# Patient Record
Sex: FEMALE | Race: BLACK OR AFRICAN AMERICAN | Employment: OTHER | URBAN - METROPOLITAN AREA
[De-identification: names, ages, dates, MRNs, and addresses within clinical notes are randomized per-mention and may not be internally consistent; named-entity substitution may affect disease eponyms.]

---

## 2018-08-06 ENCOUNTER — HOSPITAL ENCOUNTER (INPATIENT)
Age: 63
LOS: 7 days | Discharge: NURSING, REHAB OR LONG-TERM CARE | DRG: 673 | End: 2018-08-14
Attending: EMERGENCY MEDICINE | Admitting: FAMILY MEDICINE
Payer: MEDICARE

## 2018-08-06 DIAGNOSIS — N18.6 END STAGE RENAL DISEASE (HCC): ICD-10-CM

## 2018-08-06 DIAGNOSIS — N18.9 ACUTE ON CHRONIC RENAL INSUFFICIENCY: Primary | ICD-10-CM

## 2018-08-06 DIAGNOSIS — N28.9 ACUTE ON CHRONIC RENAL INSUFFICIENCY: Primary | ICD-10-CM

## 2018-08-06 DIAGNOSIS — T82.898A ARTERIOVENOUS FISTULA OCCLUSION (HCC): ICD-10-CM

## 2018-08-06 LAB
ANION GAP SERPL CALC-SCNC: 8 MMOL/L (ref 3–18)
BUN SERPL-MCNC: 68 MG/DL (ref 7–18)
BUN/CREAT SERPL: 11 (ref 12–20)
CALCIUM SERPL-MCNC: 8.5 MG/DL (ref 8.5–10.1)
CHLORIDE SERPL-SCNC: 118 MMOL/L (ref 100–108)
CO2 SERPL-SCNC: 25 MMOL/L (ref 21–32)
CREAT SERPL-MCNC: 6.12 MG/DL (ref 0.6–1.3)
GLUCOSE SERPL-MCNC: 140 MG/DL (ref 74–99)
POTASSIUM SERPL-SCNC: 3.8 MMOL/L (ref 3.5–5.5)
SODIUM SERPL-SCNC: 151 MMOL/L (ref 136–145)

## 2018-08-06 PROCEDURE — 99285 EMERGENCY DEPT VISIT HI MDM: CPT

## 2018-08-06 PROCEDURE — 85025 COMPLETE CBC W/AUTO DIFF WBC: CPT | Performed by: EMERGENCY MEDICINE

## 2018-08-06 PROCEDURE — 80048 BASIC METABOLIC PNL TOTAL CA: CPT | Performed by: EMERGENCY MEDICINE

## 2018-08-06 PROCEDURE — 81003 URINALYSIS AUTO W/O SCOPE: CPT | Performed by: EMERGENCY MEDICINE

## 2018-08-06 PROCEDURE — 93005 ELECTROCARDIOGRAM TRACING: CPT

## 2018-08-06 PROCEDURE — 81001 URINALYSIS AUTO W/SCOPE: CPT | Performed by: EMERGENCY MEDICINE

## 2018-08-06 NOTE — Clinical Note
TRANSFER - IN REPORT:  
 
Verbal report received from: [employer]. Report consisted of patient's Situation, Background, Assessment and  
Recommendations(SBAR). Opportunity for questions and clarification was provided. Assessment completed upon patient's arrival to unit and care assumed.

## 2018-08-06 NOTE — Clinical Note
Consent: signed. Proceeding with Tunnelled catheter. The right internal jugular vein accessed. A Dialysis (permanent) catheter will be used. Line secured using suture, tegaderm and Biopatch.

## 2018-08-06 NOTE — Clinical Note
TRANSFER - OUT REPORT:  
 
Verbal report given to: JEAN-CLAUDE rn. Report consisted of patient's Situation, Background, Assessment and  
Recommendations(SBAR). Opportunity for questions and clarification was provided.

## 2018-08-06 NOTE — Clinical Note
Right internal jugular vein. accessed successfully using ultrasound guidance.  Number of attempts =  1.

## 2018-08-06 NOTE — Clinical Note
Access obtained. Central venous catheter verified. Line secured using suture and other (document in comment page). Procedure was tolerated well.

## 2018-08-07 ENCOUNTER — APPOINTMENT (OUTPATIENT)
Dept: ULTRASOUND IMAGING | Age: 63
DRG: 673 | End: 2018-08-07
Attending: FAMILY MEDICINE
Payer: MEDICARE

## 2018-08-07 PROBLEM — I10 HYPERTENSION: Status: ACTIVE | Noted: 2018-08-07

## 2018-08-07 PROBLEM — N18.9 ACUTE ON CHRONIC RENAL INSUFFICIENCY: Status: ACTIVE | Noted: 2018-08-07

## 2018-08-07 PROBLEM — N28.9 ACUTE ON CHRONIC RENAL INSUFFICIENCY: Status: ACTIVE | Noted: 2018-08-07

## 2018-08-07 LAB
ALBUMIN SERPL-MCNC: 1.9 G/DL (ref 3.4–5)
ALBUMIN/GLOB SERPL: 0.5 {RATIO} (ref 0.8–1.7)
ALP SERPL-CCNC: 117 U/L (ref 45–117)
ALT SERPL-CCNC: 30 U/L (ref 13–56)
ANION GAP SERPL CALC-SCNC: 10 MMOL/L (ref 3–18)
APPEARANCE UR: CLEAR
APPEARANCE UR: CLEAR
AST SERPL-CCNC: 45 U/L (ref 15–37)
ATRIAL RATE: 78 BPM
BACTERIA URNS QL MICRO: ABNORMAL /HPF
BASOPHILS # BLD: 0 K/UL (ref 0–0.1)
BASOPHILS NFR BLD: 0 % (ref 0–2)
BILIRUB SERPL-MCNC: 0.2 MG/DL (ref 0.2–1)
BILIRUB UR QL: NEGATIVE
BILIRUB UR QL: NEGATIVE
BUN SERPL-MCNC: 60 MG/DL (ref 7–18)
BUN/CREAT SERPL: 10 (ref 12–20)
CALCIUM SERPL-MCNC: 8.2 MG/DL (ref 8.5–10.1)
CALCULATED P AXIS, ECG09: 59 DEGREES
CALCULATED R AXIS, ECG10: -28 DEGREES
CALCULATED T AXIS, ECG11: 35 DEGREES
CHLORIDE SERPL-SCNC: 117 MMOL/L (ref 100–108)
CO2 SERPL-SCNC: 17 MMOL/L (ref 21–32)
COLOR UR: YELLOW
COLOR UR: YELLOW
CREAT SERPL-MCNC: 5.85 MG/DL (ref 0.6–1.3)
CREAT UR-MCNC: 41.2 MG/DL (ref 30–125)
DIAGNOSIS, 93000: NORMAL
DIFFERENTIAL METHOD BLD: ABNORMAL
EOSINOPHIL # BLD: 0.2 K/UL (ref 0–0.4)
EOSINOPHIL NFR BLD: 2 % (ref 0–5)
EPITH CASTS URNS QL MICRO: ABNORMAL /LPF (ref 0–5)
EPITH CASTS URNS QL MICRO: NORMAL /LPF (ref 0–5)
ERYTHROCYTE [DISTWIDTH] IN BLOOD BY AUTOMATED COUNT: 18 % (ref 11.6–14.5)
GLOBULIN SER CALC-MCNC: 4.2 G/DL (ref 2–4)
GLUCOSE BLD STRIP.AUTO-MCNC: 145 MG/DL (ref 70–110)
GLUCOSE BLD STRIP.AUTO-MCNC: 188 MG/DL (ref 70–110)
GLUCOSE BLD STRIP.AUTO-MCNC: 201 MG/DL (ref 70–110)
GLUCOSE BLD STRIP.AUTO-MCNC: 74 MG/DL (ref 70–110)
GLUCOSE SERPL-MCNC: 155 MG/DL (ref 74–99)
GLUCOSE UR STRIP.AUTO-MCNC: 100 MG/DL
GLUCOSE UR STRIP.AUTO-MCNC: 250 MG/DL
HCT VFR BLD AUTO: 36.9 % (ref 35–45)
HGB BLD-MCNC: 12.3 G/DL (ref 12–16)
HGB UR QL STRIP: ABNORMAL
HGB UR QL STRIP: ABNORMAL
KETONES UR QL STRIP.AUTO: NEGATIVE MG/DL
KETONES UR QL STRIP.AUTO: NEGATIVE MG/DL
LEUKOCYTE ESTERASE UR QL STRIP.AUTO: NEGATIVE
LEUKOCYTE ESTERASE UR QL STRIP.AUTO: NEGATIVE
LYMPHOCYTES # BLD: 1.5 K/UL (ref 0.9–3.6)
LYMPHOCYTES NFR BLD: 14 % (ref 21–52)
MCH RBC QN AUTO: 27.6 PG (ref 24–34)
MCHC RBC AUTO-ENTMCNC: 33.3 G/DL (ref 31–37)
MCV RBC AUTO: 82.9 FL (ref 74–97)
MONOCYTES # BLD: 0.8 K/UL (ref 0.05–1.2)
MONOCYTES NFR BLD: 8 % (ref 3–10)
MUCOUS THREADS URNS QL MICRO: ABNORMAL /LPF
NEUTS SEG # BLD: 8 K/UL (ref 1.8–8)
NEUTS SEG NFR BLD: 76 % (ref 40–73)
NITRITE UR QL STRIP.AUTO: NEGATIVE
NITRITE UR QL STRIP.AUTO: NEGATIVE
P-R INTERVAL, ECG05: 124 MS
PH UR STRIP: 6.5 [PH] (ref 5–8)
PH UR STRIP: 6.5 [PH] (ref 5–8)
PLATELET # BLD AUTO: 286 K/UL (ref 135–420)
PLATELET COMMENTS,PCOM: ABNORMAL
PMV BLD AUTO: 12.1 FL (ref 9.2–11.8)
POTASSIUM SERPL-SCNC: 4.1 MMOL/L (ref 3.5–5.5)
PROT SERPL-MCNC: 6.1 G/DL (ref 6.4–8.2)
PROT UR STRIP-MCNC: >1000 MG/DL
PROT UR STRIP-MCNC: >1000 MG/DL
PROT UR-MCNC: 436 MG/DL
PROT/CREAT UR-RTO: 10.6
Q-T INTERVAL, ECG07: 352 MS
QRS DURATION, ECG06: 80 MS
QTC CALCULATION (BEZET), ECG08: 401 MS
RBC # BLD AUTO: 4.45 M/UL (ref 4.2–5.3)
RBC #/AREA URNS HPF: ABNORMAL /HPF (ref 0–5)
RBC #/AREA URNS HPF: NORMAL /HPF (ref 0–5)
RBC MORPH BLD: ABNORMAL
SODIUM SERPL-SCNC: 144 MMOL/L (ref 136–145)
SODIUM UR-SCNC: 40 MMOL/L (ref 20–110)
SP GR UR REFRACTOMETRY: 1.01 (ref 1–1.03)
SP GR UR REFRACTOMETRY: 1.01 (ref 1–1.03)
UROBILINOGEN UR QL STRIP.AUTO: 0.2 EU/DL (ref 0.2–1)
UROBILINOGEN UR QL STRIP.AUTO: 0.2 EU/DL (ref 0.2–1)
VENTRICULAR RATE, ECG03: 78 BPM
WBC # BLD AUTO: 10.5 K/UL (ref 4.6–13.2)
WBC URNS QL MICRO: ABNORMAL /HPF (ref 0–4)
WBC URNS QL MICRO: NORMAL /HPF (ref 0–4)

## 2018-08-07 PROCEDURE — 74011636637 HC RX REV CODE- 636/637: Performed by: FAMILY MEDICINE

## 2018-08-07 PROCEDURE — 74011250637 HC RX REV CODE- 250/637: Performed by: FAMILY MEDICINE

## 2018-08-07 PROCEDURE — 80053 COMPREHEN METABOLIC PANEL: CPT | Performed by: INTERNAL MEDICINE

## 2018-08-07 PROCEDURE — 65270000029 HC RM PRIVATE

## 2018-08-07 PROCEDURE — 84300 ASSAY OF URINE SODIUM: CPT | Performed by: INTERNAL MEDICINE

## 2018-08-07 PROCEDURE — 84156 ASSAY OF PROTEIN URINE: CPT | Performed by: INTERNAL MEDICINE

## 2018-08-07 PROCEDURE — 74011250636 HC RX REV CODE- 250/636: Performed by: INTERNAL MEDICINE

## 2018-08-07 PROCEDURE — 82962 GLUCOSE BLOOD TEST: CPT

## 2018-08-07 PROCEDURE — 81001 URINALYSIS AUTO W/SCOPE: CPT | Performed by: INTERNAL MEDICINE

## 2018-08-07 PROCEDURE — 36415 COLL VENOUS BLD VENIPUNCTURE: CPT | Performed by: INTERNAL MEDICINE

## 2018-08-07 PROCEDURE — 76770 US EXAM ABDO BACK WALL COMP: CPT

## 2018-08-07 RX ORDER — DILTIAZEM HYDROCHLORIDE 240 MG/1
240 CAPSULE, COATED, EXTENDED RELEASE ORAL DAILY
Status: DISCONTINUED | OUTPATIENT
Start: 2018-08-07 | End: 2018-08-14 | Stop reason: HOSPADM

## 2018-08-07 RX ORDER — HYDRALAZINE HYDROCHLORIDE 25 MG/1
25 TABLET, FILM COATED ORAL 3 TIMES DAILY
Status: DISCONTINUED | OUTPATIENT
Start: 2018-08-07 | End: 2018-08-10

## 2018-08-07 RX ORDER — SIMVASTATIN 20 MG/1
20 TABLET, FILM COATED ORAL
Status: DISCONTINUED | OUTPATIENT
Start: 2018-08-07 | End: 2018-08-14 | Stop reason: HOSPADM

## 2018-08-07 RX ORDER — PARICALCITOL 1 UG/1
1 CAPSULE, LIQUID FILLED ORAL EVERY OTHER DAY
Status: DISCONTINUED | OUTPATIENT
Start: 2018-08-07 | End: 2018-08-08

## 2018-08-07 RX ORDER — ACETAMINOPHEN 325 MG/1
325 TABLET ORAL
Status: DISCONTINUED | OUTPATIENT
Start: 2018-08-07 | End: 2018-08-14 | Stop reason: HOSPADM

## 2018-08-07 RX ORDER — INSULIN LISPRO 100 [IU]/ML
INJECTION, SOLUTION INTRAVENOUS; SUBCUTANEOUS
Status: DISCONTINUED | OUTPATIENT
Start: 2018-08-07 | End: 2018-08-14 | Stop reason: HOSPADM

## 2018-08-07 RX ORDER — DEXTROSE 50 % IN WATER (D50W) INTRAVENOUS SYRINGE
25-50 AS NEEDED
Status: DISCONTINUED | OUTPATIENT
Start: 2018-08-07 | End: 2018-08-14 | Stop reason: HOSPADM

## 2018-08-07 RX ORDER — SODIUM CHLORIDE 9 MG/ML
75 INJECTION, SOLUTION INTRAVENOUS CONTINUOUS
Status: DISCONTINUED | OUTPATIENT
Start: 2018-08-07 | End: 2018-08-10

## 2018-08-07 RX ORDER — MAGNESIUM SULFATE 100 %
16 CRYSTALS MISCELLANEOUS AS NEEDED
Status: DISCONTINUED | OUTPATIENT
Start: 2018-08-07 | End: 2018-08-14 | Stop reason: HOSPADM

## 2018-08-07 RX ORDER — CLONIDINE HYDROCHLORIDE 0.1 MG/1
0.1 TABLET ORAL 2 TIMES DAILY
Status: DISCONTINUED | OUTPATIENT
Start: 2018-08-07 | End: 2018-08-14 | Stop reason: HOSPADM

## 2018-08-07 RX ADMIN — HYDRALAZINE HYDROCHLORIDE 25 MG: 25 TABLET, FILM COATED ORAL at 10:48

## 2018-08-07 RX ADMIN — INSULIN LISPRO 4 UNITS: 100 INJECTION, SOLUTION INTRAVENOUS; SUBCUTANEOUS at 14:03

## 2018-08-07 RX ADMIN — SODIUM CHLORIDE 75 ML/HR: 900 INJECTION, SOLUTION INTRAVENOUS at 12:36

## 2018-08-07 RX ADMIN — INSULIN LISPRO 2 UNITS: 100 INJECTION, SOLUTION INTRAVENOUS; SUBCUTANEOUS at 17:45

## 2018-08-07 RX ADMIN — HYDRALAZINE HYDROCHLORIDE 25 MG: 25 TABLET, FILM COATED ORAL at 17:18

## 2018-08-07 RX ADMIN — PARICALCITOL 1 MCG: 1 CAPSULE, LIQUID FILLED ORAL at 11:00

## 2018-08-07 RX ADMIN — DILTIAZEM HYDROCHLORIDE 240 MG: 240 CAPSULE, COATED, EXTENDED RELEASE ORAL at 10:48

## 2018-08-07 RX ADMIN — CLONIDINE HYDROCHLORIDE 0.1 MG: 0.1 TABLET ORAL at 10:48

## 2018-08-07 RX ADMIN — HYDRALAZINE HYDROCHLORIDE 25 MG: 25 TABLET, FILM COATED ORAL at 23:15

## 2018-08-07 RX ADMIN — SIMVASTATIN 20 MG: 20 TABLET, FILM COATED ORAL at 23:15

## 2018-08-07 RX ADMIN — CLONIDINE HYDROCHLORIDE 0.1 MG: 0.1 TABLET ORAL at 17:18

## 2018-08-07 NOTE — ED PROVIDER NOTES
[address] [id number] [facility]      11:03 PM    Date: 8/6/2018  Patient Name: Mo Sanchez    History of Presenting Illness     Chief Complaint   Patient presents with    Abnormal Lab Results       History Provided By: Patient and Caregiver    Chief Complaint: Abnormal Lab Results  Duration: 3 Hours  Timing:  Acute  Location: N/A  Quality: N/A  Severity: N/A  Modifying Factors: Nothing makes it better or worse  Associated Symptoms: denies any other associated signs or symptoms    70 y.o. female with noted past medical history of asthma, diabetes, HTN, and chronic kidney disease who presents to the emergency department from here caregiver that said that Dr. Sukumar Holly sent her over to the ER because she has some renal insufficiency and for further evaluation. As the patient is without physical symptoms or complaints of pain, there is no severity of pain, quality of pain, duration, modifying factors, or associated signs and symptoms regarding the pt's presenting complaint. No other concerns or symptoms at this time. No other complaints. Nursing notes regarding the HPI and triage nursing notes were reviewed. Prior medical records were reviewed.      Current Facility-Administered Medications   Medication Dose Route Frequency Provider Last Rate Last Dose    losartan (COZAAR) tablet 50 mg  50 mg Oral DAILY Juan Dickson MD   50 mg at 08/08/18 1411    epoetin raphael (EPOGEN;PROCRIT) injection 5,200 Units  5,200 Units IntraVENous DIALYSIS TUE, THU & SAT Juan Dickson MD        doxercalciferol (HECTOROL) 4 mcg/2 mL injection 1 mcg  1 mcg IntraVENous Q TU, TH & SAT Juan Dickson MD        insulin glargine (LANTUS) injection 10 Units  10 Units SubCUTAneous QHS Mandy Cao MD   10 Units at 08/08/18 2220    acetaminophen (TYLENOL) tablet 325 mg  325 mg Oral Q4H PRN Mandy Cao MD        dilTIAZem CD (CARDIZEM CD) capsule 240 mg  240 mg Oral DAILY Mandy Cao MD   240 mg at 08/08/18 0800    cloNIDine HCl (CATAPRES) tablet 0.1 mg  0.1 mg Oral BID Shauna Taylor MD   0.1 mg at 08/08/18 1817    hydrALAZINE (APRESOLINE) tablet 25 mg  25 mg Oral TID Shauna Taylor MD   25 mg at 08/08/18 2220    simvastatin (ZOCOR) tablet 20 mg  20 mg Oral QHS Shauna Taylor MD   20 mg at 08/08/18 2222    insulin lispro (HUMALOG) injection   SubCUTAneous AC&HS Shauna Taylor MD   4 Units at 08/08/18 2221    glucose chewable tablet 16 g  16 g Oral PRN Shauna Taylor MD        glucagon [facility]) injection 1 mg  1 mg IntraMUSCular PRN Shauna Taylor MD        dextrose (D50W) injection syrg 12.5-25 g  25-50 mL IntraVENous PRN Shauna Taylor MD   25 g at 08/09/18 0608    0.9% sodium chloride infusion  75 mL/hr IntraVENous CONTINUOUS Reyes Kelley MD 75 mL/hr at 08/09/18 0212 75 mL/hr at 08/09/18 0212       Past History     Past Medical History:  Past Medical History:   Diagnosis Date    Asthma     Bipolar affective disorder ([facility] [address]     Brain condition     Cataract     Chronic kidney disease     Diabetes ([facility] [address]     Hyperlipidemia     Hypertension     Paralytic strabismus, unspecified     Psychiatric disorder        Past Surgical History:  No past surgical history on file. Family History:  No family history on file. Social History:  Social History   Substance Use Topics    Smoking status: Never Smoker    Smokeless tobacco: Never Used    Alcohol use No       Allergies: Allergies   Allergen Reactions    Amoxicillin Unknown (comments)    Cleocin [Clindamycin Hcl] Unknown (comments)    Codeine Unknown (comments)    Lorcet 10/650 [Hydrocodone-Acetaminophen] Unknown (comments)    Penicillin G Benzathine Unknown (comments)    Shellfish Derived Unknown (comments)       Patient's primary care provider (as noted in EPIC):  Reanna Olson MD    Review of Systems   Constitutional: Negative for diaphoresis. HENT: Negative for congestion. Eyes: Negative for discharge.    Respiratory: Negative for stridor. Cardiovascular: Negative for palpitations. Gastrointestinal: Negative for diarrhea. Genitourinary: Negative for flank pain. Musculoskeletal: Negative for back pain. Neurological: Negative for weakness. Psychiatric/Behavioral: Negative for hallucinations. All other systems reviewed and are negative. Visit Vitals    /86 (BP 1 Location: Left arm, BP Patient Position: At rest)    Pulse 75    Temp 97.5 °F (36.4 °C)    Resp 20    Ht 5' 3\" (1.6 m)    Wt 65.7 kg (144 lb 13.5 oz)    SpO2 98%    Breastfeeding No    BMI 25.66 kg/m2       PHYSICAL EXAM:    CONSTITUTIONAL: Alert, in no apparent distress; well-developed; well-nourished. HEAD:  Normocephalic, atraumatic. No Battles sign. No Raccoons eyes. EYES:  EOM's intact. Normal conjunctiva. Anicteric sclera. ENTM: Nose: no rhinorrhea; Oropharynx:  mucous membranes moist  Neck:  No JVD. No cervical vertebral bony point tenderness or step-off. RESP: Chest clear, equal breath sounds. CARDIOVASCULAR:  Regular rate and rhythm. No murmurs, rubs, or gallops. GI: Normal bowel sounds, abdomen soft and non-tender. No masses or organomegaly. : No costo-vertebral angle tenderness. BACK:  No TLS vertebral bony point tenderness or step-off. UPPER EXT:  Normal inspection  LOWER EXT: No edema, no calf tenderness. Distal pulses intact. NEURO: Grossly normal motor and sensation. SKIN: No rashes; Normal for age and stage. PSYCH:  Alert and oriented, normal affect.     Diagnostic Study Results     Abnormal lab results from this emergency department encounter:  Labs Reviewed   CBC WITH AUTOMATED DIFF - Abnormal; Notable for the following:        Result Value    RDW 18.0 (*)     MPV 12.1 (*)     NEUTROPHILS 76 (*)     LYMPHOCYTES 14 (*)     All other components within normal limits   METABOLIC PANEL, BASIC - Abnormal; Notable for the following:     Sodium 151 (*)     Chloride 118 (*)     Glucose 140 (*)     BUN 68 (*) Creatinine 6.12 (*)     BUN/Creatinine ratio 11 (*)     GFR est AA 8 (*)     GFR est non-AA 7 (*)     All other components within normal limits   URINALYSIS W/ RFLX MICROSCOPIC - Abnormal; Notable for the following:     Protein >1000 (*)     Glucose 250 (*)     Blood TRACE (*)     All other components within normal limits   METABOLIC PANEL, COMPREHENSIVE - Abnormal; Notable for the following:     Chloride 117 (*)     CO2 17 (*)     Glucose 155 (*)     BUN 60 (*)     Creatinine 5.85 (*)     BUN/Creatinine ratio 10 (*)     GFR est AA 9 (*)     GFR est non-AA 7 (*)     Calcium 8.2 (*)     AST (SGOT) 45 (*)     Protein, total 6.1 (*)     Albumin 1.9 (*)     Globulin 4.2 (*)     A-G Ratio 0.5 (*)     All other components within normal limits   URINALYSIS W/ RFLX MICROSCOPIC - Abnormal; Notable for the following:     Protein >1000 (*)     Glucose 100 (*)     Blood TRACE (*)     All other components within normal limits   PROTEIN/CREATININE RATIO, URINE - Abnormal; Notable for the following:     Protein, urine random 436 (*)     All other components within normal limits   URINE MICROSCOPIC ONLY - Abnormal; Notable for the following:     Bacteria FEW (*)     Mucus 1+ (*)     All other components within normal limits   CBC WITH AUTOMATED DIFF - Abnormal; Notable for the following:     RBC 3.89 (*)     HGB 10.5 (*)     HCT 32.9 (*)     RDW 17.7 (*)     MPV 11.9 (*)     NEUTROPHILS 75 (*)     LYMPHOCYTES 18 (*)     All other components within normal limits   METABOLIC PANEL, BASIC - Abnormal; Notable for the following:     Sodium 150 (*)     Chloride 117 (*)     Glucose 108 (*)     BUN 58 (*)     Creatinine 5.60 (*)     BUN/Creatinine ratio 10 (*)     GFR est AA 9 (*)     GFR est non-AA 8 (*)     Calcium 8.1 (*)     All other components within normal limits   PTH INTACT - Abnormal; Notable for the following:     Calcium 7.7 (*)     PTH, Intact 585.1 (*)     All other components within normal limits   CBC WITH AUTOMATED DIFF - Abnormal; Notable for the following:     RBC 4.12 (*)     HGB 11.3 (*)     HCT 34.4 (*)     RDW 17.3 (*)     All other components within normal limits   METABOLIC PANEL, BASIC - Abnormal; Notable for the following:     Sodium 150 (*)     Potassium 3.3 (*)     Chloride 120 (*)     Glucose 49 (*)     BUN 49 (*)     Creatinine 5.25 (*)     BUN/Creatinine ratio 9 (*)     GFR est AA 10 (*)     GFR est non-AA 8 (*)     Calcium 8.2 (*)     All other components within normal limits   GLUCOSE, POC - Abnormal; Notable for the following:     Glucose (POC) 145 (*)     All other components within normal limits   GLUCOSE, POC - Abnormal; Notable for the following:     Glucose (POC) 201 (*)     All other components within normal limits   GLUCOSE, POC - Abnormal; Notable for the following:     Glucose (POC) 188 (*)     All other components within normal limits   GLUCOSE, POC - Abnormal; Notable for the following:     Glucose (POC) 173 (*)     All other components within normal limits   GLUCOSE, POC - Abnormal; Notable for the following:     Glucose (POC) 234 (*)     All other components within normal limits   GLUCOSE, POC - Abnormal; Notable for the following:     Glucose (POC) 141 (*)     All other components within normal limits   GLUCOSE, POC - Abnormal; Notable for the following:     Glucose (POC) 236 (*)     All other components within normal limits   GLUCOSE, POC - Abnormal; Notable for the following:     Glucose (POC) 50 (*)     All other components within normal limits   GLUCOSE, POC - Abnormal; Notable for the following:     Glucose (POC) 191 (*)     All other components within normal limits   URINE MICROSCOPIC ONLY   SODIUM, UR, RANDOM   MAGNESIUM   PHOSPHORUS   PHOSPHORUS   CBC WITH AUTOMATED DIFF   PHOSPHORUS   HEP B SURFACE AB   HEP B SURFACE AG   GLUCOSE, POC   GLUCOSE, POC       Lab values for this patient within approximately the last 12 hours:  Recent Results (from the past 12 hour(s))   GLUCOSE, POC    Collection Time: 08/08/18  9:58 PM   Result Value Ref Range    Glucose (POC) 236 (H) 70 - 110 mg/dL   CBC WITH AUTOMATED DIFF    Collection Time: 08/09/18  2:42 AM   Result Value Ref Range    WBC 7.1 4.6 - 13.2 K/uL    RBC 4.12 (L) 4.20 - 5.30 M/uL    HGB 11.3 (L) 12.0 - 16.0 g/dL    HCT 34.4 (L) 35.0 - 45.0 %    MCV 83.5 74.0 - 97.0 FL    MCH 27.4 24.0 - 34.0 PG    MCHC 32.8 31.0 - 37.0 g/dL    RDW 17.3 (H) 11.6 - 14.5 %    PLATELET 442 234 - 973 K/uL    MPV 10.9 9.2 - 11.8 FL    NEUTROPHILS 66 40 - 73 %    LYMPHOCYTES 23 21 - 52 %    MONOCYTES 8 3 - 10 %    EOSINOPHILS 3 0 - 5 %    BASOPHILS 0 0 - 2 %    ABS. NEUTROPHILS 4.7 1.8 - 8.0 K/UL    ABS. LYMPHOCYTES 1.7 0.9 - 3.6 K/UL    ABS. MONOCYTES 0.6 0.05 - 1.2 K/UL    ABS. EOSINOPHILS 0.2 0.0 - 0.4 K/UL    ABS. BASOPHILS 0.0 0.0 - 0.1 K/UL    DF AUTOMATED     METABOLIC PANEL, BASIC    Collection Time: 08/09/18  2:42 AM   Result Value Ref Range    Sodium 150 (H) 136 - 145 mmol/L    Potassium 3.3 (L) 3.5 - 5.5 mmol/L    Chloride 120 (H) 100 - 108 mmol/L    CO2 21 21 - 32 mmol/L    Anion gap 9 3.0 - 18 mmol/L    Glucose 49 (LL) 74 - 99 mg/dL    BUN 49 (H) 7.0 - 18 MG/DL    Creatinine 5.25 (H) 0.6 - 1.3 MG/DL    BUN/Creatinine ratio 9 (L) 12 - 20      GFR est AA 10 (L) >60 ml/min/1.73m2    GFR est non-AA 8 (L) >60 ml/min/1.73m2    Calcium 8.2 (L) 8.5 - 10.1 MG/DL   PHOSPHORUS    Collection Time: 08/09/18  2:42 AM   Result Value Ref Range    Phosphorus 4.7 2.5 - 4.9 MG/DL   GLUCOSE, POC    Collection Time: 08/09/18  3:28 AM   Result Value Ref Range    Glucose (POC) 50 (LL) 70 - 110 mg/dL   GLUCOSE, POC    Collection Time: 08/09/18  3:55 AM   Result Value Ref Range    Glucose (POC) 191 (H) 70 - 110 mg/dL       Radiologist and cardiologist interpretations if available at time of this note:  No results found.     Medication(s) ordered for patient during this emergency visit encounter:  Medications   acetaminophen (TYLENOL) tablet 325 mg (not administered)   dilTIAZem CD (CARDIZEM CD) capsule 240 mg (240 mg Oral Given 8/8/18 0800)   cloNIDine HCl (CATAPRES) tablet 0.1 mg (0.1 mg Oral Given 8/8/18 1817)   hydrALAZINE (APRESOLINE) tablet 25 mg (25 mg Oral Given 8/8/18 2220)   simvastatin (ZOCOR) tablet 20 mg (20 mg Oral Given 8/8/18 2222)   insulin lispro (HUMALOG) injection (4 Units SubCUTAneous Given 8/8/18 2221)   glucose chewable tablet 16 g (not administered)   glucagon (GLUCAGEN) injection 1 mg (not administered)   dextrose (D50W) injection syrg 12.5-25 g (25 g IntraVENous Given 8/9/18 0332)   0.9% sodium chloride infusion (75 mL/hr IntraVENous New Bag 8/9/18 0212)   losartan (COZAAR) tablet 50 mg (50 mg Oral Given 8/8/18 1411)   epoetin raphael (EPOGEN;PROCRIT) injection 5,200 Units (not administered)   doxercalciferol (HECTOROL) 4 mcg/2 mL injection 1 mcg (not administered)   insulin glargine (LANTUS) injection 10 Units (10 Units SubCUTAneous Given 8/8/18 2220)       Medical Decision Making     I am the first provider for this patient. I reviewed the vital signs, available nursing notes, past medical history, past surgical history, family history and social history. Vital Signs:  Reviewed the patient's vital signs. ED COURSE:      Admit to Patient's Physician or Admitting Physician covering for Patient's Physician    The patient was presented to the accepting physician, Dr. Rima Townsend, who is covering for the patient's primary care physician service or is the patient's primary care physician, Reanna Olson MD.    As the emergency physician, I wrote courtesy admit orders for the accepting physician. The courtesy orders included explicit instructions for the floor nursing staff to call the admitting attending physician upon patient arrival on the floor.        IMPRESSION AND MEDICAL DECISION MAKING:  Based upon the patients presentation with noted HPI and PE, along with the work up done in the emergency department, I believe that the patient has a well adult exam, with no signficant emergent medical issues. DIAGNOSIS:  1. Acute on chronic renal insufficiency. Coding Diagnoses     Clinical Impression:   1. Acute on chronic renal insufficiency    2. Arteriovenous fistula occlusion (HCC)    3. End stage renal disease ([facility] [address]        Disposition     Disposition:  Admit. ROLANDO Ortiz Board Certified Emergency Physician    Provider Attestation:  If a scribe was utilized in generation of this patient record, I personally performed the services described in the documentation, reviewed the documentation, as recorded by the scribe in my presence, and it accurately records the patient's history of presenting illness, review of systems, patient physical examination, and procedures performed by me as the attending physician. ROLANDO Ortiz Board Certified Emergency Physician  8/6/2018.  11:03 PM    Scribe Attestation     Hadley Saravanan acting as a scribe for and in the presence of Gerber Prince MD      August 06, 2018 at 11:11 PM       Provider Attestation:      I personally performed the services described in the documentation, reviewed the documentation, as recorded by the scribe in my presence, and it accurately and completely records my words and actions.  August 06, 2018 at 11:11 PM - Gerber Prince MD

## 2018-08-07 NOTE — CONSULTS
[address] [facility]    Stu Moss  MR#: [id number]  : 1955  ACCOUNT #: [de-identified]   DATE OF SERVICE: 2018    Consult was requested by Dr. Camron Snowden. REASON FOR CONSULTATION:  Worsening renal function. HISTORY OF PRESENT ILLNESS:  This is a 49-year-old [facility] American female, a nursing home resident who is known to me for at least 15 years or more for her renal issue. She has a history of stage IV chronic renal failure from underlying type 2 diabetes mellitus and hypertension. Usually she sees me every 2-3 months. Last time she was seen by me on . During that time, her serum creatinine was 3.37 but she also fluctuates, sometimes it goes more than 4 as reflected in 2018. Her creatinine was 4.14. In fact, her recent lab was faxed to my office yesterday when I was notified that her creatinine has increased up to  6. For that reason, I advised the nursing home to send the patient to the emergency room for further evaluation and the repeat lab also showed the same kind of abnormal chemistry with a serum creatinine of 6.12. In hospital the last time when she was here in 2013, her creatinine was 2.73, but definitely she has advanced stage of stage IV chronic kidney disease and her creatinine was 2.9 in , but has increased progressively to more than 4 most of the time. When you have seen the patient and this morning, she was in bed at her baseline mental status. Denied any complaint, any chest pain, any shortness of breath. She said that she was vomiting for the last few days, but her appetite is good. She does not have any urinary complaints. No fall and no change of medications. It is worth to mention that the patient also has a significant proteinuria in the nephrotic range from diabetes mellitus.   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 SOCIAL HISTORY:  Nursing home resident but also she has support from her family    MEDICATIONS:  The list of medications before she was admitted were as follows:  Clonidine 0.1 mg 2 times daily, Cardizem 240 mg daily, hydralazine 25 mg 3 times daily, Humalog insulin, Zemplar, Zocor. PHYSICAL EXAMINATION:  VITAL SIGNS:  Temperature 98.6, heart rate 89, blood pressure 182/85, 172/38. Weight is 64.4 kilograms. HEENT:  Oral mucosa moist.    NECK:  Her jugular venous pressure nondistended. LUNGS:  Good air entry in both sides. HEART:  S1, S2, without any gallop or murmur. ABDOMEN:  Soft. No palpable mass. EXTREMITIES:  Ankles negative for edema. LABORATORY DATA:  Relevant labs that is being done are as follows:  WBC of 10.5 with hemoglobin of 12.3, hematocrit of 36.9, platelet of 004. Chemistry:  Sodium 151, potassium 3.8, chloride 118, CO2 of 25, glucose of 140, BUN of 68, creatinine of 6.12, calcium of 8.5. EGFR is only 8 mL per minute. Urinalysis on admission, specific gravity of 1.012, pH of 6.5, protein more than 1 gram, glucose 250, blood trace, ketones negative, leukocyte esterase negative, WBC 0-1, RBC 0-2. CT of the head without contrast shows no acute abnormality that was in 01/2013. EKG was done, shows normal sinus rhythm, normal EKG. ASSESSMENT:  1. Worsening renal failure. 2.  Hyponatremia. 3.  History of chronic kidney disease, advanced stage IV. 4.  Hypertension. 5.  Type 2 diabetes mellitus. 6.  Secondary hyperparathyroidism.     PLAN:  Patient's ACE inhibitor captopril will be discontinued given the worsening renal function though she has diabetic nephropathy we will hydrate with fluid to see if her renal functions improve or not.  If the renal function does not improve, then possibly we need to start on dialysis on this patient which I discussed with the patient this morning. Further recommendations will be given based on the hospital course.       MD STACIE Jaffe / TRIXIE  D: 08/07/2018 12:24     T: 08/07/2018 14:23  JOB #: [id number]

## 2018-08-07 NOTE — PROGRESS NOTES
POTENTIAL DRUG  DRUG INTERACTION  AND                  RISK FOR MYOPATHY, INCLUDING RHABDOMYOLYSIS     This patient had been prescribed Simvastatin (Zocor) concomitantly with one or more of the following medications that increases plasma levels of Zocor:     o Calcium Channel Blocker (Amlodipine, Verapamil, or Diltiazem)    o Amiodarone       Coadministration may result in elevated plasma levels of simvastatin, increasing the risk of toxicity; therefore, close monitoring for signs or symptoms of myopathy or rhabdomyolysis is warranted.  Considerations for avoiding this DDI may include:    o Converting the patient from Zocor to an equivalent dose of another statin that does not have this DDI (Crestor, Pravachol, or Lipitor). Avoid the use of Lipitor with Verapamil, Diltiazem, or Amiodarone. (See below for Statin Dose Equivalent chart)      o Reducing the dose of Zocor so that it does not exceed 10 mg/day if coadministration of Zocor is necessary with patients taking Verapamil or Diltiazem     Since this is a rapidly occurring interaction prescribers are to use their clinical judgement in deciding whether to continue Zocor with Amlodipine, Verapamil, Diltiazem, or Amiodarone in patients who have tolerated their concomitant use.   Please monitor your patient as clinically appropriate.                           STATIN DOSE EQUIVALENT CHART        Statin Daily Adult Dose Providing Similar Average LDL-lowering (%)   Simvastatin   (Zocor)      5mg  (26%)    5-10mg  (26-30%)     20mg  (38%)    40mg  (29-41%)     80mg  (36-47%)       Pravastatin  (Pravachol)     10mg  (22%)     20mg  (32%)     40-80mg  (34-37%)     80mg  (37%)     -----       Atovastatin*  (Lipitor)     ----     ----     10mg  (35-39%)     20mg  (43%)     40mg  (50%)       Rosuvastatin  (Crestor)     -----     -----     -----     5mg  (45%)     10-20mg  (46-55%)       *Avoid the use of Lipitor with Verapamil, Diltiazem, or Amiodarone      References:  1) ZocorÒ [package insert]. [address]; October 2011  2) FDA Drug Safety Communication:  New restrictions, contraindications, and dose limitations for Zocor (Simvastatin) to reduce the risk of muscle injury. December 15, 2011. US Food and Drug Administration website:  [employer]. Accessed Jan 31, 2012. 3) Clinically significant statin drug interactions. Pharmacist's Letter/Prescriber's Letter [id number].

## 2018-08-07 NOTE — PROGRESS NOTES
IDR/SLIDR Summary          Patient: Yamileth Loza MRN: [id number]    Age: 70 y.o. YOB: 1955 Room/Bed: [facility]   Admit Diagnosis: Acute on chronic renal insufficiency  Principal Diagnosis: <principal problem not specified>   Goals: SNF  Readmission: NO  Quality Measure: Not applicable  VTE Prophylaxis: Mechanical  Influenza Vaccine screening completed? YES  Pneumococcal Vaccine screening completed? YES  Mobility needs: Yes   Nutrition plan:Yes  Consults: P. T and O.T. Financial concerns:No  Escalated to CM? YES  RRAT Score: 3   [facility]   Testing due for pt today?  YES  LOS: 0 days Expected length of stay 3 days  Discharge plan: SNF   PCP: Reanna Olson MD  Transportation needs: Yes    Days before discharge:two or more days before discharge   Discharge disposition: SNF    Signed:     Lula Valles  8/7/2018  1:32 PM

## 2018-08-07 NOTE — PROGRESS NOTES
Consult dictated, discussed with her, she is following with me for at least 15 years or more . I am the one who send the patient to ER & also requested ER physician to notify me when she comes to ER.

## 2018-08-07 NOTE — PROGRESS NOTES
Problem: Falls - Risk of  Goal: *Absence of Falls  Document Oscar Fall Risk and appropriate interventions in the flowsheet. Outcome: Progressing Towards Goal  Fall Risk Interventions:  Mobility Interventions: Bed/chair exit alarm, Patient to call before getting OOB    Mentation Interventions: Bed/chair exit alarm, Door open when patient unattended    Medication Interventions: Bed/chair exit alarm, Patient to call before getting OOB, Teach patient to arise slowly    Elimination Interventions: Bed/chair exit alarm, Call light in reach             Problem: Pressure Injury - Risk of  Goal: *Prevention of pressure injury  Document Ab Scale and appropriate interventions in the flowsheet.    Outcome: Progressing Towards Goal  Pressure Injury Interventions:  Sensory Interventions: Avoid rigorous massage over bony prominences, Keep linens dry and wrinkle-free         Activity Interventions: Increase time out of bed    Mobility Interventions: Float heels    Nutrition Interventions: Document food/fluid/supplement intake, Offer support with meals,snacks and hydration

## 2018-08-07 NOTE — PROGRESS NOTES
Bedside shift change report given to Neville Henderson RN (oncoming nurse) by Mahin Mortensen (offgoing nurse). Report included the following information SBAR.

## 2018-08-07 NOTE — PROGRESS NOTES
Simvastatin 20mg was therapeutically interchanged for lovastatin 40mg per the P&T Committee approved Therapeutic Interchanges Policy.     Curt Sweeney [facility], Pharmacist  8/7/2018 10:18 AM

## 2018-08-07 NOTE — H&P
History and Physical    Patient: Uli Telles MRN: [id number]  SSN: [id number]    YOB: 1955  Age: 70 y.o. Sex: female      Subjective: Uli Telles is a 70 y.o. female who was sent to the ER from the nursing home due to increased creatinine with evidence acute on chronic renal failure and lethargy. Creatine worse than previously. Patient is poor historian. Will admit for acute on chronic renal failure. Past Medical History:   Diagnosis Date    Asthma     Bipolar affective disorder ([facility] [address]     Brain condition     Cataract     Chronic kidney disease     Diabetes ([facility] [address]     Hyperlipidemia     Hypertension     Paralytic strabismus, unspecified     Psychiatric disorder      No past surgical history on file. No family history on file. Social History   Substance Use Topics    Smoking status: Never Smoker    Smokeless tobacco: Never Used    Alcohol use No      Prior to Admission medications    Medication Sig Start Date End Date Taking? Authorizing Provider   aspirin 81 mg tablet Take 81 mg by mouth daily. Elina Light MD   captopril (CAPOTEN) 50 mg tablet Take 50 mg by mouth three (3) times daily. Elina Light MD   diltiazem CD (CARDIZEM CD) 240 mg ER capsule Take 240 mg by mouth daily. Elina Light MD   cloNIDine (CATAPRES) 0.1 mg tablet Take 0.1 mg by mouth two (2) times a day. Elina Light MD   furosemide (LASIX) 80 mg tablet Take 80 mg by mouth daily. Elina Light MD   hydrALAZINE (APRESOLINE) 25 mg tablet Take 25 mg by mouth three (3) times daily. Elina Light MD   lovastatin (MEVACOR) 40 mg tablet Take 40 mg by mouth nightly. Elina Light MD   magnesium hydroxide ([employer] MILK OF MAGNESIA) 400 mg/5 mL suspension Take 30 mL by mouth daily as needed. Elina Light MD   insulin aspart (NOVOLOG) 100 unit/mL injection 12 Units by SubCUTAneous route. Elina Light MD   acetaminophen (TYLENOL) 325 mg tablet Take 325 mg by mouth as needed.       Elina Other, MD   paricalcitol (ZEMPLAR) 1 mcg capsule Take 1 mcg by mouth every other day. Phys Other, MD   potassium chloride SR (KLOR-CON 10) 10 mEq tablet Take 2 Tabs by mouth daily. 1/2/13   Narciso Bamberger, NP        Allergies   Allergen Reactions    Amoxicillin Unknown (comments)    Cleocin [Clindamycin Hcl] Unknown (comments)    Codeine Unknown (comments)    Lorcet 10/650 [Hydrocodone-Acetaminophen] Unknown (comments)    Penicillin G Benzathine Unknown (comments)    Shellfish Derived Unknown (comments)       Review of Systems:  Review of systems not obtained due to patient factors. Objective:     Vitals:    08/07/18 0354 08/07/18 0523 08/07/18 0802 08/07/18 1317   BP: (!) 183/91 184/81 182/85 (!) 182/99   Pulse: 82 76 89 98   Resp: 18 18 18   Temp: 98.3 °F (36.8 °C)  98.6 °F (37 °C) 98.1 °F (36.7 °C)   SpO2: 99%   93%   Weight:       Height:            Physical Exam:  GENERAL: alert, fatigued, no distress, slowed mentation, appears older than stated age  THROAT & NECK: normal and no erythema or exudates noted. LUNG: clear to auscultation bilaterally  HEART: regular rate and rhythm  ABDOMEN: soft, non-tender. Bowel sounds normal. No masses,  no organomegaly  EXTREMITIES:  extremities normal, atraumatic, no cyanosis or edema, no edema    Assessment:     Hospital Problems  Date Reviewed: 8/7/2018          Codes Class Noted POA    Acute on chronic renal insufficiency ICD-10-CM: N28.9, N18.9  ICD-9-CM: 593.9, 585.9  8/7/2018 Unknown        Hypertension ICD-10-CM: I10  ICD-9-CM: 401.9  8/7/2018 Unknown              Plan:     [facility]  Nephrology consult. Gentle hydration stop ace and lasix renal ultrasound.     Signed By: Bobby Posada MD     August 7, 2018

## 2018-08-07 NOTE — CONSULTS
Consult Note  Consult requested by:dr Princess York is a 70 y.o. female [address] who is being seen on consult for renal failure  Chief Complaint   Patient presents with    Abnormal Lab Results     Admission diagnosis: <principal problem not specified>     HPI: 70 y o  [place] female admitted with worsening renal failure. pt has hx of htn,dm,crf stage 4,no old records in [facility]. pt is very poor historian. apparently was taking captopril and lasix at home  Past Medical History:   Diagnosis Date    Asthma     [facility] ([facility] [address]     Brain condition     Cataract     Chronic kidney disease     Diabetes ([facility] [address]     Hyperlipidemia     Hypertension     Paralytic strabismus, unspecified     Psychiatric disorder       No past surgical history on file. Social History     Social History    Marital status:      Spouse name: N/A    Number of children: N/A    Years of education: N/A     Occupational History    Not on file. Social History Main Topics    Smoking status: Never Smoker    Smokeless tobacco: Never Used    Alcohol use No    Drug use: No    Sexual activity: No     Other Topics Concern    Not on file     Social History Narrative    No narrative on file       No family history on file. Allergies   Allergen Reactions    Amoxicillin Unknown (comments)    Cleocin [Clindamycin Hcl] Unknown (comments)    Codeine Unknown (comments)    Lorcet 10/650 [Hydrocodone-Acetaminophen] Unknown (comments)    Penicillin G Benzathine Unknown (comments)    Shellfish Derived Unknown (comments)        Home Medications:     Prior to Admission Medications   Prescriptions Last Dose Informant Patient Reported? Taking?   acetaminophen (TYLENOL) 325 mg tablet   Yes No   Sig: Take 325 mg by mouth as needed. aspirin 81 mg tablet   Yes No   Sig: Take 81 mg by mouth daily.      captopril (CAPOTEN) 50 mg tablet   Yes No   Sig: Take 50 mg by mouth three (3) times daily. cloNIDine (CATAPRES) 0.1 mg tablet   Yes No   Sig: Take 0.1 mg by mouth two (2) times a day. diltiazem CD (CARDIZEM CD) 240 mg ER capsule   Yes No   Sig: Take 240 mg by mouth daily. furosemide (LASIX) 80 mg tablet   Yes No   Sig: Take 80 mg by mouth daily. hydrALAZINE (APRESOLINE) 25 mg tablet   Yes No   Sig: Take 25 mg by mouth three (3) times daily. insulin aspart (NOVOLOG) 100 unit/mL injection   Yes No   Si Units by SubCUTAneous route.     lovastatin (MEVACOR) 40 mg tablet   Yes No   Sig: Take 40 mg by mouth nightly.     magnesium hydroxide ([employer] MILK OF MAGNESIA) 400 mg/5 mL suspension   Yes No   Sig: Take 30 mL by mouth daily as needed. paricalcitol (ZEMPLAR) 1 mcg capsule   Yes No   Sig: Take 1 mcg by mouth every other day. potassium chloride SR (KLOR-CON 10) 10 mEq tablet   No No   Sig: Take 2 Tabs by mouth daily. Facility-Administered Medications: None       Current Facility-Administered Medications   Medication Dose Route Frequency    acetaminophen (TYLENOL) tablet 325 mg  325 mg Oral Q4H PRN    dilTIAZem CD (CARDIZEM CD) capsule 240 mg  240 mg Oral DAILY    cloNIDine HCl (CATAPRES) tablet 0.1 mg  0.1 mg Oral BID    hydrALAZINE (APRESOLINE) tablet 25 mg  25 mg Oral TID    simvastatin (ZOCOR) tablet 20 mg  20 mg Oral QHS    paricalcitol (ZEMPLAR) capsule 1 mcg  1 mcg Oral EVERY OTHER DAY    insulin lispro (HUMALOG) injection   SubCUTAneous AC&HS    glucose chewable tablet 16 g  16 g Oral PRN    glucagon (GLUCAGEN) injection 1 mg  1 mg IntraMUSCular PRN    dextrose (D50W) injection syrg 12.5-25 g  25-50 mL IntraVENous PRN       Review of Systems:   Review of systems not obtained due to patient factors.   Data Review:    Labs: Results:       Chemistry Recent Labs      18   2135   GLU  140*   NA  151*   K  3.8   CL  118*   CO2  25   BUN  68*   CREA  6.12*   CA  8.5   AGAP  8   BUCR  11*      PTH  Lab Results   Component Value Date/Time Calcium 8.5 08/06/2018 09:35 PM      CBC w/Diff Recent Labs      08/06/18   2135   WBC  10.5   RBC  4.45   HGB  12.3   HCT  36.9   PLT  286   GRANS  76*   LYMPH  14*   EOS  2      Coagulation No results for input(s): PTP, INR, APTT in the last 72 hours. No lab exists for component: INREXT    Iron/Ferritin No results for input(s): IRON in the last 72 hours. No lab exists for component: TIBCCALC   BNP No results for input(s): BNPP in the last 72 hours. Cardiac Enzymes No results for input(s): CPK, CKND1, SONG in the last 72 hours. No lab exists for component: CKRMB, TROIP   Liver Enzymes No results for input(s): TP, ALB, TBIL, AP, SGOT, GPT in the last 72 hours. No lab exists for component: DBIL   Thyroid Studies No results found for: T4, T3U, TSH, TSHEXT     Urinalysis Lab Results   Component Value Date/Time    Color YELLOW 08/06/2018 10:45 PM    Appearance CLEAR 08/06/2018 10:45 PM    Specific gravity 1.012 08/06/2018 10:45 PM    pH (UA) 6.5 08/06/2018 10:45 PM    Protein >1000 (A) 08/06/2018 10:45 PM    Glucose 250 (A) 08/06/2018 10:45 PM    Ketone NEGATIVE  08/06/2018 10:45 PM    Bilirubin NEGATIVE  08/06/2018 10:45 PM    Urobilinogen 0.2 08/06/2018 10:45 PM    Nitrites NEGATIVE  08/06/2018 10:45 PM    Leukocyte Esterase NEGATIVE  08/06/2018 10:45 PM    Epithelial cells 0 to 1 08/06/2018 10:45 PM    WBC 0 to 1 08/06/2018 10:45 PM    RBC 0 to 2 08/06/2018 10:45 PM         IMAGES:   XR Results (maximum last 3): No results found for this or any previous visit. CT Results (maximum last 3): Results from Hospital Encounter encounter on 01/02/13   CT HEAD WO CONT   Narrative CT head without IV contrast    Cortical sulci, ventricles and and brain parenchyma are within normal limits  for age. No intracranial hemorrhage, mass lesions or cortical infarct involving  a major vessel. No midline shift or extra-axial collection. No skull fracture.   Visualized paranasal sinuses and mastoid air cells are clear.         Impression Impression: No acute abnormalities by CT.        MRI Results (maximum last 3): No results found for this or any previous visit. Nuclear Medicine Results (maximum last 3): No results found for this or any previous visit. US Results (maximum last 3): No results found for this or any previous visit. DEXA Results (maximum last 3): No results found for this or any previous visit. [facility] Results (maximum last 3): Results from [place] encounter on 10/05/11   [facility] MAMMOGRAM SCREENING DIGITAL BILAT   Narrative Ordering MD: Canelo Maldonado MD  Signed By: Margarette Cruz. JARRETT HOLLIS  ** FINAL **  ---------------------------------------------------------------------  Procedure Date:  10/05/2011   Accession Number:  [id number]          Order No:   [id number]        Procedure:   [facility] - MAMMO DIGITAL SCREENING       CPT Code:        Admit Diag:   SCREEN MAMMOGRAPHY NEC             Reason:   SCREENING  INTERPRETATION:  STUDY:  Bilateral digital screening mammogram    INDICATION:  Screening. COMPARISON:  2010; 2009; 2007    FINDINGS: Bilateral digital screening mammography was performed, and   is interpreted in conjunction with a computer assisted detection   (CAD) system. The breast parenchymal pattern is scattered   fibroglandular densities. No dominant masses, areas of   architectural distortion or suspicious microcalcifications are   identified . IMPRESSION:   No mammographic evidence of malignancy. Routine follow-up advised. The patient will be notified of these results . BI-RADS Assessment Category 1: Negative. NOTE:  10-15 percent of breast cancers may not be identified by   mammography. Dense breast parenchyma can obscure an underlying   neoplasm, and some types of breast cancers are not well shown on a   mammogram.  A negative mammogram report should not delay further   evaluation if clinically suspicious findings are present.       Results from [facility] Encounter encounter on 09/02/10   [facility] MAMMOGRAM [place]   Narrative Ordering MD: Melba Barros MD  Interpreted By: Natalie Olmstead MD  ** FINAL **  ---------------------------------------------------------------------  Procedure Date:  09/02/2010   Accession Number:  [id number]          Order No:   [id number]        Procedure:   [facility] - MAMMO DIGITAL SCREENING       CPT Code:        Admit Diag:   SCREEN MAMMOGRAPHY NEC             Reason:   ROUTINE  INTERPRETATION:  Bilateral Screening Mammogram Digital with CAD  CPT CODE: , 05157  HISTORY: Asymptomatic. COMPARISON: Mammogram  2009, 2008, 2007, 2006  TECHNIQUE: Digital CC and MLO views of both breasts with computer   assisted detection, iCAD Second Look 7. 2- H  FINDINGS:  There are scattered fibroglandular densities   (approximately 25-50% glandular). No  focal mass is seen. There is   no  distortion . There are no suspicious  calcifications. IMPRESSION:  No demonstration of malignancy. BI-RADS 2 -Benign finding  NOTE:  10-15 percent of breast cancers may not be identified by   mammography. Dense breast parenchyma can obscure an underlying   neoplasm, and some types of breast cancers are not well shown on a   mammogram.  A negative mammogram report should not delay further   evaluation if clinically suspicious findings are present. IR Results (maximum last 3): No results found for this or any previous visit. VAS/US Results (maximum last 3): No results found for this or any previous visit. PET Results (maximum last 3): No results found for this or any previous visit. No results found for this or any previous visit. @LASTPROCAMB([id number])    CULTURE:   )No results for input(s): SDES, CULT in the last 72 hours. No results for input(s): CULT in the last 72 hours.     Physical Assessment:     Visit Vitals    /85 (BP 1 Location: Left arm, BP Patient Position: Head of bed elevated (Comment degrees))    Pulse 89  Temp 98.6 °F (37 °C)    Resp 18    Ht 5' 3\" (1.6 m)    Wt 64.4 kg (142 lb)    SpO2 99%    Breastfeeding No    BMI 25.15 kg/m2     Last 3 Recorded Weights in this Encounter    08/07/18 0335   Weight: 64.4 kg (142 lb)       Intake/Output Summary (Last 24 hours) at 08/07/18 1134  Last data filed at 08/07/18 0655   Gross per 24 hour   Intake              120 ml   Output              550 ml   Net             -430 ml       Physial Exam:  General appearance: alert, cooperative, no distress, appears stated age  Skin: normal coloration and turgor, no rashes, no suspicious skin lesions noted. HEENT: Head; normocephalic, atraumatic. VERITO. ENT- ENT exam normal, no neck nodes or sinus tenderness. Lungs: clear to auscultation bilaterally  Heart: regular rate and rhythm, S1, S2 normal, no murmur, click, rub or gallop  Abdomen: soft, non-tender. Bowel sounds normal. No masses,  no organomegaly  Extremities: extremities normal, atraumatic, no cyanosis or edema    PLAN / RECOMMENDATION:    Acute/crf stage 4.no old records available. I will review office records  if pt was seen in our office.suggest stopping acei and lasix. order urine lyte ,urine for protein/creatinine and renal ultrasound. will need dialysis if no improvement  htn ,adjust meds to control  Hypernatremia,probably intravascular depletion,stop lasix  Proteinuria,check spot urine for protein/creatinine. further workup depends on amount of proteinuria  Sec hyperparathyroidism,check phos,pth     Thank you for the consultation to participate in patient's care. I have personally discussed my plan with the referring physician.      Lilly Miller MD  August 7, 2018

## 2018-08-07 NOTE — ED TRIAGE NOTES
Patient sent due to abnormal labs.  [facility] states Doctor wants patient to be evaluated because of her increase creatine

## 2018-08-07 NOTE — PROGRESS NOTES
conducted an initial consultation and Spiritual Assessment for Mo Sanchez, who is a 70 y.o.,female. Patients Primary Language is: [place]. According to the patients EMR Hoahaoism Affiliation is: Yazdanism.     The reason the Patient came to the hospital is:   Patient Active Problem List    Diagnosis Date Noted    Acute on chronic renal insufficiency 08/07/2018    Hypertension 08/07/2018        The  provided the following Interventions:  Initiated a relationship of care and support. Explored issues of iveth, belief, spirituality and Evangelical/ritual needs while hospitalized. Listened empathically. Provided chaplaincy education. Provided information about Spiritual Care Services. Offered prayer and assurance of continued prayers on patient's behalf. Chart reviewed. The following outcomes where achieved:  Patient shared limited information about both their medical narrative and spiritual journey/beliefs.  confirmed Patient's Hoahaoism Affiliation. Patient processed feeling about current hospitalization. Patient expressed gratitude for 's visit. Assessment:  Patient does not have any Evangelical/cultural needs that will affect patients preferences in health care. There are no spiritual or Evangelical issues which require intervention at this time. Plan:  Chaplains will continue to follow and will provide pastoral care on an as needed/requested basis.  recommends bedside caregivers page  on duty if patient shows signs of acute spiritual or emotional distress.     Ashley Thurston, [facility]  Spiritual Care  [phone]

## 2018-08-07 NOTE — ED NOTES
Patient is awake and alert in no acute distress at this time Dr Naima Hendrix called and made aware of the need for admission orders.

## 2018-08-07 NOTE — ROUTINE PROCESS
TRANSFER - IN REPORT:    Verbal report received from [facility] RN(name) on Terrance Willams  being received from ED(unit) for routine progression of care      Report consisted of patients Situation, Background, Assessment and   Recommendations(SBAR). Information from the following report(s) SBAR, Kardex, Intake/Output, Accordion and Recent Results was reviewed with the receiving nurse. Opportunity for questions and clarification was provided. Assessment completed upon patients arrival to unit and care assumed.

## 2018-08-08 PROBLEM — E11.22 TYPE 2 DM WITH HYPERTENSION AND ESRD ON DIALYSIS (HCC): Status: ACTIVE | Noted: 2018-08-08

## 2018-08-08 PROBLEM — N18.6 ESRD (END STAGE RENAL DISEASE) (HCC): Status: ACTIVE | Noted: 2018-08-08

## 2018-08-08 PROBLEM — N25.81 SECONDARY HYPERPARATHYROIDISM OF RENAL ORIGIN (HCC): Status: ACTIVE | Noted: 2018-08-08

## 2018-08-08 PROBLEM — I12.0 TYPE 2 DM WITH HYPERTENSION AND ESRD ON DIALYSIS (HCC): Status: ACTIVE | Noted: 2018-08-08

## 2018-08-08 PROBLEM — Z99.2 TYPE 2 DM WITH HYPERTENSION AND ESRD ON DIALYSIS (HCC): Status: ACTIVE | Noted: 2018-08-08

## 2018-08-08 PROBLEM — I69.319 CVA, OLD, COGNITIVE DEFICITS: Status: ACTIVE | Noted: 2018-08-08

## 2018-08-08 PROBLEM — N18.6 TYPE 2 DM WITH HYPERTENSION AND ESRD ON DIALYSIS (HCC): Status: ACTIVE | Noted: 2018-08-08

## 2018-08-08 LAB
ANION GAP SERPL CALC-SCNC: 11 MMOL/L (ref 3–18)
BASOPHILS # BLD: 0 K/UL (ref 0–0.1)
BASOPHILS NFR BLD: 0 % (ref 0–2)
BUN SERPL-MCNC: 58 MG/DL (ref 7–18)
BUN/CREAT SERPL: 10 (ref 12–20)
CALCIUM SERPL-MCNC: 7.7 MG/DL (ref 8.5–10.1)
CALCIUM SERPL-MCNC: 8.1 MG/DL (ref 8.5–10.1)
CHLORIDE SERPL-SCNC: 117 MMOL/L (ref 100–108)
CO2 SERPL-SCNC: 22 MMOL/L (ref 21–32)
CREAT SERPL-MCNC: 5.6 MG/DL (ref 0.6–1.3)
DIFFERENTIAL METHOD BLD: ABNORMAL
EOSINOPHIL # BLD: 0.2 K/UL (ref 0–0.4)
EOSINOPHIL NFR BLD: 2 % (ref 0–5)
ERYTHROCYTE [DISTWIDTH] IN BLOOD BY AUTOMATED COUNT: 17.7 % (ref 11.6–14.5)
GLUCOSE BLD STRIP.AUTO-MCNC: 141 MG/DL (ref 70–110)
GLUCOSE BLD STRIP.AUTO-MCNC: 173 MG/DL (ref 70–110)
GLUCOSE BLD STRIP.AUTO-MCNC: 234 MG/DL (ref 70–110)
GLUCOSE BLD STRIP.AUTO-MCNC: 236 MG/DL (ref 70–110)
GLUCOSE BLD STRIP.AUTO-MCNC: 94 MG/DL (ref 70–110)
GLUCOSE SERPL-MCNC: 108 MG/DL (ref 74–99)
HCT VFR BLD AUTO: 32.9 % (ref 35–45)
HGB BLD-MCNC: 10.5 G/DL (ref 12–16)
LYMPHOCYTES # BLD: 1.4 K/UL (ref 0.9–3.6)
LYMPHOCYTES NFR BLD: 18 % (ref 21–52)
MAGNESIUM SERPL-MCNC: 2 MG/DL (ref 1.6–2.6)
MCH RBC QN AUTO: 27 PG (ref 24–34)
MCHC RBC AUTO-ENTMCNC: 31.9 G/DL (ref 31–37)
MCV RBC AUTO: 84.6 FL (ref 74–97)
MONOCYTES # BLD: 0.4 K/UL (ref 0.05–1.2)
MONOCYTES NFR BLD: 5 % (ref 3–10)
NEUTS SEG # BLD: 5.6 K/UL (ref 1.8–8)
NEUTS SEG NFR BLD: 75 % (ref 40–73)
PHOSPHATE SERPL-MCNC: 4.8 MG/DL (ref 2.5–4.9)
PLATELET # BLD AUTO: 221 K/UL (ref 135–420)
PMV BLD AUTO: 11.9 FL (ref 9.2–11.8)
POTASSIUM SERPL-SCNC: 3.6 MMOL/L (ref 3.5–5.5)
PTH-INTACT SERPL-MCNC: 585.1 PG/ML (ref 18.4–88)
RBC # BLD AUTO: 3.89 M/UL (ref 4.2–5.3)
SODIUM SERPL-SCNC: 150 MMOL/L (ref 136–145)
WBC # BLD AUTO: 7.5 K/UL (ref 4.6–13.2)

## 2018-08-08 PROCEDURE — 74011250636 HC RX REV CODE- 250/636: Performed by: INTERNAL MEDICINE

## 2018-08-08 PROCEDURE — 36415 COLL VENOUS BLD VENIPUNCTURE: CPT | Performed by: INTERNAL MEDICINE

## 2018-08-08 PROCEDURE — 82962 GLUCOSE BLOOD TEST: CPT

## 2018-08-08 PROCEDURE — 83735 ASSAY OF MAGNESIUM: CPT | Performed by: INTERNAL MEDICINE

## 2018-08-08 PROCEDURE — 83970 ASSAY OF PARATHORMONE: CPT | Performed by: INTERNAL MEDICINE

## 2018-08-08 PROCEDURE — 80048 BASIC METABOLIC PNL TOTAL CA: CPT | Performed by: INTERNAL MEDICINE

## 2018-08-08 PROCEDURE — 65270000029 HC RM PRIVATE

## 2018-08-08 PROCEDURE — 74011250637 HC RX REV CODE- 250/637: Performed by: FAMILY MEDICINE

## 2018-08-08 PROCEDURE — 84100 ASSAY OF PHOSPHORUS: CPT | Performed by: INTERNAL MEDICINE

## 2018-08-08 PROCEDURE — 85025 COMPLETE CBC W/AUTO DIFF WBC: CPT | Performed by: INTERNAL MEDICINE

## 2018-08-08 PROCEDURE — 74011636637 HC RX REV CODE- 636/637: Performed by: FAMILY MEDICINE

## 2018-08-08 PROCEDURE — 74011250637 HC RX REV CODE- 250/637: Performed by: INTERNAL MEDICINE

## 2018-08-08 RX ORDER — INSULIN GLARGINE 100 [IU]/ML
10 INJECTION, SOLUTION SUBCUTANEOUS
Status: DISCONTINUED | OUTPATIENT
Start: 2018-08-08 | End: 2018-08-14 | Stop reason: HOSPADM

## 2018-08-08 RX ORDER — LOSARTAN POTASSIUM 50 MG/1
50 TABLET ORAL DAILY
Status: DISCONTINUED | OUTPATIENT
Start: 2018-08-08 | End: 2018-08-10

## 2018-08-08 RX ORDER — DOXERCALCIFEROL 4 UG/2ML
1 INJECTION INTRAVENOUS
Status: DISCONTINUED | OUTPATIENT
Start: 2018-08-09 | End: 2018-08-11

## 2018-08-08 RX ADMIN — CLONIDINE HYDROCHLORIDE 0.1 MG: 0.1 TABLET ORAL at 18:17

## 2018-08-08 RX ADMIN — INSULIN LISPRO 2 UNITS: 100 INJECTION, SOLUTION INTRAVENOUS; SUBCUTANEOUS at 12:25

## 2018-08-08 RX ADMIN — INSULIN GLARGINE 10 UNITS: 100 INJECTION, SOLUTION SUBCUTANEOUS at 22:20

## 2018-08-08 RX ADMIN — DILTIAZEM HYDROCHLORIDE 240 MG: 240 CAPSULE, COATED, EXTENDED RELEASE ORAL at 08:00

## 2018-08-08 RX ADMIN — HYDRALAZINE HYDROCHLORIDE 25 MG: 25 TABLET, FILM COATED ORAL at 22:20

## 2018-08-08 RX ADMIN — INSULIN LISPRO 4 UNITS: 100 INJECTION, SOLUTION INTRAVENOUS; SUBCUTANEOUS at 22:21

## 2018-08-08 RX ADMIN — HYDRALAZINE HYDROCHLORIDE 25 MG: 25 TABLET, FILM COATED ORAL at 08:00

## 2018-08-08 RX ADMIN — LOSARTAN POTASSIUM 50 MG: 50 TABLET ORAL at 14:11

## 2018-08-08 RX ADMIN — HYDRALAZINE HYDROCHLORIDE 25 MG: 25 TABLET, FILM COATED ORAL at 16:43

## 2018-08-08 RX ADMIN — CLONIDINE HYDROCHLORIDE 0.1 MG: 0.1 TABLET ORAL at 08:00

## 2018-08-08 RX ADMIN — SIMVASTATIN 20 MG: 20 TABLET, FILM COATED ORAL at 22:22

## 2018-08-08 RX ADMIN — SODIUM CHLORIDE 75 ML/HR: 900 INJECTION, SOLUTION INTRAVENOUS at 12:19

## 2018-08-08 NOTE — PROGRESS NOTES
Progress Note    Patient: Thony Ram MRN: [id number]  SSN: [id number]    YOB: 1955  Age: 70 y.o. Sex: female      Admit Date: 8/6/2018    LOS: 1 day     Subjective:     Patient admitted for acute on chronic renal failure stage 4. Initially lethargic but improved. Objective:     Vitals:    08/08/18 0000 08/08/18 0436 08/08/18 0800 08/08/18 1313   BP: 150/84 (!) 188/97 (!) 198/92 (!) 182/94   Pulse: 76 86 91 80   Resp: 18 18 18 18   Temp: 97.1 °F (36.2 °C) 98.5 °F (36.9 °C) 97.7 °F (36.5 °C) 98.7 °F (37.1 °C)   SpO2: 98% 97% 100% 99%   Weight:       Height:            Intake and Output:  Current Shift:    Last three shifts: 08/06 1901 - 08/08 0700  In: 700 [P.O.:700]  Out: 1900 [Urine:1900]    Physical Exam:   GENERAL: alert, cooperative, no distress, appears older than stated age  LUNG: clear to auscultation bilaterally  HEART: regular rate and rhythm  ABDOMEN: soft, non-tender. Bowel sounds normal. No masses,  no organomegaly  EXTREMITIES:   edema 1+    Lab/Data Review: All lab results for the last 24 hours reviewed. 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     Signed By: Bobby Posada MD     August 8, 2018

## 2018-08-08 NOTE — ROUTINE PROCESS
Bedside and Verbal shift change report given to Fouzia Esposito (oncoming nurse) by Oziel Terrazas RN   (offgoing nurse). Report included the following information SBAR, Kardex, Intake/Output and MAR.

## 2018-08-08 NOTE — PROGRESS NOTES
Care Management Interventions  PCP Verified by CM: Yes  Current Support Network: [address]     Reason for Admission:   [place] on chronic renal insufficiency                  RRAT Score:     18             Do you (patient/family) have any concerns for transition/discharge? no                Plan for utilizing home health:         Likelihood of readmission? Yellow/moderate            Transition of Care Plan:    Pt states she is from [address] [place] [address].   Called Gisella of [facility] to verify if pt is long term or short term at [facility] and CM left a message for Gisella to call back

## 2018-08-08 NOTE — PROGRESS NOTES
Progress Note    Aide Faustin  70 y.o. 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 A comprehensive review of systems was negative except for that written in the History of Present Illness.     Objective:     Visit Vitals    BP (!) 198/92 (BP 1 Location: Left arm)    Pulse 91    Temp 97.7 °F (36.5 °C)    Resp 18    Ht 5' 3\" (1.6 m)    Wt 64.4 kg (142 lb)    SpO2 100%    Breastfeeding No    BMI 25.15 kg/m2         Intake/Output Summary (Last 24 hours) at 08/08/18 1226  Last data filed at 08/08/18 0600   Gross per 24 hour   Intake              580 ml   Output             1350 ml   Net             -770 ml       Current Facility-Administered Medications   Medication Dose Route Frequency Provider Last Rate Last Dose    acetaminophen (TYLENOL) tablet 325 mg  325 mg Oral Q4H PRN Bre Bear MD        dilTIAZem CD (CARDIZEM CD) capsule 240 mg  240 mg Oral DAILY Bre Bear MD   240 mg at 08/08/18 0800    cloNIDine HCl (CATAPRES) tablet 0.1 mg  0.1 mg Oral BID Bre Bear MD   0.1 mg at 08/08/18 0800    hydrALAZINE (APRESOLINE) tablet 25 mg  25 mg Oral TID Bre Bear MD   25 mg at 08/08/18 0800    simvastatin (ZOCOR) tablet 20 mg  20 mg Oral QHS Bre Bear MD   20 mg at 08/07/18 2315    paricalcitol (ZEMPLAR) capsule 1 mcg  1 mcg Oral EVERY OTHER DAY Bre Bear MD   1 mcg at 08/07/18 1100    insulin lispro (HUMALOG) injection   SubCUTAneous AC&HS Shruthi Melchor Faby Delacruz MD   2 Units at 08/08/18 1225    glucose chewable tablet 16 g  16 g Oral PRN Antelmo Lanier MD        glucagon [facility]") injection 1 mg  1 mg IntraMUSCular PRN Antelmo Lanier MD        dextrose (D50W) injection syrg 12.5-25 g  25-50 mL IntraVENous PRN Antelmo Lanier MD        0.9% sodium chloride infusion  75 mL/hr IntraVENous CONTINUOUS Allen Hdez MD 75 mL/hr at 08/08/18 1219 75 mL/hr at 08/08/18 1219        Physical Exam:     Physical Exam:   General:  Alert, cooperative, no distress, appears stated age. Neck: Supple, symmetrical, trachea midline, no adenopathy, thyroid: no enlargement/tenderness/nodules, no carotid bruit and no JVD. Lungs:   Clear to auscultation bilaterally. Heart:  Regular rate and rhythm, S1, S2 normal, no murmur, click, rub or gallop. Abdomen:   Soft, non-tender. Bowel sounds normal. No masses,  No organomegaly. Extremities: Extremities normal, atraumatic, no cyanosis or edema.    Skin: Skin color, texture, turgor normal. No rashes or lesions         Data Review:    CBC w/Diff    Recent Labs      08/08/18   0145  08/06/18   2135   WBC  7.5  10.5   RBC  3.89*  4.45   HGB  10.5*  12.3   HCT  32.9*  36.9   MCV  84.6  82.9   MCH  27.0  27.6   MCHC  31.9  33.3   RDW  17.7*  18.0*    Recent Labs      08/08/18   0145  08/06/18   2135   MONOS  5  8   EOS  2  2   BASOS  0  0   RDW  17.7*  18.0*        Comprehensive Metabolic Profile    Recent Labs      08/08/18   0145  08/07/18   1523  08/06/18   2135   NA  150*  144  151*   K  3.6  4.1  3.8   CL  117*  117*  118*   CO2  22  17*  25   BUN  58*  60*  68*   CREA  5.60*  5.85*  6.12*    Recent Labs      08/08/18   0145  08/07/18   1523  08/06/18   2135   CA  7.7*  8.1*  8.2*  8.5   PHOS  4.8   --    --    ALB   --   1.9*   --    TP   --   6.1*   --    SGOT   --   45*   --    TBILI   --   0.2   --                 }        Impression:       Active Hospital Problems    Diagnosis Date Noted    ESRD (end stage renal disease) ([facility] 08/08/2018    Secondary hyperparathyroidism of renal origin ([facility] 08/08/2018    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 Plan:     She has reached ESRD, has very PTH also, BP not controlled. She needs dialysis for long   Term. . Explained to her  & she agreed with the plan, most of the time he talks appropriately . Asked vascular surgery to arrange [facility],will start dialysis once  she has catheter. aadd ARB  To control BP. Will arrange OP dialysis with  [facility] [place] [facility]. . .        Darryl Kimble MD

## 2018-08-09 LAB
ANION GAP SERPL CALC-SCNC: 9 MMOL/L (ref 3–18)
BASOPHILS # BLD: 0 K/UL (ref 0–0.1)
BASOPHILS NFR BLD: 0 % (ref 0–2)
BUN SERPL-MCNC: 49 MG/DL (ref 7–18)
BUN/CREAT SERPL: 9 (ref 12–20)
CALCIUM SERPL-MCNC: 8.2 MG/DL (ref 8.5–10.1)
CHLORIDE SERPL-SCNC: 120 MMOL/L (ref 100–108)
CO2 SERPL-SCNC: 21 MMOL/L (ref 21–32)
CREAT SERPL-MCNC: 5.25 MG/DL (ref 0.6–1.3)
DIFFERENTIAL METHOD BLD: ABNORMAL
EOSINOPHIL # BLD: 0.2 K/UL (ref 0–0.4)
EOSINOPHIL NFR BLD: 3 % (ref 0–5)
ERYTHROCYTE [DISTWIDTH] IN BLOOD BY AUTOMATED COUNT: 17.3 % (ref 11.6–14.5)
GLUCOSE BLD STRIP.AUTO-MCNC: 105 MG/DL (ref 70–110)
GLUCOSE BLD STRIP.AUTO-MCNC: 140 MG/DL (ref 70–110)
GLUCOSE BLD STRIP.AUTO-MCNC: 150 MG/DL (ref 70–110)
GLUCOSE BLD STRIP.AUTO-MCNC: 191 MG/DL (ref 70–110)
GLUCOSE BLD STRIP.AUTO-MCNC: 244 MG/DL (ref 70–110)
GLUCOSE BLD STRIP.AUTO-MCNC: 50 MG/DL (ref 70–110)
GLUCOSE SERPL-MCNC: 49 MG/DL (ref 74–99)
HBV SURFACE AB SER QL IA: NEGATIVE
HBV SURFACE AB SERPL IA-ACNC: 6.5 MIU/ML
HBV SURFACE AG SER QL: 0.17 INDEX
HBV SURFACE AG SER QL: NEGATIVE
HCT VFR BLD AUTO: 34.4 % (ref 35–45)
HEP BS AB COMMENT,HBSAC: ABNORMAL
HGB BLD-MCNC: 11.3 G/DL (ref 12–16)
LYMPHOCYTES # BLD: 1.7 K/UL (ref 0.9–3.6)
LYMPHOCYTES NFR BLD: 23 % (ref 21–52)
MCH RBC QN AUTO: 27.4 PG (ref 24–34)
MCHC RBC AUTO-ENTMCNC: 32.8 G/DL (ref 31–37)
MCV RBC AUTO: 83.5 FL (ref 74–97)
MONOCYTES # BLD: 0.6 K/UL (ref 0.05–1.2)
MONOCYTES NFR BLD: 8 % (ref 3–10)
NEUTS SEG # BLD: 4.7 K/UL (ref 1.8–8)
NEUTS SEG NFR BLD: 66 % (ref 40–73)
PHOSPHATE SERPL-MCNC: 4.7 MG/DL (ref 2.5–4.9)
PLATELET # BLD AUTO: 263 K/UL (ref 135–420)
PMV BLD AUTO: 10.9 FL (ref 9.2–11.8)
POTASSIUM SERPL-SCNC: 3.3 MMOL/L (ref 3.5–5.5)
RBC # BLD AUTO: 4.12 M/UL (ref 4.2–5.3)
SODIUM SERPL-SCNC: 150 MMOL/L (ref 136–145)
WBC # BLD AUTO: 7.1 K/UL (ref 4.6–13.2)

## 2018-08-09 PROCEDURE — 80048 BASIC METABOLIC PNL TOTAL CA: CPT | Performed by: INTERNAL MEDICINE

## 2018-08-09 PROCEDURE — 74011000250 HC RX REV CODE- 250: Performed by: FAMILY MEDICINE

## 2018-08-09 PROCEDURE — 85025 COMPLETE CBC W/AUTO DIFF WBC: CPT | Performed by: INTERNAL MEDICINE

## 2018-08-09 PROCEDURE — 77030018719 HC DRSG PTCH ANTIMIC J&J -A: Performed by: SURGERY

## 2018-08-09 PROCEDURE — C1750 CATH, HEMODIALYSIS,LONG-TERM: HCPCS | Performed by: SURGERY

## 2018-08-09 PROCEDURE — 77030013744: Performed by: SURGERY

## 2018-08-09 PROCEDURE — 36558 INSERT TUNNELED CV CATH: CPT | Performed by: SURGERY

## 2018-08-09 PROCEDURE — 87340 HEPATITIS B SURFACE AG IA: CPT | Performed by: INTERNAL MEDICINE

## 2018-08-09 PROCEDURE — 84100 ASSAY OF PHOSPHORUS: CPT | Performed by: INTERNAL MEDICINE

## 2018-08-09 PROCEDURE — 0JH63XZ INSERTION OF TUNNELED VASCULAR ACCESS DEVICE INTO CHEST SUBCUTANEOUS TISSUE AND FASCIA, PERCUTANEOUS APPROACH: ICD-10-PCS | Performed by: SURGERY

## 2018-08-09 PROCEDURE — 74011250637 HC RX REV CODE- 250/637: Performed by: FAMILY MEDICINE

## 2018-08-09 PROCEDURE — 77030002933 HC SUT MCRYL J&J -A: Performed by: SURGERY

## 2018-08-09 PROCEDURE — 74011250636 HC RX REV CODE- 250/636

## 2018-08-09 PROCEDURE — 74011250636 HC RX REV CODE- 250/636: Performed by: INTERNAL MEDICINE

## 2018-08-09 PROCEDURE — 36415 COLL VENOUS BLD VENIPUNCTURE: CPT | Performed by: INTERNAL MEDICINE

## 2018-08-09 PROCEDURE — 99152 MOD SED SAME PHYS/QHP 5/>YRS: CPT | Performed by: SURGERY

## 2018-08-09 PROCEDURE — 76937 US GUIDE VASCULAR ACCESS: CPT | Performed by: SURGERY

## 2018-08-09 PROCEDURE — 74011250636 HC RX REV CODE- 250/636: Performed by: SURGERY

## 2018-08-09 PROCEDURE — 82962 GLUCOSE BLOOD TEST: CPT

## 2018-08-09 PROCEDURE — 02HV33Z INSERTION OF INFUSION DEVICE INTO SUPERIOR VENA CAVA, PERCUTANEOUS APPROACH: ICD-10-PCS | Performed by: SURGERY

## 2018-08-09 PROCEDURE — 90935 HEMODIALYSIS ONE EVALUATION: CPT

## 2018-08-09 PROCEDURE — 77030002986 HC SUT PROL J&J -A: Performed by: SURGERY

## 2018-08-09 PROCEDURE — 65270000029 HC RM PRIVATE

## 2018-08-09 PROCEDURE — 74011250637 HC RX REV CODE- 250/637: Performed by: INTERNAL MEDICINE

## 2018-08-09 PROCEDURE — 77030010507 HC ADH SKN DERMBND J&J -B: Performed by: SURGERY

## 2018-08-09 PROCEDURE — 86706 HEP B SURFACE ANTIBODY: CPT | Performed by: INTERNAL MEDICINE

## 2018-08-09 PROCEDURE — 74011636637 HC RX REV CODE- 636/637: Performed by: FAMILY MEDICINE

## 2018-08-09 RX ORDER — MIDAZOLAM HYDROCHLORIDE 1 MG/ML
INJECTION, SOLUTION INTRAMUSCULAR; INTRAVENOUS AS NEEDED
Status: DISCONTINUED | OUTPATIENT
Start: 2018-08-09 | End: 2018-08-09 | Stop reason: HOSPADM

## 2018-08-09 RX ORDER — LIDOCAINE HYDROCHLORIDE 10 MG/ML
INJECTION, SOLUTION EPIDURAL; INFILTRATION; INTRACAUDAL; PERINEURAL AS NEEDED
Status: DISCONTINUED | OUTPATIENT
Start: 2018-08-09 | End: 2018-08-09 | Stop reason: HOSPADM

## 2018-08-09 RX ORDER — FENTANYL CITRATE 50 UG/ML
INJECTION, SOLUTION INTRAMUSCULAR; INTRAVENOUS AS NEEDED
Status: DISCONTINUED | OUTPATIENT
Start: 2018-08-09 | End: 2018-08-09 | Stop reason: HOSPADM

## 2018-08-09 RX ADMIN — INSULIN GLARGINE 10 UNITS: 100 INJECTION, SOLUTION SUBCUTANEOUS at 21:48

## 2018-08-09 RX ADMIN — HYDRALAZINE HYDROCHLORIDE 25 MG: 25 TABLET, FILM COATED ORAL at 21:48

## 2018-08-09 RX ADMIN — INSULIN LISPRO 4 UNITS: 100 INJECTION, SOLUTION INTRAVENOUS; SUBCUTANEOUS at 21:49

## 2018-08-09 RX ADMIN — CLONIDINE HYDROCHLORIDE 0.1 MG: 0.1 TABLET ORAL at 16:50

## 2018-08-09 RX ADMIN — DEXTROSE MONOHYDRATE 25 G: 25 INJECTION, SOLUTION INTRAVENOUS at 03:32

## 2018-08-09 RX ADMIN — LOSARTAN POTASSIUM 50 MG: 50 TABLET ORAL at 16:50

## 2018-08-09 RX ADMIN — CLONIDINE HYDROCHLORIDE 0.1 MG: 0.1 TABLET ORAL at 10:32

## 2018-08-09 RX ADMIN — DILTIAZEM HYDROCHLORIDE 240 MG: 240 CAPSULE, COATED, EXTENDED RELEASE ORAL at 16:49

## 2018-08-09 RX ADMIN — SODIUM CHLORIDE 75 ML/HR: 900 INJECTION, SOLUTION INTRAVENOUS at 02:12

## 2018-08-09 RX ADMIN — HYDRALAZINE HYDROCHLORIDE 25 MG: 25 TABLET, FILM COATED ORAL at 16:49

## 2018-08-09 RX ADMIN — SIMVASTATIN 20 MG: 20 TABLET, FILM COATED ORAL at 21:47

## 2018-08-09 NOTE — PROGRESS NOTES
Called [address] and spoke with [address] who confirmed pt is a long term resident of [facility]

## 2018-08-09 NOTE — ROUTINE PROCESS
Pt currently down in dialysis. Perm cath placed this morning; pt tolerated well. Pt denied any complaint of pain or discomfort.

## 2018-08-09 NOTE — PROGRESS NOTES
Problem: Falls - Risk of  Goal: *Absence of Falls  Document Oscar Fall Risk and appropriate interventions in the flowsheet.    Outcome: Progressing Towards Goal  Fall Risk Interventions:  Mobility Interventions: Patient to call before getting OOB    Mentation Interventions: Adequate sleep, hydration, pain control, Door open when patient unattended, More frequent rounding, Increase mobility, Room close to nurse's station, Update white board    Medication Interventions: Evaluate medications/consider consulting pharmacy, Patient to call before getting OOB, Teach patient to arise slowly    Elimination Interventions: Call light in reach, Patient to call for help with toileting needs    History of Falls Interventions: Door open when patient unattended, Room close to nurse's station, Evaluate medications/consider consulting pharmacy

## 2018-08-09 NOTE — PROGRESS NOTES
Bedside and Verbal shift change report given to Theresa De Anda RN (oncoming nurse) by Salina Diaz RN   (offgoing nurse).  Report included the following information SBAR, Kardex, Intake/Output and MAR.

## 2018-08-09 NOTE — INTERVAL H&P NOTE
H&P Update: Caroline Mason was seen and examined. History and physical has been reviewed. The patient has been examined.  There have been no significant clinical changes since the completion of the originally dated History and Physical.    Signed By: Keaton Bearden MD     August 9, 2018 09:40 AM

## 2018-08-09 NOTE — ROUTINE PROCESS
TRANSFER - OUT REPORT:    Verbal report given to Edmond Carrera RN(name) on Sharla Ventura  being transferred to [facility](unit) for routine progression of care       Report consisted of patients Situation, Background, Assessment and   Recommendations(SBAR). Information from the following report(s) SBAR was reviewed with the receiving nurse. Lines:   Peripheral IV 08/08/18 Right Hand (Active)   Site Assessment Clean, dry, & intact 8/9/2018  8:26 AM   Phlebitis Assessment 0 8/9/2018  8:26 AM   Infiltration Assessment 0 8/9/2018  8:26 AM   Dressing Status Clean, dry, & intact 8/9/2018  8:26 AM   Dressing Type Transparent 8/9/2018  8:26 AM   Hub Color/Line Status Infusing 8/9/2018  8:26 AM        Opportunity for questions and clarification was provided. Patient transported with:   Registered Nurse: DREW Ballard

## 2018-08-09 NOTE — CDMP QUERY
Please clarify if this patient is being treated/managed for:    => Metabolic encephalopathy  => Other Explanation of clinical findings  => Unable to Determine (no explanation of clinical findings)    The medical record reflects the following:  Risk:  -- admitted with hypernatremia, ARMANDO on CKD, now in ESRD    Clinical Indicators:  -- nursing assessments have documented pt with confusion, disorientation    -- attending has documented pt with lethargy    Treatment:   -- being treated for ARMANDO, ESRD, hypernatremia  -- lasix was discontinued  -- HD cath inserted    Please clarify and document your clinical opinion in the progress notes and discharge summary including the definitive and/or presumptive diagnosis, (suspected or probable), related to the above clinical findings. Please include clinical findings supporting your diagnosis. If you DECLINE this query or would like to communicate with [facility], please utilize the \"[employer] message box\" at the TOP of the Progress Note on the right.       Thank you,  Timo Ware Replaced by [facility], [address] [place] [address]

## 2018-08-09 NOTE — PROGRESS NOTES
Progress Note    Patient: James Pop MRN: [id number]  SSN: [id number]    YOB: 1955  Age: 70 y.o. Sex: female      Admit Date: 8/6/2018    LOS: 2 days     Subjective:     Patient admitted due to worsening renal failure with hypertension. Patient had dialysis catheter placed today. Objective:     Vitals:    08/09/18 1011 08/09/18 1025 08/09/18 1043 08/09/18 1139   BP: (!) 214/107 (!) 217/105 (!) 217/112 (!) 188/111   Pulse: 92 84 90 92   Resp: 12 10 12 16   Temp:    98.4 °F (36.9 °C)   SpO2: 98% 98% 99% 97%   Weight:       Height:            Intake and Output:  Current Shift: 08/09 0701 - 08/09 1900  In: 240 [P.O.:240]  Out: 1025 [Urine:1025]  Last three shifts: 08/07 1901 - 08/09 0700  In: 150 [P.O.:150]  Out: 2350 [Urine:2350]    Physical Exam:   GENERAL: alert, cooperative, no distress, slowed mentation, appears stated age, appears older than stated age  LUNG: clear to auscultation bilaterally  HEART: regular rate and rhythm    Lab/Data Review: All lab results for the last 24 hours reviewed. 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     Signed By: Crystal Longoria MD     August 9, 2018

## 2018-08-09 NOTE — ROUTINE PROCESS
Pt up to bsc to void but was also incontinent of large amount of urine as well. Currently pt is down in procedure room getting a perm cath inserted for upcoming dialysis. No concerns noted at this time.

## 2018-08-09 NOTE — CONSULTS
Surgery Consult      Patient: Yamileth Loza MRN: [id number]  CSN: [id number]      YOB: 1955    Age: 70 y.o. Sex: female      DOA: 8/6/2018       HPI:     Yamileth Loza is a 70 y.o. female who presents with CKD stage IV now in need of dialysis catheter access. No previous dialysis. She is right handed. No previous intravascular devices. No arm claudication or rest pain. Past Medical History:   Diagnosis Date    Asthma     Bipolar affective disorder ([facility]     Brain condition     Cataract     Chronic kidney disease     Diabetes ([facility]     Hyperlipidemia     Hypertension     Paralytic strabismus, unspecified     Psychiatric disorder        No past surgical history on file. No family history on file. Social History     Social History    Marital status:      Spouse name: N/A    Number of children: N/A    Years of education: N/A     Social History Main Topics    Smoking status: Never Smoker    Smokeless tobacco: Never Used    Alcohol use No    Drug use: No    Sexual activity: No     Other Topics Concern    Not on file     Social History Narrative    No narrative on file       Prior to Admission medications    Medication Sig Start Date End Date Taking? Authorizing Provider   aspirin 81 mg tablet Take 81 mg by mouth daily. Elina Light MD   captopril (CAPOTEN) 50 mg tablet Take 50 mg by mouth three (3) times daily. Elina Light MD   diltiazem CD (CARDIZEM CD) 240 mg ER capsule Take 240 mg by mouth daily. Elina Light MD   cloNIDine (CATAPRES) 0.1 mg tablet Take 0.1 mg by mouth two (2) times a day. Elina Light MD   furosemide (LASIX) 80 mg tablet Take 80 mg by mouth daily. Elina Light MD   hydrALAZINE (APRESOLINE) 25 mg tablet Take 25 mg by mouth three (3) times daily. Elina Light MD   lovastatin (MEVACOR) 40 mg tablet Take 40 mg by mouth nightly.       Elina Light MD   magnesium hydroxide ([employer] MILK OF MAGNESIA) 400 mg/5 mL suspension Take 30 mL by mouth daily as needed. Elina Light MD   insulin aspart (NOVOLOG) 100 unit/mL injection 12 Units by SubCUTAneous route. Elina Light MD   acetaminophen (TYLENOL) 325 mg tablet Take 325 mg by mouth as needed. Elina Light MD   paricalcitol (ZEMPLAR) 1 mcg capsule Take 1 mcg by mouth every other day. Elina Light MD   potassium chloride SR (KLOR-CON 10) 10 mEq tablet Take 2 Tabs by mouth daily. 1/2/13   Karime Patino NP       Allergies   Allergen Reactions    Amoxicillin Unknown (comments)    Cleocin [Clindamycin Hcl] Unknown (comments)    Codeine Unknown (comments)    Lorcet 10/650 [Hydrocodone-Acetaminophen] Unknown (comments)    Penicillin G Benzathine Unknown (comments)    Shellfish Derived Unknown (comments)       Physical Exam:      Visit Vitals    /86 (BP 1 Location: Left arm, BP Patient Position: At rest)    Pulse 75    Temp 97.5 °F (36.4 °C)    Resp 20    Ht 5' 3\" (1.6 m)    Wt 144 lb 13.5 oz (65.7 kg)    SpO2 98%    Breastfeeding No    BMI 25.66 kg/m2       GENERAL: alert, cooperative, no distress, appears stated age, THROAT & NECK: normal and no erythema or exudates noted. , LUNG: clear to auscultation bilaterally, HEART: regular rate and rhythm, S1, S2 normal, no murmur, click, rub or gallop, ABDOMEN: soft, non-tender. Bowel sounds normal. No masses,  no organomegaly, EXTREMITIES:  extremities normal, atraumatic, no cyanosis or edema, NEUROLOGIC: AOx3. Cranial nerves 3-12 and sensation grossly intact. ROS:  Constitutional: negative  Eyes: negative  Ears, nose, mouth, throat, and face: negative  Respiratory: negative  Cardiovascular: negative  Gastrointestinal: negative  Genitourinary:negative  Hematologic/lymphatic: negative  Musculoskeletal:negative  Neurological: negative  Behavioral/Psych: negative  Endocrine: negative  Allergic/Immunologic: negative  Unless otherwise mentioned in the HPI.     Data Review:    CBC:   Lab Results   Component Value Date/Time WBC 7.1 08/09/2018 02:42 AM    RBC 4.12 (L) 08/09/2018 02:42 AM    HGB 11.3 (L) 08/09/2018 02:42 AM    HCT 34.4 (L) 08/09/2018 02:42 AM    PLATELET 974 30/33/2982 02:42 AM      BMP:   Lab Results   Component Value Date/Time    Glucose 49 (LL) 08/09/2018 02:42 AM    Sodium 150 (H) 08/09/2018 02:42 AM    Potassium 3.3 (L) 08/09/2018 02:42 AM    Chloride 120 (H) 08/09/2018 02:42 AM    CO2 21 08/09/2018 02:42 AM    BUN 49 (H) 08/09/2018 02:42 AM    Creatinine 5.25 (H) 08/09/2018 02:42 AM    Calcium 8.2 (L) 08/09/2018 02:42 AM     Coagulation: No results found for: PTP, INR, APTT      Assessment/Plan     69 y/o female with acute on chronic renal failure now ESRD. --Will place permcath  --Will require outpatient work up for fistula. --Please call with any further questions or concerns.     Active Problems:    Acute on chronic renal insufficiency (8/7/2018)      Hypertension (8/7/2018)      ESRD (end stage renal disease) ([facility] (8/8/2018)      Secondary hyperparathyroidism of renal origin ([facility] (8/8/2018)      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) (8/8/2018)      CVA, old, cognitive deficits (8/8/2018)        Tobin Farmer MD  August 9, 2018

## 2018-08-09 NOTE — PROGRESS NOTES
Critical Result Notification    Received and verbally repeated the following test results Glucose, 49 from [place] (NAME OF TECHNICIAN) on 8/9/2018 (DATE), at 0330 (TIME).      Additional comments: Pt treated per protocol, glucose rebounded to [address]

## 2018-08-09 NOTE — DIALYSIS
ACUTE HEMODIALYSIS FLOW SHEET    HEMODIALYSIS ORDERS: Physician: vickey     Dialyzer: revaclear        Duration: 2 hr  BFR: 300   DFR: 600   Dialysate:  Temp 36 K+   3    Ca+  3 Na 138 Bicarb 35   Weight:  na kg    Bed Scale []     Unable to Obtain [x]      Dry weight/UF Goal: 1000 Access cvc  Needle Gauge ***    Heparin*** []  Bolus      Units    [] Hourly       Units    [x]***None      Catheter locking solution ***   Pre BP:   195/11    Pulse:     97     Temperature:   98.5  Respirations: 18  Tx: NS   ***    ml/Bolus  Other        [x] N/A   Labs: Pre        Post:        [x] N/A   Additional Orders(medications, blood products, hypotension management):   ***    [x] N/A     [x] ***[facility] Consent Verified     CATHETER ACCESS:*** []N/A   [x]Right   []Left   []IJ     []Fem   [] First use X-ray verified     [x]Tunnel                [] Non Tunneled   [x]No S/S infection  []Redness  []Drainage []Cultured []Swelling []Pain   [x]Medical Aseptic Prep Utilized   []Dressing Changed  [] Biopatch  Date: ***      []Clotted   []Patent   Flows: [x]Good  []Poor  []Reversed   If access problem,  notified: []Yes    [x]N/A  Date:           GRAFT/FISTULA ACCESS: *** [x]N/A     []Right     []Left     []UE     []LE   []AVG   []AVF        []Buttonhole    []Medical Aseptic Prep Utilized   []No S/S infection  []Redness  []Drainage []Cultured []Swelling []Pain    Bruit:   [] Strong    [] Weak       Thrill :   [] Strong    [] Weak       Needle Gauge: ***   Length: ***   If access problem,  notified: []Yes     []N/A  Date:        Please describe access if present and not used:***       GENERAL ASSESSMENT: ***   LUNGS:  Rate 18 SaO2%   ***     [] N/A    [x] Clear  [] Coarse  [] Crackles  [] Wheezing        [] Diminished     Location : []RLL   []LLL    []RUL  []STEPHY   Cough: []Productive  []Dry  [x]N/A   Respirations:  [x]Easy  []Labored   Therapy:  [x]RA  []NC *** l/min    Mask: []NRB []Venti       O2%                  []Ventilator []Intubated  [] Trach  [] BiPaP   CARDIAC: [x]Regular      [] Irregular   [] Pericardial Rub  [] JVD        [x]  Monitored  [] Bedside  [] Remotely monitored [] N/A  Rhythm: ***   EDEMA: [] None  [x]Generalized  [] Pitting [] 1    [] 2    [] 3    [] 4                 [] Facial  [] Pedal  []  UE  [] LE   SKIN:   [x] Warm  [] Hot     [] Cold   [] Dry     [] Pale   [] Diaphoretic                  [] Flushed  [] Jaundiced  [] Cyanotic  [] Rash  [] Weeping   LOC:    [x] Alert      [x]Oriented:    [x] Person     [x] Place  []Time               [] Confused  [] Lethargic  [] Medicated  [] Non-responsive     GI / ABDOMEN:***  [] Flat    [] Distended    [] Soft    [] Firm   []  Obese                             [] Diarrhea  [] Bowel Sounds  [] Nausea  [] Vomiting       / URINE ASSESSMENT:***[] Voiding   [] Oliguria  [] Anuria   []  Khan     [] Incontinent    []  Incontinent Brief      []  Bathroom Privileges     PAIN:*** [] 0 []1  []2   []3   []4   []5   []6   []7   []8   []9   []10            Scale 0-10  Action/Follow Up: ***   MOBILITY: *** [] Amb    [] Amb/Assist    [] Bed    [] Wheelchair  [] Stretcher      All Vitals and Treatment Details on Attached [address]: [facility]          Room # ***     [] 1st Time Acute  [] Stat  [] Routine  [] Urgent     [] Acute Room  []  Bedside  [] ICU/CCU  [] ER   Isolation Precautions:  [] Dialysis   [] Airborne   [] Contact    [] Reverse   Special Considerations:         [] Blood Consent Verified []N/A     ALLERGIES: ***  [] NKA          Code Status:***  [] Full Code  [] DNR  [] Other           HBsAg ONLY: Date Drawn ***         []Negative []Positive []Unknown   HBsAb: Date ***    [] Susceptible   [] [id number] []Not Drawn  [] Drawn     Current Labs:    Date of Labs: ***          Today []        Cut and paste current labs here.     ***                                                                                                                              DIET:***  [] Renal [] Other     [] NPO     []  Diabetic      PRIMARY NURSE REPORT: First initial/Last name/Title      Pre Dialysis: ***    Time: ***      EDUCATION: ***   [] Patient [] Other         Knowledge Basis: []None []Minimal [] Substantial   Barriers to learning *** []N/A   [] Access Care     [] S&S of infection     [] Fluid Management     []K+     []Procedural    []Albumin     [] Medications     [] Tx Options     [] Transplant     [] Diet     [] Other   Teaching Tools:  [] Explain  [] Demo  [] Handouts [] Video  Patient response: ***  [] Verbalized understanding  [] Teach back  [] Return demonstration [] Requires follow up   Inappropriate due to            []*** Time Out/Safety Check       RO/HEMODIALYSIS MACHINE SAFETY CHECKS  Before each treatment:     Machine Number:                   [facility]                                  [] Unit Machine # *** with centralized RO                                  [] Portable Machine #1/RO serial # [id number]                                  [] Portable Machine #2/RO serial # [id number]                                  [] Portable Machine #3/RO serial # [id number]                                                                                                       [facility]                                  [] Portable Machine #11/RO serial # [id number]                                   [] Portable Machine #12/RO serial # [id number]                                  [] Portable Machine #13/RO serial #  [id number]      Alarm Test:  Pass time ***         Other:         []*** RO/Machine Log Complete      Temp    ***            []***Extracorporeal Circuit Tested for integrity   Dialysate: pH  *** Conductivity: Meter   ***     HD Machine   ***                  TCD: ***  Dialyzer Lot # ***            Blood Tubing Lot # ***          Saline Lot #  ***     CHLORINE TESTING-Before each treatment and every 4 hours    Total Chlorine:*** [] less than 0.1 ppm  Time: *** 4 Hr/2nd Check Time: ***   (if greater than 0.1 ppm from Primary then every 30 minutes from Secondary)     TREATMENT INITIATION  with Dialysis Precautions:***   [] All Connections Secured                 [] Saline Line Double Clamped   [] Venous Parameters Set                  [] Arterial Parameters Set    [] Prime Given  ***                              []Air Foam Detector Engaged      Treatment Initiation Note: ***     Medication Dose Volume Route Initials Dialyzer Cleared:*** [] Good [] Fair  [] Poor    Blood processed:  *** L  UF Removed  *** Ml    Post Wt: ***    kg  POst BP:   ***       Pulse: ***      Respirations: ***  Temperature: ***                                   Post Tx Vascular Access: AVF/AVG: Bleeding stopped Art *** min. Ray. *** Min   N/A***                                   Catheter: Locking solution: Heparin 1:1000 Art.  ***  Ray. ***  N/A***                                 Post Assessment:                                    Skin:***  [] Warm  [] Dry [] Diaphoretic    [] Flushed  [] Pale [] Cyanotic   [facility] Signatures Title Initials  Time Lungs:*** [] Clear    [] Course  [] Crackles  [] Wheezing [] Diminished       Cardiac:*** [] Regular   [] Irregular   [] Monitor  [] N/A  Rhythm:           Edema:***  [] None    [] General     [] Facial   [] Pedal    [] UE    [] LE       Pain:*** []0  []1  []2   []3  []4   []5   []6   []7   []8   []9   []10         Post Treatment Note: ***     POST TREATMENT PRIMARY NURSE HANDOFF REPORT:     First initial/Last name/Title         Post Dialysis: *** Time:  ***     Abbreviations: AVG-arterial venous graft, AVF-arterial venous fistula, IJ-Internal Jugular, Subcl-Subclavian, Fem-Femoral, Tx-treatment, AP/HR-apical heart rate, DFR-dialysate flow rate, BFR-blood flow rate, AP-arterial pressure, -venous pressure, UF-ultrafiltrate, TMP-transmembrane pressure, Ray-Venous, Art-Arterial, RO-Reverse Osmosis

## 2018-08-09 NOTE — PROGRESS NOTES
RENAL PROGRESS NOTE: Pt seen on dialysis. Follow up of ESRD            Subjective: Feels good, knows that she is on dialysis    Patient is on Dialysis. Objective:    Patient Vitals for the past 12 hrs:   Temp Pulse Resp BP SpO2   08/09/18 1139 98.4 °F (36.9 °C) 92 16 (!) 188/111 97 %   08/09/18 1043 - 90 12 (!) 217/112 99 %   08/09/18 1025 - 84 10 (!) 217/105 98 %   08/09/18 1011 - 92 12 (!) 214/107 98 %   08/09/18 0704 97.5 °F (36.4 °C) 75 20 185/86 98 %   08/09/18 0438 - 66 - 176/87 -        Intake/Output Summary (Last 24 hours) at 08/09/18 1427  Last data filed at 08/09/18 1303   Gross per 24 hour   Intake              390 ml   Output             2425 ml   Net            -2035 ml       Physical Assessment:     General Appearance: NAD  Lung: clear to auscultation  Heart: regular rate and rhythm and no murmurs, clicks or gallops  Lower Extremities: trace edema   Access: (R) TDC,q 300    Labs    CBC w/Diff    Recent Labs      08/09/18 0242 08/08/18 0145 08/06/18   2135   WBC  7.1  7.5  10.5   RBC  4.12*  3.89*  4.45   HGB  11.3*  10.5*  12.3   HCT  34.4*  32.9*  36.9   MCV  83.5  84.6  82.9   MCH  27.4  27.0  27.6   MCHC  32.8  31.9  33.3   RDW  17.3*  17.7*  18.0*    Recent Labs      08/09/18 0242 08/08/18 0145 08/06/18   2135   MONOS  8  5  8   EOS  3  2  2   BASOS  0  0  0   RDW  17.3*  17.7*  18.0*        Comprehensive Metabolic Profile    Recent Labs      08/09/18 0242 08/08/18 0145 08/07/18   1523   NA  150*  150*  144   K  3.3*  3.6  4.1   CL  120*  117*  117*   CO2  21  22  17*   BUN  49*  58*  60*   CREA  5.25*  5.60*  5.85*    Recent Labs      08/09/18   0242  08/08/18   0145  08/07/18   1523   CA  8.2*  7.7*  8.1*  8.2*   PHOS  4.7  4.8   --    ALB   --    --   1.9*   TP   --    --   6.1*   SGOT   --    --   45*   TBILI   --    --   0.2          Basic Metabolic Profile       results  reviwed. MEDS:Reviwed.   Current Facility-Administered Medications   Medication Dose Route Frequency Provider Last Rate Last Dose    losartan (COZAAR) tablet 50 mg  50 mg Oral DAILY Ac Singh MD   Stopped at 08/09/18 0900    epoetin raphael (EPOGEN;PROCRIT) injection 5,200 Units  5,200 Units IntraVENous DIALYSIS TUE, THU & SAT Ac Singh MD        doxercalciferol (HECTOROL) 4 mcg/2 mL injection 1 mcg  1 mcg IntraVENous Q TU, TH & SAT Ac Singh MD        insulin glargine (LANTUS) injection 10 Units  10 Units SubCUTAneous QHS Saint Dory, MD   10 Units at 08/08/18 2220    acetaminophen (TYLENOL) tablet 325 mg  325 mg Oral Q4H PRN Saint Dory, MD        dilTIAZem CD (CARDIZEM CD) capsule 240 mg  240 mg Oral DAILY Saint Dory, MD   Stopped at 08/09/18 0900    cloNIDine HCl (CATAPRES) tablet 0.1 mg  0.1 mg Oral BID Saint Dory, MD   0.1 mg at 08/09/18 1032    hydrALAZINE (APRESOLINE) tablet 25 mg  25 mg Oral TID Saint Dory, MD   Stopped at 08/09/18 0900    simvastatin (ZOCOR) tablet 20 mg  20 mg Oral QHS Saint Dory, MD   20 mg at 08/08/18 2222    insulin lispro (HUMALOG) injection   SubCUTAneous AC&HS Saint Dory, MD   Stopped at 08/09/18 0730    glucose chewable tablet 16 g  16 g Oral PRN Saint Dory, MD        glucagon (GLUCAGEN) injection 1 mg  1 mg IntraMUSCular PRN Saint Dory, MD        dextrose (D50W) injection syrg 12.5-25 g  25-50 mL IntraVENous PRN Saint Dory, MD   25 g at 08/09/18 0332    0.9% sodium chloride infusion  75 mL/hr IntraVENous CONTINUOUS Ac Singh MD 75 mL/hr at 08/09/18 0212 75 mL/hr at 08/09/18 0212       Impression:    ESRD: Tolerating HD well. , 1st treatment, on 3 K, 3  Ca bath, will dialyze for 2 hours today,then3 hours tomorrow. Anemia of CKD: on  Epogen. Hyperparathyroidism Yes    Plan  Dialysis for volume and solute management  Epogen  5500units. Hectorol:1 microgram.  UF 1 kg, dialyze again her on tomorrow. Will take her to [address] [employer] [facility].           Ac Singh MD

## 2018-08-10 LAB
ANION GAP SERPL CALC-SCNC: 8 MMOL/L (ref 3–18)
BASOPHILS # BLD: 0 K/UL (ref 0–0.1)
BASOPHILS NFR BLD: 0 % (ref 0–2)
BUN SERPL-MCNC: 35 MG/DL (ref 7–18)
BUN/CREAT SERPL: 8 (ref 12–20)
CALCIUM SERPL-MCNC: 8.7 MG/DL (ref 8.5–10.1)
CHLORIDE SERPL-SCNC: 114 MMOL/L (ref 100–108)
CO2 SERPL-SCNC: 26 MMOL/L (ref 21–32)
CREAT SERPL-MCNC: 4.33 MG/DL (ref 0.6–1.3)
DIFFERENTIAL METHOD BLD: ABNORMAL
EOSINOPHIL # BLD: 0.2 K/UL (ref 0–0.4)
EOSINOPHIL NFR BLD: 2 % (ref 0–5)
ERYTHROCYTE [DISTWIDTH] IN BLOOD BY AUTOMATED COUNT: 17.2 % (ref 11.6–14.5)
GLUCOSE BLD STRIP.AUTO-MCNC: 119 MG/DL (ref 70–110)
GLUCOSE BLD STRIP.AUTO-MCNC: 173 MG/DL (ref 70–110)
GLUCOSE BLD STRIP.AUTO-MCNC: 248 MG/DL (ref 70–110)
GLUCOSE BLD STRIP.AUTO-MCNC: 343 MG/DL (ref 70–110)
GLUCOSE BLD STRIP.AUTO-MCNC: 61 MG/DL (ref 70–110)
GLUCOSE SERPL-MCNC: 53 MG/DL (ref 74–99)
HCT VFR BLD AUTO: 34.8 % (ref 35–45)
HGB BLD-MCNC: 11.3 G/DL (ref 12–16)
LYMPHOCYTES # BLD: 1.1 K/UL (ref 0.9–3.6)
LYMPHOCYTES NFR BLD: 15 % (ref 21–52)
MCH RBC QN AUTO: 27.2 PG (ref 24–34)
MCHC RBC AUTO-ENTMCNC: 32.5 G/DL (ref 31–37)
MCV RBC AUTO: 83.7 FL (ref 74–97)
MONOCYTES # BLD: 0.7 K/UL (ref 0.05–1.2)
MONOCYTES NFR BLD: 9 % (ref 3–10)
NEUTS SEG # BLD: 5.3 K/UL (ref 1.8–8)
NEUTS SEG NFR BLD: 74 % (ref 40–73)
PHOSPHATE SERPL-MCNC: 3.8 MG/DL (ref 2.5–4.9)
PLATELET # BLD AUTO: 252 K/UL (ref 135–420)
PMV BLD AUTO: 11.6 FL (ref 9.2–11.8)
POTASSIUM SERPL-SCNC: 3 MMOL/L (ref 3.5–5.5)
RBC # BLD AUTO: 4.16 M/UL (ref 4.2–5.3)
SODIUM SERPL-SCNC: 148 MMOL/L (ref 136–145)
WBC # BLD AUTO: 7.2 K/UL (ref 4.6–13.2)

## 2018-08-10 PROCEDURE — 74011636637 HC RX REV CODE- 636/637: Performed by: FAMILY MEDICINE

## 2018-08-10 PROCEDURE — 90935 HEMODIALYSIS ONE EVALUATION: CPT

## 2018-08-10 PROCEDURE — 74011250637 HC RX REV CODE- 250/637: Performed by: FAMILY MEDICINE

## 2018-08-10 PROCEDURE — 65270000029 HC RM PRIVATE

## 2018-08-10 PROCEDURE — 80048 BASIC METABOLIC PNL TOTAL CA: CPT | Performed by: INTERNAL MEDICINE

## 2018-08-10 PROCEDURE — 5A1D70Z PERFORMANCE OF URINARY FILTRATION, INTERMITTENT, LESS THAN 6 HOURS PER DAY: ICD-10-PCS | Performed by: INTERNAL MEDICINE

## 2018-08-10 PROCEDURE — 84100 ASSAY OF PHOSPHORUS: CPT | Performed by: INTERNAL MEDICINE

## 2018-08-10 PROCEDURE — 36415 COLL VENOUS BLD VENIPUNCTURE: CPT | Performed by: INTERNAL MEDICINE

## 2018-08-10 PROCEDURE — 85025 COMPLETE CBC W/AUTO DIFF WBC: CPT | Performed by: INTERNAL MEDICINE

## 2018-08-10 PROCEDURE — 74011000250 HC RX REV CODE- 250: Performed by: FAMILY MEDICINE

## 2018-08-10 PROCEDURE — 74011250636 HC RX REV CODE- 250/636: Performed by: INTERNAL MEDICINE

## 2018-08-10 PROCEDURE — 82962 GLUCOSE BLOOD TEST: CPT

## 2018-08-10 PROCEDURE — 74011250637 HC RX REV CODE- 250/637: Performed by: INTERNAL MEDICINE

## 2018-08-10 RX ORDER — HYDRALAZINE HYDROCHLORIDE 50 MG/1
50 TABLET, FILM COATED ORAL 3 TIMES DAILY
Status: DISCONTINUED | OUTPATIENT
Start: 2018-08-10 | End: 2018-08-14 | Stop reason: HOSPADM

## 2018-08-10 RX ORDER — LOSARTAN POTASSIUM 50 MG/1
100 TABLET ORAL DAILY
Status: DISCONTINUED | OUTPATIENT
Start: 2018-08-11 | End: 2018-08-14 | Stop reason: HOSPADM

## 2018-08-10 RX ORDER — POTASSIUM CHLORIDE 1.5 G/1.77G
20 POWDER, FOR SOLUTION ORAL 2 TIMES DAILY WITH MEALS
Status: DISCONTINUED | OUTPATIENT
Start: 2018-08-10 | End: 2018-08-14 | Stop reason: HOSPADM

## 2018-08-10 RX ADMIN — INSULIN GLARGINE 10 UNITS: 100 INJECTION, SOLUTION SUBCUTANEOUS at 22:00

## 2018-08-10 RX ADMIN — LOSARTAN POTASSIUM 50 MG: 50 TABLET ORAL at 09:39

## 2018-08-10 RX ADMIN — DEXTROSE MONOHYDRATE 12.5 G: 25 INJECTION, SOLUTION INTRAVENOUS at 03:51

## 2018-08-10 RX ADMIN — SIMVASTATIN 20 MG: 20 TABLET, FILM COATED ORAL at 22:00

## 2018-08-10 RX ADMIN — DILTIAZEM HYDROCHLORIDE 240 MG: 240 CAPSULE, COATED, EXTENDED RELEASE ORAL at 09:39

## 2018-08-10 RX ADMIN — INSULIN LISPRO 8 UNITS: 100 INJECTION, SOLUTION INTRAVENOUS; SUBCUTANEOUS at 17:12

## 2018-08-10 RX ADMIN — POTASSIUM CHLORIDE 20 MEQ: 1.5 POWDER, FOR SOLUTION ORAL at 17:12

## 2018-08-10 RX ADMIN — INSULIN LISPRO 4 UNITS: 100 INJECTION, SOLUTION INTRAVENOUS; SUBCUTANEOUS at 22:00

## 2018-08-10 RX ADMIN — CLONIDINE HYDROCHLORIDE 0.1 MG: 0.1 TABLET ORAL at 12:04

## 2018-08-10 RX ADMIN — HYDRALAZINE HYDROCHLORIDE 50 MG: 50 TABLET ORAL at 17:12

## 2018-08-10 RX ADMIN — SODIUM CHLORIDE 75 ML/HR: 900 INJECTION, SOLUTION INTRAVENOUS at 07:08

## 2018-08-10 RX ADMIN — CLONIDINE HYDROCHLORIDE 0.1 MG: 0.1 TABLET ORAL at 17:12

## 2018-08-10 RX ADMIN — HYDRALAZINE HYDROCHLORIDE 50 MG: 50 TABLET ORAL at 22:00

## 2018-08-10 NOTE — PROGRESS NOTES
Problem: Falls - Risk of  Goal: *Absence of Falls  Document Oscar Fall Risk and appropriate interventions in the flowsheet.    Outcome: Progressing Towards Goal  Fall Risk Interventions:  Mobility Interventions: Patient to call before getting OOB    Mentation Interventions: Adequate sleep, hydration, pain control    Medication Interventions: Evaluate medications/consider consulting pharmacy, Patient to call before getting OOB, Teach patient to arise slowly    Elimination Interventions: Call light in reach, Patient to call for help with toileting needs    History of Falls Interventions: Door open when patient unattended

## 2018-08-10 NOTE — PROGRESS NOTES
RENAL PROGRESS NOTE: Pt seen on dialysis. Follow up of ESRD     Present on Admission:   ESRD (end stage renal disease) ([facility] [address]         Subjective: Feels good, no SOB. ,BP is quite high,received Clonidine. Patient is on Dialysis.      Objective:    Patient Vitals for the past 12 hrs:   Temp Pulse Resp BP SpO2   08/10/18 1230 - (!) 101 - (!) 196/119 -   08/10/18 1200 - (!) 103 - (!) 219/105 -   08/10/18 1130 - (!) 105 - (!) 218/105 -   08/10/18 1100 - 98 - (!) 186/109 -   08/10/18 1030 - 93 - (!) 219/103 -   08/10/18 1020 - 96 - (!) 204/101 -   08/10/18 1015 99.7 °F (37.6 °C) 100 16 (!) 208/97 -   08/10/18 0708 97.5 °F (36.4 °C) 77 20 (!) 192/91 100 %   08/10/18 0447 97 °F (36.1 °C) 78 18 135/90 98 %        Intake/Output Summary (Last 24 hours) at 08/10/18 1246  Last data filed at 08/10/18 0920   Gross per 24 hour   Intake              880 ml   Output              875 ml   Net                5 ml       Physical Assessment:     General Appearance: NAD  Lung: clear to auscultation  Heart: regular rate and rhythm and no murmurs, clicks or gallops  Lower Extremities: no edema   Access: (R)  IJ, qb 400    Labs    CBC w/Diff    Recent Labs      08/10/18   0238  08/09/18   0242  08/08/18   0145   WBC  7.2  7.1  7.5   RBC  4.16*  4.12*  3.89*   HGB  11.3*  11.3*  10.5*   HCT  34.8*  34.4*  32.9*   MCV  83.7  83.5  84.6   MCH  27.2  27.4  27.0   MCHC  32.5  32.8  31.9   RDW  17.2*  17.3*  17.7*    Recent Labs      08/10/18   0238  08/09/18   0242  08/08/18   0145   MONOS  9  8  5   EOS  2  3  2   BASOS  0  0  0   RDW  17.2*  17.3*  17.7*        Comprehensive Metabolic Profile    Recent Labs      08/10/18   0238  08/09/18   0242  08/08/18   0145   NA  148*  150*  150*   K  3.0*  3.3*  3.6   CL  114*  120*  117*   CO2  26  21  22   BUN  35*  49*  58*   CREA  4.33*  5.25*  5.60*    Recent Labs      08/10/18   0238  08/09/18   0242  08/08/18   0145  08/07/18   1523   CA  8.7  8.2*  7.7*  8.1*  8.2*   PHOS  3.8  4.7  4.8   -- ALB   --    --    --   1.9*   TP   --    --    --   6.1*   SGOT   --    --    --   45*   TBILI   --    --    --   0.2          Basic Metabolic Profile       results  reviwed. MEDS:Reviwed. Current Facility-Administered Medications   Medication Dose Route Frequency Provider Last Rate Last Dose    [START ON 8/11/2018] losartan (COZAAR) tablet 100 mg  100 mg Oral DAILY Tess De Los Santos MD        hydrALAZINE (APRESOLINE) tablet 50 mg  50 mg Oral TID Tess De Los Santos MD        epoetin raphael (EPOGEN;PROCRIT) injection 5,200 Units  5,200 Units IntraVENous DIALYSIS TUE, THU & SAT Tess De Los Santos MD        doxercalciferol (HECTOROL) 4 mcg/2 mL injection 1 mcg  1 mcg IntraVENous Q TU, TH & SAT Tess De Los Santos MD        insulin glargine (LANTUS) injection 10 Units  10 Units SubCUTAneous QHS Osman Lock MD   10 Units at 08/09/18 2148    acetaminophen (TYLENOL) tablet 325 mg  325 mg Oral Q4H PRN Osman Lock MD        dilTIAZem CD (CARDIZEM CD) capsule 240 mg  240 mg Oral DAILY Osman Lock MD   240 mg at 08/10/18 2703    cloNIDine HCl (CATAPRES) tablet 0.1 mg  0.1 mg Oral BID Osman Lock MD   0.1 mg at 08/10/18 1204    simvastatin (ZOCOR) tablet 20 mg  20 mg Oral QHS Osman Lock MD   20 mg at 08/09/18 2147    insulin lispro (HUMALOG) injection   SubCUTAneous AC&HS Osman Lock MD   Stopped at 08/10/18 0730    glucose chewable tablet 16 g  16 g Oral PRN Osman Lock MD        glucagon [facility]) injection 1 mg  1 mg IntraMUSCular JULEE Lock MD        dextrose (D50W) injection syrg 12.5-25 g  25-50 mL IntraVENous PRPHILLY Lock MD   12.5 g at 08/10/18 0351       Impression:    ESRD: Tolerating HD well. On 3 K 3 Ca bath. Anemia of CKD: on  Epogen. Hyperparathyroidism Yes  Uncontrolled  Hypertension. Plan  Dialysis for volume and solute management  Epogen  : no need   Hectorol 1 micro Gram.  Adjust BP medicine.   Dialysis  Tomorrow & then q Tues/thursday/Saturday.   Await for OP dialysis unit        Arron Lopes MD

## 2018-08-10 NOTE — OP NOTES
[facility] REPORT    Ashley Rosales  MR#: [id number]  : 1955  ACCOUNT #: [de-identified]   DATE OF SERVICE: 2018    SURGEON:  Charlie Martel MD    PREOPERATIVE DIAGNOSIS:  End-stage renal disease in need of dialysis access. POSTOPERATIVE DIAGNOSIS:  End-stage renal disease in need of dialysis access. PROCEDURES PERFORMED:  1. Ultrasound-guided access of right internal jugular vein. 2.  Placement of tunneled dialysis catheter, 19 cm Palindrome through a right IJ approach. 3.  Moderate conscious sedation of 12 minutes. CULTURES:  None. SPECIMENS REMOVED:  None. DRAINS:  None. ESTIMATED BLOOD LOSS:  Less than 50 mL. ANESTHESIA:  Moderate conscious sedation of 12 minutes. This was provided by an independent provider, given the medications per my discretion, monitoring of vital signs throughout the case. COMPLICATIONS:  None. ASSISTANTS:  None. IMPLANTS:  As above. INDICATIONS FOR THE PROCEDURE:  The patient is a 26-year-old female with end-stage renal disease in need of dialysis access. The patient was given risks and benefits of the procedure including but not limited to bleeding, infection, damage to tissue structures, MI, stroke, and death as well as DVT, PE and loss of access. The patient was understanding of all the risks and underwent the procedure. PROCEDURE:  The patient was correctly identified in the precath area, taken to the cath lab in stable condition. The patient had a preincision timeout prior to incision. The patient was prepped and draped in normal sterile fashion following [facility] compliant guidelines for aseptic technique. The patient then had an ultrasound that was placed on the right IJ. A picture was taken and copied to the patient's chart. We numbed her up appropriately using 1% lidocaine, took a single wall entry needle and gained access into the internal jugular vein.   Once the needle tip was identified within the vein and there was positive blood return, a wire was then placed. A small skin incision was created using an 11 blade. We then were able to dilate the tract x 2 and a peel-away sheath was then placed. We then were able to numb up an area that was 2 fingerbreadths below the clavicle, numb up our tract, make a small skin incision on the chest and then tunnel our 19 cm Palindrome catheter. The dilator and wire were removed. The catheter was placed down the peel-away sheath. The peel-away sheath was then removed. The tip of the catheter was at the sinoatrial junction. There was good coverage of the catheter without any kinking. No evidence of pneumothorax was identified and the catheter was good for use. We then were able to easily flush and aspirate both ports and 1.6 mL of hot heparin were placed in each port, capped and wrapped with a 4 x 4 and tape. We then secured the catheter with 2-0 Prolene sutures at both butterfly holes and the catheter insertion site with Biopatch and Tegaderm placed. 4-0 Monocryl subcuticular stitch was used to close the IJ incision with Dermabond as a dressing. The patient tolerated the procedure well without any issues.       MD PATRICIA Gonsalez / [place]  D: 08/09/2018 14:22     T: 08/09/2018 22:03  JOB #: [id number]

## 2018-08-10 NOTE — DIALYSIS
ACUTE HEMODIALYSIS FLOW SHEET    HEMODIALYSIS ORDERS: Physician: Tony Armstrong      Dialyzer: revaclear         Duration: 3 hr  BFR: 300   DFR: 600   Dialysate:  Temp *37 K+   3    Ca+  3 Na 138 Bicarb 35   Weight:  65.7 kg    Bed Scale []     Unable to Obtain []      Dry weight/UF Goal: 1000 Access CVL  Needle Gauge     Heparin []  Bolus      Units    [] Hourly       Units    [x]None     Catheter locking solution heparin   Pre BP:   208/97    Pulse:     100     Temperature:   99.7  Respirations: 16  Tx: NS       ml/Bolus  Other        [x] N/A   Labs: Pre        Post:        [x] N/A   Additional Orders(medications, blood products, hypotension management):       [x] N/A     [x] Time Out/Safety Check  [x] [facility] Consent Verified     CATHETER ACCESS: []N/A   [x]Right   []Left   []IJ     []Fem   [] First use X-ray verified     [x]Tunnel                [] Non Tunneled   [x]No S/S infection  []Redness  []Drainage []Cultured []Swelling []Pain   [x]Medical Aseptic Prep Utilized   []Dressing Changed  [] Biopatch  Date:       []Clotted   [x]Patent   Flows: [x]Good  []Poor  []Reversed   If access problem,  notified: []Yes    []N/A  Date:           GRAFT/FISTULA ACCESS:  [x]N/A     []Right     []Left     []UE     []LE   []AVG   []AVF        []Buttonhole    []Medical Aseptic Prep Utilized   []No S/S infection  []Redness  []Drainage []Cultured []Swelling []Pain    Bruit:   [] Strong    [] Weak       Thrill :   [] Strong    [] Weak       Needle Gauge:    Length:     If access problem,  notified: []Yes     []N/A  Date:        Please describe access if present and not used:       GENERAL ASSESSMENT:    LUNGS:  Rate  SaO2%        [x] N/A    [x] Clear  [] Coarse  [] Crackles  [] Wheezing        [] Diminished     Location : []RLL   []LLL    []RUL  []STEPHY   Cough: []Productive  []Dry  [x]N/A   Respirations:  [x]Easy  []Labored   Therapy:  [x]RA  []NC  l/min    Mask: []NRB []Venti       O2%                  []Ventilator []Intubated  [] Trach  [] BiPaP   CARDIAC: [x]Regular      [] Irregular   [] Pericardial Rub  [] JVD        []  Monitored  [] Bedside  [] Remotely monitored [] N/A  Rhythm:    EDEMA: [] None  [x]Generalized  [] Pitting [] 1    [] 2    [] 3    [] 4                 [] Facial  [] Pedal  []  UE  [] LE   SKIN:   [x] Warm  [] Hot     [] Cold   [x] Dry     [] Pale   [] Diaphoretic                  [] Flushed  [] Jaundiced  [] Cyanotic  [] Rash  [] Weeping   LOC:    [x] Alert      [x]Oriented:    [x] Person     [x] Place  [x]Time               [] Confused  [] Lethargic  [] Medicated  [] Non-responsive     GI / ABDOMEN:  [] Flat    [] Distended    [x] Soft    [] Firm   []  Obese                             [] Diarrhea  [x] Bowel Sounds  [] Nausea  [] Vomiting       / URINE ASSESSMENT:[] Voiding   [x] Oliguria  [] Anuria   []  Khan     [] Incontinent    []  Incontinent Brief      []  Bathroom Privileges     PAIN: [x] 0 []1  []2   []3   []4   []5   []6   []7   []8   []9   []10            Scale 0-10  Action/Follow Up:    MOBILITY:  [] Amb    [] Amb/Assist    [x] Bed    [] Wheelchair  [] Stretcher      All Vitals and Treatment Details on Attached  [address]: [facility]          Room # 474     [] 1st Time Acute  [] Stat  [x] Routine  [] Urgent     [x] Acute Room  []  Bedside  [] ICU/CCU  [] ER   Isolation Precautions:  [x] Dialysis   [] Airborne   [] Contact    [] Reverse   Special Considerations:         [] Blood Consent Verified [x]N/A     ALLERGIES:    View Drug-Allergy Interactions  Show: []Deleted []       Reaction Severity Reaction Type Noted Valid Until Updated            Allergies   Amoxicillin Unknown (comments)   2013  Past Updates. .. Cleocin [Clindamycin Hcl] Unknown (comments)   2013  Past Updates. .. Codeine Unknown (comments)   2013  Past Updates. .. Lorcet 10/650 [Hydrocodone-acetaminophen] Unknown (comments)   2013  Past Updates. ..    Penicillin G Benzathine Unknown (comments) 1/2/2013  Past Updates. .. Shellfish Derived Unknown (comments)   1/2/2013  Past Updates. .. Maxime as Reviewed          [] NKA          Code Status:  [x] Full Code  [] DNR  [] Other           HBsAg ONLY: Date Drawn 08/09/18         [x]Negative []Positive []Unknown   HBsAb: Date 08/09/18    [x] Susceptible   [] [id number] []Not Drawn  [] Drawn     Current Labs:    Date of Labs: Today [x]        Cut and paste current labs here.                                                                                                                                   DIET:  [x] Renal    [] Other     [] NPO     []  Diabetic      PRIMARY NURSE REPORT: First initial/Last name/Title      Pre Dialysis: Monica Waters RN    Time: 0930      EDUCATION:    [x] Patient [] Other         Knowledge Basis: []None [x]Minimal [] Substantial   Barriers to learning  [x]N/A   [] Access Care     [] S&S of infection     [] Fluid Management     []K+     [x]Procedural    []Albumin     [] Medications     [] Tx Options     [] Transplant     [] Diet     [] Other   Teaching Tools:  [x] Explain  [] Demo  [] Handouts [] Video  Patient response:   [x] Verbalized understanding  [] Teach back  [] Return demonstration [] Requires follow up   Inappropriate due to            [address] Before each treatment:     Machine Number:                   [facility]                                  [x] Unit Machine # 9 with centralized RO                                  [] Portable Machine #1/RO serial # [id number]                                  [] Portable Machine #2/RO serial # [id number]                                  [] Portable Machine #3/RO serial # [id number]                                                                                                       [facility]                                  [] Portable Machine #11/RO serial # [id number]                                   [] Portable Machine #12/RO serial # [id number]                                  [] Portable Machine #13/RO serial #  [id number]      Alarm Test:  Pass time 76651         Other:         [x] RO/Machine Log Complete      Temp    37            [x]Extracorporeal Circuit Tested for integrity   Dialysate: pH  7.4 Conductivity: Meter   14     HD Machine   14                  TCD: 14  Dialyzer Lot # [id number]        Blood Tubing Lot # [id number]     Saline Lot #       CHLORINE TESTING-Before each treatment and every 4 hours    Total Chlorine: [x] less than 0.1 ppm  Time: 1000 4 Hr/2nd Check Time:    (if greater than 0.1 ppm from Primary then every 30 minutes from Secondary)     TREATMENT INITIATION  with Dialysis Precautions:   [x] All Connections Secured                 [x] Saline Line Double Clamped   [x] Venous Parameters Set                  [x] Arterial Parameters Set    [x] Prime Given  250 ml               [x]Air Foam Detector Engaged      Treatment Initiation Note: pt arrived in stable condition via bed CVL accessed and treatment initiated without difficulty. Dr Saurabh Bernard at bedside     Medication Dose Volume Route Initials Dialyzer Cleared: [] Good [x] Fair  [] Poor    Blood processed:  44.6 L  UF Removed  1000 Ml    Post Wt: 64.7    kg  POst BP:   189/128       Pulse: 100      Respirations: 16  Temperature: 98.8                                   Post Tx Vascular Access: AVF/AVG: Bleeding stopped Art  min. Ray. Min   N/A                                   Catheter: Locking solution: Heparin 1:1000 Art. 1.6  Ray.  1.6                                 Post Assessment:                                    Skin:  [x] Warm  [x] Dry [] Diaphoretic    [] Flushed  [] Pale [] Cyanotic   [facility] Signatures Title Initials  Time Lungs: [x] Clear    [] Course  [] Crackles  [] Wheezing [] Diminished   Timo Suarez RN    Cardiac: [x] Regular   [] Irregular   [] Monitor  [] N/A  Rhythm:           Edema:  [] None    [x] General     [] Facial   [] Pedal    [] UE    [] LE Pain: [x]0  []1  []2   []3  []4   []5   []6   []7   []8   []9   []10         Post Treatment Note: HD well tolerated. 1L UF removed. No acute distress noted during or post HD treatment.      POST TREATMENT PRIMARY NURSE HANDOFF REPORT:     First initial/Last name/Title         Post Dialysis: JOSE Lam RN Time:  1400     Abbreviations: AVG-arterial venous graft, AVF-arterial venous fistula, IJ-Internal Jugular, Subcl-Subclavian, Fem-Femoral, Tx-treatment, AP/HR-apical heart rate, DFR-dialysate flow rate, BFR-blood flow rate, AP-arterial pressure, -venous pressure, UF-ultrafiltrate, TMP-transmembrane pressure, Ray-Venous, Art-Arterial, RO-Reverse Osmosis

## 2018-08-10 NOTE — PROGRESS NOTES
Progress Note    Patient: Sharla Ventura MRN: [id number]  SSN: [id number]    YOB: 1955  Age: 70 y.o. Sex: female      Admit Date: 8/6/2018    LOS: 3 days     Subjective:     Patient with hypertension admitted for acute on chronic renal failure. No encephalopathy noted during hospitalization. Patient has tolerated dialysis well so far. Objective:     Vitals:    08/10/18 1230 08/10/18 1321 08/10/18 1334 08/10/18 1402   BP: (!) 196/119 (!) 200/99 (!) 189/128 (!) 187/107   Pulse: (!) 101 (!) 103 100 (!) 105   Resp:   16 18   Temp:   98.8 °F (37.1 °C) 99.4 °F (37.4 °C)   TempSrc:   Oral    SpO2:    98%   Weight:       Height:            Intake and Output:  Current Shift: 08/10 0701 - 08/10 1900  In: 340 [P.O.:340]  Out: 1325 [Urine:325]  Last three shifts: 08/08 1901 - 08/10 0700  In: 690 [P.O.:690]  Out: 2175 [Urine:2175]    Physical Exam:   GENERAL: alert, cooperative, no distress, appears older than stated age  LUNG: clear to auscultation bilaterally  HEART: regular rate and rhythm    Lab/Data Review: All lab results for the last 24 hours reviewed. Creatinine 4.33 potassium 3.0  Glucose 119    Assessment:     Active Problems:    Acute on chronic renal insufficiency (8/7/2018)      Hypertension (8/7/2018)      ESRD (end stage renal disease) ([facility] [address] (8/8/2018)      Secondary hyperparathyroidism of renal origin ([facility] [address] (8/8/2018)      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 [facility]) (8/8/2018)      CVA, old, cognitive deficits (8/8/2018)        Plan:     Potassium supplement. Probable transfer back to SNF after dialysis tomorrow.     Signed By: Saint Dory, MD     August 10, 2018

## 2018-08-10 NOTE — PROGRESS NOTES
BS 53 was called as critical. Retook blood sugar 61, D50 12.5 mg iv was given, pt was alert, no acute distress noted, blood sugar is now 173, will continue to monitor, call bell in reach.

## 2018-08-11 LAB
ANION GAP SERPL CALC-SCNC: 7 MMOL/L (ref 3–18)
BASOPHILS # BLD: 0 K/UL (ref 0–0.1)
BASOPHILS NFR BLD: 0 % (ref 0–2)
BUN SERPL-MCNC: 30 MG/DL (ref 7–18)
BUN/CREAT SERPL: 7 (ref 12–20)
CALCIUM SERPL-MCNC: 8.5 MG/DL (ref 8.5–10.1)
CHLORIDE SERPL-SCNC: 105 MMOL/L (ref 100–108)
CO2 SERPL-SCNC: 27 MMOL/L (ref 21–32)
CREAT SERPL-MCNC: 4.3 MG/DL (ref 0.6–1.3)
DIFFERENTIAL METHOD BLD: ABNORMAL
EOSINOPHIL # BLD: 0.2 K/UL (ref 0–0.4)
EOSINOPHIL NFR BLD: 2 % (ref 0–5)
ERYTHROCYTE [DISTWIDTH] IN BLOOD BY AUTOMATED COUNT: 16.8 % (ref 11.6–14.5)
GLUCOSE BLD STRIP.AUTO-MCNC: 155 MG/DL (ref 70–110)
GLUCOSE BLD STRIP.AUTO-MCNC: 172 MG/DL (ref 70–110)
GLUCOSE BLD STRIP.AUTO-MCNC: 175 MG/DL (ref 70–110)
GLUCOSE SERPL-MCNC: 120 MG/DL (ref 74–99)
HCT VFR BLD AUTO: 34.6 % (ref 35–45)
HGB BLD-MCNC: 11.2 G/DL (ref 12–16)
LYMPHOCYTES # BLD: 1.5 K/UL (ref 0.9–3.6)
LYMPHOCYTES NFR BLD: 20 % (ref 21–52)
MCH RBC QN AUTO: 26.8 PG (ref 24–34)
MCHC RBC AUTO-ENTMCNC: 32.4 G/DL (ref 31–37)
MCV RBC AUTO: 82.8 FL (ref 74–97)
MONOCYTES # BLD: 0.8 K/UL (ref 0.05–1.2)
MONOCYTES NFR BLD: 10 % (ref 3–10)
NEUTS SEG # BLD: 5 K/UL (ref 1.8–8)
NEUTS SEG NFR BLD: 68 % (ref 40–73)
PHOSPHATE SERPL-MCNC: 3.7 MG/DL (ref 2.5–4.9)
PLATELET # BLD AUTO: 198 K/UL (ref 135–420)
PLATELET COMMENTS,PCOM: ABNORMAL
POTASSIUM SERPL-SCNC: 3.4 MMOL/L (ref 3.5–5.5)
RBC # BLD AUTO: 4.18 M/UL (ref 4.2–5.3)
RBC MORPH BLD: ABNORMAL
SODIUM SERPL-SCNC: 139 MMOL/L (ref 136–145)
WBC # BLD AUTO: 7.5 K/UL (ref 4.6–13.2)

## 2018-08-11 PROCEDURE — 74011250637 HC RX REV CODE- 250/637: Performed by: FAMILY MEDICINE

## 2018-08-11 PROCEDURE — 85025 COMPLETE CBC W/AUTO DIFF WBC: CPT | Performed by: INTERNAL MEDICINE

## 2018-08-11 PROCEDURE — 65270000029 HC RM PRIVATE

## 2018-08-11 PROCEDURE — 80048 BASIC METABOLIC PNL TOTAL CA: CPT | Performed by: INTERNAL MEDICINE

## 2018-08-11 PROCEDURE — 86706 HEP B SURFACE ANTIBODY: CPT | Performed by: INTERNAL MEDICINE

## 2018-08-11 PROCEDURE — 74011250636 HC RX REV CODE- 250/636: Performed by: INTERNAL MEDICINE

## 2018-08-11 PROCEDURE — 90935 HEMODIALYSIS ONE EVALUATION: CPT

## 2018-08-11 PROCEDURE — 86705 HEP B CORE ANTIBODY IGM: CPT | Performed by: INTERNAL MEDICINE

## 2018-08-11 PROCEDURE — 74011250637 HC RX REV CODE- 250/637: Performed by: INTERNAL MEDICINE

## 2018-08-11 PROCEDURE — 84100 ASSAY OF PHOSPHORUS: CPT | Performed by: INTERNAL MEDICINE

## 2018-08-11 PROCEDURE — 82962 GLUCOSE BLOOD TEST: CPT

## 2018-08-11 PROCEDURE — 36415 COLL VENOUS BLD VENIPUNCTURE: CPT | Performed by: INTERNAL MEDICINE

## 2018-08-11 PROCEDURE — 74011636637 HC RX REV CODE- 636/637: Performed by: FAMILY MEDICINE

## 2018-08-11 RX ORDER — DOXERCALCIFEROL 4 UG/2ML
1 INJECTION INTRAVENOUS
Status: DISCONTINUED | OUTPATIENT
Start: 2018-08-11 | End: 2018-08-14 | Stop reason: HOSPADM

## 2018-08-11 RX ADMIN — HYDRALAZINE HYDROCHLORIDE 50 MG: 50 TABLET ORAL at 16:42

## 2018-08-11 RX ADMIN — LOSARTAN POTASSIUM 100 MG: 50 TABLET ORAL at 13:23

## 2018-08-11 RX ADMIN — DILTIAZEM HYDROCHLORIDE 240 MG: 240 CAPSULE, COATED, EXTENDED RELEASE ORAL at 13:23

## 2018-08-11 RX ADMIN — HYDRALAZINE HYDROCHLORIDE 50 MG: 50 TABLET ORAL at 22:00

## 2018-08-11 RX ADMIN — INSULIN LISPRO 2 UNITS: 100 INJECTION, SOLUTION INTRAVENOUS; SUBCUTANEOUS at 22:00

## 2018-08-11 RX ADMIN — INSULIN GLARGINE 10 UNITS: 100 INJECTION, SOLUTION SUBCUTANEOUS at 22:00

## 2018-08-11 RX ADMIN — DOXERCALCIFEROL 1 MCG: 4 INJECTION, SOLUTION INTRAVENOUS at 12:03

## 2018-08-11 RX ADMIN — CLONIDINE HYDROCHLORIDE 0.1 MG: 0.1 TABLET ORAL at 17:57

## 2018-08-11 RX ADMIN — INSULIN LISPRO 2 UNITS: 100 INJECTION, SOLUTION INTRAVENOUS; SUBCUTANEOUS at 16:42

## 2018-08-11 RX ADMIN — POTASSIUM CHLORIDE 20 MEQ: 1.5 POWDER, FOR SOLUTION ORAL at 16:42

## 2018-08-11 RX ADMIN — SIMVASTATIN 20 MG: 20 TABLET, FILM COATED ORAL at 22:00

## 2018-08-11 NOTE — PROGRESS NOTES
Dr. Christopher Reyes aware of elevated HR and BP. Both are normal for patient. Pt on PO meds to treat both.  Will continue to monitor

## 2018-08-11 NOTE — PROGRESS NOTES
Bedside report given to [facility], RN. Pt still with elevated HR, and BP.  Will continue to monitor

## 2018-08-11 NOTE — PROGRESS NOTES
Progress Note    Patient: Sue Mullins MRN: [id number]  SSN: [id number]    YOB: 1955  Age: 70 y.o. Sex: female      Admit Date: 8/6/2018    LOS: 4 days     Subjective:     Patient admitted with acute on chronic renal failure. Tolerating dialysis well. Objective:     Vitals:    08/11/18 1000 08/11/18 1030 08/11/18 1100 08/11/18 1130   BP: (!) 195/105 (!) 182/107 (!) 191/101 (!) 170/113   Pulse: 91 90 93 (!) 107   Resp: 16 16 16 16   Temp:       TempSrc:       SpO2:       Weight:       Height:            Intake and Output:  Current Shift:    Last three shifts: 08/09 1901 - 08/11 0700  In: 1000 [P.O.:1000]  Out: 1925 [Urine:925]    Physical Exam:   GENERAL: alert, cooperative, no distress, appears older than stated age  LUNG: clear to auscultation bilaterally  HEART: regular rate and rhythm    Lab/Data Review: All lab results for the last 24 hours reviewed. 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     Signed By: Marc Goff MD     August 11, 2018

## 2018-08-11 NOTE — DIALYSIS
ACUTE HEMODIALYSIS FLOW SHEET    HEMODIALYSIS ORDERS: Physician: Ness Carrerak: kavon         Duration: 3 hr  BFR: 300   DFR: 600   Dialysate:  Temp 36.0 K+   3    Ca+  3 Na 140 Bicarb 35   Weight:  63.8 kg    Bed Scale [x]     Unable to Obtain []      Dry weight/UF Goal: 1000 Access RIJ  Needle Gauge     Heparin []  Bolus      Units    [] Hourly       Units    [x]None     Catheter locking solution heparin   Pre BP:   150/95    Pulse:     97     Temperature: 98.3 Respirations:16Tx: NS    ml/Bolus  Other        [x] N/A   Labs: Pre        Post:        [x] N/A   Additional Orders(medications, blood products, hypotension management):       [] N/A hectorchelle epogen held     [x]Time Out/Safety Check  [x] [facility] Consent Verified     CATHETER ACCESS:[]N/A   [x]Right   []Left   [x]IJ     []Fem   [] First use X-ray verified     [x]Tunnel                [] Non Tunneled   [x]No S/S infection  []Redness  []Drainage []Cultured []Swelling []Pain   [x]Medical Aseptic Prep Utilized   [x]Dressing Changed  [x] Biopatch  Date:8/11/18     []Clotted   []Patent   Flows: [x]Good  []Poor  []Reversed   If access problem,  notified: []Yes    [x]N/A  Date:           GRAFT/FISTULA ACCESS:  [x]N/A     []Right     []Left     []UE     []LE   []AVG   []AVF        []Buttonhole    []Medical Aseptic Prep Utilized   []No S/S infection  []Redness  []Drainage []Cultured []Swelling []Pain    Bruit:   [] Strong    [] Weak       Thrill :   [] Strong    [] Weak       Needle Gauge:   Length:     If access problem,  notified: []Yes     []N/A  Date:        Please describe access if present and not used:       GENERAL ASSESSMENT:    LUNGS:  Rate 16 SaO2%      [] N/A    [x] Clear  [] Coarse  [] Crackles  [] Wheezing        [] Diminished     Location : []RLL   []LLL    [x]RUL  [x]STEPHY   Cough: []Productive  []Dry  []N/A   Respirations:  []Easy  []Labored   Therapy:  [x]RA  []NC  l/min    Mask: []NRB []Venti       O2% []Ventilator  []Intubated  [] Trach  [] BiPaP   CARDIAC: []Regular      [x] Irregular   [] Pericardial Rub  [] JVD        []  Monitored  [] Bedside  [] Remotely monitored [] N/A  Rhythm:    EDEMA: [x] None  []Generalized  [] Pitting [] 1    [] 2    [] 3    [] 4                 [] Facial  [] Pedal  []  UE  [] LE   SKIN:   [x] Warm  [] Hot     [] Cold   [x] Dry     [] Pale   [] Diaphoretic                  [] Flushed  [] Jaundiced  [] Cyanotic  [] Rash  [] Weeping   LOC:    [x] Alert      [x]Oriented:    [x] Person     [x] Place  [x]Time               [] Confused  [] Lethargic  [] Medicated  [] Non-responsive     GI / ABDOMEN:[] Flat    [] Distended    [x] Soft    [] Firm   []  Obese                             [] Diarrhea  [x] Bowel Sounds  [] Nausea  [] Vomiting       / URINE ASSESSMENT:[x] Voiding   [] Oliguria  [] Anuria   []  Khan     [x] Incontinent    []  Incontinent Brief      []  Bathroom Privileges     PAIN: [x] 0 []1  []2   []3   []4   []5   []6   []7   []8   []9   []10            Scale 0-10  Action/Follow Up:    MOBILITY:  [] Amb    [] Amb/Assist    [] Bed    [] Wheelchair  [] Stretcher      All Vitals and Treatment Details on Attached [address]: [address]     [] 1st Time Acute  [] Stat  [x] Routine  [] Urgent     [x] Acute Room  []  Bedside  [] ICU/CCU  [] ER   Isolation Precautions:  [x] Dialysis   [] Airborne   [] Contact    [] Reverse   Special Considerations:         [] Blood Consent Verified []N/A     ALLERGIES:  [] NKA    Amoxicillin, Cleocin [Clindamycin Hcl], Codeine, Lorcet 10/650 [Hydrocodone-acetaminophen], Penicillin G Benzathine, Shellfish Derived      Code Status: [x] Full Code  [] DNR  [] Other           HBsAg ONLY: Date Drawn   8/9/18   [x]Negative []Positive []Unknown   HBsAb: Date 8/9/18    [x] Susceptible   [] [id number] []Not Drawn  [] Drawn     Current Labs:    Date of Labs:          Today [x]     Results for Saniya Cohen (MRN [id number]) as of 8/11/2018 12:48   Ref. Range 8/11/2018 03:29 8/11/2018 07:47   GLUCOSE,FAST - POC Latest Ref Range: 70 - 110 mg/dL  155 (H)   WBC Latest Ref Range: 4.6 - 13.2 K/uL 7.5    RBC Latest Ref Range: 4.20 - 5.30 M/uL 4.18 (L)    HGB Latest Ref Range: 12.0 - 16.0 g/dL 11.2 (L)    HCT Latest Ref Range: 35.0 - 45.0 % 34.6 (L)    MCV Latest Ref Range: 74.0 - 97.0 FL 82.8    MCH Latest Ref Range: 24.0 - 34.0 PG 26.8    MCHC Latest Ref Range: 31.0 - 37.0 g/dL 32.4    RDW Latest Ref Range: 11.6 - 14.5 % 16.8 (H)    PLATELET Latest Ref Range: 135 - 420 K/uL 198    NEUTROPHILS Latest Ref Range: 40 - 73 % 68    LYMPHOCYTES Latest Ref Range: 21 - 52 % 20 (L)    MONOCYTES Latest Ref Range: 3 - 10 % 10    EOSINOPHILS Latest Ref Range: 0 - 5 % 2    BASOPHILS Latest Ref Range: 0 - 2 % 0    DF Latest Units:   AUTOMATED    ABS. NEUTROPHILS Latest Ref Range: 1.8 - 8.0 K/UL 5.0    ABS. LYMPHOCYTES Latest Ref Range: 0.9 - 3.6 K/UL 1.5    ABS. MONOCYTES Latest Ref Range: 0.05 - 1.2 K/UL 0.8    ABS. EOSINOPHILS Latest Ref Range: 0.0 - 0.4 K/UL 0.2    ABS. BASOPHILS Latest Ref Range: 0.0 - 0.1 K/UL 0.0    RBC COMMENTS Latest Units:   ANISOCYTOSIS. ..     PLATELET COMMENTS Latest Units:   ADEQUATE PLATELETS    Sodium Latest Ref Range: 136 - 145 mmol/L 139    Potassium Latest Ref Range: 3.5 - 5.5 mmol/L 3.4 (L)    Chloride Latest Ref Range: 100 - 108 mmol/L 105    CO2 Latest Ref Range: 21 - 32 mmol/L 27    Anion gap Latest Ref Range: 3.0 - 18 mmol/L 7    Glucose Latest Ref Range: 74 - 99 mg/dL 120 (H)    BUN Latest Ref Range: 7.0 - 18 MG/DL 30 (H)    Creatinine Latest Ref Range: 0.6 - 1.3 MG/DL 4.30 (H)    BUN/Creatinine ratio Latest Ref Range: 12 - 20   7 (L)    Calcium Latest Ref Range: 8.5 - 10.1 MG/DL 8.5    Phosphorus Latest Ref Range: 2.5 - 4.9 MG/DL 3.7    GFR est non-AA Latest Ref Range: >60 ml/min/1.73m2 10 (L)    GFR est AA Latest Ref Range: >60 ml/min/1.73m2 13 (L)    HEPATITIS B CORE AB, IGM Unknown Rpt DIET: [x] Renal    [] Other     [] NPO     []  Diabetic      PRIMARY NURSE REPORT: First initial/Last name/Title      Pre Dialysis: JOSE Lam RN    Time: 9923     EDUCATION:    [x] Patient [] Other         Knowledge Basis: []None [x]Minimal [] Substantial   Barriers to learning N/A   [x] Access Care     [] S&S of infection     [] Fluid Management     []K+     []Procedural    []Albumin     [] Medications     [] Tx Options     [] Transplant     [] Diet     [] Other   Teaching Tools:  [x] Explain  [] Demo  [] Handouts [] Video  Patient response:   [x] Verbalized understanding  [] Teach back  [] Return demonstration [] Requires follow up   Inappropriate due to            [address] Before each treatment:     Machine Number:                   [address] [facility]                                   [x] Unit Machine # 9 with centralized RO                                  [] Portable Machine #1/RO serial # [id number]                                  [] Portable Machine #2/RO serial # [id number]                                  [] Portable Machine #3/RO serial # [id number]                                                                                                       [facility]                                  [] Portable Machine #11/RO serial # [id number]                                   [] Portable Machine #12/RO serial # [id number]                                  [] Portable Machine #13/RO serial #  [id number]      Alarm Test:  Pass time 0900       Other:         [x] RO/Machine Log Complete      Temp    36.0          [x]Extracorporeal Circuit Tested for integrity   Dialysate: pH  7.4 Conductivity: Meter   14.0    HD Machine   13.8                TCD: 13.8  Dialyzer Lot # [id number]          Blood Tubing Lot # [id number]         Saline Lot #  [id number]     CHLORINE TESTING-Before each treatment and every 4 hours    Total Chlorine: [x] less than 0.1 ppm  Time: 0900 Hr/2nd Check Time:   (if greater than 0.1 ppm from Primary then every 30 minutes from Secondary)     TREATMENT INITIATION  with Dialysis Precautions:   [x] All Connections Secured                 [x] Saline Line Double Clamped   [x] Venous Parameters Set                  [x] Arterial Parameters Set    [x] Prime Given 200 ml               [x]Air Foam Detector Engaged      Treatment Initiation Note: received pt in bed. Alert and awake. R IJ catheter accessed, arterial and venous port aspirates and flushes well. Hemodialysis now started. Will continue to monitor pt and vital signs. Medication Dose Volume Route Initials Dialyzer Cleared: [x] Good [] Fair  [] Poor    Blood processed:42.2 L  UF Removed 971 Ml    Post Wt: 62.2   kg  POst BP:  198/106       Pulse:109    Respirations: 16 Temperature: 98.1      hectorol        1mcg     ml   iv      rl      Post Tx Vascular Access: AVF/AVG: Bleeding stopped Art  min. Ray. Min   N/A*                                 Catheter: Locking solution: Heparin 1:1000 Art. 1.6  Ray. 1.6  N/A                                 Post Assessment:                                    Skin:  [x] Warm  [] Dry [] Diaphoretic    [] Flushed  [] Pale [] Cyanotic   [facility] Signatures Title Initials  Time Lungs: [x] Clear    [] Course  [] Crackles  [] Wheezing [] Diminished   Margot Longshore  RN RL 7300 Cardiac: [x] Regular   [] Irregular   [] Monitor  [] N/A  Rhythm:           Edema:  [x] None    [] General     [] Facial   [] Pedal    [] UE    [] LE       Pain: [x]0  []1  []2   []3  []4   []5   []6   []7   []8   []9   []10         Post Treatment Note: hemodialysis completed 30 mins early due to high ap.971 cc removed. Dr Harman Romeo aware. R IJ catheter flushed with normal saline and heparin. Pt bp high, floor nurse aware. Pt stable. Pt will return to room.      POST TREATMENT PRIMARY NURSE HANDOFF REPORT:     First initial/Last name/Title Post Dialysis: JOSE Lam RNTime:  5102     Abbreviations: AVG-arterial venous graft, AVF-arterial venous fistula, IJ-Internal Jugular, Subcl-Subclavian, Fem-Femoral, Tx-treatment, AP/HR-apical heart rate, DFR-dialysate flow rate, BFR-blood flow rate, AP-arterial pressure, -venous pressure, UF-ultrafiltrate, TMP-transmembrane pressure, Ray-Venous, Art-Arterial, RO-Reverse Osmosis

## 2018-08-11 NOTE — PROGRESS NOTES
RENAL PROGRESS NOTE: Pt seen on dialysis. Follow up of ESRD     Present on Admission:   ESRD (end stage renal disease) ([facility]         Subjective: Feels good, no SOB,     Patient is on Dialysis. Objective:    Patient Vitals for the past 12 hrs:   Temp Pulse Resp BP SpO2   08/11/18 1100 - 93 16 (!) 191/101 -   08/11/18 1030 - 90 16 (!) 182/107 -   08/11/18 1000 - 91 16 (!) 195/105 -   08/11/18 0950 98.3 °F (36.8 °C) 97 16 (!) 184/97 -   08/11/18 0807 - - - (!) 163/91 -   08/11/18 0750 98.1 °F (36.7 °C) 88 16 180/73 100 %   08/11/18 0410 98.4 °F (36.9 °C) 74 18 124/71 97 %   08/11/18 0000 96.8 °F (36 °C) 68 18 124/68 100 %        Intake/Output Summary (Last 24 hours) at 08/11/18 1122  Last data filed at 08/10/18 1753   Gross per 24 hour   Intake              480 ml   Output             1300 ml   Net             -820 ml       Physical Assessment:     General Appearance: NAD  Lung: clear to auscultation  Heart: regular rate and rhythm and no murmurs, clicks or gallops  Lower Extremities: no edema   Access: TDC on right  IJ . Labs    CBC w/Diff    Recent Labs      08/11/18   0329  08/10/18   0238  08/09/18   0242   WBC  7.5  7.2  7.1   RBC  4.18*  4.16*  4.12*   HGB  11.2*  11.3*  11.3*   HCT  34.6*  34.8*  34.4*   MCV  82.8  83.7  83.5   MCH  26.8  27.2  27.4   MCHC  32.4  32.5  32.8   RDW  16.8*  17.2*  17.3*    Recent Labs      08/11/18   0329  08/10/18   0238  08/09/18   0242   MONOS  10  9  8   EOS  2  2  3   BASOS  0  0  0   RDW  16.8*  17.2*  17.3*        Comprehensive Metabolic Profile    Recent Labs      08/11/18   0329  08/10/18   0238  08/09/18   0242   NA  139  148*  150*   K  3.4*  3.0*  3.3*   CL  105  114*  120*   CO2  27  26  21   BUN  30*  35*  49*   CREA  4.30*  4.33*  5.25*    Recent Labs      08/11/18   0329  08/10/18   0238  08/09/18   0242   CA  8.5  8.7  8.2*   PHOS  3.7  3.8  4.7          Basic Metabolic Profile       results  reviwed. MEDS:Reviwed.   Current Facility-Administered Medications   Medication Dose Route Frequency Provider Last Rate Last Dose    doxercalciferol (HECTOROL) 4 mcg/2 mL injection 1 mcg  1 mcg IntraVENous DIALYSIS ALEX MONIQUE & ANTONIO Shen Sa, MD        losartan (COZAAR) tablet 100 mg  100 mg Oral DAILY Ac Singh MD        hydrALAZINE (APRESOLINE) tablet 50 mg  50 mg Oral TID Ac Singh MD   50 mg at 08/10/18 2200    potassium chloride (KLOR-CON) packet 20 mEq  20 mEq Oral BID WITH MEALS Saint Dory, MD   20 mEq at 08/10/18 1712    epoetin raphael (EPOGEN;PROCRIT) injection 5,200 Units  5,200 Units IntraVENous DIALYSIS ALEX MONIQUE & ANTONIO Shen Sa, MD        insulin glargine (LANTUS) injection 10 Units  10 Units SubCUTAneous QHS Saint Dory, MD   10 Units at 08/10/18 2200    acetaminophen (TYLENOL) tablet 325 mg  325 mg Oral Q4H PRN Saint Dory, MD        dilTIAZem CD (CARDIZEM CD) capsule 240 mg  240 mg Oral DAILY Saint Dory, MD   240 mg at 08/10/18 4710    cloNIDine HCl (CATAPRES) tablet 0.1 mg  0.1 mg Oral BID Saint Dory, MD   0.1 mg at 08/10/18 1712    simvastatin (ZOCOR) tablet 20 mg  20 mg Oral QHS Saint Dory, MD   20 mg at 08/10/18 2200    insulin lispro (HUMALOG) injection   SubCUTAneous AC&HS Saint Dory, MD   4 Units at 08/10/18 2200    glucose chewable tablet 16 g  16 g Oral PRN Saint Dory, MD        glucagon [facility]) injection 1 mg  1 mg IntraMUSCular PRN Saint Dory, MD        dextrose (D50W) injection syrg 12.5-25 g  25-50 mL IntraVENous PRN Saint Dory, MD   12.5 g at 08/10/18 0351       Impression:    ESRD: Tolerating HD well. Anemia of CKD: on  Epogen. Hyperparathyroidism Yes  Hypertension. Plan  Dialysis for volume and solute management  Epogen  5200 units. Hectorol: 1 microgram.  Continue Current BP meds,now she will be on Q Tues/Thursday/saturday. Can be transferred back to her Nursing home once her OP dialysis unit is finalized.  Discussed  With Dr. Nikky Mora Saundra.         Jaja Rosales MD

## 2018-08-11 NOTE — PROGRESS NOTES
Bedside verbal report given to Ethan De Dios RN (oncoming nurse) by Nitin Magana RN (offgoing nurse). Report includes SBAR, Kardex, MAR, and I/O.

## 2018-08-12 LAB
GLUCOSE BLD STRIP.AUTO-MCNC: 108 MG/DL (ref 70–110)
GLUCOSE BLD STRIP.AUTO-MCNC: 115 MG/DL (ref 70–110)
GLUCOSE BLD STRIP.AUTO-MCNC: 213 MG/DL (ref 70–110)
GLUCOSE BLD STRIP.AUTO-MCNC: 279 MG/DL (ref 70–110)

## 2018-08-12 PROCEDURE — 74011636637 HC RX REV CODE- 636/637: Performed by: FAMILY MEDICINE

## 2018-08-12 PROCEDURE — 82962 GLUCOSE BLOOD TEST: CPT

## 2018-08-12 PROCEDURE — 65270000029 HC RM PRIVATE

## 2018-08-12 PROCEDURE — 74011250637 HC RX REV CODE- 250/637: Performed by: INTERNAL MEDICINE

## 2018-08-12 PROCEDURE — 74011250637 HC RX REV CODE- 250/637: Performed by: FAMILY MEDICINE

## 2018-08-12 RX ADMIN — INSULIN LISPRO 4 UNITS: 100 INJECTION, SOLUTION INTRAVENOUS; SUBCUTANEOUS at 12:41

## 2018-08-12 RX ADMIN — CLONIDINE HYDROCHLORIDE 0.1 MG: 0.1 TABLET ORAL at 18:11

## 2018-08-12 RX ADMIN — SIMVASTATIN 20 MG: 20 TABLET, FILM COATED ORAL at 22:24

## 2018-08-12 RX ADMIN — CLONIDINE HYDROCHLORIDE 0.1 MG: 0.1 TABLET ORAL at 08:47

## 2018-08-12 RX ADMIN — INSULIN LISPRO 6 UNITS: 100 INJECTION, SOLUTION INTRAVENOUS; SUBCUTANEOUS at 22:26

## 2018-08-12 RX ADMIN — LOSARTAN POTASSIUM 100 MG: 50 TABLET ORAL at 08:47

## 2018-08-12 RX ADMIN — HYDRALAZINE HYDROCHLORIDE 50 MG: 50 TABLET ORAL at 16:29

## 2018-08-12 RX ADMIN — HYDRALAZINE HYDROCHLORIDE 50 MG: 50 TABLET ORAL at 22:24

## 2018-08-12 RX ADMIN — INSULIN GLARGINE 10 UNITS: 100 INJECTION, SOLUTION SUBCUTANEOUS at 22:25

## 2018-08-12 RX ADMIN — HYDRALAZINE HYDROCHLORIDE 50 MG: 50 TABLET ORAL at 08:47

## 2018-08-12 RX ADMIN — POTASSIUM CHLORIDE 20 MEQ: 1.5 POWDER, FOR SOLUTION ORAL at 16:29

## 2018-08-12 RX ADMIN — POTASSIUM CHLORIDE 20 MEQ: 1.5 POWDER, FOR SOLUTION ORAL at 08:47

## 2018-08-12 RX ADMIN — DILTIAZEM HYDROCHLORIDE 240 MG: 240 CAPSULE, COATED, EXTENDED RELEASE ORAL at 08:47

## 2018-08-12 NOTE — PROGRESS NOTES
Received in bed. A/OX1 name. Disoriented to time, place, situation. Side rails X3. Call bell phone within reach. Denies pain. Appears to be in no resp. distress.

## 2018-08-12 NOTE — PROGRESS NOTES
Progress Note    Vanessa Tomlinson  70 y.o. 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     Objective:     Visit Vitals    /89 (BP 1 Location: Right arm, BP Patient Position: At rest)    Pulse 87    Temp 98.8 °F (37.1 °C)    Resp 18    Ht 5' 3\" (1.6 m)    Wt 63 kg (138 lb 14.2 oz)    SpO2 97%    Breastfeeding No    BMI 24.6 kg/m2         Intake/Output Summary (Last 24 hours) at 08/12/18 1238  Last data filed at 08/12/18 0852   Gross per 24 hour   Intake              720 ml   Output                0 ml   Net              720 ml       Current Facility-Administered Medications   Medication Dose Route Frequency Provider Last Rate Last Dose    doxercalciferol (HECTOROL) 4 mcg/2 mL injection 1 mcg  1 mcg IntraVENous DIALYSIS TUE, THDESTINEE & SAT Barbra Williamson MD   1 mcg at 08/11/18 1203    losartan (COZAAR) tablet 100 mg  100 mg Oral DAILY Barbra Williasmon MD   100 mg at 08/12/18 0847    hydrALAZINE (APRESOLINE) tablet 50 mg  50 mg Oral TID Barbra Williamson MD   50 mg at 08/12/18 0847    potassium chloride (KLOR-CON) packet 20 mEq  20 mEq Oral BID WITH MEALS Shayan Davidson MD   20 mEq at 08/12/18 0847    epoetin raphael (EPOGEN;PROCRIT) injection 5,200 Units  5,200 Units IntraVENous DIALYSIS Daquan MONIQUE MD   Stopped at 08/11/18 0900    insulin glargine (LANTUS) injection 10 Units  10 Units SubCUTAneous QHS Shayan Davidson MD   10 Units at 08/11/18 4760    acetaminophen (TYLENOL) tablet 325 mg  325 mg Oral Q4H PRN Valentina Valentine MD        dilTIAZem CD (CARDIZEM CD) capsule 240 mg  240 mg Oral DAILY Valentina Valentine MD   240 mg at 08/12/18 0847    cloNIDine HCl (CATAPRES) tablet 0.1 mg  0.1 mg Oral BID Valentina Valentine MD   0.1 mg at 08/12/18 0847    simvastatin (ZOCOR) tablet 20 mg  20 mg Oral QHS Valentina Valentine MD   20 mg at 08/11/18 2200    insulin lispro (HUMALOG) injection   SubCUTAneous AC&HS Valentina Valentine MD   Stopped at 08/12/18 0730    glucose chewable tablet 16 g  16 g Oral PRN Valentina Valentine MD        glucagon [facility]) injection 1 mg  1 mg IntraMUSCular PRN Valentina Valentine MD        dextrose (D50W) injection syrg 12.5-25 g  25-50 mL IntraVENous PRN Valentina Valentine MD   12.5 g at 08/10/18 0351        Physical Exam:     Physical Exam:   General:  Alert, cooperative, no distress, appears stated age. Neck: Supple, symmetrical, trachea midline, no adenopathy, thyroid: no enlargement/tenderness/nodules, no carotid bruit and no JVD. Lungs:   Clear to auscultation bilaterally. Heart:  Regular rate and rhythm, S1, S2 normal, no murmur, click, rub or gallop. Abdomen:   Soft, non-tender. Bowel sounds normal. No masses,  No organomegaly. Extremities: Extremities normal, atraumatic, no cyanosis or edema, HD catheter is well dressed.    Skin: Skin color, texture, turgor normal. No rashes or lesions         Data Review:    CBC w/Diff    Recent Labs      08/11/18   0329  08/10/18   0238   WBC  7.5  7.2   RBC  4.18*  4.16*   HGB  11.2*  11.3*   HCT  34.6*  34.8*   MCV  82.8  83.7   MCH  26.8  27.2   MCHC  32.4  32.5   RDW  16.8*  17.2*    Recent Labs      08/11/18   0329  08/10/18   0238   MONOS  10  9   EOS  2  2   BASOS  0  0   RDW  16.8*  17.2*        Comprehensive Metabolic Profile    Recent Labs      08/11/18   0329  08/10/18   0238   NA  139  148*   K  3.4*  3.0*   CL  105  114*   CO2  27  26   BUN  30*  35*   CREA  4.30*  4.33* Recent Labs      08/11/18   0329  08/10/18   0238   CA  8.5  8.7   PHOS  3.7  3.8        Hepatitis B profile results not done properly as orderd                Impression:       Active Hospital Problems    Diagnosis Date Noted    ESRD (end stage renal disease) ([facility] 08/08/2018    Secondary hyperparathyroidism of renal origin ([facility] 08/08/2018    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       Yoan Pak MD

## 2018-08-12 NOTE — PROGRESS NOTES
Problem: Pressure Injury - Risk of  Goal: *Prevention of pressure injury  Document Ab Scale and appropriate interventions in the flowsheet.    Outcome: Progressing Towards Goal  Pressure Injury Interventions:  Sensory Interventions: Assess changes in LOC    Moisture Interventions: Absorbent underpads    Activity Interventions: Pressure redistribution bed/mattress(bed type)    Mobility Interventions: HOB 30 degrees or less    Nutrition Interventions: Document food/fluid/supplement intake    Friction and Shear Interventions: HOB 30 degrees or less

## 2018-08-12 NOTE — PROGRESS NOTES
Progress Note    Patient: Lana Haines MRN: [id number]  SSN: [id number]    YOB: 1955  Age: 70 y.o. Sex: female      Admit Date: 8/6/2018    LOS: 5 days     Subjective:     Patient admitted with acute on chronic renal failure. Patient now undergoing dialysis. Back at baseline mentally and no distress. Objective:     Vitals:    08/11/18 2106 08/12/18 0000 08/12/18 0400 08/12/18 0805   BP: 114/65 111/70 127/69 160/83   Pulse: 94 82 73 93   Resp: 18 16 16 20   Temp: 98.7 °F (37.1 °C) 98.3 °F (36.8 °C) 97.7 °F (36.5 °C) 98 °F (36.7 °C)   TempSrc:       SpO2: 94% 97% 99% 99%   Weight:       Height:            Intake and Output:  Current Shift: 08/12 0701 - 08/12 1900  In: 720 [P.O.:720]  Out: -   Last three shifts: 08/10 1901 - 08/12 0700  In: -   Out: 971     Physical Exam:   GENERAL: alert, cooperative, no distress, appears older than stated age  LUNG: clear to auscultation bilaterally  HEART: regular rate and rhythm, S1, S2 normal, no murmur, click, rub or gallop    Lab/Data Review: All lab results for the last 24 hours reviewed. 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     Signed By: Antelmo Lanier MD     August 12, 2018

## 2018-08-12 NOTE — PROGRESS NOTES
Problem: Falls - Risk of  Goal: *Absence of Falls  Document Oscar Fall Risk and appropriate interventions in the flowsheet.    Outcome: Progressing Towards Goal  Fall Risk Interventions:  Mobility Interventions: Bed/chair exit alarm    Mentation Interventions: Door open when patient unattended    Medication Interventions: Patient to call before getting OOB    Elimination Interventions: Patient to call for help with toileting needs    History of Falls Interventions: Door open when patient unattended

## 2018-08-13 ENCOUNTER — APPOINTMENT (OUTPATIENT)
Dept: GENERAL RADIOLOGY | Age: 63
DRG: 673 | End: 2018-08-13
Attending: INTERNAL MEDICINE
Payer: MEDICARE

## 2018-08-13 LAB
ERYTHROCYTE [DISTWIDTH] IN BLOOD BY AUTOMATED COUNT: 16.4 % (ref 11.6–14.5)
GLUCOSE BLD STRIP.AUTO-MCNC: 116 MG/DL (ref 70–110)
GLUCOSE BLD STRIP.AUTO-MCNC: 272 MG/DL (ref 70–110)
GLUCOSE BLD STRIP.AUTO-MCNC: 276 MG/DL (ref 70–110)
HBV CORE IGM SER QL: NEGATIVE
HBV SURFACE AB SER QL IA: NEGATIVE
HBV SURFACE AB SER QL IA: NEGATIVE
HBV SURFACE AB SERPL IA-ACNC: <3.1 MIU/ML
HBV SURFACE AB SERPL IA-ACNC: <3.1 MIU/ML
HCT VFR BLD AUTO: 33.5 % (ref 35–45)
HEP BS AB COMMENT,HBSAC: ABNORMAL
HEP BS AB COMMENT,HBSAC: ABNORMAL
HGB BLD-MCNC: 10.9 G/DL (ref 12–16)
MCH RBC QN AUTO: 27 PG (ref 24–34)
MCHC RBC AUTO-ENTMCNC: 32.5 G/DL (ref 31–37)
MCV RBC AUTO: 82.9 FL (ref 74–97)
PLATELET # BLD AUTO: 179 K/UL (ref 135–420)
RBC # BLD AUTO: 4.04 M/UL (ref 4.2–5.3)
WBC # BLD AUTO: 8.1 K/UL (ref 4.6–13.2)

## 2018-08-13 PROCEDURE — 36415 COLL VENOUS BLD VENIPUNCTURE: CPT | Performed by: INTERNAL MEDICINE

## 2018-08-13 PROCEDURE — 74011250637 HC RX REV CODE- 250/637: Performed by: INTERNAL MEDICINE

## 2018-08-13 PROCEDURE — 86706 HEP B SURFACE ANTIBODY: CPT | Performed by: INTERNAL MEDICINE

## 2018-08-13 PROCEDURE — 82962 GLUCOSE BLOOD TEST: CPT

## 2018-08-13 PROCEDURE — 74011250637 HC RX REV CODE- 250/637: Performed by: FAMILY MEDICINE

## 2018-08-13 PROCEDURE — 97161 PT EVAL LOW COMPLEX 20 MIN: CPT

## 2018-08-13 PROCEDURE — 71046 X-RAY EXAM CHEST 2 VIEWS: CPT

## 2018-08-13 PROCEDURE — 74011636637 HC RX REV CODE- 636/637: Performed by: FAMILY MEDICINE

## 2018-08-13 PROCEDURE — 85027 COMPLETE CBC AUTOMATED: CPT | Performed by: INTERNAL MEDICINE

## 2018-08-13 PROCEDURE — 90935 HEMODIALYSIS ONE EVALUATION: CPT

## 2018-08-13 PROCEDURE — 86704 HEP B CORE ANTIBODY TOTAL: CPT | Performed by: INTERNAL MEDICINE

## 2018-08-13 PROCEDURE — 65270000029 HC RM PRIVATE

## 2018-08-13 RX ADMIN — LOSARTAN POTASSIUM 100 MG: 50 TABLET ORAL at 12:07

## 2018-08-13 RX ADMIN — HYDRALAZINE HYDROCHLORIDE 50 MG: 50 TABLET ORAL at 16:54

## 2018-08-13 RX ADMIN — SIMVASTATIN 20 MG: 20 TABLET, FILM COATED ORAL at 21:27

## 2018-08-13 RX ADMIN — POTASSIUM CHLORIDE 20 MEQ: 1.5 POWDER, FOR SOLUTION ORAL at 12:07

## 2018-08-13 RX ADMIN — INSULIN GLARGINE 10 UNITS: 100 INJECTION, SOLUTION SUBCUTANEOUS at 21:27

## 2018-08-13 RX ADMIN — HYDRALAZINE HYDROCHLORIDE 50 MG: 50 TABLET ORAL at 12:07

## 2018-08-13 RX ADMIN — HYDRALAZINE HYDROCHLORIDE 50 MG: 50 TABLET ORAL at 21:27

## 2018-08-13 RX ADMIN — INSULIN LISPRO 6 UNITS: 100 INJECTION, SOLUTION INTRAVENOUS; SUBCUTANEOUS at 12:15

## 2018-08-13 RX ADMIN — POTASSIUM CHLORIDE 20 MEQ: 1.5 POWDER, FOR SOLUTION ORAL at 16:54

## 2018-08-13 RX ADMIN — CLONIDINE HYDROCHLORIDE 0.1 MG: 0.1 TABLET ORAL at 12:07

## 2018-08-13 RX ADMIN — INSULIN LISPRO 6 UNITS: 100 INJECTION, SOLUTION INTRAVENOUS; SUBCUTANEOUS at 21:27

## 2018-08-13 RX ADMIN — DILTIAZEM HYDROCHLORIDE 240 MG: 240 CAPSULE, COATED, EXTENDED RELEASE ORAL at 12:07

## 2018-08-13 RX ADMIN — CLONIDINE HYDROCHLORIDE 0.1 MG: 0.1 TABLET ORAL at 18:14

## 2018-08-13 NOTE — PROGRESS NOTES
Dr. Mamta Mccarty called and states he wants pt admitted to [facility],   [facility]. Called Yaya Martinez of [facility] Admissions and she will email forms that needs to be filled out and all the requirements. 1250:  Faxed form and required documents to Yaya Martinez. 1415:  Cathy Thomas and left a message. 1557:  Cathy Thomas and she states she will call me back. 1600:  Met with pt's sister Candice Space [phone] ([phone]), [phone]([phone]  Who confirmed pt is going back to [facility].

## 2018-08-13 NOTE — PROGRESS NOTES
ACUTE HEMODIALYSIS FLOW SHEET    HEMODIALYSIS ORDERS: Physician: Leny Napoles     Dialyzer: Mildred        Duration: 3 hr  BFR: 300   DFR: 600   Dialysate:  Temp 37 K+   3    Ca+  3 Na 138 Bicarb 35   Weight:  68 kg    Bed Scale [x]     Unable to Obtain []      Dry weight/UF Goal: 1000 Access CVC  Needle Gauge     Heparin []  Bolus      Units    [] Hourly       Units    [x]None      Catheter locking solution Heparin 1:1000   Pre BP:   156/58    Pulse:     88     Temperature:   98.4  Respirations: 18  Tx: NS       ml/Bolus  Other        [] N/A   Labs: Pre        Post:        [x] N/A   Additional Orders(medications, blood products, hypotension management):      [x] N/A     [x][facility] Consent Verified     CATHETER ACCESS: []N/A   [x]Right   []Left   []IJ     []Fem   [] First use X-ray verified     [x]Tunnel                [] Non Tunneled   [x]No S/S infection  []Redness  []Drainage []Cultured []Swelling []Pain   [x]Medical Aseptic Prep Utilized   [x]Dressing Changed  [x] Biopatch  Date: 8/13/18     []Clotted   [x]Patent   Flows: [x]Good  []Poor  []Reversed   If access problem,  notified: []Yes    [x]N/A  Date:           GRAFT/FISTULA ACCESS:  [x]N/A     []Right     []Left     []UE     []LE   []AVG   []AVF        []Buttonhole    []Medical Aseptic Prep Utilized   []No S/S infection  []Redness  []Drainage []Cultured []Swelling []Pain    Bruit:   [] Strong    [] Weak       Thrill :   [] Strong    [] Weak       Needle Gauge:    Length:     If access problem,  notified: []Yes     [x]N/A  Date:        Please describe access if present and not used:       GENERAL ASSESSMENT:    LUNGS:  Rate 18 SaO2%        [] N/A    [x] Clear  [] Coarse  [] Crackles  [] Wheezing        [] Diminished     Location : []RLL   []LLL    []RUL  []STEPHY   Cough: []Productive  []Dry  [x]N/A   Respirations:  [x]Easy  []Labored   Therapy:  [x]RA  []NC  l/min    Mask: []NRB []Venti       O2%                  []Ventilator  []Intubated  [] Trach  [] BiPaP   CARDIAC: [x]Regular      [] Irregular   [] Pericardial Rub  [] JVD        []  Monitored  [] Bedside  [] Remotely monitored [] N/A  Rhythm:    EDEMA: [] None  [x]Generalized  [] Pitting [x] 1    [] 2    [] 3    [] 4                 [] Facial  [] Pedal  []  UE  [] LE   SKIN:   [x] Warm  [] Hot     [] Cold   [x] Dry     [] Pale   [] Diaphoretic                  [] Flushed  [] Jaundiced  [] Cyanotic  [] Rash  [] Weeping   LOC:    [x] Alert      [x]Oriented:    [x] Person     [] Place  []Time               [] Confused  [] Lethargic  [] Medicated  [] Non-responsive     GI / ABDOMEN:  [] Flat    [] Distended    [x] Soft    [] Firm   []  Obese                             [] Diarrhea  [x] Bowel Sounds  [] Nausea  [] Vomiting       / URINE ASSESSMENT:[] Voiding   [x] Oliguria  [] Anuria   []  Khan     [] Incontinent    []  Incontinent Brief      []  Bathroom Privileges     PAIN: [x] 0 []1  []2   []3   []4   []5   []6   []7   []8   []9   []10            Scale 0-10  Action/Follow Up:    MOBILITY:  [] Amb    [] Amb/Assist    [x] Bed    [] Wheelchair  [] Stretcher      All Vitals and Treatment Details on Attached [address]: [facility]          Room # 474     [] 1st Time Acute  [] Stat  [x] Routine  [] Urgent     [x] Acute Room  []  Bedside  [] ICU/CCU  [] ER   Isolation Precautions:  [x] Dialysis   [] Airborne   [] Contact    [] Reverse   Special Considerations:         [] Blood Consent Verified [x]N/A     ALLERGIES:  [] NKA   Amoxicillin, Cleocin, Codeine, Lorcet, Pen G, Shellfish derived       Code Status:  [x] Full Code  [] DNR  [] Other           HBsAg ONLY: Date Drawn 8/9/18        [x]Negative []Positive []Unknown   HBsAb: Date 8/9/18    [x] Susceptible   [] [id number] []Not Drawn  [] Drawn     Current Labs:    Date of Labs: Today [x]       Results for Ivanna Washington (MRN [id number]) as of 8/13/2018 12:04   Ref.  Range 8/13/2018 04:34   WBC Latest Ref Range: 4.6 - 13.2 K/uL 8.1   RBC Latest Ref Range: 4.20 - 5.30 M/uL 4.04 (L)   HGB Latest Ref Range: 12.0 - 16.0 g/dL 10.9 (L)   HCT Latest Ref Range: 35.0 - 45.0 % 33.5 (L)   MCV Latest Ref Range: 74.0 - 97.0 FL 82.9   MCH Latest Ref Range: 24.0 - 34.0 PG 27.0   MCHC Latest Ref Range: 31.0 - 37.0 g/dL 32.5   RDW Latest Ref Range: 11.6 - 14.5 % 16.4 (H)   PLATELET Latest Ref Range: 135 - 420 K/uL 179   Hepatitis B surface Ab Latest Ref Range: >10.0 mIU/mL <3.10 (L)   Hep B surface Ab Interp. Latest Ref Range: POS   NEGATIVE (A)   Hep B surface Ab comment Latest Units:   Samples with a  v... HEPATITIS B CORE ABS, IGG/IGM Unknown Rpt     Results for Ricardo Durham (MRN [id number]) as of 8/13/2018 12:04   Ref. Range 8/11/2018 03:29   Sodium Latest Ref Range: 136 - 145 mmol/L 139   Potassium Latest Ref Range: 3.5 - 5.5 mmol/L 3.4 (L)   Chloride Latest Ref Range: 100 - 108 mmol/L 105   CO2 Latest Ref Range: 21 - 32 mmol/L 27   Anion gap Latest Ref Range: 3.0 - 18 mmol/L 7   Glucose Latest Ref Range: 74 - 99 mg/dL 120 (H)   BUN Latest Ref Range: 7.0 - 18 MG/DL 30 (H)   Creatinine Latest Ref Range: 0.6 - 1.3 MG/DL 4.30 (H)   BUN/Creatinine ratio Latest Ref Range: 12 - 20   7 (L)   Calcium Latest Ref Range: 8.5 - 10.1 MG/DL 8.5   Phosphorus Latest Ref Range: 2.5 - 4.9 MG/DL 3.7   GFR est non-AA Latest Ref Range: >60 ml/min/1.73m2 10 (L)   GFR est AA Latest Ref Range: >60 ml/min/1.73m2 13 (L)                                                                                                                                 DIET:  [x] Renal    [] Other     [] NPO     []  Diabetic      PRIMARY NURSE REPORT: First initial/Last name/Title      Pre Dialysis:   RAYO Sierra   Time: 0708      EDUCATION:    [x] Patient [] Other         Knowledge Basis: []None [x]Minimal [] Substantial   Barriers to learning COGNITION []N/A   [x] Access Care     [x] S&S of infection     [x] Fluid Management     []K+     [x]Procedural    []Albumin     [] Medications     [] Tx Options     [] Transplant [] Diet     [] Other   Teaching Tools:  [x] Explain  [] Demo  [] Handouts [] Video  Patient response:   [] Verbalized understanding  [] Teach back  [] Return demonstration   [x] Requires follow up   Inappropriate due to            [x] Time Out/Safety Check       [address] [facility] Before each treatment:     Machine Number:                   [facility]                                  [x] Unit Machine # 5 with centralized RO                                  [] Portable Machine #1/RO serial # [id number]                                  [] Portable Machine #2/RO serial # [id number]                                  [] Portable Machine #3/RO serial # [id number]                                                                                                       [facility]                                  [] Portable Machine #11/RO serial # [id number]                                   [] Portable Machine #12/RO serial # [id number]                                  [] Portable Machine #13/RO serial #  [id number]      Alarm Test:  Pass time 1259        Other:         [x]RO/Machine Log Complete      Temp  36.7          [x]Extracorporeal Circuit Tested for integrity   Dialysate: pH 7.4 Conductivity: Meter   14.0     HD Machine   13.9                  TCD: 13.8  Dialyzer Lot # [id number]            Blood Tubing Lot # [id number]          Saline Lot #  [id number]     CHLORINE TESTING-Before each treatment and every 4 hours    Total Chlorine: [x] less than 0.1 ppm  Time: 0738 4 Hr/2nd Check Time:    (if greater than 0.1 ppm from Primary then every 30 minutes from Secondary)     TREATMENT INITIATION  with Dialysis Precautions:   [x] All Connections Secured                 [x] Saline Line Double Clamped   [x] Venous Parameters Set                  [x] Arterial Parameters Set    [x] Prime Given  250                             [x]Air Foam Detector Engaged      Treatment Initiation Note: Pt arrived to RDU in no acute distress and stable to begin tx. HD tx initiated using right chest CVC. Both lumens patent, easily aspirates and returns blood. Medication Dose Volume Route Initials Dialyzer Cleared: [x] Good [] Fair  [] Poor    Blood processed: 50.1 L  UF Removed  1000 Ml    Post Wt:     kg  POst BP:   191/96       Pulse: 114      Respirations: 18  Temperature: 98.3      No meds given                            Post Tx Vascular Access: AVF/AVG: Bleeding stopped Art  min. Ray. Min   N/A x                                   Catheter: Locking solution: Heparin 1:1000 Art. 1.6  Ray. 1.6  N/A                                 Post Assessment:                                    Skin: [x] Warm  [x] Dry [] Diaphoretic    [] Flushed  [] Pale [] Cyanotic   [facility] Signatures Title Initials  Time Lungs: [x] Clear    [] Course  [] Crackles  [] Wheezing [] Diminished   Marisol Chen RN [facility] [address] Cardiac: [x] Regular   [] Irregular   [] Monitor  [] N/A  Rhythm:           Edema:  [x] None    [] General     [] Facial   [] Pedal    [] UE    [] LE       Pain: [x]0  []1  []2   []3  []4   []5   []6   []7   []8   []9   []10         Post Treatment Note: Pt tolerated HD tx well. Successfully able to remove 1L of fluid as tolerated. Post-dialysis catheter care performed. Dressing and biopatch changed. Pt stable to return to room.      POST TREATMENT PRIMARY NURSE HANDOFF REPORT:     First initial/Last name/Title         Post Dialysis: Benito Rehman RN Time:  0     Abbreviations: AVG-arterial venous graft, AVF-arterial venous fistula, IJ-Internal Jugular, Subcl-Subclavian, Fem-Femoral, Tx-treatment, AP/HR-apical heart rate, DFR-dialysate flow rate, BFR-blood flow rate, AP-arterial pressure, -venous pressure, UF-ultrafiltrate, TMP-transmembrane pressure, Ray-Venous, Art-Arterial, RO-Reverse Osmosis

## 2018-08-13 NOTE — INTERDISCIPLINARY ROUNDS
IDR/SLIDR Summary          Patient: Sharita Henry MRN: [id number]    Age: 70 y.o. 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 YES  Pneumococcal Vaccine screening completed? YES  Mobility needs: Yes   Nutrition plan:No  Consults:Case Management    Financial concerns:No  Escalated to CM? NO  RRAT Score: 35   Interventions:  Testing due for pt today?  NO  LOS: 6 days Expected length of stay 7 days  Discharge plan: SNF   PCP: Paige Munoz MD  Transportation needs: No    Days before discharge:two or more days before discharge   Discharge disposition: SNF    Signed:     Renetta Casiano RN  8/13/2018  9:01 AM

## 2018-08-13 NOTE — PROGRESS NOTES
Progress Note    Florencia Padron  70 y.o. 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 . No Chest x-ray done in last 30 days. All Hepatitis result      A comprehensive review of systems was negative except for that written in the History of Present Illness.     Objective:     Visit Vitals    BP (!) 182/101 (BP 1 Location: Left arm, BP Patient Position: At rest)    Pulse (!) 123    Temp 98.5 °F (36.9 °C)    Resp 20    Ht 5' 3\" (1.6 m)    Wt 68 kg (150 lb)    SpO2 97%    Breastfeeding No    BMI 26.57 kg/m2         Intake/Output Summary (Last 24 hours) at 08/13/18 1355  Last data filed at 08/13/18 1243   Gross per 24 hour   Intake              360 ml   Output             1100 ml   Net             -740 ml       Current Facility-Administered Medications   Medication Dose Route Frequency Provider Last Rate Last Dose    doxercalciferol (HECTOROL) 4 mcg/2 mL injection 1 mcg  1 mcg IntraVENous DIALYSIS ALEX MONIQUE & ANTONIO Avina MD   1 mcg at 08/11/18 1203    losartan (COZAAR) tablet 100 mg  100 mg Oral DAILY Annmarie Avina MD   100 mg at 08/13/18 1207    hydrALAZINE (APRESOLINE) tablet 50 mg  50 mg Oral TID Annmarie Avina MD   50 mg at 08/13/18 1207    potassium chloride (KLOR-CON) packet 20 mEq  20 mEq Oral BID WITH MEALS Arben Sifuentes MD   20 mEq at 08/13/18 1207    epoetin raphael (EPOGEN;PROCRIT) injection 5,200 Units  5,200 Units IntraVENous DIALYSIS ALEX MONIQUE & ANTONIO Avina MD   Stopped at 08/11/18 0900    insulin glargine (LANTUS) injection 10 Units 10 Units SubCUTAneous QHS Amaya Pires MD   10 Units at 08/12/18 2225    acetaminophen (TYLENOL) tablet 325 mg  325 mg Oral Q4H PRN Amaya Pires MD        dilTIAZem CD (CARDIZEM CD) capsule 240 mg  240 mg Oral DAILY Amaya Pires MD   240 mg at 08/13/18 1207    cloNIDine HCl (CATAPRES) tablet 0.1 mg  0.1 mg Oral BID Amaya Pires MD   0.1 mg at 08/13/18 1207    simvastatin (ZOCOR) tablet 20 mg  20 mg Oral QHS Amaya Pires MD   20 mg at 08/12/18 2224    insulin lispro (HUMALOG) injection   SubCUTAneous AC&HS Amaya Pires MD   6 Units at 08/13/18 1215    glucose chewable tablet 16 g  16 g Oral PRN Amaya Pires MD        glucagon [facility]) injection 1 mg  1 mg IntraMUSCular PRN Amaya Pires MD        dextrose (D50W) injection syrg 12.5-25 g  25-50 mL IntraVENous PRN Amaya Pires MD   12.5 g at 08/10/18 0351        Physical Exam:     Physical Exam:   General:  Alert, cooperative, no distress, appears stated age. Eyes:  Conjunctivae/corneas clear. PERRL, EOMs intact. Mouth/Throat: Lips, mucosa, and tongue normal. Teeth and gums normal.   Neck: Supple, symmetrical, trachea midline, no adenopathy, thyroid: no enlargement/tenderness/nodules, no carotid bruit and no JVD. Lungs:   Clear to auscultation bilaterally. Heart:  Regular rate and rhythm, S1, S2 normal, no murmur, click, rub or gallop. Abdomen:   Soft, non-tender. Bowel sounds normal. No masses,  No organomegaly. Extremities: Extremities normal, atraumatic, no cyanosis or edema, HD catheter is well dressed.          Data Review:    CBC w/Diff    Recent Labs      08/13/18   0434  08/11/18   0329   WBC  8.1  7.5   RBC  4.04*  4.18*   HGB  10.9*  11.2*   HCT  33.5*  34.6*   MCV  82.9  82.8   MCH  27.0  26.8   MCHC  32.5  32.4   RDW  16.4*  16.8*    Recent Labs      08/13/18   0434  08/11/18   0329   MONOS   --   10   EOS   --   2   BASOS   --   0   RDW  16.4*  16.8*        Comprehensive Metabolic Profile Recent Labs      08/11/18   0329   NA  139   K  3.4*   CL  105   CO2  27   BUN  30*   CREA  4.30*    Recent Labs      08/11/18   0329   CA  8.5   PHOS  3.7                        Impression:       Active Hospital Problems    Diagnosis Date Noted    ESRD (end stage renal disease) ([facility] 08/08/2018    Secondary hyperparathyroidism of renal origin ([facility] 08/08/2018    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, Wed & Friday.       Tess De Los Santos MD

## 2018-08-13 NOTE — PROGRESS NOTES
NUTRITION    Nutrition Screen      RECOMMENDATIONS / PLAN:     - Add Nepro once daily  - Continue RD inpatient monitoring and evaluation. NUTRITION INTERVENTIONS & DIAGNOSIS:     [x] Meals/snacks: modify composition  [x] Medical food supplement therapy: initiate    Nutrition Diagnosis:  Increased nutrient needs (protein, energy) related to increased expenditure as evidenced by pt on HD 3 times weekly    ASSESSMENT:     Pt unavailable at time of visit. Has good/excellent po intake most meals per chart. Good appetite. On HD.      Average po intake adequate to meet patients estimated nutritional needs:   [] Yes     [x] No   [] Unable to determine at this time    Diet: DIET DIABETIC CONSISTENT CARB Regular      Food Allergies: shellfish  Current Appetite:   [x] Good     [] Fair     [] Poor     [] Other:  Appetite/meal intake prior to admission:   [] Good     [] Fair     [] Poor     [x] Other: unknown   Feeding Limitations:  [] Swallowing difficulty    [] Chewing difficulty    [] Other:  Current Meal Intake: Patient Vitals for the past 100 hrs:   % Diet Eaten   08/12/18 1712 25 %   08/12/18 1238 100 %   08/12/18 0847 75 %   08/10/18 1753 75 %   08/10/18 0920 95 %   08/09/18 1753 75 %   08/09/18 1303 90 %   08/09/18 0842 0 %       BM: 8/10  Skin Integrity: no pressure injury or wound noted  Edema:   [x] No     [] Yes   Pertinent Medications: Reviewed: hectorol, lantus, SSI, KCl    Recent Labs      08/11/18   0329   NA  139   K  3.4*   CL  105   CO2  27   GLU  120*   BUN  30*   CREA  4.30*   CA  8.5   PHOS  3.7       Intake/Output Summary (Last 24 hours) at 08/13/18 1208  Last data filed at 08/13/18 1047   Gross per 24 hour   Intake              600 ml   Output             1000 ml   Net             -400 ml       Anthropometrics:  Ht Readings from Last 1 Encounters:   08/07/18 5' 3\" (1.6 m)     Last 3 Recorded Weights in this Encounter    08/09/18 0003 08/11/18 0628 08/12/18 1445   Weight: 65.7 kg (144 lb 13.5 oz) 63 kg (138 lb 14.2 oz) 68 kg (150 lb)     Body mass index is 26.57 kg/(m^2). 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]  End stage renal disease (HCC) [N18.6]  Pertinent PMHx:  CKD, DM, HLD, HTN, psychiatric disorder    Education Needs:        [x] None identified  [] Identified - Not appropriate at this time  []  Identified and addressed - refer to education log  Learning Limitations:   [x] None identified  [] Identified    Cultural, Methodist & ethnic food preferences:  [x] None identified    [] Identified and addressed     ESTIMATED NUTRITION NEEDS:     Calories: 6105-5320 kcal (30-35 kcal/kg) based on  [x] Actual BW: 68 kg     [] IBW   Protein:  gm (1.2-1.5 gm/kg) based on  [x] Actual BW      [] IBW   Fluid: 1 mL/kcal     MONITORING & EVALUATION:     Nutrition Goal(s):   1. Po intake of meals will meet >75% of patient estimated nutritional needs within the next 7 days.   Outcome:  [] Met/Ongoing    []  Not Met    [x] New/Initial Goal     Monitoring:   [x] Food and beverage intake   [x] Diet order   [x] Nutrition-focused physical findings   [x] Treatment/therapy   [] Weight   [] Enteral nutrition intake        Previous Recommendations (for follow-up assessments only):     []   Implemented       []   Not Implemented (RD to address)      [] No Longer Appropriate     [] No Recommendation Made     Discharge Planning:  Renal, diabetic diet   [x] Participated in care planning, discharge planning, & interdisciplinary rounds as appropriate      Bola Fernandes, [address] [address] [facility] [address]   Pager: [phone]

## 2018-08-13 NOTE — PROGRESS NOTES
Problem: Falls - Risk of  Goal: *Absence of Falls  Document Oscar Fall Risk and appropriate interventions in the flowsheet. Outcome: Progressing Towards Goal  Fall Risk Interventions:  Mobility Interventions: Bed/chair exit alarm    Mentation Interventions: Door open when patient unattended    Medication Interventions: Patient to call before getting OOB    Elimination Interventions: Patient to call for help with toileting needs    History of Falls Interventions: Door open when patient unattended        Problem: Pressure Injury - Risk of  Goal: *Prevention of pressure injury  Document Ab Scale and appropriate interventions in the flowsheet.    Outcome: Progressing Towards Goal  Pressure Injury Interventions:  Sensory Interventions: Assess changes in LOC    Moisture Interventions: Absorbent underpads    Activity Interventions: Pressure redistribution bed/mattress(bed type)    Mobility Interventions: HOB 30 degrees or less    Nutrition Interventions: Document food/fluid/supplement intake    Friction and Shear Interventions: HOB 30 degrees or less

## 2018-08-13 NOTE — PROGRESS NOTES
Progress Note    Maryse Núñez  70 y.o. 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 All Hepatitis  B results are finally back, no  Chest X-ray done, Dany a Central off ice still have not received her medical records. A comprehensive review of systems was negative except for that written in the History of Present Illness.     Objective:     Visit Vitals    BP (!) 182/101 (BP 1 Location: Left arm, BP Patient Position: At rest)    Pulse (!) 123    Temp 98.5 °F (36.9 °C)    Resp 20    Ht 5' 3\" (1.6 m)    Wt 68 kg (150 lb)    SpO2 97%    Breastfeeding No    BMI 26.57 kg/m2         Intake/Output Summary (Last 24 hours) at 08/13/18 1404  Last data filed at 08/13/18 1243   Gross per 24 hour   Intake              360 ml   Output             1100 ml   Net             -740 ml       Current Facility-Administered Medications   Medication Dose Route Frequency Provider Last Rate Last Dose    doxercalciferol (HECTOROL) 4 mcg/2 mL injection 1 mcg  1 mcg IntraVENous DIALYSIS ALEX MONIQUE & SAT Adelia Matias MD   1 mcg at 08/11/18 1203    losartan (COZAAR) tablet 100 mg  100 mg Oral DAILY Adelia Matias MD   100 mg at 08/13/18 1207    hydrALAZINE (APRESOLINE) tablet 50 mg  50 mg Oral TID Adelia Matias MD   50 mg at 08/13/18 1207    potassium chloride (KLOR-CON) packet 20 mEq  20 mEq Oral BID WITH MEALS Valentina Valentine MD   20 mEq at 08/13/18 1207    epoetin raphael (EPOGEN;PROCRIT) injection 5,200 Units  5,200 Units IntraVENous DIALYSIS Corinne MONIQUE MD Stopped at 08/11/18 0900    insulin glargine (LANTUS) injection 10 Units  10 Units SubCUTAneous QHS Ashley Bettencourt MD   10 Units at 08/12/18 2225    acetaminophen (TYLENOL) tablet 325 mg  325 mg Oral Q4H PRN Ashley Bettencourt MD        dilTIAZem CD (CARDIZEM CD) capsule 240 mg  240 mg Oral DAILY Ashley Bettencourt MD   240 mg at 08/13/18 1207    cloNIDine HCl (CATAPRES) tablet 0.1 mg  0.1 mg Oral BID Ashley Bettencourt MD   0.1 mg at 08/13/18 1207    simvastatin (ZOCOR) tablet 20 mg  20 mg Oral QHS Ashley Bettencourt MD   20 mg at 08/12/18 2224    insulin lispro (HUMALOG) injection   SubCUTAneous AC&HS Ashley Bettencourt MD   6 Units at 08/13/18 1215    glucose chewable tablet 16 g  16 g Oral PRN Ashley Bettencourt MD        glucagon [facility]) injection 1 mg  1 mg IntraMUSCular PRN Ashley Bettencourt MD        dextrose (D50W) injection syrg 12.5-25 g  25-50 mL IntraVENous PRN Ashley Bettencourt MD   12.5 g at 08/10/18 0351        Physical Exam:     Physical Exam:   General:  Alert, cooperative, no distress, appears stated age. Neck: Supple, symmetrical, trachea midline, no adenopathy, thyroid: no enlargement/tenderness/nodules, no carotid bruit and no JVD. Lungs:   Clear to auscultation bilaterally. Heart:  Regular rate and rhythm, S1, S2 normal, no murmur, click, rub or gallop. Abdomen:   Soft, non-tender. Bowel sounds normal. No masses,  No organomegaly. Extremities: Extremities normal, atraumatic, no cyanosis or edema,HD catheter is well dressed.          Data Review:    CBC w/Diff    Recent Labs      08/13/18 0434  08/11/18   0329   WBC  8.1  7.5   RBC  4.04*  4.18*   HGB  10.9*  11.2*   HCT  33.5*  34.6*   MCV  82.9  82.8   MCH  27.0  26.8   MCHC  32.5  32.4   RDW  16.4*  16.8*    Recent Labs      08/13/18   0434  08/11/18   0329   MONOS   --   10   EOS   --   2   BASOS   --   0   RDW  16.4*  16.8*        Comprehensive Metabolic Profile    Recent Labs      08/11/18   0329   NA  139   K  3.4* CL  105   CO2  27   BUN  30*   CREA  4.30*    Recent Labs      08/11/18   0329   CA  8.5   PHOS  3.7          FINDINGS:  Right jugular dialysis catheter terminates at the atriocaval junction. Mild  enlargement of the cardiac silhouette. . Pulmonary vasculature is within normal  limits. No pneumothorax or pleural effusions. No air space opacity. No acute  osseous abnormality.     IMPRESSION  Impression:     Right jugular dialysis catheter. Mild enlargement of the cardiac silhouette. No  acute pulmonary process.                     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 Probably she can be transferred back to [address] [place] [address].       Dawna Pacheco MD

## 2018-08-13 NOTE — ROUTINE PROCESS
Bedside and Verbal shift change report given to Ashley Miguel LPN (oncoming nurse) by J Carlos Lee RN (offgoing nurse). Report included the following information SBAR, Kardex, MAR and Recent Results.     SITUATION:    Code Status: Full Code   Reason for Admission: Arteriovenous fistula occlusion ([facility] [T82.898A]   End stage renal disease ([facility] [N18.6]    [facility] day: 6   Problem List:       Hospital Problems  Date Reviewed: 8/8/2018          Codes Class Noted POA    ESRD (end stage renal disease) ([facility] ICD-10-CM: N18.6  ICD-9-CM: 585.6  8/8/2018 Yes        Secondary hyperparathyroidism of renal origin [facility]) ICD-10-CM: N25.81  ICD-9-CM: 588.81  8/8/2018 Unknown        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 [facility] ICD-10-CM: E11.22, I12.0, Z99.2, N18.6  ICD-9-CM: 250.40, 403.91, V45.11, 585.6  8/8/2018 Unknown        CVA, old, cognitive deficits ICD-10-CM: I69.319  ICD-9-CM: 438.0  8/8/2018 Unknown        Acute on chronic renal insufficiency ICD-10-CM: N28.9, N18.9  ICD-9-CM: 593.9, 585.9  8/7/2018 Unknown        Hypertension ICD-10-CM: I10  ICD-9-CM: 401.9  8/7/2018 Unknown              BACKGROUND:    Past Medical History:   Past Medical History:   Diagnosis Date    Asthma     Bipolar affective disorder ([facility]     Brain condition     Cataract     Chronic kidney disease     Diabetes ([facility]     Hyperlipidemia     Hypertension     Paralytic strabismus, unspecified     Psychiatric disorder          Patient taking anticoagulants no     ASSESSMENT:    Changes in Assessment Throughout Shift: none     Patient has Central Line: yes Reasons if yes: dialysis   Patient has Khan Cath: no Reasons if yes: n/a      Last Vitals:     Vitals:    08/12/18 1510 08/12/18 2005 08/13/18 0000 08/13/18 0416   BP: 135/80 130/73 116/66 147/79   Pulse: 83 83 85 73   Resp: 18 18 18 18   Temp: 97.8 °F (36.6 °C) 99 °F (37.2 °C) 98.2 °F (36.8 °C) 97.6 °F (36.4 °C)   TempSrc:       SpO2: 99% 98% 96% 96%   Weight: Height:            IV and DRAINS (will only show if present)   [REMOVED] Peripheral IV 08/06/18 Left Antecubital-Site Assessment: Clean, dry, & intact  [REMOVED] Peripheral IV 08/08/18 Right Hand-Site Assessment: Clean, dry, & intact  Peripheral IV 08/10/18 Left Hand-Site Assessment: Clean, dry, & intact     WOUND (if present)   Wound Type:  none   Dressing present Dressing Present : No   Wound Concerns/Notes:  none     PAIN    Pain Assessment    Pain Intensity 1: 0 (08/12/18 1926)              Patient Stated Pain Goal: 0  o Interventions for Pain:  None given  o Intervention effective: no c/o pain  o Time of last intervention: n/a   o Reassessment Completed: yes      Last 3 Weights:  Last 3 Recorded Weights in this Encounter    08/09/18 0003 08/11/18 0628 08/12/18 1445   Weight: 65.7 kg (144 lb 13.5 oz) 63 kg (138 lb 14.2 oz) 68 kg (150 lb)     Weight change:      INTAKE/OUPUT    Current Shift:      Last three shifts: 08/11 1901 - 08/13 0700  In: 1320 [P.O.:1320]  Out: -      LAB RESULTS     Recent Labs      08/13/18   0434  08/11/18   0329   WBC  8.1  7.5   HGB  10.9*  11.2*   HCT  33.5*  34.6*   PLT  179  198        Recent Labs      08/11/18   0329   NA  139   K  3.4*   GLU  120*   BUN  30*   CREA  4.30*   CA  8.5       RECOMMENDATIONS AND DISCHARGE PLANNING     1. Pending tests/procedures/ Plan of Care or Other Needs: dialysis today     2. Discharge plan for patient and Needs/Barriers: SNF    3. Estimated Discharge Date: 8/14/18 Posted on Whiteboard in [facility] Room: yes      4. The patient's care plan was reviewed with the oncoming nurse. \"HEALS\" SAFETY CHECK      Fall Risk    Total Score: 4    Safety Measures: Safety Measures: Bed/Chair alarm on, Bed/Chair-Wheels locked, Bed in low position, Call light within reach    A safety check occurred in the patient's room between off going nurse and oncoming nurse listed above.     The safety check included the below items  Area Items   H  High Alert Medications - Verify all high alert medication drips (heparin, PCA, etc.)   E  Equipment - Suction is set up for ALL patients (with genesis)  - Red plugs utilized for all equipment (IV pumps, etc.)  - WOWs wiped down at end of shift.  - Room stocked with oxygen, suction, and other unit-specific supplies   A  Alarms - Bed alarm is set for fall risk patients  - Ensure chair alarm is in place and activated if patient is up in a chair   L  Lines - Check IV for any infiltration  - Khan bag is empty if patient has a Khan   - Tubing and IV bags are labeled   S  Safety   - Room is clean, patient is clean, and equipment is clean. - Hallways are clear from equipment besides carts. - Fall bracelet on for fall risk patients  - Ensure room is clear and free of clutter  - Suction is set up for ALL patients (with genesis)  - Hallways are clear from equipment besides carts.    - Isolation precautions followed, supplies available outside room, sign posted     Shoshana Woods RN

## 2018-08-13 NOTE — PROGRESS NOTES
Progress Note    Patient: Todd Hinkle MRN: [id number]  CSN: [id number]    YOB: 1955  Age: 70 y.o. Sex: female    DOA: 8/6/2018 LOS:  LOS: 6 days                    Subjective:   Pt was seen this morning during dialysis , pt tolerating dialysis no chest pain no SOB  Pt started dialysis this admission waiting to set up dialysis as outpatinet discussed with Dr Gagan Otero and felicity betancur . Paper work submitted waiting for approval. Hepatitis panel ordered by nephrology    Renal ultrasound   Bilateral atrophic echogenic kidneys suggestive of chronic renal disease. No  hydronephrosis.     2 cm cyst upper pole right kidney.     Limited exam due to body habitus. Chief Complaint:   Chief Complaint   Patient presents with    Abnormal Lab Results       Review of systems  General: No fevers or chills. Cardiovascular: No chest pain or pressure. No palpitations. Pulmonary: No shortness of breath, cough or wheeze. Dialysis catheter Rt chest  Gastrointestinal: No abdominal pain, nausea, vomiting or diarrhea. Genitourinary: No urinary frequency, urgency, hesitancy or dysuria. Musculoskeletal: No joint or muscle pain, no back pain, no recent trauma. Neurologic: No headache,  H/O CVA cognitive impairment    Objective:     Physical Exam:  Visit Vitals    BP (!) 179/105    Pulse (!) 111    Temp 97.6 °F (36.4 °C)    Resp 18    Ht 5' 3\" (1.6 m)    Wt 68 kg (150 lb)    SpO2 96%    Breastfeeding No    BMI 26.57 kg/m2        General:         Alert, cooperative, no acute distress    HEENT: NC, Atraumatic. PERRLA, anicteric sclerae. Lungs: CTA Bilaterally. No Wheezing/Rhonchi/Rales. Heart:  Regular  rhythm,  No murmur, No Rubs, No Gallops  Abdomen: Soft, Non distended, Non tender.  +Bowel sounds, no HSM  Extremities: No lower limb edema  Psych:    Not anxious or agitated. Neurologic:  CN 2-12 grossly intact, Alert and confused .   No acute neurological                                 Deficits,     Intake and Output:  Current Shift:     Last three shifts:  08/11 1901 - 08/13 0700  In: 1320 [P.O.:1320]  Out: -     Labs: Results:       Chemistry Recent Labs      08/11/18   0329   GLU  120*   NA  139   K  3.4*   CL  105   CO2  27   BUN  30*   CREA  4.30*   CA  8.5   AGAP  7   BUCR  7*      CBC w/Diff Recent Labs      08/13/18   0434  08/11/18   0329   WBC  8.1  7.5   RBC  4.04*  4.18*   HGB  10.9*  11.2*   HCT  33.5*  34.6*   PLT  179  198   GRANS   --   68   LYMPH   --   20*   EOS   --   2      Cardiac Enzymes No results for input(s): CPK, CKND1, SONG in the last 72 hours. No lab exists for component: CKRMB, TROIP   Coagulation No results for input(s): PTP, INR, APTT in the last 72 hours. No lab exists for component: INREXT    Lipid Panel No results found for: CHOL, CHOLPOCT, CHOLX, CHLST, CHOLV, 020222, HDL, LDL, LDLC, DLDLP, 258683, VLDLC, VLDL, TGLX, TRIGL, TRIGP, TGLPOCT, CHHD, CHHDX   BNP No results for input(s): BNPP in the last 72 hours. Liver Enzymes No results for input(s): TP, ALB, TBIL, AP, SGOT, GPT in the last 72 hours.     No lab exists for component: DBIL   Thyroid Studies No results found for: T4, T3U, TSH, TSHEXT       Procedures/imaging: see electronic medical records for all procedures/Xrays and details which were not copied into this note but were reviewed prior to creation of Plan    Medications:   Current Facility-Administered Medications   Medication Dose Route Frequency    doxercalciferol (HECTOROL) 4 mcg/2 mL injection 1 mcg  1 mcg IntraVENous DIALYSIS TUE, THU & SAT    losartan (COZAAR) tablet 100 mg  100 mg Oral DAILY    hydrALAZINE (APRESOLINE) tablet 50 mg  50 mg Oral TID    potassium chloride (KLOR-CON) packet 20 mEq  20 mEq Oral BID WITH MEALS    epoetin raphael (EPOGEN;PROCRIT) injection 5,200 Units  5,200 Units IntraVENous DIALYSIS TUE, THU & SAT    insulin glargine (LANTUS) injection 10 Units  10 Units SubCUTAneous QHS    acetaminophen (TYLENOL) tablet 325 mg  325 mg Oral Q4H PRN    dilTIAZem CD (CARDIZEM CD) capsule 240 mg  240 mg Oral DAILY    cloNIDine HCl (CATAPRES) tablet 0.1 mg  0.1 mg Oral BID    simvastatin (ZOCOR) tablet 20 mg  20 mg Oral QHS    insulin lispro (HUMALOG) injection   SubCUTAneous AC&HS    glucose chewable tablet 16 g  16 g Oral PRN    glucagon (GLUCAGEN) injection 1 mg  1 mg IntraMUSCular PRN    dextrose (D50W) injection syrg 12.5-25 g  25-50 mL IntraVENous PRN       Assessment/Plan     Active Problems:    Acute on chronic renal insufficiency (8/7/2018)      Hypertension (8/7/2018)      ESRD (end stage renal disease) ([facility] (8/8/2018)      Secondary hyperparathyroidism of renal origin ([facility] (8/8/2018)      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) (8/8/2018)      CVA, old, cognitive deficits (8/8/2018)    plan    Acute on top chronic renal failure  F/u hepatitis B panel    waiting for Set up dialysis  As outpatinet  Discussed with nephrology and case manger    Hypertension  continue clonidine , hydralazine cozaar and Cardizem   continue to monitor Bp  Might increase hydralazine      Diabetes Mellitus  Check HG A1c  Lantus 10 unites sq qhs  Humalog sliding scale      Hyperlipdiemia  zocor 20 mg qhs  F/u liver function    Hypokalemia  contineu oral potassium supplement  F/u electrolyte cbc, cmp, mag    Pt and ot evaluation and treatment  Return to [facility] once dialysis approved    Susy Prader, MD  8/13/2018 10:27 AM

## 2018-08-13 NOTE — PROGRESS NOTES
Problem: Mobility Impaired (Adult and Pediatric)  Goal: *Acute Goals and Plan of Care (Insert Text)  Physical Therapy Goals  Initiated 8/13/2018 and to be accomplished within 7 day(s)  1. Patient will move from supine to sit and sit to supine  and scoot up and down in bed with modified independence. 2.  Patient will transfer from bed to chair and chair to bed with supervision/set-up using the least restrictive device. 3.  Patient will perform sit to stand with supervision/set-up. 4.  Patient will ambulate with supervision/set-up for >150 feet with the least restrictive device. Outcome: Progressing Towards Goal  physical Therapy EVALUATION    Patient: Arelis Mendoza (35 y.o. female)  Date: 8/13/2018  Primary Diagnosis: Arteriovenous fistula occlusion (HCC) [T82.898A]  End stage renal disease ([facility] [N18.6]  Procedure(s) (LRB):   in-pt 474A, Insertion Tunneled Central Venous Catheter (N/A)  Ultrasound Guided Vascular Access (N/A) 4 Days Post-Op   Precautions: Fall    OBJECTIVE/ASSESSMENT :  Based on the objective data described below, the patient presents with impaired functional mobility including bed mobility, transfers, ambulation, and general activity tolerance following admission for ESRD. Patient A&O to self only, however able to follow commands, demonstrates mild impulsivity. Patient presented today sidelying in bed, alert and agreeable to PT evaluation. Patient found to have had BM in the bed, was assisted with pericare and provided with clean gown prior to functional mobility training. Patient transferred to sitting EOB with SBA, stood with CGA without assistive device. Patient ambulated 10 ft with PT HHA, then was provided with RW. Patient ambulated [de-identified] ft with Carlos for RW management and environmental awareness as patient bumped into walls/furniture on both left and right sides.  At conclusion of session, patient left resting comfortably in bed with bed alarm in place, call bell in reach, needs met, and nurse notified. Education: bed mobility, transfers, ambulation, assistive device management, safety awareness, environmental awareness -- patient verbalized/demonstrated understanding, requires reinforcement    Patient will benefit from skilled intervention to address the above impairments. Patients rehabilitation potential is considered to be Fair  Factors which may influence rehabilitation potential include:   []         None noted  []         Mental ability/status  []         Medical condition  []         Home/family situation and support systems  []         Safety awareness  []         Pain tolerance/management  []         Other:      PLAN :  Recommendations and Planned Interventions:  [x]           Bed Mobility Training             [x]    Neuromuscular Re-Education  [x]           Transfer Training                   []    Orthotic/Prosthetic Training  [x]           Gait Training                          []    Modalities  [x]           Therapeutic Exercises          []    Edema Management/Control  [x]           Therapeutic Activities            [x]    Patient and Family Training/Education  []           Other (comment):    Frequency/Duration: Patient will be followed by physical therapy 1-2 times per day, 4-7 days per week to address goals. Discharge Recommendations: Elvis Mahajan  Further Equipment Recommendations for Discharge: rolling walker     SUBJECTIVE:   Patient stated What day is today? Wednesday?  Patient asked question 5-6 times, reminded every time that it was in fact Monday.     OBJECTIVE DATA SUMMARY:     Past Medical History:   Diagnosis Date    Asthma     [facility] ([facility]     Brain condition     Cataract     Chronic kidney disease     Diabetes ([facility]     Hyperlipidemia     Hypertension     Paralytic strabismus, unspecified     Psychiatric disorder      Past Surgical History:   Procedure Laterality Date    WA INSJ TUNNELED CVC W/O SUBQ PORT/ AGE 3 YR/> N/A 8/9/2018     in-pt 474A, Insertion Tunneled Central Venous Catheter performed by Daija Mcintyre MD at [facility]     Barriers to Learning/Limitations: yes;  cognitive and altered mental status (i.e.Sedation, Confusion)  Compensate with: Visual Cues, Verbal Cues and Tactile Cues  Prior Level of Function/Home Situation: Patient is long term resident of [address] [place], [place] [address] [place]. She reports being independent with ambulation PTA. Home Situation  Home Environment: [address] [place] [address] Name: [address] [place] [address],[address]  # Steps to Enter: 0  One/Two Story Residence: One story  Living Alone: No  Support Systems: Skilled nursing facility  Patient Expects to be Discharged to[de-identified] Skilled nursing facility  Current DME Used/Available at Home: None  Critical Behavior:  Neurologic State: Alert;Confused  Psychosocial  Patient Behaviors: Calm; Cooperative;Confused  Needs Expressed: Educational  Purposeful Interaction: Yes  Pt Identified Daily Priority: Clinical issues (comment)  Car[facility] Process: Teaching/learning; Attend basic human needs  Caring Interventions: Reassure  Reassure: Caring rounds  Strength:    Strength: Generally decreased, functional (BLE)  Tone & Sensation:   Tone: Normal (BLE)  Sensation: Intact (BLE)   Range Of Motion:  AROM: Within functional limits (BLE)  Functional Mobility:  Bed Mobility:  Supine to Sit: Stand-by assistance  Sit to Supine: Stand-by assistance  Transfers:  Sit to Stand: Contact guard assistance  Stand to Sit: Contact guard assistance  Balance:   Sitting: Intact  Standing: Impaired; With support  Standing - Static: Good  Standing - Dynamic : Fair  Ambulation/Gait Training:  Distance (ft): 80 Feet (ft)  Assistive Device: Walker, rolling  Ambulation - Level of Assistance: Minimal assistance  Base of Support: Center of gravity altered  Speed/Susan: Pace decreased (<100 feet/min)  Pain:  Pre session: 0/10  Post session: 0/10  Activity Tolerance:   Fair/good  Please refer to the flowsheet for vital signs taken during this treatment. After treatment:   [] Patient left in no apparent distress sitting up in chair  [] Patient left sitting on EOB  [x] Patient left in no apparent distress in bed  [] Patient declined to be OOB at this time due to  [x] Call bell left within reach  [x] Nursing notified(Aimee)  [] Caregiver present  [x] Bed alarm activated  [] SCDs in place    COMMUNICATION/EDUCATION:   [x]         Fall prevention education was provided and the patient/caregiver indicated understanding. [x]         Patient/family have participated as able in goal setting and plan of care. [x]         Patient/family agree to work toward stated goals and plan of care. []         Patient understands intent and goals of therapy, but is neutral about his/her participation. []         Patient is unable to participate in goal setting and plan of care. Thank you for this referral.  Bijankei Megha   Time Calculation: 16 mins      Mobility  Current  CJ= 20-39%   Goal  CI= 1-19%. The severity rating is based on the Level of Assistance required for Functional Mobility and ADLs.     Eval Complexity: History: MEDIUM  Complexity : 1-2 comorbidities / personal factors will impact the outcome/ POC Exam:MEDIUM Complexity : 3 Standardized tests and measures addressing body structure, function, activity limitation and / or participation in recreation  Presentation: MEDIUM Complexity : Evolving with changing characteristics  Clinical Decision Making:Medium Complexity   Overall Complexity:MEDIUM

## 2018-08-14 VITALS
HEART RATE: 94 BPM | BODY MASS INDEX: 25.76 KG/M2 | OXYGEN SATURATION: 96 % | TEMPERATURE: 98.6 F | DIASTOLIC BLOOD PRESSURE: 73 MMHG | SYSTOLIC BLOOD PRESSURE: 116 MMHG | RESPIRATION RATE: 20 BRPM | WEIGHT: 145.4 LBS | HEIGHT: 63 IN

## 2018-08-14 LAB
ALBUMIN SERPL-MCNC: 1.9 G/DL (ref 3.4–5)
ALBUMIN/GLOB SERPL: 0.5 {RATIO} (ref 0.8–1.7)
ALP SERPL-CCNC: 115 U/L (ref 45–117)
ALT SERPL-CCNC: 24 U/L (ref 13–56)
ANION GAP SERPL CALC-SCNC: 10 MMOL/L (ref 3–18)
AST SERPL-CCNC: 28 U/L (ref 15–37)
BASOPHILS # BLD: 0 K/UL (ref 0–0.1)
BASOPHILS NFR BLD: 0 % (ref 0–2)
BILIRUB SERPL-MCNC: 0.1 MG/DL (ref 0.2–1)
BUN SERPL-MCNC: 24 MG/DL (ref 7–18)
BUN/CREAT SERPL: 6 (ref 12–20)
CALCIUM SERPL-MCNC: 8.3 MG/DL (ref 8.5–10.1)
CHLORIDE SERPL-SCNC: 103 MMOL/L (ref 100–108)
CO2 SERPL-SCNC: 25 MMOL/L (ref 21–32)
CREAT SERPL-MCNC: 4.36 MG/DL (ref 0.6–1.3)
DIFFERENTIAL METHOD BLD: ABNORMAL
EOSINOPHIL # BLD: 0.1 K/UL (ref 0–0.4)
EOSINOPHIL NFR BLD: 1 % (ref 0–5)
ERYTHROCYTE [DISTWIDTH] IN BLOOD BY AUTOMATED COUNT: 16.2 % (ref 11.6–14.5)
EST. AVERAGE GLUCOSE BLD GHB EST-MCNC: 177 MG/DL
GLOBULIN SER CALC-MCNC: 4 G/DL (ref 2–4)
GLUCOSE BLD STRIP.AUTO-MCNC: 118 MG/DL (ref 70–110)
GLUCOSE BLD STRIP.AUTO-MCNC: 151 MG/DL (ref 70–110)
GLUCOSE BLD STRIP.AUTO-MCNC: 51 MG/DL (ref 70–110)
GLUCOSE BLD STRIP.AUTO-MCNC: 86 MG/DL (ref 70–110)
GLUCOSE SERPL-MCNC: 51 MG/DL (ref 74–99)
HBA1C MFR BLD: 7.8 % (ref 4.2–5.6)
HBV CORE AB SERPL QL IA: NEGATIVE
HBV CORE IGM SERPL QL IA: NEGATIVE
HCT VFR BLD AUTO: 34.8 % (ref 35–45)
HGB BLD-MCNC: 11.6 G/DL (ref 12–16)
LYMPHOCYTES # BLD: 1.3 K/UL (ref 0.9–3.6)
LYMPHOCYTES NFR BLD: 16 % (ref 21–52)
MAGNESIUM SERPL-MCNC: 2 MG/DL (ref 1.6–2.6)
MCH RBC QN AUTO: 27 PG (ref 24–34)
MCHC RBC AUTO-ENTMCNC: 33.3 G/DL (ref 31–37)
MCV RBC AUTO: 81.1 FL (ref 74–97)
MONOCYTES # BLD: 1.1 K/UL (ref 0.05–1.2)
MONOCYTES NFR BLD: 13 % (ref 3–10)
NEUTS SEG # BLD: 5.9 K/UL (ref 1.8–8)
NEUTS SEG NFR BLD: 70 % (ref 40–73)
PLATELET # BLD AUTO: 190 K/UL (ref 135–420)
PLATELET COMMENTS,PCOM: ABNORMAL
POTASSIUM SERPL-SCNC: 3.4 MMOL/L (ref 3.5–5.5)
PROT SERPL-MCNC: 5.9 G/DL (ref 6.4–8.2)
RBC # BLD AUTO: 4.29 M/UL (ref 4.2–5.3)
RBC MORPH BLD: ABNORMAL
SODIUM SERPL-SCNC: 138 MMOL/L (ref 136–145)
WBC # BLD AUTO: 8.4 K/UL (ref 4.6–13.2)

## 2018-08-14 PROCEDURE — 36415 COLL VENOUS BLD VENIPUNCTURE: CPT | Performed by: FAMILY MEDICINE

## 2018-08-14 PROCEDURE — 85025 COMPLETE CBC W/AUTO DIFF WBC: CPT | Performed by: FAMILY MEDICINE

## 2018-08-14 PROCEDURE — 82962 GLUCOSE BLOOD TEST: CPT

## 2018-08-14 PROCEDURE — 83036 HEMOGLOBIN GLYCOSYLATED A1C: CPT | Performed by: FAMILY MEDICINE

## 2018-08-14 PROCEDURE — 74011250637 HC RX REV CODE- 250/637: Performed by: INTERNAL MEDICINE

## 2018-08-14 PROCEDURE — 83735 ASSAY OF MAGNESIUM: CPT | Performed by: FAMILY MEDICINE

## 2018-08-14 PROCEDURE — 74011250637 HC RX REV CODE- 250/637: Performed by: FAMILY MEDICINE

## 2018-08-14 PROCEDURE — 97166 OT EVAL MOD COMPLEX 45 MIN: CPT

## 2018-08-14 PROCEDURE — 97535 SELF CARE MNGMENT TRAINING: CPT

## 2018-08-14 PROCEDURE — 74011250636 HC RX REV CODE- 250/636: Performed by: FAMILY MEDICINE

## 2018-08-14 PROCEDURE — 90744 HEPB VACC 3 DOSE PED/ADOL IM: CPT | Performed by: FAMILY MEDICINE

## 2018-08-14 PROCEDURE — 80053 COMPREHEN METABOLIC PANEL: CPT | Performed by: FAMILY MEDICINE

## 2018-08-14 PROCEDURE — 74011636637 HC RX REV CODE- 636/637: Performed by: FAMILY MEDICINE

## 2018-08-14 RX ORDER — POTASSIUM CHLORIDE 1.5 G/1.77G
20 POWDER, FOR SOLUTION ORAL 2 TIMES DAILY WITH MEALS
Qty: 30 PACKET | Refills: 0 | Status: ON HOLD
Start: 2018-08-14 | End: 2019-04-19

## 2018-08-14 RX ORDER — DOXERCALCIFEROL 4 UG/2ML
1 INJECTION INTRAVENOUS
Qty: 1 ML | Refills: 0 | Status: ON HOLD
Start: 2018-08-14 | End: 2019-04-19

## 2018-08-14 RX ORDER — HYDRALAZINE HYDROCHLORIDE 50 MG/1
50 TABLET, FILM COATED ORAL 3 TIMES DAILY
Qty: 90 TAB | Refills: 0 | Status: SHIPPED
Start: 2018-08-14 | End: 2020-09-25

## 2018-08-14 RX ORDER — INSULIN LISPRO 100 [IU]/ML
INJECTION, SOLUTION INTRAVENOUS; SUBCUTANEOUS
Qty: 1 VIAL | Refills: 0 | Status: SHIPPED
Start: 2018-08-14

## 2018-08-14 RX ORDER — INSULIN GLARGINE 100 [IU]/ML
10 INJECTION, SOLUTION SUBCUTANEOUS
Qty: 1 VIAL | Refills: 0 | Status: ON HOLD
Start: 2018-08-14 | End: 2019-04-19

## 2018-08-14 RX ORDER — SIMVASTATIN 20 MG/1
20 TABLET, FILM COATED ORAL
Qty: 30 TAB | Refills: 0 | Status: SHIPPED
Start: 2018-08-14 | End: 2020-09-25

## 2018-08-14 RX ORDER — LOSARTAN POTASSIUM 100 MG/1
100 TABLET ORAL DAILY
Qty: 30 TAB | Refills: 0 | Status: SHIPPED
Start: 2018-08-15 | End: 2020-09-25

## 2018-08-14 RX ADMIN — HEPATITIS B VACCINE (RECOMBINANT) 10 MCG: 10 INJECTION, SUSPENSION INTRAMUSCULAR at 15:14

## 2018-08-14 RX ADMIN — HYDRALAZINE HYDROCHLORIDE 50 MG: 50 TABLET ORAL at 15:13

## 2018-08-14 RX ADMIN — LOSARTAN POTASSIUM 100 MG: 50 TABLET ORAL at 08:13

## 2018-08-14 RX ADMIN — HYDRALAZINE HYDROCHLORIDE 50 MG: 50 TABLET ORAL at 08:13

## 2018-08-14 RX ADMIN — CLONIDINE HYDROCHLORIDE 0.1 MG: 0.1 TABLET ORAL at 08:12

## 2018-08-14 RX ADMIN — DILTIAZEM HYDROCHLORIDE 240 MG: 240 CAPSULE, COATED, EXTENDED RELEASE ORAL at 08:13

## 2018-08-14 RX ADMIN — INSULIN LISPRO 2 UNITS: 100 INJECTION, SOLUTION INTRAVENOUS; SUBCUTANEOUS at 13:00

## 2018-08-14 RX ADMIN — POTASSIUM CHLORIDE 20 MEQ: 1.5 POWDER, FOR SOLUTION ORAL at 08:12

## 2018-08-14 RX ADMIN — ACETAMINOPHEN 325 MG: 325 TABLET ORAL at 08:13

## 2018-08-14 NOTE — PROGRESS NOTES
Stu Clifton from [place] [facility] called and states pt's admission to [facility] has been set up for MWF at 1:45pm.  First day pt has to be there at 1:30pm.  Dr. Oanh Guzman aware. 0:  Called Dr. Daria Escobar office and spoke with josef. 1210: Informed pt's sister dialysis days and time and transportation. 1520:  Transport set for 1545 to [facility]. Nursing, pt's family, pt, and [facility] informed.

## 2018-08-14 NOTE — DISCHARGE SUMMARY
Discharge Summary        Patient ID:        Al Ivey        [id number]        50 y.o.        1955        Admission Date: 8/6/2018      Discharge date and time: 8/14/2018        Inpatient care:   Problem that led to hospitalization: Active Problems:    Acute on chronic renal insufficiency (8/7/2018)      Hypertension (8/7/2018)      ESRD (end stage renal disease) ([facility] (8/8/2018)      Secondary hyperparathyroidism of renal origin ([facility] (8/8/2018)      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 [facility]) (8/8/2018)      CVA, old, cognitive deficits (8/8/2018)                  Consults:Nephrology and Vascular Surgery              Final diagnoses (primary and secondary): <principal problem not specified>                                            Active Problems:    Acute on chronic renal insufficiency (8/7/2018)      Hypertension (8/7/2018)      ESRD (end stage renal disease) ([facility] (8/8/2018)      Secondary hyperparathyroidism of renal origin ([facility] (8/8/2018)      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 [facility]) (8/8/2018)      CVA, old, cognitive deficits (8/8/2018)            Brief hospital course  Pt started dialysis this admission waiting to set up dialysis as outpatinet discussed with Dr Napoleno De La Cruz and case manger . Paper work submitted waiting for approval. Hepatitis panel ordered by nephrology     Renal ultrasound   Bilateral atrophic echogenic kidneys suggestive of chronic renal disease. No hydronephrosis.     2 cm cyst upper pole right kidney.    Limited exam due to body habitus.     Acute on top chronic renal failure  Hep B antibody neg, non-immune, Hep B vaccine given prior to DC, follow up PCM for booster  Patient arranged to have dialysis at [address] unit [facility] schedule  Follow up with nephrology, Dr. Napoleon De La Cruz     Hypertension  continue clonidine , hydralazine, cozaar and Cardizem   continue to monitor Bp      Diabetes Mellitus  Lantus 10 unites sq qhs  Humalog sliding scale        Hyperlipdiemia  zocor 20 mg qhs     Hypokalemia  contineu oral potassium supplement  F/u electrolyte cbc, cmp, mag     Pt and ot evaluation and treatment  Return to [facility]                 Discharge Exam:   Visit Vitals    /73 (BP 1 Location: Left arm, BP Patient Position: Sitting)    Pulse 94    Temp 98.6 °F (37 °C)    Resp 20    Ht 5' 3\" (1.6 m)    Wt 66 kg (145 lb 6.4 oz)    SpO2 96%    Breastfeeding No    BMI 25.76 kg/m2     General:  Alert, cooperative, no distress, appears stated age. Head:  Normocephalic, without obvious abnormality, atraumatic. Eyes:  Conjunctivae/corneas clear. Throat: Lips, mucosa, and tongue normal.    Neck: Supple, symmetrical, trachea midline, no adenopathy   Back:   No CVA tenderness. Lungs:   Clear to auscultation bilaterally. Chest wall:  Right chest HD Cath in place without erythema or induration. Heart:  Regular rate and rhythm, S1, S2 normal, no murmur, click, rub or gallop. Abdomen:   Soft, non-tender. Bowel sounds normal. No masses,  No organomegaly. Extremities: Extremities  atraumatic, no cyanosis or edema. Pulses: 2+ and symmetric all extremities. Skin: Skin color, texture, turgor normal. No rashes or lesions. Neurologic: CNII-XII intact. Normal strength, sensation and reflexes throughout. Postdischarge care/patient self-management          Medications at discharge  including reasons for change and indications for new ones:   Current Discharge Medication List      START taking these medications    Details   simvastatin (ZOCOR) 20 mg tablet Take 1 Tab by mouth nightly. Qty: 30 Tab, Refills: 0      potassium chloride (KLOR-CON) 20 mEq packet Take 1 Packet by mouth two (2) times daily (with meals). Qty: 30 Packet, Refills: 0      doxercalciferol (HECTOROL) 4 mcg/2 mL injection 0.5 mL by IntraVENous route Every Tuesday, Thursday and Saturday.   Qty: 1 mL, Refills: 0      epoetin raphael (EPOGEN;PROCRIT) 3,000 unit/mL injection 1.73 mL by SubCUTAneous route Every Tuesday, Thursday and Saturday. Indications: ANEMIA DUE TO RENAL FAILURE, Renal Dialysis  Qty: 1 Vial, Refills: 0      insulin glargine (LANTUS) 100 unit/mL injection 10 Units by SubCUTAneous route nightly. Qty: 1 Vial, Refills: 0      insulin lispro (HUMALOG) 100 unit/mL injection INITIATE INSULIN CORRECTIVE PROTOCOL: Normal Insulin Sensitivity   For Blood Sugar (mg/dL) of:     Less than 150 =   0 units           150 -199 =   2 units  200 -249 =   4 units  250 -299 =   6 units  300 -349 =   8 units  350 and above = 10 units and Call Physician  If 2 glucose readings are above  Qty: 1 Vial, Refills: 0      losartan (COZAAR) 100 mg tablet Take 1 Tab by mouth daily. Qty: 30 Tab, Refills: 0         CONTINUE these medications which have CHANGED    Details   hydrALAZINE (APRESOLINE) 50 mg tablet Take 1 Tab by mouth three (3) times daily. Qty: 90 Tab, Refills: 0         CONTINUE these medications which have NOT CHANGED    Details   aspirin 81 mg tablet Take 81 mg by mouth daily. diltiazem CD (CARDIZEM CD) 240 mg ER capsule Take 240 mg by mouth daily. cloNIDine (CATAPRES) 0.1 mg tablet Take 0.1 mg by mouth two (2) times a day. magnesium hydroxide ([place] MILK OF MAGNESIA) 400 mg/5 mL suspension Take 30 mL by mouth daily as needed. acetaminophen (TYLENOL) 325 mg tablet Take 325 mg by mouth as needed.            STOP taking these medications       captopril (CAPOTEN) 50 mg tablet Comments:   Reason for Stopping:         furosemide (LASIX) 80 mg tablet Comments:   Reason for Stopping:         lovastatin (MEVACOR) 40 mg tablet Comments:   Reason for Stopping:         insulin aspart (NOVOLOG) 100 unit/mL injection Comments:   Reason for Stopping:         paricalcitol (ZEMPLAR) 1 mcg capsule Comments:   Reason for Stopping:         potassium chloride SR (KLOR-CON 10) 10 mEq tablet Comments:   Reason for Stopping:                   Pending laboratory work and tests: None         Condition at discharge and functional status: improved and stable        Diet at discharge Cardiac Diet, Diabetic Diet and Renal Diet        Activities at discharge: PT/OT Eval and treat        Discharge destination: SNF      Advanced care plan    Full Code    Contact information/plan for follow up care     Follow-up with Dr. Kira Mcguire at [facility] within 48 hours.   Follow up with Vascular surgery, Dr. Izabela Johnson in 1 week for HD access eval  Follow up with Nephrology, Dr. Silvano Hernandez, in 1-2 weeks   Follow-up tests/labs:  CBC, CMP, Mg within 3 days of arriving at [facility]

## 2018-08-14 NOTE — PROGRESS NOTES
Progress Note    Aide Faustin  70 y.o. Admit Date: 8/6/2018  Active Problems:    Acute on chronic renal insufficiency (8/7/2018) POA: Unknown      Hypertension (8/7/2018) POA: Unknown      ESRD (end stage renal disease) ([facility] (8/8/2018) POA: Yes      Secondary hyperparathyroidism of renal origin ([facility] (8/8/2018) POA: Unknown      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 ([facility] (8/8/2018) POA: Unknown      CVA, old, cognitive deficits (8/8/2018) POA: Unknown            Subjective:     Patient feels good, no SOB. A comprehensive review of systems was negative except for that written in the History of Present Illness.     Objective:     Visit Vitals    /73 (BP 1 Location: Left arm, BP Patient Position: Sitting)    Pulse 94    Temp 98.6 °F (37 °C)    Resp 20    Ht 5' 3\" (1.6 m)    Wt 66 kg (145 lb 6.4 oz)    SpO2 96%    Breastfeeding No    BMI 25.76 kg/m2         Intake/Output Summary (Last 24 hours) at 08/14/18 1156  Last data filed at 08/14/18 0924   Gross per 24 hour   Intake              880 ml   Output              400 ml   Net              480 ml       Current Facility-Administered Medications   Medication Dose Route Frequency Provider Last Rate Last Dose    doxercalciferol (HECTOROL) 4 mcg/2 mL injection 1 mcg  1 mcg IntraVENous DIALYSIS ALEX MONIQUE & ANTONIO Jalloh MD   1 mcg at 08/11/18 1203    losartan (COZAAR) tablet 100 mg  100 mg Oral DAILY Mali Jalloh MD   100 mg at 08/14/18 0813    hydrALAZINE (APRESOLINE) tablet 50 mg  50 mg Oral TID Mali Jalloh MD   50 mg at 08/14/18 0813    potassium chloride (KLOR-CON) packet 20 mEq  20 mEq Oral BID WITH MEALS Bre Bear MD   20 mEq at 08/14/18 0058    epoetin raphael (EPOGEN;PROCRIT) injection 5,200 Units  5,200 Units IntraVENous DIALYSIS ALEX MONIQUE & ANTONIO Jalloh MD   Stopped at 08/11/18 0900    insulin glargine (LANTUS) injection 10 Units  10 Units SubCUTAneous QHS Bre Bear MD   10 Units at 08/13/18 6085    acetaminophen (TYLENOL) tablet 325 mg  325 mg Oral Q4H PRN Antelmo Lanier MD   325 mg at 08/14/18 0813    dilTIAZem CD (CARDIZEM CD) capsule 240 mg  240 mg Oral DAILY Antelmo Lanier MD   240 mg at 08/14/18 0813    cloNIDine HCl (CATAPRES) tablet 0.1 mg  0.1 mg Oral BID Antelmo Lanier MD   0.1 mg at 08/14/18 6916    simvastatin (ZOCOR) tablet 20 mg  20 mg Oral QHS Antelmo Lanier MD   20 mg at 08/13/18 2127    insulin lispro (HUMALOG) injection   SubCUTAneous AC&HS Antelmo Lanier MD   Stopped at 08/14/18 0730    glucose chewable tablet 16 g  16 g Oral PRN Antelmo Lanier MD        glucagon [facility]) injection 1 mg  1 mg IntraMUSCular PRN Antelmo Lanier MD        dextrose (D50W) injection syrg 12.5-25 g  25-50 mL IntraVENous PRN Antelmo Lanier MD   12.5 g at 08/10/18 0351        Physical Exam:     Physical Exam:   General:  Alert, cooperative, no distress, appears stated age. Lungs:   Clear to auscultation bilaterally. Heart:  Regular rate and rhythm, S1, S2 normal, no murmur, click, rub or gallop. Abdomen:   Soft, non-tender. Bowel sounds normal. No masses,  No organomegaly.    Extremities: Extremities normal, atraumatic, no cyanosis or edema,has TDC   Skin: Skin color, texture, turgor normal. No rashes or lesions         Data Review:    CBC w/Diff    Recent Labs      08/14/18   0234  08/13/18   0434   WBC  8.4  8.1   RBC  4.29  4.04*   HGB  11.6*  10.9*   HCT  34.8*  33.5*   MCV  81.1  82.9   MCH  27.0  27.0   MCHC  33.3  32.5   RDW  16.2*  16.4*    Recent Labs      08/14/18   0234   MONOS  13*   EOS  1   BASOS  0   RDW  16.2*        Comprehensive Metabolic Profile    Recent Labs      08/14/18   0234   NA  138   K  3.4*   CL  103   CO2  25   BUN  24*   CREA  4.36*    Recent Labs      08/14/18   0234   CA  8.3*   ALB  1.9*   TP  5.9*   SGOT  28   TBILI  0.1*                        Impression:       Active Hospital Problems    Diagnosis Date Noted    ESRD (end stage renal disease) ([facility] 08/08/2018    Secondary hyperparathyroidism of renal origin ([facility] 08/08/2018    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 [facility]) 08/08/2018    CVA, old, cognitive deficits 08/08/2018    Acute on chronic renal insufficiency 08/07/2018    Hypertension 08/07/2018            Plan:     [id number] Yudelka Peralta  to transfer back to Nursing home today,will start OP dialysis q Mon, Wed & Friday at [facility] unit  From tomorrow.       Mali Jallho MD

## 2018-08-14 NOTE — PROGRESS NOTES
1530 Patient discharged to [facility]. IV removed. Hepatitis B Vaccine administered. Instructions provided to the facility.

## 2018-08-14 NOTE — PROGRESS NOTES
Assumed care; Pt resting in chair; no distress noted; Pt denies pain; bed in low position; Side rails up x 3; Call light and personal belonging within reach. Will continue to monitor. 2000: Returned to bed; Bed alarm on; Side rails up x 3; Call light and personal belonging within reach. 0327: Critical glucose 51  0333: POC 50  0334: Repeat test POC 51. Pt awake, asymptomatic. Hypoglycemic protocol initiated. 0354[de-identified] .  Will continue to monitor

## 2018-08-14 NOTE — PROGRESS NOTES
Problem: Self Care Deficits Care Plan (Adult)  Goal: *Acute Goals and Plan of Care (Insert Text)  Occupational Therapy Goals  Initiated 8/14/2018 within 7 day(s). 1.  Patient will perform upper body dressing with modified independence   2. Patient will perform lower body dressing with supervision/set-up. 4.  Patient will perform toilet transfers with supervision/set-up. 5.  Patient will perform all aspects of toileting with supervision/set-up. 6.  Patient will participate in upper extremity therapeutic exercise/activities with supervision/set-up for 8 minutes. 7.  Patient will perform grooming in stance at sink with SUP and G balance. Occupational Therapy EVALUATION  Patient: Sharla Ventura (46 y.o. 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 Pt demonstrates decreased L shld strength and decreased balance while performing self care. Pt is impulsive and requires v/c for safety during self care (i.e. Sitting when donning underwear). Patient will benefit from skilled intervention to address the above impairments. Pt performed toileting CGA and required assist to doff/mookie underwear. Pt performed oral care in stance with and demonstrated need for setup due to decreased visual perception.  Patients rehabilitation potential is considered to be Mikhail Jenniffer may influence rehabilitation potential include:   []             None noted  []             Mental ability/status  []             Medical condition  []             Home/family situation and support systems  []             Safety awareness  [] Pain tolerance/management  []             Other:      PLAN :  Recommendations and Planned Interventions:  []               Self Care Training                  []        Therapeutic Activities  []               Functional Mobility Training    []        Cognitive Retraining  []               Therapeutic Exercises           []        Endurance Activities  []               Balance Training                   []        Neuromuscular Re-Education  []               Visual/Perceptual Training     []   Home Safety Training  []               Patient Education                 []        Family Training/Education  []               Other (comment):    Frequency/Duration: Patient will be followed by occupational therapy 1-2 times per day/4-7 days per week to address goals. Discharge Recommendations: Elvis Mahajan  Further Equipment Recommendations for Discharge: N/A     Barriers to Learning/Limitations: yes;  cognitive and sensory deficits-vision/hearing/speech  Compensate with: visual, verbal, tactile, kinesthetic cues/model     PATIENT COMPLEXITY      Eval Complexity: History: MEDIUM Complexity : Expanded review of history including physical, cognitive and psychosocial  history ; Examination: MEDIUM Complexity : 3-5 performance deficits relating to physical, cognitive , or psychosocial skils that result in activity limitations and / or participation restrictions; Decision Making:MEDIUM Complexity : Patient may present with comorbidities that affect occupational performnce. Miniml to moderate modification of tasks or assistance (eg, physical or verbal ) with assesment(s) is necessary to enable patient to complete evaluation  Assessment: moderate Complexity     G-CODES:     Self Care  Current  CJ= 20-39%   Goal  CI= 1-19%. The severity rating is based on the Level of Assistance required for Functional Mobility and ADLs. SUBJECTIVE:   Patient stated I dress myself.     OBJECTIVE DATA SUMMARY:     Past Medical History:   Diagnosis Date    Asthma     Bipolar affective disorder [facility]     Brain condition     Cataract     Chronic kidney disease     Diabetes [facility]     Hyperlipidemia     Hypertension     Paralytic strabismus, unspecified     Psychiatric disorder      Past Surgical History:   Procedure Laterality Date    TN INSJ TUNNELED CVC W/O SUBQ PORT/ AGE 3 YR/> N/A 8/9/2018     in-pt 474A, Insertion Tunneled Central Venous Catheter performed by Bhavik Carrasquillo MD at [facility] CATH LAB     Prior Level of Function/Home Situation: MI dressing, toileting, MI cane ambulation, assist bathing  Home Situation  Home Environment: [address] [place] [address] Name: [address] [place] [address],[address]  # Steps to Enter: 0  One/Two Story Residence: One story  Living Alone: No  Support Systems: Skilled nursing facility  Patient Expects to be Discharged to[de-identified] Skilled nursing facility  Current DME Used/Available at Home: skip Ocasio, Hospital bed  [x]  Right hand dominant   []  Left hand dominant  Cognitive/Behavioral Status:  Neurologic State: Alert  Orientation Level: Disoriented to person  Cognition: Decreased attention/concentration; Impaired decision making; Impulsive  Safety/Judgement: Fall prevention;Decreased awareness of environment    Skin:  Intact BUEs    Edema: none noted BUEs    Vision/Perceptual:   Impaired L and R visual field perception     Balance:  Sitting: Intact  Standing: With support; Impaired  Strength:  Strength: Generally decreased, functional (LUE shld 3+/5)   Tone & Sensation:  Tone: Normal (BUEs)  Sensation: Intact (BUEs)   Range of Motion:  AROM: Within functional limits (BUEs)   Functional Mobility and Transfers for ADLs:  Transfers: Toilet Transfer : Contact guard assistance    ADL Assessment:  Feeding: Setup  Oral Facial Hygiene/Grooming: Setup  Bathing: Moderate assistance  Upper Body Dressing: Setup  Lower Body Dressing:  Moderate assistance  Toileting: Minimum assistance   ADL Intervention: Cognitive Retraining  Safety/Judgement: Fall prevention;Decreased awareness of environment      Pain:  Pt reports 0/10 pain or discomfort prior to treatment.    Pt reports 0/10 pain or discomfort post treatment. Activity Tolerance:   Good    Please refer to the flowsheet for vital signs taken during this treatment. After treatment:   [x] Patient left in no apparent distress sitting up in chair  [] Patient left in no apparent distress in bed  [x] Call bell left within reach  [] Nursing notified  [] Caregiver present  [] Bed alarm activated    COMMUNICATION/EDUCATION:   [x] Home safety education was provided and the patient/caregiver indicated understanding. [x] Patient/family have participated as able in goal setting and plan of care. [x] Patient/family agree to work toward stated goals and plan of care. [] Patient understands intent and goals of therapy, but is neutral about his/her participation. [] Patient is unable to participate in goal setting and plan of care.     Thank you for this referral.  Parrish David OT  Time Calculation: 26 mins

## 2018-08-14 NOTE — PROGRESS NOTES
Problem: Falls - Risk of  Goal: *Absence of Falls  Document Oscar Fall Risk and appropriate interventions in the flowsheet. Outcome: Progressing Towards Goal  Fall Risk Interventions:  Mobility Interventions: Patient to call before getting OOB, Bed/chair exit alarm    Mentation Interventions: Bed/chair exit alarm, Room close to nurse's station, Toileting rounds, More frequent rounding    Medication Interventions: Teach patient to arise slowly, Patient to call before getting OOB, Bed/chair exit alarm    Elimination Interventions: Patient to call for help with toileting needs, Toileting schedule/hourly rounds    History of Falls Interventions: Room close to nurse's station, Door open when patient unattended        Problem: Pressure Injury - Risk of  Goal: *Prevention of pressure injury  Document Ab Scale and appropriate interventions in the flowsheet.    Outcome: Progressing Towards Goal  Pressure Injury Interventions:  Sensory Interventions: Assess changes in LOC, Keep linens dry and wrinkle-free    Moisture Interventions: Offer toileting Q_hr, Check for incontinence Q2 hours and as needed    Activity Interventions: Increase time out of bed, PT/OT evaluation    Mobility Interventions: Pressure redistribution bed/mattress (bed type)    Nutrition Interventions: Document food/fluid/supplement intake    Friction and Shear Interventions: Foam dressings/transparent film/skin sealants

## 2018-08-14 NOTE — PROGRESS NOTES
CMS was asked to arrange round trip transportation to [facility] (  [facility] [address]) from [facility] ([address] [place] [address]). CMS spoke with \"[employer]\" ([phone]) representative Sharla Manning) and scheduled the following days MWF at [phone] (8/15/18- #[id number], 8/17/18- #21580, 8/20/18- #[id number]). CMS also spoke with [facility] representative to inform them of round trip info and updates.

## 2018-08-14 NOTE — DIABETES MGMT
Glycemic Control Plan of Care    T2DM with pending result of A1c ordered on 8/14/2018. Patient is from [facility]. POC BG range on 8/13/2018: 116-276 mg/dL. POC BG report on 8/14/2018 at time of review: 51, 118, 86 mg/dL. Patient reported today that she felt shaky and sweaty very early in the morning and she was given orange juice to drink and then she felt better after that. Did not see specific nursing documentation about hypoglycemia treatment. Noted plan for return back to [facility]. Recommendation(s):  1.) Discontinue basal lantus insulin at HS.  2.) Continue on correctional lispro insulin. Assessment:  Patient is 70year old with past medical history including type 2 diabetes mellitus, hypertension, hyperparathyroidism,  Old CVA, cataract, and bipolar disorder - was admitted from [facility] home facility on 8/06/2018 with report of lethargy. Noted:  ESRD on hemodialysis. T2DM with pending result of A1c ordered on 8/14/2018. Most recent blood glucose values:    Results for Tamar Mendoza (MRN [id number]) as of 8/14/2018 11:29   Ref. Range 8/13/2018 12:10 8/13/2018 15:59 8/13/2018 21:21   GLUCOSE,FAST - POC Latest Ref Range: 70 - 110 mg/dL 272 (H) 116 (H) 276 (H)     Results for Tamar Mendoza (MRN [id number]) as of 8/14/2018 11:29   Ref. Range 8/14/2018 03:34 8/14/2018 03:54 8/14/2018 07:32   GLUCOSE,FAST - POC Latest Ref Range: 70 - 110 mg/dL 51 (LL) 118 (H) 86     Current A1C: No report at time of review. Pending A1c result ordered on 8/14/2018. Current hospital diabetes medications:  Basal lantus insulin 10 units daily at bedtime. Correctional lispro insulin ACHS. Normal sensitivity dose.     Total daily dose insulin requirement previous day: 8/13/2018  Lantus: 10 units  Lispro: 12 units  TDD: 22 units of insulin    Home diabetes medications: Patient came from [address] facility:  [facility] insulin.     Diet: Diabetic consistent carb regular; nutr suppl: nepro with lunch    Goals:  Blood glucose will be within target range of  mg/dL by 8/17/2018    Education:  ___  Refer to Diabetes Education Record             _X__  Education not indicated at this time    Jayna Lowry RN [facility]  Pager: [phone]

## 2018-08-15 RX ORDER — SODIUM CHLORIDE 0.9 % (FLUSH) 0.9 %
5-10 SYRINGE (ML) INJECTION EVERY 8 HOURS
Status: CANCELLED | OUTPATIENT
Start: 2018-08-15

## 2018-08-15 RX ORDER — SODIUM CHLORIDE 0.9 % (FLUSH) 0.9 %
5-10 SYRINGE (ML) INJECTION AS NEEDED
Status: CANCELLED | OUTPATIENT
Start: 2018-08-15

## 2018-08-16 ENCOUNTER — HOSPITAL ENCOUNTER (OUTPATIENT)
Age: 63
Setting detail: OUTPATIENT SURGERY
Discharge: HOME | End: 2018-08-16
Attending: SURGERY | Admitting: SURGERY
Payer: MEDICARE

## 2018-08-16 VITALS
HEART RATE: 108 BPM | DIASTOLIC BLOOD PRESSURE: 76 MMHG | OXYGEN SATURATION: 99 % | SYSTOLIC BLOOD PRESSURE: 180 MMHG | RESPIRATION RATE: 16 BRPM

## 2018-08-16 DIAGNOSIS — N18.6 END STAGE RENAL DISEASE (HCC): ICD-10-CM

## 2018-08-16 DIAGNOSIS — T82.898A ARTERIOVENOUS FISTULA OCCLUSION (HCC): ICD-10-CM

## 2018-08-16 LAB
BUN BLD-MCNC: 51 MG/DL (ref 7–18)
CA-I BLD-MCNC: 1.24 MMOL/L (ref 1.12–1.32)
CHLORIDE BLD-SCNC: 106 MMOL/L (ref 100–108)
CREAT UR-MCNC: 6.6 MG/DL (ref 0.6–1.3)
GLUCOSE BLD STRIP.AUTO-MCNC: 155 MG/DL (ref 74–106)
HCT VFR BLD CALC: 36 % (ref 36–49)
HGB BLD-MCNC: 12.2 G/DL (ref 12–16)
POTASSIUM BLD-SCNC: 5.4 MMOL/L (ref 3.5–5.5)
SODIUM BLD-SCNC: 139 MMOL/L (ref 136–145)

## 2018-08-16 PROCEDURE — 77030018719 HC DRSG PTCH ANTIMIC J&J -A: Performed by: SURGERY

## 2018-08-16 PROCEDURE — 74011250636 HC RX REV CODE- 250/636

## 2018-08-16 PROCEDURE — C1769 GUIDE WIRE: HCPCS | Performed by: SURGERY

## 2018-08-16 PROCEDURE — 36558 INSERT TUNNELED CV CATH: CPT | Performed by: SURGERY

## 2018-08-16 PROCEDURE — C1750 CATH, HEMODIALYSIS,LONG-TERM: HCPCS | Performed by: SURGERY

## 2018-08-16 PROCEDURE — 80047 BASIC METABLC PNL IONIZED CA: CPT

## 2018-08-16 PROCEDURE — 74011250636 HC RX REV CODE- 250/636: Performed by: SURGERY

## 2018-08-16 RX ORDER — HEPARIN SODIUM 5000 [USP'U]/ML
INJECTION, SOLUTION INTRAVENOUS; SUBCUTANEOUS AS NEEDED
Status: DISCONTINUED | OUTPATIENT
Start: 2018-08-16 | End: 2018-08-16 | Stop reason: HOSPADM

## 2018-08-16 RX ORDER — ACETAMINOPHEN 325 MG/1
650 TABLET ORAL
Status: DISCONTINUED | OUTPATIENT
Start: 2018-08-16 | End: 2018-08-16 | Stop reason: HOSPADM

## 2018-08-16 RX ORDER — FENTANYL CITRATE 50 UG/ML
INJECTION, SOLUTION INTRAMUSCULAR; INTRAVENOUS
Status: DISCONTINUED
Start: 2018-08-16 | End: 2018-08-16 | Stop reason: HOSPADM

## 2018-08-16 RX ORDER — HEPARIN SODIUM 5000 [USP'U]/ML
INJECTION, SOLUTION INTRAVENOUS; SUBCUTANEOUS
Status: DISCONTINUED
Start: 2018-08-16 | End: 2018-08-16 | Stop reason: HOSPADM

## 2018-08-16 RX ORDER — MIDAZOLAM HYDROCHLORIDE 1 MG/ML
INJECTION, SOLUTION INTRAMUSCULAR; INTRAVENOUS
Status: DISCONTINUED
Start: 2018-08-16 | End: 2018-08-16 | Stop reason: HOSPADM

## 2018-08-16 RX ORDER — DIPHENHYDRAMINE HYDROCHLORIDE 50 MG/ML
12.5 INJECTION, SOLUTION INTRAMUSCULAR; INTRAVENOUS
Status: DISCONTINUED | OUTPATIENT
Start: 2018-08-16 | End: 2018-08-16 | Stop reason: HOSPADM

## 2018-08-16 RX ORDER — MORPHINE SULFATE 10 MG/ML
1 INJECTION, SOLUTION INTRAMUSCULAR; INTRAVENOUS
Status: DISCONTINUED | OUTPATIENT
Start: 2018-08-16 | End: 2018-08-16 | Stop reason: HOSPADM

## 2018-08-16 RX ORDER — OXYCODONE AND ACETAMINOPHEN 5; 325 MG/1; MG/1
1 TABLET ORAL
Status: DISCONTINUED | OUTPATIENT
Start: 2018-08-16 | End: 2018-08-16 | Stop reason: HOSPADM

## 2018-08-16 RX ORDER — ONDANSETRON 2 MG/ML
4 INJECTION INTRAMUSCULAR; INTRAVENOUS
Status: DISCONTINUED | OUTPATIENT
Start: 2018-08-16 | End: 2018-08-16 | Stop reason: HOSPADM

## 2018-08-16 RX ORDER — LIDOCAINE HYDROCHLORIDE 10 MG/ML
INJECTION, SOLUTION EPIDURAL; INFILTRATION; INTRACAUDAL; PERINEURAL
Status: DISCONTINUED
Start: 2018-08-16 | End: 2018-08-16 | Stop reason: HOSPADM

## 2018-08-16 RX ORDER — FENTANYL CITRATE 50 UG/ML
INJECTION, SOLUTION INTRAMUSCULAR; INTRAVENOUS AS NEEDED
Status: DISCONTINUED | OUTPATIENT
Start: 2018-08-16 | End: 2018-08-16 | Stop reason: HOSPADM

## 2018-08-16 RX ORDER — MIDAZOLAM HYDROCHLORIDE 1 MG/ML
INJECTION, SOLUTION INTRAMUSCULAR; INTRAVENOUS AS NEEDED
Status: DISCONTINUED | OUTPATIENT
Start: 2018-08-16 | End: 2018-08-16 | Stop reason: HOSPADM

## 2018-08-16 RX ORDER — LIDOCAINE HYDROCHLORIDE 10 MG/ML
INJECTION, SOLUTION EPIDURAL; INFILTRATION; INTRACAUDAL; PERINEURAL AS NEEDED
Status: DISCONTINUED | OUTPATIENT
Start: 2018-08-16 | End: 2018-08-16 | Stop reason: HOSPADM

## 2018-08-16 NOTE — Clinical Note
Sheath #1: Dressed using transparent dressing. Site: clean, dry, & intact, no bleeding and no hematoma.

## 2018-08-16 NOTE — Clinical Note
TRANSFER - IN REPORT:  
 
Verbal report received from: Juan Ramon Ramirez RN. Report consisted of patient's Situation, Background, Assessment and  
Recommendations(SBAR). Opportunity for questions and clarification was provided. Assessment completed upon patient's arrival to unit and care assumed. Patient transported with a Cardiac Cath Tech / Patient Care Tech.

## 2018-08-16 NOTE — PROGRESS NOTES
I have reviewed discharge instructions with the patient and pts sisiter,   The patient and guardian verbalized understanding. pt taken out by sister in wheelchair awaiting for medical transport to arrive to take pt back to [facility] .

## 2018-08-16 NOTE — Clinical Note
TRANSFER - OUT REPORT:  
 
Verbal report given to: rekha baez RN. Report consisted of patient's Situation, Background, Assessment and  
Recommendations(SBAR). Opportunity for questions and clarification was provided. Patient transported with a Cardiac Cath Tech / Patient Care Tech. Patient transported to: [address].

## 2018-08-16 NOTE — H&P
Surgery History and Physical    Subjective: Todd Hinkle is a 70 y.o. female who presents with poorly working dialysis catheter with ESRD. 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 ([facility] 08/08/2018    CVA, old, cognitive deficits 08/08/2018    Acute on chronic renal insufficiency 08/07/2018    Hypertension 08/07/2018     Past Medical History:   Diagnosis Date    Asthma     Bipolar affective disorder ([facility]     Brain condition     Cataract     Chronic kidney disease     Diabetes ([facility]     Hyperlipidemia     Hypertension     Paralytic strabismus, unspecified     Psychiatric disorder       Past Surgical History:   Procedure Laterality Date    DC INSJ TUNNELED CVC W/O SUBQ PORT/ AGE 3 YR/> N/A 8/9/2018     in-pt 474A, Insertion Tunneled Central Venous Catheter performed by Osman Alexis MD at [facility] CATH LAB      Social History   Substance Use Topics    Smoking status: Never Smoker    Smokeless tobacco: Never Used    Alcohol use No      No family history on file. Prior to Admission medications    Medication Sig Start Date End Date Taking? Authorizing Provider   hydrALAZINE (APRESOLINE) 50 mg tablet Take 1 Tab by mouth three (3) times daily. 8/14/18   Cat Kirk MD   simvastatin (ZOCOR) 20 mg tablet Take 1 Tab by mouth nightly. 8/14/18   Cat Kirk MD   potassium chloride (KLOR-CON) 20 mEq packet Take 1 Packet by mouth two (2) times daily (with meals). 8/14/18   Cat Kirk MD   doxercalciferol (HECTOROL) 4 mcg/2 mL injection 0.5 mL by IntraVENous route Every Tuesday, Thursday and Saturday. 8/14/18   Cat Kirk MD   epoetin raphael (EPOGEN;PROCRIT) 3,000 unit/mL injection 1.73 mL by SubCUTAneous route Every Tuesday, Thursday and Saturday.  Indications: ANEMIA DUE TO RENAL FAILURE, Renal Dialysis 8/14/18   Doug Aponte MD   insulin glargine (LANTUS) 100 unit/mL injection 10 Units by SubCUTAneous route nightly. 8/14/18   Angle Kirk MD   insulin lispro (HUMALOG) 100 unit/mL injection INITIATE INSULIN CORRECTIVE PROTOCOL: Normal Insulin Sensitivity   For Blood Sugar (mg/dL) of:     Less than 150 =   0 units           150 -199 =   2 units  200 -249 =   4 units  250 -299 =   6 units  300 -349 =   8 units  350 and above = 10 units and Call Physician  If 2 glucose readings are above 8/14/18   Doug Aponte MD   losartan (COZAAR) 100 mg tablet Take 1 Tab by mouth daily. 8/15/18   Angle Kirk MD   aspirin 81 mg tablet Take 81 mg by mouth daily. Elina Light MD   diltiazem CD (CARDIZEM CD) 240 mg ER capsule Take 240 mg by mouth daily. Elina Light MD   cloNIDine (CATAPRES) 0.1 mg tablet Take 0.1 mg by mouth two (2) times a day. Elina Light MD   magnesium hydroxide ([place] MILK OF MAGNESIA) 400 mg/5 mL suspension Take 30 mL by mouth daily as needed. Elina Light MD   acetaminophen (TYLENOL) 325 mg tablet Take 325 mg by mouth as needed. Elina Light MD     Allergies   Allergen Reactions    Amoxicillin Unknown (comments)    Cleocin [Clindamycin Hcl] Unknown (comments)    Codeine Unknown (comments)    Lorcet 10/650 [Hydrocodone-Acetaminophen] Unknown (comments)    Penicillin G Benzathine Unknown (comments)    Shellfish Derived Unknown (comments)         ROS:  Pertinent items are noted in HPI. Unless otherwise mentioned in the HPI. Objective:     No data found. No data recorded. Physical Exam:  GENERAL: alert, cooperative, no distress, appears stated age, THROAT & NECK: normal and no erythema or exudates noted. , LUNG: clear to auscultation bilaterally, HEART: regular rate and rhythm, S1, S2 normal, no murmur, click, rub or gallop, ABDOMEN: soft, non-tender.  Bowel sounds normal. No masses,  no organomegaly    Labs: No results found for this or any previous visit (from the past 24 hour(s)). Data Review:    CBC:   Lab Results   Component Value Date/Time    WBC 8.4 08/14/2018 02:34 AM    RBC 4.29 08/14/2018 02:34 AM    HGB 11.6 (L) 08/14/2018 02:34 AM    HCT 34.8 (L) 08/14/2018 02:34 AM    PLATELET 573 19/14/2245 02:34 AM      BMP:   Lab Results   Component Value Date/Time    Glucose 51 (LL) 08/14/2018 02:34 AM    Sodium 138 08/14/2018 02:34 AM    Potassium 3.4 (L) 08/14/2018 02:34 AM    Chloride 103 08/14/2018 02:34 AM    CO2 25 08/14/2018 02:34 AM    BUN 24 (H) 08/14/2018 02:34 AM    Creatinine 4.36 (H) 08/14/2018 02:34 AM    Calcium 8.3 (L) 08/14/2018 02:34 AM     Coagulation: No results found for: PTP, INR, APTT    Assessment:     Active Problems:    * No active hospital problems. *      Plan:     permcath exchange.     Signed By: Nano Baez MD     August 16, 2018

## 2018-08-16 NOTE — IP AVS SNAPSHOT
[facility] 
 
 
 [address] Patient: Matthew Prieto MRN: [id number] About your hospitalization You were admitted on:  August 16, 2018 You last received care in the:  [facility] CATH LAB You were discharged on:  August 16, 2018 Why you were hospitalized Your primary diagnosis was:  Not on File Follow-up Information Follow up With Details Comments Contact Info Evaristo Romo MD   [address] [facility] [place] [address] 
[phone] Sree Camara MD Follow up in 5 day(s) follow up [address] Christophe JOSE 
[facility]  [facility] 
[phone] Your Scheduled Appointments Thursday August 16, 2018 FLUORO GUIDED VASCULAR ACCESS DEVICE PLACEMENT with Sree Camara MD  
[facility] CARD[facility]AC [facility]ATH [facility] [address] [place] [address]) [address], [address], [address] Tuesday August 28, 2018  2:45 PM EDT PROCEDURE with [facility] 1 [facility] ([address] [place] [address]) [address] [facility] [address] [facility]  
[phone] Thursday September 13, 2018 10:00 AM EDT HOSPITAL DISCHARGE with [facility] ([address] [place] [address]) [address] [facility] [address] 200 [facility]  
[phone] Discharge Orders None A check raz indicates which time of day the medication should be taken. My Medications CONTINUE taking these medications Instructions Each Dose to Equal  
 Morning Noon Evening Bedtime  
 aspirin 81 mg tablet Your last dose was: Your next dose is: Take 81 mg by mouth daily. 81 mg CARDIZEM  mg ER capsule Generic drug:  dilTIAZem CD Your last dose was: Your next dose is: Take 240 mg by mouth daily. 240 mg  
    
   
   
   
  
 CATAPRES 0.1 mg tablet Generic drug:  cloNIDine HCl Your last dose was: Your next dose is: Take 0.1 mg by mouth two (2) times a day. 0.1 mg  
    
   
   
   
  
 doxercalciferol 4 mcg/2 mL injection Commonly known as:  Lo Parshall Your last dose was: Your next dose is: 0.5 mL by IntraVENous route Every Tuesday, Thursday and Saturday. 1 mcg  
    
   
   
   
  
 epoetin raphael 3,000 unit/mL injection Commonly known as:  EPOGEN;PROCRIT Your last dose was: Your next dose is:    
   
   
 1.73 mL by SubCUTAneous route Every Tuesday, Thursday and Saturday. Indications: ANEMIA DUE TO RENAL FAILURE, Renal Dialysis 5200 Units  
    
   
   
   
  
 hydrALAZINE 50 mg tablet Commonly known as:  APRESOLINE Your last dose was: Your next dose is: Take 1 Tab by mouth three (3) times daily. 50 mg  
    
   
   
   
  
 insulin glargine 100 unit/mL injection Commonly known as:  LANTUS Your last dose was: Your next dose is:    
   
   
 10 Units by SubCUTAneous route nightly. 10 Units  
    
   
   
   
  
 insulin lispro 100 unit/mL injection Commonly known as:  HUMALOG Your last dose was: Your next dose is: INITIATE INSULIN CORRECTIVE PROTOCOL: Normal Insulin Sensitivity  For Blood Sugar (mg/dL) of:    Less than 150 =   0 units          150 -199 =   2 units 200 -249 =   4 units 250 -299 =   6 units 300 -349 =   8 units 350 and above = 10 units and Call Physician If 2 glucose readings are above  
     
   
   
   
  
 losartan 100 mg tablet Commonly known as:  COZAAR Your last dose was: Your next dose is: Take 1 Tab by mouth daily. 100 mg [place] MILK OF MAGNESIA 400 mg/5 mL suspension Generic drug:  magnesium hydroxide Your last dose was: Your next dose is: Take 30 mL by mouth daily as needed. 30 mL  
    
   
   
   
  
 potassium chloride 20 mEq packet Commonly known as:  KLOR-CON Your last dose was: Your next dose is: Take 1 Packet by mouth two (2) times daily (with meals). 20 mEq  
    
   
   
   
  
 simvastatin 20 mg tablet Commonly known as:  ZOCOR Your last dose was: Your next dose is: Take 1 Tab by mouth nightly. 20 mg  
    
   
   
   
  
 TYLENOL 325 mg tablet Generic drug:  acetaminophen Your last dose was: Your next dose is: Take 325 mg by mouth as needed. 325 mg Discharge Instructions [address] Tunneled or Non Tunneled Catheter Discharge Instructions General Information: A catheter, either tunneled (permanent) or non-tunneled (temporary) catheter was inserted into your neck today for the purpose of Cancer treatment (apheresis) or dialysis. Your catheter will be used for the length of your apheresis, or until you get a fistula placed in surgery for dialysis. After this time, your catheter may be removed. You may return to our department for the catheter removal.  A non-tunneled catheter will exit at the neck. There will be three ports, two of which will be used for dialysis or apheresis. The one smaller port in the middle can be used like a regular IV. It ideally is used only for about two weeks. A tunneled catheter will exit lower down on the chest wall, and can be used for a longer period of time. There is no IV port on these. The tunneled catheter is used only for dialysis.   In case of emergencies only can drugs be given through these catheters and only with written permission from your doctor. Home Care Instructions: You can resume your regular diet. Do not drink alcohol, drive, or make any important legal decisions in the next 24 hours. Watch the site carefully for signs of infection, like fever, drainage, redness, and/or swelling. Your catheter should be \"packed\" with heparin after each use or at least once a week if it is not being used. This \"packing\" should only be done by nurses experienced with caring for this type of catheter. You may shower in 24 hours, but you need to cover the catheter with plastic wrap and tape to keep it dry while you are showering. Keep the site clean, dry, and protected. Do not immerse yourself in water like in the case of tub baths or swimming as long as you have the catheter. Call If: 
 You should call your Physician and/or the Radiology Nurse if you have any signs of infection, shortness of breath, or if the dressing should come off or become moist.  You will be instructed on where to go for a new dressing. You should not have to change the dressings yourself, as that will be done by the nurses who access the catheter. Follow-Up Instructions:  Please see your ordering doctor as he/she has requested. DISCHARGE SUMMARY from Nurse PATIENT INSTRUCTIONS: 
 
After general anesthesia or intravenous sedation, for 24 hours or while taking prescription Narcotics: · Limit your activities · Do not drive and operate hazardous machinery · Do not make important personal or business decisions · Do  not drink alcoholic beverages · If you have not urinated within 8 hours after discharge, please contact your surgeon on call. Report the following to your surgeon: 
· Excessive pain, swelling, redness or odor of or around the surgical area · Temperature over 100.5 · Nausea and vomiting lasting longer than 4 hours or if unable to take medications · Any signs of decreased circulation or nerve impairment to extremity: change in color, persistent  numbness, tingling, coldness or increase pain · Any questions What to do at Home: *  Please give a list of your current medications to your Primary Care Provider. *  Please update this list whenever your medications are discontinued, doses are 
    changed, or new medications (including over-the-counter products) are added. *  Please carry medication information at all times in case of emergency situations. These are general instructions for a healthy lifestyle: No smoking/ No tobacco products/ Avoid exposure to second hand smoke Surgeon General's Warning:  Quitting smoking now greatly reduces serious risk to your health. Obesity, smoking, and sedentary lifestyle greatly increases your risk for illness A healthy diet, regular physical exercise & weight monitoring are important for maintaining a healthy lifestyle You may be retaining fluid if you have a history of heart failure or if you experience any of the following symptoms:  Weight gain of 3 pounds or more overnight or 5 pounds in a week, increased swelling in our hands or feet or shortness of breath while lying flat in bed. Please call your doctor as soon as you notice any of these symptoms; do not wait until your next office visit. Recognize signs and symptoms of STROKE: 
 
F-face looks uneven A-arms unable to move or move unevenly S-speech slurred or non-existent T-time-call 911 as soon as signs and symptoms begin-DO NOT go Back to bed or wait to see if you get better-TIME IS BRAIN. Warning Signs of HEART ATTACK Call 911 if you have these symptoms: 
? Chest discomfort. Most heart attacks involve discomfort in the center of the chest that lasts more than a few minutes, or that goes away and comes back. It can feel like uncomfortable pressure, squeezing, fullness, or pain. ? Discomfort in other areas of the upper body. Symptoms can include pain or discomfort in one or both arms, the back, neck, jaw, or stomach. ? Shortness of breath with or without chest discomfort. ? Other signs may include breaking out in a cold sweat, nausea, or lightheadedness. Don't wait more than five minutes to call [address]! Fast action can save your life. Calling 911 is almost always the fastest way to get lifesaving treatment. Emergency Medical Services staff can begin treatment when they arrive  up to an hour sooner than if someone gets to the hospital by car. The discharge information has been reviewed with the patient and caregiver. The patient and caregiver verbalized understanding. Discharge medications reviewed with the patient and caregiver and appropriate educational materials and side effects teaching were provided. ___________________________________________________________________________________________________________________________________ To Reach Us:  
 
Patient Signature: 
Date: 8/16/2018 Discharging Nurse: Alberta Espinal RN Introducing [facility]! Nicolasa Mahajan introduces [employer] patient portal. Now you can access parts of your medical record, email your doctor's office, and request medication refills online. 1. In your internet browser, go to [email or web address] 2. Click on the First Time User? Click Here link in the Sign In box. You will see the New Member Sign Up page. 3. Enter your [employer] Access Code exactly as it appears below. You will not need to use this code after youve completed the sign-up process. If you do not sign up before the expiration date, you must request a new code. · [employer] Access Code: [facility] Expires: 11/4/2018  9:54 PM 
 
4. Enter the last four digits of your Social Security Number (xxxx) and Date of Birth (mm/dd/yyyy) as indicated and click Submit.  You will be taken to the next sign-up page. 5. Create a [employer] ID. This will be your [employer] login ID and cannot be changed, so think of one that is secure and easy to remember. 6. Create a [employer] password. You can change your password at any time. 7. Enter your Password Reset Question and Answer. This can be used at a later time if you forget your password. 8. Enter your e-mail address. You will receive e-mail notification when new information is available in [address] 9. Click Sign Up. You can now view and download portions of your medical record. 10. Click the Download Summary menu link to download a portable copy of your medical information. If you have questions, please visit the Frequently Asked Questions section of the [employer] website. Remember, [employer] is NOT to be used for urgent needs. For medical emergencies, dial 911. Now available from your iPhone and Android! Introducing Lucius Trent As a [facility] patient, I wanted to make you aware of our electronic visit tool called Lucius Trent. [facility] 24/7 allows you to connect within minutes with a medical provider 24 hours a day, seven days a week via a mobile device or tablet or logging into a secure website from your computer. You can access Lucius Trent from anywhere in the [place]. A virtual visit might be right for you when you have a simple condition and feel like you just dont want to get out of bed, or cant get away from work for an appointment, when your regular [facility] provider is not available (evenings, weekends or holidays), or when youre out of town and need minor care. Electronic visits cost only $49 and if the [facility] 24/7 provider determines a prescription is needed to treat your condition, one can be electronically transmitted to a nearby pharmacy*. Please take a moment to enroll today if you have not already done so.   The enrollment process is free and takes just a few minutes. To enroll, please download the [address] 24/7 lisa to your tablet or phone, or visit [email or web address] to enroll on your computer. And, as an [address] [place] [address] patient with a [employer] account, the results of your visits will be scanned into your electronic medical record and your primary care provider will be able to view the scanned results. We urge you to continue to see your regular [facility] provider for your ongoing medical care. And while your primary care provider may not be the one available when you seek a [employer] virtual visit, the peace of mind you get from getting a real diagnosis real time can be priceless. For more information on [employer], view our Frequently Asked Questions (FAQs) at [email or web address]. Sincerely, 
 
He Ferris MD 
Chief Medical Officer Chris Zaria Watson *:  certain medications cannot be prescribed via [employer] Providers Seen During Your Hospitalization Provider Specialty Primary office phone Divya Coughlin MD Vascular Surgery [phone] Your Primary Care Physician (PCP) Primary Care Physician Office Phone Office Fax Margarita Reasons [phone] You are allergic to the following Allergen Reactions Amoxicillin Unknown (comments) Cleocin (Clindamycin Hcl) Unknown (comments) Codeine Unknown (comments) Lorcet 10/650 (Hydrocodone-Acetaminophen) Unknown (comments) Penicillin G Benzathine Unknown (comments) Shellfish Derived Unknown (comments) Recent Documentation Smoking Status Never Smoker Emergency Contacts Name Discharge Info Relation Home Work Mobile [employer] Moo[employer] CAREGIVER [3] Other Relative [6] [phone] Patient Belongings The following personal items are in your possession at time of discharge: 
                             
 
  
  
 Please provide this summary of care documentation to your next provider. Signatures-by signing, you are acknowledging that this After Visit Summary has been reviewed with you and you have received a copy. Patient Signature:  ____________________________________________________________ Date:  ____________________________________________________________  
  
[facility] Provider Signature:  ____________________________________________________________ Date:  ____________________________________________________________

## 2018-08-16 NOTE — Clinical Note
Right neck prepped with ChloraPrep and draped. Wet prep solution applied at: 1316. Wet prep solution dried at: 1319. Wet prep elapsed drying time: 3 mins.

## 2018-08-16 NOTE — DISCHARGE INSTRUCTIONS
[address] Tunneled or Non Tunneled Catheter Discharge Instructions    General Information:   A catheter, either tunneled (permanent) or non-tunneled (temporary) catheter was inserted into your neck today for the purpose of Cancer treatment (apheresis) or dialysis. Your catheter will be used for the length of your apheresis, or until you get a fistula placed in surgery for dialysis. After this time, your catheter may be removed. You may return to our department for the catheter removal.  A non-tunneled catheter will exit at the neck. There will be three ports, two of which will be used for dialysis or apheresis. The one smaller port in the middle can be used like a regular IV. It ideally is used only for about two weeks. A tunneled catheter will exit lower down on the chest wall, and can be used for a longer period of time. There is no IV port on these. The tunneled catheter is used only for dialysis. In case of emergencies only can drugs be given through these catheters and only with written permission from your doctor. Home Care Instructions: You can resume your regular diet. Do not drink alcohol, drive, or make any important legal decisions in the next 24 hours. Watch the site carefully for signs of infection, like fever, drainage, redness, and/or swelling. Your catheter should be \"packed\" with heparin after each use or at least once a week if it is not being used. This \"packing\" should only be done by nurses experienced with caring for this type of catheter. You may shower in 24 hours, but you need to cover the catheter with plastic wrap and tape to keep it dry while you are showering. Keep the site clean, dry, and protected. Do not immerse yourself in water like in the case of tub baths or swimming as long as you have the catheter.      Call If:   You should call your Physician and/or the Radiology Nurse if you have any signs of infection, shortness of breath, or if the dressing should come off or become moist.  You will be instructed on where to go for a new dressing. You should not have to change the dressings yourself, as that will be done by the nurses who access the catheter. Follow-Up Instructions:  Please see your ordering doctor as he/she has requested. DISCHARGE SUMMARY from Nurse    PATIENT INSTRUCTIONS:    After general anesthesia or intravenous sedation, for 24 hours or while taking prescription Narcotics:  · Limit your activities  · Do not drive and operate hazardous machinery  · Do not make important personal or business decisions  · Do  not drink alcoholic beverages  · If you have not urinated within 8 hours after discharge, please contact your surgeon on call. Report the following to your surgeon:  · Excessive pain, swelling, redness or odor of or around the surgical area  · Temperature over 100.5  · Nausea and vomiting lasting longer than 4 hours or if unable to take medications  · Any signs of decreased circulation or nerve impairment to extremity: change in color, persistent  numbness, tingling, coldness or increase pain  · Any questions    What to do at Home:    *  Please give a list of your current medications to your Primary Care Provider. *  Please update this list whenever your medications are discontinued, doses are      changed, or new medications (including over-the-counter products) are added. *  Please carry medication information at all times in case of emergency situations. These are general instructions for a healthy lifestyle:    No smoking/ No tobacco products/ Avoid exposure to second hand smoke  Surgeon General's Warning:  Quitting smoking now greatly reduces serious risk to your health.     Obesity, smoking, and sedentary lifestyle greatly increases your risk for illness    A healthy diet, regular physical exercise & weight monitoring are important for maintaining a healthy lifestyle    You may be retaining fluid if you have a history of heart failure or if you experience any of the following symptoms:  Weight gain of 3 pounds or more overnight or 5 pounds in a week, increased swelling in our hands or feet or shortness of breath while lying flat in bed. Please call your doctor as soon as you notice any of these symptoms; do not wait until your next office visit. Recognize signs and symptoms of STROKE:    F-face looks uneven    A-arms unable to move or move unevenly    S-speech slurred or non-existent    T-time-call 911 as soon as signs and symptoms begin-DO NOT go       Back to bed or wait to see if you get better-TIME IS BRAIN. Warning Signs of HEART ATTACK     Call 911 if you have these symptoms:   Chest discomfort. Most heart attacks involve discomfort in the center of the chest that lasts more than a few minutes, or that goes away and comes back. It can feel like uncomfortable pressure, squeezing, fullness, or pain.  Discomfort in other areas of the upper body. Symptoms can include pain or discomfort in one or both arms, the back, neck, jaw, or stomach.  Shortness of breath with or without chest discomfort.  Other signs may include breaking out in a cold sweat, nausea, or lightheadedness. Don't wait more than five minutes to call 911 - MINUTES MATTER! Fast action can save your life. Calling 911 is almost always the fastest way to get lifesaving treatment. Emergency Medical Services staff can begin treatment when they arrive -- up to an hour sooner than if someone gets to the hospital by car. The discharge information has been reviewed with the patient and caregiver. The patient and caregiver verbalized understanding. Discharge medications reviewed with the patient and caregiver and appropriate educational materials and side effects teaching were provided.   ___________________________________________________________________________________________________________________________________    To Reach Us: Patient Signature:  Date: 8/16/2018  Discharging Nurse: Eros Childress RN

## 2018-08-17 NOTE — OP NOTES
[facility] REPORT    Tomas Saeed  MR#: [id number]  : 1955  ACCOUNT #: [de-identified]   DATE OF SERVICE: 2018    PREOPERATIVE DIAGNOSIS:  End-stage renal disease with poorly working catheter. POSTOPERATIVE DIAGNOSIS:  End-stage renal disease with poorly working catheter. PROCEDURE PERFORMED:  Permanent dialysis catheter exchange over wire, 19 cm Palindrome catheter through right IJ approach. SURGEON:  Partha Dunne MD     CULTURES:  None. SPECIMENS REMOVED:  None. DRAINS:  None. ESTIMATED BLOOD LOSS:  Less than 50 mL. ANESTHESIA:  Moderate conscious sedation 5 minutes along with local anesthetic. COMPLICATIONS:  None. ASSISTANTS:  None. IMPLANTS:  As above. INDICATION FOR THE PROCEDURE:  The patient is a  12-year-old female with a right IJ permanent tunneled dialysis catheter poorly working and need of new one. The patient was given risks and benefits of the procedure including but not limited to bleeding, infection, damage to adjacent structures, MI, stroke, and death as well as loss of access and need for further surgery. Patient was understanding of all the risks and underwent the procedure. PROCEDURE:  The patient was correctly identified in the precath area and taken to the cath lab in stable condition. The patient had a preincision timeout per anesthesia. The patient was prepped and draped in normal sterile fashion according to CDC guidelines for aseptic technique. We then were able to numb her up appropriately using 1% lidocaine from the catheter insertion and all the way to the neck. We then took an Amplatz Super Stiff wire down the previous port of the previous catheter, removed it out over a wire, placed a new 19 cm Palindrome catheter in. We then secured the new catheter with a 2-0 Prolene suture. I put the butterfly holes and the catheter insertion site. Biopatch and Tegaderm were placed.   Both ports were easily flushed and aspirated. A 1.6 mm of hot heparin were placed in each pleural cavity and wrapped with a 4 x 4. X-ray was taken. The tip of the catheter was at the sinoatrial junction. There was good curvature of the catheter without any kinking and no pneumothorax. The patient tolerated the procedure well without any issues.       MD PATRICIA Woods / AMY  D: 08/16/2018 14:32     T: 08/16/2018 21:05  JOB #: [id number]

## 2018-08-27 ENCOUNTER — HOSPITAL ENCOUNTER (INPATIENT)
Age: 63
LOS: 4 days | Discharge: NURSING, REHAB OR LONG-TERM CARE | DRG: 100 | End: 2018-08-31
Attending: EMERGENCY MEDICINE | Admitting: FAMILY MEDICINE
Payer: MEDICARE

## 2018-08-27 ENCOUNTER — APPOINTMENT (OUTPATIENT)
Dept: CT IMAGING | Age: 63
DRG: 100 | End: 2018-08-27
Attending: EMERGENCY MEDICINE
Payer: MEDICARE

## 2018-08-27 ENCOUNTER — APPOINTMENT (OUTPATIENT)
Dept: GENERAL RADIOLOGY | Age: 63
DRG: 100 | End: 2018-08-27
Attending: EMERGENCY MEDICINE
Payer: MEDICARE

## 2018-08-27 DIAGNOSIS — D72.825 BANDEMIA: ICD-10-CM

## 2018-08-27 DIAGNOSIS — N18.6 ESRD ON DIALYSIS (HCC): ICD-10-CM

## 2018-08-27 DIAGNOSIS — R65.20 SEVERE SEPSIS (HCC): ICD-10-CM

## 2018-08-27 DIAGNOSIS — R56.9 NEW ONSET SEIZURE (HCC): Primary | ICD-10-CM

## 2018-08-27 DIAGNOSIS — Z99.2 ESRD ON DIALYSIS (HCC): ICD-10-CM

## 2018-08-27 DIAGNOSIS — A41.9 SEVERE SEPSIS (HCC): ICD-10-CM

## 2018-08-27 LAB
ALBUMIN SERPL-MCNC: 2.3 G/DL (ref 3.4–5)
ALBUMIN/GLOB SERPL: 0.6 {RATIO} (ref 0.8–1.7)
ALP SERPL-CCNC: 120 U/L (ref 45–117)
ALT SERPL-CCNC: 17 U/L (ref 13–56)
ANION GAP SERPL CALC-SCNC: 10 MMOL/L (ref 3–18)
APPEARANCE FLD: CLEAR
AST SERPL-CCNC: 18 U/L (ref 15–37)
BASOPHILS # BLD: 0 K/UL (ref 0–0.06)
BASOPHILS NFR BLD: 0 % (ref 0–3)
BILIRUB SERPL-MCNC: 0.3 MG/DL (ref 0.2–1)
BUN SERPL-MCNC: 18 MG/DL (ref 7–18)
BUN/CREAT SERPL: 4 (ref 12–20)
CA-I SERPL-SCNC: 1.08 MMOL/L (ref 1.12–1.32)
CALCIUM SERPL-MCNC: 8 MG/DL (ref 8.5–10.1)
CHLORIDE SERPL-SCNC: 105 MMOL/L (ref 100–108)
CO2 SERPL-SCNC: 24 MMOL/L (ref 21–32)
COLOR FLD: COLORLESS
CREAT SERPL-MCNC: 4.27 MG/DL (ref 0.6–1.3)
DIFFERENTIAL METHOD BLD: ABNORMAL
EOSINOPHIL # BLD: 0 K/UL (ref 0–0.4)
EOSINOPHIL NFR BLD: 0 % (ref 0–5)
ERYTHROCYTE [DISTWIDTH] IN BLOOD BY AUTOMATED COUNT: 15.7 % (ref 11.6–14.5)
GLOBULIN SER CALC-MCNC: 4 G/DL (ref 2–4)
GLUCOSE BLD STRIP.AUTO-MCNC: 210 MG/DL (ref 70–110)
GLUCOSE CSF-MCNC: 113 MG/DL (ref 40–70)
GLUCOSE SERPL-MCNC: 240 MG/DL (ref 74–99)
HCT VFR BLD AUTO: 36 % (ref 35–45)
HGB BLD-MCNC: 11.5 G/DL (ref 12–16)
LACTATE BLD-SCNC: 0.9 MMOL/L (ref 0.4–2)
LACTATE BLD-SCNC: 2.6 MMOL/L (ref 0.4–2)
LYMPHOCYTES # BLD: 1.6 K/UL (ref 0.8–3.5)
LYMPHOCYTES NFR BLD: 6 % (ref 20–51)
MAGNESIUM SERPL-MCNC: 1.8 MG/DL (ref 1.6–2.6)
MCH RBC QN AUTO: 27.5 PG (ref 24–34)
MCHC RBC AUTO-ENTMCNC: 31.9 G/DL (ref 31–37)
MCV RBC AUTO: 86.1 FL (ref 74–97)
METAMYELOCYTES NFR BLD MANUAL: 1 %
MONOCYTES # BLD: 0.5 K/UL (ref 0–1)
MONOCYTES NFR BLD: 2 % (ref 2–9)
NEUTS BAND NFR BLD MANUAL: 11 % (ref 0–5)
NEUTS SEG # BLD: 23.8 K/UL (ref 1.8–8)
NEUTS SEG NFR BLD: 80 % (ref 42–75)
NUC CELL # FLD: 0 /CU MM
PHOSPHATE SERPL-MCNC: 3.2 MG/DL (ref 2.5–4.9)
PLATELET # BLD AUTO: 199 K/UL (ref 135–420)
PLATELET COMMENTS,PCOM: ABNORMAL
PMV BLD AUTO: 11.1 FL (ref 9.2–11.8)
POTASSIUM SERPL-SCNC: 3.9 MMOL/L (ref 3.5–5.5)
PROT CSF-MCNC: 31 MG/DL (ref 15–45)
PROT SERPL-MCNC: 6.3 G/DL (ref 6.4–8.2)
RBC # BLD AUTO: 4.18 M/UL (ref 4.2–5.3)
RBC # FLD: 3 /CU MM
RBC MORPH BLD: ABNORMAL
RBC MORPH BLD: ABNORMAL
SODIUM SERPL-SCNC: 139 MMOL/L (ref 136–145)
SPECIMEN SOURCE FLD: ABNORMAL
TUBE # CSF: 3
TUBE # CSF: 3
WBC # BLD AUTO: 26.2 K/UL (ref 4.6–13.2)

## 2018-08-27 PROCEDURE — 80053 COMPREHEN METABOLIC PANEL: CPT | Performed by: EMERGENCY MEDICINE

## 2018-08-27 PROCEDURE — 96375 TX/PRO/DX INJ NEW DRUG ADDON: CPT

## 2018-08-27 PROCEDURE — 83735 ASSAY OF MAGNESIUM: CPT | Performed by: EMERGENCY MEDICINE

## 2018-08-27 PROCEDURE — 85025 COMPLETE CBC W/AUTO DIFF WBC: CPT | Performed by: EMERGENCY MEDICINE

## 2018-08-27 PROCEDURE — 96368 THER/DIAG CONCURRENT INF: CPT

## 2018-08-27 PROCEDURE — 82330 ASSAY OF CALCIUM: CPT | Performed by: EMERGENCY MEDICINE

## 2018-08-27 PROCEDURE — 009U3ZX DRAINAGE OF SPINAL CANAL, PERCUTANEOUS APPROACH, DIAGNOSTIC: ICD-10-PCS | Performed by: EMERGENCY MEDICINE

## 2018-08-27 PROCEDURE — 84100 ASSAY OF PHOSPHORUS: CPT | Performed by: EMERGENCY MEDICINE

## 2018-08-27 PROCEDURE — 74011250637 HC RX REV CODE- 250/637: Performed by: FAMILY MEDICINE

## 2018-08-27 PROCEDURE — 96361 HYDRATE IV INFUSION ADD-ON: CPT

## 2018-08-27 PROCEDURE — 87070 CULTURE OTHR SPECIMN AEROBIC: CPT

## 2018-08-27 PROCEDURE — 65660000000 HC RM CCU STEPDOWN

## 2018-08-27 PROCEDURE — 89051 BODY FLUID CELL COUNT: CPT

## 2018-08-27 PROCEDURE — 87252 VIRUS INOCULATION TISSUE: CPT

## 2018-08-27 PROCEDURE — 84157 ASSAY OF PROTEIN OTHER: CPT

## 2018-08-27 PROCEDURE — 99284 EMERGENCY DEPT VISIT MOD MDM: CPT

## 2018-08-27 PROCEDURE — 87040 BLOOD CULTURE FOR BACTERIA: CPT | Performed by: EMERGENCY MEDICINE

## 2018-08-27 PROCEDURE — 70450 CT HEAD/BRAIN W/O DYE: CPT

## 2018-08-27 PROCEDURE — 74011250636 HC RX REV CODE- 250/636: Performed by: EMERGENCY MEDICINE

## 2018-08-27 PROCEDURE — 83605 ASSAY OF LACTIC ACID: CPT

## 2018-08-27 PROCEDURE — 82962 GLUCOSE BLOOD TEST: CPT

## 2018-08-27 PROCEDURE — 71045 X-RAY EXAM CHEST 1 VIEW: CPT

## 2018-08-27 PROCEDURE — 74011250636 HC RX REV CODE- 250/636: Performed by: STUDENT IN AN ORGANIZED HEALTH CARE EDUCATION/TRAINING PROGRAM

## 2018-08-27 PROCEDURE — 82945 GLUCOSE OTHER FLUID: CPT

## 2018-08-27 PROCEDURE — 74011000258 HC RX REV CODE- 258: Performed by: EMERGENCY MEDICINE

## 2018-08-27 RX ORDER — LIDOCAINE HYDROCHLORIDE 10 MG/ML
10 INJECTION, SOLUTION EPIDURAL; INFILTRATION; INTRACAUDAL; PERINEURAL ONCE
Status: COMPLETED | OUTPATIENT
Start: 2018-08-27 | End: 2018-08-27

## 2018-08-27 RX ORDER — DOXERCALCIFEROL 4 UG/2ML
1 INJECTION INTRAVENOUS
Status: DISCONTINUED | OUTPATIENT
Start: 2018-08-28 | End: 2018-08-28

## 2018-08-27 RX ORDER — DEXTROSE 50 % IN WATER (D50W) INTRAVENOUS SYRINGE
25-50 AS NEEDED
Status: DISCONTINUED | OUTPATIENT
Start: 2018-08-27 | End: 2018-08-31 | Stop reason: HOSPADM

## 2018-08-27 RX ORDER — DILTIAZEM HYDROCHLORIDE 240 MG/1
240 CAPSULE, COATED, EXTENDED RELEASE ORAL DAILY
Status: DISCONTINUED | OUTPATIENT
Start: 2018-08-28 | End: 2018-08-31 | Stop reason: HOSPADM

## 2018-08-27 RX ORDER — HYDRALAZINE HYDROCHLORIDE 50 MG/1
50 TABLET, FILM COATED ORAL EVERY 8 HOURS
Status: DISCONTINUED | OUTPATIENT
Start: 2018-08-27 | End: 2018-08-31 | Stop reason: HOSPADM

## 2018-08-27 RX ORDER — CLONIDINE HYDROCHLORIDE 0.1 MG/1
0.1 TABLET ORAL 2 TIMES DAILY
Status: DISCONTINUED | OUTPATIENT
Start: 2018-08-27 | End: 2018-08-27

## 2018-08-27 RX ORDER — LOSARTAN POTASSIUM 50 MG/1
100 TABLET ORAL DAILY
Status: DISCONTINUED | OUTPATIENT
Start: 2018-08-28 | End: 2018-08-31 | Stop reason: HOSPADM

## 2018-08-27 RX ORDER — SIMVASTATIN 20 MG/1
20 TABLET, FILM COATED ORAL
Status: DISCONTINUED | OUTPATIENT
Start: 2018-08-27 | End: 2018-08-31 | Stop reason: HOSPADM

## 2018-08-27 RX ORDER — MAGNESIUM SULFATE 100 %
16 CRYSTALS MISCELLANEOUS AS NEEDED
Status: DISCONTINUED | OUTPATIENT
Start: 2018-08-27 | End: 2018-08-31 | Stop reason: HOSPADM

## 2018-08-27 RX ORDER — LEVETIRACETAM 5 MG/ML
500 INJECTION INTRAVASCULAR EVERY 12 HOURS
Status: DISCONTINUED | OUTPATIENT
Start: 2018-08-27 | End: 2018-08-28

## 2018-08-27 RX ORDER — CLONIDINE HYDROCHLORIDE 0.1 MG/1
0.1 TABLET ORAL EVERY 12 HOURS
Status: DISCONTINUED | OUTPATIENT
Start: 2018-08-27 | End: 2018-08-31 | Stop reason: HOSPADM

## 2018-08-27 RX ORDER — INSULIN LISPRO 100 [IU]/ML
INJECTION, SOLUTION INTRAVENOUS; SUBCUTANEOUS
Status: DISCONTINUED | OUTPATIENT
Start: 2018-08-27 | End: 2018-08-31 | Stop reason: HOSPADM

## 2018-08-27 RX ORDER — HYDRALAZINE HYDROCHLORIDE 50 MG/1
50 TABLET, FILM COATED ORAL 3 TIMES DAILY
Status: DISCONTINUED | OUTPATIENT
Start: 2018-08-27 | End: 2018-08-27

## 2018-08-27 RX ORDER — ACETAMINOPHEN 325 MG/1
325 TABLET ORAL
Status: DISCONTINUED | OUTPATIENT
Start: 2018-08-27 | End: 2018-08-31 | Stop reason: HOSPADM

## 2018-08-27 RX ADMIN — SIMVASTATIN 20 MG: 20 TABLET, FILM COATED ORAL at 21:27

## 2018-08-27 RX ADMIN — CLONIDINE HYDROCHLORIDE 0.1 MG: 0.1 TABLET ORAL at 21:27

## 2018-08-27 RX ADMIN — VANCOMYCIN HYDROCHLORIDE 1750 MG: 10 INJECTION, POWDER, LYOPHILIZED, FOR SOLUTION INTRAVENOUS at 18:57

## 2018-08-27 RX ADMIN — SODIUM CHLORIDE 2 G: 900 INJECTION INTRAVENOUS at 18:34

## 2018-08-27 RX ADMIN — LIDOCAINE HYDROCHLORIDE 10 ML: 10 INJECTION, SOLUTION EPIDURAL; INFILTRATION; INTRACAUDAL; PERINEURAL at 20:06

## 2018-08-27 RX ADMIN — LEVETIRACETAM 500 MG: 5 INJECTION INTRAVENOUS at 18:34

## 2018-08-27 RX ADMIN — HYDRALAZINE HYDROCHLORIDE 50 MG: 50 TABLET, FILM COATED ORAL at 21:27

## 2018-08-27 NOTE — H&P
History and Physical    Patient: Man Cortes MRN: [id number]  SSN: [id number]    YOB: 1955  Age: 70 y.o. Sex: female      Subjective: Man Cortes is a 70 y.o. female who had witnessed seizures at dialysis today. Patient denies fever but is poor historian. Patient sent to ER, No further seizures but elevated WBC. Admit for possible infection, new seizures and concern for meningitis. Past Medical History:   Diagnosis Date    Asthma     Bipolar affective disorder ([facility] [address]     Brain condition     Cataract     Chronic kidney disease     Diabetes ([facility] [address]     Hyperlipidemia     Hypertension     Paralytic strabismus, unspecified     Psychiatric disorder      Past Surgical History:   Procedure Laterality Date    MA INSJ TUNNELED CVC W/O SUBQ PORT/ AGE 3 YR/> N/A 8/9/2018     in-pt [address]A, Insertion Tunneled Central Venous Catheter performed by Lencho Haney MD at [facility]      History reviewed. No pertinent family history. Social History   Substance Use Topics    Smoking status: Never Smoker    Smokeless tobacco: Never Used    Alcohol use No      Prior to Admission medications    Medication Sig Start Date End Date Taking? Authorizing Provider   hydrALAZINE (APRESOLINE) 50 mg tablet Take 1 Tab by mouth three (3) times daily. 8/14/18   Amna iKrk MD   simvastatin (ZOCOR) 20 mg tablet Take 1 Tab by mouth nightly. 8/14/18   Amna Kirk MD   potassium chloride (KLOR-CON) 20 mEq packet Take 1 Packet by mouth two (2) times daily (with meals). 8/14/18   Amna Kirk MD   doxercalciferol (HECTOROL) 4 mcg/2 mL injection 0.5 mL by IntraVENous route Every Tuesday, Thursday and Saturday. 8/14/18   Amna Kirk MD   epoetin raphael (EPOGEN;PROCRIT) 3,000 unit/mL injection 1.73 mL by SubCUTAneous route Every Tuesday, Thursday and Saturday.  Indications: ANEMIA DUE TO RENAL FAILURE, Renal Dialysis 8/14/18   Jody Shetty MD insulin glargine (LANTUS) 100 unit/mL injection 10 Units by SubCUTAneous route nightly. 8/14/18   Anaid Kirk MD   insulin lispro (HUMALOG) 100 unit/mL injection INITIATE INSULIN CORRECTIVE PROTOCOL: Normal Insulin Sensitivity   For Blood Sugar (mg/dL) of:     Less than 150 =   0 units           150 -199 =   2 units  200 -249 =   4 units  250 -299 =   6 units  300 -349 =   8 units  350 and above = 10 units and Call Physician  If 2 glucose readings are above 8/14/18   Iva Vivar MD   losartan (COZAAR) 100 mg tablet Take 1 Tab by mouth daily. 8/15/18   Anaid Kirk MD   aspirin 81 mg tablet Take 81 mg by mouth daily. Elina Light MD   diltiazem CD (CARDIZEM CD) 240 mg ER capsule Take 240 mg by mouth daily. Elina Light MD   cloNIDine (CATAPRES) 0.1 mg tablet Take 0.1 mg by mouth two (2) times a day. Elina Light MD   magnesium hydroxide ([place] MILK OF MAGNESIA) 400 mg/5 mL suspension Take 30 mL by mouth daily as needed. Elina Light MD   acetaminophen (TYLENOL) 325 mg tablet Take 325 mg by mouth as needed. Elina Light MD        Allergies   Allergen Reactions    Amoxicillin Unknown (comments)    Cleocin [Clindamycin Hcl] Unknown (comments)    Codeine Unknown (comments)    Lorcet 10/650 [Hydrocodone-Acetaminophen] Unknown (comments)    Penicillin G Benzathine Unknown (comments)    Shellfish Derived Unknown (comments)       Review of Systems:  Review of systems not obtained due to patient factors. Objective:     Vitals:    08/27/18 1708 08/27/18 1709 08/27/18 1715 08/27/18 1716   BP: 169/63  157/62    Pulse:  95 88    Resp:  22 18    Temp:       SpO2:  97% 99%    Weight:    69.6 kg (153 lb 6.4 oz)        Physical Exam:  GENERAL: alert, cooperative, slowed mentation, appears older than stated age  THROAT & NECK: normal, no erythema or exudates noted.   and neck supple and symmetrical.  no nucchal rigidity  LUNG: clear to auscultation bilaterally  HEART: regular rate and rhythm, S1, S2 normal, no murmur, click, rub or gallop  ABDOMEN: soft, non-tender. Bowel sounds normal. No masses,  no organomegaly  EXTREMITIES:  extremities normal, atraumatic, no cyanosis or edema    Assessment:     Hospital Problems  Date Reviewed: 8/27/2018          Codes Class Noted POA    [place] ICD-10-CM: X36.089  ICD-9-CM: 288.66  8/27/2018 Unknown        [facility] ICD-10-CM: R56.9  ICD-9-CM: 780.39  8/27/2018 Unknown        ESRD (end stage renal disease) ([facility] ICD-10-CM: N18.6  ICD-9-CM: 585.6  8/8/2018 Unknown              Plan:     Admit LP  IV antibiotics alessandro.     Signed By: Crystal Longoria MD     August 27, 2018

## 2018-08-27 NOTE — ED PROVIDER NOTES
EMERGENCY DEPARTMENT HISTORY AND PHYSICAL EXAM    4:02 PM      Date: 8/27/2018  Patient Name: Estephanie Peña    History of Presenting Illness     Chief Complaint   Patient presents with    Seizure         History Provided By: EMS    Chief Complaint: Seizure  Duration:  PTA, minutes  Timing: Acute  Location: Generalized  Quality: NA  Severity: Moderate  Modifying Factors: No modifying or aggravating factors were reported. Associated Symptoms: No associated Sx reported      Additional History (Context): 4:03 PM Estephanie Peña is a 70 y.o. female with h/o asthma, diabetes, CKD, HTN, psychiatric disorder, and hyperlipidemia who presents to ED complaining of acute moderate generalized seizures onset PTA. No modifying or aggravating factors were reported. Per EMS, Pt was 2 hours into her 3.5 dialysis appointment when she had 4 seizures, the last of which was tonic clonic. Takes no seizure medicines. Does not know who her dialysis physician is. Denies any further complaints or symptoms at the moment.        PCP: Bobbi Brito MD        Current Facility-Administered Medications   Medication Dose Route Frequency Provider Last Rate Last Dose    [START ON 8/28/2018] aztreonam (AZACTAM) 500 mg in 0.9% sodium chloride 100 mL IVPB  500 mg IntraVENous Kirby Woodruff MD        VANCOMYCIN INFORMATION NOTE   Other CONTINUOUS Genny Rodriguez MD   Stopped at 08/27/18 1810    levETIRAcetam (KEPPRA) 500 mg in saline (iso-osm) 100 ml IVPB  500 mg IntraVENous Q12H Genny Rodriguez MD   Stopped at 08/27/18 1849    acetaminophen (TYLENOL) tablet 325 mg  325 mg Oral Q4H PRN MD Mendoza Luong ON 8/28/2018] dilTIAZem CD (CARDIZEM CD) capsule 240 mg  240 mg Oral DAILY Bobbi Brito MD        [START ON 8/28/2018] doxercalciferol (HECTOROL) 4 mcg/2 mL injection 1 mcg  1 mcg IntraVENous DIALYSIS ALEX MONIQUE & SAT Bobbi Brito MD        [START ON 8/28/2018] epoetin raphael (EPOGEN;PROCRIT) injection 5,200 Units  5,200 Units SubCUTAneous DIALYSIS TUE, THU & SAT Bre Bear MD        [START ON 8/28/2018] losartan (COZAAR) tablet 100 mg  100 mg Oral DAILY Bre Bear MD        simvastatin (ZOCOR) tablet 20 mg  20 mg Oral QHS Bre Bear MD        insulin lispro (HUMALOG) injection   SubCUTAneous AC&HS Bre Bear MD        glucose chewable tablet 16 g  16 g Oral PRN Bre Bear MD        glucagon [facility]) injection 1 mg  1 mg IntraMUSCular PRN Bre Bear MD        dextrose (D50W) injection syrg 12.5-25 g  25-50 mL IntraVENous PRN Bre Bear MD        cloNIDine HCl (CATAPRES) tablet 0.1 mg  0.1 mg Oral Q12H Bre Bear MD        hydrALAZINE (APRESOLINE) tablet 50 mg  50 mg Oral Q8H Bre Bear MD           Past History     Past Medical History:  Past Medical History:   Diagnosis Date    Asthma     [facility]     Brain condition     Cataract     Chronic kidney disease     Diabetes ([facility]     Hyperlipidemia     Hypertension     Paralytic strabismus, unspecified     Psychiatric disorder        Past Surgical History:  Past Surgical History:   Procedure Laterality Date    PA INSJ TUNNELED CVC W/O SUBQ PORT/ AGE 3 YR/> N/A 8/9/2018     in-pt 474A, Insertion Tunneled Central Venous Catheter performed by Rob Arshad MD at [address] [place] [address]       Family History:  History reviewed. No pertinent family history. Social History:  Social History   Substance Use Topics    Smoking status: Never Smoker    Smokeless tobacco: Never Used    Alcohol use No       Allergies:   Allergies   Allergen Reactions    Amoxicillin Unknown (comments)    Cleocin [Clindamycin Hcl] Unknown (comments)    Codeine Unknown (comments)    Lorcet 10/650 [Hydrocodone-Acetaminophen] Unknown (comments)    Penicillin G Benzathine Unknown (comments)    Shellfish Derived Unknown (comments)         Review of Systems       Review of Systems   Unable to perform ROS: Acuity of condition         Physical Exam     Visit Vitals    /70    Pulse 97    Temp 98.5 °F (36.9 °C)    Resp 22    Wt 69.6 kg (153 lb 6.4 oz)    SpO2 99%    BMI 27.17 kg/m2         Physical Exam   Constitutional: She appears well-developed and well-nourished. No distress. HENT:   Head: Normocephalic and atraumatic. Right Ear: External ear normal.   Left Ear: External ear normal.   Nose: Nose normal.   Mouth/Throat: Oropharynx is clear and moist.   Eyes: Conjunctivae are normal. Pupils are equal, round, and reactive to light. No scleral icterus. Chronic strabismus noted    Neck: Normal range of motion. Neck supple. No JVD present. No tracheal deviation present. No thyromegaly present. Cardiovascular: Normal rate, regular rhythm, normal heart sounds and intact distal pulses. Exam reveals no gallop and no friction rub. No murmur heard. Pulmonary/Chest: Effort normal and breath sounds normal. She exhibits no tenderness. HD cath in R chest wall    Abdominal: Soft. Bowel sounds are normal. She exhibits no distension. There is no tenderness. There is no rebound and no guarding. Musculoskeletal: She exhibits no edema or tenderness. No edema    Lymphadenopathy:     She has no cervical adenopathy. Neurological: She is alert. No cranial nerve deficit. Coordination normal.   Follows commands, moving all 4 extremities, gait not observed   Skin: Skin is warm and dry. Psychiatric:   Lives in SNF, no family currently at the bedside    Nursing note and vitals reviewed.         Diagnostic Study Results     Labs -  Recent Results (from the past 12 hour(s))   CBC WITH AUTOMATED DIFF    Collection Time: 08/27/18  4:28 PM   Result Value Ref Range    WBC 26.2 (H) 4.6 - 13.2 K/uL    RBC 4.18 (L) 4.20 - 5.30 M/uL    HGB 11.5 (L) 12.0 - 16.0 g/dL    HCT 36.0 35.0 - 45.0 %    MCV 86.1 74.0 - 97.0 FL    MCH 27.5 24.0 - 34.0 PG    MCHC 31.9 31.0 - 37.0 g/dL    RDW 15.7 (H) 11.6 - 14.5 % PLATELET 172 034 - 817 K/uL    MPV 11.1 9.2 - 11.8 FL    NEUTROPHILS 80 (H) 42 - 75 %    BAND NEUTROPHILS 11 (H) 0 - 5 %    LYMPHOCYTES 6 (L) 20 - 51 %    MONOCYTES 2 2 - 9 %    EOSINOPHILS 0 0 - 5 %    BASOPHILS 0 0 - 3 %    METAMYELOCYTES 1 (H) 0 %    ABS. NEUTROPHILS 23.8 (H) 1.8 - 8.0 K/UL    ABS. LYMPHOCYTES 1.6 0.8 - 3.5 K/UL    ABS. MONOCYTES 0.5 0 - 1.0 K/UL    ABS. EOSINOPHILS 0.0 0.0 - 0.4 K/UL    ABS. BASOPHILS 0.0 0.0 - 0.06 K/UL    DF MANUAL      PLATELET COMMENTS ADEQUATE PLATELETS      RBC COMMENTS ANISOCYTOSIS  1+        RBC COMMENTS HYPOCHROMIA  1+       METABOLIC PANEL, COMPREHENSIVE    Collection Time: 08/27/18  4:28 PM   Result Value Ref Range    Sodium 139 136 - 145 mmol/L    Potassium 3.9 3.5 - 5.5 mmol/L    Chloride 105 100 - 108 mmol/L    CO2 24 21 - 32 mmol/L    Anion gap 10 3.0 - 18 mmol/L    Glucose 240 (H) 74 - 99 mg/dL    BUN 18 7.0 - 18 MG/DL    Creatinine 4.27 (H) 0.6 - 1.3 MG/DL    BUN/Creatinine ratio 4 (L) 12 - 20      GFR est AA 13 (L) >60 ml/min/1.73m2    GFR est non-AA 10 (L) >60 ml/min/1.73m2    Calcium 8.0 (L) 8.5 - 10.1 MG/DL    Bilirubin, total 0.3 0.2 - 1.0 MG/DL    ALT (SGPT) 17 13 - 56 U/L    AST (SGOT) 18 15 - 37 U/L    Alk.  phosphatase 120 (H) 45 - 117 U/L    Protein, total 6.3 (L) 6.4 - 8.2 g/dL    Albumin 2.3 (L) 3.4 - 5.0 g/dL    Globulin 4.0 2.0 - 4.0 g/dL    A-G Ratio 0.6 (L) 0.8 - 1.7     MAGNESIUM    Collection Time: 08/27/18  4:28 PM   Result Value Ref Range    Magnesium 1.8 1.6 - 2.6 mg/dL   PHOSPHORUS    Collection Time: 08/27/18  4:28 PM   Result Value Ref Range    Phosphorus 3.2 2.5 - 4.9 MG/DL   POC LACTIC ACID    Collection Time: 08/27/18  5:45 PM   Result Value Ref Range    Lactic Acid (POC) 2.6 (HH) 0.4 - 2.0 mmol/L   CALCIUM, IONIZED    Collection Time: 08/27/18  7:56 PM   Result Value Ref Range    Ionized Calcium 1.08 (L) 1.12 - 1.32 MMOL/L       Radiologic Studies -   CT HEAD WO CONT   Final Result      XR CHEST SNGL V   Final Result      Xr Chest Sngl V    Result Date: 8/27/2018  IMPRESSION: Gastric distention. Stable cardiomegaly. No pneumonia or CHF. No acute pulmonary disease. Ct Head Wo Cont    Result Date: 8/27/2018  IMPRESSION: Out of proportion enlargement of the lateral ventricles this may represent normal pressure hydrocephalus. No acute hemorrhage or midline shift. No evidence for a fracture in the calvarium. .          Medical Decision Making   I am the first provider for this patient. I reviewed the vital signs, available nursing notes, past medical history, past surgical history, family history and social history. Vital Signs-Reviewed the patient's vital signs. Pulse Oximetry Analysis -  Patient is 99% O2 on RA, indicating adequate oxygenation. Cardiac Monitor:  Rate: 88 bpm    Records Reviewed: Nursing Notes (Time of Review: 4:02 PM)    ED Course: Progress Notes, Reevaluation, and Consults:  6:39 PM Consult: I discussed care with Dr. Jocelyn Javier, nephrology. It was a standard discussion including patient history, chief complaint, available diagnostic results, and predicted treatment course. Called and knows patient well. Pt had 5 witnessed seizures in dialysis. He saw her and she's back to baseline. Wants pt started on broad-spectrum Abx for white count. Would like pt admitted to PCP service and wants a neuro consult. Wants 500 mg of Keppra Bid.     6:40 PM Consult: I discussed care with Dr. Dhaval Perez, neuro. It was a standard discussion including patient history, chief complaint, available diagnostic results, and predicted treatment course. Agrees with Keppra and would like us to do a LP given pt's white count. 6:46 PM Consult: I discussed care with Dr. Aidan Elizondo, internist.  It was a standard discussion including patient history, chief complaint, available diagnostic results, and predicted treatment course. Dr Lois Eller is at bedside and agrees to admit pt for telemetry.     Provider Notes (Medical Decision Making): Pt is a 70yo female with a hx of ESRD on HD, HTN, CVA, behavioral disease, DM presents after having what appeared to be a seizure per the reports from HD after 2.5 hrs of the run. Pt in the ED is alert and follows commands. Suspect seizure vs syncope from volume shifts. Will follow CT head, CXR, trend her labs including K then reevaluate. Pt has HD cath and white count so will start empiric abx and then reevaluate. Malcom Cook DO 5:12 PM    Lumbar Puncture  Date/Time: 8/27/2018 8:58 PM  Performed by: Luis Armando Meyer  Authorized by: Adore Velarde     Consent:     Consent obtained:  Written    Consent given by:  Patient and guardian    Risks discussed:  Bleeding, infection, pain, repeat procedure, nerve damage and headache    Alternatives discussed:  No treatment  Pre-procedure details:     Procedure purpose:  Diagnostic    Preparation: Patient was prepped and draped in usual sterile fashion    Anesthesia (see MAR for exact dosages): Anesthesia method:  Local infiltration    Local anesthetic:  Lidocaine 1% w/o epi  Procedure details:     Lumbar space:  L3-L4 interspace    Patient position:  Sitting    Needle gauge:  20    Needle type:  Spinal needle - Quincke tip    Needle length (in):  3.5    Ultrasound guidance: no      Number of attempts:  2    Fluid appearance:  Clear    Tubes of fluid:  4    Total volume (ml):  10  Post-procedure:     Puncture site:  Adhesive bandage applied and direct pressure applied    Patient tolerance of procedure: Tolerated well, no immediate complications          Critical Care Time:  The services I provided to this patient were to treat and/or prevent clinically significant deterioration that could result in the failure of one or more body systems and/or organ systems due to sepsis.     Services included the following:  -reviewing nursing notes and old charts  -vital sign assessments  -direct patient care  -medication orders and management  -interpreting and reviewing diagnostic studies/labs  -re-evaluations  -documentation time    Aggregate critical care time was 60 minutes, which includes only time during which I was engaged in work directly related to the patient's care as described above, whether I was at bedside or elsewhere in the Emergency Department. It did not include time spent performing other reported procedures or the services of residents, students, nurses, or advance practice providers. Gabo Antonjose miguel   6:48 PM      Decision to Admit pt was made by Dr. Bolivar Rodrigues at 6:46 PM    Diagnosis     Clinical Impression:   1. New onset seizure ([facility]    2. Bandemia    3. Severe sepsis ([facility] [facility]    4. ESRD on dialysis [facility])        Disposition: Admit    Follow-up Information     None           Current Discharge Medication List      CONTINUE these medications which have NOT CHANGED    Details   hydrALAZINE (APRESOLINE) 50 mg tablet Take 1 Tab by mouth three (3) times daily. Qty: 90 Tab, Refills: 0      simvastatin (ZOCOR) 20 mg tablet Take 1 Tab by mouth nightly. Qty: 30 Tab, Refills: 0      potassium chloride (KLOR-CON) 20 mEq packet Take 1 Packet by mouth two (2) times daily (with meals). Qty: 30 Packet, Refills: 0      doxercalciferol (HECTOROL) 4 mcg/2 mL injection 0.5 mL by IntraVENous route Every Tuesday, Thursday and Saturday. Qty: 1 mL, Refills: 0      epoetin raphael (EPOGEN;PROCRIT) 3,000 unit/mL injection 1.73 mL by SubCUTAneous route Every Tuesday, Thursday and Saturday. Indications: ANEMIA DUE TO RENAL FAILURE, Renal Dialysis  Qty: 1 Vial, Refills: 0      insulin glargine (LANTUS) 100 unit/mL injection 10 Units by SubCUTAneous route nightly.   Qty: 1 Vial, Refills: 0      insulin lispro (HUMALOG) 100 unit/mL injection INITIATE INSULIN CORRECTIVE PROTOCOL: Normal Insulin Sensitivity   For Blood Sugar (mg/dL) of:     Less than 150 =   0 units           150 -199 =   2 units  200 -249 =   4 units  250 -299 =   6 units  300 -349 =   8 units  350 and above = 10 units and Call Physician  If 2 glucose readings are above  Qty: 1 Vial, Refills: 0      losartan (COZAAR) 100 mg tablet Take 1 Tab by mouth daily. Qty: 30 Tab, Refills: 0      aspirin 81 mg tablet Take 81 mg by mouth daily. diltiazem CD (CARDIZEM CD) 240 mg ER capsule Take 240 mg by mouth daily. cloNIDine (CATAPRES) 0.1 mg tablet Take 0.1 mg by mouth two (2) times a day. magnesium hydroxide ([place] MILK OF MAGNESIA) 400 mg/5 mL suspension Take 30 mL by mouth daily as needed. acetaminophen (TYLENOL) 325 mg tablet Take 325 mg by mouth as needed. _______________________________    Attestations:  Scribe Attestation     Aide Hicks acting as a scribe for and in the presence of Zulieka Sanabria MD      August 27, 2018 at 4:02 PM       Provider Attestation:      I personally performed the services described in the documentation, reviewed the documentation, as recorded by the scribe in my presence, and it accurately and completely records my words and actions.  August 27, 2018 at 4:02 PM - Zuleika Sanabria MD    _______________________________

## 2018-08-27 NOTE — ED TRIAGE NOTES
Pt was at dialysis center receiving dialysis (2/3.5 hours tx) had 4 witnessed seizures; last seizure lasted about 8 mins; ; VSS.

## 2018-08-28 LAB
BASOPHILS # BLD: 0 K/UL (ref 0–0.1)
BASOPHILS NFR BLD: 0 % (ref 0–2)
DIFFERENTIAL METHOD BLD: ABNORMAL
EOSINOPHIL # BLD: 0.1 K/UL (ref 0–0.4)
EOSINOPHIL NFR BLD: 0 % (ref 0–5)
ERYTHROCYTE [DISTWIDTH] IN BLOOD BY AUTOMATED COUNT: 15.8 % (ref 11.6–14.5)
GLUCOSE BLD STRIP.AUTO-MCNC: 119 MG/DL (ref 70–110)
GLUCOSE BLD STRIP.AUTO-MCNC: 213 MG/DL (ref 70–110)
GLUCOSE BLD STRIP.AUTO-MCNC: 86 MG/DL (ref 70–110)
HCT VFR BLD AUTO: 30.6 % (ref 35–45)
HGB BLD-MCNC: 9.7 G/DL (ref 12–16)
LYMPHOCYTES # BLD: 1.2 K/UL (ref 0.9–3.6)
LYMPHOCYTES NFR BLD: 8 % (ref 21–52)
MCH RBC QN AUTO: 27.2 PG (ref 24–34)
MCHC RBC AUTO-ENTMCNC: 31.7 G/DL (ref 31–37)
MCV RBC AUTO: 85.7 FL (ref 74–97)
MONOCYTES # BLD: 0.9 K/UL (ref 0.05–1.2)
MONOCYTES NFR BLD: 6 % (ref 3–10)
NEUTS SEG # BLD: 13 K/UL (ref 1.8–8)
NEUTS SEG NFR BLD: 86 % (ref 40–73)
PLATELET # BLD AUTO: 170 K/UL (ref 135–420)
PMV BLD AUTO: 11.3 FL (ref 9.2–11.8)
RBC # BLD AUTO: 3.57 M/UL (ref 4.2–5.3)
VANCOMYCIN SERPL-MCNC: 26.1 UG/ML (ref 5–40)
WBC # BLD AUTO: 15.2 K/UL (ref 4.6–13.2)

## 2018-08-28 PROCEDURE — 65660000000 HC RM CCU STEPDOWN

## 2018-08-28 PROCEDURE — 80202 ASSAY OF VANCOMYCIN: CPT | Performed by: FAMILY MEDICINE

## 2018-08-28 PROCEDURE — 74011000258 HC RX REV CODE- 258: Performed by: EMERGENCY MEDICINE

## 2018-08-28 PROCEDURE — 74011250637 HC RX REV CODE- 250/637: Performed by: PSYCHIATRY & NEUROLOGY

## 2018-08-28 PROCEDURE — 95816 EEG AWAKE AND DROWSY: CPT | Performed by: FAMILY MEDICINE

## 2018-08-28 PROCEDURE — 74011250637 HC RX REV CODE- 250/637: Performed by: FAMILY MEDICINE

## 2018-08-28 PROCEDURE — 74011250636 HC RX REV CODE- 250/636: Performed by: EMERGENCY MEDICINE

## 2018-08-28 PROCEDURE — 36415 COLL VENOUS BLD VENIPUNCTURE: CPT | Performed by: FAMILY MEDICINE

## 2018-08-28 PROCEDURE — 82962 GLUCOSE BLOOD TEST: CPT

## 2018-08-28 PROCEDURE — 85025 COMPLETE CBC W/AUTO DIFF WBC: CPT | Performed by: FAMILY MEDICINE

## 2018-08-28 PROCEDURE — 74011636637 HC RX REV CODE- 636/637: Performed by: FAMILY MEDICINE

## 2018-08-28 RX ORDER — LEVETIRACETAM 500 MG/1
500 TABLET ORAL 2 TIMES DAILY
Status: DISCONTINUED | OUTPATIENT
Start: 2018-08-28 | End: 2018-08-31 | Stop reason: HOSPADM

## 2018-08-28 RX ORDER — LEVETIRACETAM 250 MG/1
250 TABLET ORAL
Status: DISCONTINUED | OUTPATIENT
Start: 2018-08-29 | End: 2018-08-31 | Stop reason: HOSPADM

## 2018-08-28 RX ORDER — DOXERCALCIFEROL 4 UG/2ML
0.5 INJECTION INTRAVENOUS
Status: DISCONTINUED | OUTPATIENT
Start: 2018-08-29 | End: 2018-08-29

## 2018-08-28 RX ADMIN — INSULIN LISPRO 4 UNITS: 100 INJECTION, SOLUTION INTRAVENOUS; SUBCUTANEOUS at 17:42

## 2018-08-28 RX ADMIN — AZTREONAM 500 MG: 1 INJECTION, POWDER, LYOPHILIZED, FOR SOLUTION INTRAMUSCULAR; INTRAVENOUS at 17:36

## 2018-08-28 RX ADMIN — DILTIAZEM HYDROCHLORIDE 240 MG: 240 CAPSULE, COATED, EXTENDED RELEASE ORAL at 09:00

## 2018-08-28 RX ADMIN — LOSARTAN POTASSIUM 100 MG: 50 TABLET ORAL at 09:00

## 2018-08-28 RX ADMIN — CLONIDINE HYDROCHLORIDE 0.1 MG: 0.1 TABLET ORAL at 22:26

## 2018-08-28 RX ADMIN — LEVETIRACETAM 500 MG: 5 INJECTION INTRAVENOUS at 06:28

## 2018-08-28 RX ADMIN — AZTREONAM 500 MG: 1 INJECTION, POWDER, LYOPHILIZED, FOR SOLUTION INTRAMUSCULAR; INTRAVENOUS at 02:58

## 2018-08-28 RX ADMIN — HYDRALAZINE HYDROCHLORIDE 50 MG: 50 TABLET, FILM COATED ORAL at 17:36

## 2018-08-28 RX ADMIN — HYDRALAZINE HYDROCHLORIDE 50 MG: 50 TABLET, FILM COATED ORAL at 06:28

## 2018-08-28 RX ADMIN — AZTREONAM 500 MG: 1 INJECTION, POWDER, LYOPHILIZED, FOR SOLUTION INTRAMUSCULAR; INTRAVENOUS at 09:07

## 2018-08-28 RX ADMIN — HYDRALAZINE HYDROCHLORIDE 50 MG: 50 TABLET, FILM COATED ORAL at 22:27

## 2018-08-28 RX ADMIN — LEVETIRACETAM 500 MG: 500 TABLET ORAL at 17:36

## 2018-08-28 RX ADMIN — SIMVASTATIN 20 MG: 20 TABLET, FILM COATED ORAL at 22:27

## 2018-08-28 NOTE — PROGRESS NOTES
conducted an initial consultation and Spiritual Assessment for James Pop, who is a 70 y.o.,female. Patients Primary Language is: [place]. According to the patients EMR Pentecostal Affiliation is: Hinduism.  
 
The reason the Patient came to the hospital is:  
Patient Active Problem List  
 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 
Initiated a relationship of care and support. Explored issues of iveth, belief, spirituality and Jainism/ritual needs while hospitalized. Listened empathically. Provided chaplaincy education. Provided information about Spiritual Care Services. Offered prayer and assurance of continued prayers on patient's behalf. Chart reviewed. The following outcomes where achieved: 
Patient shared limited information about both their medical narrative and spiritual journey/beliefs.  confirmed Patient's Pentecostal Affiliation. Patient processed feeling about current hospitalization. Patient expressed gratitude for 's visit. Assessment: 
Patient does not have any Jainism/cultural needs that will affect patients preferences in health care. There are no spiritual or Jainism issues which require intervention at this time. Plan: 
Chaplains will continue to follow and will provide pastoral care on an as needed/requested basis.  recommends bedside caregivers page  on duty if patient shows signs of acute spiritual or emotional distress. Chaplain Isaac Heath Spiritual Care  
[phone]

## 2018-08-28 NOTE — PROGRESS NOTES
Progress Note Patient: Matthew Prieto MRN: [id number]  SSN: [id number] YOB: 1955  Age: 70 y.o. Sex: female Admit Date: 8/27/2018 LOS: 1 day Subjective:  
 
Patient with ESRD admitted s/p new onset seizure noted during dialysis. No further seizures and no fever. Patient has no complaints and is baseline alert. Objective:  
 
Vitals:  
 08/28/18 0044 08/28/18 0417 08/28/18 0858 08/28/18 1200 BP: 106/57 119/72 125/65 122/69 Pulse: 68 76 85 90 Resp: 19 18 18 17 Temp: 98.2 °F (36.8 °C) 98.3 °F (36.8 °C) 98.4 °F (36.9 °C) 98 °F (36.7 °C) SpO2: 100% 100% 96% 99% Weight:      
  
 
Intake and Output: 
Current Shift: 08/28 0701 - 08/28 1900 In: 100 [I.V.:100] Out: - Last three shifts:   
 
Physical Exam:  
GENERAL: alert, cooperative, no distress, appears stated age LUNG: clear to auscultation bilaterally HEART: regular rate and rhythm, S1, S2 normal, no murmur, click, rub or gallop Lab/Data Review: All lab results for the last 24 hours reviewed. Blood and CSF cultures negative so far Assessment:  
 
Active Problems: 
  ESRD (end stage renal disease) ([facility] (8/8/2018) Bandemia (8/27/2018) New onset seizure ([address] (8/27/2018) Plan:  
 
Dialysis tomorrow. Continue present antibiotics  EEG. Signed By: Evaristo Romo MD   
 August 28, 2018

## 2018-08-28 NOTE — PROGRESS NOTES
NUTRITION Nursing Referral: [facility]  
  
RECOMMENDATIONS / PLAN:  
 
- Add supplement: Nepro BID 
- Continue RD inpatient monitoring and evaluation. NUTRITION INTERVENTIONS & DIAGNOSIS:  
 
[x] Meals/snacks: modify composition 
[x] Medical food supplement therapy:  initiate Nutrition Diagnosis:   Increased nutrient needs (protein, energy) related to increased expenditure as evidenced by pt on HD 3 times weekly. Unintended weight loss related to inadequate oral intake as evidenced by wt loss of 7 lb PTA ASSESSMENT:  
 
Pt asleep/lethargic at time of visit. Reported unplanned wt loss PTA per nursing screen. Has hx of ESRD on HD Average po intake adequate to meet patients estimated nutritional needs:   [] Yes     [] No   [x] Unable to determine at this time Diet: DIET RENAL Regular Food Allergies:  None known Current Appetite:   [] Good     [] Fair     [] Poor     [x] Other: unknown Appetite/meal intake prior to admission:   [] Good     [] Fair     [] Poor     [x] Other: unknown Feeding Limitations:  [] Swallowing difficulty    [] Chewing difficulty    [] Other: 
Current Meal Intake: No data found. BM:  PTA Skin Integrity:  No pressure injury or wound noted Edema:   [x] No     [] Yes Pertinent Medications: Reviewed: SSI, hectorol (ordered) Recent Labs  
   08/27/18 
 1628 NA  139  
K  3.9 CL  105 CO2  24 GLU  240* BUN  18 CREA  4.27* CA  8.0*  
MG  1.8 PHOS  3.2 ALB  2.3*  
SGOT  18 ALT  17 Intake/Output Summary (Last 24 hours) at 08/28/18 1736 Last data filed at 08/28/18 7082 Gross per 24 hour Intake              100 ml Output                0 ml Net              100 ml Anthropometrics: 
Ht Readings from Last 1 Encounters:  
08/07/18 5' 3\" (1.6 m) Last 3 Recorded Weights in this Encounter 08/27/18 1716 08/27/18 2120 Weight: 69.6 kg (153 lb 6.4 oz) 62.9 kg (138 lb 10.7 oz) Body mass index is 24.56 kg/(m^2). Weight History:  Pt reported unplanned wt loss PTA per nursing screen. Noted wt loss of 7 lb (4.8%) x 2 week PTA per chart hx 
 
Weight Metrics 8/27/2018 8/13/2018 1/2/2013 Weight 138 lb 10.7 oz 145 lb 6.4 oz 146 lb BMI 24.56 kg/m2 25.76 kg/m2 26.7 kg/m2 Admitting Diagnosis: New onset seizure ([facility] Bandemia ESRD (end stage renal disease) ([facility] [address] [facility] Pertinent PMHx:  CKD, DM, HLD, HTN Education Needs:        [x] None identified  [] Identified - Not appropriate at this time  []  Identified and addressed - refer to education log Learning Limitations:   [x] None identified  [] Identified Cultural, Shinto & ethnic food preferences:  [x] None identified    [] Identified and addressed ESTIMATED NUTRITION NEEDS:  
 
Calories: 5252-9696 kcal (30-35 kcal/kg) based on  [x] Actual BW: 63 kg     [] IBW Protein: 76-95 gm (1.2-1.5 gm/kg) based on  [x] Actual BW      [] IBW Fluid: 1 mL/kcal 
  
MONITORING & EVALUATION:  
 
Nutrition Goal(s): 1. Po intake of meals will meet >75% of patient estimated nutritional needs within the next 7 days. Outcome:  [] Met/Ongoing    []  Not Met    [x] New/Initial Goal  
 
Monitoring:   [x] Food and beverage intake   [x] Diet order   [x] Nutrition-focused physical findings   [x] Treatment/therapy   [] Weight   [] Enteral nutrition intake Previous Recommendations (for follow-up assessments only):     []   Implemented       []   Not Implemented (RD to address)      [] No Longer Appropriate     [] No Recommendation Made Discharge Planning:  Renal, diabetic diet [x] Participated in care planning, discharge planning, & interdisciplinary rounds as appropriate Elizabeth Danielson RD Pager: [phone]

## 2018-08-28 NOTE — PROGRESS NOTES
Problem: Falls - Risk of 
Goal: *Absence of Falls Document Jose Manuel Linares Fall Risk and appropriate interventions in the flowsheet. Outcome: Progressing Towards Goal 
Fall Risk Interventions: 
Mobility Interventions: Assess mobility with egress test, Bed/chair exit alarm, Communicate number of staff needed for ambulation/transfer, OT consult for ADLs, Patient to call before getting OOB, PT Consult for mobility concerns, PT Consult for assist device competence, Strengthening exercises (ROM-active/passive) Mentation Interventions: Adequate sleep, hydration, pain control, Bed/chair exit alarm, Door open when patient unattended, Familiar objects from home, More frequent rounding, Increase mobility, Reorient patient, Room close to nurse's station Medication Interventions: Assess postural VS orthostatic hypotension, Bed/chair exit alarm, Evaluate medications/consider consulting pharmacy, Patient to call before getting OOB, Teach patient to arise slowly Elimination Interventions: Bed/chair exit alarm, Call light in reach, Patient to call for help with toileting needs History of Falls Interventions: Bed/chair exit alarm, Consult care management for discharge planning, Door open when patient unattended, Investigate reason for fall, Room close to nurse's station Comments: Non skid footwear intact, call for assistance

## 2018-08-28 NOTE — CONSULTS
[address] [facility]    Jyotsna Amos  MR#: [id number]  : 1955  ACCOUNT #: [de-identified]   DATE OF SERVICE: 2018    REQUESTED BY:  Consult requested by emergency room physician . REASON FOR CONSULTATION:  New onset seizure in a dialysis patient. HISTORY OF PRESENT ILLNESS:  This is a 68-year-old -American male known to me for her medical and renal problem, was sent to the emergency room directly from the dialysis unit as this patient had 2-3 episodes of seizures. The last one was quite tonic-clonic and patient was unresponsive at that time, but finally she wake up and was talking with the nurse and back to her baseline, but given the recurrent seizure in the dialysis unit, she was transferred to the emergency room. Subsequently, she remained at her baseline again and being admitted for further evaluation. After discussion with ER physician, I advised them to start him on Keppra as to admit him to the primary care physician's service and as this patient has a high white cell count make sure that she does not have any source of infection. Subsequently, neuro was consulted and they also concurred with the plan for the [address] and recommended to get a lumbar puncture, which was done in the emergency room. CT showed some questionable normal pressure hydrocephalus. This patient has a long-term history of hypertension, type 2 diabetes mellitus, hyperlipidemia, old stroke and underlying psychiatric problem. Recently, she has started dialysis based on her chronic kidney failure and . She usually comes to dialysis on a regular basis, but off and on had mild episode of vomiting and also some catheter issue. Already she changed 1 catheter and having high white cell count also. When I saw the patient again today she feels good back to her baseline. No fever, no chills, no seizure. She claims that she knew that she was having seizure.   Denies any headache or any other complaint. PAST MEDICAL HISTORY:  As I mentioned. Has history of hypertension, type 2 diabetes mellitus, hyperlipidemia, ESRD, bipolar disorder, asthma, psychiatric disorder unspecified. PAST SURGICAL HISTORY:  Tunneled dialysis catheter and was planning to have AV fistula as an outpatient. FAMILY HISTORY:  Is not clear. SOCIAL HISTORY:  Nursing home resident, nonsmoker, nondrinker, no drug abuse problem. ALLERGIES:  SHE IS ALLERGIC TO AMOXICILLIN, CLINDAMYCIN, LOTREL, PENICILLIN, SHELLFISH , nature of  ALLERGY IS NOT CLEAR. REVIEW OF SYSTEMS:  GENERAL:  This is a  woman, appears her stated age without any acute distress. No more evidence of any seizure. Denies any headache, any chills, any weakness. CARDIOVASCULAR SYSTEM:  No palpitation. No chest pain. RESPIRATORY:  No wheezing. No shortness of breath. GASTROINTESTINAL:  No nausea, no vomiting anymore, but had vomiting at the time of admission as per the patient. GENITOURINARY: No urgency, no hesitancy, no hematuria, no flank pain. LIST OF MEDICATIONS:  Tylenol, aspirin, clonidine, Cardizem, Hectorol, Epogen, hydralazine, Lantus insulin, Humalog insulin, losartan, potassium chloride, and Zocor. PHYSICAL EXAMINATION:  GENERAL:  At this time, patient is quite alert, awake and oriented, not in acute distress, can recognize me, but cannot tell my name. VITAL SIGNS:  Temperature 98.4, heart rate 85, blood pressure 135/65, mean arterial pressures of 85, respiration of 18, oxygenation % on room air. Body weight is 62.9 kilograms. HEENT:  Oral mucosa moist.  NECK:  Jugular venous pressure not distended. LUNGS:  Good air entry to both sides. HEART:  S1, S2, without any gallop or murmur. ABDOMEN:  Soft. No palpable mass. EXTREMITIES:  Ankle negative edema. She has a right IJ tunneled dialysis catheter, no drainage. LABORATORY DATA:  There has been none so far.   As follows:  WBC of 26.2 on admission, hemoglobin of 11.5, hematocrit of 36.0, platelets of 601,747. Today, WBC of 15.2 with a hemoglobin of 9.7, hematocrit 30.6, platelets 344,854 with a neutrophil of 86%. Chemistry today, sodium 139, potassium 3.9, chloride 104, CO2 24, glucose of 240, urea nitrogen of 18, creatinine of 4.27, calcium of 8.0, ionized calcium of 1.08, total protein of 6.6, albumin of 2.3, phosphorus of 3.2, magnesium 1.8. Last hemoglobin A1c was 7.8. Average glucose is 177. CT of the head without contrast was done, shows out of proportion enlargement of the lateral ventricles. This may represent normal pressure hydrocephalus. No acute hemorrhage or midline shift. No evidence of any fracture of the calvarium. Chest x-ray was done on the day of admission. Gastric distention is stable. No hepatomegaly. No pneumothorax or heart failure. No acute pulmonary disease. IMPRESSION:  1. End stage renal disease. 2.  New onset seizure. 3.  Hypertension. 4.  Type 2 diabetes mellitus. 5.  Underlying psychiatric disorder with a bipolar problem. PLAN:  Dialyzed  today, but no immediate need for any dialysis. Neuro recommended to continue the Keppra, that will be continued. I would like to supplement  250 mg of Keppra after each dialysis treatment if is not being ordered. Also, we will maintain her on Epogen and Hectorol for her anemia and secondary hyperparathyroidism. We will follow the culture data. White cell count is high. Source is not clear. So far, the cultures are negative and we have to continue to use the antibiotic empirically. While she is here, she will be maintained on her dialysis schedule every Monday, Wednesday and Friday and we will give Epogen and Hectorol based on her lab results. Further recommendations will be given based on the hospital course.       Skyler Acosta MD       [facility] / Ruben Abbott  D: 08/28/2018 11:38     T: 08/28/2018 12:51  JOB #: [id number]

## 2018-08-28 NOTE — PROGRESS NOTES
Bedside shift change report given to Colette RN (oncoming nurse) by Velva Bloch RN (offgoing nurse). Report included the following information SBAR, Intake/Output, Recent Results and Cardiac Rhythm a fib. Bed alarm on and call bell within reach.

## 2018-08-28 NOTE — PROGRESS NOTES
Problem: Falls - Risk of  Goal: *Absence of Falls  Document Oscar Fall Risk and appropriate interventions in the flowsheet. Outcome: Progressing Towards Goal  Fall Risk Interventions:       Mentation Interventions: Bed/chair exit alarm, Room close to nurse's station, Reorient patient    Medication Interventions: Teach patient to arise slowly         History of Falls Interventions: Bed/chair exit alarm, Room close to nurse's station   Call bell within reach, pt told to call before getting out of bed        Problem: Falls - Risk of  Goal: *Absence of Falls  Document Oscar Fall Risk and appropriate interventions in the flowsheet. Fall Risk Interventions:       Mentation Interventions: Bed/chair exit alarm, Room close to nurse's station, Reorient patient    Medication Interventions: Teach patient to arise slowly         History of Falls Interventions: Bed/chair exit alarm, Room close to nurse's station        Problem: Diabetes Self-Management  Goal: *Monitoring blood glucose, interpreting and using results  Identify recommended blood glucose targets  and personal targets. Outcome: Progressing Towards Goal  Educated patient on importance of keeping blood sugar at a certain range,   Notified patient of sliding scale we will be using while inpatient.

## 2018-08-28 NOTE — PROGRESS NOTES
Patient in room 417. Admission assessment and required documentation completed. Patient is intermittently confused, very repetitive.   2 IVs both patent, pt oriented to room and call bell usage. Telemetry box on. Will continue to monitor.

## 2018-08-28 NOTE — CONSULTS
HPI: 78-year-old female admitted with a chief complaint of new onset seizure. Yesterday while receiving hemodialysis for end-stage renal disease she reportedly had 2-3 episodes of seizures. These were witnessed. They were described as tonic-clonic with unresponsiveness. She reportedly was able to wake up and was talking with the nurse after these episodes. The duration is not specifically documented. There is no mentions of oral trauma or urinary incontinence. There are no reports of past history of seizures. She was started on Keppra. She had a white blood cell count of 24,000. Because of the very elevated white blood cell count I was consulted and they recommended a lumbar puncture, the results are below and did not reveal evidence of meningoencephalitis process. She is back to her baseline. Her sister is in the room with her, she mentions how she lives in [facility], which is a nearby [facility] and that she has been forgetful and repetitive for some time now. Social History     Social History    Marital status:      Spouse name: N/A    Number of children: N/A    Years of education: N/A     Occupational History    Not on file. Social History Main Topics    Smoking status: Never Smoker    Smokeless tobacco: Never Used    Alcohol use No    Drug use: No    Sexual activity: No     Other Topics Concern    Not on file     Social History Narrative       History reviewed. No pertinent family history.     Current Facility-Administered Medications   Medication Dose Route Frequency Provider Last Rate Last Dose    [START ON 8/29/2018] epoetin raphael (EPOGEN;PROCRIT) 5,000 Units  5,000 Units IntraVENous DIALYSIS MON, WED & Jose R Robert MD        [START ON 8/29/2018] doxercalciferol (HECTOROL) 4 mcg/2 mL injection 0.5 mcg  0.5 mcg IntraVENous Q MON, WED & Jose R Robert MD        aztreonam (AZACTAM) 500 mg in 0.9% sodium chloride 100 mL IVPB  500 mg IntraVENous Q8H Jeffrey MENA MD [place] 200 mL/hr at 08/28/18 0907 500 mg at 08/28/18 1186    VANCOMYCIN INFORMATION NOTE   Other CONTINUOUS Otis Cline MD   Stopped at 08/27/18 1810    levETIRAcetam (KEPPRA) 500 mg in saline (iso-osm) 100 ml IVPB  500 mg IntraVENous Q12H Otis Cline MD 0 mL/hr at 08/27/18 1849 500 mg at 08/28/18 1566    acetaminophen (TYLENOL) tablet 325 mg  325 mg Oral Q4H PRN Valentina Valentine MD        dilTIAZem CD (CARDIZEM CD) capsule 240 mg  240 mg Oral DAILY Valentina Valentine MD   240 mg at 08/28/18 0900    losartan (COZAAR) tablet 100 mg  100 mg Oral DAILY Valentina Valentine MD   100 mg at 08/28/18 0900    simvastatin (ZOCOR) tablet 20 mg  20 mg Oral QHS Valentina Valentine MD   20 mg at 08/27/18 2127    insulin lispro (HUMALOG) injection   SubCUTAneous AC&HS Valentina Valentine MD   Stopped at 08/27/18 2200    glucose chewable tablet 16 g  16 g Oral PRN Valentina Valentine MD        glucagon [facility]) injection 1 mg  1 mg IntraMUSCular PRN Valentina Valentine MD        dextrose (D50W) injection syrg 12.5-25 g  25-50 mL IntraVENous PRN Valentina Valentine MD        cloNIDine HCl (CATAPRES) tablet 0.1 mg  0.1 mg Oral Q12H Valentina Valentine MD   Stopped at 08/28/18 0900    hydrALAZINE (APRESOLINE) tablet 50 mg  50 mg Oral Q8H Valentina Valentine MD   50 mg at 08/28/18 1521       Past Medical History:   Diagnosis Date    Asthma     Bipolar [facility] disorder [facility]     Brain condition     Cataract     Chronic kidney disease     Diabetes ([facility]     Hyperlipidemia     Hypertension     Paralytic strabismus, unspecified     Psychiatric disorder        Past Surgical History:   Procedure Laterality Date    CO INSJ TUNNELED CVC W/O SUBQ PORT/ AGE 5 YR/> N/A 8/9/2018     in-pt 474A, Insertion Tunneled Central Venous Catheter performed by Daija Mcintyre MD at [facility]       Allergies   Allergen Reactions    Amoxicillin Unknown (comments)    Cleocin [Clindamycin Hcl] Unknown (comments)    Codeine Unknown (comments)    Lorcet 10/650 [Hydrocodone-Acetaminophen] Unknown (comments)    Penicillin G Benzathine Unknown (comments)    Shellfish Derived Unknown (comments)       Patient Active Problem List   Diagnosis Code    Acute on chronic renal insufficiency N28.9, N18.9    Hypertension I10    ESRD (end stage renal disease) ([facility] N18.6    Secondary hyperparathyroidism of renal origin ([facility] N25.81    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 ([facility] E11.22, I12.0, Z99.2, N18.6    CVA, old, cognitive deficits I69.319    Bandemia D72.825    New onset seizure [facility] R56.9         Review of Systems:   Constitutional: no fever or chills  Skin denies rash or itching  HEENT:  Denies tinnitus, hearing loss, or visual changes  Respiratory: denies shortness of breath  Cardiovascular: denies chest pain, dyspnea on exertion  Gastrointestinal: does not report nausea or vomiting  Genitourinary: does not report dysuria or incontinence  Musculoskeletal: does not report joint pain or swelling  Endocrine: denies weight change  Hematology: denies easy bruising or bleeding   Neurological: as above in HPI      PHYSICAL EXAMINATION:      VITAL SIGNS:    Visit Vitals    /69 (BP 1 Location: Right arm, BP Patient Position: At rest)    Pulse 90    Temp 98 °F (36.7 °C)    Resp 17    Wt 62.9 kg (138 lb 10.7 oz)    SpO2 99%    BMI 24.56 kg/m2       GENERAL: Well developed, well nourished, in no apparent distress. HEART: RR, no murmurs heard, no carotid bruits  EXTREMITIES: No clubbing, cyanosis, or edema is identified. Pulses 2+    and symmetrical.  HEAD:   Normocephalic, atraumatic. NEUROLOGIC EXAMINATION    MENTAL STATUS: Awake, alert,. She knows she is at [place], she thinks that she is at [facility], the nursing home where she lives at. She is unable to tell me the day of the week or the date. Her attention is diminished. Her short-term memory is poor.    Fund of knowledge is reduced. Mood and affect are appropriate  CRANIAL NERVES: Visual fields are full to confrontation. Unable to see fundi at bedside, right esotropia is present, pupils are reactive to light and accommodation. Facial sensation is normal  Face is symmetrical.   Hearing is present. SCM/TPZ 5/5  Palate rises symmetrically. Tongue is in the midline. MOTOR:  The patient is 5/5 in all four limbs without any drift. CEREBELLAR: No tremors or dysmetria    SENSORY: Reduced distal pinprick, unable to test Romberg    DTR's: Absent bilateral ankle jerks, no long tract signs     GAIT:   Deferred        I have personally reviewed imaging studies. I have reviewed CTs of the head done back in January 2013 and compared to the one done yesterday. There is a substantial amount of deep cortical atrophy that has not essentially changed from the 2013 to yesterday's study. There is a significant amount of diffuse microangiopathic changes. The ventricles are indeed enlarged. Interestingly despite the CAT scans looking very similar to each other, the radiologist in 2013 calls the ventricles as being normal, but yesterday the radiologist that read it is of the opinion that normal pressure hydrocephalus needs to be considered. Impression: New onset seizure on a patient with probable early likely vascular dementia, with evidence of significant cerebral atrophy and what I think is ex-vacuo ventriculomegaly. Plan: 1Continue levetiracetam 500 mg, changing it to p.o. twice a day with an additional 250 mg to be given after each dialysis session. 2Patient is back to her baseline. She may be discharged on the above dose of levetiracetam when she is considered to be otherwise medically able with a follow-up with me as an outpatient in approximately 1 month. PLEASE NOTE:   Portions of this document may have been produced using voice recognition software. Unrecognized errors in transcription may be present. This note will not be viewable in [address]

## 2018-08-28 NOTE — PROGRESS NOTES
Reason for Admission:   Witnessed Seizures RRAT Score:     34 Resources/supports as identified by patient/family:   Resides at [address] [place] Challenges facing patient (as identified by patient/family and CM): Finances/Medication cost?      No  
           
Transportation? Medical transport will be set up to [facility] when discharged. Then patient and sister report [facility] will take her if she needs outside follow up care. Support system or lack thereof? Family, sister at bedside Living arrangements? Long term care, [address] Self-care/ADLs/Cognition? Requires assistance for cleaning etc, however can ambulate with one assist and feed herself. Current Advanced Directive/Advance Care Plan:  Not on file, Full code Plan for utilizing home health:    No, plan to return to LTC Likelihood of readmission: high Transition of Care Plan:             Plan is to return to [address] [place] [address]. Transportation to be made from Care Management at time of discharge.

## 2018-08-29 LAB
ANION GAP SERPL CALC-SCNC: 9 MMOL/L (ref 3–18)
BASOPHILS # BLD: 0 K/UL (ref 0–0.1)
BASOPHILS NFR BLD: 0 % (ref 0–2)
BUN SERPL-MCNC: 34 MG/DL (ref 7–18)
BUN/CREAT SERPL: 6 (ref 12–20)
CALCIUM SERPL-MCNC: 8.1 MG/DL (ref 8.5–10.1)
CHLORIDE SERPL-SCNC: 114 MMOL/L (ref 100–108)
CO2 SERPL-SCNC: 21 MMOL/L (ref 21–32)
CREAT SERPL-MCNC: 5.36 MG/DL (ref 0.6–1.3)
DIFFERENTIAL METHOD BLD: ABNORMAL
EOSINOPHIL # BLD: 0.2 K/UL (ref 0–0.4)
EOSINOPHIL NFR BLD: 2 % (ref 0–5)
ERYTHROCYTE [DISTWIDTH] IN BLOOD BY AUTOMATED COUNT: 15.9 % (ref 11.6–14.5)
GLUCOSE BLD STRIP.AUTO-MCNC: 121 MG/DL (ref 70–110)
GLUCOSE BLD STRIP.AUTO-MCNC: 185 MG/DL (ref 70–110)
GLUCOSE BLD STRIP.AUTO-MCNC: 223 MG/DL (ref 70–110)
GLUCOSE SERPL-MCNC: 132 MG/DL (ref 74–99)
HCT VFR BLD AUTO: 29.3 % (ref 35–45)
HGB BLD-MCNC: 9.3 G/DL (ref 12–16)
LYMPHOCYTES # BLD: 1.5 K/UL (ref 0.9–3.6)
LYMPHOCYTES NFR BLD: 14 % (ref 21–52)
MCH RBC QN AUTO: 27.2 PG (ref 24–34)
MCHC RBC AUTO-ENTMCNC: 31.7 G/DL (ref 31–37)
MCV RBC AUTO: 85.7 FL (ref 74–97)
MONOCYTES # BLD: 0.6 K/UL (ref 0.05–1.2)
MONOCYTES NFR BLD: 5 % (ref 3–10)
NEUTS SEG # BLD: 8.6 K/UL (ref 1.8–8)
NEUTS SEG NFR BLD: 79 % (ref 40–73)
PHOSPHATE SERPL-MCNC: 3.6 MG/DL (ref 2.5–4.9)
PLATELET # BLD AUTO: 180 K/UL (ref 135–420)
PMV BLD AUTO: 11.6 FL (ref 9.2–11.8)
POTASSIUM SERPL-SCNC: 3.9 MMOL/L (ref 3.5–5.5)
RBC # BLD AUTO: 3.42 M/UL (ref 4.2–5.3)
SODIUM SERPL-SCNC: 144 MMOL/L (ref 136–145)
VANCOMYCIN SERPL-MCNC: 17.4 UG/ML (ref 5–40)
WBC # BLD AUTO: 10.8 K/UL (ref 4.6–13.2)

## 2018-08-29 PROCEDURE — 82962 GLUCOSE BLOOD TEST: CPT

## 2018-08-29 PROCEDURE — 65660000000 HC RM CCU STEPDOWN

## 2018-08-29 PROCEDURE — 74011250636 HC RX REV CODE- 250/636: Performed by: INTERNAL MEDICINE

## 2018-08-29 PROCEDURE — 74011250637 HC RX REV CODE- 250/637: Performed by: FAMILY MEDICINE

## 2018-08-29 PROCEDURE — 74011250637 HC RX REV CODE- 250/637: Performed by: PSYCHIATRY & NEUROLOGY

## 2018-08-29 PROCEDURE — 36415 COLL VENOUS BLD VENIPUNCTURE: CPT | Performed by: FAMILY MEDICINE

## 2018-08-29 PROCEDURE — 5A1D70Z PERFORMANCE OF URINARY FILTRATION, INTERMITTENT, LESS THAN 6 HOURS PER DAY: ICD-10-PCS | Performed by: FAMILY MEDICINE

## 2018-08-29 PROCEDURE — 80048 BASIC METABOLIC PNL TOTAL CA: CPT | Performed by: INTERNAL MEDICINE

## 2018-08-29 PROCEDURE — 90935 HEMODIALYSIS ONE EVALUATION: CPT

## 2018-08-29 PROCEDURE — 74011250636 HC RX REV CODE- 250/636: Performed by: FAMILY MEDICINE

## 2018-08-29 PROCEDURE — 80202 ASSAY OF VANCOMYCIN: CPT | Performed by: FAMILY MEDICINE

## 2018-08-29 PROCEDURE — 85025 COMPLETE CBC W/AUTO DIFF WBC: CPT | Performed by: INTERNAL MEDICINE

## 2018-08-29 PROCEDURE — 84100 ASSAY OF PHOSPHORUS: CPT | Performed by: INTERNAL MEDICINE

## 2018-08-29 PROCEDURE — 74011250636 HC RX REV CODE- 250/636: Performed by: EMERGENCY MEDICINE

## 2018-08-29 PROCEDURE — 74011000258 HC RX REV CODE- 258: Performed by: EMERGENCY MEDICINE

## 2018-08-29 PROCEDURE — 74011636637 HC RX REV CODE- 636/637: Performed by: FAMILY MEDICINE

## 2018-08-29 RX ORDER — DOXERCALCIFEROL 4 UG/2ML
0.5 INJECTION INTRAVENOUS
Status: DISCONTINUED | OUTPATIENT
Start: 2018-08-29 | End: 2018-08-31 | Stop reason: HOSPADM

## 2018-08-29 RX ADMIN — ERYTHROPOIETIN 5000 UNITS: 3000 INJECTION, SOLUTION INTRAVENOUS; SUBCUTANEOUS at 13:30

## 2018-08-29 RX ADMIN — VANCOMYCIN HYDROCHLORIDE 600 MG: 10 INJECTION, POWDER, LYOPHILIZED, FOR SOLUTION INTRAVENOUS at 09:00

## 2018-08-29 RX ADMIN — HYDRALAZINE HYDROCHLORIDE 50 MG: 50 TABLET, FILM COATED ORAL at 15:53

## 2018-08-29 RX ADMIN — CLONIDINE HYDROCHLORIDE 0.1 MG: 0.1 TABLET ORAL at 21:10

## 2018-08-29 RX ADMIN — HYDRALAZINE HYDROCHLORIDE 50 MG: 50 TABLET, FILM COATED ORAL at 21:10

## 2018-08-29 RX ADMIN — LOSARTAN POTASSIUM 100 MG: 50 TABLET ORAL at 15:52

## 2018-08-29 RX ADMIN — HYDRALAZINE HYDROCHLORIDE 50 MG: 50 TABLET, FILM COATED ORAL at 06:11

## 2018-08-29 RX ADMIN — INSULIN LISPRO 2 UNITS: 100 INJECTION, SOLUTION INTRAVENOUS; SUBCUTANEOUS at 17:28

## 2018-08-29 RX ADMIN — INSULIN LISPRO 4 UNITS: 100 INJECTION, SOLUTION INTRAVENOUS; SUBCUTANEOUS at 21:11

## 2018-08-29 RX ADMIN — AZTREONAM 500 MG: 1 INJECTION, POWDER, LYOPHILIZED, FOR SOLUTION INTRAMUSCULAR; INTRAVENOUS at 09:04

## 2018-08-29 RX ADMIN — DILTIAZEM HYDROCHLORIDE 240 MG: 240 CAPSULE, COATED, EXTENDED RELEASE ORAL at 08:27

## 2018-08-29 RX ADMIN — AZTREONAM 500 MG: 1 INJECTION, POWDER, LYOPHILIZED, FOR SOLUTION INTRAMUSCULAR; INTRAVENOUS at 02:51

## 2018-08-29 RX ADMIN — LEVETIRACETAM 500 MG: 500 TABLET ORAL at 17:28

## 2018-08-29 RX ADMIN — SIMVASTATIN 20 MG: 20 TABLET, FILM COATED ORAL at 21:10

## 2018-08-29 RX ADMIN — LEVETIRACETAM 250 MG: 500 TABLET ORAL at 17:31

## 2018-08-29 RX ADMIN — LEVETIRACETAM 500 MG: 500 TABLET ORAL at 08:31

## 2018-08-29 NOTE — PROGRESS NOTES
Progress Note Patient: Uli Telles MRN: [id number]  SSN: [id number] YOB: 1955  Age: 70 y.o. Sex: female Admit Date: 8/27/2018 LOS: 2 days Subjective:  
 
Patient with ESRD admitted for new onset seizure. Patient back at baseline and no fever. Objective:  
 
Vitals:  
 08/29/18 1100 08/29/18 1130 08/29/18 1200 08/29/18 1234 BP: 149/82 137/78 151/79 153/68 Pulse: (!) 109 (!) 106 (!) 104 99 Resp: 16 16 16 18 Temp:      
TempSrc:      
SpO2:      
Weight:      
  
 
Intake and Output: 
Current Shift: 08/29 0701 - 08/29 1900 In: -  
Out: [address] [Urine:200] Last three shifts: 08/27 1901 - 08/29 0700 In: 100 [I.V.:100] Out: - Physical Exam:  
GENERAL: alert, cooperative, slowed mentation, appears older than stated age LUNG: clear to auscultation bilaterally HEART: regular rate and rhythm, S1, S2 normal, no murmur, click, rub or gallop Lab/Data Review: All lab results for the last 24 hours reviewed. Blood and CSF cultures negative  Wbc 10.8 Assessment:  
 
Active Problems: 
  ESRD (end stage renal disease) ([facility] (8/8/2018) Bandemia (8/27/2018) New onset seizure ([facility] (8/27/2018) Plan: EEG   Will stop IV antibiotics   Ready to go back to SNF after EEG if dialysis catheter problem resolved. Signed By: David Canada MD   
 August 29, 2018

## 2018-08-29 NOTE — PROGRESS NOTES
Danis Tempelton (SALAZAR) sent SNF referral to [facility] in [place]. Informed [place] referral was sent.

## 2018-08-29 NOTE — DIALYSIS
ACUTE HEMODIALYSIS FLOW SHEET 
 
HEMODIALYSIS ORDERS: Physician: Joce Sanabria. Dialyzer: revaclear   Duration: 3.5 hr  BFR: 350   DFR: 800 Dialysate:  Temp 37 K+   3    Ca+  2.5 Na 138 Bicarb 35 Weight:  62.8 kg    Patient Chart [x]     Unable to Obtain []   Dry weight/UF Goal: [address] [facility] Heparin []  Bolus      Units    [] Hourly       Units    [x]None Catheter locking solution Heparin 1000units/mL Pre BP:   158/91    Pulse:     98     Temperature:   97.7  Respirations: 18  Tx: NS       ml/Bolus  Other        [x] N/A Labs: Pre        Post:        [x] N/A Additional Orders(medications, blood products, hypotension management):       [x] N/A [x] Casper Consent Verified CATHETER ACCESS: []N/A   [x]Right   []Left   []IJ     []Fem [] First use X-ray verified     [x]Tunnel                [] Non Tunneled [x]No S/S infection  []Redness  []Drainage []Cultured []Swelling []Pain  
[x]Medical Aseptic Prep Utilized   []Dressing Changed  [] Biopatch  Date: n/a []Clotted   []Patent   Flows: []Good  []Poor  [x]Reversed If access problem,  notified: [x]Yes  [facility]. Mago Pour   []N/A  Date:   8/29/2018 GENERAL ASSESSMENT:   
LUNGS:  Rate 18 SaO2%        [x] N/A    [x] Clear  [] Coarse  [] Crackles  [] Wheezing 
      [] Diminished     Location : []RLL   []LLL    []RUL  []STEPHY Cough: []Productive  []Dry  [x]N/A   Respirations:  [x]Easy  []Labored Therapy:  [x]RA  []NC  l/min    Mask: []NRB []Venti       O2% []Ventilator  []Intubated  [] Trach  [] BiPaP CARDIAC: [x]Regular      [] Irregular   [] Pericardial Rub  [] JVD []  Monitored  [] Bedside  [] Remotely monitored [] N/A  Rhythm: n;a  
EDEMA: [] None  [x]Generalized  [] Pitting [] 1    [] 2    [] 3    [] 4 [] Facial  [] Pedal  []  UE  [] LE  
SKIN:   [x] Warm  [] Hot     [] Cold   [x] Dry     [] Pale   [] Diaphoretic [] Flushed  [] Jaundiced  [] Cyanotic  [] Rash  [] Weeping LOC:    [x] Alert      [x]Oriented:    [x] Person     [] Place  []Time 
             [] Confused  [] Lethargic  [] Medicated  [] Non-responsive GI / ABDOMEN:  [] Flat    [] Distended    [x] Soft    [] Firm   []  Obese 
                           [] Diarrhea  [x] Bowel Sounds  [] Nausea  [] Vomiting  / URINE ASSESSMENT:[] Voiding   [x] Oliguria  [] Anuria   []  Khan [] Incontinent    []  Incontinent Brief      []  Bathroom Privileges PAIN: [x] 0 []1  []2   []3   []4   []5   []6   []7   []8   []9   []10 Scale 0-10  Action/Follow Up: N/A MOBILITY:  [] Amb    [] Amb/Assist    [x] Bed    [] Wheelchair  [] Stretcher All Vitals and Treatment Details on Attached Flowsheet Hospital: [facility] Room # 362 [] 1st Time Acute  [] Stat  [x] Routine  [] Urgent [x] Acute Room  []  Bedside  [] ICU/CCU  [] ER Isolation Precautions:  [x] Dialysis   [] Airborne   [] Contact    [] Reverse Special Considerations:         [] Blood Consent Verified [x]N/A ALLERGIES:   [] NKA   Amoxicillin, Cleocin, Codeine, Lorcet, Penicillin G, Shellfish Code Status:  [x] Full Code  [] DNR  [] Other HBsAg ONLY: Date Drawn 8/13/2018         [x]Negative []Positive []Unknown HBsAb: Date 8/13/2018    [x] Susceptible   [] Spacoq82 []Not Drawn  [] Drawn Current Labs:    Date of Labs: Today [x] Results for Ricardo Durham (MRN [id number]) as of 8/29/2018 11:28 Ref. Range 8/29/2018 02:50 8/29/2018 08:24 GLUCOSE,FAST - POC Latest Ref Range: 70 - 110 mg/dL  121 (H) WBC Latest Ref Range: 4.6 - 13.2 K/uL 10.8 RBC Latest Ref Range: 4.20 - 5.30 M/uL 3.42 (L) HGB Latest Ref Range: 12.0 - 16.0 g/dL 9.3 (L) HCT Latest Ref Range: 35.0 - 45.0 % 29.3 (L) MCV Latest Ref Range: 74.0 - 97.0 FL 85.7 MCH Latest Ref Range: 24.0 - 34.0 PG 27.2 MCHC Latest Ref Range: 31.0 - 37.0 g/dL 31.7 RDW Latest Ref Range: 11.6 - 14.5 % 15.9 (H) PLATELET Latest Ref Range: 135 - 420 K/uL 180 MPV Latest Ref Range: 9.2 - 11.8 FL 11.6 NEUTROPHILS Latest Ref Range: 40 - 73 % 79 (H) LYMPHOCYTES Latest Ref Range: 21 - 52 % 14 (L) MONOCYTES Latest Ref Range: 3 - 10 % 5 EOSINOPHILS Latest Ref Range: 0 - 5 % 2   
BASOPHILS Latest Ref Range: 0 - 2 % 0   
DF Latest Units:   AUTOMATED   
ABS. NEUTROPHILS Latest Ref Range: 1.8 - 8.0 K/UL 8.6 (H)   
ABS. LYMPHOCYTES Latest Ref Range: 0.9 - 3.6 K/UL 1.5   
ABS. MONOCYTES Latest Ref Range: 0.05 - 1.2 K/UL 0.6   
ABS. EOSINOPHILS Latest Ref Range: 0.0 - 0.4 K/UL 0.2   
ABS. BASOPHILS Latest Ref Range: 0.0 - 0.1 K/UL 0.0 Sodium Latest Ref Range: 136 - 145 mmol/L 144 Potassium Latest Ref Range: 3.5 - 5.5 mmol/L 3.9 Chloride Latest Ref Range: 100 - 108 mmol/L 114 (H) CO2 Latest Ref Range: 21 - 32 mmol/L 21 Anion gap Latest Ref Range: 3.0 - 18 mmol/L 9 Glucose Latest Ref Range: 74 - 99 mg/dL 132 (H) BUN Latest Ref Range: 7.0 - 18 MG/DL 34 (H) Creatinine Latest Ref Range: 0.6 - 1.3 MG/DL 5.36 (H) BUN/Creatinine ratio Latest Ref Range: 12 - 20   6 (L) Calcium Latest Ref Range: 8.5 - 10.1 MG/DL 8.1 (L) Phosphorus Latest Ref Range: 2.5 - 4.9 MG/DL 3.6 GFR est non-AA Latest Ref Range: >60 ml/min/1.73m2 8 (L) GFR est AA Latest Ref Range: >60 ml/min/1.73m2 10 (L) Vancomycin, random Latest Ref Range: 5.0 - 40.0 UG/ML 17.4 DIET:  [x] Renal    [] Other     [] NPO     []  Diabetic PRIMARY NURSE REPORT: First initial/Last name/Title Pre Dialysis: ANDREW Allan RN    [x][x][x][x][x][x][x][x][x][x][x][x][x][x][x][x][x][x][x][x][x][x][x][x][x][x][x][x][x][x][x][x][x][x][x][x][x][x][x][x][x][x][x][x][x][x][x][x][x][x][x][x][x][x][x][x][x][x][x][x][x][x][x][x][x][x][x][x][x][x][x][x][x][x][x][x][x][x][x][x][x][x][x][x][x][x][x][x][x][x]

## 2018-08-29 NOTE — PROGRESS NOTES
This CM talked with Nursing in regards to patient being able to return to [address] [place] [address] today. Patient is currently in dialysis and is scheduled to get an EEG today, and then may be ready for d/c. Talked with Admission at [address] [place] [address] and they stated that patient could return to the facility today, and requested clinical be sent to them for review. This CM requested that clinical be sent to the facility for review via [address]. Awaiting to hear when patient is ready to have tranportation set up for d/c back to facility. Will continue to follow and assist with d/c planning for return to SNF. SHU Robles, Care Manager.

## 2018-08-29 NOTE — PROGRESS NOTES
Problem: Falls - Risk of 
Goal: *Absence of Falls Document Loulou Snyder Fall Risk and appropriate interventions in the flowsheet. Outcome: Progressing Towards Goal 
Fall Risk Interventions: 
Mobility Interventions: Assess mobility with egress test, Bed/chair exit alarm, OT consult for ADLs, Patient to call before getting OOB, PT Consult for mobility concerns, PT Consult for assist device competence, Strengthening exercises (ROM-active/passive) Mentation Interventions: Adequate sleep, hydration, pain control, Bed/chair exit alarm, Door open when patient unattended, More frequent rounding, Reorient patient, Room close to nurse's station Medication Interventions: Assess postural VS orthostatic hypotension, Bed/chair exit alarm, Evaluate medications/consider consulting pharmacy, Patient to call before getting OOB, Teach patient to arise slowly Elimination Interventions: Bed/chair exit alarm, Call light in reach, Patient to call for help with toileting needs History of Falls Interventions: Bed/chair exit alarm, Consult care management for discharge planning, Door open when patient unattended, Investigate reason for fall, Room close to nurse's station Comments: Non skid footwear intact

## 2018-08-29 NOTE — PROGRESS NOTES
Spoke with patient's sister's regarding patient's \"home medications. \" Says she lives at [facility] and they would have that information. Sabi you faxed a copy of patient's medications over and I will put it in the system.

## 2018-08-29 NOTE — PROGRESS NOTES
RENAL PROGRESS NOTE: Pt seen on dialysis. Follow up of ESRD Subjective: Feels good, no more documented Seizure, no Fever. Patient is on Dialysis. Objective: had to reverse the catheter, still having high -ve   arerial pressure. Patient Vitals for the past 12 hrs: 
 Temp Pulse Resp BP SpO2  
08/29/18 1100 - (!) 109 16 149/82 -  
08/29/18 1030 - (!) 109 18 (!) 141/94 -  
08/29/18 1020 - (!) 110 16 132/82 -  
08/29/18 1000 97.7 °F (36.5 °C) 98 18 (!) 158/91 -  
08/29/18 0827 - 88 - 152/89 -  
08/29/18 0805 98 °F (36.7 °C) 88 17 152/89 97 % 08/29/18 0458 98.4 °F (36.9 °C) 81 18 111/74 97 % 08/29/18 0043 97.2 °F (36.2 °C) 81 18 111/65 97 % Intake/Output Summary (Last 24 hours) at 08/29/18 1116 Last data filed at 08/29/18 6622 Gross per 24 hour Intake                0 ml Output              200 ml Net             -200 ml Physical Assessment:  
 
General Appearance: NAD Lung: clear to auscultation Heart: regular rate and rhythm and no murmurs, clicks or gallops Lower Extremitiesno: edema Access: (R) IJ TDC  seems coming out Labs CBC w/Diff Recent Labs  
   08/29/18 
 0250  08/28/18 
 0142  08/27/18 
 1628 WBC  10.8  15.2*  26.2*  
RBC  3.42*  3.57*  4.18* HGB  9.3*  9.7*  11.5* HCT  29.3*  30.6*  36.0 MCV  85.7  85.7  86.1 MCH  27.2  27.2  27.5 MCHC  31.7  31.7  31.9  
RDW  15.9*  15.8*  15.7* Recent Labs  
   08/29/18 
 0250  08/28/18 
 0142  08/27/18 
 1628 BANDS   --    --   11* MONOS  5  6  2 EOS  2  0  0  
BASOS  0  0  0  
RDW  15.9*  15.8*  15.7* Comprehensive Metabolic Profile Recent Labs  
   08/29/18 
 0250  08/27/18 
 1628 NA  144  139  
K  3.9  3.9 CL  114*  105 CO2  21  24 BUN  34*  18  
CREA  5.36*  4.27* Recent Labs  
   08/29/18 
 0250  08/27/18 
 1628 CA  8.1*  8.0*  
PHOS  3.6  3.2 ALB   --   2.3* TP   --   6.3*  
SGOT   --   18  
TBILI   --   0.3 Basic Metabolic Profile results  reviwed. MEDS:Reviwed. Current Facility-Administered Medications Medication Dose Route Frequency Provider Last Rate Last Dose  doxercalciferol (HECTOROL) 4 mcg/2 mL injection 0.5 mcg  0.5 mcg IntraVENous DIALYSIS MON, WED & Aaronphilip Duran MD      
 epoetin raphael (EPOGEN;PROCRIT) 5,000 Units  5,000 Units IntraVENous DIALYSIS MON, WED & FRI Arnold Cortes MD      
 levETIRAcetam (KEPPRA) tablet 500 mg  500 mg Oral BID Jacqueline Andersen MD   500 mg at 08/29/18 4550  levETIRAcetam (KEPPRA) tablet 250 mg  250 mg Oral Q MON, WED & FRI Jacqueline Andersen MD      
 acetaminophen (TYLENOL) tablet 325 mg  325 mg Oral Q4H PRN Gwen Barahona MD      
 dilTIAZem CD (CARDIZEM CD) capsule 240 mg  240 mg Oral DAILY Gwen Barahona MD   240 mg at 08/29/18 0827  
 losartan (COZAAR) tablet 100 mg  100 mg Oral DAILY Gwen Barahona MD   100 mg at 08/28/18 0900  simvastatin (ZOCOR) tablet 20 mg  20 mg Oral QHS Gwen Barahona MD   20 mg at 08/28/18 2227  
 insulin lispro (HUMALOG) injection   SubCUTAneous AC&HS Gwen Barahona MD   Stopped at 08/28/18 2200  
 glucose chewable tablet 16 g  16 g Oral PRN Gwen Barahona MD      
 glucagon [facility]) injection 1 mg  1 mg IntraMUSCular PRN Gwen Barahona MD      
 dextrose (D50W) injection syrg 12.5-25 g  25-50 mL IntraVENous PRN Gwen Barahona MD      
 cloNIDine HCl (CATAPRES) tablet 0.1 mg  0.1 mg Oral Q12H Gwen Barahona MD   0.1 mg at 08/28/18 2226  hydrALAZINE (APRESOLINE) tablet 50 mg  50 mg Oral Q8H Gwen Barahona MD   50 mg at 08/29/18 2196 Impression: 
 
ESRD: Tolerating HD well.UF 2.5 kg Anemia of CKD: on  Epogen. Hyperparathyroidism Yes Plan Dialysis for volume and solute management, UF 2.5 kg. Epogen  5000 units. Hectorol: 0.5 microgram. 
Discussed with Isatu Feeler, PA  Of Vascular Surgery, she will address patient's HD catheter issue.  
 
 
 
Arnold Cortes MD

## 2018-08-29 NOTE — PROGRESS NOTES
Bedside and Verbal shift change report given to [address] [place] [address] (oncoming nurse) by Saniya Heredia RN 
 (offgoing nurse). Report given with SBAR, Navneet, MAR and Recent Results.

## 2018-08-30 ENCOUNTER — APPOINTMENT (OUTPATIENT)
Dept: INTERVENTIONAL RADIOLOGY/VASCULAR | Age: 63
DRG: 100 | End: 2018-08-30
Attending: SURGERY
Payer: MEDICARE

## 2018-08-30 LAB
GLUCOSE BLD STRIP.AUTO-MCNC: 120 MG/DL (ref 70–110)
GLUCOSE BLD STRIP.AUTO-MCNC: 123 MG/DL (ref 70–110)
GLUCOSE BLD STRIP.AUTO-MCNC: 130 MG/DL (ref 70–110)
GLUCOSE BLD STRIP.AUTO-MCNC: 157 MG/DL (ref 70–110)

## 2018-08-30 PROCEDURE — 05PY33Z REMOVAL OF INFUSION DEVICE FROM UPPER VEIN, PERCUTANEOUS APPROACH: ICD-10-PCS | Performed by: RADIOLOGY

## 2018-08-30 PROCEDURE — 74011250636 HC RX REV CODE- 250/636

## 2018-08-30 PROCEDURE — 74011250637 HC RX REV CODE- 250/637: Performed by: FAMILY MEDICINE

## 2018-08-30 PROCEDURE — 74011000250 HC RX REV CODE- 250: Performed by: RADIOLOGY

## 2018-08-30 PROCEDURE — 74011250636 HC RX REV CODE- 250/636: Performed by: RADIOLOGY

## 2018-08-30 PROCEDURE — 82962 GLUCOSE BLOOD TEST: CPT

## 2018-08-30 PROCEDURE — 05HM33Z INSERTION OF INFUSION DEVICE INTO RIGHT INTERNAL JUGULAR VEIN, PERCUTANEOUS APPROACH: ICD-10-PCS | Performed by: RADIOLOGY

## 2018-08-30 PROCEDURE — 65660000000 HC RM CCU STEPDOWN

## 2018-08-30 PROCEDURE — 76937 US GUIDE VASCULAR ACCESS: CPT

## 2018-08-30 PROCEDURE — 0JH63XZ INSERTION OF TUNNELED VASCULAR ACCESS DEVICE INTO CHEST SUBCUTANEOUS TISSUE AND FASCIA, PERCUTANEOUS APPROACH: ICD-10-PCS | Performed by: RADIOLOGY

## 2018-08-30 PROCEDURE — 74011250637 HC RX REV CODE- 250/637: Performed by: PSYCHIATRY & NEUROLOGY

## 2018-08-30 PROCEDURE — 74011636637 HC RX REV CODE- 636/637: Performed by: FAMILY MEDICINE

## 2018-08-30 RX ORDER — HEPARIN SODIUM (PORCINE) LOCK FLUSH IV SOLN 100 UNIT/ML 100 UNIT/ML
300 SOLUTION INTRAVENOUS AS NEEDED
Status: DISCONTINUED | OUTPATIENT
Start: 2018-08-30 | End: 2018-08-31 | Stop reason: HOSPADM

## 2018-08-30 RX ORDER — SODIUM CHLORIDE 0.9 % (FLUSH) 0.9 %
5-10 SYRINGE (ML) INJECTION EVERY 8 HOURS
Status: DISCONTINUED | OUTPATIENT
Start: 2018-08-30 | End: 2018-08-31 | Stop reason: HOSPADM

## 2018-08-30 RX ORDER — HEPARIN SODIUM 1000 [USP'U]/ML
5000 INJECTION, SOLUTION INTRAVENOUS; SUBCUTANEOUS ONCE
Status: COMPLETED | OUTPATIENT
Start: 2018-08-30 | End: 2018-08-30

## 2018-08-30 RX ORDER — SODIUM CHLORIDE 0.9 % (FLUSH) 0.9 %
5-10 SYRINGE (ML) INJECTION AS NEEDED
Status: DISCONTINUED | OUTPATIENT
Start: 2018-08-30 | End: 2018-08-31 | Stop reason: HOSPADM

## 2018-08-30 RX ORDER — FENTANYL CITRATE 50 UG/ML
50 INJECTION, SOLUTION INTRAMUSCULAR; INTRAVENOUS
Status: DISCONTINUED | OUTPATIENT
Start: 2018-08-30 | End: 2018-08-30

## 2018-08-30 RX ORDER — HEPARIN SODIUM 1000 [USP'U]/ML
INJECTION, SOLUTION INTRAVENOUS; SUBCUTANEOUS
Status: COMPLETED
Start: 2018-08-30 | End: 2018-08-30

## 2018-08-30 RX ORDER — NALOXONE HYDROCHLORIDE 0.4 MG/ML
0.1 INJECTION, SOLUTION INTRAMUSCULAR; INTRAVENOUS; SUBCUTANEOUS
Status: DISCONTINUED | OUTPATIENT
Start: 2018-08-30 | End: 2018-08-31 | Stop reason: HOSPADM

## 2018-08-30 RX ORDER — FLUMAZENIL 0.1 MG/ML
0.2 INJECTION INTRAVENOUS
Status: DISCONTINUED | OUTPATIENT
Start: 2018-08-30 | End: 2018-08-31 | Stop reason: HOSPADM

## 2018-08-30 RX ORDER — MIDAZOLAM HYDROCHLORIDE 1 MG/ML
1 INJECTION, SOLUTION INTRAMUSCULAR; INTRAVENOUS
Status: DISCONTINUED | OUTPATIENT
Start: 2018-08-30 | End: 2018-08-30

## 2018-08-30 RX ADMIN — Medication 10 ML: at 17:00

## 2018-08-30 RX ADMIN — INSULIN LISPRO 2 UNITS: 100 INJECTION, SOLUTION INTRAVENOUS; SUBCUTANEOUS at 21:35

## 2018-08-30 RX ADMIN — LEVETIRACETAM 500 MG: 500 TABLET ORAL at 17:18

## 2018-08-30 RX ADMIN — CLONIDINE HYDROCHLORIDE 0.1 MG: 0.1 TABLET ORAL at 09:44

## 2018-08-30 RX ADMIN — HYDRALAZINE HYDROCHLORIDE 50 MG: 50 TABLET, FILM COATED ORAL at 05:37

## 2018-08-30 RX ADMIN — HEPARIN SODIUM 5000 UNITS: 1000 INJECTION, SOLUTION INTRAVENOUS; SUBCUTANEOUS at 16:14

## 2018-08-30 RX ADMIN — SIMVASTATIN 20 MG: 20 TABLET, FILM COATED ORAL at 21:34

## 2018-08-30 RX ADMIN — LEVETIRACETAM 500 MG: 500 TABLET ORAL at 09:44

## 2018-08-30 RX ADMIN — FENTANYL CITRATE 50 MCG: 50 INJECTION INTRAMUSCULAR; INTRAVENOUS at 16:05

## 2018-08-30 RX ADMIN — DILTIAZEM HYDROCHLORIDE 240 MG: 240 CAPSULE, COATED, EXTENDED RELEASE ORAL at 09:44

## 2018-08-30 RX ADMIN — SODIUM CHLORIDE 1000 MG: 900 INJECTION, SOLUTION INTRAVENOUS at 15:50

## 2018-08-30 RX ADMIN — LIDOCAINE HYDROCHLORIDE 300 MG: 10; .005 INJECTION, SOLUTION EPIDURAL; INFILTRATION; INTRACAUDAL; PERINEURAL at 16:03

## 2018-08-30 RX ADMIN — MIDAZOLAM HYDROCHLORIDE 1 MG: 1 INJECTION, SOLUTION INTRAMUSCULAR; INTRAVENOUS at 16:00

## 2018-08-30 RX ADMIN — Medication 10 ML: at 21:39

## 2018-08-30 RX ADMIN — MIDAZOLAM HYDROCHLORIDE 1 MG: 1 INJECTION, SOLUTION INTRAMUSCULAR; INTRAVENOUS at 16:05

## 2018-08-30 RX ADMIN — HYDRALAZINE HYDROCHLORIDE 50 MG: 50 TABLET, FILM COATED ORAL at 21:34

## 2018-08-30 RX ADMIN — FENTANYL CITRATE 50 MCG: 50 INJECTION INTRAMUSCULAR; INTRAVENOUS at 16:00

## 2018-08-30 RX ADMIN — LOSARTAN POTASSIUM 100 MG: 50 TABLET ORAL at 09:44

## 2018-08-30 RX ADMIN — HYDRALAZINE HYDROCHLORIDE 50 MG: 50 TABLET, FILM COATED ORAL at 13:25

## 2018-08-30 NOTE — PROCEDURES
RADIOLOGY POST PROCEDURE NOTE     August 30, 2018       5:15 PM     Preoperative Diagnosis:   Malfunctioning TDC,    Postoperative Diagnosis:  Same. :  Dr. Ana Wright    Assistant:  None. Type of Anesthesia: 1% plain lidocaine and IV moderate sedation with Versed and Fentanyl. Procedure/Description:      1. Image guided RIJ TDC removal.  2. Image guided RIJ new TDC placement. Findings:   No bleeding. Estimated blood Loss:  Minimal    Specimen Removed:   no    Blood transfusions:  None. Implants: 14.5F double lumen 19 cm Palindrome RIJ TDC. Complications: None    Condition: Stable    Discharge Plan:  continue present therapy     Catheter can be used now.     Alec Duenas MD

## 2018-08-30 NOTE — PROGRESS NOTES
Progress Note Patient: Eduard Elkins MRN: [id number]  SSN: [id number] YOB: 1955  Age: 70 y.o. Sex: female Admit Date: 8/27/2018 LOS: 3 days Subjective:  
 
Patient with ESRD admitted for new onset seizure. No further seizures. Problem with dialysis catheter. Objective:  
 
Vitals:  
 08/30/18 1610 08/30/18 1615 08/30/18 1620 08/30/18 1641 BP: 145/62 128/60 132/62 135/81 Pulse: 86 91 89 92 Resp: 13 12 13 16 Temp:    98.4 °F (36.9 °C) TempSrc:      
SpO2: 100% 100% 99% 98% Weight:      
  
 
Intake and Output: 
Current Shift:   
Last three shifts: 08/28 1901 - 08/30 0700 In: 1240 [P.O.:1240] Out: 0712 [[id number]] Physical Exam:  
GENERAL: alert, cooperative, no distress, slowed mentation, appears older than stated age LUNG: clear to auscultation bilaterally HEART: regular rate and rhythm, S1, S2 normal, no murmur, click, rub or gallop Lab/Data Review: All lab results for the last 24 hours reviewed. All cultures negative EEG pending Assessment:  
 
Active Problems: 
  ESRD (end stage renal disease) ([facility] (8/8/2018) Bandemia (8/27/2018) New onset seizure ([facility] (8/27/2018) Plan:  
 
Transfer back to [facility] after dialysis catheter changed. Signed By: Dianne Diop MD   
 August 30, 2018

## 2018-08-30 NOTE — PROCEDURES
[address] [place] [address] Dr TALA Garcia Search  MR#: [id number]  : 1955  ACCOUNT #: [de-identified]   DATE OF SERVICE: 2018    ELECTROENCEPHALOGRAM NUMBER:  . HISTORY:  A 25-year-old female with a history of new onset seizures. MEDICATIONS:  Include Catapres, diltiazem, Epogen, GlucaGen, hydralazine, Humalog, Keppra, Cozaar, Zocor. ELECTROENCEPHALOGRAM REPORT:  This is a 16-channel routine EEG done using the International 10-20 electrode placement system. The predominant background consists of posterior predominant 8-9 Hz activity which is of low amplitude, somewhat irregular, and symmetric without appreciable reactivity to eye opening. This is intermixed with prominent and frequent beta activity. Hyperventilation was not performed. There were no abnormal photoparoxysmal responses. No epileptiform abnormalities were observed. IMPRESSION:  This is a normal awake EEG. It does show the presence of beta activity, which could be secondary to a medication effect.       MD Shea King  D: 2018 10:58     T: 2018 12:49  JOB #: [id number]

## 2018-08-30 NOTE — PROGRESS NOTES
Problem: Falls - Risk of 
Goal: *Absence of Falls Document Santhosh Car Fall Risk and appropriate interventions in the flowsheet. Outcome: Progressing Towards Goal 
Fall Risk Interventions: 
Mobility Interventions: Assess mobility with egress test, Bed/chair exit alarm Mentation Interventions: Adequate sleep, hydration, pain control Medication Interventions: Bed/chair exit alarm Elimination Interventions: Bed/chair exit alarm History of Falls Interventions: Bed/chair exit alarm Problem: Falls - Risk of 
Goal: *Absence of Falls Document Santhosh Car Fall Risk and appropriate interventions in the flowsheet. Outcome: Progressing Towards Goal 
Fall Risk Interventions: 
Mobility Interventions: Assess mobility with egress test, Bed/chair exit alarm Mentation Interventions: Adequate sleep, hydration, pain control Medication Interventions: Bed/chair exit alarm Elimination Interventions: Bed/chair exit alarm History of Falls Interventions: Bed/chair exit alarm

## 2018-08-30 NOTE — PROGRESS NOTES
CMS went to speak with the pt in regards to the [employer] from [employer] About Your Rights. \"  Pt was able to review, but unable to sign the documents provided. No family were present at bedside. Unsigned, but noted documents can be found in the chart for review; additional copies were left at pt bedside for family review.

## 2018-08-30 NOTE — PROGRESS NOTES
TRANSFER - OUT REPORT: 
 
Verbal report given to RAYO Kenyon (name) on Eduard Elkins  being transferred to (unit) for routine post - op Report consisted of patients Situation, Background, Assessment and  
Recommendations(SBAR). Information from the following report(s) SBAR, Kardex and MAR was reviewed with the receiving nurse. Lines:  
Peripheral IV 08/28/18 Right Wrist (Active) Site Assessment Clean, dry, & intact 8/29/2018  8:31 AM  
Phlebitis Assessment 0 8/29/2018  8:31 AM  
Infiltration Assessment 0 8/29/2018  8:31 AM  
Dressing Status Clean, dry, & intact 8/29/2018  8:31 AM  
Dressing Type Transparent 8/29/2018  8:31 AM  
Hub Color/Line Status Yellow;Capped;Flushed 8/29/2018  8:31 AM  
Action Taken Other (comment) 8/29/2018  8:31 AM  
Alcohol Cap Used Yes 8/29/2018  8:31 AM  
   
Peripheral IV 08/30/18 Left Wrist (Active) Site Assessment Clean, dry, & intact 8/30/2018  3:30 PM  
Phlebitis Assessment 0 8/30/2018  3:30 PM  
Infiltration Assessment 0 8/30/2018  3:30 PM  
Dressing Status Clean, dry, & intact 8/30/2018  3:30 PM  
Dressing Type Transparent;Tape 8/30/2018  3:30 PM  
Hub Color/Line Status Blue;Flushed;Patent;Capped 8/30/2018  3:30 PM  
Action Taken Other (comment) 8/30/2018  3:30 PM  
Alcohol Cap Used Yes 8/30/2018  3:30 PM  
  
 
Opportunity for questions and clarification was provided. Patient transported with: 
 [employer]

## 2018-08-30 NOTE — PROGRESS NOTES
Progress Note Yamileth Loza 70 y.o. Admit Date: 8/27/2018 Active Problems: 
  ESRD (end stage renal disease) ([facility] (8/8/2018) POA: Unknown Bandemia (8/27/2018) POA: Unknown [facility] (8/27/2018) POA: Unknown Subjective:  
 
Patient feels good, old dialysis catheter is anchored with part of it is out , IR will replace the catheter as 2 recent cath done by Vascular surgery did not work  Good. A comprehensive review of systems was negative except for that written in the History of Present Illness. Objective:  
 
Visit Vitals  /84 (BP 1 Location: Left arm, BP Patient Position: At rest)  Pulse 85  Temp 97.3 °F (36.3 °C)  Resp 19  Wt 62.5 kg (137 lb 12.8 oz)  SpO2 98%  BMI 24.41 kg/m2 Intake/Output Summary (Last 24 hours) at 08/30/18 1536 Last data filed at 08/30/18 1319 Gross per 24 hour Intake              840 ml Output             1300 ml Net             -460 ml  
 
 
Current Facility-Administered Medications Medication Dose Route Frequency Provider Last Rate Last Dose  vancomycin (VANCOCIN) 1,000 mg in 0.9% sodium chloride (MBP/ADV) 250 mL adv  1,000 mg IntraVENous ONCE Alec Duenas MD      
 sodium chloride (NS) flush 5-10 mL  5-10 mL IntraVENous Q8H Alec Duenas MD      
 sodium chloride (NS) flush 5-10 mL  5-10 mL IntraVENous PRN Alec Duenas MD      
 flumazenil (ROMAZICON) 0.1 mg/mL injection 0.2 mg  0.2 mg IntraVENous Multiple Alec Duenas MD      
 naloxone (NARCAN) injection 0.1 mg  0.1 mg IntraVENous Multiple Alec Duenas MD      
 midazolam (VERSED) injection 1 mg  1 mg IntraVENous Yue Duenas MD      
 fentaNYL citrate (PF) injection 50 mcg  50 mcg IntraVENous Yue Duenas MD      
 lidocaine-EPINEPHrine (PF) (XYLOCAINE) 1 %-1:200,000 injection 300 mg  30 mL SubCUTAneous Soo Malave MD      
  doxercalciferol (HECTOROL) 4 mcg/2 mL injection 0.5 mcg  0.5 mcg IntraVENous DIALYSIS MON, WED & Carmel Michaud MD      
 epoetin raphael (EPOGEN;PROCRIT) 5,000 Units  5,000 Units IntraVENous DIALYSIS MON, WED & Carmel Michaud MD   5,000 Units at 08/29/18 1330  levETIRAcetam (KEPPRA) tablet 500 mg  500 mg Oral BID Teim Morales MD   500 mg at 08/30/18 5781  levETIRAcetam (KEPPRA) tablet 250 mg  250 mg Oral Q MON, WED & FRI Temi Morales MD   250 mg at 08/29/18 1731  acetaminophen (TYLENOL) tablet 325 mg  325 mg Oral Q4H PRN Ashley Bettencourt MD      
 dilTIAZem CD (CARDIZEM CD) capsule 240 mg  240 mg Oral DAILY Ashley Bettencourt MD   240 mg at 08/30/18 8920  losartan (COZAAR) tablet 100 mg  100 mg Oral DAILY Ashley Bettencourt MD   100 mg at 08/30/18 5169  simvastatin (ZOCOR) tablet 20 mg  20 mg Oral QHS Ashley Bettencourt MD   20 mg at 08/29/18 2110  
 insulin lispro (HUMALOG) injection   SubCUTAneous AC&HS Ashley Bettencourt MD   Stopped at 08/30/18 0730  
 glucose chewable tablet 16 g  16 g Oral PRN Ashley Bettencourt MD      
 glucagon [facility]) injection 1 mg  1 mg IntraMUSCular PRN Ashley Bettencourt MD      
 dextrose (D50W) injection syrg 12.5-25 g  25-50 mL IntraVENous PRN Ashley Bettencourt MD      
 cloNIDine HCl (CATAPRES) tablet 0.1 mg  0.1 mg Oral Q12H Ashley Bettencourt MD   0.1 mg at 08/30/18 2052  hydrALAZINE (APRESOLINE) tablet 50 mg  50 mg Oral Q8H Ashley Bettencourt MD   50 mg at 08/30/18 1325 Physical Exam:  
 
Physical Exam:  
General:  Alert, cooperative, no distress, appears stated age. Neck: Supple, symmetrical, trachea midline, no adenopathy, thyroid: no enlargement/tenderness/nodules, no carotid bruit and no JVD. Lungs:   Clear to auscultation bilaterally. Heart:  Regular rate and rhythm, S1, S2 normal, no murmur, click, rub or gallop. Abdomen:   Soft, non-tender. Bowel sounds normal. No masses,  No organomegaly. Extremities: Extremities normal, atraumatic, no cyanosis or edema, has  Partly out HD catheter. No drainage. Skin: Skin color, texture, turgor normal. No rashes or lesions Data Review: CBC w/Diff Recent Labs  
   08/29/18 0250 08/28/18 0142 08/27/18 
 1628 WBC  10.8  15.2*  26.2*  
RBC  3.42*  3.57*  4.18* HGB  9.3*  9.7*  11.5* HCT  29.3*  30.6*  36.0 MCV  85.7  85.7  86.1 MCH  27.2  27.2  27.5 MCHC  31.7  31.7  31.9  
RDW  15.9*  15.8*  15.7* Recent Labs  
   08/29/18 
 0250 08/28/18 0142 08/27/18 
 1628 BANDS   --    --   11* MONOS  5  6  2 EOS  2  0  0  
BASOS  0  0  0  
RDW  15.9*  15.8*  15.7* Comprehensive Metabolic Profile Recent Labs  
   08/29/18 0250 08/27/18 
 1628 NA  144  139  
K  3.9  3.9 CL  114*  105 CO2  21  24 BUN  34*  18  
CREA  5.36*  4.27* Recent Labs  
   08/29/18 
 0250 08/27/18 
 1628 CA  8.1*  8.0*  
PHOS  3.6  3.2 ALB   --   2.3* TP   --   6.3*  
SGOT   --   18  
TBILI   --   0.3  
  
 
 
 
 
 
} 
 
 
 
Impression: Active Hospital Problems Diagnosis Date Noted  Bandemia 08/27/2018  New onset seizure ([facility] 08/27/2018  ESRD (end stage renal disease) ([facility] 08/08/2018 Plan:  
 
Dialysis tomorrow once has  Anew catheter. Discussd with her sister at the bed side, she will give consent.  
 
 
Flores Mccallum MD

## 2018-08-30 NOTE — PROGRESS NOTES
Bedside and Verbal shift change report given to [employer] (oncoming nurse) by Geoff Sanchez RN 
 (offgoing nurse). Report given with SBAR, Kardex, MAR and Recent Results.

## 2018-08-31 VITALS
BODY MASS INDEX: 24.3 KG/M2 | DIASTOLIC BLOOD PRESSURE: 82 MMHG | OXYGEN SATURATION: 100 % | SYSTOLIC BLOOD PRESSURE: 141 MMHG | RESPIRATION RATE: 18 BRPM | WEIGHT: 137.2 LBS | HEART RATE: 108 BPM | TEMPERATURE: 98.3 F

## 2018-08-31 LAB
ANION GAP SERPL CALC-SCNC: 8 MMOL/L (ref 3–18)
BASOPHILS # BLD: 0 K/UL (ref 0–0.1)
BASOPHILS NFR BLD: 0 % (ref 0–2)
BUN SERPL-MCNC: 26 MG/DL (ref 7–18)
BUN/CREAT SERPL: 5 (ref 12–20)
CALCIUM SERPL-MCNC: 8.7 MG/DL (ref 8.5–10.1)
CHLORIDE SERPL-SCNC: 113 MMOL/L (ref 100–108)
CO2 SERPL-SCNC: 24 MMOL/L (ref 21–32)
CREAT SERPL-MCNC: 5.15 MG/DL (ref 0.6–1.3)
DIFFERENTIAL METHOD BLD: ABNORMAL
EOSINOPHIL # BLD: 0.2 K/UL (ref 0–0.4)
EOSINOPHIL NFR BLD: 3 % (ref 0–5)
ERYTHROCYTE [DISTWIDTH] IN BLOOD BY AUTOMATED COUNT: 16.3 % (ref 11.6–14.5)
GLUCOSE BLD STRIP.AUTO-MCNC: 126 MG/DL (ref 70–110)
GLUCOSE BLD STRIP.AUTO-MCNC: 196 MG/DL (ref 70–110)
GLUCOSE SERPL-MCNC: 135 MG/DL (ref 74–99)
HCT VFR BLD AUTO: 32.9 % (ref 35–45)
HGB BLD-MCNC: 10.4 G/DL (ref 12–16)
LYMPHOCYTES # BLD: 1.1 K/UL (ref 0.9–3.6)
LYMPHOCYTES NFR BLD: 12 % (ref 21–52)
MCH RBC QN AUTO: 27.7 PG (ref 24–34)
MCHC RBC AUTO-ENTMCNC: 31.6 G/DL (ref 31–37)
MCV RBC AUTO: 87.7 FL (ref 74–97)
MONOCYTES # BLD: 0.7 K/UL (ref 0.05–1.2)
MONOCYTES NFR BLD: 7 % (ref 3–10)
NEUTS SEG # BLD: 7.1 K/UL (ref 1.8–8)
NEUTS SEG NFR BLD: 78 % (ref 40–73)
PHOSPHATE SERPL-MCNC: 4.3 MG/DL (ref 2.5–4.9)
PLATELET # BLD AUTO: 186 K/UL (ref 135–420)
PMV BLD AUTO: 11.5 FL (ref 9.2–11.8)
POTASSIUM SERPL-SCNC: 4.2 MMOL/L (ref 3.5–5.5)
RBC # BLD AUTO: 3.75 M/UL (ref 4.2–5.3)
SODIUM SERPL-SCNC: 145 MMOL/L (ref 136–145)
WBC # BLD AUTO: 9.2 K/UL (ref 4.6–13.2)

## 2018-08-31 PROCEDURE — 74011250637 HC RX REV CODE- 250/637: Performed by: FAMILY MEDICINE

## 2018-08-31 PROCEDURE — 74011250637 HC RX REV CODE- 250/637: Performed by: PSYCHIATRY & NEUROLOGY

## 2018-08-31 PROCEDURE — 77030022017 HC DRSG HEMO QCLOT ZMED -A

## 2018-08-31 PROCEDURE — 85025 COMPLETE CBC W/AUTO DIFF WBC: CPT | Performed by: INTERNAL MEDICINE

## 2018-08-31 PROCEDURE — 77030031139 HC SUT VCRL2 J&J -A

## 2018-08-31 PROCEDURE — 82962 GLUCOSE BLOOD TEST: CPT

## 2018-08-31 PROCEDURE — 80048 BASIC METABOLIC PNL TOTAL CA: CPT | Performed by: INTERNAL MEDICINE

## 2018-08-31 PROCEDURE — 90935 HEMODIALYSIS ONE EVALUATION: CPT

## 2018-08-31 PROCEDURE — C1750 CATH, HEMODIALYSIS,LONG-TERM: HCPCS

## 2018-08-31 PROCEDURE — C1769 GUIDE WIRE: HCPCS

## 2018-08-31 PROCEDURE — C1893 INTRO/SHEATH, FIXED,NON-PEEL: HCPCS

## 2018-08-31 PROCEDURE — 74011250636 HC RX REV CODE- 250/636: Performed by: INTERNAL MEDICINE

## 2018-08-31 PROCEDURE — 36415 COLL VENOUS BLD VENIPUNCTURE: CPT | Performed by: INTERNAL MEDICINE

## 2018-08-31 PROCEDURE — 74011636637 HC RX REV CODE- 636/637: Performed by: FAMILY MEDICINE

## 2018-08-31 PROCEDURE — 84100 ASSAY OF PHOSPHORUS: CPT | Performed by: INTERNAL MEDICINE

## 2018-08-31 RX ORDER — LEVETIRACETAM 500 MG/1
500 TABLET ORAL 2 TIMES DAILY
Qty: 30 TAB | Refills: 1 | Status: ON HOLD
Start: 2018-08-31 | End: 2019-04-19

## 2018-08-31 RX ORDER — LEVETIRACETAM 250 MG/1
250 TABLET ORAL
Qty: 30 TAB | Refills: 1 | Status: SHIPPED
Start: 2018-08-31

## 2018-08-31 RX ADMIN — HYDRALAZINE HYDROCHLORIDE 50 MG: 50 TABLET, FILM COATED ORAL at 06:08

## 2018-08-31 RX ADMIN — Medication 10 ML: at 06:10

## 2018-08-31 RX ADMIN — INSULIN LISPRO 2 UNITS: 100 INJECTION, SOLUTION INTRAVENOUS; SUBCUTANEOUS at 13:45

## 2018-08-31 RX ADMIN — ERYTHROPOIETIN 5000 UNITS: 3000 INJECTION, SOLUTION INTRAVENOUS; SUBCUTANEOUS at 12:37

## 2018-08-31 RX ADMIN — CLONIDINE HYDROCHLORIDE 0.1 MG: 0.1 TABLET ORAL at 13:29

## 2018-08-31 RX ADMIN — HYDRALAZINE HYDROCHLORIDE 50 MG: 50 TABLET, FILM COATED ORAL at 13:29

## 2018-08-31 RX ADMIN — DOXERCALCIFEROL 0.5 MCG: 4 INJECTION, SOLUTION INTRAVENOUS at 12:37

## 2018-08-31 RX ADMIN — LEVETIRACETAM 500 MG: 500 TABLET ORAL at 09:13

## 2018-08-31 RX ADMIN — LOSARTAN POTASSIUM 100 MG: 50 TABLET ORAL at 13:46

## 2018-08-31 RX ADMIN — DILTIAZEM HYDROCHLORIDE 240 MG: 240 CAPSULE, COATED, EXTENDED RELEASE ORAL at 13:29

## 2018-08-31 NOTE — ROUTINE PROCESS
Bedside shift change report given to Jyoti Snyder (oncoming nurse) by Guadalupe Hassan (offgoing nurse). Report included the following information SBAR, Intake/Output, MAR, Recent Results and Cardiac Rhythm NSR.

## 2018-08-31 NOTE — DIALYSIS
ACUTE HEMODIALYSIS FLOW SHEET 
 
HEMODIALYSIS ORDERS: Physician: Nico Nayak Dialyzer: revaclear   Duration: 3.25 hr  BFR: 350   DFR: 800 Dialysate:  Temp 37 K+   2    Ca+  2.5 Na 138 Bicarb 35 Weight:  62.2 kg    Patient Chart [x]     Unable to Obtain []   Dry weight/UF Goal: 2000ml  Access RT chest CVL Heparin [x]  Bolus  1[x][x][x][x][x][x][x][x][x][x][x][x][x][x][x][x][x][x][x][x][x][x][x][x][x][x][x][x][x][x][x][x][x][x][x][x][x][x][x][x][x][x][x][x][x][x][x][x][x][x][x][x][x][x][x][x][x][x][x][x][x][x][x][x][x][x][x][x][x][x][x][x][x][x][x][x][x][x][x][x][x][x][x][x][x][x][x][x][x][x][x][x][x][x][x][x][x][x][x][x][x][x][x][x][x][x][x][x][x][x][x][x][x][x][x][x][x][x][x][x][x][x][x][x][x][x][x][x][x][x][x][x][x][x][x][x][x][x][x][x][x][x][x][x][x][x][x][x][x][x][x][x][x][x][x][x][x][x][x][x][x][x][x][x][x][x][x][x][x][x][x][x][x][x][x][x][x][x][x][x][x][x][x][x][x][x][x][x][x][x][x][x][x][x][x][x][x][x][x][x][x][x][x][x][x][x][x][x][x][x][x][x][x][x][x][x][x][x][x][x][x][x][x][x][x][x][x][x][x][x][x][x][x][x][x][x][x][x][x][x][x][x][x][x][x][x][x][x][x][x][x][x][x][x][x][x][x][x][x][x][x][x][x][x][x][x][x][x][x][x][x][x][x][x][x]

## 2018-08-31 NOTE — DISCHARGE SUMMARY
Discharge Summary     Patient: Jose Rapp MRN: [id number]  SSN: [id number]    YOB: 1955  Age: 70 y.o. Sex: female       Admit Date: 8/27/2018    Discharge Date: 8/31/2018      Admission Diagnoses: New onset seizure ([facility]  Bandemia  ESRD (end stage renal disease) ([facility]    Discharge Diagnoses:   Problem List as of 8/31/2018  Date Reviewed: 8/27/2018          Codes Class Noted - Resolved    Bandemia ICD-10-CM: M15.927  ICD-9-CM: 288.66  8/27/2018 - Present        New onset seizure ([facility] ICD-10-CM: R56.9  ICD-9-CM: 780.39  8/27/2018 - Present        ESRD (end stage renal disease) ([facility] ICD-10-CM: N18.6  ICD-9-CM: 585.6  8/8/2018 - Present        Secondary hyperparathyroidism of renal origin [facility]) ICD-10-CM: N25.81  ICD-9-CM: 588.81  8/8/2018 - Present        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 Patient given keppra and no further seizure activity. WBC initially elevated so LP done. Patient did have trouble with the dialysis catheter which has now been changed. Patient will be transferred back to the SNF with addition of keppra.     Consults: Nephrology, Neurology and Vascular Surgery    Significant Diagnostic Studies: microbiology: blood culture: negative and csf culture negative EEG    Disposition: SNF    Discharge Medications:   Current Discharge Medication List      START taking these medications    Details   !! levETIRAcetam (KEPPRA) 250 mg tablet Take 1 Tab by mouth every Monday, Wednesday, Friday. Qty: 30 Tab, Refills: 1      !! levETIRAcetam (KEPPRA) 500 mg tablet Take 1 Tab by mouth two (2) times a day. Qty: 30 Tab, Refills: 1       !! - Potential duplicate medications found. Please discuss with provider. CONTINUE these medications which have NOT CHANGED    Details   hydrALAZINE (APRESOLINE) 50 mg tablet Take 1 Tab by mouth three (3) times daily. Qty: 90 Tab, Refills: 0      simvastatin (ZOCOR) 20 mg tablet Take 1 Tab by mouth nightly. Qty: 30 Tab, Refills: 0      insulin glargine (LANTUS) 100 unit/mL injection 10 Units by SubCUTAneous route nightly. Qty: 1 Vial, Refills: 0      losartan (COZAAR) 100 mg tablet Take 1 Tab by mouth daily. Qty: 30 Tab, Refills: 0      diltiazem CD (CARDIZEM CD) 240 mg ER capsule Take 240 mg by mouth daily. potassium chloride (KLOR-CON) 20 mEq packet Take 1 Packet by mouth two (2) times daily (with meals). Qty: 30 Packet, Refills: 0      doxercalciferol (HECTOROL) 4 mcg/2 mL injection 0.5 mL by IntraVENous route Every Tuesday, Thursday and Saturday. Qty: 1 mL, Refills: 0      epoetin raphael (EPOGEN;PROCRIT) 3,000 unit/mL injection 1.73 mL by SubCUTAneous route Every Tuesday, Thursday and Saturday. Indications: ANEMIA DUE TO RENAL FAILURE, Renal Dialysis  Qty: 1 Vial, Refills: 0      insulin lispro (HUMALOG) 100 unit/mL injection INITIATE INSULIN CORRECTIVE PROTOCOL: Normal Insulin Sensitivity   For Blood Sugar (mg/dL) of:     Less than 150 =   0 units           150 -199 =   2 units  200 -249 =   4 units  250 -299 =   6 units  300 -349 =   8 units  350 and above = 10 units and Call Physician  If 2 glucose readings are above  Qty: 1 Vial, Refills: 0      aspirin 81 mg tablet Take 81 mg by mouth daily. cloNIDine (CATAPRES) 0.1 mg tablet Take 0.1 mg by mouth two (2) times a day. magnesium hydroxide ([place] MILK OF MAGNESIA) 400 mg/5 mL suspension Take 30 mL by mouth daily as needed.         acetaminophen (TYLENOL) 325 mg tablet Take 325 mg by mouth as needed.                Activity: Activity as tolerated  Diet: Renal Diet  Wound Care: None needed    Follow-up Appointments   Procedures    FOLLOW UP VISIT Appointment in: One Month With Neurology Dr. Doc Paul 1 month after discharge     With Neurology Dr. Doc Paul 1 month after discharge     Standing Status:   Standing     Number of Occurrences:   1     Order Specific Question:   Appointment in     Answer:   One Month       Signed By: Rhiannon Ribeiro MD     August 31, 2018

## 2018-08-31 NOTE — PROGRESS NOTES
Per Prakash (SALAZAR) called Medicaid and received trip # [id number] from [place]. Called [facility] to schedule transport, [facility] has already scheduled transport for 1:00pm to [facility]. Informed Prakash of transportation arrangements.

## 2018-08-31 NOTE — PROGRESS NOTES
RENAL PROGRESS NOTE: Pt seen on dialysis. Follow up of ESRD Subjective: Feels good, no new complain, Patient is on Dialysis. Objective:TDCwaschangebreanne IR yesterday Patient Vitals for the past 12 hrs: 
 Temp Pulse Resp BP SpO2  
08/31/18 1100 - (!) 108 - 153/89 -  
08/31/18 1030 - (!) 106 - 159/90 -  
08/31/18 1000 - (!) 104 - 129/80 -  
08/31/18 0937 - (!) 104 - 167/80 -  
08/31/18 0927 98.5 °F (36.9 °C) (!) 106 16 155/78 -  
08/31/18 0759 97.6 °F (36.4 °C) (!) 109 18 180/90 100 % 08/31/18 0308 98.3 °F (36.8 °C) 80 18 (!) 152/95 97 % 08/31/18 0000 98.5 °F (36.9 °C) 81 18 132/71 97 % Intake/Output Summary (Last 24 hours) at 08/31/18 1143 Last data filed at 08/31/18 1485 Gross per 24 hour Intake              950 ml Output              550 ml Net              400 ml Physical Assessment:  
 
General Appearance: NAD Lung: clear to auscultation Heart: regular rate and rhythm and no murmurs, clicks or gallops Lower Extremities:no  edema Access: (R)IJ TDC,qb 350, had to reverse ,does not work  qb 400 Labs CBC w/Diff Recent Labs 08/31/18 
 0320  08/29/18 
 0250 WBC  9.2  10.8 RBC  3.75*  3.42* HGB  10.4*  9.3* HCT  32.9*  29.3* MCV  87.7  85.7 MCH  27.7  27.2 MCHC  31.6  31.7 RDW  16.3*  15.9* Recent Labs 08/31/18 
 0320  08/29/18 
 0250 MONOS  7  5 EOS  3  2  
BASOS  0  0  
RDW  16.3*  15.9* Comprehensive Metabolic Profile Recent Labs 08/31/18 
 0320  08/29/18 
 0250 NA  145  144  
K  4.2  3.9 CL  113*  114* CO2  24  21 BUN  26*  34* CREA  5.15*  5.36* Recent Labs 08/31/18 
 0320  08/29/18 
 0250 CA  8.7  8.1*  
PHOS  4.3  3.6 Basic Metabolic Profile   
 
 results  reviwed. MEDS:Reviwed. Current Facility-Administered Medications Medication Dose Route Frequency Provider Last Rate Last Dose  sodium chloride (NS) flush 5-10 mL  5-10 mL IntraVENous Q8H Bubba MD Raj   10 mL at 08/31/18 0610  
 sodium chloride (NS) flush 5-10 mL  5-10 mL IntraVENous PRN Sarabjit Liu MD      
 flumazenil (ROMAZICON) 0.1 mg/mL injection 0.2 mg  0.2 mg IntraVENous Multiple Sarabjit Liu MD      
 naloxone (NARCAN) injection 0.1 mg  0.1 mg IntraVENous Multiple Sarabjit Liu MD      
 heparin (porcine) 100 unit/mL injection 300 Units  300 Units InterCATHeter PRN Sarabjit Liu MD      
 doxercalciferol (HECTOROL) 4 mcg/2 mL injection 0.5 mcg  0.5 mcg IntraVENous DIALYSIS MON, WED & Kari King MD      
 epoetin raphael (EPOGEN;PROCRIT) 5,000 Units  5,000 Units IntraVENous DIALYSIS MON, WED & Kari King MD   5,000 Units at 08/29/18 1330  levETIRAcetam (KEPPRA) tablet 500 mg  500 mg Oral BID Terri Mathew MD   500 mg at 08/31/18 4582  levETIRAcetam (KEPPRA) tablet 250 mg  250 mg Oral Q MON, WED & FRI Terri Mathew MD   250 mg at 08/29/18 1731  acetaminophen (TYLENOL) tablet 325 mg  325 mg Oral Q4H PRN Margarette Bee MD      
 dilTIAZem CD (CARDIZEM CD) capsule 240 mg  240 mg Oral DAILY Margarette Bee MD   Stopped at 08/31/18 0900  losartan (COZAAR) tablet 100 mg  100 mg Oral DAILY Margarette Bee MD   Stopped at 08/31/18 0900  simvastatin (ZOCOR) tablet 20 mg  20 mg Oral QHS Margarette Bee MD   20 mg at 08/30/18 2134  insulin lispro (HUMALOG) injection   SubCUTAneous AC&HS Margarette Bee MD   Stopped at 08/31/18 0730  
 glucose chewable tablet 16 g  16 g Oral PRN Margarette Bee MD      
 glucagon [facility]) injection 1 mg  1 mg IntraMUSCular PRN Margarette Bee MD      
 dextrose (D50W) injection syrg 12.5-25 g  25-50 mL IntraVENous PRN Margarette Bee MD      
 cloNIDine HCl (CATAPRES) tablet 0.1 mg  0.1 mg Oral Q12H Margarette Bee MD   Stopped at 08/30/18 2100  hydrALAZINE (APRESOLINE) tablet 50 mg  50 mg Oral Q8H Margarette Bee MD   50 mg at 08/31/18 0125 Impression: 
 
ESRD: Tolerating HD well. Anemia of CKD: on  Epogen. Hyperparathyroidism Yes Seizure Plan Dialysis for volume and solute management Epogen  5000 units. Hectorol: 0.5 microgram.. Continue Moo.  
 
 
 
Yoan Pak MD

## 2018-08-31 NOTE — ROUTINE PROCESS
Bedside and Verbal shift change report given to Bruce Hoyt RN (oncoming nurse) by Jeremias Valente RN (offgoing nurse). Report included the following information SBAR, Kardex, MAR and Recent Results. SITUATION:  
? Code Status: Full Code 
? Reason for Admission: New onset seizure ([facility] ? Bandemia ? ESRD (end stage renal disease) ([facility] ? Hospital day: 3 
? Problem List:  
   
Hospital Problems  Date Reviewed: 8/27/2018 Codes Class Noted POA Bandemia ICD-10-CM: C73.606 ICD-9-CM: 288.66  8/27/2018 Unknown New onset seizure ([facility] ICD-10-CM: R56.9 ICD-9-CM: 780.39  8/27/2018 Unknown ESRD (end stage renal disease) ([facility] ICD-10-CM: N18.6 ICD-9-CM: 585.6  8/8/2018 Unknown BACKGROUND:  
 Past Medical History:  
Past Medical History:  
Diagnosis Date  Asthma  Bipolar affective disorder ([facility]  Brain condition  Cataract  Chronic kidney disease  Diabetes ([facility]  Hyperlipidemia  Hypertension  Paralytic strabismus, unspecified  Psychiatric disorder Patient taking anticoagulants: No  
 
ASSESSMENT:  
? Changes in Assessment Throughout Shift: EEG completed. Old malfunctioning tunneled dialysis catheter removed and replaced by new tunneled dialysis catheter without complication by Interventional Radiologist. No seizure activity. ? Patient has Central Line: No     (Right IJ tunneled dialysis catheter) ? Patient has Khan Cath: No  
 
? Last Vitals: 
  
Vitals:  
 08/30/18 1615 08/30/18 1620 08/30/18 1641 08/30/18 2000 BP: 128/60 132/62 135/81 114/53 Pulse: 91 89 92 89 Resp: 12 13 16 18 Temp:   98.4 °F (36.9 °C) 97.8 °F (36.6 °C) TempSrc:      
SpO2: 100% 99% 98% 100% Weight:      
 
 
? IV and DRAINS (will only show if present) [REMOVED] Peripheral IV 08/27/18 Left Antecubital-Site Assessment: Clean, dry, & intact Peripheral IV 08/28/18 Right Wrist-Site Assessment: Clean, dry, & intact Peripheral IV 08/30/18 Left Wrist-Site Assessment: Clean, dry, & intact ? WOUND (if present) Wound Type:  none Dressing present Dressing Present : No 
 Wound Concerns/Notes:  none ? PAIN Pain Assessment Pain Intensity 1: 0 (08/30/18 1641) Patient Stated Pain Goal: 0 
o Interventions for Pain:  none 
o Intervention effective: N/A 
o Time of last intervention: N/A  
o Reassessment Completed: Yes ? Last 3 Weights: 
Last 3 Recorded Weights in this Encounter 08/28/18 2225 08/29/18 0458 08/30/18 0400 Weight: 63.5 kg (140 lb) 62.8 kg (138 lb 8 oz) 62.5 kg (137 lb 12.8 oz) Weight change: -0.998 kg (-2 lb 3.2 oz) ? INTAKE/OUPUT Current Shift: 08/30 1901 - 08/31 0700 In: -  
Out: 200 [Urine:200] Last three shifts: 08/29 0701 - 08/30 1900 In: 0676 [P.O.:1480; I.V.:250] Out: 6791 [WEICE:9897] ? LAB RESULTS Recent Labs  
   08/29/18 
 0250  08/28/18 
 0142 WBC  10.8  15.2* HGB  9.3*  9.7* HCT  29.3*  30.6* PLT  180  170 Recent Labs  
   08/29/18 
 0250 NA  144  
K  3.9 GLU  132* BUN  34* CREA  5.36* CA  8.1*  
 
 
RECOMMENDATIONS AND DISCHARGE PLANNING 1. Pending tests/procedures/ Plan of Care or Other Needs: Dialysis 8-31-18, then discharge back to [address] [place] [address],[address]; Seizure precautions 2. Discharge plan for patient and Needs/Barriers: Discharge back to [address] [place] [address],[address] following completion of dialysis treatment, 8-31-18 3. Estimated Discharge Date: 8-31-18; Posted on Whiteboard in [facility] Room: Yes 4. The patient's care plan was reviewed with the oncoming nurse. \"HEALS\" SAFETY CHECK Fall Risk Total Score: 4 Safety Measures: Safety Measures: Bed/Chair-Wheels locked, Bed in low position, Call light within reach A safety check occurred in the patient's room between off going nurse and oncoming nurse listed above. The safety check included the below items Area Items H 
 High Alert Medications ? Verify all high alert medication drips (heparin, PCA, etc.) E Equipment ? Suction is set up for ALL patients (with genesis) ? Red plugs utilized for all equipment (IV pumps, etc.) ? WOWs wiped down at end of shift. ? Room stocked with oxygen, suction, and other unit-specific supplies A Alarms ? Bed alarm is set for fall risk patients ? Ensure chair alarm is in place and activated if patient is up in a chair L Lines ? Check IV for any infiltration ? Khan bag is empty if patient has a Khan ? Tubing and IV bags are labeled Kelly Po Safety ? Room is clean, patient is clean, and equipment is clean. ? Hallways are clear from equipment besides carts. ? Fall bracelet on for fall risk patients ? Ensure room is clear and free of clutter ? Suction is set up for ALL patients (with genesis) ? Hallways are clear from equipment besides carts. ? Isolation precautions followed, supplies available outside room, sign posted Reanna Carrington RN

## 2018-08-31 NOTE — PROGRESS NOTES
Problem: Falls - Risk of 
Goal: *Absence of Falls Document Sallyann Farrell Fall Risk and appropriate interventions in the flowsheet. Outcome: Progressing Towards Goal 
Fall Risk Interventions: 
Mobility Interventions: Assess mobility with egress test, Bed/chair exit alarm, Patient to call before getting OOB, PT Consult for mobility concerns Mentation Interventions: Adequate sleep, hydration, pain control, Bed/chair exit alarm, Door open when patient unattended, Increase mobility, More frequent rounding, Reorient patient, Room close to nurse's station Medication Interventions: Bed/chair exit alarm, Patient to call before getting OOB, Teach patient to arise slowly Elimination Interventions: Bed/chair exit alarm, Call light in reach, Patient to call for help with toileting needs, Toileting schedule/hourly rounds History of Falls Interventions: Bed/chair exit alarm, Door open when patient unattended, Room close to nurse's station Problem: Falls - Risk of 
Goal: *Absence of Falls Document Sallyann Farrell Fall Risk and appropriate interventions in the flowsheet. Outcome: Progressing Towards Goal 
Fall Risk Interventions: 
Mobility Interventions: Assess mobility with egress test, Bed/chair exit alarm, Patient to call before getting OOB, PT Consult for mobility concerns Mentation Interventions: Adequate sleep, hydration, pain control, Bed/chair exit alarm, Door open when patient unattended, Increase mobility, More frequent rounding, Reorient patient, Room close to nurse's station Medication Interventions: Bed/chair exit alarm, Patient to call before getting OOB, Teach patient to arise slowly Elimination Interventions: Bed/chair exit alarm, Call light in reach, Patient to call for help with toileting needs, Toileting schedule/hourly rounds History of Falls Interventions: Bed/chair exit alarm, Door open when patient unattended, Room close to nurse's station Problem: Pressure Injury - Risk of 
 Goal: *Prevention of pressure injury Document Ab Scale and appropriate interventions in the flowsheet. Outcome: Progressing Towards Goal 
Pressure Injury Interventions: 
Sensory Interventions: Assess changes in LOC, Check visual cues for pain, Pressure redistribution bed/mattress (bed type) Moisture Interventions: Absorbent underpads, Check for incontinence Q2 hours and as needed, Offer toileting Q_hr Activity Interventions: Increase time out of bed, Pressure redistribution bed/mattress(bed type), PT/OT evaluation Mobility Interventions: HOB 30 degrees or less, Pressure redistribution bed/mattress (bed type), PT/OT evaluation Nutrition Interventions: Document food/fluid/supplement intake Friction and Shear Interventions: HOB 30 degrees or less

## 2018-09-01 LAB
BACTERIA SPEC CULT: NORMAL
GRAM STN SPEC: NORMAL
GRAM STN SPEC: NORMAL
SERVICE CMNT-IMP: NORMAL

## 2018-09-02 LAB
BACTERIA SPEC CULT: NORMAL
BACTERIA SPEC CULT: NORMAL
SERVICE CMNT-IMP: NORMAL
SERVICE CMNT-IMP: NORMAL

## 2018-09-05 LAB
SPECIMEN SOURCE: NORMAL
VIRUS SPEC CULT: NORMAL

## 2018-10-04 ENCOUNTER — HOSPITAL ENCOUNTER (OUTPATIENT)
Age: 63
Setting detail: OUTPATIENT SURGERY
Discharge: HOME | End: 2018-10-04
Attending: SURGERY | Admitting: SURGERY
Payer: MEDICARE

## 2018-10-04 VITALS
TEMPERATURE: 95.5 F | OXYGEN SATURATION: 99 % | SYSTOLIC BLOOD PRESSURE: 166 MMHG | DIASTOLIC BLOOD PRESSURE: 77 MMHG | RESPIRATION RATE: 12 BRPM

## 2018-10-04 DIAGNOSIS — T82.898A ARTERIOVENOUS FISTULA OCCLUSION (HCC): ICD-10-CM

## 2018-10-04 DIAGNOSIS — N18.6 END STAGE RENAL DISEASE (HCC): ICD-10-CM

## 2018-10-04 LAB
BUN BLD-MCNC: 37 MG/DL (ref 7–18)
CHLORIDE BLD-SCNC: 118 MMOL/L (ref 100–108)
GLUCOSE BLD STRIP.AUTO-MCNC: 118 MG/DL (ref 74–106)
HCT VFR BLD CALC: 31 % (ref 36–49)
HGB BLD-MCNC: 10.5 G/DL (ref 12–16)
POTASSIUM BLD-SCNC: 4.7 MMOL/L (ref 3.5–5.5)
SODIUM BLD-SCNC: 146 MMOL/L (ref 136–145)

## 2018-10-04 PROCEDURE — 74011000250 HC RX REV CODE- 250: Performed by: SURGERY

## 2018-10-04 PROCEDURE — C1750 CATH, HEMODIALYSIS,LONG-TERM: HCPCS | Performed by: SURGERY

## 2018-10-04 PROCEDURE — 74011250636 HC RX REV CODE- 250/636: Performed by: SURGERY

## 2018-10-04 PROCEDURE — C1769 GUIDE WIRE: HCPCS | Performed by: SURGERY

## 2018-10-04 PROCEDURE — 82435 ASSAY OF BLOOD CHLORIDE: CPT

## 2018-10-04 PROCEDURE — 36581 REPLACE TUNNELED CV CATH: CPT | Performed by: SURGERY

## 2018-10-04 PROCEDURE — 74011250636 HC RX REV CODE- 250/636

## 2018-10-04 DEVICE — PRECISION CHRONIC CATHETER SPORT PACK,SYMMETRICAL TIP, TAL VENATRAC STYLET,14.5 FR/CH (4.8 MM) X 19 CM
Type: IMPLANTABLE DEVICE | Status: FUNCTIONAL
Brand: PALINDROME

## 2018-10-04 RX ORDER — OXYCODONE AND ACETAMINOPHEN 5; 325 MG/1; MG/1
1 TABLET ORAL
Status: DISCONTINUED | OUTPATIENT
Start: 2018-10-04 | End: 2018-10-04 | Stop reason: HOSPADM

## 2018-10-04 RX ORDER — ONDANSETRON 2 MG/ML
4 INJECTION INTRAMUSCULAR; INTRAVENOUS
Status: DISCONTINUED | OUTPATIENT
Start: 2018-10-04 | End: 2018-10-04 | Stop reason: HOSPADM

## 2018-10-04 RX ORDER — ACETAMINOPHEN 325 MG/1
650 TABLET ORAL
Status: DISCONTINUED | OUTPATIENT
Start: 2018-10-04 | End: 2018-10-04 | Stop reason: HOSPADM

## 2018-10-04 RX ORDER — LABETALOL HYDROCHLORIDE 5 MG/ML
INJECTION, SOLUTION INTRAVENOUS AS NEEDED
Status: DISCONTINUED | OUTPATIENT
Start: 2018-10-04 | End: 2018-10-04 | Stop reason: HOSPADM

## 2018-10-04 RX ORDER — MORPHINE SULFATE 10 MG/ML
1 INJECTION, SOLUTION INTRAMUSCULAR; INTRAVENOUS
Status: DISCONTINUED | OUTPATIENT
Start: 2018-10-04 | End: 2018-10-04 | Stop reason: HOSPADM

## 2018-10-04 RX ORDER — DIPHENHYDRAMINE HYDROCHLORIDE 50 MG/ML
12.5 INJECTION, SOLUTION INTRAMUSCULAR; INTRAVENOUS
Status: DISCONTINUED | OUTPATIENT
Start: 2018-10-04 | End: 2018-10-04 | Stop reason: HOSPADM

## 2018-10-04 RX ORDER — HEPARIN SODIUM 5000 [USP'U]/ML
INJECTION, SOLUTION INTRAVENOUS; SUBCUTANEOUS AS NEEDED
Status: DISCONTINUED | OUTPATIENT
Start: 2018-10-04 | End: 2018-10-04 | Stop reason: HOSPADM

## 2018-10-04 RX ORDER — HYDRALAZINE HYDROCHLORIDE 20 MG/ML
INJECTION INTRAMUSCULAR; INTRAVENOUS AS NEEDED
Status: DISCONTINUED | OUTPATIENT
Start: 2018-10-04 | End: 2018-10-04 | Stop reason: HOSPADM

## 2018-10-04 RX ORDER — LIDOCAINE HYDROCHLORIDE 10 MG/ML
INJECTION, SOLUTION EPIDURAL; INFILTRATION; INTRACAUDAL; PERINEURAL AS NEEDED
Status: DISCONTINUED | OUTPATIENT
Start: 2018-10-04 | End: 2018-10-04 | Stop reason: HOSPADM

## 2018-10-04 NOTE — PROGRESS NOTES
10/04/18 1247 Vitals Temp 95.5 °F (35.3 °C) Temp Source Oral  
Heart Rate Source Monitor Heart Rate (Monitor) 82 Resp Rate 13  
O2 Sat (%) 98 % Level of Consciousness Alert  
BP (!) 188/97 MAP (Calculated) 127 BP 1 Location Left arm BP 1 Method Automatic  
BP Patient Position At rest  
Cardiac Rhythm NSR Pt AxO 2, brought to [facility] ambulatory by transportation from a nursing home and family members in the waiting room. pt or family unable to provide information about the pt. Pt placed in bed 7and connected to monitor. Baseline VS taken and PIV started x 1 unable to complete admission database. Pt ready for ordered procedure.

## 2018-10-04 NOTE — IP AVS SNAPSHOT
[facility] 
 
 
 [address] Patient: William Verma MRN: [id number] About your hospitalization You were admitted on:  October 4, 2018 You last received care in the:  [facility] CATH LAB You were discharged on:  October 4, 2018 Why you were hospitalized Your primary diagnosis was:  Not on File Follow-up Information Follow up With Details Comments Contact Info Юлия Bonds MD   [address] [facility] [place] [id number] [phone] Sung Jones MD Follow up in 3 day(s) 3-5 days please call Dr's office for follow up appointment 165 [facility] Shirin Gonzales 
BS VEIN AND VASCULAR  [facility] 
[phone] Your Scheduled Appointments Thursday October 04, 2018 FLUORO GUIDED VASCULAR ACCESS DEVICE PLACEMENT with Sung Jones MD  
[facility] CARDIAC CATH LAB [facility]) [address] [facility], [address] [facility], [address] Discharge Orders None A check raz indicates which time of day the medication should be taken. My Medications CONTINUE taking these medications Instructions Each Dose to Equal  
 Morning Noon Evening Bedtime  
 aspirin 81 mg tablet Your last dose was: Your next dose is: Take 81 mg by mouth daily. 81 mg CARDIZEM  mg ER capsule Generic drug:  dilTIAZem CD Your last dose was: Your next dose is: Take 240 mg by mouth daily. 240 mg  
    
   
   
   
  
 CATAPRES 0.1 mg tablet Generic drug:  cloNIDine HCl Your last dose was: Your next dose is: Take 0.1 mg by mouth two (2) times a day. 0.1 mg  
    
   
   
   
  
 doxercalciferol 4 mcg/2 mL injection Commonly known as:  Eneida Hedger Your last dose was: Your next dose is: 0.5 mL by IntraVENous route Every Tuesday, Thursday and Saturday. 1 mcg  
    
   
   
   
  
 epoetin raphael 3,000 unit/mL injection Commonly known as:  EPOGEN;PROCRIT Your last dose was: Your next dose is:    
   
   
 1.73 mL by SubCUTAneous route Every Tuesday, Thursday and Saturday. Indications: ANEMIA DUE TO RENAL FAILURE, Renal Dialysis 5200 Units  
    
   
   
   
  
 hydrALAZINE 50 mg tablet Commonly known as:  APRESOLINE Your last dose was: Your next dose is: Take 1 Tab by mouth three (3) times daily. 50 mg  
    
   
   
   
  
 insulin glargine 100 unit/mL injection Commonly known as:  LANTUS Your last dose was: Your next dose is:    
   
   
 10 Units by SubCUTAneous route nightly. 10 Units  
    
   
   
   
  
 insulin lispro 100 unit/mL injection Commonly known as:  HUMALOG Your last dose was: Your next dose is: INITIATE INSULIN CORRECTIVE PROTOCOL: Normal Insulin Sensitivity  For Blood Sugar (mg/dL) of:    Less than 150 =   0 units          150 -199 =   2 units 200 -249 =   4 units 250 -299 =   6 units 300 -349 =   8 units 350 and above = 10 units and Call Physician If 2 glucose readings are above * levETIRAcetam 250 mg tablet Commonly known as:  KEPPRA Your last dose was: Your next dose is: Take 1 Tab by mouth every Monday, Wednesday, Friday. 250 mg  
    
   
   
   
  
 * levETIRAcetam 500 mg tablet Commonly known as:  KEPPRA Your last dose was: Your next dose is: Take 1 Tab by mouth two (2) times a day. 500 mg  
    
   
   
   
  
 losartan 100 mg tablet Commonly known as:  COZAAR  
   
 Your last dose was: Your next dose is: Take 1 Tab by mouth daily. 100 mg GREENBERG MILK OF MAGNESIA 400 mg/5 mL suspension Generic drug:  magnesium hydroxide Your last dose was: Your next dose is: Take 30 mL by mouth daily as needed. 30 mL  
    
   
   
   
  
 potassium chloride 20 mEq packet Commonly known as:  KLOR-CON Your last dose was: Your next dose is: Take 1 Packet by mouth two (2) times daily (with meals). 20 mEq  
    
   
   
   
  
 simvastatin 20 mg tablet Commonly known as:  ZOCOR Your last dose was: Your next dose is: Take 1 Tab by mouth nightly. 20 mg  
    
   
   
   
  
 TYLENOL 325 mg tablet Generic drug:  acetaminophen Your last dose was: Your next dose is: Take 325 mg by mouth as needed. 325 mg  
    
   
   
   
  
 * Notice: This list has 2 medication(s) that are the same as other medications prescribed for you. Read the directions carefully, and ask your doctor or other care provider to review them with you. Discharge Instructions Hemodialysis Access: What to Expect at [facility] Hemodialysis is a way to remove wastes from the blood when your kidneys can no longer do the job. It is not a cure, but it can help you live longer and feel better. It is a lifesaving treatment when you have kidney failure. Hemodialysis is often called dialysis. Your doctor created a place (called an access) in your arm for your blood to flow in and out of your body during your dialysis sessions. Your arm will probably be bruised and swollen. It may hurt. The cut (incision) may bleed. The pain and bleeding will get better over several days. You will probably need only over-the-counter pain medicine. You can reduce swelling by propping your arm on 1 or 2 pillows and keeping your elbow straight. You will have stitches. These may dissolve on their own, or your doctor will tell you when to come in to have them removed. You should also be able to return to work in a few days. You may feel some coolness or numbness in your hand. These feelings usually go away in a few weeks. Your doctor may suggest squeezing a soft object. This will strengthen your access and may make hemodialysis faster and easier. You should always be able to feel blood rushing through the fistula or graft. It feels like a slight vibration when you put your fingers on the skin over the fistula or graft. This feeling is called a thrill or pulse. This care sheet gives you a general idea about how long it will take for you to recover. But each person recovers at a different pace. Follow the steps below to get better as quickly as possible. How can you care for yourself at home? Activity 
  · Rest when you feel tired. Getting enough sleep will help you recover. Do not lie on or sleep on the arm with the access.  
  · Avoid activities such as washing windows or gardening that put stress on the arm with the access.  
  · You may use your arm, but do not lift anything that weighs more than about 15 pounds. This may include a child, heavy grocery bags, a heavy briefcase or backpack, cat litter or dog food bags, or a vacuum .  
  · You can shower, but keep the access dry for the first 2 days. Cover the area with a plastic bag to keep it dry.  
  · Do not soak or scrub the incision until it has healed.  
  · Wear an arm guard to protect the area if you play sports or work with your arms.  
  · You may drive when your doctor says it is okay. This is usually in 1 to 2 days.  
  · Most people are able to return to work about 1 or 2 days after surgery. Diet 
  · Follow an eating plan that is good for your kidneys. A registered dietitian can help you make a meal plan that is right for you.  You may need to limit protein, salt, fluids, and certain foods. Medicines 
  · Your doctor will tell you if and when you can restart your medicines. He or she will also give you instructions about taking any new medicines.  
  · If you take blood thinners, such as warfarin (Coumadin), clopidogrel (Plavix), or aspirin, be sure to talk to your doctor. He or she will tell you if and when to start taking those medicines again. Make sure that you understand exactly what your doctor wants you to do.  
  · Take pain medicines exactly as directed. ¨ If the doctor gave you a prescription medicine for pain, take it as prescribed. ¨ If you are not taking a prescription pain medicine, ask your doctor if you can take acetaminophen (Tylenol). Do not take ibuprofen (Advil, Motrin) or naproxen (Aleve), or similar medicines, unless your doctor tells you to. They may make chronic kidney disease worse. ¨ Do not take two or more pain medicines at the same time unless the doctor told you to. Many pain medicines have acetaminophen, which is Tylenol. Too much acetaminophen (Tylenol) can be harmful.  
  · If you think your pain medicine is making you sick to your stomach: 
¨ Take your medicine after meals (unless your doctor has told you not to). ¨ Ask your doctor for a different pain medicine.  
  · If your doctor prescribed antibiotics, take them as directed. Do not stop taking them just because you feel better. You need to take the full course of antibiotics. Incision care 
  · Keep the area dry for 2 days. After 2 days, wash the area with soap and water every day, and always before dialysis.  
  · Do not soak or scrub the incision until it has healed.  
  · If you have a bandage, change it every day or as your doctor recommends. Your doctor will tell you when you can remove it. Exercise 
  · Squeeze a soft ball or other object as your doctor tells you. This will help blood flow through the access and help prevent blood clots.  Elevation 
  · Prop up the sore arm on a pillow anytime you sit or lie down during the next 3 days. Try to keep it above the level of your heart. This will help reduce swelling. Other instructions 
  · Every day, check your access for a pulse or thrill in the fistula or graft area. A thrill is a vibration. To feel a pulse or thrill, place the first two fingers of your hand over the access.  
  · Do not bump your arm.  
  · Do not wear tight clothing, jewelry, or anything else that may squeeze the access.  
  · Use your other arm to have blood drawn or blood pressure taken.  
  · Do not put cream or lotion on or near the access.  
  · Make sure all doctors you deal with know you have a vascular access. Follow-up care is a key part of your treatment and safety. Be sure to make and go to all appointments, and call your doctor if you are having problems. It's also a good idea to know your test results and keep a list of the medicines you take. When should you call for help? Call 911 anytime you think you may need emergency care. For example, call if: 
  · You passed out (lost consciousness).  
  · You have chest pain, are short of breath, or cough up blood.  
 Call your doctor now or seek immediate medical care if: 
  · Your hand or arm is cold or dark-colored.  
  · You have no pulse in your access.  
  · You have nausea or you vomit.  
  · You have pain that does not get better after you take pain medicine.  
  · You have loose stitches, or your incision comes open.  
  · You are bleeding from the incision.  
  · You have signs of infection, such as: 
¨ Increased pain, swelling, warmth, or redness. ¨ Red streaks leading from the area. ¨ Pus draining from the area. ¨ A fever.  
  · You have signs of a blood clot in your leg (called a deep vein thrombosis), such as: 
¨ Pain in your calf, back of the knee, thigh, or groin. ¨ Redness or swelling in your leg.  Watch closely for changes in your health, and be sure to contact your doctor if you have any problems. Where can you learn more? Go to http://izabela-jewell.info/. Enter P616 in the search box to learn more about \"Hemodialysis Access: What to Expect at Home. \" Current as of: March 15, 2018 Content Version: 11.8 © 0505-2866 To8to. Care instructions adapted under license by [employer] (which disclaims liability or warranty for this information). If you have questions about a medical condition or this instruction, always ask your healthcare professional. [place] [address] any warranty or liability for your use of this information. DISCHARGE SUMMARY from Nurse PATIENT INSTRUCTIONS: 
 
After general anesthesia or intravenous sedation, for 24 hours or while taking prescription Narcotics: · Limit your activities · Do not drive and operate hazardous machinery · Do not make important personal or business decisions · Do  not drink alcoholic beverages · If you have not urinated within 8 hours after discharge, please contact your surgeon on call. Report the following to your surgeon: 
· Excessive pain, swelling, redness or odor of or around the surgical area · Temperature over 100.5 · Nausea and vomiting lasting longer than 4 hours or if unable to take medications · Any signs of decreased circulation or nerve impairment to extremity: change in color, persistent  numbness, tingling, coldness or increase pain · Any questions What to do at Home: 
Recommended activity: Activity as tolerated and no driving for today, *  Please give a list of your current medications to your Primary Care Provider. *  Please update this list whenever your medications are discontinued, doses are 
    changed, or new medications (including over-the-counter products) are added.  
 
*  Please carry medication information at all times in case of emergency situations. These are general instructions for a healthy lifestyle: No smoking/ No tobacco products/ Avoid exposure to second hand smoke Surgeon General's Warning:  Quitting smoking now greatly reduces serious risk to your health. Obesity, smoking, and sedentary lifestyle greatly increases your risk for illness A healthy diet, regular physical exercise & weight monitoring are important for maintaining a healthy lifestyle You may be retaining fluid if you have a history of heart failure or if you experience any of the following symptoms:  Weight gain of 3 pounds or more overnight or 5 pounds in a week, increased swelling in our hands or feet or shortness of breath while lying flat in bed. Please call your doctor as soon as you notice any of these symptoms; do not wait until your next office visit. Recognize signs and symptoms of STROKE: 
 
F-face looks uneven A-arms unable to move or move unevenly S-speech slurred or non-existent T-time-call 911 as soon as signs and symptoms begin-DO NOT go Back to bed or wait to see if you get better-TIME IS BRAIN. Warning Signs of HEART ATTACK Call 911 if you have these symptoms: 
? Chest discomfort. Most heart attacks involve discomfort in the center of the chest that lasts more than a few minutes, or that goes away and comes back. It can feel like uncomfortable pressure, squeezing, fullness, or pain. ? Discomfort in other areas of the upper body. Symptoms can include pain or discomfort in one or both arms, the back, neck, jaw, or stomach. ? Shortness of breath with or without chest discomfort. ? Other signs may include breaking out in a cold sweat, nausea, or lightheadedness. Don't wait more than five minutes to call [address]! Fast action can save your life. Calling 911 is almost always the fastest way to get lifesaving treatment.  Emergency Medical Services staff can begin treatment when they arrive  up to an hour sooner than if someone gets to the hospital by car. The discharge information has been reviewed with the patient. The patient verbalized understanding. Discharge medications reviewed with the patient and appropriate educational materials and side effects teaching were provided. ___________________________________________________________________________________________________________________________________ Introducing [facility]! Harrison Buchanan introduces OffersBy.Me patient portal. Now you can access parts of your medical record, email your doctor's office, and request medication refills online. 1. In your internet browser, go to [email or web address] 2. Click on the First Time User? Click Here link in the Sign In box. You will see the New Member Sign Up page. 3. Enter your OffersBy.Me Access Code exactly as it appears below. You will not need to use this code after youve completed the sign-up process. If you do not sign up before the expiration date, you must request a new code. · OffersBy.Me Access Code: [facility] Expires: 11/4/2018  9:54 PM 
 
4. Enter the last four digits of your Social Security Number (xxxx) and Date of Birth (mm/dd/yyyy) as indicated and click Submit. You will be taken to the next sign-up page. 5. Create a MedServet ID. This will be your OffersBy.Me login ID and cannot be changed, so think of one that is secure and easy to remember. 6. Create a MedServet password. You can change your password at any time. 7. Enter your Password Reset Question and Answer. This can be used at a later time if you forget your password. 8. Enter your e-mail address. You will receive e-mail notification when new information is available in [address]. 9. Click Sign Up. You can now view and download portions of your medical record. 10. Click the Download Summary menu link to download a portable copy of your medical information. If you have questions, please visit the Frequently Asked Questions section of the MyChart website. Remember, AppLovint is NOT to be used for urgent needs. For medical emergencies, dial 911. Now available from your iPhone and Android! Introducing Lucius Trent As a [employer] patient, I wanted to make you aware of our electronic visit tool called Lucius Trent. [employer] 24/7 allows you to connect within minutes with a medical provider 24 hours a day, seven days a week via a mobile device or tablet or logging into a secure website from your computer. You can access Lucius Trent from anywhere in the [place]. A virtual visit might be right for you when you have a simple condition and feel like you just dont want to get out of bed, or cant get away from work for an appointment, when your regular [employer] provider is not available (evenings, weekends or holidays), or when youre out of town and need minor care. Electronic visits cost only $49 and if the [employer] 24/7 provider determines a prescription is needed to treat your condition, one can be electronically transmitted to a nearby pharmacy*. Please take a moment to enroll today if you have not already done so. The enrollment process is free and takes just a few minutes. To enroll, please download the [employer] 24/7 lisa to your tablet or phone, or visit [email or web address] to enroll on your computer. And, as an [address] [place] [address] patient with a [employer] account, the results of your visits will be scanned into your electronic medical record and your primary care provider will be able to view the scanned results. We urge you to continue to see your regular [employer] provider for your ongoing medical care.   And while your primary care provider may not be the one available when you seek a Lucius Trent virtual visit, the peace of mind you get from getting a real diagnosis real time can be priceless. For more information on Lucius Trent, view our Frequently Asked Questions (FAQs) at [email or web address]. Sincerely, 
 
Sherron Adam MD 
Chief Medical Officer [address] *:  certain medications cannot be prescribed via Lucius Trent Providers Seen During Your Hospitalization Provider Specialty Primary office phone Flora Marquez MD Vascular Surgery [phone] Your Primary Care Physician (PCP) Primary Care Physician Office Phone Office Fax Dewane Lesch [phone] You are allergic to the following Allergen Reactions Amoxicillin Unknown (comments) Cleocin (Clindamycin Hcl) Unknown (comments) Codeine Unknown (comments) Lorcet 10/650 (Hydrocodone-Acetaminophen) Unknown (comments) Penicillin G Benzathine Unknown (comments) Shellfish Derived Unknown (comments) Recent Documentation Smoking Status Never Smoker Emergency Contacts Name Discharge Info Relation Home Work Mobile [employer] CAREGIVER [3] Other Relative [6] [phone] Flora Ortiz  Other Relative [6] [phone] Patient Belongings The following personal items are in your possession at time of discharge: 
                             
 
  
  
 Please provide this summary of care documentation to your next provider. Signatures-by signing, you are acknowledging that this After Visit Summary has been reviewed with you and you have received a copy. Patient Signature:  ____________________________________________________________ Date:  ____________________________________________________________  
  
[place] Provider Signature:  ____________________________________________________________ Date:  ____________________________________________________________

## 2018-10-04 NOTE — Clinical Note
Consent: signed. Proceeding with Tunnelled catheter. A Dialysis (permanent) catheter will be used. Central venous catheter inserted and verified. Line secured using suture, tegaderm and Biopatch. Procedure was tolerated well.

## 2018-10-04 NOTE — DISCHARGE INSTRUCTIONS
Hemodialysis Access: What to Expect at [address] [facility]  Hemodialysis is a way to remove wastes from the blood when your kidneys can no longer do the job. It is not a cure, but it can help you live longer and feel better. It is a lifesaving treatment when you have kidney failure. Hemodialysis is often called dialysis. Your doctor created a place (called an access) in your arm for your blood to flow in and out of your body during your dialysis sessions. Your arm will probably be bruised and swollen. It may hurt. The cut (incision) may bleed. The pain and bleeding will get better over several days. You will probably need only over-the-counter pain medicine. You can reduce swelling by propping your arm on 1 or 2 pillows and keeping your elbow straight. You will have stitches. These may dissolve on their own, or your doctor will tell you when to come in to have them removed. You should also be able to return to work in a few days. You may feel some coolness or numbness in your hand. These feelings usually go away in a few weeks. Your doctor may suggest squeezing a soft object. This will strengthen your access and may make hemodialysis faster and easier. You should always be able to feel blood rushing through the fistula or graft. It feels like a slight vibration when you put your fingers on the skin over the fistula or graft. This feeling is called a thrill or pulse. This care sheet gives you a general idea about how long it will take for you to recover. But each person recovers at a different pace. Follow the steps below to get better as quickly as possible. How can you care for yourself at home? Activity    · Rest when you feel tired. Getting enough sleep will help you recover.  Do not lie on or sleep on the arm with the access.     · Avoid activities such as washing windows or gardening that put stress on the arm with the access.     · You may use your arm, but do not lift anything that weighs more than about 15 pounds. This may include a child, heavy grocery bags, a heavy briefcase or backpack, cat litter or dog food bags, or a vacuum .     · You can shower, but keep the access dry for the first 2 days. Cover the area with a plastic bag to keep it dry.     · Do not soak or scrub the incision until it has healed.     · Wear an arm guard to protect the area if you play sports or work with your arms.     · You may drive when your doctor says it is okay. This is usually in 1 to 2 days.     · Most people are able to return to work about 1 or 2 days after surgery. Diet    · Follow an eating plan that is good for your kidneys. A registered dietitian can help you make a meal plan that is right for you. You may need to limit protein, salt, fluids, and certain foods. Medicines    · Your doctor will tell you if and when you can restart your medicines. He or she will also give you instructions about taking any new medicines.     · If you take blood thinners, such as warfarin (Coumadin), clopidogrel (Plavix), or aspirin, be sure to talk to your doctor. He or she will tell you if and when to start taking those medicines again. Make sure that you understand exactly what your doctor wants you to do.     · Take pain medicines exactly as directed. ¨ If the doctor gave you a prescription medicine for pain, take it as prescribed. ¨ If you are not taking a prescription pain medicine, ask your doctor if you can take acetaminophen (Tylenol). Do not take ibuprofen (Advil, Motrin) or naproxen (Aleve), or similar medicines, unless your doctor tells you to. They may make chronic kidney disease worse. ¨ Do not take two or more pain medicines at the same time unless the doctor told you to. Many pain medicines have acetaminophen, which is Tylenol.  Too much acetaminophen (Tylenol) can be harmful.     · If you think your pain medicine is making you sick to your stomach:  ¨ Take your medicine after meals (unless your doctor has told you not to). ¨ Ask your doctor for a different pain medicine.     · If your doctor prescribed antibiotics, take them as directed. Do not stop taking them just because you feel better. You need to take the full course of antibiotics. Incision care    · Keep the area dry for 2 days. After 2 days, wash the area with soap and water every day, and always before dialysis.     · Do not soak or scrub the incision until it has healed.     · If you have a bandage, change it every day or as your doctor recommends. Your doctor will tell you when you can remove it. Exercise    · Squeeze a soft ball or other object as your doctor tells you. This will help blood flow through the access and help prevent blood clots.    Elevation    · Prop up the sore arm on a pillow anytime you sit or lie down during the next 3 days. Try to keep it above the level of your heart. This will help reduce swelling. Other instructions    · Every day, check your access for a pulse or thrill in the fistula or graft area. A thrill is a vibration. To feel a pulse or thrill, place the first two fingers of your hand over the access.     · Do not bump your arm.     · Do not wear tight clothing, jewelry, or anything else that may squeeze the access.     · Use your other arm to have blood drawn or blood pressure taken.     · Do not put cream or lotion on or near the access.     · Make sure all doctors you deal with know you have a vascular access. Follow-up care is a key part of your treatment and safety. Be sure to make and go to all appointments, and call your doctor if you are having problems. It's also a good idea to know your test results and keep a list of the medicines you take. When should you call for help? Call 911 anytime you think you may need emergency care.  For example, call if:    · You passed out (lost consciousness).     · You have chest pain, are short of breath, or cough up blood.    Call your doctor now or seek immediate medical care if:    · Your hand or arm is cold or dark-colored.     · You have no pulse in your access.     · You have nausea or you vomit.     · You have pain that does not get better after you take pain medicine.     · You have loose stitches, or your incision comes open.     · You are bleeding from the incision.     · You have signs of infection, such as:  ¨ Increased pain, swelling, warmth, or redness. ¨ Red streaks leading from the area. ¨ Pus draining from the area. ¨ A fever.     · You have signs of a blood clot in your leg (called a deep vein thrombosis), such as:  ¨ Pain in your calf, back of the knee, thigh, or groin. ¨ Redness or swelling in your leg.    Watch closely for changes in your health, and be sure to contact your doctor if you have any problems. Where can you learn more? Go to http://izabela-jewell.info/. Enter P616 in the search box to learn more about \"Hemodialysis Access: What to Expect at Home. \"  Current as of: March 15, 2018  Content Version: 11.8  © 8116-4946 [employer] [place]. Care instructions adapted under license by [employer] (which disclaims liability or warranty for this information). If you have questions about a medical condition or this instruction, always ask your healthcare professional. [address] any warranty or liability for your use of this information. DISCHARGE SUMMARY from Nurse    PATIENT INSTRUCTIONS:    After general anesthesia or intravenous sedation, for 24 hours or while taking prescription Narcotics:  · Limit your activities  · Do not drive and operate hazardous machinery  · Do not make important personal or business decisions  · Do  not drink alcoholic beverages  · If you have not urinated within 8 hours after discharge, please contact your surgeon on call.     Report the following to your surgeon:  · Excessive pain, swelling, redness or odor of or around the surgical area  · Temperature over 100.5  · Nausea and vomiting lasting longer than 4 hours or if unable to take medications  · Any signs of decreased circulation or nerve impairment to extremity: change in color, persistent  numbness, tingling, coldness or increase pain  · Any questions    What to do at Home:  Recommended activity: Activity as tolerated and no driving for today,   *  Please give a list of your current medications to your Primary Care Provider. *  Please update this list whenever your medications are discontinued, doses are      changed, or new medications (including over-the-counter products) are added. *  Please carry medication information at all times in case of emergency situations. These are general instructions for a healthy lifestyle:    No smoking/ No tobacco products/ Avoid exposure to second hand smoke  Surgeon General's Warning:  Quitting smoking now greatly reduces serious risk to your health. Obesity, smoking, and sedentary lifestyle greatly increases your risk for illness    A healthy diet, regular physical exercise & weight monitoring are important for maintaining a healthy lifestyle    You may be retaining fluid if you have a history of heart failure or if you experience any of the following symptoms:  Weight gain of 3 pounds or more overnight or 5 pounds in a week, increased swelling in our hands or feet or shortness of breath while lying flat in bed. Please call your doctor as soon as you notice any of these symptoms; do not wait until your next office visit. Recognize signs and symptoms of STROKE:    F-face looks uneven    A-arms unable to move or move unevenly    S-speech slurred or non-existent    T-time-call 911 as soon as signs and symptoms begin-DO NOT go       Back to bed or wait to see if you get better-TIME IS BRAIN. Warning Signs of HEART ATTACK     Call 911 if you have these symptoms:   Chest discomfort.  Most heart attacks involve discomfort in the center of the chest that lasts more than a few minutes, or that goes away and comes back. It can feel like uncomfortable pressure, squeezing, fullness, or pain.  Discomfort in other areas of the upper body. Symptoms can include pain or discomfort in one or both arms, the back, neck, jaw, or stomach.  Shortness of breath with or without chest discomfort.  Other signs may include breaking out in a cold sweat, nausea, or lightheadedness. Don't wait more than five minutes to call 911 - MINUTES MATTER! Fast action can save your life. Calling 911 is almost always the fastest way to get lifesaving treatment. Emergency Medical Services staff can begin treatment when they arrive -- up to an hour sooner than if someone gets to the hospital by car. The discharge information has been reviewed with the patient. The patient verbalized understanding. Discharge medications reviewed with the patient and appropriate educational materials and side effects teaching were provided.   ___________________________________________________________________________________________________________________________________

## 2018-10-04 NOTE — H&P
Surgery History and Physical 
 
Subjective: Walter Patton is a 70 y.o. female who presents with ESRD and poorly working catheter. Patient Active Problem List  
 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  
Diagnosis Date  Asthma  Bipolar affective disorder ([facility]  Brain condition  Cataract  Chronic kidney disease  Diabetes ([facility]  Hyperlipidemia  Hypertension  Paralytic strabismus, unspecified  Psychiatric disorder Past Surgical History:  
Procedure Laterality Date  [place] INSJ TUNNELED CVC W/O SUBQ PORT/ AGE 3 YR/> N/A 8/9/2018 in-pt 474A, Insertion Tunneled Central Venous Catheter performed by Anny Easley MD at [facility] CATH LAB Social History Substance Use Topics  Smoking status: Never Smoker  Smokeless tobacco: Never Used  Alcohol use No  
  
No family history on file. Prior to Admission medications Medication Sig Start Date End Date Taking? Authorizing Provider  
levETIRAcetam (KEPPRA) 250 mg tablet Take 1 Tab by mouth every Monday, Wednesday, Friday. 8/31/18   Honey Craig MD  
levETIRAcetam (KEPPRA) 500 mg tablet Take 1 Tab by mouth two (2) times a day. 8/31/18   Honey Craig MD  
hydrALAZINE (APRESOLINE) 50 mg tablet Take 1 Tab by mouth three (3) times daily. 8/14/18   Gabrielle Kirk MD  
simvastatin (ZOCOR) 20 mg tablet Take 1 Tab by mouth nightly. 8/14/18   Gabrielle Kirk MD  
potassium chloride (KLOR-CON) 20 mEq packet Take 1 Packet by mouth two (2) times daily (with meals).  8/14/18   Neeru Lipscomb MD  
 doxercalciferol (HECTOROL) 4 mcg/2 mL injection 0.5 mL by IntraVENous route Every Tuesday, Thursday and Saturday. 8/14/18   Damián Kirk MD  
epoetin raphael (EPOGEN;PROCRIT) 3,000 unit/mL injection 1.73 mL by SubCUTAneous route Every Tuesday, Thursday and Saturday. Indications: ANEMIA DUE TO RENAL FAILURE, Renal Dialysis 8/14/18   Romel Monroy MD  
insulin glargine (LANTUS) 100 unit/mL injection 10 Units by SubCUTAneous route nightly. 8/14/18   Tiera Kirk MD  
insulin lispro (HUMALOG) 100 unit/mL injection INITIATE INSULIN CORRECTIVE PROTOCOL: Normal Insulin Sensitivity For Blood Sugar (mg/dL) of:    
Less than 150 =   0 units 150 -199 =   2 units 200 -249 =   4 units 250 -299 =   6 units 300 -349 =   8 units 350 and above = 10 units and Call Physician If 2 glucose readings are above 8/14/18   Romel Monroy MD  
losartan (COZAAR) 100 mg tablet Take 1 Tab by mouth daily. 8/15/18   Damián Kirk MD  
aspirin 81 mg tablet Take 81 mg by mouth daily. Elina Light MD  
diltiazem CD (CARDIZEM CD) 240 mg ER capsule Take 240 mg by mouth daily. Elina Light MD  
cloNIDine (CATAPRES) 0.1 mg tablet Take 0.1 mg by mouth two (2) times a day. Elina Light MD  
magnesium hydroxide (GREENBERG MILK OF MAGNESIA) 400 mg/5 mL suspension Take 30 mL by mouth daily as needed. Elina Light MD  
acetaminophen (TYLENOL) 325 mg tablet Take 325 mg by mouth as needed. Elina Light MD  
 
Allergies Allergen Reactions  Amoxicillin Unknown (comments)  Cleocin [Clindamycin Hcl] Unknown (comments)  Codeine Unknown (comments)  Lorcet 10/650 [Hydrocodone-Acetaminophen] Unknown (comments)  Penicillin G Benzathine Unknown (comments)  Shellfish Derived Unknown (comments) ROS: 
Pertinent items are noted in HPI. Unless otherwise mentioned in the HPI. Objective:  
 
No data found. No data recorded. Physical Exam: GENERAL: alert, cooperative, no distress, appears stated age, THROAT & NECK: normal and no erythema or exudates noted. , LUNG: clear to auscultation bilaterally, HEART: regular rate and rhythm, S1, S2 normal, no murmur, click, rub or gallop, ABDOMEN: soft, non-tender. Bowel sounds normal. No masses,  no organomegaly Labs:  
Recent Results (from the past 24 hour(s)) POC 6 PLUS Collection Time: 10/04/18  1:06 PM  
Result Value Ref Range Sodium,  (H) 136 - 145 MMOL/L Potassium, POC 4.7 3.5 - 5.5 MMOL/L Chloride,  (H) 100 - 108 MMOL/L  
 BUN, POC 37 (H) 7 - 18 MG/DL Glucose,  (H) 74 - 106 MG/DL Hematocrit, POC 31 (L) 36 - 49 % Hemoglobin, POC 10.5 (L) 12 - 16 G/DL Data Review: CBC:  
Lab Results Component Value Date/Time WBC 9.2 08/31/2018 03:20 AM  
 RBC 3.75 (L) 08/31/2018 03:20 AM  
 HGB 10.4 (L) 08/31/2018 03:20 AM  
 HCT 32.9 (L) 08/31/2018 03:20 AM  
 PLATELET 557 52/83/4614 03:20 AM  
  
BMP:  
Lab Results Component Value Date/Time Glucose 135 (H) 08/31/2018 03:20 AM  
 Sodium 145 08/31/2018 03:20 AM  
 Potassium 4.2 08/31/2018 03:20 AM  
 Chloride 113 (H) 08/31/2018 03:20 AM  
 CO2 24 08/31/2018 03:20 AM  
 BUN 26 (H) 08/31/2018 03:20 AM  
 Creatinine 5.15 (H) 08/31/2018 03:20 AM  
 Calcium 8.7 08/31/2018 03:20 AM  
 
Coagulation: No results found for: PTP, INR, APTT Assessment:  
 
Active Problems: * No active hospital problems. * 
 
 
Plan:  
 
permcath exchange. Signed By: Jerome Keys MD   
 October 4, 2018

## 2018-10-04 NOTE — H&P (VIEW-ONLY)
Surgery History and Physical 
 
Subjective: César Nieves is a 70 y.o. female who presents with ESRD and poorly working catheter. Patient Active Problem List  
 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  
Diagnosis Date  Asthma  Bipolar affective disorder ([facility]  Brain condition  Cataract  Chronic kidney disease  Diabetes ([facility]  Hyperlipidemia  Hypertension  Paralytic strabismus, unspecified  Psychiatric disorder Past Surgical History:  
Procedure Laterality Date  [place] INSJ TUNNELED CVC W/O SUBQ PORT/ AGE 3 YR/> N/A 8/9/2018 in-pt [facility]A, Insertion Tunneled Central Venous Catheter performed by Hilda Leary MD at [facility] CATH LAB Social History Substance Use Topics  Smoking status: Never Smoker  Smokeless tobacco: Never Used  Alcohol use No  
  
No family history on file. Prior to Admission medications Medication Sig Start Date End Date Taking? Authorizing Provider  
levETIRAcetam (KEPPRA) 250 mg tablet Take 1 Tab by mouth every Monday, Wednesday, Friday. 8/31/18   Kai Schmid MD  
levETIRAcetam (KEPPRA) 500 mg tablet Take 1 Tab by mouth two (2) times a day. 8/31/18   Kai Schmid MD  
hydrALAZINE (APRESOLINE) 50 mg tablet Take 1 Tab by mouth three (3) times daily. 8/14/18   Damián Kirk MD  
simvastatin (ZOCOR) 20 mg tablet Take 1 Tab by mouth nightly. 8/14/18   Damián Kirk MD  
potassium chloride (KLOR-CON) 20 mEq packet Take 1 Packet by mouth two (2) times daily (with meals).  8/14/18   Romel Monroy MD  
 doxercalciferol (HECTOROL) 4 mcg/2 mL injection 0.5 mL by IntraVENous route Every Tuesday, Thursday and Saturday. 8/14/18   Pat Kirk MD  
epoetin raphael (EPOGEN;PROCRIT) 3,000 unit/mL injection 1.73 mL by SubCUTAneous route Every Tuesday, Thursday and Saturday. Indications: ANEMIA DUE TO RENAL FAILURE, Renal Dialysis 8/14/18   Obdulio Guardado MD  
insulin glargine (LANTUS) 100 unit/mL injection 10 Units by SubCUTAneous route nightly. 8/14/18   Tiera Kirk MD  
insulin lispro (HUMALOG) 100 unit/mL injection INITIATE INSULIN CORRECTIVE PROTOCOL: Normal Insulin Sensitivity For Blood Sugar (mg/dL) of:    
Less than 150 =   0 units 150 -199 =   2 units 200 -249 =   4 units 250 -299 =   6 units 300 -349 =   8 units 350 and above = 10 units and Call Physician If 2 glucose readings are above 8/14/18   Obdulio Guardado MD  
losartan (COZAAR) 100 mg tablet Take 1 Tab by mouth daily. 8/15/18   Pat Kirk MD  
aspirin 81 mg tablet Take 81 mg by mouth daily. Elina Lihgt MD  
diltiazem CD (CARDIZEM CD) 240 mg ER capsule Take 240 mg by mouth daily. Elina Light MD  
cloNIDine (CATAPRES) 0.1 mg tablet Take 0.1 mg by mouth two (2) times a day. Elina Light MD  
magnesium hydroxide (GREENBERG MILK OF MAGNESIA) 400 mg/5 mL suspension Take 30 mL by mouth daily as needed. Elina Light MD  
acetaminophen (TYLENOL) 325 mg tablet Take 325 mg by mouth as needed. Elina Light MD  
 
Allergies Allergen Reactions  Amoxicillin Unknown (comments)  Cleocin [Clindamycin Hcl] Unknown (comments)  Codeine Unknown (comments)  Lorcet 10/650 [Hydrocodone-Acetaminophen] Unknown (comments)  Penicillin G Benzathine Unknown (comments)  Shellfish Derived Unknown (comments) ROS: 
Pertinent items are noted in HPI. Unless otherwise mentioned in the HPI. Objective:  
 
No data found. No data recorded. Physical Exam: GENERAL: alert, cooperative, no distress, appears stated age, THROAT & NECK: normal and no erythema or exudates noted. , LUNG: clear to auscultation bilaterally, HEART: regular rate and rhythm, S1, S2 normal, no murmur, click, rub or gallop, ABDOMEN: soft, non-tender. Bowel sounds normal. No masses,  no organomegaly Labs:  
Recent Results (from the past 24 hour(s)) POC 6 PLUS Collection Time: 10/04/18  1:06 PM  
Result Value Ref Range Sodium,  (H) 136 - 145 MMOL/L Potassium, POC 4.7 3.5 - 5.5 MMOL/L Chloride,  (H) 100 - 108 MMOL/L  
 BUN, POC 37 (H) 7 - 18 MG/DL Glucose,  (H) 74 - 106 MG/DL Hematocrit, POC 31 (L) 36 - 49 % Hemoglobin, POC 10.5 (L) 12 - 16 G/DL Data Review: CBC:  
Lab Results Component Value Date/Time WBC 9.2 08/31/2018 03:20 AM  
 RBC 3.75 (L) 08/31/2018 03:20 AM  
 HGB 10.4 (L) 08/31/2018 03:20 AM  
 HCT 32.9 (L) 08/31/2018 03:20 AM  
 PLATELET 031 43/31/3589 03:20 AM  
  
BMP:  
Lab Results Component Value Date/Time Glucose 135 (H) 08/31/2018 03:20 AM  
 Sodium 145 08/31/2018 03:20 AM  
 Potassium 4.2 08/31/2018 03:20 AM  
 Chloride 113 (H) 08/31/2018 03:20 AM  
 CO2 24 08/31/2018 03:20 AM  
 BUN 26 (H) 08/31/2018 03:20 AM  
 Creatinine 5.15 (H) 08/31/2018 03:20 AM  
 Calcium 8.7 08/31/2018 03:20 AM  
 
Coagulation: No results found for: PTP, INR, APTT Assessment:  
 
Active Problems: * No active hospital problems. * 
 
 
Plan:  
 
permcath exchange. Signed By: Yefri Baker MD   
 October 4, 2018

## 2018-10-04 NOTE — Clinical Note
TRANSFER - OUT REPORT:  
 
Verbal report given to: josh nam rn. Report consisted of patient's Situation, Background, Assessment and  
Recommendations(SBAR). Opportunity for questions and clarification was provided. Patient transported with a Cardiac Cath Tech / Patient Care Tech. Patient transported to: [facility].

## 2018-10-04 NOTE — Clinical Note
TRANSFER - IN REPORT:  
 
Verbal report received from: Endy Fulton RN. Report consisted of patient's Situation, Background, Assessment and  
Recommendations(SBAR). Opportunity for questions and clarification was provided. Assessment completed upon patient's arrival to unit and care assumed. Patient transported with a Cardiac Cath Tech / Patient Care Tech.

## 2018-10-05 NOTE — OP NOTES
[facility] REPORT    Gayla Willoughby  MR#: [id number]  : 1955  ACCOUNT #: [de-identified]   DATE OF SERVICE: 10/04/2018    PREOPERATIVE DIAGNOSIS:  End-stage renal disease in need of dialysis access as current catheter is not working well. POSTOPERATIVE DIAGNOSIS:  End-stage renal disease in need of dialysis access as current catheter is not working well. PROCEDURE PERFORMED:  Permanent dialysis catheter exchange 19 cm Palindrome through a right IJ approach. SURGEON:  Sunita Jack MD    CULTURES:  None. SPECIMENS REMOVED:  None. DRAINS:  None. ESTIMATED BLOOD LOSS:  Less than 50 mL. ANESTHESIA:  Local anesthetic only. ASSISTANTS:  None. COMPLICATIONS:  None. IMPLANTS:  As above. INDICATION FOR THE PROCEDURE:  The patient is a 24-year-old female with end-stage renal disease needing dialysis access as her current catheter was not working very well. The patient was given the risks and benefits of the procedure including but not limited to bleeding, infection, damage to adjacent structures, MI, stroke, and death as well as loss of access. The patient was understanding of all the risks and underwent the procedure. PROCEDURE:  The patient was correctly identified in the precath area and taken to the cath lab in stable condition. The patient had a preincision timeout prior to incision. The patient was prepped and draped in normal sterile fashion according to CDC guidelines aseptic technique. We then were able to take an Amplatz wire down the previous port through the previous catheter. We numbed her up appropriately from the catheter insertion site all the way to the neck. We did perform some blunt dissection removed the cuff. The catheter was then removed in its entirety. We placed a new 19 cm Palindrome catheter with a cuff approximately 1-2 cm within the patient.   We secured the catheter with 2-0 Prolene suture at both butterfly holes and the catheter insertion site. A Biopatch and Tegaderm were placed. Both ports were easily flushed and aspirated. The tip of the catheter was at the sinoatrial junction with good curvature of the catheter without any evidence of pneumothorax. The catheter was good for use. We then were able to cap and wrap the ports and wrap it with a tape. The patient tolerated the procedure well without any new issues.       MD PATRICIA Serra / ANDRES  D: 10/04/2018 15:25     T: 10/04/2018 20:22  JOB #: [id number]

## 2018-10-11 ENCOUNTER — TELEPHONE (OUTPATIENT)
Dept: VASCULAR SURGERY | Age: 63
End: 2018-10-11

## 2018-10-11 NOTE — TELEPHONE ENCOUNTER
Patient's dialysis unit has notified our office that perm catheter is not working properly, activase was used but then could only run for hour and a half and would not work anymore. Notified Dr. Josiah Cooley and has advised for perm catheter to be exchanged. Surgery scheduler will contact patient with date and time.

## 2018-10-12 ENCOUNTER — HOSPITAL ENCOUNTER (OUTPATIENT)
Age: 63
Setting detail: OUTPATIENT SURGERY
Discharge: HOME | End: 2018-10-12
Attending: SURGERY | Admitting: SURGERY
Payer: MEDICARE

## 2018-10-12 VITALS
OXYGEN SATURATION: 100 % | RESPIRATION RATE: 16 BRPM | HEIGHT: 63 IN | SYSTOLIC BLOOD PRESSURE: 211 MMHG | BODY MASS INDEX: 23.92 KG/M2 | WEIGHT: 135 LBS | DIASTOLIC BLOOD PRESSURE: 92 MMHG | HEART RATE: 96 BPM

## 2018-10-12 DIAGNOSIS — T82.898A ARTERIOVENOUS FISTULA OCCLUSION (HCC): ICD-10-CM

## 2018-10-12 LAB
BUN BLD-MCNC: 33 MG/DL (ref 7–18)
CHLORIDE BLD-SCNC: 115 MMOL/L (ref 100–108)
GLUCOSE BLD STRIP.AUTO-MCNC: 141 MG/DL (ref 74–106)
HCT VFR BLD CALC: 31 % (ref 36–49)
HGB BLD-MCNC: 10.5 G/DL (ref 12–16)
POTASSIUM BLD-SCNC: 4.4 MMOL/L (ref 3.5–5.5)
SODIUM BLD-SCNC: 146 MMOL/L (ref 136–145)

## 2018-10-12 PROCEDURE — C1769 GUIDE WIRE: HCPCS | Performed by: SURGERY

## 2018-10-12 PROCEDURE — 74011250636 HC RX REV CODE- 250/636: Performed by: SURGERY

## 2018-10-12 PROCEDURE — 84295 ASSAY OF SERUM SODIUM: CPT

## 2018-10-12 PROCEDURE — 36558 INSERT TUNNELED CV CATH: CPT | Performed by: SURGERY

## 2018-10-12 PROCEDURE — C1750 CATH, HEMODIALYSIS,LONG-TERM: HCPCS | Performed by: SURGERY

## 2018-10-12 PROCEDURE — 77030018719 HC DRSG PTCH ANTIMIC J&J -A: Performed by: SURGERY

## 2018-10-12 DEVICE — PRECISION CHRONIC CATHETER SPORT PACK,SYMMETRICAL TIP, TAL VENATRAC STYLET,14.5 FR/CH (4.8 MM) X 19 CM
Type: IMPLANTABLE DEVICE | Status: FUNCTIONAL
Brand: PALINDROME

## 2018-10-12 RX ORDER — DIPHENHYDRAMINE HYDROCHLORIDE 50 MG/ML
12.5 INJECTION, SOLUTION INTRAMUSCULAR; INTRAVENOUS
Status: DISCONTINUED | OUTPATIENT
Start: 2018-10-12 | End: 2018-10-12 | Stop reason: HOSPADM

## 2018-10-12 RX ORDER — ACETAMINOPHEN 325 MG/1
650 TABLET ORAL
Status: DISCONTINUED | OUTPATIENT
Start: 2018-10-12 | End: 2018-10-12 | Stop reason: HOSPADM

## 2018-10-12 RX ORDER — MORPHINE SULFATE 10 MG/ML
1 INJECTION, SOLUTION INTRAMUSCULAR; INTRAVENOUS
Status: DISCONTINUED | OUTPATIENT
Start: 2018-10-12 | End: 2018-10-12 | Stop reason: HOSPADM

## 2018-10-12 RX ORDER — ONDANSETRON 2 MG/ML
4 INJECTION INTRAMUSCULAR; INTRAVENOUS
Status: DISCONTINUED | OUTPATIENT
Start: 2018-10-12 | End: 2018-10-12 | Stop reason: HOSPADM

## 2018-10-12 RX ORDER — LIDOCAINE HYDROCHLORIDE 10 MG/ML
INJECTION, SOLUTION EPIDURAL; INFILTRATION; INTRACAUDAL; PERINEURAL AS NEEDED
Status: DISCONTINUED | OUTPATIENT
Start: 2018-10-12 | End: 2018-10-12 | Stop reason: HOSPADM

## 2018-10-12 RX ORDER — HEPARIN SODIUM 1000 [USP'U]/ML
INJECTION, SOLUTION INTRAVENOUS; SUBCUTANEOUS AS NEEDED
Status: DISCONTINUED | OUTPATIENT
Start: 2018-10-12 | End: 2018-10-12 | Stop reason: HOSPADM

## 2018-10-12 NOTE — DISCHARGE INSTRUCTIONS
[facility] [address] Tunneled or Non Tunneled Catheter Discharge Instructions    General Information:   A catheter, either tunneled (permanent) or non-tunneled (temporary) catheter was inserted into your neck today for the purpose of Cancer treatment (apheresis) or dialysis. Your catheter will be used for the length of your apheresis, or until you get a fistula placed in surgery for dialysis. After this time, your catheter may be removed. You may return to our department for the catheter removal.  A non-tunneled catheter will exit at the neck. There will be three ports, two of which will be used for dialysis or apheresis. The one smaller port in the middle can be used like a regular IV. It ideally is used only for about two weeks. A tunneled catheter will exit lower down on the chest wall, and can be used for a longer period of time. There is no IV port on these. The tunneled catheter is used only for dialysis. In case of emergencies only can drugs be given through these catheters and only with written permission from your doctor. Home Care Instructions: You can resume your regular diet. Do not drink alcohol, drive, or make any important legal decisions in the next 24 hours. Watch the site carefully for signs of infection, like fever, drainage, redness, and/or swelling. Your catheter should be \"packed\" with heparin after each use or at least once a week if it is not being used. This \"packing\" should only be done by nurses experienced with caring for this type of catheter. You may shower in 24 hours, but you need to cover the catheter with plastic wrap and tape to keep it dry while you are showering. Keep the site clean, dry, and protected. Do not immerse yourself in water like in the case of tub baths or swimming as long as you have the catheter.      Call If:   You should call your Physician and/or the Radiology Nurse if you have any signs of infection, shortness of breath, or if the dressing should come off or become moist.  You will be instructed on where to go for a new dressing. You should not have to change the dressings yourself, as that will be done by the nurses who access the catheter. Follow-Up Instructions:  Please see your ordering doctor as he/she has requested. DISCHARGE SUMMARY from Nurse    PATIENT INSTRUCTIONS:    After general anesthesia or intravenous sedation, for 24 hours or while taking prescription Narcotics:  · Limit your activities  · Do not drive and operate hazardous machinery  · Do not make important personal or business decisions  · Do  not drink alcoholic beverages  · If you have not urinated within 8 hours after discharge, please contact your surgeon on call. Report the following to your surgeon:  · Excessive pain, swelling, redness or odor of or around the surgical area  · Temperature over 100.5  · Nausea and vomiting lasting longer than 4 hours or if unable to take medications  · Any signs of decreased circulation or nerve impairment to extremity: change in color, persistent  numbness, tingling, coldness or increase pain  · Any questions    What to do at Home:  Recommended activity: Activity as tolerated,     *  Please give a list of your current medications to your Primary Care Provider. *  Please update this list whenever your medications are discontinued, doses are      changed, or new medications (including over-the-counter products) are added. *  Please carry medication information at all times in case of emergency situations. These are general instructions for a healthy lifestyle:    No smoking/ No tobacco products/ Avoid exposure to second hand smoke  Surgeon General's Warning:  Quitting smoking now greatly reduces serious risk to your health.     Obesity, smoking, and sedentary lifestyle greatly increases your risk for illness    A healthy diet, regular physical exercise & weight monitoring are important for maintaining a healthy lifestyle    You may be retaining fluid if you have a history of heart failure or if you experience any of the following symptoms:  Weight gain of 3 pounds or more overnight or 5 pounds in a week, increased swelling in our hands or feet or shortness of breath while lying flat in bed. Please call your doctor as soon as you notice any of these symptoms; do not wait until your next office visit. Recognize signs and symptoms of STROKE:    F-face looks uneven    A-arms unable to move or move unevenly    S-speech slurred or non-existent    T-time-call 911 as soon as signs and symptoms begin-DO NOT go       Back to bed or wait to see if you get better-TIME IS BRAIN. Warning Signs of HEART ATTACK     Call 911 if you have these symptoms:   Chest discomfort. Most heart attacks involve discomfort in the center of the chest that lasts more than a few minutes, or that goes away and comes back. It can feel like uncomfortable pressure, squeezing, fullness, or pain.  Discomfort in other areas of the upper body. Symptoms can include pain or discomfort in one or both arms, the back, neck, jaw, or stomach.  Shortness of breath with or without chest discomfort.  Other signs may include breaking out in a cold sweat, nausea, or lightheadedness. Don't wait more than five minutes to call 911 - MINUTES MATTER! Fast action can save your life. Calling 911 is almost always the fastest way to get lifesaving treatment. Emergency Medical Services staff can begin treatment when they arrive -- up to an hour sooner than if someone gets to the hospital by car. The discharge information has been reviewed with the patient and caregiver. The patient and caregiver verbalized understanding.   Discharge medications reviewed with the patient and caregiver and appropriate educational materials and side effects teaching were provided.   ___________________________________________________________________________________________________________________________________    To Reach Us:     Patient Signature:  Date: 10/12/2018  Discharging Nurse: Alberta Torres RN

## 2018-10-12 NOTE — Clinical Note
Access site prepped. Proceeding with Non-Tunnelled catheter. The right internal jugular vein accessed. A Dialysis (temporary) catheter will be used. Line secured using suture, tegaderm and Biopatch.

## 2018-10-12 NOTE — H&P (VIEW-ONLY)
Surgery History and Physical 
 
Subjective: Beau Capone is a 70 y.o. female who presents with ESRD and poorly working catheter. Patient Active Problem List  
 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  
Diagnosis Date  Asthma  Bipolar affective disorder ([facility]  Brain condition  Cataract  Chronic kidney disease  Diabetes ([facility]  Hyperlipidemia  Hypertension  Paralytic strabismus, unspecified  Psychiatric disorder Past Surgical History:  
Procedure Laterality Date  LA INSJ TUNNELED CVC W/O SUBQ PORT/ AGE 3 YR/> N/A 8/9/2018 in-pt 474A, Insertion Tunneled Central Venous Catheter performed by Fawad Mcintyre MD at [facility] CATH LAB Social History Substance Use Topics  Smoking status: Never Smoker  Smokeless tobacco: Never Used  Alcohol use No  
  
No family history on file. Prior to Admission medications Medication Sig Start Date End Date Taking? Authorizing Provider  
levETIRAcetam (KEPPRA) 250 mg tablet Take 1 Tab by mouth every Monday, Wednesday, Friday. 8/31/18   Joshua Ag MD  
levETIRAcetam (KEPPRA) 500 mg tablet Take 1 Tab by mouth two (2) times a day. 8/31/18   Joshua Ag MD  
hydrALAZINE (APRESOLINE) 50 mg tablet Take 1 Tab by mouth three (3) times daily. 8/14/18   Fortino Kirk MD  
simvastatin (ZOCOR) 20 mg tablet Take 1 Tab by mouth nightly. 8/14/18   Fortino Kirk MD  
potassium chloride (KLOR-CON) 20 mEq packet Take 1 Packet by mouth two (2) times daily (with meals).  8/14/18   Jacob Rice MD  
 doxercalciferol (HECTOROL) 4 mcg/2 mL injection 0.5 mL by IntraVENous route Every Tuesday, Thursday and Saturday. 8/14/18   Hayley Kirk MD  
epoetin raphael (EPOGEN;PROCRIT) 3,000 unit/mL injection 1.73 mL by SubCUTAneous route Every Tuesday, Thursday and Saturday. Indications: ANEMIA DUE TO RENAL FAILURE, Renal Dialysis 8/14/18   Jose Cordova MD  
insulin glargine (LANTUS) 100 unit/mL injection 10 Units by SubCUTAneous route nightly. 8/14/18   Tiera Kirk MD  
insulin lispro (HUMALOG) 100 unit/mL injection INITIATE INSULIN CORRECTIVE PROTOCOL: Normal Insulin Sensitivity For Blood Sugar (mg/dL) of:    
Less than 150 =   0 units 150 -199 =   2 units 200 -249 =   4 units 250 -299 =   6 units 300 -349 =   8 units 350 and above = 10 units and Call Physician If 2 glucose readings are above 8/14/18   Jose Cordova MD  
losartan (COZAAR) 100 mg tablet Take 1 Tab by mouth daily. 8/15/18   Hayley Kirk MD  
aspirin 81 mg tablet Take 81 mg by mouth daily. Elina Light MD  
diltiazem CD (CARDIZEM CD) 240 mg ER capsule Take 240 mg by mouth daily. Elina Light MD  
cloNIDine (CATAPRES) 0.1 mg tablet Take 0.1 mg by mouth two (2) times a day. Elina Light MD  
magnesium hydroxide ([place] MILK OF MAGNESIA) 400 mg/5 mL suspension Take 30 mL by mouth daily as needed. Elina Light MD  
acetaminophen (TYLENOL) 325 mg tablet Take 325 mg by mouth as needed. Elina Light MD  
 
Allergies Allergen Reactions  Amoxicillin Unknown (comments)  Cleocin [Clindamycin Hcl] Unknown (comments)  Codeine Unknown (comments)  Lorcet 10/650 [Hydrocodone-Acetaminophen] Unknown (comments)  Penicillin G Benzathine Unknown (comments)  Shellfish Derived Unknown (comments) ROS: 
Pertinent items are noted in HPI. Unless otherwise mentioned in the HPI. Objective:  
 
No data found. No data recorded. Physical Exam: GENERAL: alert, cooperative, no distress, appears stated age, THROAT & NECK: normal and no erythema or exudates noted. , LUNG: clear to auscultation bilaterally, HEART: regular rate and rhythm, S1, S2 normal, no murmur, click, rub or gallop, ABDOMEN: soft, non-tender. Bowel sounds normal. No masses,  no organomegaly Labs:  
Recent Results (from the past 24 hour(s)) POC 6 PLUS Collection Time: 10/04/18  1:06 PM  
Result Value Ref Range Sodium,  (H) 136 - 145 MMOL/L Potassium, POC 4.7 3.5 - 5.5 MMOL/L Chloride,  (H) 100 - 108 MMOL/L  
 BUN, POC 37 (H) 7 - 18 MG/DL Glucose,  (H) 74 - 106 MG/DL Hematocrit, POC 31 (L) 36 - 49 % Hemoglobin, POC 10.5 (L) 12 - 16 G/DL Data Review: CBC:  
Lab Results Component Value Date/Time WBC 9.2 08/31/2018 03:20 AM  
 RBC 3.75 (L) 08/31/2018 03:20 AM  
 HGB 10.4 (L) 08/31/2018 03:20 AM  
 HCT 32.9 (L) 08/31/2018 03:20 AM  
 PLATELET 064 70/41/5493 03:20 AM  
  
BMP:  
Lab Results Component Value Date/Time Glucose 135 (H) 08/31/2018 03:20 AM  
 Sodium 145 08/31/2018 03:20 AM  
 Potassium 4.2 08/31/2018 03:20 AM  
 Chloride 113 (H) 08/31/2018 03:20 AM  
 CO2 24 08/31/2018 03:20 AM  
 BUN 26 (H) 08/31/2018 03:20 AM  
 Creatinine 5.15 (H) 08/31/2018 03:20 AM  
 Calcium 8.7 08/31/2018 03:20 AM  
 
Coagulation: No results found for: PTP, INR, APTT Assessment:  
 
Active Problems: * No active hospital problems. * 
 
 
Plan:  
 
permcath exchange. Signed By: Eduar Fermin MD   
 October 4, 2018

## 2018-10-12 NOTE — Clinical Note
TRANSFER - OUT REPORT:  
 
[facility] Report consisted of patient's Situation, Background, Assessment and  
Recommendations(SBAR). Opportunity for questions and clarification was provided.

## 2018-10-12 NOTE — PROGRESS NOTES
Cath holding summary    Patient escorted to cath holding from waiting area ambulatory, alert and oriented x 4, voicing no complaints. Changed into gown and placed on monitor. NPO since MN. Lab results, med rec and H&P reviewed on chart. PIV x 1 inserted without difficulty. Family to bedside. Patient arrived to cath holding awake and alert, right upper chest site clean dry and intact with no hematoma present, /92 Dr Lakeisha Aquino made aware, patient ok to take medication once returned to [facility], nurse made aware.      1120 patient discharged home no C/O pain

## 2018-10-12 NOTE — OP NOTES
[facility] REPORT    Zoila Meadows  MR#: [id number]  : 1955  ACCOUNT #: [de-identified]   DATE OF SERVICE: 10/12/2018    SURGEON:  Rhoda Andrade MD    PREOPERATIVE DIAGNOSIS:  End-stage renal disease with poorly working dialysis catheter. POSTOPERATIVE DIAGNOSIS:  End-stage renal disease with poorly working dialysis catheter. PROCEDURE PERFORMED:  Permanent dialysis catheter exchange, Palindrome catheter, 19 cm, through a right internal jugular approach over a wire. CULTURES:  None. SPECIMENS REMOVED:  None. DRAINS:  None. ESTIMATED BLOOD LOSS:  Less than 50 mL. ANESTHESIA:  Local anesthetic only. ASSISTANT:  None. COMPLICATIONS:  None. IMPLANTS:  As above. INDICATION FOR THE PROCEDURE:  The patient is a 15-year-old female with end-stage renal disease who needed dialysis access as her current catheter is not working. The patient was given risks and benefits of the procedure including, but not limited to bleeding, infection, damage to adjacent structures, MI, stroke, and death, as well as loss of access and need for further surgery. The patient voiced understanding of all of the risks and underwent the procedure. OPERATIVE FINDINGS:  Appropriately placed previous dialysis catheter and needed a new one. PROCEDURE:  The patient was correctly identified in the precath area and taken to the cath lab in stable condition. The patient had a preincision timeout prior to the incision. The patient was prepped and draped in normal sterile fashion according to CDC guidelines for aseptic technique. We then were able to use 10 mL of 1% lidocaine from the catheter insertion site all the way to the base of the neck. We then were able to place an Amplatz Super Stiff wire down the previous catheter and get this down into the IVC. We removed the previous catheter out over a wire without any major need for any blunt dissection.   We then were able to place a new 19 cm Palindrome catheter and made sure that the cuff was approximately 2 cm within the patient. The tip of the catheter was deep within the sinoatrial junction towards the hepatic portion of the IVC. We then were able to see that there was good curvature of the catheter without any kinking. No pneumothorax was identified. Both ports were easily flushed and aspirated, and 2-0 Prolene suture was used to secure the catheter at both butterfly holes and the catheter insertion site. Biopatch with Tegaderm was placed. The patient tolerated the procedure without any issues.       MD PATRICIA Franco / ANDRES  D: 10/12/2018 11:01     T: 10/12/2018 14:29  JOB #: [id number]

## 2018-10-12 NOTE — Clinical Note
TRANSFER - IN REPORT:  
 
Verbal report received from: Lula CADE. Report consisted of patient's Situation, Background, Assessment and  
Recommendations(SBAR). Opportunity for questions and clarification was provided. Assessment completed upon patient's arrival to unit and care assumed.

## 2018-10-12 NOTE — INTERVAL H&P NOTE
H&P Update: Robert Foy was seen and examined. History and physical has been reviewed. Significant clinical changes have occurred as noted:  Continued not working catheter needs new one.     Signed By: Tamela Aguilar MD     October 12, 2018 9:49 AM

## 2018-10-12 NOTE — IP AVS SNAPSHOT
[address] Patient: Aleks Hobbs MRN: [id number]:[id number] About your hospitalization You were admitted on:  October 12, 2018 You last received care in the:  [facility] CATH LAB You were discharged on:  October 12, 2018 Why you were hospitalized Your primary diagnosis was:  Not on File Follow-up Information Follow up With Details Comments Contact Info Lo Beltran MD   [address] [facility] [place] [address] 
[phone] Sarika Serrano MD Follow up in 5 day(s) follow up [address] [facility] Shirin Gonzales 
[facility]  [facility] 
[phone] Your Scheduled Appointments Tuesday October 16, 2018  3:15 PM EDT HOSPITAL DISCHARGE with [facility] ([facility]) [address] [place] [address] 701 [facility]  
[phone] Discharge Orders None A check raz indicates which time of day the medication should be taken. My Medications CONTINUE taking these medications Instructions Each Dose to Equal  
 Morning Noon Evening Bedtime  
 aspirin 81 mg tablet Your last dose was: Your next dose is: Take 81 mg by mouth daily. 81 mg CARDIZEM  mg ER capsule Generic drug:  dilTIAZem CD Your last dose was: Your next dose is: Take 240 mg by mouth daily. 240 mg  
    
   
   
   
  
 CATAPRES 0.1 mg tablet Generic drug:  cloNIDine HCl Your last dose was: Your next dose is: Take 0.1 mg by mouth two (2) times a day. 0.1 mg  
    
   
   
   
  
 doxercalciferol 4 mcg/2 mL injection Commonly known as:  [place] Slimmer Your last dose was: Your next dose is: 0.5 mL by IntraVENous route Every Tuesday, Thursday and Saturday. 1 mcg  
    
   
   
   
  
 epoetin raphael 3,000 unit/mL injection Commonly known as:  EPOGEN;PROCRIT Your last dose was: Your next dose is:    
   
   
 1.73 mL by SubCUTAneous route Every Tuesday, Thursday and Saturday. Indications: ANEMIA DUE TO RENAL FAILURE, Renal Dialysis 5200 Units  
    
   
   
   
  
 hydrALAZINE 50 mg tablet Commonly known as:  APRESOLINE Your last dose was: Your next dose is: Take 1 Tab by mouth three (3) times daily. 50 mg  
    
   
   
   
  
 insulin glargine 100 unit/mL injection Commonly known as:  LANTUS Your last dose was: Your next dose is:    
   
   
 10 Units by SubCUTAneous route nightly. 10 Units  
    
   
   
   
  
 insulin lispro 100 unit/mL injection Commonly known as:  HUMALOG Your last dose was: Your next dose is: INITIATE INSULIN CORRECTIVE PROTOCOL: Normal Insulin Sensitivity  For Blood Sugar (mg/dL) of:    Less than 150 =   0 units          150 -199 =   2 units 200 -249 =   4 units 250 -299 =   6 units 300 -349 =   8 units 350 and above = 10 units and Call Physician If 2 glucose readings are above * levETIRAcetam 250 mg tablet Commonly known as:  KEPPRA Your last dose was: Your next dose is: Take 1 Tab by mouth every Monday, Wednesday, Friday. 250 mg  
    
   
   
   
  
 * levETIRAcetam 500 mg tablet Commonly known as:  KEPPRA Your last dose was: Your next dose is: Take 1 Tab by mouth two (2) times a day. 500 mg  
    
   
   
   
  
 losartan 100 mg tablet Commonly known as:  COZAAR Your last dose was: Your next dose is: Take 1 Tab by mouth daily. 100 mg [place]EN[place]G MILK OF MAGNESIA 400 mg/5 mL suspension Generic drug:  magnesium hydroxide Your last dose was: Your next dose is: Take 30 mL by mouth daily as needed. 30 mL  
    
   
   
   
  
 potassium chloride 20 mEq packet Commonly known as:  KLOR-CON Your last dose was: Your next dose is: Take 1 Packet by mouth two (2) times daily (with meals). 20 mEq  
    
   
   
   
  
 simvastatin 20 mg tablet Commonly known as:  ZOCOR Your last dose was: Your next dose is: Take 1 Tab by mouth nightly. 20 mg  
    
   
   
   
  
 TYLENOL 325 mg tablet Generic drug:  acetaminophen Your last dose was: Your next dose is: Take 325 mg by mouth as needed. 325 mg  
    
   
   
   
  
 * Notice: This list has 2 medication(s) that are the same as other medications prescribed for you. Read the directions carefully, and ask your doctor or other care provider to review them with you. Discharge Instructions [place] 34 Tunneled or Non Tunneled Catheter Discharge Instructions General Information: A catheter, either tunneled (permanent) or non-tunneled (temporary) catheter was inserted into your neck today for the purpose of Cancer treatment (apheresis) or dialysis. Your catheter will be used for the length of your apheresis, or until you get a fistula placed in surgery for dialysis. After this time, your catheter may be removed. You may return to our department for the catheter removal.  A non-tunneled catheter will exit at the neck. There will be three ports, two of which will be used for dialysis or apheresis. The one smaller port in the middle can be used like a regular IV. It ideally is used only for about two weeks. A tunneled catheter will exit lower down on the chest wall, and can be used for a longer period of time. There is no IV port on these. The tunneled catheter is used only for dialysis.   In case of emergencies only can drugs be given through these catheters and only with written permission from your doctor. Home Care Instructions: You can resume your regular diet. Do not drink alcohol, drive, or make any important legal decisions in the next 24 hours. Watch the site carefully for signs of infection, like fever, drainage, redness, and/or swelling. Your catheter should be \"packed\" with heparin after each use or at least once a week if it is not being used. This \"packing\" should only be done by nurses experienced with caring for this type of catheter. You may shower in 24 hours, but you need to cover the catheter with plastic wrap and tape to keep it dry while you are showering. Keep the site clean, dry, and protected. Do not immerse yourself in water like in the case of tub baths or swimming as long as you have the catheter. Call If: 
 You should call your Physician and/or the Radiology Nurse if you have any signs of infection, shortness of breath, or if the dressing should come off or become moist.  You will be instructed on where to go for a new dressing. You should not have to change the dressings yourself, as that will be done by the nurses who access the catheter. Follow-Up Instructions:  Please see your ordering doctor as he/she has requested. DISCHARGE SUMMARY from Nurse PATIENT INSTRUCTIONS: 
 
 
F-face looks uneven A-arms unable to move or move unevenly S-speech slurred or non-existent T-time-call 911 as soon as signs and symptoms begin-DO NOT go Back to bed or wait to see if you get better-TIME IS BRAIN. Warning Signs of HEART ATTACK Call 911 if you have these symptoms: 
? Chest discomfort.  Most heart attacks involve discomfort in the center of the chest that lasts more than a few minutes, or that goes away and comes back. It can feel like uncomfortable pressure, squeezing, fullness, or pain. ? Discomfort in other areas of the upper body. Symptoms can include pain or discomfort in one or both arms, the back, neck, jaw, or stomach. ? Shortness of breath with or without chest discomfort. ? Other signs may include breaking out in a cold sweat, nausea, or lightheadedness. Don't wait more than five minutes to call [address]! Fast action can save your life. Calling 911 is almost always the fastest way to get lifesaving treatment. Emergency Medical Services staff can begin treatment when they arrive  up to an hour sooner than if someone gets to the hospital by car. The discharge information has been reviewed with the patient and caregiver. The patient and caregiver verbalized understanding. Discharge medications reviewed with the patient and caregiver and appropriate educational materials and side effects teaching were provided. ___________________________________________________________________________________________________________________________________ To Reach Us:  
 
Patient Signature: 
Date: 10/12/2018 Discharging Nurse: Ravindra Shaw RN Introducing [facility]! Sarah Restrepo introduces [employer] patient portal. Now you can access parts of your medical record, email your doctor's office, and request medication refills online. 1. In your internet browser, go to [email or web address] 2. Click on the First Time User? Click Here link in the Sign In box. You will see the New Member Sign Up page. 3. Enter your [employer] Access Code exactly as it appears below. You will not need to use this code after youve completed the sign-up process. If you do not sign up before the expiration date, you must request a new code. · [employer] Access Code: [facility] Expires: 11/4/2018  9:54 PM 
 
4.  Enter the last four digits of your Social Security Number (xxxx) and Date of Birth (mm/dd/yyyy) as indicated and click Submit. You will be taken to the next sign-up page. 5. Create a Sootoo.comt ID. This will be your [employer] login ID and cannot be changed, so think of one that is secure and easy to remember. 6. Create a Sootoo.comt password. You can change your password at any time. 7. Enter your Password Reset Question and Answer. This can be used at a later time if you forget your password. 8. Enter your e-mail address. You will receive e-mail notification when new information is available in [address] 9. Click Sign Up. You can now view and download portions of your medical record. 10. Click the Download Summary menu link to download a portable copy of your medical information. If you have questions, please visit the Frequently Asked Questions section of the Indigo[employer]oom website. Remember, [employer]igo[employer]oom is NOT to be used for urgent needs. For medical emergencies, dial 911. Now available from your iPhone and Android! Introducing Lucius Trent As a [facility] patient, I wanted to make you aware of our electronic visit tool called Lucius Trent. [facility] 24/7 allows you to connect within minutes with a medical provider 24 hours a day, seven days a week via a mobile device or tablet or logging into a secure website from your computer. You can access Lucius Trent from anywhere in the [place]. A virtual visit might be right for you when you have a simple condition and feel like you just dont want to get out of bed, or cant get away from work for an appointment, when your regular [facility] provider is not available (evenings, weekends or holidays), or when youre out of town and need minor care. Electronic visits cost only $49 and if the [facility] 24/7 provider determines a prescription is needed to treat your condition, one can be electronically transmitted to a nearby pharmacy*. Please take a moment to enroll today if you have not already done so. The enrollment process is free and takes just a few minutes. To enroll, please download the [employer] 24/7 lisa to your tablet or phone, or visit [email or web address] to enroll on your computer. And, as an [address] [place] [address] patient with a [employer] account, the results of your visits will be scanned into your electronic medical record and your primary care provider will be able to view the scanned results. We urge you to continue to see your regular [employer] provider for your ongoing medical care. And while your primary care provider may not be the one available when you seek a [employer] virtual visit, the peace of mind you get from getting a real diagnosis real time can be priceless. For more information on [employer], view our Frequently Asked Questions (FAQs) at [email or web address]. Sincerely, 
 
Phil Aragon MD 
Chief Medical Officer [facility] Zaria Watson *:  certain medications cannot be prescribed via [employer] Providers Seen During Your Hospitalization Provider Specialty Primary office phone Basilio Beach MD Vascular Surgery [phone] Your Primary Care Physician (PCP) Primary Care Physician Office Phone Office Fax Tiffani Beach [phone] You are allergic to the following Allergen Reactions Amoxicillin Unknown (comments) Cleocin (Clindamycin Hcl) Unknown (comments) Codeine Unknown (comments) Lorcet 10/650 (Hydrocodone-Acetaminophen) Unknown (comments) Penicillin G Benzathine Unknown (comments) Shellfish Derived Unknown (comments) Recent Documentation Height Weight Breastfeeding? BMI Smoking Status 1.6 m 61.2 kg No 23.91 kg/m2 Never Smoker Emergency Contacts Name Discharge Info Relation Home Work Mobile [address] CAREGIVER [3] Other Relative [6] [phone] Flora Ortiz  Other Relative [6] [phone] Patient Belongings The following personal items are in your possession at time of discharge: 
  Dental Appliances: None  Visual Aid: None      Home Medications: None   Jewelry: None  Clothing: At bedside    Other Valuables: None Please provide this summary of care documentation to your next provider. Signatures-by signing, you are acknowledging that this After Visit Summary has been reviewed with you and you have received a copy. Patient Signature:  ____________________________________________________________ Date:  ____________________________________________________________  
  
Lawerance Pool Provider Signature:  ____________________________________________________________ Date:  ____________________________________________________________

## 2018-10-16 ENCOUNTER — OFFICE VISIT (OUTPATIENT)
Dept: VASCULAR SURGERY | Age: 63
End: 2018-10-16

## 2018-10-16 VITALS
SYSTOLIC BLOOD PRESSURE: 122 MMHG | RESPIRATION RATE: 17 BRPM | WEIGHT: 135 LBS | HEIGHT: 63 IN | HEART RATE: 76 BPM | BODY MASS INDEX: 23.92 KG/M2 | DIASTOLIC BLOOD PRESSURE: 82 MMHG

## 2018-10-16 DIAGNOSIS — N18.6 ESRD (END STAGE RENAL DISEASE) (HCC): Primary | ICD-10-CM

## 2018-10-16 RX ORDER — MIRTAZAPINE 15 MG/1
TABLET, FILM COATED ORAL
COMMUNITY
End: 2020-09-25

## 2018-10-16 NOTE — PROGRESS NOTES
Ms Dorothey Phalen is here for follow up vein mapping  She had to start dialysis urgently in the hospital recently and had catheter placed  She has had catheter replaced several times since then due to dysfunction  She needs permanent access  She had vein mapping and has no suitable veins for fistula  She is right handed so we discussed plans for a left arm AVG  Surgical details, follow up, time line to use, and then timing for catheter removal reviewed  She agrees to proceed  Will schedule her at the next available time

## 2018-10-16 NOTE — PROGRESS NOTES
1. Have you been to an emergency room or urgent care clinic since your last visit? no    Hospitalized since your last visit? If yes, where, when, and reason for visit? no  2. Have you seen or consulted any other health care providers outside of the [facility] since your last visit including any procedures, health maintenance items.  If yes, where, when and reason for visit? no

## 2018-10-22 ENCOUNTER — ANESTHESIA EVENT (OUTPATIENT)
Dept: CARDIOTHORACIC SURGERY | Age: 63
End: 2018-10-22
Payer: MEDICARE

## 2018-10-23 ENCOUNTER — HOSPITAL ENCOUNTER (OUTPATIENT)
Age: 63
Setting detail: OUTPATIENT SURGERY
Discharge: HOME | End: 2018-10-23
Attending: SURGERY | Admitting: SURGERY
Payer: MEDICARE

## 2018-10-23 ENCOUNTER — ANESTHESIA (OUTPATIENT)
Dept: CARDIOTHORACIC SURGERY | Age: 63
End: 2018-10-23
Payer: MEDICARE

## 2018-10-23 VITALS
DIASTOLIC BLOOD PRESSURE: 79 MMHG | HEIGHT: 63 IN | BODY MASS INDEX: 24.63 KG/M2 | RESPIRATION RATE: 13 BRPM | TEMPERATURE: 97.8 F | OXYGEN SATURATION: 100 % | WEIGHT: 139 LBS | SYSTOLIC BLOOD PRESSURE: 164 MMHG | HEART RATE: 103 BPM

## 2018-10-23 DIAGNOSIS — N18.6 ESRD (END STAGE RENAL DISEASE) (HCC): Primary | ICD-10-CM

## 2018-10-23 LAB
BUN BLD-MCNC: 37 MG/DL (ref 7–18)
CHLORIDE BLD-SCNC: 112 MMOL/L (ref 100–108)
ERYTHROCYTE [DISTWIDTH] IN BLOOD BY AUTOMATED COUNT: 16 % (ref 11.6–14.5)
GLUCOSE BLD STRIP.AUTO-MCNC: 121 MG/DL (ref 70–110)
GLUCOSE BLD STRIP.AUTO-MCNC: 129 MG/DL (ref 70–110)
GLUCOSE BLD STRIP.AUTO-MCNC: 63 MG/DL (ref 70–110)
GLUCOSE BLD STRIP.AUTO-MCNC: 91 MG/DL (ref 74–106)
HCT VFR BLD AUTO: 41.1 % (ref 35–45)
HCT VFR BLD CALC: 36 % (ref 36–49)
HGB BLD-MCNC: 12.2 G/DL (ref 12–16)
HGB BLD-MCNC: 13.4 G/DL (ref 12–16)
MCH RBC QN AUTO: 28.9 PG (ref 24–34)
MCHC RBC AUTO-ENTMCNC: 32.6 G/DL (ref 31–37)
MCV RBC AUTO: 88.8 FL (ref 74–97)
PLATELET # BLD AUTO: 298 K/UL (ref 135–420)
PMV BLD AUTO: 13 FL (ref 9.2–11.8)
POTASSIUM BLD-SCNC: 4.8 MMOL/L (ref 3.5–5.5)
RBC # BLD AUTO: 4.63 M/UL (ref 4.2–5.3)
SODIUM BLD-SCNC: 144 MMOL/L (ref 136–145)
WBC # BLD AUTO: 16.1 K/UL (ref 4.6–13.2)

## 2018-10-23 PROCEDURE — C1768 GRAFT, VASCULAR: HCPCS | Performed by: SURGERY

## 2018-10-23 PROCEDURE — 74011250636 HC RX REV CODE- 250/636

## 2018-10-23 PROCEDURE — 77030002996 HC SUT SLK J&J -A: Performed by: SURGERY

## 2018-10-23 PROCEDURE — 74011000258 HC RX REV CODE- 258: Performed by: NURSE ANESTHETIST, CERTIFIED REGISTERED

## 2018-10-23 PROCEDURE — 77030002924 HC SUT GORTX WLGO -B: Performed by: SURGERY

## 2018-10-23 PROCEDURE — 77030039266 HC ADH SKN EXOFIN S2SG -A: Performed by: SURGERY

## 2018-10-23 PROCEDURE — 84295 ASSAY OF SERUM SODIUM: CPT

## 2018-10-23 PROCEDURE — 76210000006 HC OR PH I REC 0.5 TO 1 HR: Performed by: SURGERY

## 2018-10-23 PROCEDURE — 77030031139 HC SUT VCRL2 J&J -A: Performed by: SURGERY

## 2018-10-23 PROCEDURE — C1894 INTRO/SHEATH, NON-LASER: HCPCS | Performed by: SURGERY

## 2018-10-23 PROCEDURE — 77030002986 HC SUT PROL J&J -A: Performed by: SURGERY

## 2018-10-23 PROCEDURE — 82947 ASSAY GLUCOSE BLOOD QUANT: CPT

## 2018-10-23 PROCEDURE — 76210000021 HC REC RM PH II 0.5 TO 1 HR: Performed by: SURGERY

## 2018-10-23 PROCEDURE — 74011250636 HC RX REV CODE- 250/636: Performed by: SURGERY

## 2018-10-23 PROCEDURE — 76060000034 HC ANESTHESIA 1.5 TO 2 HR: Performed by: SURGERY

## 2018-10-23 PROCEDURE — 77030018836 HC SOL IRR NACL ICUM -A: Performed by: SURGERY

## 2018-10-23 PROCEDURE — 82962 GLUCOSE BLOOD TEST: CPT

## 2018-10-23 PROCEDURE — 85027 COMPLETE CBC AUTOMATED: CPT | Performed by: SURGERY

## 2018-10-23 PROCEDURE — 76010000129 HC CV SURG 1.5 TO 2 HR: Performed by: SURGERY

## 2018-10-23 DEVICE — GRAFT VASC 4-7MMX45CM TAPR -- ACUSEAL: Type: IMPLANTABLE DEVICE | Site: ARM | Status: FUNCTIONAL

## 2018-10-23 RX ORDER — PROPOFOL 10 MG/ML
INJECTION, EMULSION INTRAVENOUS
Status: DISCONTINUED | OUTPATIENT
Start: 2018-10-23 | End: 2018-10-23 | Stop reason: HOSPADM

## 2018-10-23 RX ORDER — MAGNESIUM SULFATE 100 %
4 CRYSTALS MISCELLANEOUS AS NEEDED
Status: DISCONTINUED | OUTPATIENT
Start: 2018-10-23 | End: 2018-10-23 | Stop reason: HOSPADM

## 2018-10-23 RX ORDER — ONDANSETRON 2 MG/ML
4 INJECTION INTRAMUSCULAR; INTRAVENOUS ONCE
Status: DISCONTINUED | OUTPATIENT
Start: 2018-10-23 | End: 2018-10-23 | Stop reason: HOSPADM

## 2018-10-23 RX ORDER — SODIUM CHLORIDE 0.9 % (FLUSH) 0.9 %
5-10 SYRINGE (ML) INJECTION EVERY 8 HOURS
Status: DISCONTINUED | OUTPATIENT
Start: 2018-10-23 | End: 2018-10-23 | Stop reason: HOSPADM

## 2018-10-23 RX ORDER — HEPARIN SODIUM 1000 [USP'U]/ML
INJECTION, SOLUTION INTRAVENOUS; SUBCUTANEOUS AS NEEDED
Status: DISCONTINUED | OUTPATIENT
Start: 2018-10-23 | End: 2018-10-23 | Stop reason: HOSPADM

## 2018-10-23 RX ORDER — INSULIN LISPRO 100 [IU]/ML
INJECTION, SOLUTION INTRAVENOUS; SUBCUTANEOUS ONCE
Status: DISCONTINUED | OUTPATIENT
Start: 2018-10-23 | End: 2018-10-23 | Stop reason: HOSPADM

## 2018-10-23 RX ORDER — HEPARIN SODIUM 200 [USP'U]/100ML
INJECTION, SOLUTION INTRAVENOUS
Status: DISCONTINUED
Start: 2018-10-23 | End: 2018-10-23 | Stop reason: HOSPADM

## 2018-10-23 RX ORDER — PHENYLEPHRINE HCL IN 0.9% NACL 1 MG/10 ML
SYRINGE (ML) INTRAVENOUS AS NEEDED
Status: DISCONTINUED | OUTPATIENT
Start: 2018-10-23 | End: 2018-10-23 | Stop reason: HOSPADM

## 2018-10-23 RX ORDER — FENTANYL CITRATE 50 UG/ML
INJECTION, SOLUTION INTRAMUSCULAR; INTRAVENOUS AS NEEDED
Status: DISCONTINUED | OUTPATIENT
Start: 2018-10-23 | End: 2018-10-23 | Stop reason: HOSPADM

## 2018-10-23 RX ORDER — MIDAZOLAM HYDROCHLORIDE 1 MG/ML
INJECTION, SOLUTION INTRAMUSCULAR; INTRAVENOUS AS NEEDED
Status: DISCONTINUED | OUTPATIENT
Start: 2018-10-23 | End: 2018-10-23 | Stop reason: HOSPADM

## 2018-10-23 RX ORDER — FAMOTIDINE 20 MG/1
20 TABLET, FILM COATED ORAL ONCE
Status: DISCONTINUED | OUTPATIENT
Start: 2018-10-23 | End: 2018-10-23 | Stop reason: HOSPADM

## 2018-10-23 RX ORDER — SODIUM CHLORIDE 0.9 % (FLUSH) 0.9 %
5-10 SYRINGE (ML) INJECTION AS NEEDED
Status: DISCONTINUED | OUTPATIENT
Start: 2018-10-23 | End: 2018-10-23 | Stop reason: HOSPADM

## 2018-10-23 RX ORDER — HEPARIN SODIUM 200 [USP'U]/100ML
INJECTION, SOLUTION INTRAVENOUS
Status: COMPLETED | OUTPATIENT
Start: 2018-10-23 | End: 2018-10-23

## 2018-10-23 RX ORDER — OXYCODONE AND ACETAMINOPHEN 5; 325 MG/1; MG/1
1 TABLET ORAL
Qty: 40 TAB | Refills: 0 | Status: ON HOLD | OUTPATIENT
Start: 2018-10-23 | End: 2018-11-20

## 2018-10-23 RX ORDER — LIDOCAINE HYDROCHLORIDE 20 MG/ML
INJECTION, SOLUTION EPIDURAL; INFILTRATION; INTRACAUDAL; PERINEURAL AS NEEDED
Status: DISCONTINUED | OUTPATIENT
Start: 2018-10-23 | End: 2018-10-23 | Stop reason: HOSPADM

## 2018-10-23 RX ORDER — PROPOFOL 10 MG/ML
INJECTION, EMULSION INTRAVENOUS AS NEEDED
Status: DISCONTINUED | OUTPATIENT
Start: 2018-10-23 | End: 2018-10-23 | Stop reason: HOSPADM

## 2018-10-23 RX ORDER — HEPARIN SODIUM 5000 [USP'U]/ML
INJECTION, SOLUTION INTRAVENOUS; SUBCUTANEOUS
Status: DISCONTINUED
Start: 2018-10-23 | End: 2018-10-23 | Stop reason: HOSPADM

## 2018-10-23 RX ORDER — CEFOXITIN 2 G/1
INJECTION, POWDER, FOR SOLUTION INTRAVENOUS AS NEEDED
Status: DISCONTINUED | OUTPATIENT
Start: 2018-10-23 | End: 2018-10-23

## 2018-10-23 RX ORDER — DEXTROSE MONOHYDRATE AND SODIUM CHLORIDE 5; .225 G/100ML; G/100ML
25 INJECTION, SOLUTION INTRAVENOUS CONTINUOUS
Status: DISCONTINUED | OUTPATIENT
Start: 2018-10-23 | End: 2018-10-23 | Stop reason: HOSPADM

## 2018-10-23 RX ORDER — LIDOCAINE HYDROCHLORIDE 10 MG/ML
INJECTION, SOLUTION EPIDURAL; INFILTRATION; INTRACAUDAL; PERINEURAL AS NEEDED
Status: DISCONTINUED | OUTPATIENT
Start: 2018-10-23 | End: 2018-10-23 | Stop reason: HOSPADM

## 2018-10-23 RX ORDER — LIDOCAINE HYDROCHLORIDE 10 MG/ML
0.1 INJECTION, SOLUTION EPIDURAL; INFILTRATION; INTRACAUDAL; PERINEURAL AS NEEDED
Status: DISCONTINUED | OUTPATIENT
Start: 2018-10-23 | End: 2018-10-23 | Stop reason: HOSPADM

## 2018-10-23 RX ORDER — DEXTROSE 50 % IN WATER (D50W) INTRAVENOUS SYRINGE
Status: DISCONTINUED
Start: 2018-10-23 | End: 2018-10-23 | Stop reason: HOSPADM

## 2018-10-23 RX ORDER — CEFAZOLIN SODIUM 1 G/3ML
INJECTION, POWDER, FOR SOLUTION INTRAMUSCULAR; INTRAVENOUS AS NEEDED
Status: DISCONTINUED | OUTPATIENT
Start: 2018-10-23 | End: 2018-10-23 | Stop reason: HOSPADM

## 2018-10-23 RX ORDER — LIDOCAINE HYDROCHLORIDE 10 MG/ML
INJECTION, SOLUTION EPIDURAL; INFILTRATION; INTRACAUDAL; PERINEURAL
Status: DISCONTINUED
Start: 2018-10-23 | End: 2018-10-23 | Stop reason: HOSPADM

## 2018-10-23 RX ORDER — DEXTROSE 50 % IN WATER (D50W) INTRAVENOUS SYRINGE
25-50 AS NEEDED
Status: DISCONTINUED | OUTPATIENT
Start: 2018-10-23 | End: 2018-10-23 | Stop reason: HOSPADM

## 2018-10-23 RX ORDER — FENTANYL CITRATE 50 UG/ML
50 INJECTION, SOLUTION INTRAMUSCULAR; INTRAVENOUS
Status: DISCONTINUED | OUTPATIENT
Start: 2018-10-23 | End: 2018-10-23 | Stop reason: HOSPADM

## 2018-10-23 RX ADMIN — LIDOCAINE HYDROCHLORIDE 100 MG: 20 INJECTION, SOLUTION EPIDURAL; INFILTRATION; INTRACAUDAL; PERINEURAL at 14:02

## 2018-10-23 RX ADMIN — FENTANYL CITRATE 50 MCG: 50 INJECTION, SOLUTION INTRAMUSCULAR; INTRAVENOUS at 14:49

## 2018-10-23 RX ADMIN — DEXTROSE MONOHYDRATE AND SODIUM CHLORIDE: 5; .225 INJECTION, SOLUTION INTRAVENOUS at 13:55

## 2018-10-23 RX ADMIN — FENTANYL CITRATE 50 MCG: 50 INJECTION, SOLUTION INTRAMUSCULAR; INTRAVENOUS at 13:53

## 2018-10-23 RX ADMIN — DEXTROSE MONOHYDRATE AND SODIUM CHLORIDE 25 ML/HR: 5; .225 INJECTION, SOLUTION INTRAVENOUS at 13:31

## 2018-10-23 RX ADMIN — PROPOFOL 30 MG: 10 INJECTION, EMULSION INTRAVENOUS at 14:14

## 2018-10-23 RX ADMIN — Medication 100 MCG: at 14:57

## 2018-10-23 RX ADMIN — PROPOFOL 30 MG: 10 INJECTION, EMULSION INTRAVENOUS at 15:15

## 2018-10-23 RX ADMIN — FENTANYL CITRATE 50 MCG: 50 INJECTION, SOLUTION INTRAMUSCULAR; INTRAVENOUS at 14:09

## 2018-10-23 RX ADMIN — PROPOFOL 50 MCG/KG/MIN: 10 INJECTION, EMULSION INTRAVENOUS at 14:02

## 2018-10-23 RX ADMIN — PROPOFOL 20 MG: 10 INJECTION, EMULSION INTRAVENOUS at 14:23

## 2018-10-23 RX ADMIN — FENTANYL CITRATE 50 MCG: 50 INJECTION, SOLUTION INTRAMUSCULAR; INTRAVENOUS at 14:24

## 2018-10-23 RX ADMIN — CEFAZOLIN SODIUM 2 G: 1 INJECTION, POWDER, FOR SOLUTION INTRAMUSCULAR; INTRAVENOUS at 14:00

## 2018-10-23 RX ADMIN — HEPARIN SODIUM 5000 UNITS: 1000 INJECTION, SOLUTION INTRAVENOUS; SUBCUTANEOUS at 14:40

## 2018-10-23 RX ADMIN — MIDAZOLAM HYDROCHLORIDE 2 MG: 1 INJECTION, SOLUTION INTRAMUSCULAR; INTRAVENOUS at 13:53

## 2018-10-23 RX ADMIN — PROPOFOL 20 MG: 10 INJECTION, EMULSION INTRAVENOUS at 14:13

## 2018-10-23 NOTE — ANESTHESIA POSTPROCEDURE EVALUATION
Procedure(s): LEFT ARM ARTERIO VENOUS GRAFT CREATION. Anesthesia Post Evaluation Multimodal analgesia: multimodal analgesia used between 6 hours prior to anesthesia start to PACU discharge Patient location during evaluation: bedside Patient participation: complete - patient participated Level of consciousness: awake Pain management: adequate Airway patency: patent Anesthetic complications: no 
Cardiovascular status: acceptable Respiratory status: acceptable Hydration status: acceptable Visit Vitals /79 Pulse (!) 103 Temp 36.6 °C (97.8 °F) Resp 13 Ht 5' 3\" (1.6 m) Wt 63 kg (139 lb) SpO2 100% BMI 24.62 kg/m²

## 2018-10-23 NOTE — ROUTINE PROCESS
TRANSFER - IN REPORT: 
 
Verbal report received from Karley High LPN ([facility]) on William Verma  being received from [facility]) for ordered procedure Report consisted of patients Situation, Background, Assessment and  
Recommendations(SBAR). Information from the following report(s) SBAR and Kardex was reviewed with the receiving nurse. Opportunity for questions and clarification was provided. Assessment completed upon patients arrival to unit and care assumed.

## 2018-10-23 NOTE — ANESTHESIA PREPROCEDURE EVALUATION
Anesthetic History No history of anesthetic complications Review of Systems / Medical History Patient summary reviewed and pertinent labs reviewed Pulmonary Asthma Neuro/Psych  
 
seizures CVA 
TIA and psychiatric history Comments: Was in a Coma about 30 years ago for a few months. Has been in a nursing home since then. Blind and bed bound. Cardiovascular Hypertension Exercise tolerance: <4 METS 
  
GI/Hepatic/Renal 
  
 
 
Renal disease: ESRD and dialysis Endo/Other Diabetes Other Findings Physical Exam 
 
Airway Mallampati: III 
TM Distance: 4 - 6 cm Neck ROM: normal range of motion Mouth opening: Normal 
 
 Cardiovascular Regular rate and rhythm,  S1 and S2 normal,  no murmur, click, rub, or gallop Dental 
 
Dentition: Poor dentition and Upper partial plate Pulmonary Breath sounds clear to auscultation Abdominal 
GI exam deferred Other Findings Anesthetic Plan ASA: 4 Anesthesia type: MAC Induction: Intravenous Anesthetic plan and risks discussed with: Patient

## 2018-10-23 NOTE — PERIOP NOTES
DR. Shara Eisenmenger in Pre-op to transfer pt. To CVT-OR. I was unable to transfer PIV from Left arm to Right arm or give ordered po pepcid or repeat redraw of lab(  Reported I stat lab to Dr. Shara Eisenmenger potassium >9, and   called for specimen redraw. Also reported to Dr. Shara Eisenmenger Vancomycin hasn't been started, I was unable to start or send vancomycin with pt.

## 2018-10-23 NOTE — INTERVAL H&P NOTE
H&P Update: Lakesha Guevara was seen and examined. History and physical has been reviewed. Significant clinical changes have occurred as noted:  Catheter working fine per report now ready for AVG placement.  
 
Signed By: Lisa Myles MD   
 October 23, 2018 1:08 PM

## 2018-10-23 NOTE — PERIOP NOTES
Report/discharge instructions given to \"Markos\" at [facility] in [place].

## 2018-10-23 NOTE — DISCHARGE INSTRUCTIONS
Hemodialysis Access: What to Expect at [address]  Hemodialysis is a way to remove wastes from the blood when your kidneys can no longer do the job. It is not a cure, but it can help you live longer and feel better. It is a lifesaving treatment when you have kidney failure. Hemodialysis is often called dialysis. Your doctor created a place (called an access) in your arm for your blood to flow in and out of your body during your dialysis sessions. Your arm will probably be bruised and swollen. It may hurt. The cut (incision) may bleed. The pain and bleeding will get better over several days. You will probably need only over-the-counter pain medicine. You can reduce swelling by propping your arm on 1 or 2 pillows and keeping your elbow straight. You will have stitches. These may dissolve on their own, or your doctor will tell you when to come in to have them removed. You should also be able to return to work in a few days. You may feel some coolness or numbness in your hand. These feelings usually go away in a few weeks. Your doctor may suggest squeezing a soft object. This will strengthen your access and may make hemodialysis faster and easier. You should always be able to feel blood rushing through the fistula or graft. It feels like a slight vibration when you put your fingers on the skin over the fistula or graft. This feeling is called a thrill or pulse. This care sheet gives you a general idea about how long it will take for you to recover. But each person recovers at a different pace. Follow the steps below to get better as quickly as possible. How can you care for yourself at home? Activity    · Rest when you feel tired. Getting enough sleep will help you recover.  Do not lie on or sleep on the arm with the access.     · Avoid activities such as washing windows or gardening that put stress on the arm with the access.     · You may use your arm, but do not lift anything that weighs more than about 15 pounds. This may include a child, heavy grocery bags, a heavy briefcase or backpack, cat litter or dog food bags, or a vacuum .     · You can shower, but keep the access dry for the first 2 days. Cover the area with a plastic bag to keep it dry.     · Do not soak or scrub the incision until it has healed.     · Wear an arm guard to protect the area if you play sports or work with your arms.     · You may drive when your doctor says it is okay. This is usually in 1 to 2 days.     · Most people are able to return to work about 1 or 2 days after surgery. Diet    · Follow an eating plan that is good for your kidneys. A registered dietitian can help you make a meal plan that is right for you. You may need to limit protein, salt, fluids, and certain foods. Medicines    · Your doctor will tell you if and when you can restart your medicines. He or she will also give you instructions about taking any new medicines.     · If you take blood thinners, such as warfarin (Coumadin), clopidogrel (Plavix), or aspirin, be sure to talk to your doctor. He or she will tell you if and when to start taking those medicines again. Make sure that you understand exactly what your doctor wants you to do.     · Take pain medicines exactly as directed. ? If the doctor gave you a prescription medicine for pain, take it as prescribed. ? If you are not taking a prescription pain medicine, ask your doctor if you can take acetaminophen (Tylenol). Do not take ibuprofen (Advil, Motrin) or naproxen (Aleve), or similar medicines, unless your doctor tells you to. They may make chronic kidney disease worse. ? Do not take two or more pain medicines at the same time unless the doctor told you to. Many pain medicines have acetaminophen, which is Tylenol.  Too much acetaminophen (Tylenol) can be harmful.     · If you think your pain medicine is making you sick to your stomach:  ? Take your medicine after meals (unless your doctor has told you not to). ? Ask your doctor for a different pain medicine.     · If your doctor prescribed antibiotics, take them as directed. Do not stop taking them just because you feel better. You need to take the full course of antibiotics. Incision care    · Keep the area dry for 2 days. After 2 days, wash the area with soap and water every day, and always before dialysis.     · Do not soak or scrub the incision until it has healed.     · If you have a bandage, change it every day or as your doctor recommends. Your doctor will tell you when you can remove it. Exercise    · Squeeze a soft ball or other object as your doctor tells you. This will help blood flow through the access and help prevent blood clots.    Elevation    · Prop up the sore arm on a pillow anytime you sit or lie down during the next 3 days. Try to keep it above the level of your heart. This will help reduce swelling. Other instructions    · Every day, check your access for a pulse or thrill in the fistula or graft area. A thrill is a vibration. To feel a pulse or thrill, place the first two fingers of your hand over the access.     · Do not bump your arm.     · Do not wear tight clothing, jewelry, or anything else that may squeeze the access.     · Use your other arm to have blood drawn or blood pressure taken.     · Do not put cream or lotion on or near the access.     · Make sure all doctors you deal with know you have a vascular access. Follow-up care is a key part of your treatment and safety. Be sure to make and go to all appointments, and call your doctor if you are having problems. It's also a good idea to know your test results and keep a list of the medicines you take. When should you call for help? Call 911 anytime you think you may need emergency care.  For example, call if:    · You passed out (lost consciousness).     · You have chest pain, are short of breath, or cough up blood.    Call your doctor now or seek immediate medical care if:    · Your hand or arm is cold or dark-colored.     · You have no pulse in your access.     · You have nausea or you vomit.     · You have pain that does not get better after you take pain medicine.     · You have loose stitches, or your incision comes open.     · You are bleeding from the incision.     · You have signs of infection, such as:  ? Increased pain, swelling, warmth, or redness. ? Red streaks leading from the area. ? Pus draining from the area. ? A fever.     · You have signs of a blood clot in your leg (called a deep vein thrombosis), such as:  ? Pain in your calf, back of the knee, thigh, or groin. ? Redness or swelling in your leg.    Watch closely for changes in your health, and be sure to contact your doctor if you have any problems. Where can you learn more? Go to http://izabela-jewell.info/. Enter P616 in the search box to learn more about \"Hemodialysis Access: What to Expect at Home. \"  Current as of: March 15, 2018  Content Version: 11.8  © 6516-4760 [employer]. Care instructions adapted under license by [employer] (which disclaims liability or warranty for this information). If you have questions about a medical condition or this instruction, always ask your healthcare professional. [place] [address] any warranty or liability for your use of this information. Hemodialysis Access: What to Expect at [address] [place] [address]  Hemodialysis is a way to remove wastes from the blood when your kidneys can no longer do the job. It is not a cure, but it can help you live longer and feel better. It is a lifesaving treatment when you have kidney failure. Hemodialysis is often called dialysis. Your doctor created a place (called an access) in your arm for your blood to flow in and out of your body during your dialysis sessions. Your arm will probably be bruised and swollen. It may hurt. The cut (incision) may bleed.  The pain and bleeding will get better over several days. You will probably need only over-the-counter pain medicine. You can reduce swelling by propping your arm on 1 or 2 pillows and keeping your elbow straight. You will have stitches. These may dissolve on their own, or your doctor will tell you when to come in to have them removed. You should also be able to return to work in a few days. You may feel some coolness or numbness in your hand. These feelings usually go away in a few weeks. Your doctor may suggest squeezing a soft object. This will strengthen your access and may make hemodialysis faster and easier. You should always be able to feel blood rushing through the fistula or graft. It feels like a slight vibration when you put your fingers on the skin over the fistula or graft. This feeling is called a thrill or pulse. This care sheet gives you a general idea about how long it will take for you to recover. But each person recovers at a different pace. Follow the steps below to get better as quickly as possible. How can you care for yourself at home? Activity    · Rest when you feel tired. Getting enough sleep will help you recover. Do not lie on or sleep on the arm with the access.     · Avoid activities such as washing windows or gardening that put stress on the arm with the access.     · You may use your arm, but do not lift anything that weighs more than about 15 pounds. This may include a child, heavy grocery bags, a heavy briefcase or backpack, cat litter or dog food bags, or a vacuum .     · You can shower, but keep the access dry for the first 2 days. Cover the area with a plastic bag to keep it dry.     · Do not soak or scrub the incision until it has healed.     · Wear an arm guard to protect the area if you play sports or work with your arms.     · You may drive when your doctor says it is okay.  This is usually in 1 to 2 days.     · Most people are able to return to work about 1 or 2 days after surgery. Diet    · Follow an eating plan that is good for your kidneys. A registered dietitian can help you make a meal plan that is right for you. You may need to limit protein, salt, fluids, and certain foods. Medicines    · Your doctor will tell you if and when you can restart your medicines. He or she will also give you instructions about taking any new medicines.     · If you take blood thinners, such as warfarin (Coumadin), clopidogrel (Plavix), or aspirin, be sure to talk to your doctor. He or she will tell you if and when to start taking those medicines again. Make sure that you understand exactly what your doctor wants you to do.     · Take pain medicines exactly as directed. ? If the doctor gave you a prescription medicine for pain, take it as prescribed. ? If you are not taking a prescription pain medicine, ask your doctor if you can take acetaminophen (Tylenol). Do not take ibuprofen (Advil, Motrin) or naproxen (Aleve), or similar medicines, unless your doctor tells you to. They may make chronic kidney disease worse. ? Do not take two or more pain medicines at the same time unless the doctor told you to. Many pain medicines have acetaminophen, which is Tylenol. Too much acetaminophen (Tylenol) can be harmful.     · If you think your pain medicine is making you sick to your stomach:  ? Take your medicine after meals (unless your doctor has told you not to). ? Ask your doctor for a different pain medicine.     · If your doctor prescribed antibiotics, take them as directed. Do not stop taking them just because you feel better. You need to take the full course of antibiotics. Incision care    · Keep the area dry for 2 days. After 2 days, wash the area with soap and water every day, and always before dialysis.     · Do not soak or scrub the incision until it has healed.     · If you have a bandage, change it every day or as your doctor recommends. Your doctor will tell you when you can remove it. Exercise    · Squeeze a soft ball or other object as your doctor tells you. This will help blood flow through the access and help prevent blood clots.    Elevation    · Prop up the sore arm on a pillow anytime you sit or lie down during the next 3 days. Try to keep it above the level of your heart. This will help reduce swelling. Other instructions    · Every day, check your access for a pulse or thrill in the fistula or graft area. A thrill is a vibration. To feel a pulse or thrill, place the first two fingers of your hand over the access.     · Do not bump your arm.     · Do not wear tight clothing, jewelry, or anything else that may squeeze the access.     · Use your other arm to have blood drawn or blood pressure taken.     · Do not put cream or lotion on or near the access.     · Make sure all doctors you deal with know you have a vascular access. Follow-up care is a key part of your treatment and safety. Be sure to make and go to all appointments, and call your doctor if you are having problems. It's also a good idea to know your test results and keep a list of the medicines you take. When should you call for help? Call 911 anytime you think you may need emergency care. For example, call if:    · You passed out (lost consciousness).     · You have chest pain, are short of breath, or cough up blood.    Call your doctor now or seek immediate medical care if:    · Your hand or arm is cold or dark-colored.     · You have no pulse in your access.     · You have nausea or you vomit.     · You have pain that does not get better after you take pain medicine.     · You have loose stitches, or your incision comes open.     · You are bleeding from the incision.     · You have signs of infection, such as:  ? Increased pain, swelling, warmth, or redness. ? Red streaks leading from the area. ? Pus draining from the area.   ? A fever.     · You have signs of a blood clot in your leg (called a deep vein thrombosis), such as:  ? Pain in your calf, back of the knee, thigh, or groin. ? Redness or swelling in your leg.    Watch closely for changes in your health, and be sure to contact your doctor if you have any problems. Where can you learn more? Go to http://izabela-jewell.info/. Enter P616 in the search box to learn more about \"Hemodialysis Access: What to Expect at Home. \"  Current as of: March 15, 2018  Content Version: 11.8  © 8050-8917 [employer]. Care instructions adapted under license by [employer] (which disclaims liability or warranty for this information). If you have questions about a medical condition or this instruction, always ask your healthcare professional. [address] any warranty or liability for your use of this information. Hemodialysis Access: What to Expect at [address] [place] [address]  Hemodialysis is a way to remove wastes from the blood when your kidneys can no longer do the job. It is not a cure, but it can help you live longer and feel better. It is a lifesaving treatment when you have kidney failure. Hemodialysis is often called dialysis. Your doctor created a place (called an access) in your arm for your blood to flow in and out of your body during your dialysis sessions. Your arm will probably be bruised and swollen. It may hurt. The cut (incision) may bleed. The pain and bleeding will get better over several days. You will probably need only over-the-counter pain medicine. You can reduce swelling by propping your arm on 1 or 2 pillows and keeping your elbow straight. You will have stitches. These may dissolve on their own, or your doctor will tell you when to come in to have them removed. You should also be able to return to work in a few days. You may feel some coolness or numbness in your hand. These feelings usually go away in a few weeks. Your doctor may suggest squeezing a soft object.  This will strengthen your access and may make hemodialysis faster and easier. You should always be able to feel blood rushing through the fistula or graft. It feels like a slight vibration when you put your fingers on the skin over the fistula or graft. This feeling is called a thrill or pulse. This care sheet gives you a general idea about how long it will take for you to recover. But each person recovers at a different pace. Follow the steps below to get better as quickly as possible. How can you care for yourself at home? Activity    · Rest when you feel tired. Getting enough sleep will help you recover. Do not lie on or sleep on the arm with the access.     · Avoid activities such as washing windows or gardening that put stress on the arm with the access.     · You may use your arm, but do not lift anything that weighs more than about 15 pounds. This may include a child, heavy grocery bags, a heavy briefcase or backpack, cat litter or dog food bags, or a vacuum .     · You can shower, but keep the access dry for the first 2 days. Cover the area with a plastic bag to keep it dry.     · Do not soak or scrub the incision until it has healed.     · Wear an arm guard to protect the area if you play sports or work with your arms.     · You may drive when your doctor says it is okay. This is usually in 1 to 2 days.     · Most people are able to return to work about 1 or 2 days after surgery. Diet    · Follow an eating plan that is good for your kidneys. A registered dietitian can help you make a meal plan that is right for you. You may need to limit protein, salt, fluids, and certain foods. Medicines    · Your doctor will tell you if and when you can restart your medicines. He or she will also give you instructions about taking any new medicines.     · If you take blood thinners, such as warfarin (Coumadin), clopidogrel (Plavix), or aspirin, be sure to talk to your doctor.  He or she will tell you if and when to start taking those medicines again. Make sure that you understand exactly what your doctor wants you to do.     · Take pain medicines exactly as directed. ? If the doctor gave you a prescription medicine for pain, take it as prescribed. ? If you are not taking a prescription pain medicine, ask your doctor if you can take acetaminophen (Tylenol). Do not take ibuprofen (Advil, Motrin) or naproxen (Aleve), or similar medicines, unless your doctor tells you to. They may make chronic kidney disease worse. ? Do not take two or more pain medicines at the same time unless the doctor told you to. Many pain medicines have acetaminophen, which is Tylenol. Too much acetaminophen (Tylenol) can be harmful.     · If you think your pain medicine is making you sick to your stomach:  ? Take your medicine after meals (unless your doctor has told you not to). ? Ask your doctor for a different pain medicine.     · If your doctor prescribed antibiotics, take them as directed. Do not stop taking them just because you feel better. You need to take the full course of antibiotics. Incision care    · Keep the area dry for 2 days. After 2 days, wash the area with soap and water every day, and always before dialysis.     · Do not soak or scrub the incision until it has healed.     · If you have a bandage, change it every day or as your doctor recommends. Your doctor will tell you when you can remove it. Exercise    · Squeeze a soft ball or other object as your doctor tells you. This will help blood flow through the access and help prevent blood clots.    Elevation    · Prop up the sore arm on a pillow anytime you sit or lie down during the next 3 days. Try to keep it above the level of your heart. This will help reduce swelling. Other instructions    · Every day, check your access for a pulse or thrill in the fistula or graft area. A thrill is a vibration.  To feel a pulse or thrill, place the first two fingers of your hand over the access.     · Do not bump your arm.     · Do not wear tight clothing, jewelry, or anything else that may squeeze the access.     · Use your other arm to have blood drawn or blood pressure taken.     · Do not put cream or lotion on or near the access.     · Make sure all doctors you deal with know you have a vascular access. Follow-up care is a key part of your treatment and safety. Be sure to make and go to all appointments, and call your doctor if you are having problems. It's also a good idea to know your test results and keep a list of the medicines you take. When should you call for help? Call 911 anytime you think you may need emergency care. For example, call if:    · You passed out (lost consciousness).     · You have chest pain, are short of breath, or cough up blood.    Call your doctor now or seek immediate medical care if:    · Your hand or arm is cold or dark-colored.     · You have no pulse in your access.     · You have nausea or you vomit.     · You have pain that does not get better after you take pain medicine.     · You have loose stitches, or your incision comes open.     · You are bleeding from the incision.     · You have signs of infection, such as:  ? Increased pain, swelling, warmth, or redness. ? Red streaks leading from the area. ? Pus draining from the area. ? A fever.     · You have signs of a blood clot in your leg (called a deep vein thrombosis), such as:  ? Pain in your calf, back of the knee, thigh, or groin. ? Redness or swelling in your leg.    Watch closely for changes in your health, and be sure to contact your doctor if you have any problems. Where can you learn more? Go to http://izabela-jewell.info/. Enter P616 in the search box to learn more about \"Hemodialysis Access: What to Expect at Home. \"  Current as of: March 15, 2018  Content Version: 11.8  © 7501-2592 [employer].  Care instructions adapted under license by [employer] (which disclaims liability or warranty for this information). If you have questions about a medical condition or this instruction, always ask your healthcare professional. [address] any warranty or liability for your use of this information. Hemodialysis Access: What to Expect at [address] [facility]  Hemodialysis is a way to remove wastes from the blood when your kidneys can no longer do the job. It is not a cure, but it can help you live longer and feel better. It is a lifesaving treatment when you have kidney failure. Hemodialysis is often called dialysis. Your doctor created a place (called an access) in your arm for your blood to flow in and out of your body during your dialysis sessions. Your arm will probably be bruised and swollen. It may hurt. The cut (incision) may bleed. The pain and bleeding will get better over several days. You will probably need only over-the-counter pain medicine. You can reduce swelling by propping your arm on 1 or 2 pillows and keeping your elbow straight. You will have stitches. These may dissolve on their own, or your doctor will tell you when to come in to have them removed. You should also be able to return to work in a few days. You may feel some coolness or numbness in your hand. These feelings usually go away in a few weeks. Your doctor may suggest squeezing a soft object. This will strengthen your access and may make hemodialysis faster and easier. You should always be able to feel blood rushing through the fistula or graft. It feels like a slight vibration when you put your fingers on the skin over the fistula or graft. This feeling is called a thrill or pulse. This care sheet gives you a general idea about how long it will take for you to recover. But each person recovers at a different pace. Follow the steps below to get better as quickly as possible. How can you care for yourself at home? Activity    · Rest when you feel tired.  Getting enough sleep will help you recover. Do not lie on or sleep on the arm with the access.     · Avoid activities such as washing windows or gardening that put stress on the arm with the access.     · You may use your arm, but do not lift anything that weighs more than about 15 pounds. This may include a child, heavy grocery bags, a heavy briefcase or backpack, cat litter or dog food bags, or a vacuum .     · You can shower, but keep the access dry for the first 2 days. Cover the area with a plastic bag to keep it dry.     · Do not soak or scrub the incision until it has healed.     · Wear an arm guard to protect the area if you play sports or work with your arms.     · You may drive when your doctor says it is okay. This is usually in 1 to 2 days.     · Most people are able to return to work about 1 or 2 days after surgery. Diet    · Follow an eating plan that is good for your kidneys. A registered dietitian can help you make a meal plan that is right for you. You may need to limit protein, salt, fluids, and certain foods. Medicines    · Your doctor will tell you if and when you can restart your medicines. He or she will also give you instructions about taking any new medicines.     · If you take blood thinners, such as warfarin (Coumadin), clopidogrel (Plavix), or aspirin, be sure to talk to your doctor. He or she will tell you if and when to start taking those medicines again. Make sure that you understand exactly what your doctor wants you to do.     · Take pain medicines exactly as directed. ? If the doctor gave you a prescription medicine for pain, take it as prescribed. ? If you are not taking a prescription pain medicine, ask your doctor if you can take acetaminophen (Tylenol). Do not take ibuprofen (Advil, Motrin) or naproxen (Aleve), or similar medicines, unless your doctor tells you to. They may make chronic kidney disease worse.   ? Do not take two or more pain medicines at the same time unless the doctor told you to. Many pain medicines have acetaminophen, which is Tylenol. Too much acetaminophen (Tylenol) can be harmful.     · If you think your pain medicine is making you sick to your stomach:  ? Take your medicine after meals (unless your doctor has told you not to). ? Ask your doctor for a different pain medicine.     · If your doctor prescribed antibiotics, take them as directed. Do not stop taking them just because you feel better. You need to take the full course of antibiotics. Incision care    · Keep the area dry for 2 days. After 2 days, wash the area with soap and water every day, and always before dialysis.     · Do not soak or scrub the incision until it has healed.     · If you have a bandage, change it every day or as your doctor recommends. Your doctor will tell you when you can remove it. Exercise    · Squeeze a soft ball or other object as your doctor tells you. This will help blood flow through the access and help prevent blood clots.    Elevation    · Prop up the sore arm on a pillow anytime you sit or lie down during the next 3 days. Try to keep it above the level of your heart. This will help reduce swelling. Other instructions    · Every day, check your access for a pulse or thrill in the fistula or graft area. A thrill is a vibration. To feel a pulse or thrill, place the first two fingers of your hand over the access.     · Do not bump your arm.     · Do not wear tight clothing, jewelry, or anything else that may squeeze the access.     · Use your other arm to have blood drawn or blood pressure taken.     · Do not put cream or lotion on or near the access.     · Make sure all doctors you deal with know you have a vascular access. Follow-up care is a key part of your treatment and safety. Be sure to make and go to all appointments, and call your doctor if you are having problems. It's also a good idea to know your test results and keep a list of the medicines you take.   When should you call for help?  Call 911 anytime you think you may need emergency care. For example, call if:    · You passed out (lost consciousness).     · You have chest pain, are short of breath, or cough up blood.    Call your doctor now or seek immediate medical care if:    · Your hand or arm is cold or dark-colored.     · You have no pulse in your access.     · You have nausea or you vomit.     · You have pain that does not get better after you take pain medicine.     · You have loose stitches, or your incision comes open.     · You are bleeding from the incision.     · You have signs of infection, such as:  ? Increased pain, swelling, warmth, or redness. ? Red streaks leading from the area. ? Pus draining from the area. ? A fever.     · You have signs of a blood clot in your leg (called a deep vein thrombosis), such as:  ? Pain in your calf, back of the knee, thigh, or groin. ? Redness or swelling in your leg.    Watch closely for changes in your health, and be sure to contact your doctor if you have any problems. Where can you learn more? Go to http://izabela-jewell.info/. Enter P616 in the search box to learn more about \"Hemodialysis Access: What to Expect at Home. \"  Current as of: March 15, 2018  Content Version: 11.8  © 3906-1189 [employer]. Care instructions adapted under license by [employer] (which disclaims liability or warranty for this information). If you have questions about a medical condition or this instruction, always ask your healthcare professional. [place] [address] any warranty or liability for your use of this information.     DISCHARGE SUMMARY from Nurse    PATIENT INSTRUCTIONS:    After general anesthesia or intravenous sedation, for 24 hours or while taking prescription Narcotics:  · Limit your activities  · Do not drive and operate hazardous machinery  · Do not make important personal or business decisions  · Do  not drink alcoholic beverages  · If you have not urinated within 8 hours after discharge, please contact your surgeon on call. Report the following to your surgeon:  · Excessive pain, swelling, redness or odor of or around the surgical area  · Temperature over 100.5  · Nausea and vomiting lasting longer than 4 hours or if unable to take medications  · Any signs of decreased circulation or nerve impairment to extremity: change in color, persistent  numbness, tingling, coldness or increase pain  · Any questions    What to do at Home:  Recommended activity: Activity as tolerated. *  Please give a list of your current medications to your Primary Care Provider. *  Please update this list whenever your medications are discontinued, doses are      changed, or new medications (including over-the-counter products) are added. *  Please carry medication information at all times in case of emergency situations. These are general instructions for a healthy lifestyle:    No smoking/ No tobacco products/ Avoid exposure to second hand smoke  Surgeon General's Warning:  Quitting smoking now greatly reduces serious risk to your health. Obesity, smoking, and sedentary lifestyle greatly increases your risk for illness    A healthy diet, regular physical exercise & weight monitoring are important for maintaining a healthy lifestyle    You may be retaining fluid if you have a history of heart failure or if you experience any of the following symptoms:  Weight gain of 3 pounds or more overnight or 5 pounds in a week, increased swelling in our hands or feet or shortness of breath while lying flat in bed. Please call your doctor as soon as you notice any of these symptoms; do not wait until your next office visit.     Recognize signs and symptoms of STROKE:    F-face looks uneven    A-arms unable to move or move unevenly    S-speech slurred or non-existent    T-time-call 911 as soon as signs and symptoms begin-DO NOT go       Back to bed or wait to see if you get better-TIME IS BRAIN. Warning Signs of HEART ATTACK     Call 911 if you have these symptoms:   Chest discomfort. Most heart attacks involve discomfort in the center of the chest that lasts more than a few minutes, or that goes away and comes back. It can feel like uncomfortable pressure, squeezing, fullness, or pain.  Discomfort in other areas of the upper body. Symptoms can include pain or discomfort in one or both arms, the back, neck, jaw, or stomach.  Shortness of breath with or without chest discomfort.  Other signs may include breaking out in a cold sweat, nausea, or lightheadedness. Don't wait more than five minutes to call 911 - MINUTES MATTER! Fast action can save your life. Calling 911 is almost always the fastest way to get lifesaving treatment. Emergency Medical Services staff can begin treatment when they arrive -- up to an hour sooner than if someone gets to the hospital by car. The discharge information has been reviewed with the patient and daughter. The patient and daughter verbalized understanding. Discharge medications reviewed with the patient and daughter and appropriate educational materials and side effects teaching were provided. ___________________________________________________________________________________________________________________________________    Oxycodone/Acetaminophen (By mouth)   Acetaminophen (s-rvlq-c-MIN-oh-fen), Oxycodone Hydrochloride (sq-s-FPD-done acosta-droe-KLOR-barrie)  Treats moderate to moderately severe pain. This medicine is a narcotic pain reliever. Brand Name(s): Endocet, Percocet, Primlev, Xartemis XR   There may be other brand names for this medicine. When This Medicine Should Not Be Used: This medicine is not right for everyone. Do not use it if you had an allergic reaction to acetaminophen or oxycodone, or if you have serious breathing problems or paralytic ileus.   How to Use This Medicine:   Capsule, Liquid, Tablet, Long Acting Tablet  · Your doctor will tell you how much medicine to use. Do not use more than directed. · An overdose can be dangerous. Follow directions carefully so you do not get too much medicine at one time. · Oral liquid: Measure the oral liquid medicine with a marked measuring spoon, oral syringe, or medicine cup. · Swallow the extended-release tablet whole. Do not crush, break, or chew it. Do not lick or wet the tablet before placing it in your mouth. Do not give this medicine through a feeding tube. · This medicine should come with a Medication Guide. Ask your pharmacist for a copy if you do not have one. · Missed dose: If you miss a dose of this medicine, skip the missed dose and go back to your regular dosing schedule. Do not double doses. · Store the medicine in a closed container at room temperature, away from heat, moisture, and direct light. Ask your pharmacist about the best way to dispose of medicine you do not use. Drugs and Foods to Avoid:   Ask your doctor or pharmacist before using any other medicine, including over-the-counter medicines, vitamins, and herbal products. · Do not use Xartemis XR if you are using or have used an MAO inhibitor in the past 14 days. · Some medicines can affect how this medicine works. Tell your doctor if you are using any of the following:   ¨ Carbamazepine, erythromycin, ketoconazole, lamotrigine, mirtazapine, naltrexone, phenytoin, propranolol, rifampin, ritonavir, tramadol, trazodone, or zidovudine  ¨ Birth control pills  ¨ Diuretic (water pill)  ¨ Medicine to treat depression  ¨ Phenothiazine medicine  ¨ Triptan medicine to treat migraine headaches  · Do not drink alcohol while you are using this medicine. Acetaminophen can damage your liver, and alcohol can increase this risk. Do not take acetaminophen without asking your doctor if you have 3 or more drinks of alcohol every day.   · Tell your doctor if you use anything else that makes you sleepy. Some examples are allergy medicine, narcotic pain medicine, and alcohol. Tell your doctor if you are using buprenorphine, butorphanol, nalbuphine, pentazocine, a benzodiazepine, or a muscle relaxer. Warnings While Using This Medicine:   · Tell your doctor if you are pregnant or breastfeeding, or if you have kidney disease, liver disease, heart disease, low blood pressure, breathing problems or lung disease (such as asthma, COPD), thyroid problems, McKinnon disease, pancreas or gallbladder problems, prostate problems, trouble urinating, or a stomach problems, or a history of head injury or brain damage, seizures, or alcohol or drug abuse. Tell your doctor if you are allergic to codeine. · This medicine may cause the following problems:  ¨ High risk of overdose, which can lead to death  ¨ Respiratory depression (serious breathing problem that can be life-threatening)  ¨ Liver problems  ¨ Serious skin reactions  ¨ Serotonin syndrome (when used with certain medicines)  · This medicine may make you dizzy or drowsy. Do not drive or do anything that could be dangerous until you know how this medicine affects you. Sit or lie down if you feel dizzy. Stand up carefully. · This medicine contains acetaminophen. Read the labels of all other medicines you are using to see if they also contain acetaminophen, or ask your doctor or pharmacist. Amelia Loja not use more than 4 grams (4,000 milligrams) total of acetaminophen in one day. · This medicine can be habit-forming. Do not use more than your prescribed dose. Call your doctor if you think your medicine is not working. · Do not stop using this medicine suddenly. Your doctor will need to slowly decrease your dose before you stop it completely. · This medicine could cause infertility. Talk with your doctor before using this medicine if you plan to have children. · This medicine may cause constipation, especially with long-term use.  Ask your doctor if you should use a laxative to prevent and treat constipation. · Keep all medicine out of the reach of children. Never share your medicine with anyone. Possible Side Effects While Using This Medicine:   Call your doctor right away if you notice any of these side effects:  · Allergic reaction: Itching or hives, swelling in your face or hands, swelling or tingling in your mouth or throat, chest tightness, trouble breathing  · Anxiety, restlessness, fast heartbeat, fever, muscle spasms, twitching, diarrhea, seeing or hearing things that are not there  · Blistering, peeling, red skin rash  · Blue lips, fingernails, or skin  · Dark urine or pale stools, loss of appetite, stomach pain, yellow skin or eyes  · Extreme weakness, shallow breathing, uneven heartbeat, seizures, sweating, or cold or clammy skin  · Severe confusion, lightheadedness, dizziness, or fainting  · Severe constipation, nausea, or vomiting  · Trouble breathing or slow breathing  If you notice these less serious side effects, talk with your doctor:   · Headache  · Mild constipation, nausea, or vomiting  · Mild sleepiness or drowsiness  If you notice other side effects that you think are caused by this medicine, tell your doctor. Call your doctor for medical advice about side effects. You may report side effects to FDA at 2-934-FDA-3447  © 2017 [address] [employer] Information is for End User's use only and may not be sold, redistributed or otherwise used for commercial purposes. The above information is an  only. It is not intended as medical advice for individual conditions or treatments. Talk to your doctor, nurse or pharmacist before following any medical regimen to see if it is safe and effective for you.

## 2018-10-23 NOTE — PERIOP NOTES
IV with swelling with attempt to administer Dextrose for blood sugar 63. Pt awake and verbalizes able to drink juice. Pt given 4oz Apple juice mixed with 4oz Cranberry juice.

## 2018-10-23 NOTE — BRIEF OP NOTE
BRIEF OPERATIVE NOTE Date of Procedure: 10/23/2018 Preoperative Diagnosis: ESRD (end stage renal disease) ([facility].) [N18.6] Postoperative Diagnosis: ESRD (end stage renal disease) ([facility] [N18.6] Procedure(s): LEFT ARM ARTERIO VENOUS GRAFT CREATION Surgeon(s) and Role: Amanda Agrawal MD - Primary Surgical Assistant: none Surgical Staff: 
Circ-1: Mick Newman RN Scrub Tech-1: April Pizano Surg Asst-1: Natasha Arroyo Time In Time Out Incision Start [phone] Incision Close Anesthesia: MAC Estimated Blood Loss: 100mL Specimens: * No specimens in log * Findings: none Complications: none Implants:  
Implant Name Type Inv. Item Serial No.  Lot No. LRB No. Used Action GRAFT VASC [id number] Ayde Peek  GRAFT VASC [id number] TAPR -- ACUSEAL [id number]  [employer] N/A Left 1 Implanted

## 2018-10-24 LAB
BUN BLD-MCNC: 66 MG/DL (ref 7–18)
CHLORIDE BLD-SCNC: 112 MMOL/L (ref 100–108)
GLUCOSE BLD STRIP.AUTO-MCNC: 131 MG/DL (ref 74–106)
HCT VFR BLD CALC: 41 % (ref 36–49)
HGB BLD-MCNC: 13.9 G/DL (ref 12–16)
POTASSIUM BLD-SCNC: >9 MMOL/L (ref 3.5–5.5)
SODIUM BLD-SCNC: 136 MMOL/L (ref 136–145)

## 2018-10-24 NOTE — OP NOTES
[facility] REPORT    Mikhail Carlson  MR#: [id number]  : 1955  ACCOUNT #: [de-identified]   DATE OF SERVICE: 10/23/2018    SURGEON:  Jo Ann Banks MD    PREOPERATIVE DIAGNOSIS:  End-stage renal disease, in need of dialysis access. POSTOPERATIVE DIAGNOSIS:  End-stage renal disease, in need of dialysis access. PROCEDURE PERFORMED:  Left arm AV graft, placement of 4 x 7 tapered Acuseal graft. CULTURES:  None. SPECIMENS REMOVED:  None. DRAINS:  None. ESTIMATED BLOOD LOSS:  100 mL. ANESTHESIA:  MAC with local anesthetic. COMPLICATIONS:  None. ASSISTANTS:  None. IMPLANTS:  As above. INDICATIONS FOR THE PROCEDURE:  The patient is a 68-year-old female with end-stage renal disease needing dialysis access. The patient was given risks and benefits of the procedure including but not limited to bleeding, infection, damage to adjacent structures, MI, stroke, and death as well as loss of the upper extremity and need for further surgery. The patient was understanding of all the risks and underwent the procedure. PROCEDURE:  The patient was correctly identified in the preoperative holding area and taken to the operating room in stable condition. The patient had a preincision timeout prior to the incision. The patient was prepped and draped in normal sterile fashion according to the CDC guidelines for aseptic technique. We then were able to feel the arterial limb flow and then the brachial artery just above the antecubital fossa. We numbed this area up and then dissected out the brachial artery, both proximally and distally, and looped with red vessel loops in a Biswas fashion and blue vessel loops in a Biswas fashion for all branches. We then turned our attention to the axilla, made an incision between the bicipital, tricipital groove after numbing it up appropriately and dissected down to the axillary vein.   We did identify the axillary vein deep to the artery and nerve and then we were able to loop this with blue vessel loops in a Biswas fashion both proximally and distally. The patient tolerated this portion of the procedure and was heparinized. We tunneled our 4 x 7 Acuseal graft, creating an arteriotomy using 11 blade and Biswas scissors, and a CV5 suture was used to create an anastomosis. No repair sutures were required at the arterial portion. On the venous side, we created a venotomy using 11 blade, cut our graft to appropriate size, and we created an anastomosis with a CV5 suture. Again, the patient did well, did require 3 repair sutures. We then copiously irrigated the wounds. Surgicel was placed with good hemostasis. We then were able to remove Surgicel and close using 3-0 Vicryl suture in the deep dermal layer,  4-0 Vicryl suture in the subcuticular layer, and Dermabond for dressing. There was a good thrill at the end of the case. Patient tolerated the procedure well without any issues.       MD PATRICIA Singer / MAXI  D: 10/23/2018 15:24     T: 10/24/2018 04:10  JOB #: [id number]

## 2018-10-25 ENCOUNTER — TELEPHONE (OUTPATIENT)
Dept: VASCULAR SURGERY | Age: 63
End: 2018-10-25

## 2018-10-25 NOTE — TELEPHONE ENCOUNTER
Received call from [address] [place] [address] at [facility] and she states that patient's arm is swollen and warm. Advised this is normal since patient just had AVG placed 2 days ago. Advised to have patient elevate arm, use warm compresses and patient would benefit from light compression with ace or tubi , advised if she uses ace wrap needs to be from base of fingers to shoulder consistently. She verbalized understanding and will call back Monday if not better.

## 2018-11-01 ENCOUNTER — OFFICE VISIT (OUTPATIENT)
Dept: VASCULAR SURGERY | Age: 63
End: 2018-11-01

## 2018-11-01 VITALS
SYSTOLIC BLOOD PRESSURE: 110 MMHG | DIASTOLIC BLOOD PRESSURE: 60 MMHG | HEART RATE: 76 BPM | RESPIRATION RATE: 16 BRPM | HEIGHT: 63 IN | BODY MASS INDEX: 24.63 KG/M2 | WEIGHT: 139 LBS

## 2018-11-01 DIAGNOSIS — Z99.2 ESRD (END STAGE RENAL DISEASE) ON DIALYSIS (HCC): Primary | ICD-10-CM

## 2018-11-01 DIAGNOSIS — N18.6 ESRD (END STAGE RENAL DISEASE) ON DIALYSIS (HCC): Primary | ICD-10-CM

## 2018-11-01 NOTE — PROGRESS NOTES
Subjective: Bipin Waller is a 70 y.o. female who presents today for post op visit, status post left arm AVG creation. She has a surgical incision wound which is located on the left upper arm x 2. Current symptoms: she has a fair amount of swelling in left arm  Verbal orders were given to snf for compression but she has none on    Objective:     Visit Vitals  /60 (BP 1 Location: Left arm, BP Patient Position: Sitting)   Pulse 76   Resp 16   Ht 5' 3\" (1.6 m)   Wt 139 lb (63 kg)   BMI 24.62 kg/m²       Wound:   wound margins intact and healing well. No signs of infection. Good bruit of graft to confirm patency  But she does have significant edema through the arm     Assessment:     Wound check/discharge visit. Plan:       ICD-10-CM ICD-9-CM    1. ESRD (end stage renal disease) on dialysis ([facility]) N18.6 585.6     Z99.2 V45.11      No orders of the defined types were placed in this encounter. Needs to do adequate compression and elevation  We placed a tubi  sleeve on today and written instructions for the snf to keep this on, elevate regularly on 2 pillows, and do squeeze ball exercises throughout the day, ball provided  Will follow up in a couple of weeks to re-evaluate before determination for cannulation vs further testing on the arm    Follow-up Disposition:  Return in about 2 weeks (around 11/15/2018). SUSI Marcano    Portions of this note have been entered using voice recognition software.

## 2018-11-01 NOTE — PROGRESS NOTES
1. Have you been to an emergency room or urgent care clinic since your last visit? NO    Hospitalized since your last visit? If yes, where, when, and reason for visit? NO  2. Have you seen or consulted any other health care providers outside of the [facility] since your last visit including any procedures, health maintenance items. If yes, where, when and reason for visit?  NO

## 2018-11-12 ENCOUNTER — HOSPITAL ENCOUNTER (EMERGENCY)
Age: 63
Discharge: HOME | End: 2018-11-12
Attending: EMERGENCY MEDICINE
Payer: MEDICARE

## 2018-11-12 VITALS
DIASTOLIC BLOOD PRESSURE: 81 MMHG | RESPIRATION RATE: 20 BRPM | OXYGEN SATURATION: 96 % | SYSTOLIC BLOOD PRESSURE: 171 MMHG | HEART RATE: 102 BPM

## 2018-11-12 DIAGNOSIS — G89.29 CHRONIC PAIN OF LEFT UPPER EXTREMITY: Primary | ICD-10-CM

## 2018-11-12 DIAGNOSIS — M79.602 CHRONIC PAIN OF LEFT UPPER EXTREMITY: Primary | ICD-10-CM

## 2018-11-12 LAB
ALBUMIN SERPL-MCNC: 2.9 G/DL (ref 3.4–5)
ALBUMIN/GLOB SERPL: 0.7 {RATIO} (ref 0.8–1.7)
ALP SERPL-CCNC: 112 U/L (ref 45–117)
ALT SERPL-CCNC: 27 U/L (ref 13–56)
ANION GAP SERPL CALC-SCNC: 9 MMOL/L (ref 3–18)
AST SERPL-CCNC: 26 U/L (ref 15–37)
BASOPHILS # BLD: 0.1 K/UL (ref 0–0.06)
BASOPHILS NFR BLD: 1 % (ref 0–3)
BILIRUB SERPL-MCNC: 0.2 MG/DL (ref 0.2–1)
BUN SERPL-MCNC: 17 MG/DL (ref 7–18)
BUN/CREAT SERPL: 4 (ref 12–20)
CALCIUM SERPL-MCNC: 8.7 MG/DL (ref 8.5–10.1)
CHLORIDE SERPL-SCNC: 106 MMOL/L (ref 100–108)
CO2 SERPL-SCNC: 25 MMOL/L (ref 21–32)
CREAT SERPL-MCNC: 4.4 MG/DL (ref 0.6–1.3)
DIFFERENTIAL METHOD BLD: ABNORMAL
EOSINOPHIL # BLD: 0.5 K/UL (ref 0–0.4)
EOSINOPHIL NFR BLD: 6 % (ref 0–5)
ERYTHROCYTE [DISTWIDTH] IN BLOOD BY AUTOMATED COUNT: 16.7 % (ref 11.6–14.5)
ETHANOL SERPL-MCNC: <3 MG/DL (ref 0–3)
GLOBULIN SER CALC-MCNC: 4.4 G/DL (ref 2–4)
GLUCOSE SERPL-MCNC: 174 MG/DL (ref 74–99)
HCT VFR BLD AUTO: 35.6 % (ref 35–45)
HGB BLD-MCNC: 11.1 G/DL (ref 12–16)
LYMPHOCYTES # BLD: 0.8 K/UL (ref 0.8–3.5)
LYMPHOCYTES NFR BLD: 9 % (ref 20–51)
MCH RBC QN AUTO: 28.2 PG (ref 24–34)
MCHC RBC AUTO-ENTMCNC: 31.2 G/DL (ref 31–37)
MCV RBC AUTO: 90.4 FL (ref 74–97)
MONOCYTES # BLD: 0.4 K/UL (ref 0–1)
MONOCYTES NFR BLD: 4 % (ref 2–9)
NEUTS SEG # BLD: 7.1 K/UL (ref 1.8–8)
NEUTS SEG NFR BLD: 80 % (ref 42–75)
PLATELET # BLD AUTO: 266 K/UL (ref 135–420)
PLATELET COMMENTS,PCOM: ABNORMAL
PMV BLD AUTO: 11.2 FL (ref 9.2–11.8)
POTASSIUM SERPL-SCNC: 4.5 MMOL/L (ref 3.5–5.5)
PROT SERPL-MCNC: 7.3 G/DL (ref 6.4–8.2)
RBC # BLD AUTO: 3.94 M/UL (ref 4.2–5.3)
RBC MORPH BLD: ABNORMAL
SODIUM SERPL-SCNC: 140 MMOL/L (ref 136–145)
WBC # BLD AUTO: 8.9 K/UL (ref 4.6–13.2)

## 2018-11-12 PROCEDURE — 85025 COMPLETE CBC W/AUTO DIFF WBC: CPT

## 2018-11-12 PROCEDURE — 80053 COMPREHEN METABOLIC PANEL: CPT

## 2018-11-12 PROCEDURE — 99285 EMERGENCY DEPT VISIT HI MDM: CPT

## 2018-11-12 PROCEDURE — 80307 DRUG TEST PRSMV CHEM ANLYZR: CPT

## 2018-11-13 NOTE — ED PROVIDER NOTES
EMERGENCY DEPARTMENT HISTORY AND PHYSICAL EXAM 
 
7:29 PM 
 
 
Date: 11/12/2018 Patient Name: Silvana Garcia History of Presenting Illness Chief Complaint Patient presents with  Arm Pain History Provided By: Patient Chief Complaint: rash Duration:  PTA Timing:  Acute Location: left arm Severity: Mild Modifying Factors: Pt was at dialysis and made a statement around the lines of she wants to hurt herself. The people at dialysis called her  and told her to come here. She initially refused but was threatened to get TDO'd so she. She denies SI while here. Associated Symptoms: Denies SI Additional History (Context): Silvana Garcia is a 70 y.o. female with CKD, Asthma, DM, HTN, HLD who presents with acute mild rash on her left arm that started PTA. Pt was at dialysis and made a statement around the lines of she wants to hurt herself. The people at dialysis called her  and told her to come here. She initially refused but was threatened to get TDO'd so she. She denies SI while here. She does not smoke or drink. PCP: Hilda Caruso MD 
 
Current Outpatient Medications Medication Sig Dispense Refill  oxyCODONE-acetaminophen (PERCOCET) 5-325 mg per tablet Take 1 Tab by mouth every four (4) hours as needed for Pain. Max Daily Amount: 6 Tabs. 40 Tab 0  
 mirtazapine (REMERON) 15 mg tablet Take  by mouth nightly.  levETIRAcetam (KEPPRA) 250 mg tablet Take 1 Tab by mouth every Monday, Wednesday, Friday. 30 Tab 1  
 levETIRAcetam (KEPPRA) 500 mg tablet Take 1 Tab by mouth two (2) times a day. 30 Tab 1  
 hydrALAZINE (APRESOLINE) 50 mg tablet Take 1 Tab by mouth three (3) times daily. 90 Tab 0  
 simvastatin (ZOCOR) 20 mg tablet Take 1 Tab by mouth nightly. 30 Tab 0  
 potassium chloride (KLOR-CON) 20 mEq packet Take 1 Packet by mouth two (2) times daily (with meals).  30 Packet 0  
 doxercalciferol (HECTOROL) 4 mcg/2 mL injection 0.5 mL by IntraVENous route Every Tuesday, Thursday and Saturday. (Patient taking differently: 1 mcg by IntraVENous route every Monday, Wednesday, Friday.) 1 mL 0  
 epoetin raphael (EPOGEN;PROCRIT) 3,000 unit/mL injection 1.73 mL by SubCUTAneous route Every Tuesday, Thursday and Saturday. Indications: ANEMIA DUE TO RENAL FAILURE, Renal Dialysis (Patient taking differently: 3,000 Units by SubCUTAneous route every Monday, Wednesday, Friday.) 1 Vial 0  
 insulin glargine (LANTUS) 100 unit/mL injection 10 Units by SubCUTAneous route nightly. 1 Vial 0  
 insulin lispro (HUMALOG) 100 unit/mL injection INITIATE INSULIN CORRECTIVE PROTOCOL: Normal Insulin Sensitivity For Blood Sugar (mg/dL) of:    
Less than 150 =   0 units 150 -199 =   2 units 200 -249 =   4 units 250 -299 =   6 units 300 -349 =   8 units 350 and above = 10 units and Call Physician If 2 glucose readings are above 1 Vial 0  
 losartan (COZAAR) 100 mg tablet Take 1 Tab by mouth daily. 30 Tab 0  
 aspirin 81 mg tablet Take 81 mg by mouth daily.  diltiazem CD (CARDIZEM CD) 240 mg ER capsule Take 240 mg by mouth daily.  cloNIDine (CATAPRES) 0.1 mg tablet Take 0.1 mg by mouth two (2) times a day.  acetaminophen (TYLENOL) 325 mg tablet Take 325 mg by mouth as needed. Past History Past Medical History: 
Past Medical History:  
Diagnosis Date  Asthma  Bipolar affective disorder ([facility]  Brain condition  Cataract  Chronic kidney disease   
 hemodialysis M-W-F  Diabetes ([facility]  Hyperlipidemia  Hypertension  Paralytic strabismus, unspecified  Psychiatric disorder  Seizures ([facility] 08/27/2018 Past Surgical History: 
History reviewed. No pertinent surgical history. Family History: 
History reviewed. No pertinent family history. Social History: 
Social History Tobacco Use  Smoking status: Never Smoker  Smokeless tobacco: Never Used Substance Use Topics  Alcohol use:  No  
  Drug use: No  
 
 
Allergies: Allergies Allergen Reactions  Amoxicillin Unknown (comments)  Cleocin [Clindamycin Hcl] Unknown (comments)  Codeine Unknown (comments)  Lorcet 10/650 [Hydrocodone-Acetaminophen] Unknown (comments)  Penicillin G Benzathine Unknown (comments)  Shellfish Derived Unknown (comments) Review of Systems Review of Systems Constitutional: Negative for activity change and appetite change. HENT: Negative for congestion. Eyes: Negative for visual disturbance. Respiratory: Negative for cough and shortness of breath. Cardiovascular: Negative for chest pain. Gastrointestinal: Negative for abdominal pain, diarrhea, nausea and vomiting. Genitourinary: Negative for dysuria. Musculoskeletal: Negative for arthralgias and myalgias. Skin: Positive for rash. Neurological: Negative for weakness and numbness. Psychiatric/Behavioral: Negative for suicidal ideas. All other systems reviewed and are negative. Physical Exam  
 
Visit Vitals /67 Pulse (!) 110 Resp 19 SpO2 97% Physical Exam  
Constitutional: She is oriented to person, place, and time. She appears well-developed and well-nourished. HENT:  
Head: Normocephalic and atraumatic. Mouth/Throat: Oropharynx is clear and moist.  
Eyes: Conjunctivae are normal.  
Neck: Normal range of motion. Neck supple. No JVD present. Cardiovascular: Normal rate, regular rhythm, normal heart sounds and intact distal pulses. No murmur heard. Pulmonary/Chest: Effort normal and breath sounds normal.  
Abdominal: Soft. Bowel sounds are normal. She exhibits no distension. There is no tenderness. Musculoskeletal: Normal range of motion. She exhibits no deformity. Left arm edematous, baseline, with compression hose. Lymphadenopathy:  
  She has no cervical adenopathy. Neurological: She is alert and oriented to person, place, and time.  Coordination normal.  
 Skin: Skin is warm and dry. No rash noted. Psychiatric: She has a normal mood and affect. Nursing note and vitals reviewed. Diagnostic Study Results Labs - Recent Results (from the past 12 hour(s)) CBC WITH AUTOMATED DIFF Collection Time: 11/12/18  7:57 PM  
Result Value Ref Range WBC 8.9 4.6 - 13.2 K/uL  
 RBC 3.94 (L) 4.20 - 5.30 M/uL  
 HGB 11.1 (L) 12.0 - 16.0 g/dL HCT 35.6 35.0 - 45.0 % MCV 90.4 74.0 - 97.0 FL  
 MCH 28.2 24.0 - 34.0 PG  
 MCHC 31.2 31.0 - 37.0 g/dL  
 RDW 16.7 (H) 11.6 - 14.5 % PLATELET 618 099 - 102 K/uL MPV 11.2 9.2 - 11.8 FL  
 NEUTROPHILS PENDING % LYMPHOCYTES PENDING % MONOCYTES PENDING % EOSINOPHILS PENDING % BASOPHILS PENDING %  
 ABS. NEUTROPHILS PENDING K/UL  
 ABS. LYMPHOCYTES PENDING K/UL  
 ABS. MONOCYTES PENDING K/UL  
 ABS. EOSINOPHILS PENDING K/UL  
 ABS. BASOPHILS PENDING K/UL  
 DF PENDING   
METABOLIC PANEL, COMPREHENSIVE Collection Time: 11/12/18  7:57 PM  
Result Value Ref Range Sodium 140 136 - 145 mmol/L Potassium 4.5 3.5 - 5.5 mmol/L Chloride 106 100 - 108 mmol/L  
 CO2 25 21 - 32 mmol/L Anion gap 9 3.0 - 18 mmol/L Glucose 174 (H) 74 - 99 mg/dL BUN 17 7.0 - 18 MG/DL Creatinine 4.40 (H) 0.6 - 1.3 MG/DL  
 BUN/Creatinine ratio 4 (L) 12 - 20 GFR est AA 12 (L) >60 ml/min/1.73m2 GFR est non-AA 10 (L) >60 ml/min/1.73m2 Calcium 8.7 8.5 - 10.1 MG/DL Bilirubin, total 0.2 0.2 - 1.0 MG/DL  
 ALT (SGPT) 27 13 - 56 U/L  
 AST (SGOT) 26 15 - 37 U/L Alk. phosphatase 112 45 - 117 U/L Protein, total 7.3 6.4 - 8.2 g/dL Albumin 2.9 (L) 3.4 - 5.0 g/dL Globulin 4.4 (H) 2.0 - 4.0 g/dL A-G Ratio 0.7 (L) 0.8 - 1.7 ETHYL ALCOHOL Collection Time: 11/12/18  7:57 PM  
Result Value Ref Range ALCOHOL(ETHYL),SERUM <3 0 - 3 MG/DL Radiologic Studies - No orders to display CT Results  (Last 48 hours) None CXR Results  (Last 48 hours) None Medical Decision Making I am the first provider for this patient. I reviewed the vital signs, available nursing notes, past medical history, past surgical history, family history and social history. Vital Signs-Reviewed the patient's vital signs. Records Reviewed: Nursing Notes (Time of Review: 7:29 PM) ED Course: Progress Notes, Reevaluation, and Consults: 
 
Provider Notes (Medical Decision Making): Nader Laughlin is a 70 y.o. female with CKD, Asthma, DM, HTN, HLD who presents with acute mild rash on her left arm that started PTA. She denies SI. Appears well on exam, without acute complaint. Differential Diagnosis: sent for evaluation for SI given unknown statement at dialysis center. Patient denies SI, has no acute medical concerns. Noted concern for rash, but no  Rash noted on exam. 
 
Testing:  CBC, CMP, UDS Treatments: pending evaluation by crisis. 10:00 PM 
Seen by crisis, does not feel she is a danger to herself. Patient noted commend made when frustrated about her chronic left arm pain/edema. No acute issue tonight. Cleared for discharge. The patient will be discharged home. Findings were discussed at length and questions were answered. Information on all newly prescribed medications was given. the patient was instructed to follow-up with her PCP, or return to the Emergency Department with any worsened symptoms or concerns. Return precautions were given. Diagnosis Clinical Impression: 1. Chronic pain of left upper extremity Disposition: discharge Follow-up Information Follow up With Specialties Details Why Contact Info Alisa Sanderson MD Family Practice Schedule an appointment as soon as possible for a visit As needed [address] [place] [address] 
[phone] [facility] EMERGENCY DEPT Emergency Medicine  If symptoms worsen [address] [facility] [address] 
[phone] Medication List  
  
 ASK your doctor about these medications   
aspirin 81 mg tablet CARDIZEM  mg ER capsule Generic drug:  dilTIAZem CD 
  
CATAPRES 0.1 mg tablet Generic drug:  cloNIDine HCl 
  
doxercalciferol 4 mcg/2 mL injection Commonly known as:  HECTOROL 
0.5 mL by IntraVENous route Every Tuesday, Thursday and Saturday. epoetin raphael 3,000 unit/mL injection Commonly known as:  EPOGEN;PROCRIT 
1.73 mL by SubCUTAneous route Every Tuesday, Thursday and Saturday. Indications: ANEMIA DUE TO RENAL FAILURE, Renal Dialysis 
  
hydrALAZINE 50 mg tablet Commonly known as:  APRESOLINE Take 1 Tab by mouth three (3) times daily. insulin glargine 100 unit/mL injection Commonly known as:  LANTUS 
10 Units by SubCUTAneous route nightly. insulin lispro 100 unit/mL injection Commonly known as:  HUMALOG INITIATE INSULIN CORRECTIVE PROTOCOL: Normal Insulin Sensitivity For Blood Sugar (mg/dL) of:    
Less than 150 =   0 units 150 -199 =   2 units 200 -249 =   4 units 250 -299 =   6 units 300 -349 =   8 units 350 and above = 10 units and Call Physician If 2 glucose readings are above * levETIRAcetam 250 mg tablet Commonly known as:  KEPPRA Take 1 Tab by mouth every Monday, Wednesday, Friday. * levETIRAcetam 500 mg tablet Commonly known as:  KEPPRA Take 1 Tab by mouth two (2) times a day. losartan 100 mg tablet Commonly known as:  COZAAR Take 1 Tab by mouth daily. oxyCODONE-acetaminophen 5-325 mg per tablet Commonly known as:  PERCOCET Take 1 Tab by mouth every four (4) hours as needed for Pain. Max Daily Amount: 6 Tabs. potassium chloride 20 mEq packet Commonly known as:  KLOR-CON Take 1 Packet by mouth two (2) times daily (with meals). REMERON 15 mg tablet Generic drug:  mirtazapine 
  
simvastatin 20 mg tablet Commonly known as:  ZOCOR Take 1 Tab by mouth nightly. TYLENOL 325 mg tablet Generic drug:  acetaminophen * This list has 2 medication(s) that are the same as other medications prescribed for you. Read the directions carefully, and ask your doctor or other care provider to review them with you.  
  
  
  
 
_______________________________ Attestations: 
Scribe Attestation Macario Jennifer acting as a scribe for and in the presence of Abbey Pryor MD     
November 12, 2018 at 7:35 PM 
    
Provider Attestation:     
I personally performed the services described in the documentation, reviewed the documentation, as recorded by the scribe in my presence, and it accurately and completely records my words and actions. November 12, 2018 at 7:35 PM - Abbey Pryor MD   
_________________________ 
______

## 2018-11-13 NOTE — ED TRIAGE NOTES
Pt arrived via EMS from dialysis, pt lives at [facility], according to dialysis nurse pt stated that she wanted to hurt herself, dialysis nurse told patient she would not send her back to [facility] due to statement, pt refused to be transported to [facility] so dialysis nurse reportedly called  who threatened to [address]asia Hendrix patient if she did not go to [facility] for evaluation, pt tearful but agreed to come to [facility], upon arrival pt denies any SI or HI, states she is having left arm pain, denies any other complaints. Pt has hx of multiple strokes, states she has memory problems and weakness in all extremeties due to strokes. Pt states she does not want to be here

## 2018-11-13 NOTE — ED NOTES
I have reviewed discharge instructions with the patient. The patient verbalized understanding. [facility], report called to [facility]

## 2018-11-13 NOTE — BSMART NOTE
Comprehensive Assessment Form Part 1 Section l - Integrated Summary Summary:  70year old female brought to the ER by EMS from Dialysis after making a statement she was suicidal. 
 
Miller Ask in room 11 @ the request of Dr. Yary Valadez. Patient lying in ER bed. Calm and cooperative. Patient is oriented to person only. Stated she has memory problems, stated she has had two strokes. Patient was reportedly at dialysis and made a statement of being suicidal. Patient recalls she did mention something about killing herself but relates this statement to her right arm hurting. Per Dr. Yary Valadez patient's arm is edematous related to resent fistula issue. Patient current is not stating she is suicidal.  
 
Residence: patient is residing at [facility] in [place]. Section ll - Disposition The Medical Doctor to Psychiatrist conference was not completed. The Medical Doctor is in agreement with Psychiatrist disposition because of (reason) patient is not actively suicidal and does not appear a danger to herself. The plan is discuss with Dr. Yary Valadez plan to discharge back to [facility]. Referred to outpatient per [facility] Myles Holguin RN

## 2018-11-13 NOTE — DISCHARGE INSTRUCTIONS

## 2018-11-15 ENCOUNTER — OFFICE VISIT (OUTPATIENT)
Dept: VASCULAR SURGERY | Age: 63
End: 2018-11-15

## 2018-11-15 VITALS
DIASTOLIC BLOOD PRESSURE: 80 MMHG | BODY MASS INDEX: 24.63 KG/M2 | WEIGHT: 139 LBS | HEIGHT: 63 IN | HEART RATE: 104 BPM | SYSTOLIC BLOOD PRESSURE: 140 MMHG

## 2018-11-15 DIAGNOSIS — N18.6 ESRD (END STAGE RENAL DISEASE) ON DIALYSIS (HCC): Primary | ICD-10-CM

## 2018-11-15 DIAGNOSIS — Z99.2 ESRD (END STAGE RENAL DISEASE) ON DIALYSIS (HCC): Primary | ICD-10-CM

## 2018-11-15 NOTE — PROGRESS NOTES
1. Have you been to an emergency room or urgent care clinic since your last visit? No    Hospitalized since your last visit? If yes, where, when, and reason for visit? NO  2. Have you seen or consulted any other health care providers outside of the [facility] since your last visit including any procedures, health maintenance items. If yes, where, when and reason for visit?  NO

## 2018-11-16 NOTE — PROGRESS NOTES
Ms. June Esquivel is here today for another postop check status post left upper extremity AV graft placement. She had some postop edema which is not unusual, but I wanted her to be able to come back to reevaluate. Her edema appears no better in fact perhaps even worse. This is in spite of efforts of compression which she was even wearing today. The graft is patent with a good bruit, incisions well-healed, but she is obviously very uncomfortable and was in tears. I discussed with Dr. Rosalie Ramires, though we might could look into some central venous stenosis with a shuntogram, I stated that I was worried even if we resolve this her affect just makes me concerned she will not end up allowing any cannulation of this arm just based on the experience that she has had with this so far. Perhaps planning for ligation would help reduce the edema and allow her to be more comfortable. She will just have to use the catheter for now.   That we can reevaluate other options, but the urgency to get her comfortable I think is what will matter at this point for her

## 2018-11-19 ENCOUNTER — ANESTHESIA EVENT (OUTPATIENT)
Dept: CARDIOTHORACIC SURGERY | Age: 63
End: 2018-11-19
Payer: MEDICARE

## 2018-11-20 ENCOUNTER — ANESTHESIA (OUTPATIENT)
Dept: CARDIOTHORACIC SURGERY | Age: 63
End: 2018-11-20
Payer: MEDICARE

## 2018-11-20 ENCOUNTER — HOSPITAL ENCOUNTER (OUTPATIENT)
Age: 63
Setting detail: OUTPATIENT SURGERY
Discharge: HOME | End: 2018-11-20
Attending: SURGERY | Admitting: SURGERY
Payer: MEDICARE

## 2018-11-20 VITALS
TEMPERATURE: 97.1 F | OXYGEN SATURATION: 98 % | HEIGHT: 63 IN | SYSTOLIC BLOOD PRESSURE: 155 MMHG | BODY MASS INDEX: 24.63 KG/M2 | HEART RATE: 90 BPM | RESPIRATION RATE: 14 BRPM | DIASTOLIC BLOOD PRESSURE: 77 MMHG | WEIGHT: 139 LBS

## 2018-11-20 DIAGNOSIS — N18.6 ESRD (END STAGE RENAL DISEASE) (HCC): Primary | ICD-10-CM

## 2018-11-20 LAB
BUN BLD-MCNC: 33 MG/DL (ref 7–18)
CHLORIDE BLD-SCNC: 111 MMOL/L (ref 100–108)
GLUCOSE BLD STRIP.AUTO-MCNC: 113 MG/DL (ref 70–110)
GLUCOSE BLD STRIP.AUTO-MCNC: 132 MG/DL (ref 74–106)
HCT VFR BLD CALC: 38 % (ref 36–49)
HGB BLD-MCNC: 12.9 G/DL (ref 12–16)
POTASSIUM BLD-SCNC: 5 MMOL/L (ref 3.5–5.5)
SODIUM BLD-SCNC: 142 MMOL/L (ref 136–145)

## 2018-11-20 PROCEDURE — 74011000258 HC RX REV CODE- 258: Performed by: NURSE ANESTHETIST, CERTIFIED REGISTERED

## 2018-11-20 PROCEDURE — 74011000258 HC RX REV CODE- 258

## 2018-11-20 PROCEDURE — 74011250636 HC RX REV CODE- 250/636: Performed by: NURSE ANESTHETIST, CERTIFIED REGISTERED

## 2018-11-20 PROCEDURE — 82947 ASSAY GLUCOSE BLOOD QUANT: CPT

## 2018-11-20 PROCEDURE — 77030018836 HC SOL IRR NACL ICUM -A: Performed by: SURGERY

## 2018-11-20 PROCEDURE — 74011250636 HC RX REV CODE- 250/636

## 2018-11-20 PROCEDURE — 77030002996 HC SUT SLK J&J -A: Performed by: SURGERY

## 2018-11-20 PROCEDURE — 76060000032 HC ANESTHESIA 0.5 TO 1 HR: Performed by: SURGERY

## 2018-11-20 PROCEDURE — 74011250636 HC RX REV CODE- 250/636: Performed by: SURGERY

## 2018-11-20 PROCEDURE — 82962 GLUCOSE BLOOD TEST: CPT

## 2018-11-20 PROCEDURE — 77030039266 HC ADH SKN EXOFIN S2SG -A: Performed by: SURGERY

## 2018-11-20 PROCEDURE — C1894 INTRO/SHEATH, NON-LASER: HCPCS | Performed by: SURGERY

## 2018-11-20 PROCEDURE — 76210000021 HC REC RM PH II 0.5 TO 1 HR: Performed by: SURGERY

## 2018-11-20 PROCEDURE — 77030010509 HC AIRWY LMA MSK TELE -A: Performed by: ANESTHESIOLOGY

## 2018-11-20 PROCEDURE — 76210000006 HC OR PH I REC 0.5 TO 1 HR: Performed by: SURGERY

## 2018-11-20 PROCEDURE — 77030031139 HC SUT VCRL2 J&J -A: Performed by: SURGERY

## 2018-11-20 PROCEDURE — 77030002924 HC SUT GORTX WLGO -B: Performed by: SURGERY

## 2018-11-20 PROCEDURE — 77030002986 HC SUT PROL J&J -A: Performed by: SURGERY

## 2018-11-20 PROCEDURE — 76010000112 HC CV SURG 0.5 TO 1 HR: Performed by: SURGERY

## 2018-11-20 PROCEDURE — 74011250637 HC RX REV CODE- 250/637: Performed by: SURGERY

## 2018-11-20 RX ORDER — TRAMADOL HYDROCHLORIDE 50 MG/1
50 TABLET ORAL
Status: DISCONTINUED | OUTPATIENT
Start: 2018-11-20 | End: 2018-11-20 | Stop reason: HOSPADM

## 2018-11-20 RX ORDER — SODIUM CHLORIDE 0.9 % (FLUSH) 0.9 %
5-10 SYRINGE (ML) INJECTION AS NEEDED
Status: DISCONTINUED | OUTPATIENT
Start: 2018-11-20 | End: 2018-11-20 | Stop reason: HOSPADM

## 2018-11-20 RX ORDER — FAMOTIDINE 10 MG/ML
INJECTION INTRAVENOUS
Status: DISCONTINUED
Start: 2018-11-20 | End: 2018-11-20 | Stop reason: HOSPADM

## 2018-11-20 RX ORDER — SODIUM CHLORIDE 0.9 % (FLUSH) 0.9 %
5-10 SYRINGE (ML) INJECTION EVERY 8 HOURS
Status: DISCONTINUED | OUTPATIENT
Start: 2018-11-20 | End: 2018-11-20 | Stop reason: HOSPADM

## 2018-11-20 RX ORDER — DEXTROSE 50 % IN WATER (D50W) INTRAVENOUS SYRINGE
25-50 AS NEEDED
Status: DISCONTINUED | OUTPATIENT
Start: 2018-11-20 | End: 2018-11-20 | Stop reason: HOSPADM

## 2018-11-20 RX ORDER — DEXTROSE MONOHYDRATE AND SODIUM CHLORIDE 5; .225 G/100ML; G/100ML
25 INJECTION, SOLUTION INTRAVENOUS CONTINUOUS
Status: DISCONTINUED | OUTPATIENT
Start: 2018-11-20 | End: 2018-11-20 | Stop reason: HOSPADM

## 2018-11-20 RX ORDER — ONDANSETRON 2 MG/ML
4 INJECTION INTRAMUSCULAR; INTRAVENOUS ONCE
Status: DISCONTINUED | OUTPATIENT
Start: 2018-11-20 | End: 2018-11-20 | Stop reason: HOSPADM

## 2018-11-20 RX ORDER — MAGNESIUM SULFATE 100 %
4 CRYSTALS MISCELLANEOUS AS NEEDED
Status: DISCONTINUED | OUTPATIENT
Start: 2018-11-20 | End: 2018-11-20 | Stop reason: HOSPADM

## 2018-11-20 RX ORDER — PHENYLEPHRINE HCL IN 0.9% NACL 1 MG/10 ML
SYRINGE (ML) INTRAVENOUS AS NEEDED
Status: DISCONTINUED | OUTPATIENT
Start: 2018-11-20 | End: 2018-11-20 | Stop reason: HOSPADM

## 2018-11-20 RX ORDER — HEPARIN SODIUM 200 [USP'U]/100ML
INJECTION, SOLUTION INTRAVENOUS
Status: DISCONTINUED
Start: 2018-11-20 | End: 2018-11-20 | Stop reason: HOSPADM

## 2018-11-20 RX ORDER — LIDOCAINE HYDROCHLORIDE 10 MG/ML
0.1 INJECTION, SOLUTION EPIDURAL; INFILTRATION; INTRACAUDAL; PERINEURAL AS NEEDED
Status: DISCONTINUED | OUTPATIENT
Start: 2018-11-20 | End: 2018-11-20 | Stop reason: HOSPADM

## 2018-11-20 RX ORDER — FENTANYL CITRATE 50 UG/ML
50 INJECTION, SOLUTION INTRAMUSCULAR; INTRAVENOUS AS NEEDED
Status: DISCONTINUED | OUTPATIENT
Start: 2018-11-20 | End: 2018-11-20 | Stop reason: HOSPADM

## 2018-11-20 RX ORDER — LIDOCAINE HYDROCHLORIDE 10 MG/ML
INJECTION, SOLUTION EPIDURAL; INFILTRATION; INTRACAUDAL; PERINEURAL
Status: DISCONTINUED
Start: 2018-11-20 | End: 2018-11-20 | Stop reason: HOSPADM

## 2018-11-20 RX ORDER — FENTANYL CITRATE 50 UG/ML
INJECTION, SOLUTION INTRAMUSCULAR; INTRAVENOUS AS NEEDED
Status: DISCONTINUED | OUTPATIENT
Start: 2018-11-20 | End: 2018-11-20 | Stop reason: HOSPADM

## 2018-11-20 RX ORDER — LIDOCAINE HYDROCHLORIDE 20 MG/ML
INJECTION, SOLUTION EPIDURAL; INFILTRATION; INTRACAUDAL; PERINEURAL AS NEEDED
Status: DISCONTINUED | OUTPATIENT
Start: 2018-11-20 | End: 2018-11-20 | Stop reason: HOSPADM

## 2018-11-20 RX ORDER — INSULIN LISPRO 100 [IU]/ML
INJECTION, SOLUTION INTRAVENOUS; SUBCUTANEOUS ONCE
Status: DISCONTINUED | OUTPATIENT
Start: 2018-11-20 | End: 2018-11-20 | Stop reason: HOSPADM

## 2018-11-20 RX ORDER — VANCOMYCIN/0.9 % SOD CHLORIDE 1 G/100 ML
1000 PLASTIC BAG, INJECTION (ML) INTRAVENOUS ONCE
Status: COMPLETED | OUTPATIENT
Start: 2018-11-20 | End: 2018-11-20

## 2018-11-20 RX ORDER — MIDAZOLAM HYDROCHLORIDE 1 MG/ML
INJECTION, SOLUTION INTRAMUSCULAR; INTRAVENOUS AS NEEDED
Status: DISCONTINUED | OUTPATIENT
Start: 2018-11-20 | End: 2018-11-20 | Stop reason: HOSPADM

## 2018-11-20 RX ORDER — LIDOCAINE HYDROCHLORIDE 10 MG/ML
INJECTION, SOLUTION EPIDURAL; INFILTRATION; INTRACAUDAL; PERINEURAL AS NEEDED
Status: DISCONTINUED | OUTPATIENT
Start: 2018-11-20 | End: 2018-11-20 | Stop reason: HOSPADM

## 2018-11-20 RX ORDER — PROPOFOL 10 MG/ML
INJECTION, EMULSION INTRAVENOUS AS NEEDED
Status: DISCONTINUED | OUTPATIENT
Start: 2018-11-20 | End: 2018-11-20 | Stop reason: HOSPADM

## 2018-11-20 RX ORDER — TRAMADOL HYDROCHLORIDE 50 MG/1
50 TABLET ORAL
Qty: 20 TAB | Refills: 0 | Status: ON HOLD | OUTPATIENT
Start: 2018-11-20 | End: 2020-09-29 | Stop reason: SDUPTHER

## 2018-11-20 RX ORDER — HEPARIN SODIUM 200 [USP'U]/100ML
INJECTION, SOLUTION INTRAVENOUS
Status: COMPLETED | OUTPATIENT
Start: 2018-11-20 | End: 2018-11-20

## 2018-11-20 RX ADMIN — LIDOCAINE HYDROCHLORIDE 40 MG: 20 INJECTION, SOLUTION EPIDURAL; INFILTRATION; INTRACAUDAL; PERINEURAL at 09:02

## 2018-11-20 RX ADMIN — FENTANYL CITRATE 50 MCG: 50 INJECTION, SOLUTION INTRAMUSCULAR; INTRAVENOUS at 09:24

## 2018-11-20 RX ADMIN — FENTANYL CITRATE 50 MCG: 50 INJECTION, SOLUTION INTRAMUSCULAR; INTRAVENOUS at 08:56

## 2018-11-20 RX ADMIN — DEXTROSE MONOHYDRATE AND SODIUM CHLORIDE 25 ML/HR: 5; .225 INJECTION, SOLUTION INTRAVENOUS at 08:41

## 2018-11-20 RX ADMIN — FAMOTIDINE 20 MG: 10 INJECTION, SOLUTION INTRAVENOUS at 08:41

## 2018-11-20 RX ADMIN — Medication 100 MCG: at 09:05

## 2018-11-20 RX ADMIN — VANCOMYCIN HYDROCHLORIDE 1 G: 10 INJECTION, POWDER, LYOPHILIZED, FOR SOLUTION INTRAVENOUS at 08:56

## 2018-11-20 RX ADMIN — TRAMADOL HYDROCHLORIDE 50 MG: 50 TABLET, FILM COATED ORAL at 12:20

## 2018-11-20 RX ADMIN — PROPOFOL 120 MG: 10 INJECTION, EMULSION INTRAVENOUS at 09:02

## 2018-11-20 RX ADMIN — MIDAZOLAM HYDROCHLORIDE 1 MG: 1 INJECTION, SOLUTION INTRAMUSCULAR; INTRAVENOUS at 08:56

## 2018-11-20 RX ADMIN — Medication 100 MCG: at 09:21

## 2018-11-20 RX ADMIN — MIDAZOLAM HYDROCHLORIDE 1 MG: 1 INJECTION, SOLUTION INTRAMUSCULAR; INTRAVENOUS at 08:59

## 2018-11-20 NOTE — PERIOP NOTES
Report given to Damion Clifford at [facility]. No further questions. Written instructions provided on last dose of tramadol.

## 2018-11-20 NOTE — PERIOP NOTES
Patient confirmed by two identifiers with discharge instructions prior too being provided to patient and sister and caregiver.

## 2018-11-20 NOTE — ANESTHESIA POSTPROCEDURE EVALUATION
Procedure(s): LIGATION OF LEFT ARM AV FISTULA. Anesthesia Post Evaluation Multimodal analgesia: multimodal analgesia used between 6 hours prior to anesthesia start to PACU discharge Patient location during evaluation: PACU Patient participation: complete - patient participated Level of consciousness: awake Pain score: 0 Pain management: satisfactory to patient Airway patency: patent Anesthetic complications: no 
Cardiovascular status: acceptable Respiratory status: acceptable Hydration status: acceptable Post anesthesia nausea and vomiting:  none Visit Vitals /77 (BP 1 Location: Right arm, BP Patient Position: At rest) Pulse 90 Temp 36.2 °C (97.1 °F) Resp 14 Ht 5' 3\" (1.6 m) Wt 63 kg (139 lb) SpO2 98% BMI 24.62 kg/m²

## 2018-11-20 NOTE — OP NOTES
[facility] REPORT    Christ Segura  MR#: [id number]  : 1955  ACCOUNT #: [de-identified]   DATE OF SERVICE: 2018    PREOPERATIVE DIAGNOSIS:  End-stage renal disease with a left upper extremity swelling and pain. POSTOPERATIVE DIAGNOSIS:  End-stage renal disease with a left upper extremity swelling and pain. PROCEDURE PERFORMED:  Left arteriovenous graft ligation. SURGEON:  Amari Pagan MD    CULTURES:  None. SPECIMENS REMOVED:  None. DRAINS:  None. ESTIMATED BLOOD LOSS:  Less than 50 mL. ANESTHESIA:  General.    ASSISTANT:  None. COMPLICATIONS:  None. IMPLANTS:  None. INDICATIONS FOR THE PROCEDURE:  The patient is a 58-year-old female with end-stage renal disease with left upper extremity swelling and pain after AV graft placement. Patient was given the risks and benefits of ligation including a loss of access, bleeding, infection, damage to adjacent structures, MI, stroke, and death, as well as need for further surgery. She was offered a shuntogram with attempted balloon angioplasty to salvage the graft, but the patient was very adamant that if we were able to improve things, she would not want any access on that arm secondary to the amount of pain that she is currently in, so decision was made to move forward with ligation. PROCEDURE:  The patient was correctly identified in the preoperative holding area and taken to the operating room in stable condition. The patient had a preincision timeout prior to incision. The patient was prepped and draped in normal sterile fashion according to CDC guidelines for aseptic technique. Patient also had preoperative antibiotics prior to incision. We then were able to numb her up at the arterial incision, dissect down to the graft itself. We looped it twice with 0 silk ties and tied off the graft. We had a successful ligation of the graft at this time.   We then were able to irrigate and gained hemostasis with electric Bovie cautery and closed with a deep dermal layer and staples for skin. At her axilla wound, there was some dehiscence at the kind of more proximal portion of it, with a kind of a complex area, but there was good pink granulation tissue over the area. In order to help with healing, we just went ahead and placed some staples to try and bring the skin together to allow it for healing and we were able to close that wound as well. Small Coverderms were placed at both staple sites. The patient tolerated the procedure well without any issues.       MD PATRICIA Oneal / PAKO  D: 11/20/2018 09:30     T: 11/20/2018 16:00  JOB #: [id number]

## 2018-11-20 NOTE — ANESTHESIA PREPROCEDURE EVALUATION
Anesthetic History No history of anesthetic complications Review of Systems / Medical History Patient summary reviewed and pertinent labs reviewed Pulmonary Within defined limits Asthma Neuro/Psych  
 
seizures CVA 
TIA and psychiatric history Cardiovascular Within defined limits Hypertension Exercise tolerance: <4 METS 
  
GI/Hepatic/Renal 
Within defined limits Renal disease: ESRD Endo/Other Within defined limits Diabetes Other Findings Physical Exam 
 
Airway Mallampati: III 
TM Distance: 4 - 6 cm Neck ROM: normal range of motion Mouth opening: Normal 
 
 Cardiovascular Rhythm: regular Dental 
 
Dentition: Poor dentition Comments: Patient reports upper teeth do not come out but they appear to be implants or dentures Pulmonary Breath sounds clear to auscultation Abdominal 
GI exam deferred Other Findings Anesthetic Plan ASA: 4 Anesthesia type: general 
 
 
 
 
Induction: Intravenous Anesthetic plan and risks discussed with: Patient

## 2018-11-20 NOTE — BRIEF OP NOTE
BRIEF OPERATIVE NOTE Date of Procedure: 11/20/2018 Preoperative Diagnosis: END STAGE RENAL DISEASE ACCESS COMPLICATION Postoperative Diagnosis: END STAGE RENAL DISEASE ACCESS COMPLICATION Procedure(s): LIGATION OF LEFT ARM AV FISTULA Surgeon(s) and Role: Isela Georges MD - Primary Surgical Assistant: none Surgical Staff: 
Circ-1: Kameron Yates RN Scrub Tech-1: Oneal Morataya Surg Asst-1: [facility] Event Time In Time Out Incision Start 1966 Incision Close Anesthesia: General  
Estimated Blood Loss: <50mL Specimens: * No specimens in log * Findings: none Complications: none Implants: * No implants in log *

## 2018-11-20 NOTE — DISCHARGE INSTRUCTIONS
INSTRUCTIONS     ACTIVITY:  Limited activity today. Keep  arm straight and raised above the level of your heart for 24 hours or until the swelling goes away. DIET:  May have your usual food, unless told otherwise by your doctor. PAIN:  Use the prescription for pain medicine your doctor gave you. If you do not have one, usual your normal pain medicine. If this does not control your pain, call your doctor. DO NOT TAKE ASPIRIN OR MEDICINE WITH ASPIRIN IN IT, unless your doctor says you may. DRESSING CARE:   Keep your dressing clean and dry for 24-48 hours. BLEEDING:  If you have any bleeding put pressure over the bleeding area and call your doctor. CARE OF YOUR ACCESS ARM:  You may have some bruising, swelling, and discoloration for several weeks. For the next 24 hours, DO NOT Drive, Drink Alcoholic Beverages, or Make IMPORTANT Decisions. OTHER INSTRUCTIONS: No strenuous activities for 2 weeks. APPOINTMENT/DIALYSIS: Go as scheduled    Constipation: Care Instructions  Your Care Instructions    Constipation means that you have a hard time passing stools (bowel movements). People pass stools from 3 times a day to once every 3 days. What is normal for you may be different. Constipation may occur with pain in the rectum and cramping. The pain may get worse when you try to pass stools. Sometimes there are small amounts of bright red blood on toilet paper or the surface of stools. This is because of enlarged veins near the rectum (hemorrhoids). A few changes in your diet and lifestyle may help you avoid ongoing constipation. Your doctor may also prescribe medicine to help loosen your stool. Some medicines can cause constipation. These include pain medicines and antidepressants. Tell your doctor about all the medicines you take. Your doctor may want to make a medicine change to ease your symptoms. Follow-up care is a key part of your treatment and safety.  Be sure to make and go to all appointments, and call your doctor if you are having problems. It's also a good idea to know your test results and keep a list of the medicines you take. How can you care for yourself at home? · Drink plenty of fluids, enough so that your urine is light yellow or clear like water. If you have kidney, heart, or liver disease and have to limit fluids, talk with your doctor before you increase the amount of fluids you drink. · Include high-fiber foods in your diet each day. These include fruits, vegetables, beans, and whole grains. · Get at least 30 minutes of exercise on most days of the week. Walking is a good choice. You also may want to do other activities, such as running, swimming, cycling, or playing tennis or team sports. · Take a fiber supplement, such as Citrucel or Metamucil, every day. Read and follow all instructions on the label. · Schedule time each day for a bowel movement. A daily routine may help. Take your time having your bowel movement. · Support your feet with a small step stool when you sit on the toilet. This helps flex your hips and places your pelvis in a squatting position. · Your doctor may recommend an over-the-counter laxative to relieve your constipation. Examples are Milk of Magnesia and MiraLax. Read and follow all instructions on the label. Do not use laxatives on a long-term basis. When should you call for help? Call your doctor now or seek immediate medical care if:    · You have new or worse belly pain.     · You have new or worse nausea or vomiting.     · You have blood in your stools.    Watch closely for changes in your health, and be sure to contact your doctor if:    · Your constipation is getting worse.     · You do not get better as expected. Where can you learn more? Go to http://izabela-jewell.info/. Enter 21 [phone] in the search box to learn more about \"Constipation: Care Instructions. \"  Current as of: November 20, 2017  Content Version: 11.8  © 5154-2489 [employer]. Care instructions adapted under license by [employer] (which disclaims liability or warranty for this information). If you have questions about a medical condition or this instruction, always ask your healthcare professional. [address] any warranty or liability for your use of this information. Tramadol (Ultram, Ultram ER, [place], Theratramadol-60) - (By mouth)   Why this medicine is used:   Treats moderate to severe pain. This medicine is a narcotic pain reliever. Contact a nurse or doctor right away if you have:  · Extreme weakness, shallow breathing  · Seizures  · Seeing or hearing things that are not there  · Anxiety, restlessness, muscle spasms, fever, sweating, diarrhea  · Slow or fast heartbeat     Common side effects:  · Nausea, vomiting, constipation  · Headache, drowsiness  · Itching, feeling of warmth, redness of the face, neck, arms, and upper chest  © 2017 [address] [facility] Information is for End User's use only and may not be sold, redistributed or otherwise used for commercial purposes. DISCHARGE SUMMARY from Nurse    PATIENT INSTRUCTIONS:    After general anesthesia or intravenous sedation, for 24 hours or while taking prescription Narcotics:  · Limit your activities  · Do not drive and operate hazardous machinery  · Do not make important personal or business decisions  · Do  not drink alcoholic beverages  · If you have not urinated within 8 hours after discharge, please contact your surgeon on call.     Report the following to your surgeon:  · Excessive pain, swelling, redness or odor of or around the surgical area  · Temperature over 100.5  · Nausea and vomiting lasting longer than 4 hours or if unable to take medications  · Any signs of decreased circulation or nerve impairment to extremity: change in color, persistent  numbness, tingling, coldness or increase pain  · Any questions  *  Please give a list of your current medications to your Primary Care Provider. *  Please update this list whenever your medications are discontinued, doses are      changed, or new medications (including over-the-counter products) are added. *  Please carry medication information at all times in case of emergency situations. These are general instructions for a healthy lifestyle:    No smoking/ No tobacco products/ Avoid exposure to second hand smoke  Surgeon General's Warning:  Quitting smoking now greatly reduces serious risk to your health. Obesity, smoking, and sedentary lifestyle greatly increases your risk for illness    A healthy diet, regular physical exercise & weight monitoring are important for maintaining a healthy lifestyle    You may be retaining fluid if you have a history of heart failure or if you experience any of the following symptoms:  Weight gain of 3 pounds or more overnight or 5 pounds in a week, increased swelling in our hands or feet or shortness of breath while lying flat in bed. Please call your doctor as soon as you notice any of these symptoms; do not wait until your next office visit. Recognize signs and symptoms of STROKE:    F-face looks uneven    A-arms unable to move or move unevenly    S-speech slurred or non-existent    T-time-call 911 as soon as signs and symptoms begin-DO NOT go       Back to bed or wait to see if you get better-TIME IS BRAIN. Warning Signs of HEART ATTACK     Call 911 if you have these symptoms:   Chest discomfort. Most heart attacks involve discomfort in the center of the chest that lasts more than a few minutes, or that goes away and comes back. It can feel like uncomfortable pressure, squeezing, fullness, or pain.  Discomfort in other areas of the upper body. Symptoms can include pain or discomfort in one or both arms, the back, neck, jaw, or stomach.  Shortness of breath with or without chest discomfort.    Other signs may include breaking out in a cold sweat, nausea, or lightheadedness. Don't wait more than five minutes to call 911 - MINUTES MATTER! Fast action can save your life. Calling 911 is almost always the fastest way to get lifesaving treatment. Emergency Medical Services staff can begin treatment when they arrive -- up to an hour sooner than if someone gets to the hospital by car. The discharge information has been reviewed with the patient. The patient verbalized understanding. Discharge medications reviewed with the patient and appropriate educational materials and side effects teaching were provided.   ___________________________________________________________________________________________________________________________________

## 2018-11-20 NOTE — H&P
Surgery History and Physical 
 
Subjective: Noni Roberson is a 70 y.o. female who presents with AVG with arm swelling and pain. She was offered shuntagram to aide with reduction of swelling and pain but wants ligation. She understands that this is a total loss of access. Patient Active Problem List  
 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  
Diagnosis Date  Asthma  Bipolar affective disorder ([facility] [address]  Brain condition  Cataract  Chronic kidney disease   
 hemodialysis M-W-F  Diabetes ([facility] [address]  Hyperlipidemia  Hypertension  Paralytic strabismus, unspecified  Psychiatric disorder  Seizures ([facility] [address] 08/27/2018 History reviewed. No pertinent surgical history. Social History Tobacco Use  Smoking status: Never Smoker  Smokeless tobacco: Never Used Substance Use Topics  Alcohol use: No  
  
History reviewed. No pertinent family history. Prior to Admission medications Medication Sig Start Date End Date Taking? Authorizing Provider  
mirtazapine (REMERON) 15 mg tablet Take  by mouth nightly. Yes Provider, Historical  
levETIRAcetam (KEPPRA) 250 mg tablet Take 1 Tab by mouth every Monday, Wednesday, Friday. 8/31/18  Yes Nolvia Butler MD  
levETIRAcetam (KEPPRA) 500 mg tablet Take 1 Tab by mouth two (2) times a day. 8/31/18  Yes Nolvia Butler MD  
hydrALAZINE (APRESOLINE) 50 mg tablet Take 1 Tab by mouth three (3) times daily. 8/14/18  Yes Tyler Kirk MD  
simvastatin (ZOCOR) 20 mg tablet Take 1 Tab by mouth nightly.  8/14/18  Yes Dianne Urbina MD  
 potassium chloride (KLOR-CON) 20 mEq packet Take 1 Packet by mouth two (2) times daily (with meals). 8/14/18  Yes Fortino Kirk MD  
doxercalciferol (HECTOROL) 4 mcg/2 mL injection 0.5 mL by IntraVENous route Every Tuesday, Thursday and Saturday. Patient taking differently: 1 mcg by IntraVENous route every Monday, Wednesday, Friday. 8/14/18  Yes Fortino Kirk MD  
epoetin raphael (EPOGEN;PROCRIT) 3,000 unit/mL injection 1.73 mL by SubCUTAneous route Every Tuesday, Thursday and Saturday. Indications: ANEMIA DUE TO RENAL FAILURE, Renal Dialysis Patient taking differently: 3,000 Units by SubCUTAneous route every Monday, Wednesday, Friday. 8/14/18  Yes Fortino Kirk MD  
insulin glargine (LANTUS) 100 unit/mL injection 10 Units by SubCUTAneous route nightly. 8/14/18  Yes Fortino Kirk MD  
losartan (COZAAR) 100 mg tablet Take 1 Tab by mouth daily. 8/15/18  Yes Fortino Kirk MD  
aspirin 81 mg tablet Take 81 mg by mouth daily. Yes Kuldeep, MD Elina  
diltiazem CD (CARDIZEM CD) 240 mg ER capsule Take 240 mg by mouth daily. Yes Kuldeep, MD Elina  
cloNIDine (CATAPRES) 0.1 mg tablet Take 0.1 mg by mouth two (2) times a day. Yes Elina Light MD  
acetaminophen (TYLENOL) 325 mg tablet Take 325 mg by mouth as needed. Yes Kuldeep, MD Elina  
insulin lispro (HUMALOG) 100 unit/mL injection INITIATE INSULIN CORRECTIVE PROTOCOL: Normal Insulin Sensitivity For Blood Sugar (mg/dL) of:    
Less than 150 =   0 units 150 -199 =   2 units 200 -249 =   4 units 250 -299 =   6 units 300 -349 =   8 units 350 and above = 10 units and Call Physician If 2 glucose readings are above 8/14/18   Fortino Kirk MD  
 
Allergies Allergen Reactions  Amoxicillin Unknown (comments)  Cleocin [Clindamycin Hcl] Unknown (comments)  Codeine Unknown (comments)  Lorcet 10/650 [Hydrocodone-Acetaminophen] Unknown (comments)  Penicillin G Benzathine Unknown (comments)  Shellfish Derived Unknown (comments) ROS: 
Pertinent items are noted in HPI. Unless otherwise mentioned in the HPI. Objective:  
 
Patient Vitals for the past 8 hrs: 
 Temp 18 0804 97.7 °F (36.5 °C) Temp (24hrs), Av.7 °F (36.5 °C), Min:97.7 °F (36.5 °C), Max:97.7 °F (36.5 °C) Physical Exam: 
GENERAL: alert, cooperative, no distress, appears stated age, THROAT & NECK: normal and no erythema or exudates noted. , LUNG: clear to auscultation bilaterally, HEART: regular rate and rhythm, S1, S2 normal, no murmur, click, rub or gallop, ABDOMEN: soft, non-tender. Bowel sounds normal. No masses,  no organomegaly Labs: No results found for this or any previous visit (from the past 24 hour(s)). Data Review: CBC:  
Lab Results Component Value Date/Time WBC 8.9 2018 07:57 PM  
 RBC 3.94 (L) 2018 07:57 PM  
 HGB 11.1 (L) 2018 07:57 PM  
 HCT 35.6 2018 07:57 PM  
 PLATELET 366  07:57 PM  
  
BMP:  
Lab Results Component Value Date/Time Glucose 174 (H) 2018 07:57 PM  
 Sodium 140 2018 07:57 PM  
 Potassium 4.5 2018 07:57 PM  
 Chloride 106 2018 07:57 PM  
 CO2 25 2018 07:57 PM  
 BUN 17 2018 07:57 PM  
 Creatinine 4.40 (H) 2018 07:57 PM  
 Calcium 8.7 2018 07:57 PM  
 
Coagulation: No results found for: PTP, INR, APTT Assessment:  
 
Active Problems: * No active hospital problems. * 
 
 
Plan:  
 
Ligation of AVG Signed By: Ila Triplett MD   
 2018

## 2018-12-06 ENCOUNTER — OFFICE VISIT (OUTPATIENT)
Dept: VASCULAR SURGERY | Age: 63
End: 2018-12-06

## 2018-12-06 VITALS
RESPIRATION RATE: 14 BRPM | DIASTOLIC BLOOD PRESSURE: 80 MMHG | HEART RATE: 72 BPM | BODY MASS INDEX: 24.63 KG/M2 | SYSTOLIC BLOOD PRESSURE: 144 MMHG | HEIGHT: 63 IN | WEIGHT: 139 LBS

## 2018-12-06 DIAGNOSIS — Z99.2 ESRD (END STAGE RENAL DISEASE) ON DIALYSIS (HCC): Primary | ICD-10-CM

## 2018-12-06 DIAGNOSIS — N18.6 ESRD (END STAGE RENAL DISEASE) ON DIALYSIS (HCC): Primary | ICD-10-CM

## 2018-12-06 NOTE — PROGRESS NOTES
40-year-old female with end-stage renal disease on hemodialysis  She had a left upper extremity AV graft placed in October. Unfortunately she had uncontrollable pain and swelling in the left arm postoperatively  Ultimately the decision was made to ligate her graft and she presents today in follow-up  She continues to complain of pain and swelling in the arm. No report of fever/chills. She is currently dialyzing with a RIJ tunneled dialysis catheter without issue. Incisions of her left arm are clean dry and intact. Staples were removed in the office today  She has no signs of infection or hematoma. I did advise her to continue to elevate her arm and she was given a Tubigrip for gentle compression to help with the swelling. Discussed that we will allow her more healing time and have her follow-up in 2-3 weeks to reassess. Further surgical discussion for new access to be addressed at that time. She will call the office sooner as needed.

## 2018-12-06 NOTE — PROGRESS NOTES
1. Have you been to an emergency room or urgent care clinic since your last visit? No  Hospitalized since your last visit? If yes, where, when, and reason for visit? No  2. Have you seen or consulted any other health care providers outside of the [facility] since your last visit including any procedures, health maintenance items. If yes, where, when and reason for visit?

## 2018-12-19 ENCOUNTER — OFFICE VISIT (OUTPATIENT)
Dept: VASCULAR SURGERY | Age: 63
End: 2018-12-19

## 2018-12-19 VITALS
HEART RATE: 72 BPM | HEIGHT: 63 IN | BODY MASS INDEX: 24.63 KG/M2 | WEIGHT: 139 LBS | RESPIRATION RATE: 16 BRPM | SYSTOLIC BLOOD PRESSURE: 160 MMHG | DIASTOLIC BLOOD PRESSURE: 80 MMHG

## 2018-12-19 DIAGNOSIS — N18.6 ESRD (END STAGE RENAL DISEASE) (HCC): Primary | ICD-10-CM

## 2018-12-19 NOTE — PROGRESS NOTES
1. Have you been to an emergency room or urgent care clinic since your last visit? No  Hospitalized since your last visit? If yes, where, when, and reason for visit? No  2. Have you seen or consulted any other health care providers outside of the [facility] since your last visit including any procedures, health maintenance items. If yes, where, when and reason for visit?

## 2018-12-19 NOTE — PROGRESS NOTES
Robert Foy    Chief Complaint   Patient presents with    End Stage Renal Disease       History and Physical    Robert Foy is a 70 y.o. female with end-stage renal disease in need of dialysis access. Patient has good basilic vein on the right arm that could be useful. Patient unfortunately had AV graft that was placed in the left side that caused him with swelling and pain that required ligation. Overall her left upper extremity seems to be improving. Currently she has no fevers or chills. Past Medical History:   Diagnosis Date    Asthma     [facility] [address]     Brain condition     Cataract     Chronic kidney disease     hemodialysis M-W-F    Diabetes ([facility] [address]     Hyperlipidemia     Hypertension     Paralytic strabismus, unspecified     Psychiatric disorder     Seizures ([facility] [address] 08/27/2018     Past Surgical History:   Procedure Laterality Date    MO INSJ TUNNELED CVC W/O SUBQ PORT/ AGE 3 YR/> N/A 8/9/2018     in-pt 474A, Insertion Tunneled Central Venous Catheter performed by Tamela Aguilar MD at [facility] CATH LAB     Patient Active Problem List   Diagnosis Code    Acute on chronic renal insufficiency N28.9, N18.9    Hypertension I10    ESRD (end stage renal disease) ([facility] [address] N18.6    Secondary hyperparathyroidism of renal origin ([facility] [address] N25.81    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 Max Daily Amount: 200 mg. 20 Tab 0    mirtazapine (REMERON) 15 mg tablet Take  by mouth nightly.  levETIRAcetam (KEPPRA) 250 mg tablet Take 1 Tab by mouth every Monday, Wednesday, Friday. 30 Tab 1    levETIRAcetam (KEPPRA) 500 mg tablet Take 1 Tab by mouth two (2) times a day.  30 Tab 1    hydrALAZINE (APRESOLINE) 50 mg tablet Take 1 Tab by mouth three (3) times daily. 90 Tab 0    simvastatin (ZOCOR) 20 mg tablet Take 1 Tab by mouth nightly. 30 Tab 0    potassium chloride (KLOR-CON) 20 mEq packet Take 1 Packet by mouth two (2) times daily (with meals). 30 Packet 0    doxercalciferol (HECTOROL) 4 mcg/2 mL injection 0.5 mL by IntraVENous route Every Tuesday, Thursday and Saturday. (Patient taking differently: 1 mcg by IntraVENous route every Monday, Wednesday, Friday.) 1 mL 0    epoetin raphael (EPOGEN;PROCRIT) 3,000 unit/mL injection 1.73 mL by SubCUTAneous route Every Tuesday, Thursday and Saturday. Indications: ANEMIA DUE TO RENAL FAILURE, Renal Dialysis (Patient taking differently: 3,000 Units by SubCUTAneous route every Monday, Wednesday, Friday.) 1 Vial 0    insulin glargine (LANTUS) 100 unit/mL injection 10 Units by SubCUTAneous route nightly. 1 Vial 0    insulin lispro (HUMALOG) 100 unit/mL injection INITIATE INSULIN CORRECTIVE PROTOCOL: Normal Insulin Sensitivity   For Blood Sugar (mg/dL) of:     Less than 150 =   0 units           150 -199 =   2 units  200 -249 =   4 units  250 -299 =   6 units  300 -349 =   8 units  350 and above = 10 units and Call Physician  If 2 glucose readings are above 1 Vial 0    losartan (COZAAR) 100 mg tablet Take 1 Tab by mouth daily. 30 Tab 0    aspirin 81 mg tablet Take 81 mg by mouth daily.  diltiazem CD (CARDIZEM CD) 240 mg ER capsule Take 240 mg by mouth daily.  cloNIDine (CATAPRES) 0.1 mg tablet Take 0.1 mg by mouth two (2) times a day.  acetaminophen (TYLENOL) 325 mg tablet Take 325 mg by mouth as needed.          Allergies   Allergen Reactions    Amoxicillin Unknown (comments)    Cleocin [Clindamycin Hcl] Unknown (comments)    Codeine Unknown (comments)    Lorcet 10/650 [Hydrocodone-Acetaminophen] Unknown (comments)    Penicillin G Benzathine Unknown (comments)    Shellfish Derived Unknown (comments)     Social History Socioeconomic History    Marital status: SINGLE     Spouse name: Not on file    Number of children: Not on file    Years of education: Not on file    Highest education level: Not on file   Social Needs    Financial resource strain: Not on file    Food insecurity - worry: Not on file    Food insecurity - inability: Not on file    Transportation needs - medical: Not on file   [employer] needs - non-medical: Not on file   Occupational History    Not on file   Tobacco Use    Smoking status: Never Smoker    Smokeless tobacco: Never Used   Substance and Sexual Activity    Alcohol use: No    Drug use: No    Sexual activity: No   Other Topics Concern    Not on file   Social History Narrative    Not on file      History reviewed. No pertinent family history. Physical Exam:    Visit Vitals  /80 (BP 1 Location: Right arm, BP Patient Position: Sitting)   Pulse 72   Resp 16   Ht 5' 3\" (1.6 m)   Wt 139 lb (63 kg)   BMI 24.62 kg/m²      General: Well-appearing female in no acute distress  HEENT: EOMI no scleral icterus is noted  Pulmonary: No increased work of breathing is noted  Extremities: Warm and perfused she does have 1+ edema of the left upper extremity  Neuro: Cranial nerves II through XII grossly intact    Impression and Plan:  Beau England is a 70 y.o. female with end-stage renal disease in need of dialysis. I had a long talk with her stating that we can perform a basilic vein transposition versus AV graft placement. Patient is absolutely against basilic vein transposition. She does not want a longer incision on her arm. We had a long talk about the benefits of fistula placement versus graft placement but patient really wants AV graft quicker procedure time and less pain. We will move forward with right arm AV graft. We reviewed the plan with the patient and the patient understands. We also gave the patient appropriate instructions on their disease process and when to call back. Greater than 50% of this visit was spent with face to face discussion. Follow-up Disposition: Not on [address], [place]    PLEASE NOTE:  This document has been produced using voice recognition software. Unrecognized errors in transcription may be present.

## 2019-01-07 ENCOUNTER — ANESTHESIA EVENT (OUTPATIENT)
Dept: CARDIOTHORACIC SURGERY | Age: 64
End: 2019-01-07
Payer: MEDICARE

## 2019-01-08 ENCOUNTER — HOSPITAL ENCOUNTER (EMERGENCY)
Age: 64
Discharge: HOME | End: 2019-01-08
Attending: EMERGENCY MEDICINE
Payer: MEDICARE

## 2019-01-08 ENCOUNTER — HOSPITAL ENCOUNTER (OUTPATIENT)
Age: 64
Setting detail: OUTPATIENT SURGERY
Discharge: OTHER INSTITUTION | End: 2019-01-08
Attending: SURGERY | Admitting: SURGERY
Payer: MEDICARE

## 2019-01-08 ENCOUNTER — APPOINTMENT (OUTPATIENT)
Dept: GENERAL RADIOLOGY | Age: 64
End: 2019-01-08
Attending: EMERGENCY MEDICINE
Payer: MEDICARE

## 2019-01-08 ENCOUNTER — ANESTHESIA (OUTPATIENT)
Dept: CARDIOTHORACIC SURGERY | Age: 64
End: 2019-01-08
Payer: MEDICARE

## 2019-01-08 VITALS
HEIGHT: 63 IN | DIASTOLIC BLOOD PRESSURE: 90 MMHG | RESPIRATION RATE: 18 BRPM | BODY MASS INDEX: 22.15 KG/M2 | WEIGHT: 125 LBS | TEMPERATURE: 97.9 F | OXYGEN SATURATION: 98 % | HEART RATE: 80 BPM | SYSTOLIC BLOOD PRESSURE: 190 MMHG

## 2019-01-08 VITALS
SYSTOLIC BLOOD PRESSURE: 172 MMHG | HEART RATE: 70 BPM | RESPIRATION RATE: 20 BRPM | OXYGEN SATURATION: 99 % | TEMPERATURE: 98.3 F | DIASTOLIC BLOOD PRESSURE: 88 MMHG

## 2019-01-08 DIAGNOSIS — R07.9 CHEST PAIN, UNSPECIFIED TYPE: Primary | ICD-10-CM

## 2019-01-08 DIAGNOSIS — N18.6 ESRD (END STAGE RENAL DISEASE) (HCC): ICD-10-CM

## 2019-01-08 LAB
ALBUMIN SERPL-MCNC: 3.1 G/DL (ref 3.4–5)
ALBUMIN/GLOB SERPL: 0.8 {RATIO} (ref 0.8–1.7)
ALP SERPL-CCNC: 146 U/L (ref 45–117)
ALT SERPL-CCNC: 21 U/L (ref 13–56)
ANION GAP SERPL CALC-SCNC: 7 MMOL/L (ref 3–18)
ANION GAP SERPL CALC-SCNC: 8 MMOL/L (ref 3–18)
AST SERPL-CCNC: 15 U/L (ref 15–37)
ATRIAL RATE: 83 BPM
BASOPHILS # BLD: 0 K/UL (ref 0–0.1)
BASOPHILS NFR BLD: 0 % (ref 0–2)
BILIRUB SERPL-MCNC: 0.3 MG/DL (ref 0.2–1)
BUN SERPL-MCNC: 32 MG/DL (ref 7–18)
BUN SERPL-MCNC: 33 MG/DL (ref 7–18)
BUN/CREAT SERPL: 7 (ref 12–20)
BUN/CREAT SERPL: 7 (ref 12–20)
CALCIUM SERPL-MCNC: 8.9 MG/DL (ref 8.5–10.1)
CALCIUM SERPL-MCNC: 9.2 MG/DL (ref 8.5–10.1)
CALCULATED P AXIS, ECG09: 70 DEGREES
CALCULATED R AXIS, ECG10: -51 DEGREES
CALCULATED T AXIS, ECG11: 42 DEGREES
CHLORIDE SERPL-SCNC: 112 MMOL/L (ref 100–108)
CHLORIDE SERPL-SCNC: 113 MMOL/L (ref 100–108)
CK MB CFR SERPL CALC: NORMAL % (ref 0–4)
CK MB SERPL-MCNC: <1 NG/ML (ref 5–25)
CK SERPL-CCNC: 80 U/L (ref 26–192)
CO2 SERPL-SCNC: 25 MMOL/L (ref 21–32)
CO2 SERPL-SCNC: 28 MMOL/L (ref 21–32)
CREAT SERPL-MCNC: 4.91 MG/DL (ref 0.6–1.3)
CREAT SERPL-MCNC: 5.02 MG/DL (ref 0.6–1.3)
DIAGNOSIS, 93000: NORMAL
DIFFERENTIAL METHOD BLD: ABNORMAL
EOSINOPHIL # BLD: 0.3 K/UL (ref 0–0.4)
EOSINOPHIL NFR BLD: 4 % (ref 0–5)
ERYTHROCYTE [DISTWIDTH] IN BLOOD BY AUTOMATED COUNT: 17.2 % (ref 11.6–14.5)
ERYTHROCYTE [DISTWIDTH] IN BLOOD BY AUTOMATED COUNT: 17.2 % (ref 11.6–14.5)
GLOBULIN SER CALC-MCNC: 4.1 G/DL (ref 2–4)
GLUCOSE BLD STRIP.AUTO-MCNC: 144 MG/DL (ref 70–110)
GLUCOSE SERPL-MCNC: 147 MG/DL (ref 74–99)
GLUCOSE SERPL-MCNC: 152 MG/DL (ref 74–99)
HCT VFR BLD AUTO: 37.4 % (ref 35–45)
HCT VFR BLD AUTO: 39.4 % (ref 35–45)
HGB BLD-MCNC: 11.9 G/DL (ref 12–16)
HGB BLD-MCNC: 12.6 G/DL (ref 12–16)
LYMPHOCYTES # BLD: 1.3 K/UL (ref 0.9–3.6)
LYMPHOCYTES NFR BLD: 17 % (ref 21–52)
MCH RBC QN AUTO: 27.6 PG (ref 24–34)
MCH RBC QN AUTO: 27.8 PG (ref 24–34)
MCHC RBC AUTO-ENTMCNC: 31.8 G/DL (ref 31–37)
MCHC RBC AUTO-ENTMCNC: 32 G/DL (ref 31–37)
MCV RBC AUTO: 86.8 FL (ref 74–97)
MCV RBC AUTO: 87 FL (ref 74–97)
MONOCYTES # BLD: 0.5 K/UL (ref 0.05–1.2)
MONOCYTES NFR BLD: 6 % (ref 3–10)
NEUTS SEG # BLD: 5.7 K/UL (ref 1.8–8)
NEUTS SEG NFR BLD: 73 % (ref 40–73)
P-R INTERVAL, ECG05: 146 MS
PLATELET # BLD AUTO: 199 K/UL (ref 135–420)
PLATELET # BLD AUTO: 201 K/UL (ref 135–420)
PMV BLD AUTO: 11.7 FL (ref 9.2–11.8)
POTASSIUM SERPL-SCNC: 4 MMOL/L (ref 3.5–5.5)
POTASSIUM SERPL-SCNC: 4.5 MMOL/L (ref 3.5–5.5)
PROT SERPL-MCNC: 7.2 G/DL (ref 6.4–8.2)
Q-T INTERVAL, ECG07: 388 MS
QRS DURATION, ECG06: 86 MS
QTC CALCULATION (BEZET), ECG08: 455 MS
RBC # BLD AUTO: 4.31 M/UL (ref 4.2–5.3)
RBC # BLD AUTO: 4.53 M/UL (ref 4.2–5.3)
SODIUM SERPL-SCNC: 146 MMOL/L (ref 136–145)
SODIUM SERPL-SCNC: 147 MMOL/L (ref 136–145)
TROPONIN I SERPL-MCNC: <0.02 NG/ML (ref 0–0.04)
TROPONIN I SERPL-MCNC: <0.02 NG/ML (ref 0–0.04)
VENTRICULAR RATE, ECG03: 83 BPM
WBC # BLD AUTO: 7.5 K/UL (ref 4.6–13.2)
WBC # BLD AUTO: 7.8 K/UL (ref 4.6–13.2)

## 2019-01-08 PROCEDURE — 71045 X-RAY EXAM CHEST 1 VIEW: CPT

## 2019-01-08 PROCEDURE — 93005 ELECTROCARDIOGRAM TRACING: CPT

## 2019-01-08 PROCEDURE — 74011250636 HC RX REV CODE- 250/636: Performed by: ANESTHESIOLOGY

## 2019-01-08 PROCEDURE — 85027 COMPLETE CBC AUTOMATED: CPT

## 2019-01-08 PROCEDURE — 82550 ASSAY OF CK (CPK): CPT

## 2019-01-08 PROCEDURE — 74011250637 HC RX REV CODE- 250/637: Performed by: ANESTHESIOLOGY

## 2019-01-08 PROCEDURE — 99283 EMERGENCY DEPT VISIT LOW MDM: CPT

## 2019-01-08 PROCEDURE — 74011250636 HC RX REV CODE- 250/636: Performed by: NURSE ANESTHETIST, CERTIFIED REGISTERED

## 2019-01-08 PROCEDURE — 85025 COMPLETE CBC W/AUTO DIFF WBC: CPT

## 2019-01-08 PROCEDURE — 80053 COMPREHEN METABOLIC PANEL: CPT

## 2019-01-08 PROCEDURE — 96374 THER/PROPH/DIAG INJ IV PUSH: CPT

## 2019-01-08 PROCEDURE — 82962 GLUCOSE BLOOD TEST: CPT

## 2019-01-08 RX ORDER — VANCOMYCIN/0.9 % SOD CHLORIDE 1 G/100 ML
1000 PLASTIC BAG, INJECTION (ML) INTRAVENOUS ONCE
Status: DISCONTINUED | OUTPATIENT
Start: 2019-01-08 | End: 2019-01-08

## 2019-01-08 RX ORDER — FAMOTIDINE 20 MG/1
20 TABLET, FILM COATED ORAL ONCE
Status: DISCONTINUED | OUTPATIENT
Start: 2019-01-08 | End: 2019-01-08 | Stop reason: HOSPADM

## 2019-01-08 RX ORDER — SODIUM CHLORIDE, SODIUM LACTATE, POTASSIUM CHLORIDE, CALCIUM CHLORIDE 600; 310; 30; 20 MG/100ML; MG/100ML; MG/100ML; MG/100ML
75 INJECTION, SOLUTION INTRAVENOUS CONTINUOUS
Status: DISCONTINUED | OUTPATIENT
Start: 2019-01-09 | End: 2019-01-08 | Stop reason: HOSPADM

## 2019-01-08 RX ORDER — VANCOMYCIN/0.9 % SOD CHLORIDE 1 G/100 ML
1000 PLASTIC BAG, INJECTION (ML) INTRAVENOUS ONCE
Status: DISCONTINUED | OUTPATIENT
Start: 2019-01-08 | End: 2019-01-08 | Stop reason: HOSPADM

## 2019-01-08 RX ORDER — GUAIFENESIN 100 MG/5ML
81 LIQUID (ML) ORAL DAILY
Status: DISCONTINUED | OUTPATIENT
Start: 2019-01-09 | End: 2019-01-08 | Stop reason: HOSPADM

## 2019-01-08 RX ORDER — NITROGLYCERIN 400 UG/1
1 SPRAY ORAL
Status: DISCONTINUED | OUTPATIENT
Start: 2019-01-08 | End: 2019-01-08 | Stop reason: HOSPADM

## 2019-01-08 RX ORDER — LABETALOL HCL 20 MG/4 ML
20 SYRINGE (ML) INTRAVENOUS AS NEEDED
Status: DISCONTINUED | OUTPATIENT
Start: 2019-01-08 | End: 2019-01-08 | Stop reason: HOSPADM

## 2019-01-08 RX ORDER — SODIUM CHLORIDE 0.9 % (FLUSH) 0.9 %
5-40 SYRINGE (ML) INJECTION AS NEEDED
Status: DISCONTINUED | OUTPATIENT
Start: 2019-01-08 | End: 2019-01-08 | Stop reason: HOSPADM

## 2019-01-08 RX ORDER — SODIUM CHLORIDE 0.9 % (FLUSH) 0.9 %
5-40 SYRINGE (ML) INJECTION EVERY 8 HOURS
Status: DISCONTINUED | OUTPATIENT
Start: 2019-01-08 | End: 2019-01-08 | Stop reason: HOSPADM

## 2019-01-08 RX ORDER — SODIUM CHLORIDE 9 MG/ML
25 INJECTION, SOLUTION INTRAVENOUS CONTINUOUS
Status: DISCONTINUED | OUTPATIENT
Start: 2019-01-08 | End: 2019-01-08 | Stop reason: HOSPADM

## 2019-01-08 RX ORDER — INSULIN LISPRO 100 [IU]/ML
INJECTION, SOLUTION INTRAVENOUS; SUBCUTANEOUS ONCE
Status: DISCONTINUED | OUTPATIENT
Start: 2019-01-08 | End: 2019-01-08 | Stop reason: HOSPADM

## 2019-01-08 RX ADMIN — SODIUM CHLORIDE 25 ML/HR: 900 INJECTION, SOLUTION INTRAVENOUS at 10:30

## 2019-01-08 RX ADMIN — ASPIRIN 81 MG 81 MG: 81 TABLET ORAL at 11:49

## 2019-01-08 RX ADMIN — NITROGLYCERIN 1 SPRAY: 400 SPRAY, METERED SUBLINGUAL at 11:50

## 2019-01-08 RX ADMIN — LABETALOL HYDROCHLORIDE 20 MG: 5 INJECTION, SOLUTION INTRAVENOUS at 11:50

## 2019-01-08 NOTE — PERIOP NOTES
Spoke with nurse Vista Krabbe to report to Dr. Jayy Tim he need to speak with pt's sister to get consent for surgery

## 2019-01-08 NOTE — ANESTHESIA PREPROCEDURE EVALUATION
Anesthetic History No history of anesthetic complications Review of Systems / Medical History Patient summary reviewed and pertinent labs reviewed Pulmonary Asthma Neuro/Psych  
 
seizures CVA 
TIA and psychiatric history Cardiovascular Hypertension: poorly controlled Past MI and CAD Exercise tolerance: <4 METS 
  
GI/Hepatic/Renal 
  
 
Hepatitis Renal disease: dialysis and ESRD Endo/Other Diabetes: type 2 Other Findings Physical Exam 
 
Airway Mallampati: III 
TM Distance: > 6 cm Neck ROM: normal range of motion Mouth opening: Normal 
 
 Cardiovascular Regular rate and rhythm,  S1 and S2 normal,  no murmur, click, rub, or gallop Dental 
 
Dentition: Poor dentition Comments: Multiple missing, damaged and diseased teeth Pulmonary Decreased breath sounds: bilateral 
 
 
 
 
 Abdominal 
GI exam deferred Other Findings Anesthetic Plan ASA: 4 Anesthesia type: general 
 
 
 
 
Induction: Intravenous Anesthetic plan and risks discussed with: Patient

## 2019-01-08 NOTE — ED TRIAGE NOTES
Pt arrived c/o chest pain, was scheduled for AV fistula placement today when she began to experience pain, was hypertensive in pre op, comes from [facility], CNA sitter at bedside, oriented to self and situation, was given 81mg ASA, 1 nitro and 20mg labetalol by pre op staff, EKG completed in pre op and brought with patient. Dr Luis Alberto Mayo notified of delay en route

## 2019-01-08 NOTE — DISCHARGE INSTRUCTIONS
Patient Education   PLEASE MAKE APPOINTMENT WITH DOCTOR TO DISCUSS PATIENT'S WISH TO STOP DIALYSIS AND AVOID FISTULA OPERATION, POSSIBLY COMFORT CARE. Chest Pain: Care Instructions  Your Care Instructions    There are many things that can cause chest pain. Some are not serious and will get better on their own in a few days. But some kinds of chest pain need more testing and treatment. Your doctor may have recommended a follow-up visit in the next 8 to 12 hours. If you are not getting better, you may need more tests or treatment. Even though your doctor has released you, you still need to watch for any problems. The doctor carefully checked you, but sometimes problems can develop later. If you have new symptoms or if your symptoms do not get better, get medical care right away. If you have worse or different chest pain or pressure that lasts more than 5 minutes or you passed out (lost consciousness), call 911 or seek other emergency help right away. A medical visit is only one step in your treatment. Even if you feel better, you still need to do what your doctor recommends, such as going to all suggested follow-up appointments and taking medicines exactly as directed. This will help you recover and help prevent future problems. How can you care for yourself at home? · Rest until you feel better. · Take your medicine exactly as prescribed. Call your doctor if you think you are having a problem with your medicine. · Do not drive after taking a prescription pain medicine. When should you call for help? Call 911 if:    · You passed out (lost consciousness).     · You have severe difficulty breathing.     · You have symptoms of a heart attack. These may include:  ? Chest pain or pressure, or a strange feeling in your chest.  ? Sweating. ? Shortness of breath. ? Nausea or vomiting.   ? Pain, pressure, or a strange feeling in your back, neck, jaw, or upper belly or in one or both shoulders or arms.  ? Lightheadedness or sudden weakness. ? A fast or irregular heartbeat. After you call 911, the  may tell you to chew 1 adult-strength or 2 to 4 low-dose aspirin. Wait for an ambulance. Do not try to drive yourself.    Call your doctor today if:    · You have any trouble breathing.     · Your chest pain gets worse.     · You are dizzy or lightheaded, or you feel like you may faint.     · You are not getting better as expected.     · You are having new or different chest pain. Where can you learn more? Go to http://izabela-jewell.info/. Enter A120 in the search box to learn more about \"Chest Pain: Care Instructions. \"  Current as of: November 20, 2017  Content Version: 11.8  © 0990-0006 [employer]. Care instructions adapted under license by [employer] (which disclaims liability or warranty for this information). If you have questions about a medical condition or this instruction, always ask your healthcare professional. [address] any warranty or liability for your use of this information.

## 2019-01-08 NOTE — ED PROVIDER NOTES
EMERGENCY DEPARTMENT HISTORY AND PHYSICAL EXAM 
 
12:51 PM 
 
 
Date: 1/8/2019 Patient Name: Micki Crigler History of Presenting Illness Chief Complaint Patient presents with  Chest Pain History Provided By: Patient and patients sister Chief Complaint: Chest Pain Duration:  Prior to ED arrival 
Timing:  acute Location: Mid chest 
Quality: Not reported Severity: Moderate Modifying Factors: No modifying factors Associated Symptoms: Denies any associated sx Additional History (Context): 12:52 PM Micki Crigler is a 70 y.o. female with h/o Asthma, DM, HTN, HLD, and CKD who presents to ED complaining of moderate acute mid chest pain onset prior to ED arrival. Patient reportedly began complaining of chest pain while upstairs preparing for a skin graft to gain dialysis access on the right arm. No other concerns or symptoms at this time. PCP: Nathaniel Florez MD 
 
 
Current Outpatient Medications Medication Sig Dispense Refill  traMADol (ULTRAM) 50 mg tablet Take 1 Tab by mouth every six (6) hours as needed for Pain. Max Daily Amount: 200 mg. 20 Tab 0  
 mirtazapine (REMERON) 15 mg tablet Take  by mouth nightly.  levETIRAcetam (KEPPRA) 250 mg tablet Take 1 Tab by mouth every Monday, Wednesday, Friday. 30 Tab 1  
 levETIRAcetam (KEPPRA) 500 mg tablet Take 1 Tab by mouth two (2) times a day. 30 Tab 1  
 hydrALAZINE (APRESOLINE) 50 mg tablet Take 1 Tab by mouth three (3) times daily. 90 Tab 0  
 simvastatin (ZOCOR) 20 mg tablet Take 1 Tab by mouth nightly. 30 Tab 0  
 potassium chloride (KLOR-CON) 20 mEq packet Take 1 Packet by mouth two (2) times daily (with meals). 30 Packet 0  
 doxercalciferol (HECTOROL) 4 mcg/2 mL injection 0.5 mL by IntraVENous route Every Tuesday, Thursday and Saturday.  (Patient taking differently: 1 mcg by IntraVENous route every Monday, Wednesday, Friday.) 1 mL 0  
 epoetin raphael (EPOGEN;PROCRIT) 3,000 unit/mL injection 1.73 mL by SubCUTAneous route Every Tuesday, Thursday and Saturday. Indications: ANEMIA DUE TO RENAL FAILURE, Renal Dialysis (Patient taking differently: 3,000 Units by SubCUTAneous route every Monday, Wednesday, Friday.) 1 Vial 0  
 insulin glargine (LANTUS) 100 unit/mL injection 10 Units by SubCUTAneous route nightly. 1 Vial 0  
 insulin lispro (HUMALOG) 100 unit/mL injection INITIATE INSULIN CORRECTIVE PROTOCOL: Normal Insulin Sensitivity For Blood Sugar (mg/dL) of:    
Less than 150 =   0 units 150 -199 =   2 units 200 -249 =   4 units 250 -299 =   6 units 300 -349 =   8 units 350 and above = 10 units and Call Physician If 2 glucose readings are above 1 Vial 0  
 losartan (COZAAR) 100 mg tablet Take 1 Tab by mouth daily. 30 Tab 0  
 aspirin 81 mg tablet Take 81 mg by mouth daily.  diltiazem CD (CARDIZEM CD) 240 mg ER capsule Take 240 mg by mouth daily.  cloNIDine (CATAPRES) 0.1 mg tablet Take 0.1 mg by mouth two (2) times a day.  acetaminophen (TYLENOL) 325 mg tablet Take 325 mg by mouth as needed. Past History Past Medical History: 
Past Medical History:  
Diagnosis Date  Asthma  Bipolar affective disorder ([facility] [address]  Brain condition  Cataract  Chronic kidney disease   
 hemodialysis M-W-F  Diabetes ([facility] [address]  Hyperlipidemia  Hypertension  Paralytic strabismus, unspecified  Psychiatric disorder  Seizures ([facility] [address] 08/27/2018 Past Surgical History: 
Past Surgical History:  
Procedure Laterality Date  OR INSJ TUNNELED CVC W/O SUBQ PORT/ AGE 3 YR/> N/A 8/9/2018 in-pt 474A, Insertion Tunneled Central Venous Catheter performed by Jean-Claude Palacios MD at [facility] CATH LAB Family History: 
History reviewed. No pertinent family history. Social History: 
Social History Tobacco Use  Smoking status: Never Smoker  Smokeless tobacco: Never Used Substance Use Topics  Alcohol use: No  
 Drug use:  No  
 
 
 Allergies: Allergies Allergen Reactions  Amoxicillin Unknown (comments)  Cleocin [Clindamycin Hcl] Unknown (comments)  Codeine Unknown (comments)  Lorcet 10/650 [Hydrocodone-Acetaminophen] Unknown (comments)  Penicillin G Benzathine Unknown (comments)  Shellfish Derived Unknown (comments) Review of Systems Review of Systems Constitutional: Negative for diaphoresis and fever. HENT: Negative for congestion and sore throat. Eyes: Negative for pain and itching. Respiratory: Negative for cough and shortness of breath. Cardiovascular: Positive for chest pain. Negative for palpitations. Gastrointestinal: Negative for abdominal pain and diarrhea. Endocrine: Negative for polydipsia and polyuria. Genitourinary: Negative for dysuria and hematuria. Musculoskeletal: Negative for arthralgias and myalgias. Skin: Negative for rash and wound. Neurological: Negative for seizures and syncope. Hematological: Does not bruise/bleed easily. Psychiatric/Behavioral: Negative for agitation and hallucinations. Physical Exam  
 
Patient Vitals for the past 12 hrs: 
 Temp Pulse Resp BP SpO2  
01/08/19 1241 98.3 °F (36.8 °C) 77 18 (!) 186/92 99 % 01/08/19 1207  96 17 (!) 200/113 99 % Physical Exam  
Constitutional: She appears well-developed and well-nourished. Appears to be in poor general health HENT:  
Head: Normocephalic and atraumatic. Eyes: Conjunctivae are normal. No scleral icterus. Neck: Normal range of motion. Neck supple. No JVD present. Cardiovascular: Normal rate, regular rhythm and normal heart sounds. 4 intact extremity pulses Pulmonary/Chest: Effort normal and breath sounds normal.  
Left chest dialysis catheter. Abdominal: Soft. She exhibits no mass. There is no tenderness. Musculoskeletal: Normal range of motion. Lymphadenopathy:  
  She has no cervical adenopathy. Neurological: She is alert. Skin: Skin is warm and dry. Nursing note and vitals reviewed. Diagnostic Study Results Labs - Recent Results (from the past 12 hour(s)) GLUCOSE, POC Collection Time: 01/08/19 10:11 AM  
Result Value Ref Range Glucose (POC) 144 (H) 70 - 110 mg/dL METABOLIC PANEL, BASIC Collection Time: 01/08/19 10:54 AM  
Result Value Ref Range Sodium 147 (H) 136 - 145 mmol/L Potassium 4.5 3.5 - 5.5 mmol/L Chloride 112 (H) 100 - 108 mmol/L  
 CO2 28 21 - 32 mmol/L Anion gap 7 3.0 - 18 mmol/L Glucose 152 (H) 74 - 99 mg/dL BUN 32 (H) 7.0 - 18 MG/DL Creatinine 4.91 (H) 0.6 - 1.3 MG/DL  
 BUN/Creatinine ratio 7 (L) 12 - 20 GFR est AA 11 (L) >60 ml/min/1.73m2 GFR est non-AA 9 (L) >60 ml/min/1.73m2 Calcium 9.2 8.5 - 10.1 MG/DL  
CBC W/O DIFF Collection Time: 01/08/19 10:54 AM  
Result Value Ref Range WBC 7.5 4.6 - 13.2 K/uL  
 RBC 4.53 4.20 - 5.30 M/uL  
 HGB 12.6 12.0 - 16.0 g/dL HCT 39.4 35.0 - 45.0 % MCV 87.0 74.0 - 97.0 FL  
 MCH 27.8 24.0 - 34.0 PG  
 MCHC 32.0 31.0 - 37.0 g/dL  
 RDW 17.2 (H) 11.6 - 14.5 % PLATELET 650 096 - 799 K/uL CARDIAC PANEL,(CK, CKMB & TROPONIN) Collection Time: 01/08/19 10:54 AM  
Result Value Ref Range CK 80 26 - 192 U/L  
 CK - MB <1.0 <3.6 ng/ml CK-MB Index  0.0 - 4.0 % CALCULATION NOT PERFORMED WHEN RESULT IS BELOW LINEAR LIMIT Troponin-I, QT <0.02 0.0 - 0.045 NG/ML  
EKG, 12 LEAD, INITIAL Collection Time: 01/08/19 11:28 AM  
Result Value Ref Range Ventricular Rate 83 BPM  
 Atrial Rate 83 BPM  
 P-R Interval 146 ms  
 QRS Duration 86 ms  
 Q-T Interval 388 ms QTC Calculation (Bezet) 455 ms Calculated P Axis 70 degrees Calculated R Axis -51 degrees Calculated T Axis 42 degrees Diagnosis Normal sinus rhythm Left anterior fascicular block Abnormal ECG When compared with ECG of 06-AUG-2018 23:16, 
Nonspecific T wave abnormality no longer evident in Anterior leads QT has lengthened Confirmed by Ari Mccullough ([id number]) on 1/8/2019 1:07:38 PM 
  
CBC WITH AUTOMATED DIFF Collection Time: 01/08/19  1:55 PM  
Result Value Ref Range WBC 7.8 4.6 - 13.2 K/uL  
 RBC 4.31 4.20 - 5.30 M/uL  
 HGB 11.9 (L) 12.0 - 16.0 g/dL HCT 37.4 35.0 - 45.0 % MCV 86.8 74.0 - 97.0 FL  
 MCH 27.6 24.0 - 34.0 PG  
 MCHC 31.8 31.0 - 37.0 g/dL  
 RDW 17.2 (H) 11.6 - 14.5 % PLATELET 377 718 - 466 K/uL MPV 11.7 9.2 - 11.8 FL  
 NEUTROPHILS 73 40 - 73 % LYMPHOCYTES 17 (L) 21 - 52 % MONOCYTES 6 3 - 10 % EOSINOPHILS 4 0 - 5 % BASOPHILS 0 0 - 2 %  
 ABS. NEUTROPHILS 5.7 1.8 - 8.0 K/UL  
 ABS. LYMPHOCYTES 1.3 0.9 - 3.6 K/UL  
 ABS. MONOCYTES 0.5 0.05 - 1.2 K/UL  
 ABS. EOSINOPHILS 0.3 0.0 - 0.4 K/UL  
 ABS. BASOPHILS 0.0 0.0 - 0.1 K/UL  
 DF AUTOMATED METABOLIC PANEL, COMPREHENSIVE Collection Time: 01/08/19  1:55 PM  
Result Value Ref Range Sodium 146 (H) 136 - 145 mmol/L Potassium 4.0 3.5 - 5.5 mmol/L Chloride 113 (H) 100 - 108 mmol/L  
 CO2 25 21 - 32 mmol/L Anion gap 8 3.0 - 18 mmol/L Glucose 147 (H) 74 - 99 mg/dL BUN 33 (H) 7.0 - 18 MG/DL Creatinine 5.02 (H) 0.6 - 1.3 MG/DL  
 BUN/Creatinine ratio 7 (L) 12 - 20 GFR est AA 10 (L) >60 ml/min/1.73m2 GFR est non-AA 9 (L) >60 ml/min/1.73m2 Calcium 8.9 8.5 - 10.1 MG/DL Bilirubin, total 0.3 0.2 - 1.0 MG/DL  
 ALT (SGPT) 21 13 - 56 U/L  
 AST (SGOT) 15 15 - 37 U/L Alk. phosphatase 146 (H) 45 - 117 U/L Protein, total 7.2 6.4 - 8.2 g/dL Albumin 3.1 (L) 3.4 - 5.0 g/dL Globulin 4.1 (H) 2.0 - 4.0 g/dL A-G Ratio 0.8 0.8 - 1.7    
TROPONIN I Collection Time: 01/08/19  1:55 PM  
Result Value Ref Range Troponin-I, QT <0.02 0.0 - 0.045 NG/ML Radiologic Studies -  
XR CHEST PORT Final Result IMPRESSION: Hypoinflation exaggerating bronchovascular markings. No definite  
confluent consolidation. Xr Chest [facility] Result Date: 1/8/2019 INDICATION:  Chest pain. COMPARISON: 8/27/2018 FINDINGS: A portable AP radiograph of the chest was obtained at 1303 hours. Stable cardiac silhouette. Dialysis catheter tip overlying the cavoatrial junction, stable. Hypoinflation exaggerating bronchovascular markings. No definite confluent consolidation. Osseous structures are stable. IMPRESSION: Hypoinflation exaggerating bronchovascular markings. No definite confluent consolidation. Medications ordered:  
Medications - No data to display Medical Decision Making Initial Medical Decision Making and DDx: 
Will evaluate for ACS. Doubt PE, PNA, PTX. CP has been short lived. ED Course: Progress Notes, Reevaluation, and Consults: 
 5:14 PM Discussed results thus far. If next troponin is negative will discharge. NAD. Discussed patients desire for no further operation to facilitate dialysis. She is interested in stopping dialysis. Recommended discussion with PCP about palliative care. I am the first provider for this patient. I reviewed the vital signs, available nursing notes, past medical history, past surgical history, family history and social history. Vital Signs-Reviewed the patient's vital signs. Pulse Oximetry Analysis - 99% in room air, WNL Cardiac Monitor: 
Rate/Rhythm:  77 bpm 
 
EKG: Interpreted by the EP. Time Interpreted: 11:28 Rate: 83 bpm 
 Rhythm: SR Interpretation: NAP or ischemic changes Records Reviewed: Nursing Notes and Old Medical Records (Time of Review: 12:51 PM) Diagnosis Clinical Impression: 1. Chest pain, unspecified type 2. ESRD (end stage renal disease) ([facility] [address] Disposition: Discharge Follow-up Information Follow up With Specialties Details Why Contact Info Quan Wooten MD Geriatric Medicine   [facility] [address] [facility] [place] [id number] [phone] Medication List  
  
ASK your doctor about these medications aspirin 81 mg tablet CARDIZEM  mg ER capsule Generic drug:  dilTIAZem CD 
  
CATAPRES 0.1 mg tablet Generic drug:  cloNIDine HCl 
  
doxercalciferol 4 mcg/2 mL injection Commonly known as:  HECTOROL 
0.5 mL by IntraVENous route Every Tuesday, Thursday and Saturday. epoetin raphael 3,000 unit/mL injection Commonly known as:  EPOGEN;PROCRIT 
1.73 mL by SubCUTAneous route Every Tuesday, Thursday and Saturday. Indications: ANEMIA DUE TO RENAL FAILURE, Renal Dialysis 
  
hydrALAZINE 50 mg tablet Commonly known as:  APRESOLINE Take 1 Tab by mouth three (3) times daily. insulin glargine 100 unit/mL injection Commonly known as:  LANTUS 
10 Units by SubCUTAneous route nightly. insulin lispro 100 unit/mL injection Commonly known as:  HUMALOG INITIATE INSULIN CORRECTIVE PROTOCOL: Normal Insulin Sensitivity For Blood Sugar (mg/dL) of:    
Less than 150 =   0 units 150 -199 =   2 units 200 -249 =   4 units 250 -299 =   6 units 300 -349 =   8 units 350 and above = 10 units and Call Physician If 2 glucose readings are above * levETIRAcetam 250 mg tablet Commonly known as:  KEPPRA Take 1 Tab by mouth every Monday, Wednesday, Friday. * levETIRAcetam 500 mg tablet Commonly known as:  KEPPRA Take 1 Tab by mouth two (2) times a day. losartan 100 mg tablet Commonly known as:  COZAAR Take 1 Tab by mouth daily. potassium chloride 20 mEq Commonly known as:  KLOR-CON Take 1 Packet by mouth two (2) times daily (with meals). REMERON 15 mg tablet Generic drug:  mirtazapine 
  
simvastatin 20 mg tablet Commonly known as:  ZOCOR Take 1 Tab by mouth nightly. traMADol 50 mg tablet Commonly known as:  ULTRAM 
Take 1 Tab by mouth every six (6) hours as needed for Pain. Max Daily Amount: 200 mg. 
  
TYLENOL 325 mg tablet Generic drug:  acetaminophen  * This list has 2 medication(s) that are the same as other medications prescribed for you. Read the directions carefully, and ask your doctor or other care provider to review them with you.  
  
  
  
 
_______________________________ Attestations: 
Scribe Attestation Mikala Heart acting as a scribe for and in the presence of Mickie Seals MD     
January 08, 2019 at 5:35 PM 
    
Provider Attestation:     
I personally performed the services described in the documentation, reviewed the documentation, as recorded by the scribe in my presence, and it accurately and completely records my words and actions. January 08, 2019 at 5:35 PM - Mickie Seals MD   
_______________________________

## 2019-01-08 NOTE — PERIOP NOTES
1125 patient c/o chest pain, placed pt. On cardiac monitor, Dr. Ap Malave at bedside talking with patient. Patient stated she has had chest pain before and never was told about order 12lead EKG. 81mg aspirin. 20mg labetalol. And 0.4mg nitroglycerine sublingualspray. Code green called patient talking and oriented to self, patient transported to ER,

## 2019-01-14 ENCOUNTER — DOCUMENTATION ONLY (OUTPATIENT)
Dept: VASCULAR SURGERY | Age: 64
End: 2019-01-14

## 2019-01-14 NOTE — PROGRESS NOTES
1/14/19 I spoke to patients sister Araseli Lees and she states that Ms Elizabeth Dyer does not want to have the surgery for a dialysis graft or any other surgery. She states that Dr Georgia Smith told her she had about a year to live and this is her choice.

## 2019-01-25 ENCOUNTER — HOSPITAL ENCOUNTER (EMERGENCY)
Age: 64
Discharge: NURSING, REHAB OR LONG-TERM CARE | End: 2019-01-25
Attending: EMERGENCY MEDICINE | Admitting: EMERGENCY MEDICINE
Payer: MEDICARE

## 2019-01-25 VITALS
BODY MASS INDEX: 24.45 KG/M2 | HEART RATE: 105 BPM | TEMPERATURE: 98.4 F | SYSTOLIC BLOOD PRESSURE: 169 MMHG | WEIGHT: 138 LBS | DIASTOLIC BLOOD PRESSURE: 90 MMHG | RESPIRATION RATE: 16 BRPM | OXYGEN SATURATION: 99 %

## 2019-01-25 DIAGNOSIS — N18.6 ESRD (END STAGE RENAL DISEASE) ON DIALYSIS (HCC): Primary | ICD-10-CM

## 2019-01-25 DIAGNOSIS — I10 POORLY-CONTROLLED HYPERTENSION: ICD-10-CM

## 2019-01-25 DIAGNOSIS — Z99.2 ESRD (END STAGE RENAL DISEASE) ON DIALYSIS (HCC): Primary | ICD-10-CM

## 2019-01-25 LAB
ALBUMIN SERPL-MCNC: 3.4 G/DL (ref 3.4–5)
ALBUMIN/GLOB SERPL: 1 {RATIO} (ref 0.8–1.7)
ALP SERPL-CCNC: 142 U/L (ref 45–117)
ALT SERPL-CCNC: 20 U/L (ref 13–56)
ANION GAP SERPL CALC-SCNC: 8 MMOL/L (ref 3–18)
AST SERPL-CCNC: 18 U/L (ref 15–37)
BASOPHILS # BLD: 0 K/UL (ref 0–0.1)
BASOPHILS NFR BLD: 0 % (ref 0–2)
BILIRUB SERPL-MCNC: 0.3 MG/DL (ref 0.2–1)
BUN SERPL-MCNC: 19 MG/DL (ref 7–18)
BUN/CREAT SERPL: 6 (ref 12–20)
CALCIUM SERPL-MCNC: 8.4 MG/DL (ref 8.5–10.1)
CHLORIDE SERPL-SCNC: 106 MMOL/L (ref 100–108)
CK MB CFR SERPL CALC: NORMAL % (ref 0–4)
CK MB SERPL-MCNC: <1 NG/ML (ref 5–25)
CK SERPL-CCNC: 71 U/L (ref 26–192)
CO2 SERPL-SCNC: 30 MMOL/L (ref 21–32)
CREAT SERPL-MCNC: 3.05 MG/DL (ref 0.6–1.3)
DIFFERENTIAL METHOD BLD: ABNORMAL
EOSINOPHIL # BLD: 0.2 K/UL (ref 0–0.4)
EOSINOPHIL NFR BLD: 3 % (ref 0–5)
ERYTHROCYTE [DISTWIDTH] IN BLOOD BY AUTOMATED COUNT: 17.7 % (ref 11.6–14.5)
GLOBULIN SER CALC-MCNC: 3.4 G/DL (ref 2–4)
GLUCOSE SERPL-MCNC: 189 MG/DL (ref 74–99)
HCT VFR BLD AUTO: 36.7 % (ref 35–45)
HGB BLD-MCNC: 11.7 G/DL (ref 12–16)
LYMPHOCYTES # BLD: 1.4 K/UL (ref 0.9–3.6)
LYMPHOCYTES NFR BLD: 21 % (ref 21–52)
MCH RBC QN AUTO: 28.1 PG (ref 24–34)
MCHC RBC AUTO-ENTMCNC: 31.9 G/DL (ref 31–37)
MCV RBC AUTO: 88 FL (ref 74–97)
MONOCYTES # BLD: 0.5 K/UL (ref 0.05–1.2)
MONOCYTES NFR BLD: 7 % (ref 3–10)
NEUTS SEG # BLD: 4.6 K/UL (ref 1.8–8)
NEUTS SEG NFR BLD: 69 % (ref 40–73)
PLATELET # BLD AUTO: 206 K/UL (ref 135–420)
PLATELET COMMENTS,PCOM: ABNORMAL
POTASSIUM SERPL-SCNC: 3.3 MMOL/L (ref 3.5–5.5)
PROT SERPL-MCNC: 6.8 G/DL (ref 6.4–8.2)
RBC # BLD AUTO: 4.17 M/UL (ref 4.2–5.3)
RBC MORPH BLD: ABNORMAL
SODIUM SERPL-SCNC: 144 MMOL/L (ref 136–145)
TROPONIN I SERPL-MCNC: <0.02 NG/ML (ref 0–0.04)
WBC # BLD AUTO: 6.7 K/UL (ref 4.6–13.2)

## 2019-01-25 PROCEDURE — 99285 EMERGENCY DEPT VISIT HI MDM: CPT

## 2019-01-25 PROCEDURE — 93005 ELECTROCARDIOGRAM TRACING: CPT

## 2019-01-25 PROCEDURE — 96374 THER/PROPH/DIAG INJ IV PUSH: CPT

## 2019-01-25 PROCEDURE — 85025 COMPLETE CBC W/AUTO DIFF WBC: CPT

## 2019-01-25 PROCEDURE — 74011250636 HC RX REV CODE- 250/636: Performed by: EMERGENCY MEDICINE

## 2019-01-25 PROCEDURE — 82550 ASSAY OF CK (CPK): CPT

## 2019-01-25 PROCEDURE — 80053 COMPREHEN METABOLIC PANEL: CPT

## 2019-01-25 RX ORDER — HYDRALAZINE HYDROCHLORIDE 20 MG/ML
20 INJECTION INTRAMUSCULAR; INTRAVENOUS ONCE
Status: COMPLETED | OUTPATIENT
Start: 2019-01-25 | End: 2019-01-25

## 2019-01-25 RX ADMIN — HYDRALAZINE HYDROCHLORIDE 20 MG: 20 INJECTION INTRAMUSCULAR; INTRAVENOUS at 19:05

## 2019-01-25 NOTE — ED TRIAGE NOTES
Pt arrived via EMS from dialysis center. Pt received 100% of dialysis treatment and had a BP of 200/120, per EMS. Dialysis catheter on left chest wall.

## 2019-01-25 NOTE — ED PROVIDER NOTES
EMERGENCY DEPARTMENT HISTORY AND PHYSICAL EXAM 
 
6:38 PM 
 
 
Date: 1/25/2019 Patient Name: Kulwinder Vieyra History of Presenting Illness Chief Complaint Patient presents with  Hypertension History Provided By: Patient Chief Complaint: Hypertension Duration:  Today Timing:  Acute Location: Cardio Quality: N/A Severity: Severe Modifying Factors: none reported Associated Symptoms: denies any other associated signs or symptoms 6:40 PM Kulwinder Vieyra is a 70 y.o. female with h/o HTN and seizures who presents to ED complaining of hypertension onset today. Patient arrived via EMS from dialysis center after having a blood pressure of 200/120. Denies any further complaints or symptoms at the moment. PCP: Hoa Cannon MD 
 
 
 
Current Outpatient Medications Medication Sig Dispense Refill  traMADol (ULTRAM) 50 mg tablet Take 1 Tab by mouth every six (6) hours as needed for Pain. Max Daily Amount: 200 mg. 20 Tab 0  
 mirtazapine (REMERON) 15 mg tablet Take  by mouth nightly.  levETIRAcetam (KEPPRA) 250 mg tablet Take 1 Tab by mouth every Monday, Wednesday, Friday. 30 Tab 1  
 levETIRAcetam (KEPPRA) 500 mg tablet Take 1 Tab by mouth two (2) times a day. 30 Tab 1  
 hydrALAZINE (APRESOLINE) 50 mg tablet Take 1 Tab by mouth three (3) times daily. 90 Tab 0  
 simvastatin (ZOCOR) 20 mg tablet Take 1 Tab by mouth nightly. 30 Tab 0  
 potassium chloride (KLOR-CON) 20 mEq packet Take 1 Packet by mouth two (2) times daily (with meals). 30 Packet 0  
 doxercalciferol (HECTOROL) 4 mcg/2 mL injection 0.5 mL by IntraVENous route Every Tuesday, Thursday and Saturday. (Patient taking differently: 1 mcg by IntraVENous route every Monday, Wednesday, Friday.) 1 mL 0  
 epoetin raphael (EPOGEN;PROCRIT) 3,000 unit/mL injection 1.73 mL by SubCUTAneous route Every Tuesday, Thursday and Saturday.  Indications: ANEMIA DUE TO RENAL FAILURE, Renal Dialysis (Patient taking differently: 3,000 Units by SubCUTAneous route every Monday, Wednesday, Friday.) 1 Vial 0  
 insulin glargine (LANTUS) 100 unit/mL injection 10 Units by SubCUTAneous route nightly. 1 Vial 0  
 insulin lispro (HUMALOG) 100 unit/mL injection INITIATE INSULIN CORRECTIVE PROTOCOL: Normal Insulin Sensitivity For Blood Sugar (mg/dL) of:    
Less than 150 =   0 units 150 -199 =   2 units 200 -249 =   4 units 250 -299 =   6 units 300 -349 =   8 units 350 and above = 10 units and Call Physician If 2 glucose readings are above 1 Vial 0  
 losartan (COZAAR) 100 mg tablet Take 1 Tab by mouth daily. 30 Tab 0  
 aspirin 81 mg tablet Take 81 mg by mouth daily.  diltiazem CD (CARDIZEM CD) 240 mg ER capsule Take 240 mg by mouth daily.  cloNIDine (CATAPRES) 0.1 mg tablet Take 0.1 mg by mouth two (2) times a day.  acetaminophen (TYLENOL) 325 mg tablet Take 325 mg by mouth as needed. Past History Past Medical History: 
Past Medical History:  
Diagnosis Date  Asthma  Bipolar affective disorder ([facility] [address]  Brain condition  Cataract  Chronic kidney disease   
 hemodialysis M-W-F  Diabetes ([facility] [address]  Hyperlipidemia  Hypertension  Paralytic strabismus, unspecified  Psychiatric disorder  Seizures ([facility] [address] 08/27/2018 Past Surgical History: 
Past Surgical History:  
Procedure Laterality Date  ID INSJ TUNNELED CVC W/O SUBQ PORT/ AGE 3 YR/> N/A 8/9/2018 in-pt 474A, Insertion Tunneled Central Venous Catheter performed by Demario Alexis MD at [facility] CATH LAB Family History: No family history on file. Social History: 
Social History Tobacco Use  Smoking status: Never Smoker  Smokeless tobacco: Never Used Substance Use Topics  Alcohol use: No  
 Drug use: No  
 
 
Allergies: Allergies Allergen Reactions  Amoxicillin Unknown (comments)  Cleocin [Clindamycin Hcl] Unknown (comments)  Codeine Unknown (comments)  Lorcet 10/650 [Hydrocodone-Acetaminophen] Unknown (comments)  Penicillin G Benzathine Unknown (comments)  Shellfish Derived Unknown (comments) Review of Systems Review of Systems Constitutional: Negative for chills and fatigue. Hypertension HENT: Negative for rhinorrhea and sore throat. Respiratory: Negative for cough and shortness of breath. Cardiovascular: Negative for chest pain and palpitations. Gastrointestinal: Negative for abdominal pain, diarrhea, nausea and vomiting. Genitourinary: Negative for difficulty urinating and dysuria. Musculoskeletal: Negative for back pain. Neurological: Negative for syncope and light-headedness. All other systems reviewed and are negative. Physical Exam  
 
Visit Vitals BP (!) 165/97 Pulse 94 Temp 98.4 °F (36.9 °C) Resp 22 SpO2 100% Physical Exam  
Constitutional: She is oriented to person, place, and time. She appears well-developed and well-nourished. No distress. HENT:  
Head: Normocephalic and atraumatic. Eyes: Pupils are equal, round, and reactive to light. Neck: Normal range of motion. Neck supple. Cardiovascular: Normal rate, regular rhythm and normal heart sounds. Pulmonary/Chest: Effort normal and breath sounds normal. She has no wheezes. She has no rales. Abdominal: Soft. Bowel sounds are normal. She exhibits no distension. There is no tenderness. Musculoskeletal: Normal range of motion. Lymphadenopathy:  
  She has no cervical adenopathy. Neurological: She is alert and oriented to person, place, and time. Skin: Skin is warm and dry. She is not diaphoretic. Nursing note and vitals reviewed. Diagnostic Study Results Labs - Recent Results (from the past 12 hour(s)) CBC WITH AUTOMATED DIFF Collection Time: 01/25/19  6:25 PM  
Result Value Ref Range WBC 6.7 4.6 - 13.2 K/uL  
 RBC 4.17 (L) 4.20 - 5.30 M/uL  
 HGB 11.7 (L) 12.0 - 16.0 g/dL HCT 36.7 35.0 - 45.0 % MCV 88.0 74.0 - 97.0 FL  
 MCH 28.1 24.0 - 34.0 PG  
 MCHC 31.9 31.0 - 37.0 g/dL  
 RDW 17.7 (H) 11.6 - 14.5 % PLATELET 681 083 - 637 K/uL NEUTROPHILS PENDING % LYMPHOCYTES PENDING % MONOCYTES PENDING % EOSINOPHILS PENDING % BASOPHILS PENDING %  
 ABS. NEUTROPHILS PENDING K/UL  
 ABS. LYMPHOCYTES PENDING K/UL  
 ABS. MONOCYTES PENDING K/UL  
 ABS. EOSINOPHILS PENDING K/UL  
 ABS. BASOPHILS PENDING K/UL  
 DF PENDING   
METABOLIC PANEL, COMPREHENSIVE Collection Time: 01/25/19  6:25 PM  
Result Value Ref Range Sodium 144 136 - 145 mmol/L Potassium 3.3 (L) 3.5 - 5.5 mmol/L Chloride 106 100 - 108 mmol/L  
 CO2 30 21 - 32 mmol/L Anion gap 8 3.0 - 18 mmol/L Glucose 189 (H) 74 - 99 mg/dL BUN 19 (H) 7.0 - 18 MG/DL Creatinine 3.05 (H) 0.6 - 1.3 MG/DL  
 BUN/Creatinine ratio 6 (L) 12 - 20 GFR est AA 19 (L) >60 ml/min/1.73m2 GFR est non-AA 15 (L) >60 ml/min/1.73m2 Calcium 8.4 (L) 8.5 - 10.1 MG/DL Bilirubin, total 0.3 0.2 - 1.0 MG/DL  
 ALT (SGPT) 20 13 - 56 U/L  
 AST (SGOT) 18 15 - 37 U/L Alk. phosphatase 142 (H) 45 - 117 U/L Protein, total 6.8 6.4 - 8.2 g/dL Albumin 3.4 3.4 - 5.0 g/dL Globulin 3.4 2.0 - 4.0 g/dL A-G Ratio 1.0 0.8 - 1.7 CARDIAC PANEL,(CK, CKMB & TROPONIN) Collection Time: 01/25/19  6:25 PM  
Result Value Ref Range CK 71 26 - 192 U/L  
 CK - MB <1.0 <3.6 ng/ml CK-MB Index  0.0 - 4.0 % CALCULATION NOT PERFORMED WHEN RESULT IS BELOW LINEAR LIMIT Troponin-I, QT <0.02 0.0 - 0.045 NG/ML Radiologic Studies - No orders to display Medical Decision Making It should be noted that Johan Price MD will be the provider of record for this patient. I reviewed the vital signs, available nursing notes, past medical history, past surgical history, family history and social history. Vital Signs-Reviewed the patient's vital signs. Pulse Oximetry Analysis -  100% on room air, normal  
 
EKG: 
NSR, no acute ST/T changes Records Reviewed: Nursing Notes and Old Medical Records (Time of Review: 6:38 PM) ED Course: Progress Notes, Reevaluation, and Consults: 
 
Provider Notes (Medical Decision Making): Asymptomatic HTN, reassuring labs, no indication for acute intervention. Diagnosis Clinical Impression: 1. ESRD (end stage renal disease) on dialysis ([address] 2. Poorly-controlled hypertension Disposition: discharge Follow-up Information Follow up With Specialties Details Why Contact Info Carolyn Guzman MD Geriatric Medicine In 3 days  [address] [facility] Internal Medicine PD [phone] [facility] EMERGENCY DEPT Emergency Medicine  As needed, If symptoms worsen [phone] 
[phone] Medication List  
  
ASK your doctor about these medications   
aspirin 81 mg tablet CARDIZEM  mg ER capsule Generic drug:  dilTIAZem CD 
  
CATAPRES 0.1 mg tablet Generic drug:  cloNIDine HCl 
  
doxercalciferol 4 mcg/2 mL injection Commonly known as:  HECTOROL 
0.5 mL by IntraVENous route Every Tuesday, Thursday and Saturday. epoetin raphael 3,000 unit/mL injection Commonly known as:  EPOGEN;PROCRIT 
1.73 mL by SubCUTAneous route Every Tuesday, Thursday and Saturday. Indications: ANEMIA DUE TO RENAL FAILURE, Renal Dialysis 
  
hydrALAZINE 50 mg tablet Commonly known as:  APRESOLINE Take 1 Tab by mouth three (3) times daily. insulin glargine 100 unit/mL injection Commonly known as:  LANTUS 
10 Units by SubCUTAneous route nightly. insulin lispro 100 unit/mL injection Commonly known as:  HUMALOG INITIATE INSULIN CORRECTIVE PROTOCOL: Normal Insulin Sensitivity For Blood Sugar (mg/dL) of:    
Less than 150 =   0 units 150 -199 =   2 units 200 -249 =   4 units 250 -299 =   6 units 300 -349 =   8 units 350 and above = 10 units and Call Physician If 2 glucose readings are above * levETIRAcetam 250 mg tablet Commonly known as:  KEPPRA Take 1 Tab by mouth every Monday, Wednesday, Friday. * levETIRAcetam 500 mg tablet Commonly known as:  KEPPRA Take 1 Tab by mouth two (2) times a day. losartan 100 mg tablet Commonly known as:  COZAAR Take 1 Tab by mouth daily. potassium chloride 20 mEq Pack Commonly known as:  KLOR-CON Take 1 Packet by mouth two (2) times daily (with meals). REMERON 15 mg tablet Generic drug:  mirtazapine 
  
simvastatin 20 mg tablet Commonly known as:  ZOCOR Take 1 Tab by mouth nightly. traMADol 50 mg tablet Commonly known as:  ULTRAM 
Take 1 Tab by mouth every six (6) hours as needed for Pain. Max Daily Amount: 200 mg. 
  
TYLENOL 325 mg tablet Generic drug:  acetaminophen * This list has 2 medication(s) that are the same as other medications prescribed for you. Read the directions carefully, and ask your doctor or other care provider to review them with you.  
  
  
  
 
_______________________________ Scribe Attestation Jackson Villafana acting as a scribe for and in the presence of Silas Godfrey MD     
January 25, 2019 at 6:38 PM 
    
Provider Attestation:     
I personally performed the services described in the documentation, reviewed the documentation, as recorded by the scribe in my presence, and it accurately and completely records my words and actions. January 25, 2019 at 6:38 PM - Silas Godfrey MD   
 
 
_______________________________

## 2019-01-26 LAB
ATRIAL RATE: 92 BPM
CALCULATED P AXIS, ECG09: 61 DEGREES
CALCULATED R AXIS, ECG10: -38 DEGREES
CALCULATED T AXIS, ECG11: 28 DEGREES
DIAGNOSIS, 93000: NORMAL
P-R INTERVAL, ECG05: 148 MS
Q-T INTERVAL, ECG07: 382 MS
QRS DURATION, ECG06: 84 MS
QTC CALCULATION (BEZET), ECG08: 472 MS
VENTRICULAR RATE, ECG03: 92 BPM

## 2019-01-26 NOTE — DISCHARGE INSTRUCTIONS
Patient Education        Learning About High Blood Pressure  What is high blood pressure? Blood pressure is a measure of how hard the blood pushes against the walls of your arteries. It's normal for blood pressure to go up and down throughout the day, but if it stays up, you have high blood pressure. Another name for high blood pressure is hypertension. Two numbers tell you your blood pressure. The first number is the systolic pressure. It shows how hard the blood pushes when your heart is pumping. The second number is the diastolic pressure. It shows how hard the blood pushes between heartbeats, when your heart is relaxed and filling with blood. Your doctor will give you a goal for your blood pressure. Your goal will be based on your health and your age. High blood pressure (hypertension) means that the top number stays high, or the bottom number stays high, or both. High blood pressure increases the risk of stroke, heart attack, and other problems. You and your doctor will talk about your risks of these problems based on your blood pressure. What happens when you have high blood pressure? · Blood flows through your arteries with too much force. Over time, this damages the walls of your arteries. But you can't feel it. High blood pressure usually doesn't cause symptoms. · Fat and calcium start to build up in your arteries. This buildup is called plaque. Plaque makes your arteries narrower and stiffer. Blood can't flow through them as easily. · This lack of good blood flow starts to damage some of the organs in your body. This can lead to problems such as coronary artery disease and heart attack, heart failure, stroke, kidney failure, and eye damage. How can you prevent high blood pressure? · Stay at a healthy weight. · Try to limit how much sodium you eat to less than 2,300 milligrams (mg) a day. If you limit your sodium to 1,500 mg a day, you can lower your blood pressure even more.   ? Buy foods that are labeled \"unsalted,\" \"sodium-free,\" or \"low-sodium. \" Foods labeled \"reduced-sodium\" and \"light sodium\" may still have too much sodium. ? Flavor your food with garlic, lemon juice, onion, vinegar, herbs, and spices instead of salt. Do not use soy sauce, steak sauce, onion salt, garlic salt, mustard, or ketchup on your food. ? Use less salt (or none) when recipes call for it. You can often use half the salt a recipe calls for without losing flavor. · Be physically active. Get at least 30 minutes of exercise on most days of the week. Walking is a good choice. You also may want to do other activities, such as running, swimming, cycling, or playing tennis or team sports. · Limit alcohol to 2 drinks a day for men and 1 drink a day for women. · Eat plenty of fruits, vegetables, and low-fat dairy products. Eat less saturated and total fats. How is high blood pressure treated? · Your doctor will suggest making lifestyle changes. For example, your doctor may ask you to eat healthy foods, quit smoking, lose extra weight, and be more active. · If lifestyle changes don't help enough, your doctor may recommend that you take medicine. · When blood pressure is very high, medicines are needed to lower it. Follow-up care is a key part of your treatment and safety. Be sure to make and go to all appointments, and call your doctor if you are having problems. It's also a good idea to know your test results and keep a list of the medicines you take. Where can you learn more? Go to http://izabela-jewell.info/. Enter P501 in the search box to learn more about \"Learning About High Blood Pressure. \"  Current as of: July 22, 2018  Content Version: 11.9  © 9872-5303 [employer]. Care instructions adapted under license by [employer] (which disclaims liability or warranty for this information).  If you have questions about a medical condition or this instruction, always ask your healthcare professional. [address] any warranty or liability for your use of this information.

## 2019-04-18 ENCOUNTER — DOCUMENTATION ONLY (OUTPATIENT)
Dept: VASCULAR SURGERY | Age: 64
End: 2019-04-18

## 2019-04-18 NOTE — PROGRESS NOTES
Received communication from dialysis that pt is in need of cath exchange , gave info to surgical scheduler and patient will need dc appt to discuss permanent access , and if patient declines needs documentation as to why cath only.

## 2019-04-19 ENCOUNTER — HOSPITAL ENCOUNTER (OUTPATIENT)
Age: 64
Setting detail: OUTPATIENT SURGERY
Discharge: HOME | End: 2019-04-19
Attending: SURGERY | Admitting: SURGERY
Payer: MEDICARE

## 2019-04-19 VITALS
DIASTOLIC BLOOD PRESSURE: 96 MMHG | SYSTOLIC BLOOD PRESSURE: 202 MMHG | HEART RATE: 84 BPM | OXYGEN SATURATION: 99 % | BODY MASS INDEX: 24.13 KG/M2 | WEIGHT: 136.2 LBS | RESPIRATION RATE: 11 BRPM | HEIGHT: 63 IN

## 2019-04-19 DIAGNOSIS — T82.898A ARTERIOVENOUS FISTULA OCCLUSION (HCC): ICD-10-CM

## 2019-04-19 DIAGNOSIS — N18.6 END STAGE RENAL DISEASE (HCC): ICD-10-CM

## 2019-04-19 LAB
BUN BLD-MCNC: 58 MG/DL (ref 7–18)
CHLORIDE BLD-SCNC: 108 MMOL/L (ref 100–108)
GLUCOSE BLD STRIP.AUTO-MCNC: 234 MG/DL (ref 70–110)
GLUCOSE BLD STRIP.AUTO-MCNC: 238 MG/DL (ref 74–106)
HCT VFR BLD CALC: 31 % (ref 36–49)
HGB BLD-MCNC: 10.5 G/DL (ref 12–16)
POTASSIUM BLD-SCNC: 4.9 MMOL/L (ref 3.5–5.5)
SODIUM BLD-SCNC: 144 MMOL/L (ref 136–145)

## 2019-04-19 PROCEDURE — C1750 CATH, HEMODIALYSIS,LONG-TERM: HCPCS | Performed by: SURGERY

## 2019-04-19 PROCEDURE — 36558 INSERT TUNNELED CV CATH: CPT | Performed by: SURGERY

## 2019-04-19 PROCEDURE — 74011250636 HC RX REV CODE- 250/636

## 2019-04-19 PROCEDURE — 74011250636 HC RX REV CODE- 250/636: Performed by: SURGERY

## 2019-04-19 PROCEDURE — 74011000250 HC RX REV CODE- 250: Performed by: SURGERY

## 2019-04-19 PROCEDURE — 99152 MOD SED SAME PHYS/QHP 5/>YRS: CPT | Performed by: SURGERY

## 2019-04-19 PROCEDURE — 82947 ASSAY GLUCOSE BLOOD QUANT: CPT

## 2019-04-19 PROCEDURE — 82962 GLUCOSE BLOOD TEST: CPT

## 2019-04-19 PROCEDURE — C1769 GUIDE WIRE: HCPCS | Performed by: SURGERY

## 2019-04-19 DEVICE — PRECISION CHRONIC CATHETER SPORT PACK,SYMMETRICAL TIP, TAL VENATRAC STYLET,14.5 FR/CH (4.8 MM) X 19 CM
Type: IMPLANTABLE DEVICE | Status: FUNCTIONAL
Brand: PALINDROME

## 2019-04-19 RX ORDER — HEPARIN SODIUM 5000 [USP'U]/ML
INJECTION, SOLUTION INTRAVENOUS; SUBCUTANEOUS AS NEEDED
Status: DISCONTINUED | OUTPATIENT
Start: 2019-04-19 | End: 2019-04-19 | Stop reason: HOSPADM

## 2019-04-19 RX ORDER — METOPROLOL TARTRATE 5 MG/5ML
INJECTION INTRAVENOUS AS NEEDED
Status: DISCONTINUED | OUTPATIENT
Start: 2019-04-19 | End: 2019-04-19 | Stop reason: HOSPADM

## 2019-04-19 RX ORDER — FENTANYL CITRATE 50 UG/ML
INJECTION, SOLUTION INTRAMUSCULAR; INTRAVENOUS AS NEEDED
Status: DISCONTINUED | OUTPATIENT
Start: 2019-04-19 | End: 2019-04-19 | Stop reason: HOSPADM

## 2019-04-19 RX ORDER — MIDAZOLAM HYDROCHLORIDE 1 MG/ML
INJECTION, SOLUTION INTRAMUSCULAR; INTRAVENOUS AS NEEDED
Status: DISCONTINUED | OUTPATIENT
Start: 2019-04-19 | End: 2019-04-19 | Stop reason: HOSPADM

## 2019-04-19 RX ORDER — SEVELAMER CARBONATE 800 MG/1
800 TABLET, FILM COATED ORAL 3 TIMES DAILY
COMMUNITY
End: 2021-11-10

## 2019-04-19 RX ORDER — LIDOCAINE HYDROCHLORIDE 10 MG/ML
INJECTION, SOLUTION EPIDURAL; INFILTRATION; INTRACAUDAL; PERINEURAL AS NEEDED
Status: DISCONTINUED | OUTPATIENT
Start: 2019-04-19 | End: 2019-04-19 | Stop reason: HOSPADM

## 2019-04-19 NOTE — DISCHARGE INSTRUCTIONS
DISCHARGE SUMMARY from Nurse    PATIENT INSTRUCTIONS:    After general anesthesia or intravenous sedation, for 24 hours or while taking prescription Narcotics:  · Limit your activities  · Do not drive and operate hazardous machinery  · Do not make important personal or business decisions  · Do  not drink alcoholic beverages  · If you have not urinated within 8 hours after discharge, please contact your surgeon on call. Report the following to your surgeon:  · Excessive pain, swelling, redness or odor of or around the surgical area  · Temperature over 100.5  · Nausea and vomiting lasting longer than 4 hours or if unable to take medications  · Any signs of decreased circulation or nerve impairment to extremity: change in color, persistent  numbness, tingling, coldness or increase pain  · Any questions    What to do at Home:  Recommended activity: Activity as tolerated and no driving for today,     If you experience any of the following symptoms fever greater than 100.5, bleeding, swelling, numbness or tingling down your extremities, redness, puss from site, please follow up with emergency services. *  Please give a list of your current medications to your Primary Care Provider. *  Please update this list whenever your medications are discontinued, doses are      changed, or new medications (including over-the-counter products) are added. *  Please carry medication information at all times in case of emergency situations. These are general instructions for a healthy lifestyle:    No smoking/ No tobacco products/ Avoid exposure to second hand smoke  Surgeon General's Warning:  Quitting smoking now greatly reduces serious risk to your health.     Obesity, smoking, and sedentary lifestyle greatly increases your risk for illness    A healthy diet, regular physical exercise & weight monitoring are important for maintaining a healthy lifestyle    You may be retaining fluid if you have a history of heart failure or if you experience any of the following symptoms:  Weight gain of 3 pounds or more overnight or 5 pounds in a week, increased swelling in our hands or feet or shortness of breath while lying flat in bed. Please call your doctor as soon as you notice any of these symptoms; do not wait until your next office visit. Recognize signs and symptoms of STROKE:    F-face looks uneven    A-arms unable to move or move unevenly    S-speech slurred or non-existent    T-time-call 911 as soon as signs and symptoms begin-DO NOT go       Back to bed or wait to see if you get better-TIME IS BRAIN. Warning Signs of HEART ATTACK     Call 911 if you have these symptoms:   Chest discomfort. Most heart attacks involve discomfort in the center of the chest that lasts more than a few minutes, or that goes away and comes back. It can feel like uncomfortable pressure, squeezing, fullness, or pain.  Discomfort in other areas of the upper body. Symptoms can include pain or discomfort in one or both arms, the back, neck, jaw, or stomach.  Shortness of breath with or without chest discomfort.  Other signs may include breaking out in a cold sweat, nausea, or lightheadedness. Don't wait more than five minutes to call 911 - MINUTES MATTER! Fast action can save your life. Calling 911 is almost always the fastest way to get lifesaving treatment. Emergency Medical Services staff can begin treatment when they arrive -- up to an hour sooner than if someone gets to the hospital by car. The discharge information has been reviewed with the patient. The patient verbalized understanding. Discharge medications reviewed with the patient and appropriate educational materials and side effects teaching were provided.   ___________________________________________________________________________________________________________________________________      Patient Education        Catheter for Hemodialysis: Care Instructions  Your Care Instructions    To start hemodialysis (also called dialysis) right away, your doctor will insert a soft plastic tube into a vein. This tube will carry your blood to the dialysis machine. The tube is called a central venous catheter, or CV line. It will be your vascular access until your permanent access is ready to use. If you have a kidney injury that can be healed, you may need dialysis only for a short time. But some people will need to have long-term dialysis. This includes people with chronic kidney disease. If you need long-term dialysis, it can take weeks or months for a permanent vascular access to be ready to use. You can use the catheter until a permanent access site is ready. The catheter site will be in a large vein, usually in your chest or neck. It may also be in your groin. A few stitches will hold the catheter in place. By learning how to care for your access, you will help avoid problems and get the best results from your dialysis treatments. Follow-up care is a key part of your treatment and safety. Be sure to make and go to all appointments, and call your doctor if you are having problems. It's also a good idea to know your test results and keep a list of the medicines you take. How can you care for yourself at home? · Make sure the catheter is secured to your body and not pulling. · Avoid clothes that rub or pull on your catheter. · Never use scissors or other sharp objects around your catheter. · Don't bend or crimp your catheter. · Ask your doctor or dialysis nurse when you can take a bath or shower. You may be able to do these things after the site heals or if you cover the exit site with a waterproof bandage. · You can stay active while the catheter is in. But talk to your doctor about the kind of activities you want to do. · Review emergency instructions with your dialysis team so you know what to do if your catheter comes out. · Keep your bandage and exit site dry and clean. Change a dirty or bloody bandage. Check it every day for signs of infection. · Always wash your hands before you touch your catheter. · Keep the end of the catheter covered when it's not in use. · Wear a mask during your dialysis treatments. It can keep you from breathing germs onto your catheter. When should you call for help? Call 911 anytime you think you may need emergency care. For example, call if:    · The catheter comes out of your body.    Call your doctor now or seek immediate medical care if:    · You have signs of infection, such as:  ? Increased pain, swelling, warmth, or redness around the area. ? Red streaks leading from the area. ? Pus draining from the area. ? A fever.     · You have liquid leaking from around the catheter.     · There are cracks or leaks in the tube.     · You have pain or swelling in your neck or arm.     · The line becomes clogged.    Watch closely for changes in your health, and be sure to contact your doctor if you have any problems. Where can you learn more? Go to http://izabela-jewell.info/. Enter C210 in the search box to learn more about \"Catheter for Hemodialysis: Care Instructions. \"  Current as of: March 14, 2018  Content Version: 11.9  © 4630-8359 [employer]. Care instructions adapted under license by [employer] (which disclaims liability or warranty for this information). If you have questions about a medical condition or this instruction, always ask your healthcare professional. [place] [address] any warranty or liability for your use of this information.        Patient armband removed and shredded

## 2019-04-19 NOTE — Clinical Note
TRANSFER - OUT REPORT:  
 
Verbal report given to: Elijah Hayes RN. Report consisted of patient's Situation, Background, Assessment and  
Recommendations(SBAR). Opportunity for questions and clarification was provided. Patient transported with a Cardiac Cath Tech / Patient Care Tech. Patient transported to: [address]

## 2019-04-19 NOTE — H&P
Surgery History and Physical    Subjective: Sue Mullins is a 70 y.o.  female who presents with a non functioning RIJ TDC here for exchange today. .    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 ([facility] 08/08/2018    CVA, old, cognitive deficits 08/08/2018    Acute on chronic renal insufficiency 08/07/2018    Hypertension 08/07/2018     Past Medical History:   Diagnosis Date    Asthma     Bipolar affective disorder ([facility] [facility]     Brain condition     Cataract     Chronic kidney disease     hemodialysis M-W-F    Diabetes ([facility] [address]     Hyperlipidemia     Hypertension     Paralytic strabismus, unspecified     Psychiatric disorder     Seizures ([address] 08/27/2018      Past Surgical History:   Procedure Laterality Date    [place] INSJ TUNNELED CVC W/O SUBQ PORT/ AGE 3 YR/> N/A 8/9/2018     in-pt 474A, Insertion Tunneled Central Venous Catheter performed by Nano Baez MD at [address] [place] [address]      Social History     Tobacco Use    Smoking status: Never Smoker    Smokeless tobacco: Never Used   Substance Use Topics    Alcohol use: No      History reviewed. No pertinent family history. Prior to Admission medications    Medication Sig Start Date End Date Taking? Authorizing Provider   sevelamer carbonate (RENVELA) 800 mg tab tab Take 800 mg by mouth three (3) times daily. Yes Provider, Historical   traMADol (ULTRAM) 50 mg tablet Take 1 Tab by mouth every six (6) hours as needed for Pain. Max Daily Amount: 200 mg. 11/20/18  Yes Lillian Glaser MD   mirtazapine (REMERON) 15 mg tablet Take  by mouth nightly. Yes Provider, Historical   levETIRAcetam (KEPPRA) 250 mg tablet Take 1 Tab by mouth every Monday, Wednesday, Friday.  8/31/18  Yes Ramiro Morales MD   hydrALAZINE (APRESOLINE) 50 mg tablet Take 1 Tab by mouth three (3) times daily. 8/14/18  Yes Ann Kirk MD   simvastatin (ZOCOR) 20 mg tablet Take 1 Tab by mouth nightly. 8/14/18  Yes Ann Kirk MD   epoetin raphael (EPOGEN;PROCRIT) 3,000 unit/mL injection 1.73 mL by SubCUTAneous route Every Tuesday, Thursday and Saturday. Indications: ANEMIA DUE TO RENAL FAILURE, Renal Dialysis  Patient taking differently: 3,000 Units by SubCUTAneous route every Monday, Wednesday, Friday. 8/14/18  Yes Ann Kirk MD   insulin lispro (HUMALOG) 100 unit/mL injection INITIATE INSULIN CORRECTIVE PROTOCOL: Normal Insulin Sensitivity   For Blood Sugar (mg/dL) of:     Less than 150 =   0 units           150 -199 =   2 units  200 -249 =   4 units  250 -299 =   6 units  300 -349 =   8 units  350 and above = 10 units and Call Physician  If 2 glucose readings are above 8/14/18  Yes Ann Kirk MD   losartan (COZAAR) 100 mg tablet Take 1 Tab by mouth daily. 8/15/18  Yes Ann Kirk MD   aspirin 81 mg tablet Take 81 mg by mouth daily. Yes Kuldeep, MD Elina   diltiazem CD (CARDIZEM CD) 240 mg ER capsule Take 240 mg by mouth daily. Yes Kuldeep, MD Elina   cloNIDine (CATAPRES) 0.1 mg tablet Take 0.1 mg by mouth two (2) times a day.      Yes Kuldeep, MD Elina     Allergies   Allergen Reactions    Amoxicillin Unknown (comments)    Cleocin [Clindamycin Hcl] Unknown (comments)    Codeine Unknown (comments)    Lorcet 10/650 [Hydrocodone-Acetaminophen] Unknown (comments)    Penicillin G Benzathine Unknown (comments)    Shellfish Derived Unknown (comments)         Review of Systems:    Constitutional: negative  Eyes: negative  Ears, Nose, Mouth, Throat, and Face: negative  Respiratory: negative  Cardiovascular: negative  Gastrointestinal: negative    Objective:     Patient Vitals for the past 8 hrs:   Height Weight   04/19/19 0821 5' 3\" (1.6 m) 136 lb 3.2 oz (61.8 kg) No data recorded. Physical Exam:  GENERAL: alert, cooperative, no distress, appears stated age, EYE: negative findings: anicteric sclera, LYMPHATIC: Cervical, supraclavicular, and axillary nodes normal. , THROAT & NECK: normal, LUNG: clear to auscultation bilaterally, HEART: regular rate and rhythm, ABDOMEN: soft, non-tender. Bowel sounds normal. No masses,  no organomegaly    Labs:   Recent Results (from the past 24 hour(s))   GLUCOSE, POC    Collection Time: 04/19/19  7:57 AM   Result Value Ref Range    Glucose (POC) 234 (H) 70 - 110 mg/dL   POC 6 PLUS    Collection Time: 04/19/19  7:58 AM   Result Value Ref Range    Sodium,  136 - 145 MMOL/L    Potassium, POC 4.9 3.5 - 5.5 MMOL/L    Chloride,  100 - 108 MMOL/L    BUN, POC 58 (H) 7 - 18 MG/DL    Glucose,  (H) 74 - 106 MG/DL    Hematocrit, POC 31 (L) 36 - 49 %    Hemoglobin, POC 10.5 (L) 12 - 16 G/DL       Data Review:    CBC:   Lab Results   Component Value Date/Time    WBC 6.7 01/25/2019 06:25 PM    RBC 4.17 (L) 01/25/2019 06:25 PM    HGB 11.7 (L) 01/25/2019 06:25 PM    HCT 36.7 01/25/2019 06:25 PM    PLATELET 597 69/94/4265 06:25 PM      BMP:   Lab Results   Component Value Date/Time    Glucose 189 (H) 01/25/2019 06:25 PM    Sodium 144 01/25/2019 06:25 PM    Potassium 3.3 (L) 01/25/2019 06:25 PM    Chloride 106 01/25/2019 06:25 PM    CO2 30 01/25/2019 06:25 PM    BUN 19 (H) 01/25/2019 06:25 PM    Creatinine 3.05 (H) 01/25/2019 06:25 PM    Calcium 8.4 (L) 01/25/2019 06:25 PM     Coagulation: No results found for: PTP, INR, APTT    Assessment:     Active Problems:    * No active hospital problems.  *      Plan:     70 F for [address] exchange today    Signed By: Twin Jordan MD     April 19, 2019

## 2019-04-19 NOTE — PROGRESS NOTES
0830: Pt brought to [facility] ambulatory. Pt placed in bay 4 and connected to monitor. Baseline VS taken and PIV started x 1 by HETAL Roa. Admission database completed. Pt ready for ordered procedure. 1020: Dr. Joleen Fritz made aware of pt contrast allergy. No new orders given at this time. 1035: Pt taken to cath lab for ordered procedure. Report given to HETAL Roa     1130: Pt returned to cath holding post procedure. Pt alert and oriented times four. No complaints of pain noted at this time. New dialysis catheter noted to right chest/neck    1215: Pt assisted into clothing for pending discharge and placed and wheelchair    1230: Pt requesting assistance to restroom. While transferring to restroom, pt had episode of incontinence.  Pt cleaned and redressed    1300: Transport team arrived for discharge

## 2019-04-19 NOTE — Clinical Note
TRANSFER - IN REPORT:  
 
Verbal report received from: Norman Hurtado RN. Report consisted of patient's Situation, Background, Assessment and  
Recommendations(SBAR). Opportunity for questions and clarification was provided. Assessment completed upon patient's arrival to unit and care assumed.

## 2019-04-19 NOTE — Clinical Note
Right neck prepped with ChloraPrep and draped. Wet prep solution applied at: 1055. Wet prep solution dried at: 1058. Wet prep elapsed drying time: 3 mins.

## 2019-05-06 ENCOUNTER — OFFICE VISIT (OUTPATIENT)
Dept: VASCULAR SURGERY | Age: 64
End: 2019-05-06

## 2019-05-06 VITALS
BODY MASS INDEX: 24.1 KG/M2 | DIASTOLIC BLOOD PRESSURE: 78 MMHG | RESPIRATION RATE: 16 BRPM | WEIGHT: 136 LBS | HEART RATE: 68 BPM | HEIGHT: 63 IN | SYSTOLIC BLOOD PRESSURE: 124 MMHG

## 2019-05-06 DIAGNOSIS — N18.6 ESRD (END STAGE RENAL DISEASE) (HCC): Primary | ICD-10-CM

## 2019-05-06 NOTE — LETTER
5/6/19 Patient: Terrance Willams YOB: 1955 Date of Visit: 5/6/2019 Kim Zhou MD 
[id number] [address] Internal Medicine [facility] [id number] VIA Facsimile: [phone] Dear Kim Zhou MD, Thank you for referring Ms. Terrance Willams to [facility] VEIN/VASCULAR SPEC-PORTS for evaluation. My notes for this consultation are attached. If you have questions, please do not hesitate to call me. I look forward to following your patient along with you. Sincerely, Govind Guy MD

## 2019-05-06 NOTE — PROGRESS NOTES
1. Have you been to the ER, urgent care clinic since your last visit? Hospitalized since your last visit? No 
 
2. Have you seen or consulted any other health care providers outside of the [address] [place] [address] since your last visit? Include any pap smears or colon screening.  No

## 2019-05-06 NOTE — PROGRESS NOTES
Sue Mullins Chief Complaint Patient presents with  Surgical Follow-up  End Stage Renal Disease History and Physical   
Sue Mullins is a 70 y.o. female with end-stage renal disease. Patient is currently catheter only with a right internal jugular vein permanent dialysis catheter in place. She is now open to the idea of possible surgery for internal hemodialysis access. She has had a previous failed left arteriovenous graft secondary to steal syndrome. Currently she has no claudication or rest pain in her right upper extremity. No fevers or chills at this time. Past Medical History:  
Diagnosis Date  Asthma  Bipolar affective disorder ([address]  Brain condition  Cataract  Chronic kidney disease   
 hemodialysis M-W-F  Diabetes ([address]  Hyperlipidemia  Hypertension  Paralytic strabismus, unspecified  Psychiatric disorder  Seizures ([address] 08/27/2018 Past Surgical History:  
Procedure Laterality Date  [place] INSJ TUNNELED CVC W/O SUBQ PORT/ AGE 3 YR/> N/A 8/9/2018 in-pt 474A, Insertion Tunneled Central Venous Catheter performed by Nano Baez MD at [facility] CATH LAB Patient Active Problem List  
Diagnosis Code  Acute on chronic renal insufficiency N28.9, N18.9  Hypertension I10  
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) E11.22, I12.0, Z99.2, N18.6  CVA, old, cognitive deficits I69.319  Bandemia [id number]  New onset seizure ([facility] [address] R56.9 Current Outpatient Medications Medication Sig Dispense Refill  sevelamer carbonate (RENVELA) 800 mg tab tab Take 800 mg by mouth three (3) times daily.  traMADol (ULTRAM) 50 mg tablet Take 1 Tab by mouth every six (6) hours as needed for Pain. Max Daily Amount: 200 mg. 20 Tab 0  
 mirtazapine (REMERON) 15 mg tablet Take  by mouth nightly.  levETIRAcetam (KEPPRA) 250 mg tablet Take 1 Tab by mouth every Monday, Wednesday, Friday. 30 Tab 1  
 hydrALAZINE (APRESOLINE) 50 mg tablet Take 1 Tab by mouth three (3) times daily. 90 Tab 0  
 simvastatin (ZOCOR) 20 mg tablet Take 1 Tab by mouth nightly. 30 Tab 0  
 epoetin raphael (EPOGEN;PROCRIT) 3,000 unit/mL injection 1.73 mL by SubCUTAneous route Every Tuesday, Thursday and Saturday. Indications: ANEMIA DUE TO RENAL FAILURE, Renal Dialysis (Patient taking differently: 3,000 Units by SubCUTAneous route every Monday, Wednesday, Friday.) 1 Vial 0  
 insulin lispro (HUMALOG) 100 unit/mL injection INITIATE INSULIN CORRECTIVE PROTOCOL: Normal Insulin Sensitivity For Blood Sugar (mg/dL) of:    
Less than 150 =   0 units 150 -199 =   2 units 200 -249 =   4 units 250 -299 =   6 units 300 -349 =   8 units 350 and above = 10 units and Call Physician If 2 glucose readings are above 1 Vial 0  
 losartan (COZAAR) 100 mg tablet Take 1 Tab by mouth daily. 30 Tab 0  
 aspirin 81 mg tablet Take 81 mg by mouth daily.  diltiazem CD (CARDIZEM CD) 240 mg ER capsule Take 240 mg by mouth daily.  cloNIDine (CATAPRES) 0.1 mg tablet Take 0.1 mg by mouth two (2) times a day. Allergies Allergen Reactions  Amoxicillin Unknown (comments)  Cleocin [Clindamycin Hcl] Unknown (comments)  Codeine Unknown (comments)  Lorcet 10/650 [Hydrocodone-Acetaminophen] Unknown (comments)  Penicillin G Benzathine Unknown (comments)  Shellfish Derived Unknown (comments) Social History Socioeconomic History  Marital status: SINGLE Spouse name: Not on file  Number of children: Not on file  Years of education: Not on file  Highest education level: Not on file Occupational History  Not on file Social Needs  Financial resource strain: Not on file  Food insecurity:  
  Worry: Not on file Inability: Not on file  Transportation needs: Medical: Not on file Non-medical: Not on file Tobacco Use  Smoking status: Never Smoker  Smokeless tobacco: Never Used Substance and Sexual Activity  Alcohol use: No  
 Drug use: No  
 Sexual activity: Never Lifestyle  Physical activity:  
  Days per week: Not on file Minutes per session: Not on file  Stress: Not on file Relationships  Social connections:  
  Talks on phone: Not on file Gets together: Not on file Attends Protestant service: Not on file Active member of club or organization: Not on file Attends meetings of clubs or organizations: Not on file Relationship status: Not on file  Intimate partner violence:  
  Fear of current or ex partner: Not on file Emotionally abused: Not on file Physically abused: Not on file Forced sexual activity: Not on file Other Topics Concern  Not on file Social History Narrative  Not on file History reviewed. No pertinent family history. Physical Exam:   
Visit Vitals /78 (BP 1 Location: Left arm, BP Patient Position: Sitting) Pulse 68 Resp 16 Ht 5' 3\" (1.6 m) Wt 136 lb (61.7 kg) LMP  (LMP Unknown) BMI 24.09 kg/m² General: Well-appearing female in no acute distress HEENT: EOMI no scleral icterus is noted Pulmonary: No increased work of breathing is noted Extremities: Warm and well-perfused bilaterally 2+ radial pulse of the right upper extremity Neuro: Cranial nerves II through XII are grossly intact although patient does have lazy eye on the right side Impression and Plan: Florencia Padron is a 70 y.o. female with end-stage renal disease. Patient had previous failed left arm AV graft that was ligated secondary to steal syndrome. Currently dialyzing through a right IJ permanent dialysis catheter. She was given the option of a right arm AV graft versus thigh loop graft.   Patient is fairly adamant about not having a loop graft within the thigh. Her only other option would be right arm AV graft. I did let her know given the fact that she has had a long-standing history of catheter within her central venous system that that puts her at high risk of central occlusion versus stenosis which may mean that her right arm AV graft may not work or may need to be revised surgically to a hero graft. Patient is aware of this risk as well as the other surgical risks including but not limited to bleeding, infection, damage to adjacent structures, MI, stroke, death, loss of upper extremity, steal syndrome, need for further surgery. She will think about the surgery and get back to us as to what her wishes are. I have made a back-up appointment in 3 weeks for further discussion just in case. We reviewed the plan with the patient and the patient understands. We also gave the patient appropriate instructions on their disease process and when to call back. Greater than 50% of this visit was spent with face to face discussion. Follow-up and Dispositions · Return in about 3 weeks (around 5/27/2019). Mikki Castañeda MD 
 
PLEASE NOTE: 
This document has been produced using voice recognition software. Unrecognized errors in transcription may be present.

## 2019-05-20 ENCOUNTER — APPOINTMENT (OUTPATIENT)
Dept: GENERAL RADIOLOGY | Age: 64
End: 2019-05-20
Attending: EMERGENCY MEDICINE
Payer: MEDICARE

## 2019-05-20 ENCOUNTER — APPOINTMENT (OUTPATIENT)
Dept: CT IMAGING | Age: 64
End: 2019-05-20
Attending: EMERGENCY MEDICINE
Payer: MEDICARE

## 2019-05-20 ENCOUNTER — HOSPITAL ENCOUNTER (EMERGENCY)
Age: 64
Discharge: HOME | End: 2019-05-21
Attending: EMERGENCY MEDICINE
Payer: MEDICARE

## 2019-05-20 VITALS
HEART RATE: 99 BPM | BODY MASS INDEX: 22.14 KG/M2 | TEMPERATURE: 98.1 F | WEIGHT: 125 LBS | SYSTOLIC BLOOD PRESSURE: 216 MMHG | OXYGEN SATURATION: 99 % | DIASTOLIC BLOOD PRESSURE: 100 MMHG | RESPIRATION RATE: 18 BRPM

## 2019-05-20 DIAGNOSIS — I10 ESSENTIAL HYPERTENSION: ICD-10-CM

## 2019-05-20 DIAGNOSIS — N18.6 ESRD (END STAGE RENAL DISEASE) ON DIALYSIS (HCC): Primary | ICD-10-CM

## 2019-05-20 DIAGNOSIS — Z99.2 ESRD (END STAGE RENAL DISEASE) ON DIALYSIS (HCC): Primary | ICD-10-CM

## 2019-05-20 LAB
ALBUMIN SERPL-MCNC: 3.6 G/DL (ref 3.4–5)
ALBUMIN/GLOB SERPL: 1 {RATIO} (ref 0.8–1.7)
ALP SERPL-CCNC: 177 U/L (ref 45–117)
ALT SERPL-CCNC: 16 U/L (ref 13–56)
ANION GAP SERPL CALC-SCNC: 7 MMOL/L (ref 3–18)
AST SERPL-CCNC: 10 U/L (ref 15–37)
ATRIAL RATE: 93 BPM
BASOPHILS # BLD: 0 K/UL (ref 0–0.1)
BASOPHILS NFR BLD: 0 % (ref 0–2)
BILIRUB SERPL-MCNC: 0.3 MG/DL (ref 0.2–1)
BUN SERPL-MCNC: 61 MG/DL (ref 7–18)
BUN/CREAT SERPL: 8 (ref 12–20)
CALCIUM SERPL-MCNC: 9.8 MG/DL (ref 8.5–10.1)
CALCULATED P AXIS, ECG09: 68 DEGREES
CALCULATED R AXIS, ECG10: -29 DEGREES
CALCULATED T AXIS, ECG11: 38 DEGREES
CHLORIDE SERPL-SCNC: 109 MMOL/L (ref 100–108)
CO2 SERPL-SCNC: 26 MMOL/L (ref 21–32)
CREAT SERPL-MCNC: 7.69 MG/DL (ref 0.6–1.3)
DIAGNOSIS, 93000: NORMAL
DIFFERENTIAL METHOD BLD: ABNORMAL
EOSINOPHIL # BLD: 0.2 K/UL (ref 0–0.4)
EOSINOPHIL NFR BLD: 2 % (ref 0–5)
ERYTHROCYTE [DISTWIDTH] IN BLOOD BY AUTOMATED COUNT: 16.9 % (ref 11.6–14.5)
GLOBULIN SER CALC-MCNC: 3.7 G/DL (ref 2–4)
GLUCOSE SERPL-MCNC: 130 MG/DL (ref 74–99)
HCT VFR BLD AUTO: 34.8 % (ref 35–45)
HGB BLD-MCNC: 11 G/DL (ref 12–16)
LYMPHOCYTES # BLD: 1.3 K/UL (ref 0.9–3.6)
LYMPHOCYTES NFR BLD: 15 % (ref 21–52)
MCH RBC QN AUTO: 28 PG (ref 24–34)
MCHC RBC AUTO-ENTMCNC: 31.6 G/DL (ref 31–37)
MCV RBC AUTO: 88.5 FL (ref 74–97)
MONOCYTES # BLD: 0.5 K/UL (ref 0.05–1.2)
MONOCYTES NFR BLD: 5 % (ref 3–10)
NEUTS SEG # BLD: 7 K/UL (ref 1.8–8)
NEUTS SEG NFR BLD: 78 % (ref 40–73)
P-R INTERVAL, ECG05: 124 MS
PLATELET # BLD AUTO: 263 K/UL (ref 135–420)
PMV BLD AUTO: 11 FL (ref 9.2–11.8)
POTASSIUM SERPL-SCNC: 3.8 MMOL/L (ref 3.5–5.5)
PROT SERPL-MCNC: 7.3 G/DL (ref 6.4–8.2)
Q-T INTERVAL, ECG07: 350 MS
QRS DURATION, ECG06: 78 MS
QTC CALCULATION (BEZET), ECG08: 435 MS
RBC # BLD AUTO: 3.93 M/UL (ref 4.2–5.3)
SODIUM SERPL-SCNC: 142 MMOL/L (ref 136–145)
TROPONIN I SERPL-MCNC: <0.02 NG/ML (ref 0–0.04)
VENTRICULAR RATE, ECG03: 93 BPM
WBC # BLD AUTO: 9 K/UL (ref 4.6–13.2)

## 2019-05-20 PROCEDURE — 71045 X-RAY EXAM CHEST 1 VIEW: CPT

## 2019-05-20 PROCEDURE — 93005 ELECTROCARDIOGRAM TRACING: CPT

## 2019-05-20 PROCEDURE — 80053 COMPREHEN METABOLIC PANEL: CPT

## 2019-05-20 PROCEDURE — 85025 COMPLETE CBC W/AUTO DIFF WBC: CPT

## 2019-05-20 PROCEDURE — 84484 ASSAY OF TROPONIN QUANT: CPT

## 2019-05-20 PROCEDURE — 99284 EMERGENCY DEPT VISIT MOD MDM: CPT

## 2019-05-20 PROCEDURE — 70450 CT HEAD/BRAIN W/O DYE: CPT

## 2019-05-20 NOTE — ED NOTES
Patient laying in bed alert and oriented x3. No signs of distress noted at this time. Will continue to monitor.

## 2019-05-20 NOTE — ED NOTES
Attempts made to call medical transport, was informed that they need to call around and get someone to pick her up. Unable to schedule a time for dialysis since we don't have a time for .

## 2019-05-20 NOTE — ED PROVIDER NOTES
EMERGENCY DEPARTMENT HISTORY AND PHYSICAL EXAM    Date: 5/20/2019  Patient Name: Vanessa Tomlinson    History of Presenting Illness     Chief Complaint   Patient presents with    Generalized Body Aches         History Provided By: Patient, EMS and Caregiver      Additional History (Context): Vanessa Tomlinson is a 70 y.o. female with diabetes, hypertension, hyperlipidemia, asthma, seizure and ESRD on dalysis who presents with with some confusion today. Per Landen Child, caregiver at [facility] were for the patient is a resident, she says that around 9 AM she noticed that the patient had right-sided facial fullness and right-sided upper extremity weakness. They called EMS for possibility of stroke is arrived in says that they told by the patient and the patient's roommate that she was not feeling well all day since yesterday and had been in bed most of the day. Patient is a dialysis patient followed by Dr. Jayce Jones and has dialysis later today at noon. Patient states she still makes urine. Denies abdominal pain nausea vomiting diarrhea blood in her urine or stool fever or cough or rash. PCP: Frorest Hargrove MD    Current Outpatient Medications   Medication Sig Dispense Refill    sevelamer carbonate (RENVELA) 800 mg tab tab Take 800 mg by mouth three (3) times daily.  traMADol (ULTRAM) 50 mg tablet Take 1 Tab by mouth every six (6) hours as needed for Pain. Max Daily Amount: 200 mg. 20 Tab 0    mirtazapine (REMERON) 15 mg tablet Take  by mouth nightly.  levETIRAcetam (KEPPRA) 250 mg tablet Take 1 Tab by mouth every Monday, Wednesday, Friday. 30 Tab 1    hydrALAZINE (APRESOLINE) 50 mg tablet Take 1 Tab by mouth three (3) times daily. 90 Tab 0    simvastatin (ZOCOR) 20 mg tablet Take 1 Tab by mouth nightly. 30 Tab 0    epoetin raphael (EPOGEN;PROCRIT) 3,000 unit/mL injection 1.73 mL by SubCUTAneous route Every Tuesday, Thursday and Saturday.  Indications: ANEMIA DUE TO RENAL FAILURE, Renal Dialysis (Patient taking differently: 3,000 Units by SubCUTAneous route every Monday, Wednesday, Friday.) 1 Vial 0    insulin lispro (HUMALOG) 100 unit/mL injection INITIATE INSULIN CORRECTIVE PROTOCOL: Normal Insulin Sensitivity   For Blood Sugar (mg/dL) of:     Less than 150 =   0 units           150 -199 =   2 units  200 -249 =   4 units  250 -299 =   6 units  300 -349 =   8 units  350 and above = 10 units and Call Physician  If 2 glucose readings are above 1 Vial 0    losartan (COZAAR) 100 mg tablet Take 1 Tab by mouth daily. 30 Tab 0    aspirin 81 mg tablet Take 81 mg by mouth daily.  diltiazem CD (CARDIZEM CD) 240 mg ER capsule Take 240 mg by mouth daily.  cloNIDine (CATAPRES) 0.1 mg tablet Take 0.1 mg by mouth two (2) times a day. Past History     Past Medical History:  Past Medical History:   Diagnosis Date    Asthma     [facility] [address]     Brain condition     Cataract     Chronic kidney disease     hemodialysis M-W-F    Diabetes ([facility] [address]     Hyperlipidemia     Hypertension     Paralytic strabismus, unspecified     Psychiatric disorder     Seizures ([facility] [address] 08/27/2018       Past Surgical History:  Past Surgical History:   Procedure Laterality Date    WA INSJ TUNNELED CVC W/O SUBQ PORT/ AGE 3 YR/> N/A 8/9/2018     in-pt 474A, Insertion Tunneled Central Venous Catheter performed by Alisa Glez MD at [address] [place] [address]       Family History:  No family history on file. Social History:  Social History     Tobacco Use    Smoking status: Never Smoker    Smokeless tobacco: Never Used   Substance Use Topics    Alcohol use: No    Drug use: No       Allergies:   Allergies   Allergen Reactions    Amoxicillin Unknown (comments)    Cleocin [Clindamycin Hcl] Unknown (comments)    Codeine Unknown (comments)    Lorcet 10/650 [Hydrocodone-Acetaminophen] Unknown (comments)    Penicillin G Benzathine Unknown (comments)    Shellfish Derived Unknown (comments)         Review of Systems   Review of Systems   Constitutional: Positive for activity change. Negative for fever. Eyes: Negative for visual disturbance. Respiratory: Negative for shortness of breath. Cardiovascular: Negative for chest pain. Gastrointestinal: Negative for abdominal pain and nausea. Neurological: Positive for facial asymmetry and weakness. Negative for dizziness, tremors, seizures, syncope, speech difficulty, light-headedness, numbness and headaches. Psychiatric/Behavioral: Positive for confusion. All Other Systems Negative  Physical Exam     Vitals:    05/20/19 1051   BP: (!) 207/100   Pulse: (!) 103   Resp: 18   Temp: 98.1 °F (36.7 °C)   SpO2: 98%   Weight: 56.7 kg (125 lb)     Physical Exam   Constitutional: She is oriented to person, place, and time. She appears well-developed. HENT:   Head: Normocephalic and atraumatic. Eyes: Pupils are equal, round, and reactive to light. Neck: No JVD present. No tracheal deviation present. No thyromegaly present. Cardiovascular: Normal rate, regular rhythm and normal heart sounds. Exam reveals no gallop and no friction rub. No murmur heard. Pulmonary/Chest: Effort normal and breath sounds normal. No stridor. No respiratory distress. She has no wheezes. She has no rales. She exhibits no tenderness. Abdominal: Soft. She exhibits no distension and no mass. There is no tenderness. There is no rebound and no guarding. Musculoskeletal: She exhibits no edema or tenderness. Lymphadenopathy:     She has no cervical adenopathy. Neurological: She is alert and oriented to person, place, and time. Skin: Skin is warm and dry. No rash noted. No erythema. No pallor. Psychiatric: She has a normal mood and affect. Her behavior is normal. Thought content normal.   Nursing note and vitals reviewed.          Diagnostic Study Results     Labs -     Recent Results (from the past 12 hour(s))   EKG, 12 LEAD, INITIAL    Collection Time: 05/20/19 11:36 AM   Result Value Ref Range    Ventricular Rate 93 BPM    Atrial Rate 93 BPM    P-R Interval 124 ms    QRS Duration 78 ms    Q-T Interval 350 ms    QTC Calculation (Bezet) 435 ms    Calculated P Axis 68 degrees    Calculated R Axis -29 degrees    Calculated T Axis 38 degrees    Diagnosis       Sinus rhythm with premature atrial complexes  Otherwise normal ECG  When compared with ECG of 25-JAN-2019 18:46,  premature atrial complexes are now present     CBC WITH AUTOMATED DIFF    Collection Time: 05/20/19 12:00 PM   Result Value Ref Range    WBC 9.0 4.6 - 13.2 K/uL    RBC 3.93 (L) 4.20 - 5.30 M/uL    HGB 11.0 (L) 12.0 - 16.0 g/dL    HCT 34.8 (L) 35.0 - 45.0 %    MCV 88.5 74.0 - 97.0 FL    MCH 28.0 24.0 - 34.0 PG    MCHC 31.6 31.0 - 37.0 g/dL    RDW 16.9 (H) 11.6 - 14.5 %    PLATELET 704 135 - 183 K/uL    MPV 11.0 9.2 - 11.8 FL    NEUTROPHILS 78 (H) 40 - 73 %    LYMPHOCYTES 15 (L) 21 - 52 %    MONOCYTES 5 3 - 10 %    EOSINOPHILS 2 0 - 5 %    BASOPHILS 0 0 - 2 %    ABS. NEUTROPHILS 7.0 1.8 - 8.0 K/UL    ABS. LYMPHOCYTES 1.3 0.9 - 3.6 K/UL    ABS. MONOCYTES 0.5 0.05 - 1.2 K/UL    ABS. EOSINOPHILS 0.2 0.0 - 0.4 K/UL    ABS. BASOPHILS 0.0 0.0 - 0.1 K/UL    DF AUTOMATED     METABOLIC PANEL, COMPREHENSIVE    Collection Time: 05/20/19 12:00 PM   Result Value Ref Range    Sodium 142 136 - 145 mmol/L    Potassium 3.8 3.5 - 5.5 mmol/L    Chloride 109 (H) 100 - 108 mmol/L    CO2 26 21 - 32 mmol/L    Anion gap 7 3.0 - 18 mmol/L    Glucose 130 (H) 74 - 99 mg/dL    BUN 61 (H) 7.0 - 18 MG/DL    Creatinine 7.69 (H) 0.6 - 1.3 MG/DL    BUN/Creatinine ratio 8 (L) 12 - 20      GFR est AA 6 (L) >60 ml/min/1.73m2    GFR est non-AA 5 (L) >60 ml/min/1.73m2    Calcium 9.8 8.5 - 10.1 MG/DL    Bilirubin, total 0.3 0.2 - 1.0 MG/DL    ALT (SGPT) 16 13 - 56 U/L    AST (SGOT) 10 (L) 15 - 37 U/L    Alk.  phosphatase 177 (H) 45 - 117 U/L    Protein, total 7.3 6.4 - 8.2 g/dL    Albumin 3.6 3.4 - 5.0 g/dL    Globulin 3.7 2.0 - 4.0 g/dL    A-G Ratio 1.0 0.8 - 1.7 TROPONIN I    Collection Time: 05/20/19 12:00 PM   Result Value Ref Range    Troponin-I, QT <0.02 0.0 - 0.045 NG/ML       Radiologic Studies -   XR CHEST SNGL V   Final Result   Impression:   1. No acute process. CT HEAD WO CONT   Final Result   IMPRESSION:      No acute hemorrhage or midline shift. Diffuse atrophy with the lateral ventricles appear much larger than expected   for the enlargement of the cerebral sulci normal pressure hydrocephalus is a   consideration. No change when compared with the previous study dated August 27, 2018. No evidence for fracture in the calvarium. The partially visualized paranasal sinuses and mastoid sinuses are clear. .        CT Results  (Last 48 hours)               05/20/19 1130  CT HEAD WO CONT Final result    Impression:  IMPRESSION:       No acute hemorrhage or midline shift. Diffuse atrophy with the lateral ventricles appear much larger than expected   for the enlargement of the cerebral sulci normal pressure hydrocephalus is a   consideration. No change when compared with the previous study dated August 27, 2018. No evidence for fracture in the calvarium. The partially visualized paranasal sinuses and mastoid sinuses are clear. .       Narrative:  EXAM: CT scan of the head without IV contrast.       CLINICAL HISTORY/INDICATION: Confusion, delirium and loss of consciousness. .       COMPARISON: CT scan of the head dated August 27, 2018. Yomaira Nunez TECHNIQUE: All CT scans at this facility are performed using dose optimization   technique as appropriate to a performed exam, to include automated exposure   control, adjustment of the mA and/or kV according to patient's size (including   appropriate matching for site-specific examinations), or use of iterative   reconstruction technique. Yomaira Nunez FINDINGS:       A CT scan of the patient's brain is performed with multiple axial images from   skull base to vertex.  There is enlargement of the cerebral sulci, cisterns and   ventricles compatible with diffuse atrophy. When the size of the lateral   ventricles are compared with the size of the sulci the lateral ventricles appear   much larger than one would expect. Correlation for normal pressure hydrocephalus   is recommended. No acute hemorrhage or midline shift is noted. Bone window   images show no evidence for a fracture in the calvarium. .               CXR Results  (Last 48 hours)               05/20/19 1210  XR CHEST SNGL V Final result    Impression:  Impression:   1. No acute process. Narrative:      Examination: Portable AP chest        History: Fatigue       Comparison: January 8, 2019       Findings: There is a right IJ dialysis catheter with the right atrium. The lungs   are clear. The heart is mildly enlarged but stable. No definite acute osseous   abnormality. Medical Decision Making   I am the first provider for this patient. I reviewed the vital signs, available nursing notes, past medical history, past surgical history, family history and social history. Vital Signs-Reviewed the patient's vital signs. Records Reviewed: Nursing Notes, Old Medical Records, Previous Radiology Studies and Previous Laboratory Studies    Procedures:  Procedures    Provider Notes (Medical Decision Making): Talk with her nephrologist.  Somewhat confusing stories with one being an acute stroke and the other one being more an infectious or metabolic encephalopathy for her symptoms of increased drowsiness. Will get labs and scan head. After patient's head CT came back as well as all of her blood work and chest x-ray showing no acute process I had patient evaluated by Dr. Marcos Ledbetter who does not believe patient is having any kinds of signs of stroke or TIA. This is concurrent with my clinical assessment as well.   Then I spoke with Dr. Leilani Asif he says state patient can be sent over from our facility to the dialysis center on [address] and then sent back to [facility]. MED RECONCILIATION:  No current facility-administered medications for this encounter. Current Outpatient Medications   Medication Sig    sevelamer carbonate (RENVELA) 800 mg tab tab Take 800 mg by mouth three (3) times daily.  traMADol (ULTRAM) 50 mg tablet Take 1 Tab by mouth every six (6) hours as needed for Pain. Max Daily Amount: 200 mg.    mirtazapine (REMERON) 15 mg tablet Take  by mouth nightly.  levETIRAcetam (KEPPRA) 250 mg tablet Take 1 Tab by mouth every Monday, Wednesday, Friday.  hydrALAZINE (APRESOLINE) 50 mg tablet Take 1 Tab by mouth three (3) times daily.  simvastatin (ZOCOR) 20 mg tablet Take 1 Tab by mouth nightly.  epoetin raphael (EPOGEN;PROCRIT) 3,000 unit/mL injection 1.73 mL by SubCUTAneous route Every Tuesday, Thursday and Saturday. Indications: ANEMIA DUE TO RENAL FAILURE, Renal Dialysis (Patient taking differently: 3,000 Units by SubCUTAneous route every Monday, Wednesday, Friday.)    insulin lispro (HUMALOG) 100 unit/mL injection INITIATE INSULIN CORRECTIVE PROTOCOL: Normal Insulin Sensitivity   For Blood Sugar (mg/dL) of:     Less than 150 =   0 units           150 -199 =   2 units  200 -249 =   4 units  250 -299 =   6 units  300 -349 =   8 units  350 and above = 10 units and Call Physician  If 2 glucose readings are above    losartan (COZAAR) 100 mg tablet Take 1 Tab by mouth daily.  aspirin 81 mg tablet Take 81 mg by mouth daily.  diltiazem CD (CARDIZEM CD) 240 mg ER capsule Take 240 mg by mouth daily.  cloNIDine (CATAPRES) 0.1 mg tablet Take 0.1 mg by mouth two (2) times a day. Disposition:  home    DISCHARGE NOTE:   12:58 PM    Pt has been reexamined. Patient has no new complaints, changes, or physical findings. Care plan outlined and precautions discussed. Results of head CT, CXR, labs were reviewed with the patient. All medications were reviewed with the patient.  All of pt's questions and concerns were addressed. Patient was instructed and agrees to follow up with PCP, nephrology, as well as to return to the ED upon further deterioration. Patient is ready to go home. Follow-up Information     Follow up With Specialties Details Why Contact Info      Go to Dialysis Center today when leaving the Emergency Department     Luly Jimenez MD Geriatric Medicine Schedule an appointment as soon as possible for a visit in 2 days As needed [id number] [facility]   [address] [facility] Internal Medicine PD  [address] [facility] Loraine Mayorga MD Nephrology Schedule an appointment as soon as possible for a visit in 2 days  [address] [place] [facility] EMERGENCY DEPT Emergency Medicine  If symptoms worsen return immediately [address]  [phone]          Current Discharge Medication List          Diagnosis     Clinical Impression:   1. ESRD (end stage renal disease) on dialysis ([facility] [address]    2.  Essential hypertension

## 2019-05-20 NOTE — ED NOTES
Still waiting on time for . Asked covering  to call medicaid and see if they can update us as this is delaying patient care.

## 2019-05-20 NOTE — ED TRIAGE NOTES
Per EMS, Patient comes from [facility] where they state she is not feeling well since yesterday. She has pain all over.

## 2019-05-29 ENCOUNTER — OFFICE VISIT (OUTPATIENT)
Dept: VASCULAR SURGERY | Age: 64
End: 2019-05-29

## 2019-05-29 VITALS
HEART RATE: 81 BPM | WEIGHT: 125 LBS | SYSTOLIC BLOOD PRESSURE: 150 MMHG | OXYGEN SATURATION: 97 % | RESPIRATION RATE: 17 BRPM | HEIGHT: 63 IN | BODY MASS INDEX: 22.15 KG/M2 | DIASTOLIC BLOOD PRESSURE: 82 MMHG

## 2019-05-29 DIAGNOSIS — N18.6 ESRD (END STAGE RENAL DISEASE) (HCC): Primary | ICD-10-CM

## 2019-05-29 NOTE — PROGRESS NOTES
1. Have you been to an emergency room or urgent care clinic since your last visit? No    Hospitalized since your last visit? If yes, where, when, and reason for visit? NO  2. Have you seen or consulted any other health care providers outside of the [facility] since your last visit including any procedures, health maintenance items. If yes, where, when and reason for visit?  NO

## 2019-05-29 NOTE — LETTER
5/29/19 Patient: Maryse Núñez YOB: 1955 Date of Visit: 5/29/2019 Matti Bowman MD 
[address] Internal Medicine [facility] [address] [id number] VIA Facsimile: [phone] Dear Matti Bowman MD, Thank you for referring Ms. Maryse Núñez to [facility] for evaluation. My notes for this consultation are attached. If you have questions, please do not hesitate to call me. I look forward to following your patient along with you. Sincerely, Daija Mcintyre MD

## 2019-06-07 ENCOUNTER — HOSPITAL ENCOUNTER (EMERGENCY)
Age: 64
Discharge: NURSING, REHAB OR LONG-TERM CARE | End: 2019-06-08
Attending: EMERGENCY MEDICINE
Payer: MEDICARE

## 2019-06-07 ENCOUNTER — APPOINTMENT (OUTPATIENT)
Dept: GENERAL RADIOLOGY | Age: 64
End: 2019-06-07
Attending: EMERGENCY MEDICINE
Payer: MEDICARE

## 2019-06-07 DIAGNOSIS — N18.6 ESRD (END STAGE RENAL DISEASE) ON DIALYSIS (HCC): ICD-10-CM

## 2019-06-07 DIAGNOSIS — Z99.2 ESRD (END STAGE RENAL DISEASE) ON DIALYSIS (HCC): ICD-10-CM

## 2019-06-07 DIAGNOSIS — R07.9 CHEST PAIN, UNSPECIFIED TYPE: Primary | ICD-10-CM

## 2019-06-07 LAB
ALBUMIN SERPL-MCNC: 3.8 G/DL (ref 3.4–5)
ALBUMIN/GLOB SERPL: 1 {RATIO} (ref 0.8–1.7)
ALP SERPL-CCNC: 204 U/L (ref 45–117)
ALT SERPL-CCNC: 19 U/L (ref 13–56)
ANION GAP SERPL CALC-SCNC: 8 MMOL/L (ref 3–18)
AST SERPL-CCNC: 25 U/L (ref 15–37)
BASOPHILS # BLD: 0 K/UL (ref 0–0.1)
BASOPHILS NFR BLD: 0 % (ref 0–2)
BILIRUB SERPL-MCNC: 0.3 MG/DL (ref 0.2–1)
BUN SERPL-MCNC: 22 MG/DL (ref 7–18)
BUN/CREAT SERPL: 6 (ref 12–20)
CALCIUM SERPL-MCNC: 9.2 MG/DL (ref 8.5–10.1)
CHLORIDE SERPL-SCNC: 106 MMOL/L (ref 100–108)
CO2 SERPL-SCNC: 29 MMOL/L (ref 21–32)
CREAT SERPL-MCNC: 3.6 MG/DL (ref 0.6–1.3)
DIFFERENTIAL METHOD BLD: ABNORMAL
EOSINOPHIL # BLD: 0.3 K/UL (ref 0–0.4)
EOSINOPHIL NFR BLD: 4 % (ref 0–5)
ERYTHROCYTE [DISTWIDTH] IN BLOOD BY AUTOMATED COUNT: 16.5 % (ref 11.6–14.5)
GLOBULIN SER CALC-MCNC: 4 G/DL (ref 2–4)
GLUCOSE SERPL-MCNC: 146 MG/DL (ref 74–99)
HCT VFR BLD AUTO: 41 % (ref 35–45)
HGB BLD-MCNC: 13 G/DL (ref 12–16)
LYMPHOCYTES # BLD: 1.6 K/UL (ref 0.9–3.6)
LYMPHOCYTES NFR BLD: 18 % (ref 21–52)
MAGNESIUM SERPL-MCNC: 2.1 MG/DL (ref 1.6–2.6)
MCH RBC QN AUTO: 27.8 PG (ref 24–34)
MCHC RBC AUTO-ENTMCNC: 31.7 G/DL (ref 31–37)
MCV RBC AUTO: 87.6 FL (ref 74–97)
MONOCYTES # BLD: 0.8 K/UL (ref 0.05–1.2)
MONOCYTES NFR BLD: 10 % (ref 3–10)
NEUTS SEG # BLD: 5.9 K/UL (ref 1.8–8)
NEUTS SEG NFR BLD: 68 % (ref 40–73)
PHOSPHATE SERPL-MCNC: 2.3 MG/DL (ref 2.5–4.9)
PLATELET # BLD AUTO: 192 K/UL (ref 135–420)
POTASSIUM SERPL-SCNC: 3.5 MMOL/L (ref 3.5–5.5)
PROT SERPL-MCNC: 7.8 G/DL (ref 6.4–8.2)
RBC # BLD AUTO: 4.68 M/UL (ref 4.2–5.3)
SODIUM SERPL-SCNC: 143 MMOL/L (ref 136–145)
TROPONIN I SERPL-MCNC: <0.02 NG/ML (ref 0–0.04)
TROPONIN I SERPL-MCNC: <0.02 NG/ML (ref 0–0.04)
WBC # BLD AUTO: 8.6 K/UL (ref 4.6–13.2)

## 2019-06-07 PROCEDURE — 80053 COMPREHEN METABOLIC PANEL: CPT

## 2019-06-07 PROCEDURE — 84100 ASSAY OF PHOSPHORUS: CPT

## 2019-06-07 PROCEDURE — 83735 ASSAY OF MAGNESIUM: CPT

## 2019-06-07 PROCEDURE — 99285 EMERGENCY DEPT VISIT HI MDM: CPT

## 2019-06-07 PROCEDURE — 84484 ASSAY OF TROPONIN QUANT: CPT

## 2019-06-07 PROCEDURE — 93005 ELECTROCARDIOGRAM TRACING: CPT

## 2019-06-07 PROCEDURE — 85025 COMPLETE CBC W/AUTO DIFF WBC: CPT

## 2019-06-07 PROCEDURE — 71045 X-RAY EXAM CHEST 1 VIEW: CPT

## 2019-06-07 NOTE — ED PROVIDER NOTES
EMERGENCY DEPARTMENT HISTORY AND PHYSICAL EXAM    7:31 PM      Date: 6/7/2019  Patient Name: Sharita Henry    History of Presenting Illness     Chief Complaint   Patient presents with    Chest Pain         History Provided By: patient    Additional History (Context): Sharita Henry is a 70 y.o. female presents with chest pain while at dialysis. She does not remember the event well, denies chest pain here. Bizarre affect. No complaints of pain shortness of breath vomiting diarrhea or fever. Dutch Jeffrey PCP: Gregor Daily MD    Chief Complaint:   Duration:    Timing:    Location:   Quality:   Severity:   Modifying Factors:   Associated Symptoms:       Current Outpatient Medications   Medication Sig Dispense Refill    sevelamer carbonate (RENVELA) 800 mg tab tab Take 800 mg by mouth three (3) times daily.  traMADol (ULTRAM) 50 mg tablet Take 1 Tab by mouth every six (6) hours as needed for Pain. Max Daily Amount: 200 mg. 20 Tab 0    mirtazapine (REMERON) 15 mg tablet Take  by mouth nightly.  levETIRAcetam (KEPPRA) 250 mg tablet Take 1 Tab by mouth every Monday, Wednesday, Friday. 30 Tab 1    hydrALAZINE (APRESOLINE) 50 mg tablet Take 1 Tab by mouth three (3) times daily. 90 Tab 0    simvastatin (ZOCOR) 20 mg tablet Take 1 Tab by mouth nightly. 30 Tab 0    epoetin raphael (EPOGEN;PROCRIT) 3,000 unit/mL injection 1.73 mL by SubCUTAneous route Every Tuesday, Thursday and Saturday.  Indications: ANEMIA DUE TO RENAL FAILURE, Renal Dialysis (Patient taking differently: 3,000 Units by SubCUTAneous route every Monday, Wednesday, Friday.) 1 Vial 0    insulin lispro (HUMALOG) 100 unit/mL injection INITIATE INSULIN CORRECTIVE PROTOCOL: Normal Insulin Sensitivity   For Blood Sugar (mg/dL) of:     Less than 150 =   0 units           150 -199 =   2 units  200 -249 =   4 units  250 -299 =   6 units  300 -349 =   8 units  350 and above = 10 units and Call Physician  If 2 glucose readings are above 1 Vial 0    losartan (COZAAR) 100 mg tablet Take 1 Tab by mouth daily. 30 Tab 0    aspirin 81 mg tablet Take 81 mg by mouth daily.  diltiazem CD (CARDIZEM CD) 240 mg ER capsule Take 240 mg by mouth daily.  cloNIDine (CATAPRES) 0.1 mg tablet Take 0.1 mg by mouth two (2) times a day. Past History     Past Medical History:  Past Medical History:   Diagnosis Date    Asthma     [facility]ipolar [facility] ([facility]     Brain condition     Cataract     Chronic kidney disease     hemodialysis M-W-F    Diabetes ([facility]     Hyperlipidemia     Hypertension     Paralytic strabismus, unspecified     Psychiatric disorder     Seizures ([facility] 08/27/2018       Past Surgical History:  Past Surgical History:   Procedure Laterality Date    [place] INSJ TUNNELED CVC W/O SUBQ PORT/ AGE 3 YR/> N/A 8/9/2018     in-pt 474A, Insertion Tunneled Central Venous Catheter performed by Rob Arshad MD at [address] [place] [address]       Family History:  No family history on file. Social History:  Social History     Tobacco Use    Smoking status: Never Smoker    Smokeless tobacco: Never Used   Substance Use Topics    Alcohol use: No    Drug use: No       Allergies: Allergies   Allergen Reactions    Amoxicillin Unknown (comments)    Cleocin [Clindamycin Hcl] Unknown (comments)    Codeine Unknown (comments)    Lorcet 10/650 [Hydrocodone-Acetaminophen] Unknown (comments)    Penicillin G Benzathine Unknown (comments)    Shellfish Derived Unknown (comments)         Review of Systems     Review of Systems   Constitutional: Negative for diaphoresis and fever. HENT: Negative for congestion and sore throat. Eyes: Negative for pain and itching. Respiratory: Negative for cough and shortness of breath. Cardiovascular: Positive for chest pain. Negative for palpitations. Gastrointestinal: Negative for abdominal pain and diarrhea. Endocrine: Negative for polydipsia and polyuria. Genitourinary: Negative for dysuria and hematuria. Musculoskeletal: Negative for arthralgias and myalgias. Skin: Negative for rash and wound. Neurological: Negative for seizures and syncope. Hematological: Does not bruise/bleed easily. Psychiatric/Behavioral: Negative for agitation and hallucinations. Physical Exam       Patient Vitals for the past 12 hrs:   Temp Pulse Resp BP SpO2   06/07/19 1712 98.5 °F (36.9 °C) (!) 121 18 141/78 99 %       Physical Exam   Constitutional: She appears well-developed and well-nourished. HENT:   Head: Normocephalic and atraumatic. Eyes: Conjunctivae are normal. No scleral icterus. Neck: Normal range of motion. Neck supple. No JVD present. Cardiovascular: Normal rate, regular rhythm and normal heart sounds. 4 intact extremity pulses   Pulmonary/Chest: Effort normal and breath sounds normal.   Dialysis catheter right upper chest appears benign. Abdominal: Soft. She exhibits no mass. There is no tenderness. Musculoskeletal: Normal range of motion. Lymphadenopathy:     She has no cervical adenopathy. Neurological: She is alert. Skin: Skin is warm and dry. Nursing note and vitals reviewed. Diagnostic Study Results   Labs -  Recent Results (from the past 12 hour(s))   CBC WITH AUTOMATED DIFF    Collection Time: 06/07/19  5:30 PM   Result Value Ref Range    WBC 8.6 4.6 - 13.2 K/uL    RBC 4.68 4.20 - 5.30 M/uL    HGB 13.0 12.0 - 16.0 g/dL    HCT 41.0 35.0 - 45.0 %    MCV 87.6 74.0 - 97.0 FL    MCH 27.8 24.0 - 34.0 PG    MCHC 31.7 31.0 - 37.0 g/dL    RDW 16.5 (H) 11.6 - 14.5 %    PLATELET 074 043 - 747 K/uL    NEUTROPHILS 68 40 - 73 %    LYMPHOCYTES 18 (L) 21 - 52 %    MONOCYTES 10 3 - 10 %    EOSINOPHILS 4 0 - 5 %    BASOPHILS 0 0 - 2 %    ABS. NEUTROPHILS 5.9 1.8 - 8.0 K/UL    ABS. LYMPHOCYTES 1.6 0.9 - 3.6 K/UL    ABS. MONOCYTES 0.8 0.05 - 1.2 K/UL    ABS. EOSINOPHILS 0.3 0.0 - 0.4 K/UL    ABS.  BASOPHILS 0.0 0.0 - 0.1 K/UL    DF AUTOMATED     METABOLIC PANEL, COMPREHENSIVE    Collection Time: 06/07/19  5:30 PM   Result Value Ref Range    Sodium 143 136 - 145 mmol/L    Potassium 3.5 3.5 - 5.5 mmol/L    Chloride 106 100 - 108 mmol/L    CO2 29 21 - 32 mmol/L    Anion gap 8 3.0 - 18 mmol/L    Glucose 146 (H) 74 - 99 mg/dL    BUN 22 (H) 7.0 - 18 MG/DL    Creatinine 3.60 (H) 0.6 - 1.3 MG/DL    BUN/Creatinine ratio 6 (L) 12 - 20      GFR est AA 15 (L) >60 ml/min/1.73m2    GFR est non-AA 13 (L) >60 ml/min/1.73m2    Calcium 9.2 8.5 - 10.1 MG/DL    Bilirubin, total 0.3 0.2 - 1.0 MG/DL    ALT (SGPT) 19 13 - 56 U/L    AST (SGOT) 25 15 - 37 U/L    Alk. phosphatase 204 (H) 45 - 117 U/L    Protein, total 7.8 6.4 - 8.2 g/dL    Albumin 3.8 3.4 - 5.0 g/dL    Globulin 4.0 2.0 - 4.0 g/dL    A-G Ratio 1.0 0.8 - 1.7     MAGNESIUM    Collection Time: 06/07/19  5:30 PM   Result Value Ref Range    Magnesium 2.1 1.6 - 2.6 mg/dL   PHOSPHORUS    Collection Time: 06/07/19  5:30 PM   Result Value Ref Range    Phosphorus 2.3 (L) 2.5 - 4.9 MG/DL   TROPONIN I    Collection Time: 06/07/19  5:30 PM   Result Value Ref Range    Troponin-I, QT <0.02 0.0 - 0.045 NG/ML       Radiologic Studies -   XR CHEST PORT    (Results Pending)     No results found. Medications ordered:   Medications - No data to display      Medical Decision Making   Initial Medical Decision Making and DDx:     Patient with an event of chest pain during dialysis which is resolved now. Nothing acute on her initial EKG. We will get delta troponin likely discharge. No prior visits for similar complaint. Doubt PE aortic disease pneumonia and pneumothorax    ED Course: Progress Notes, Reevaluation, and Consults:     8:08 PM unimpressive chest chest x-ray and first set of labs. We will get a delta troponin. Turned over care to Dr. Altaf Rodriguez will follow the last troponin. I am the first provider for this patient. I reviewed the vital signs, available nursing notes, past medical history, past surgical history, family history and social history.     Patient Vitals for the past 12 hrs:   Temp Pulse Resp BP SpO2   06/07/19 1930  78 19 196/79 99 %   06/07/19 1745  95 19 199/85 99 %   06/07/19 1713  (!) 118 17  100 %   06/07/19 1712 98.5 °F (36.9 °C) (!) 121 21 141/78 100 %   06/07/19 1711     100 %   06/07/19 1710     100 %   06/07/19 1708    141/78        Vital Signs-Reviewed the patient's vital signs. Pulse Oximetry Analysis, Cardiac Monitor, 12 lead ekg:  Telemetry sinus tachycardia 121, EKG 1716, sinus tachycardia at 117 no acute ischemic process and no change from prior. Pulse ox 99% on room air. Interpreted by the EP. Records Reviewed: Nursing notes reviewed (Time of Review: 7:31 PM)    Procedures:   Critical Care Time:   Aspirin: (was aspirin given for stroke?)    Diagnosis     Clinical Impression: No diagnosis found. Disposition:       Follow-up Information    None          Patient's Medications   Start Taking    No medications on file   Continue Taking    ASPIRIN 81 MG TABLET    Take 81 mg by mouth daily. CLONIDINE (CATAPRES) 0.1 MG TABLET    Take 0.1 mg by mouth two (2) times a day. DILTIAZEM CD (CARDIZEM CD) 240 MG ER CAPSULE    Take 240 mg by mouth daily. EPOETIN RACHAEL (EPOGEN;PROCRIT) 3,000 UNIT/ML INJECTION    1.73 mL by SubCUTAneous route Every Tuesday, Thursday and Saturday. Indications: ANEMIA DUE TO RENAL FAILURE, Renal Dialysis    HYDRALAZINE (APRESOLINE) 50 MG TABLET    Take 1 Tab by mouth three (3) times daily. INSULIN LISPRO (HUMALOG) 100 UNIT/ML INJECTION    INITIATE INSULIN CORRECTIVE PROTOCOL: Normal Insulin Sensitivity   For Blood Sugar (mg/dL) of:     Less than 150 =   0 units           150 -199 =   2 units  200 -249 =   4 units  250 -299 =   6 units  300 -349 =   8 units  350 and above = 10 units and Call Physician  If 2 glucose readings are above    LEVETIRACETAM (KEPPRA) 250 MG TABLET    Take 1 Tab by mouth every Monday, Wednesday, Friday. LOSARTAN (COZAAR) 100 MG TABLET    Take 1 Tab by mouth daily. MIRTAZAPINE (REMERON) 15 MG TABLET    Take  by mouth nightly. SEVELAMER CARBONATE (RENVELA) 800 MG TAB TAB    Take 800 mg by mouth three (3) times daily. SIMVASTATIN (ZOCOR) 20 MG TABLET    Take 1 Tab by mouth nightly. TRAMADOL (ULTRAM) 50 MG TABLET    Take 1 Tab by mouth every six (6) hours as needed for Pain. Max Daily Amount: 200 mg.    These Medications have changed    No medications on file   Stop Taking    No medications on file     _______________________________    Notes:    Radha Wilcox MD using Dragon dictation      _______________________________

## 2019-06-07 NOTE — ED TRIAGE NOTES
Arrived from dialysis c/o chest pain. Patient states she has had pain for a few days.  Patient from [facility]

## 2019-06-08 VITALS
OXYGEN SATURATION: 99 % | HEART RATE: 83 BPM | SYSTOLIC BLOOD PRESSURE: 193 MMHG | RESPIRATION RATE: 16 BRPM | TEMPERATURE: 98.5 F | DIASTOLIC BLOOD PRESSURE: 88 MMHG

## 2019-06-08 NOTE — ED NOTES
Patient turned over to me Dr Domi Stevenson she had some chest pain while at dialysis. She states the pain lasted a few seconds. She states it is not recurred since. Troponin negative x2 no suspicion of PE or dissection. IMPRESSION:    1. Mild cardiomegaly.  Hypoinflation    EKG shows sinus at 86 with normal axis normal intervals there is no ST elevation or depression and no hypertrophy no change compared to May 2019

## 2019-06-08 NOTE — DISCHARGE INSTRUCTIONS

## 2019-06-09 LAB
ATRIAL RATE: 117 BPM
ATRIAL RATE: 86 BPM
CALCULATED P AXIS, ECG09: 60 DEGREES
CALCULATED P AXIS, ECG09: 64 DEGREES
CALCULATED R AXIS, ECG10: -49 DEGREES
CALCULATED R AXIS, ECG10: -51 DEGREES
CALCULATED T AXIS, ECG11: 31 DEGREES
CALCULATED T AXIS, ECG11: 35 DEGREES
DIAGNOSIS, 93000: NORMAL
DIAGNOSIS, 93000: NORMAL
P-R INTERVAL, ECG05: 120 MS
P-R INTERVAL, ECG05: 136 MS
Q-T INTERVAL, ECG07: 328 MS
Q-T INTERVAL, ECG07: 376 MS
QRS DURATION, ECG06: 86 MS
QRS DURATION, ECG06: 88 MS
QTC CALCULATION (BEZET), ECG08: 449 MS
QTC CALCULATION (BEZET), ECG08: 457 MS
VENTRICULAR RATE, ECG03: 117 BPM
VENTRICULAR RATE, ECG03: 86 BPM

## 2019-11-08 ENCOUNTER — HOSPITAL ENCOUNTER (OUTPATIENT)
Age: 64
Setting detail: OUTPATIENT SURGERY
Discharge: HOME | End: 2019-11-08
Attending: SURGERY | Admitting: SURGERY
Payer: MEDICARE

## 2019-11-08 VITALS
HEART RATE: 76 BPM | HEIGHT: 63 IN | DIASTOLIC BLOOD PRESSURE: 100 MMHG | RESPIRATION RATE: 21 BRPM | OXYGEN SATURATION: 99 % | BODY MASS INDEX: 23.57 KG/M2 | WEIGHT: 133 LBS | SYSTOLIC BLOOD PRESSURE: 198 MMHG

## 2019-11-08 DIAGNOSIS — N18.6 END STAGE RENAL DISEASE (HCC): ICD-10-CM

## 2019-11-08 DIAGNOSIS — T82.9XXA COMPLICATION OF DIALYSIS ACCESS INSERTION: ICD-10-CM

## 2019-11-08 LAB
BUN BLD-MCNC: 38 MG/DL (ref 7–18)
CHLORIDE BLD-SCNC: 102 MMOL/L (ref 100–108)
GLUCOSE BLD STRIP.AUTO-MCNC: 203 MG/DL (ref 74–106)
HCT VFR BLD CALC: 39 % (ref 36–49)
HGB BLD-MCNC: 13.3 G/DL (ref 12–16)
POTASSIUM BLD-SCNC: 4 MMOL/L (ref 3.5–5.5)
SODIUM BLD-SCNC: 139 MMOL/L (ref 136–145)

## 2019-11-08 PROCEDURE — 99152 MOD SED SAME PHYS/QHP 5/>YRS: CPT | Performed by: SURGERY

## 2019-11-08 PROCEDURE — 82947 ASSAY GLUCOSE BLOOD QUANT: CPT

## 2019-11-08 PROCEDURE — 74011250636 HC RX REV CODE- 250/636: Performed by: SURGERY

## 2019-11-08 PROCEDURE — 36558 INSERT TUNNELED CV CATH: CPT | Performed by: SURGERY

## 2019-11-08 PROCEDURE — 74011000250 HC RX REV CODE- 250: Performed by: SURGERY

## 2019-11-08 PROCEDURE — 77030013744: Performed by: SURGERY

## 2019-11-08 PROCEDURE — C1750 CATH, HEMODIALYSIS,LONG-TERM: HCPCS | Performed by: SURGERY

## 2019-11-08 PROCEDURE — 77030002986 HC SUT PROL J&J -A: Performed by: SURGERY

## 2019-11-08 PROCEDURE — C1769 GUIDE WIRE: HCPCS | Performed by: SURGERY

## 2019-11-08 DEVICE — PRECISION CHRONIC CATHETER SPORT PACK,SYMMETRICAL TIP, TAL VENATRAC STYLET,14.5 FR/CH (4.8 MM) X 19 CM
Type: IMPLANTABLE DEVICE | Status: FUNCTIONAL
Brand: PALINDROME

## 2019-11-08 RX ORDER — HEPARIN SODIUM 200 [USP'U]/100ML
INJECTION, SOLUTION INTRAVENOUS
Status: COMPLETED | OUTPATIENT
Start: 2019-11-08 | End: 2019-11-08

## 2019-11-08 RX ORDER — ONDANSETRON 2 MG/ML
4 INJECTION INTRAMUSCULAR; INTRAVENOUS
Status: DISCONTINUED | OUTPATIENT
Start: 2019-11-08 | End: 2019-11-08 | Stop reason: HOSPADM

## 2019-11-08 RX ORDER — OXYCODONE AND ACETAMINOPHEN 5; 325 MG/1; MG/1
1 TABLET ORAL
Status: DISCONTINUED | OUTPATIENT
Start: 2019-11-08 | End: 2019-11-08 | Stop reason: HOSPADM

## 2019-11-08 RX ORDER — MIDAZOLAM HYDROCHLORIDE 1 MG/ML
INJECTION, SOLUTION INTRAMUSCULAR; INTRAVENOUS AS NEEDED
Status: DISCONTINUED | OUTPATIENT
Start: 2019-11-08 | End: 2019-11-08 | Stop reason: HOSPADM

## 2019-11-08 RX ORDER — HEPARIN SODIUM 5000 [USP'U]/ML
INJECTION, SOLUTION INTRAVENOUS; SUBCUTANEOUS AS NEEDED
Status: DISCONTINUED | OUTPATIENT
Start: 2019-11-08 | End: 2019-11-08 | Stop reason: HOSPADM

## 2019-11-08 RX ORDER — FENTANYL CITRATE 50 UG/ML
INJECTION, SOLUTION INTRAMUSCULAR; INTRAVENOUS AS NEEDED
Status: DISCONTINUED | OUTPATIENT
Start: 2019-11-08 | End: 2019-11-08 | Stop reason: HOSPADM

## 2019-11-08 RX ORDER — MORPHINE SULFATE 2 MG/ML
1 INJECTION, SOLUTION INTRAMUSCULAR; INTRAVENOUS
Status: DISCONTINUED | OUTPATIENT
Start: 2019-11-08 | End: 2019-11-08 | Stop reason: HOSPADM

## 2019-11-08 RX ORDER — DIPHENHYDRAMINE HYDROCHLORIDE 50 MG/ML
12.5 INJECTION, SOLUTION INTRAMUSCULAR; INTRAVENOUS
Status: DISCONTINUED | OUTPATIENT
Start: 2019-11-08 | End: 2019-11-08 | Stop reason: HOSPADM

## 2019-11-08 RX ORDER — LIDOCAINE HYDROCHLORIDE 10 MG/ML
INJECTION, SOLUTION EPIDURAL; INFILTRATION; INTRACAUDAL; PERINEURAL AS NEEDED
Status: DISCONTINUED | OUTPATIENT
Start: 2019-11-08 | End: 2019-11-08 | Stop reason: HOSPADM

## 2019-11-08 RX ORDER — ACETAMINOPHEN 325 MG/1
650 TABLET ORAL
Status: DISCONTINUED | OUTPATIENT
Start: 2019-11-08 | End: 2019-11-08 | Stop reason: HOSPADM

## 2019-11-08 RX ORDER — MORPHINE SULFATE 10 MG/ML
1 INJECTION, SOLUTION INTRAMUSCULAR; INTRAVENOUS
Status: DISCONTINUED | OUTPATIENT
Start: 2019-11-08 | End: 2019-11-08

## 2019-11-08 NOTE — Clinical Note
TRANSFER - IN REPORT:  
 
Verbal report received from: alok mora. Report consisted of patient's Situation, Background, Assessment and  
Recommendations(SBAR). Opportunity for questions and clarification was provided. Assessment completed upon patient's arrival to unit and care assumed. Patient transported with a Cardiac Cath Tech / Patient Care Tech.

## 2019-11-08 NOTE — DISCHARGE INSTRUCTIONS
[address] Tunneled or Non Tunneled Catheter Discharge Instructions    General Information:   A catheter, either tunneled (permanent) or non-tunneled (temporary) catheter was inserted into your neck today for the purpose of Cancer treatment (apheresis) or dialysis. Your catheter will be used for the length of your apheresis, or until you get a fistula placed in surgery for dialysis. After this time, your catheter may be removed. You may return to our department for the catheter removal.  A non-tunneled catheter will exit at the neck. There will be three ports, two of which will be used for dialysis or apheresis. The one smaller port in the middle can be used like a regular IV. It ideally is used only for about two weeks. A tunneled catheter will exit lower down on the chest wall, and can be used for a longer period of time. There is no IV port on these. The tunneled catheter is used only for dialysis. In case of emergencies only can drugs be given through these catheters and only with written permission from your doctor. Home Care Instructions: You can resume your regular diet. Do not drink alcohol, drive, or make any important legal decisions in the next 24 hours. Watch the site carefully for signs of infection, like fever, drainage, redness, and/or swelling. Your catheter should be \"packed\" with heparin after each use or at least once a week if it is not being used. This \"packing\" should only be done by nurses experienced with caring for this type of catheter. You may shower in 24 hours, but you need to cover the catheter with plastic wrap and tape to keep it dry while you are showering. Keep the site clean, dry, and protected. Do not immerse yourself in water like in the case of tub baths or swimming as long as you have the catheter.      Call If:   You should call your Physician and/or the Radiology Nurse if you have any signs of infection, shortness of breath, or if the dressing should come off or become moist.  You will be instructed on where to go for a new dressing. You should not have to change the dressings yourself, as that will be done by the nurses who access the catheter. Follow-Up Instructions:  Please see your ordering doctor as he/she has requested. DISCHARGE SUMMARY from Nurse    PATIENT INSTRUCTIONS:    After general anesthesia or intravenous sedation, for 24 hours or while taking prescription Narcotics:  · Limit your activities  · Do not drive and operate hazardous machinery  · Do not make important personal or business decisions  · Do  not drink alcoholic beverages  · If you have not urinated within 8 hours after discharge, please contact your surgeon on call. Report the following to your surgeon:  · Excessive pain, swelling, redness or odor of or around the surgical area  · Temperature over 100.5  · Nausea and vomiting lasting longer than 4 hours or if unable to take medications  · Any signs of decreased circulation or nerve impairment to extremity: change in color, persistent  numbness, tingling, coldness or increase pain  · Any questions    What to do at Home:    *  Please give a list of your current medications to your Primary Care Provider. *  Please update this list whenever your medications are discontinued, doses are      changed, or new medications (including over-the-counter products) are added. *  Please carry medication information at all times in case of emergency situations. These are general instructions for a healthy lifestyle:    No smoking/ No tobacco products/ Avoid exposure to second hand smoke  Surgeon General's Warning:  Quitting smoking now greatly reduces serious risk to your health.     Obesity, smoking, and sedentary lifestyle greatly increases your risk for illness    A healthy diet, regular physical exercise & weight monitoring are important for maintaining a healthy lifestyle    You may be retaining fluid if you have a history of heart failure or if you experience any of the following symptoms:  Weight gain of 3 pounds or more overnight or 5 pounds in a week, increased swelling in our hands or feet or shortness of breath while lying flat in bed. Please call your doctor as soon as you notice any of these symptoms; do not wait until your next office visit. The discharge information has been reviewed with the patient and caregiver. The patient and caregiver verbalized understanding. Discharge medications reviewed with the patient and caregiver and appropriate educational materials and side effects teaching were provided.   ___________________________________________________________________________________________________________________________________    To Reach Us:     Patient Signature:  Date: 11/8/2019  Discharging Nurse: Junious Schaumann, RN

## 2019-11-08 NOTE — Clinical Note
TRANSFER - OUT REPORT:  
 
Verbal report given to: Lula Shankar . Report consisted of patient's Situation, Background, Assessment and  
Recommendations(SBAR). Opportunity for questions and clarification was provided. Patient transported with a Cardiac Cath Tech / Patient Care Tech. Patient transported to: [address]

## 2019-11-08 NOTE — H&P
Surgery History and Physical    Subjective: Davin Wyatt is a 75 y.o. female who presents with esrd and poorly working catheter. Patient Active Problem List    Diagnosis Date Noted   Shana Cheadle 08/27/2018    New onset seizure ([facility] 08/27/2018    ESRD (end stage renal disease) ([facility] 08/08/2018    Secondary hyperparathyroidism of renal origin (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 08/08/2018    CVA, old, cognitive deficits 08/08/2018    Acute on chronic renal insufficiency 08/07/2018    Hypertension 08/07/2018     Past Medical History:   Diagnosis Date    Asthma     Bipolar [facility] disorder [facility]     Brain condition     Cataract     Chronic kidney disease     hemodialysis M-W-F    Diabetes ([facility]     Hyperlipidemia     Hypertension     Paralytic strabismus, unspecified     Psychiatric disorder     Seizures ([facility] 08/27/2018      Past Surgical History:   Procedure Laterality Date    [place] INSJ TUNNELED CVC W/O SUBQ PORT/ AGE 3 YR/> N/A 8/9/2018     in-pt 474A, Insertion Tunneled Central Venous Catheter performed by Austin Antonio MD at [address] [place] [address]      Social History     Tobacco Use    Smoking status: Never Smoker    Smokeless tobacco: Never Used   Substance Use Topics    Alcohol use: No      No family history on file. Prior to Admission medications    Medication Sig Start Date End Date Taking? Authorizing Provider   sevelamer carbonate (RENVELA) 800 mg tab tab Take 800 mg by mouth three (3) times daily. Provider, Historical   traMADol (ULTRAM) 50 mg tablet Take 1 Tab by mouth every six (6) hours as needed for Pain. Max Daily Amount: 200 mg. 11/20/18   Pa Monique MD   mirtazapine (REMERON) 15 mg tablet Take  by mouth nightly. Provider, Historical   levETIRAcetam (KEPPRA) 250 mg tablet Take 1 Tab by mouth every Monday, Wednesday, Friday.  8/31/18   Peter Thompson MD   hydrALAZINE (APRESOLINE) 50 mg tablet Take 1 Tab by mouth three (3) times daily. 8/14/18   Manpreet Kirk MD   simvastatin (ZOCOR) 20 mg tablet Take 1 Tab by mouth nightly. 8/14/18   Manpreet Kirk MD   epoetin raphael (EPOGEN;PROCRIT) 3,000 unit/mL injection 1.73 mL by SubCUTAneous route Every Tuesday, Thursday and Saturday. Indications: ANEMIA DUE TO RENAL FAILURE, Renal Dialysis  Patient taking differently: 3,000 Units by SubCUTAneous route every Monday, Wednesday, Friday. 8/14/18   Manpreet Kirk MD   insulin lispro (HUMALOG) 100 unit/mL injection INITIATE INSULIN CORRECTIVE PROTOCOL: Normal Insulin Sensitivity   For Blood Sugar (mg/dL) of:     Less than 150 =   0 units           150 -199 =   2 units  200 -249 =   4 units  250 -299 =   6 units  300 -349 =   8 units  350 and above = 10 units and Call Physician  If 2 glucose readings are above 8/14/18   Rachna Kirk MD   losartan (COZAAR) 100 mg tablet Take 1 Tab by mouth daily. 8/15/18   Manpreet Kirk MD   aspirin 81 mg tablet Take 81 mg by mouth daily. Other, MD Elina   diltiazem CD (CARDIZEM CD) 240 mg ER capsule Take 240 mg by mouth daily. Elina Light MD   cloNIDine (CATAPRES) 0.1 mg tablet Take 0.1 mg by mouth two (2) times a day. Elina Light MD     Allergies   Allergen Reactions    Amoxicillin Unknown (comments)    Cleocin [Clindamycin Hcl] Unknown (comments)    Codeine Unknown (comments)    Lorcet 10/650 [Hydrocodone-Acetaminophen] Unknown (comments)    Penicillin G Benzathine Unknown (comments)    Shellfish Derived Unknown (comments)         ROS:  Pertinent items are noted in HPI. Unless otherwise mentioned in the HPI. Objective:     No data found. No data recorded.       Physical Exam:  GENERAL: alert, cooperative, no distress, appears stated age, THROAT & NECK: normal and no erythema or exudates noted. , LUNG: clear to auscultation bilaterally, HEART: regular rate and rhythm, S1, S2 normal, no murmur, click, rub or gallop, ABDOMEN: soft, non-tender. Bowel sounds normal. No masses,  no organomegaly    Labs: No results found for this or any previous visit (from the past 24 hour(s)). Data Review:    CBC:   Lab Results   Component Value Date/Time    WBC 8.6 06/07/2019 05:30 PM    RBC 4.68 06/07/2019 05:30 PM    HGB 13.0 06/07/2019 05:30 PM    HCT 41.0 06/07/2019 05:30 PM    PLATELET 181 54/96/1035 05:30 PM      BMP:   Lab Results   Component Value Date/Time    Glucose 146 (H) 06/07/2019 05:30 PM    Sodium 143 06/07/2019 05:30 PM    Potassium 3.5 06/07/2019 05:30 PM    Chloride 106 06/07/2019 05:30 PM    CO2 29 06/07/2019 05:30 PM    BUN 22 (H) 06/07/2019 05:30 PM    Creatinine 3.60 (H) 06/07/2019 05:30 PM    Calcium 9.2 06/07/2019 05:30 PM     Coagulation: No results found for: PTP, INR, APTT, INREXT    Assessment:     Active Problems:    * No active hospital problems. *      Plan:     permcath exchange.     Signed By: Cecilio Sims MD     November 8, 2019

## 2019-11-08 NOTE — PROGRESS NOTES
Cath holding summary     Patient escorted to cath holding from waiting area ambulatory, alert and oriented x 4, voicing no complaints. Changed into gown and placed on monitor. NPO since MN. Lab results, med rec and H&P reviewed on chart. PIV x 1 inserted by Jeronimo Rea. Family at bedside. 1524  TRANSFER - OUT REPORT:    Verbal report given to Johnny(name) on Trisha Claude  being transferred to cath lab(unit) for ordered procedure       Report consisted of patients Situation, Background, Assessment and   Recommendations(SBAR). Information from the following report(s) SBAR, MAR and Pre Procedure Checklist was reviewed with the receiving nurse. Opportunity for questions and clarification was provided. Patient transported with:   Tech     0813  TRANSFER - IN REPORT:    Verbal report received from Johnny(name) on Trisha Claude  being received from cath lab(unit) for routine post - op      Report consisted of patients Situation, Background, Assessment and   Recommendations(SBAR). Information from the following report(s) SBAR, Procedure Summary and MAR was reviewed with the receiving nurse. Opportunity for questions and clarification was provided. Assessment completed upon patients arrival to unit and care assumed. Right chest perm cath, no bleeding noted. Clean dry and intact. 0900  Pt d/c to [facility].

## 2019-12-04 ENCOUNTER — HOSPITAL ENCOUNTER (EMERGENCY)
Age: 64
Discharge: NURSING, REHAB OR LONG-TERM CARE | End: 2019-12-04
Attending: EMERGENCY MEDICINE
Payer: MEDICARE

## 2019-12-04 VITALS
WEIGHT: 167.6 LBS | HEART RATE: 90 BPM | TEMPERATURE: 97 F | RESPIRATION RATE: 21 BRPM | HEIGHT: 63 IN | BODY MASS INDEX: 29.7 KG/M2 | DIASTOLIC BLOOD PRESSURE: 99 MMHG | OXYGEN SATURATION: 100 % | SYSTOLIC BLOOD PRESSURE: 159 MMHG

## 2019-12-04 DIAGNOSIS — R11.2 NON-INTRACTABLE VOMITING WITH NAUSEA, UNSPECIFIED VOMITING TYPE: Primary | ICD-10-CM

## 2019-12-04 DIAGNOSIS — R19.7 DIARRHEA, UNSPECIFIED TYPE: ICD-10-CM

## 2019-12-04 LAB
ALBUMIN SERPL-MCNC: 3.9 G/DL (ref 3.4–5)
ALBUMIN/GLOB SERPL: 1 {RATIO} (ref 0.8–1.7)
ALP SERPL-CCNC: 121 U/L (ref 45–117)
ALT SERPL-CCNC: 21 U/L (ref 13–56)
ANION GAP SERPL CALC-SCNC: 5 MMOL/L (ref 3–18)
AST SERPL-CCNC: 19 U/L (ref 10–38)
BASOPHILS # BLD: 0 K/UL (ref 0–0.1)
BASOPHILS NFR BLD: 0 % (ref 0–2)
BILIRUB SERPL-MCNC: 0.2 MG/DL (ref 0.2–1)
BUN SERPL-MCNC: 18 MG/DL (ref 7–18)
BUN/CREAT SERPL: 5 (ref 12–20)
CALCIUM SERPL-MCNC: 9.2 MG/DL (ref 8.5–10.1)
CHLORIDE SERPL-SCNC: 104 MMOL/L (ref 100–111)
CO2 SERPL-SCNC: 30 MMOL/L (ref 21–32)
CREAT SERPL-MCNC: 3.76 MG/DL (ref 0.6–1.3)
DIFFERENTIAL METHOD BLD: ABNORMAL
EOSINOPHIL # BLD: 0.4 K/UL (ref 0–0.4)
EOSINOPHIL NFR BLD: 4 % (ref 0–5)
ERYTHROCYTE [DISTWIDTH] IN BLOOD BY AUTOMATED COUNT: 14.9 % (ref 11.6–14.5)
GLOBULIN SER CALC-MCNC: 4 G/DL (ref 2–4)
GLUCOSE SERPL-MCNC: 162 MG/DL (ref 74–99)
HCT VFR BLD AUTO: 35.4 % (ref 35–45)
HGB BLD-MCNC: 11.4 G/DL (ref 12–16)
LIPASE SERPL-CCNC: 542 U/L (ref 73–393)
LYMPHOCYTES # BLD: 1.3 K/UL (ref 0.9–3.6)
LYMPHOCYTES NFR BLD: 14 % (ref 21–52)
MCH RBC QN AUTO: 28.1 PG (ref 24–34)
MCHC RBC AUTO-ENTMCNC: 32.2 G/DL (ref 31–37)
MCV RBC AUTO: 87.4 FL (ref 74–97)
MONOCYTES # BLD: 0.7 K/UL (ref 0.05–1.2)
MONOCYTES NFR BLD: 8 % (ref 3–10)
NEUTS SEG # BLD: 6.6 K/UL (ref 1.8–8)
NEUTS SEG NFR BLD: 74 % (ref 40–73)
PLATELET # BLD AUTO: 257 K/UL (ref 135–420)
PMV BLD AUTO: 12.3 FL (ref 9.2–11.8)
POTASSIUM SERPL-SCNC: 4 MMOL/L (ref 3.5–5.5)
PROT SERPL-MCNC: 7.9 G/DL (ref 6.4–8.2)
RBC # BLD AUTO: 4.05 M/UL (ref 4.2–5.3)
SODIUM SERPL-SCNC: 139 MMOL/L (ref 136–145)
WBC # BLD AUTO: 9 K/UL (ref 4.6–13.2)

## 2019-12-04 PROCEDURE — 74011250636 HC RX REV CODE- 250/636: Performed by: EMERGENCY MEDICINE

## 2019-12-04 PROCEDURE — 83690 ASSAY OF LIPASE: CPT

## 2019-12-04 PROCEDURE — 96375 TX/PRO/DX INJ NEW DRUG ADDON: CPT

## 2019-12-04 PROCEDURE — 99284 EMERGENCY DEPT VISIT MOD MDM: CPT

## 2019-12-04 PROCEDURE — 80053 COMPREHEN METABOLIC PANEL: CPT

## 2019-12-04 PROCEDURE — 85025 COMPLETE CBC W/AUTO DIFF WBC: CPT

## 2019-12-04 PROCEDURE — 96374 THER/PROPH/DIAG INJ IV PUSH: CPT

## 2019-12-04 RX ORDER — HYDRALAZINE HYDROCHLORIDE 20 MG/ML
10 INJECTION INTRAMUSCULAR; INTRAVENOUS ONCE
Status: COMPLETED | OUTPATIENT
Start: 2019-12-04 | End: 2019-12-04

## 2019-12-04 RX ORDER — SODIUM CHLORIDE 9 MG/ML
100 INJECTION, SOLUTION INTRAVENOUS CONTINUOUS
Status: DISCONTINUED | OUTPATIENT
Start: 2019-12-04 | End: 2019-12-04

## 2019-12-04 RX ORDER — ONDANSETRON 4 MG/1
4 TABLET, FILM COATED ORAL
Qty: 4 TAB | Refills: 0 | Status: SHIPPED | OUTPATIENT
Start: 2019-12-04 | End: 2019-12-07

## 2019-12-04 RX ORDER — ONDANSETRON 2 MG/ML
4 INJECTION INTRAMUSCULAR; INTRAVENOUS
Status: COMPLETED | OUTPATIENT
Start: 2019-12-04 | End: 2019-12-04

## 2019-12-04 RX ADMIN — SODIUM CHLORIDE 100 ML/HR: 900 INJECTION, SOLUTION INTRAVENOUS at 16:57

## 2019-12-04 RX ADMIN — HYDRALAZINE HYDROCHLORIDE 10 MG: 20 INJECTION, SOLUTION INTRAMUSCULAR; INTRAVENOUS at 19:05

## 2019-12-04 RX ADMIN — ONDANSETRON 4 MG: 2 INJECTION INTRAMUSCULAR; INTRAVENOUS at 16:59

## 2019-12-04 RX ADMIN — SODIUM CHLORIDE 100 ML/HR: 900 INJECTION, SOLUTION INTRAVENOUS at 16:58

## 2019-12-04 NOTE — ED TRIAGE NOTES
Patient arrived via EMS from dialysis. Patient's blood pressure after dialysis was 220/110. Dialysis called the doctor aman and they were instructed to bring her to the hospital. Patient has no complaints per EMS.

## 2019-12-04 NOTE — ED PROVIDER NOTES
EMERGENCY DEPARTMENT HISTORY AND PHYSICAL EXAM    4:32 PM      Date: 12/4/2019  Patient Name: Bayron Adrian    History of Presenting Illness     Chief Complaint   Patient presents with    Hypertension    Vomiting         History Provided By: Patient    Additional History (Context): Bayron Adrian is a 75 y.o. female with Past medical history of end-stage renal disease, diabetes, hyperlipidemia, hypertension, asthma who presents with chief complaint of nausea and vomiting today while at dialysis. Patient reports that she threw up  3 times and also had some diarrhea. Denies any food out of the ordinary and no sick contacts. She denies any sick contacts, fever, abdominal pain, dizziness, chest pain, shortness of breath and no other complaint. PCP: Maggie Beltran MD        Past History     Past Medical History:  Past Medical History:   Diagnosis Date    Asthma     [facility] [address]     Brain condition     Cataract     Chronic kidney disease     hemodialysis M-W-F    Diabetes ([facility] [address]     Hyperlipidemia     Hypertension     Paralytic strabismus, unspecified     Psychiatric disorder     Seizures ([facility] [address] 08/27/2018       Past Surgical History:  Past Surgical History:   Procedure Laterality Date    [place] [facility] TUNNELED CVC W/O SUBQ PORT/ AGE 3 YR/> N/A 8/9/2018     in-pt 474A, Insertion Tunneled Central Venous Catheter performed by Apolinar Reyna MD at [place] [facility]    [place] [facility] TUNNELED CVC W/O SUBQ PORT/ AGE 3 YR/> N/A 11/8/2019    INSERTION TUNNELED CENTRAL VENOUS CATHETER performed by Eros Ji MD at [address] [place] [address]       Family History:  No family history on file. Social History:  Social History     Tobacco Use    Smoking status: Never Smoker    Smokeless tobacco: Never Used   Substance Use Topics    Alcohol use: No    Drug use: No       Allergies:   Allergies   Allergen Reactions    Amoxicillin Unknown (comments)    Cleocin [Clindamycin Hcl] Unknown (comments)    Codeine Unknown (comments)    Lorcet 10/650 [Hydrocodone-Acetaminophen] Unknown (comments)    Penicillin G Benzathine Unknown (comments)    Shellfish Derived Unknown (comments)         Review of Systems       Review of Systems   Constitutional: Negative for chills and fever. HENT: Negative for congestion, rhinorrhea, sore throat and trouble swallowing. Eyes: Negative for visual disturbance. Respiratory: Negative for cough, shortness of breath and wheezing. Cardiovascular: Negative for chest pain. Gastrointestinal: Positive for diarrhea, nausea and vomiting. Negative for abdominal pain. Endocrine: Negative for cold intolerance and heat intolerance. Genitourinary: Negative for flank pain. End-stage renal disease   Musculoskeletal: Negative for arthralgias and neck stiffness. Skin: Negative for pallor and rash. Neurological: Negative for dizziness, weakness, numbness and headaches. Hematological: Does not bruise/bleed easily. Psychiatric/Behavioral: Negative for confusion and dysphoric mood. All other systems reviewed and are negative. Physical Exam     Visit Vitals  BP (!) 159/99 (BP 1 Location: Right arm, BP Patient Position: At rest)   Pulse 90   Temp 97 °F (36.1 °C)   Resp 21   Ht 5' 3\" (1.6 m)   Wt 76 kg (167 lb 9.6 oz)   LMP  (LMP Unknown)   SpO2 100%   BMI 29.69 kg/m²         Physical Exam  Vitals signs and nursing note reviewed. Constitutional:       General: She is not in acute distress. Appearance: She is well-developed. She is not diaphoretic. HENT:      Head: Normocephalic and atraumatic. Mouth/Throat:      Mouth: Mucous membranes are dry. Eyes:      General: No scleral icterus. Conjunctiva/sclera: Conjunctivae normal.      Pupils: Pupils are equal, round, and reactive to light. Neck:      Musculoskeletal: Normal range of motion and neck supple. No muscular tenderness. Cardiovascular:      Rate and Rhythm: Normal rate.       Heart sounds: Normal heart sounds. Comments: Capillary refill < 3 seconds  Pulmonary:      Effort: Pulmonary effort is normal. No respiratory distress. Breath sounds: Normal breath sounds. No stridor. No wheezing or rhonchi. Abdominal:      General: Bowel sounds are normal. There is no distension. Palpations: Abdomen is soft. Tenderness: There is no tenderness. There is no guarding. Hernia: No hernia is present. Musculoskeletal: Normal range of motion. General: No swelling or tenderness. Comments: Right chest wall dialysis catheter looks good, clean   Lymphadenopathy:      Cervical: No cervical adenopathy. Skin:     General: Skin is warm and dry. Coloration: Skin is not jaundiced or pale. Findings: No erythema. Neurological:      Mental Status: She is alert and oriented to person, place, and time. Cranial Nerves: No cranial nerve deficit. Sensory: No sensory deficit. Motor: No weakness. Psychiatric:         Thought Content: Thought content normal.           Diagnostic Study Results     Labs -  Recent Results (from the past 12 hour(s))   METABOLIC PANEL, COMPREHENSIVE    Collection Time: 12/04/19  3:48 PM   Result Value Ref Range    Sodium 139 136 - 145 mmol/L    Potassium 4.0 3.5 - 5.5 mmol/L    Chloride 104 100 - 111 mmol/L    CO2 30 21 - 32 mmol/L    Anion gap 5 3.0 - 18 mmol/L    Glucose 162 (H) 74 - 99 mg/dL    BUN 18 7.0 - 18 MG/DL    Creatinine 3.76 (H) 0.6 - 1.3 MG/DL    BUN/Creatinine ratio 5 (L) 12 - 20      GFR est AA 15 (L) >60 ml/min/1.73m2    GFR est non-AA 12 (L) >60 ml/min/1.73m2    Calcium 9.2 8.5 - 10.1 MG/DL    Bilirubin, total 0.2 0.2 - 1.0 MG/DL    ALT (SGPT) 21 13 - 56 U/L    AST (SGOT) 19 10 - 38 U/L    Alk.  phosphatase 121 (H) 45 - 117 U/L    Protein, total 7.9 6.4 - 8.2 g/dL    Albumin 3.9 3.4 - 5.0 g/dL    Globulin 4.0 2.0 - 4.0 g/dL    A-G Ratio 1.0 0.8 - 1.7     LIPASE    Collection Time: 12/04/19  3:48 PM   Result Value Ref Range Lipase 542 (H) 73 - 393 U/L   CBC WITH AUTOMATED DIFF    Collection Time: 12/04/19  3:48 PM   Result Value Ref Range    WBC 9.0 4.6 - 13.2 K/uL    RBC 4.05 (L) 4.20 - 5.30 M/uL    HGB 11.4 (L) 12.0 - 16.0 g/dL    HCT 35.4 35.0 - 45.0 %    MCV 87.4 74.0 - 97.0 FL    MCH 28.1 24.0 - 34.0 PG    MCHC 32.2 31.0 - 37.0 g/dL    RDW 14.9 (H) 11.6 - 14.5 %    PLATELET 223 129 - 867 K/uL    MPV 12.3 (H) 9.2 - 11.8 FL    NEUTROPHILS 74 (H) 40 - 73 %    LYMPHOCYTES 14 (L) 21 - 52 %    MONOCYTES 8 3 - 10 %    EOSINOPHILS 4 0 - 5 %    BASOPHILS 0 0 - 2 %    ABS. NEUTROPHILS 6.6 1.8 - 8.0 K/UL    ABS. LYMPHOCYTES 1.3 0.9 - 3.6 K/UL    ABS. MONOCYTES 0.7 0.05 - 1.2 K/UL    ABS. EOSINOPHILS 0.4 0.0 - 0.4 K/UL    ABS. BASOPHILS 0.0 0.0 - 0.1 K/UL    DF AUTOMATED         Radiologic Studies -   No orders to display         Medical Decision Making   I am the first provider for this patient. I reviewed the vital signs, available nursing notes, past medical history, past surgical history, family history and social history. Vital Signs-Reviewed the patient's vital signs. Records Reviewed: Nursing Notes and Old Medical Records (Time of Review: 4:32 PM)    Provider Notes (Medical Decision Making): DDX: Viral illness, metabolic, dehydration, food poisoning    Labs, small IV fluid bolus, Zofran    Abdominal exam is benign  MDM    Medications   sodium chloride 0.9 % bolus infusion 500 mL (has no administration in time range)   ondansetron (ZOFRAN) injection 4 mg (4 mg IntraVENous Given 12/4/19 0462)           ED Course: Progress Notes, Reevaluation, and Consults:  WBC within normal limits  Creatinine elevated and patient of end-stage renal disease  Lipase slightly elevated but patient with no abdominal tenderness or abdominal pain    No alarming labs      Diagnosis     Clinical Impression:   1. Non-intractable vomiting with nausea, unspecified vomiting type    2.  Diarrhea, unspecified type        Disposition: Discharged    Follow-up Information     Follow up With Specialties Details Why Contact Info    Laurie Lopez MD Geriatric Medicine Schedule an appointment as soon as possible for a visit in 2 days  [facility] [address]      [facility] EMERGENCY DEPT Emergency Medicine  As needed, If symptoms worsen [address] [id number]  [phone]           Patient's Medications   Start Taking    ONDANSETRON HCL (ZOFRAN) 4 MG TABLET    Take 1 Tab by mouth every eight (8) hours as needed for Nausea for up to 3 days. Continue Taking    ASPIRIN 81 MG TABLET    Take 81 mg by mouth daily. CLONIDINE (CATAPRES) 0.1 MG TABLET    Take 0.1 mg by mouth two (2) times a day. DILTIAZEM CD (CARDIZEM CD) 240 MG ER CAPSULE    Take 240 mg by mouth daily. EPOETIN RACHAEL (EPOGEN;PROCRIT) 3,000 UNIT/ML INJECTION    1.73 mL by SubCUTAneous route Every Tuesday, Thursday and Saturday. Indications: ANEMIA DUE TO RENAL FAILURE, Renal Dialysis    HYDRALAZINE (APRESOLINE) 50 MG TABLET    Take 1 Tab by mouth three (3) times daily. INSULIN LISPRO (HUMALOG) 100 UNIT/ML INJECTION    INITIATE INSULIN CORRECTIVE PROTOCOL: Normal Insulin Sensitivity   For Blood Sugar (mg/dL) of:     Less than 150 =   0 units           150 -199 =   2 units  200 -249 =   4 units  250 -299 =   6 units  300 -349 =   8 units  350 and above = 10 units and Call Physician  If 2 glucose readings are above    LEVETIRACETAM (KEPPRA) 250 MG TABLET    Take 1 Tab by mouth every Monday, Wednesday, Friday. LOSARTAN (COZAAR) 100 MG TABLET    Take 1 Tab by mouth daily. MIRTAZAPINE (REMERON) 15 MG TABLET    Take  by mouth nightly. SEVELAMER CARBONATE (RENVELA) 800 MG TAB TAB    Take 800 mg by mouth three (3) times daily. SIMVASTATIN (ZOCOR) 20 MG TABLET    Take 1 Tab by mouth nightly. TRAMADOL (ULTRAM) 50 MG TABLET    Take 1 Tab by mouth every six (6) hours as needed for Pain. Max Daily Amount: 200 mg. These Medications have changed    No medications on file   Stop Taking    No medications on file         Brooklyn Dos Santos DO    Dragon medical dictation software was used for portions of this report. Unintended transcription errors may occur. My signature above authenticates this document and my orders, the final    diagnosis (es), discharge prescription (s), and instructions in the Epic    record.

## 2019-12-04 NOTE — DISCHARGE INSTRUCTIONS
Patient Education      If you were prescribed any medication take as directed. Follow up with your primary care physician or with specialist as directed. Return to the emergency room with any new or worsening conditions. Diarrhea: Care Instructions  Your Care Instructions    Diarrhea is loose, watery stools (bowel movements). The exact cause is often hard to find. Sometimes diarrhea is your body's way of getting rid of what caused an upset stomach. Viruses, food poisoning, and many medicines can cause diarrhea. Some people get diarrhea in response to emotional stress, anxiety, or certain foods. Almost everyone has diarrhea now and then. It usually isn't serious, and your stools will return to normal soon. The important thing to do is replace the fluids you have lost, so you can prevent dehydration. The doctor has checked you carefully, but problems can develop later. If you notice any problems or new symptoms, get medical treatment right away. Follow-up care is a key part of your treatment and safety. Be sure to make and go to all appointments, and call your doctor if you are having problems. It's also a good idea to know your test results and keep a list of the medicines you take. How can you care for yourself at home? · Watch for signs of dehydration, which means your body has lost too much water. Dehydration is a serious condition and should be treated right away. Signs of dehydration are:  ? Increasing thirst and dry eyes and mouth. ? Feeling faint or lightheaded. ? A smaller amount of urine than normal.  · To prevent dehydration, drink plenty of fluids. Choose water and other caffeine-free clear liquids until you feel better. If you have kidney, heart, or liver disease and have to limit fluids, talk with your doctor before you increase the amount of fluids you drink. · Begin eating small amounts of mild foods the next day, if you feel like it. ? Try yogurt that has live cultures of Lactobacillus. (Check the label.)  ? Avoid spicy foods, fruits, alcohol, and caffeine until 48 hours after all symptoms are gone. ? Avoid chewing gum that contains sorbitol. ? Avoid dairy products (except for yogurt with Lactobacillus) while you have diarrhea and for 3 days after symptoms are gone. · The doctor may recommend that you take over-the-counter medicine, such as loperamide (Imodium), if you still have diarrhea after 6 hours. Read and follow all instructions on the label. Do not use this medicine if you have bloody diarrhea, a high fever, or other signs of serious illness. Call your doctor if you think you are having a problem with your medicine. When should you call for help? Call 911 anytime you think you may need emergency care. For example, call if:    · You passed out (lost consciousness).     · Your stools are maroon or very bloody.    Call your doctor now or seek immediate medical care if:    · You are dizzy or lightheaded, or you feel like you may faint.     · Your stools are black and look like tar, or they have streaks of blood.     · You have new or worse belly pain.     · You have symptoms of dehydration, such as:  ? Dry eyes and a dry mouth. ? Passing only a little dark urine. ? Feeling thirstier than usual.     · You have a new or higher fever.    Watch closely for changes in your health, and be sure to contact your doctor if:    · Your diarrhea is getting worse.     · You see pus in the diarrhea.     · You are not getting better after 2 days (48 hours). Where can you learn more? Go to http://izabela-jewell.info/. Enter U167 in the search box to learn more about \"Diarrhea: Care Instructions. \"  Current as of: June 26, 2019  Content Version: 12.2  © 5244-9193 [employer]. Care instructions adapted under license by [employer] (which disclaims liability or warranty for this information).  If you have questions about a medical condition or this instruction, always ask your healthcare professional. [place] [address] any warranty or liability for your use of this information. Patient Education        Nausea and Vomiting: Care Instructions  Your Care Instructions    When you are nauseated, you may feel weak and sweaty and notice a lot of saliva in your mouth. Nausea often leads to vomiting. Most of the time you do not need to worry about nausea and vomiting, but they can be signs of other illnesses. Two common causes of nausea and vomiting are stomach flu and food poisoning. Nausea and vomiting from viral stomach flu will usually start to improve within 24 hours. Nausea and vomiting from food poisoning may last from 12 to 48 hours. The doctor has checked you carefully, but problems can develop later. If you notice any problems or new symptoms, get medical treatment right away. Follow-up care is a key part of your treatment and safety. Be sure to make and go to all appointments, and call your doctor if you are having problems. It's also a good idea to know your test results and keep a list of the medicines you take. How can you care for yourself at home? · To prevent dehydration, drink plenty of fluids, enough so that your urine is light yellow or clear like water. Choose water and other caffeine-free clear liquids until you feel better. If you have kidney, heart, or liver disease and have to limit fluids, talk with your doctor before you increase the amount of fluids you drink. · Rest in bed until you feel better. · When you are able to eat, try clear soups, mild foods, and liquids until all symptoms are gone for 12 to 48 hours. Other good choices include dry toast, crackers, cooked cereal, and gelatin dessert, such as Jell-O. When should you call for help? Call 911 anytime you think you may need emergency care.  For example, call if:    · You passed out (lost consciousness).    Call your doctor now or seek immediate medical care if:    · You have symptoms of dehydration, such as:  ? Dry eyes and a dry mouth. ? Passing only a little dark urine. ? Feeling thirstier than usual.     · You have new or worsening belly pain.     · You have a new or higher fever.     · You vomit blood or what looks like coffee grounds.    Watch closely for changes in your health, and be sure to contact your doctor if:    · You have ongoing nausea and vomiting.     · Your vomiting is getting worse.     · Your vomiting lasts longer than 2 days.     · You are not getting better as expected. Where can you learn more? Go to http://izabela-jewell.info/. Enter 25 [id number] in the search box to learn more about \"Nausea and Vomiting: Care Instructions. \"  Current as of: June 26, 2019  Content Version: 12.2  © 7187-5247 [employer]. Care instructions adapted under license by [employer] (which disclaims liability or warranty for this information). If you have questions about a medical condition or this instruction, always ask your healthcare professional. [place] [address] any warranty or liability for your use of this information.

## 2019-12-05 NOTE — ED NOTES
Patient holding breath and blood pressure continue to elevate at this time. Transportation refusing to transport patient with elevated blood pressure.

## 2020-03-11 ENCOUNTER — APPOINTMENT (OUTPATIENT)
Dept: GENERAL RADIOLOGY | Age: 65
End: 2020-03-11
Attending: EMERGENCY MEDICINE
Payer: MEDICARE

## 2020-03-11 ENCOUNTER — HOSPITAL ENCOUNTER (EMERGENCY)
Age: 65
Discharge: HOME | End: 2020-03-12
Attending: EMERGENCY MEDICINE | Admitting: EMERGENCY MEDICINE
Payer: MEDICARE

## 2020-03-11 DIAGNOSIS — R07.9 CHEST PAIN, UNSPECIFIED TYPE: Primary | ICD-10-CM

## 2020-03-11 LAB
ALBUMIN SERPL-MCNC: 4.2 G/DL (ref 3.4–5)
ALBUMIN/GLOB SERPL: 1 {RATIO} (ref 0.8–1.7)
ALP SERPL-CCNC: 113 U/L (ref 45–117)
ALT SERPL-CCNC: 26 U/L (ref 13–56)
ANION GAP SERPL CALC-SCNC: 7 MMOL/L (ref 3–18)
AST SERPL-CCNC: 50 U/L (ref 10–38)
BASOPHILS # BLD: 0 K/UL (ref 0–0.1)
BASOPHILS NFR BLD: 0 % (ref 0–2)
BILIRUB SERPL-MCNC: 0.4 MG/DL (ref 0.2–1)
BUN SERPL-MCNC: 24 MG/DL (ref 7–18)
BUN/CREAT SERPL: 6 (ref 12–20)
CALCIUM SERPL-MCNC: 9.2 MG/DL (ref 8.5–10.1)
CHLORIDE SERPL-SCNC: 106 MMOL/L (ref 100–111)
CK MB CFR SERPL CALC: 0.5 % (ref 0–4)
CK MB CFR SERPL CALC: 1.7 % (ref 0–4)
CK MB SERPL-MCNC: 1.5 NG/ML (ref 5–25)
CK MB SERPL-MCNC: 1.5 NG/ML (ref 5–25)
CK SERPL-CCNC: 297 U/L (ref 26–192)
CK SERPL-CCNC: 88 U/L (ref 26–192)
CO2 SERPL-SCNC: 23 MMOL/L (ref 21–32)
CREAT SERPL-MCNC: 4.28 MG/DL (ref 0.6–1.3)
DIFFERENTIAL METHOD BLD: ABNORMAL
EOSINOPHIL # BLD: 0.3 K/UL (ref 0–0.4)
EOSINOPHIL NFR BLD: 4 % (ref 0–5)
ERYTHROCYTE [DISTWIDTH] IN BLOOD BY AUTOMATED COUNT: 14 % (ref 11.6–14.5)
GLOBULIN SER CALC-MCNC: 4.1 G/DL (ref 2–4)
GLUCOSE SERPL-MCNC: 119 MG/DL (ref 74–99)
HCT VFR BLD AUTO: 46.8 % (ref 35–45)
HGB BLD-MCNC: 15.8 G/DL (ref 12–16)
LYMPHOCYTES # BLD: 1.3 K/UL (ref 0.9–3.6)
LYMPHOCYTES NFR BLD: 16 % (ref 21–52)
MCH RBC QN AUTO: 31.3 PG (ref 24–34)
MCHC RBC AUTO-ENTMCNC: 33.8 G/DL (ref 31–37)
MCV RBC AUTO: 92.9 FL (ref 74–97)
MONOCYTES # BLD: 0.8 K/UL (ref 0.05–1.2)
MONOCYTES NFR BLD: 10 % (ref 3–10)
NEUTS SEG # BLD: 5.7 K/UL (ref 1.8–8)
NEUTS SEG NFR BLD: 70 % (ref 40–73)
PLATELET # BLD AUTO: 185 K/UL (ref 135–420)
POTASSIUM SERPL-SCNC: 4.5 MMOL/L (ref 3.5–5.5)
PROT SERPL-MCNC: 8.3 G/DL (ref 6.4–8.2)
RBC # BLD AUTO: 5.04 M/UL (ref 4.2–5.3)
RBC MORPH BLD: ABNORMAL
SODIUM SERPL-SCNC: 136 MMOL/L (ref 136–145)
TROPONIN I SERPL-MCNC: <0.02 NG/ML (ref 0–0.04)
TROPONIN I SERPL-MCNC: <0.02 NG/ML (ref 0–0.04)
WBC # BLD AUTO: 8.1 K/UL (ref 4.6–13.2)

## 2020-03-11 PROCEDURE — 80053 COMPREHEN METABOLIC PANEL: CPT

## 2020-03-11 PROCEDURE — 71045 X-RAY EXAM CHEST 1 VIEW: CPT

## 2020-03-11 PROCEDURE — 85025 COMPLETE CBC W/AUTO DIFF WBC: CPT

## 2020-03-11 PROCEDURE — 93005 ELECTROCARDIOGRAM TRACING: CPT

## 2020-03-11 PROCEDURE — 74011250637 HC RX REV CODE- 250/637: Performed by: EMERGENCY MEDICINE

## 2020-03-11 PROCEDURE — 96375 TX/PRO/DX INJ NEW DRUG ADDON: CPT

## 2020-03-11 PROCEDURE — 96374 THER/PROPH/DIAG INJ IV PUSH: CPT

## 2020-03-11 PROCEDURE — 99285 EMERGENCY DEPT VISIT HI MDM: CPT

## 2020-03-11 PROCEDURE — 74011250636 HC RX REV CODE- 250/636: Performed by: EMERGENCY MEDICINE

## 2020-03-11 PROCEDURE — 82550 ASSAY OF CK (CPK): CPT

## 2020-03-11 RX ORDER — NITROGLYCERIN 0.4 MG/1
0.4 TABLET SUBLINGUAL
Status: COMPLETED | OUTPATIENT
Start: 2020-03-11 | End: 2020-03-11

## 2020-03-11 RX ORDER — GUAIFENESIN 100 MG/5ML
243 LIQUID (ML) ORAL
Status: COMPLETED | OUTPATIENT
Start: 2020-03-11 | End: 2020-03-11

## 2020-03-11 RX ORDER — GUAIFENESIN 100 MG/5ML
324 LIQUID (ML) ORAL
Status: DISCONTINUED | OUTPATIENT
Start: 2020-03-11 | End: 2020-03-11

## 2020-03-11 RX ORDER — LABETALOL HCL 20 MG/4 ML
20 SYRINGE (ML) INTRAVENOUS ONCE
Status: COMPLETED | OUTPATIENT
Start: 2020-03-11 | End: 2020-03-11

## 2020-03-11 RX ADMIN — LABETALOL 20 MG/4 ML (5 MG/ML) INTRAVENOUS SYRINGE 20 MG: at 21:44

## 2020-03-11 RX ADMIN — NITROGLYCERIN 0.4 MG: 0.4 TABLET SUBLINGUAL at 17:34

## 2020-03-11 RX ADMIN — ASPIRIN 81 MG 243 MG: 81 TABLET ORAL at 16:45

## 2020-03-11 NOTE — ED NOTES
Received a verbal order with readback from DHARMESH HAWK MD for 243 mg of aspirin and to d/c 324 mg of aspirin.

## 2020-03-11 NOTE — ED NOTES
The pt has not said that she has to urinate. The pt is calm and relaxed. Discussed with Melanie Hi MD. Melanie Hi MD stated not to disturb the pt.

## 2020-03-11 NOTE — ED NOTES
VerbalVerbal shift change report given to Guera Perez RN (oncoming nurse) by Lilia Salas RN (offgoing nurse). Report included the following information SBAR, Kardex, ED Summary and Recent Results.

## 2020-03-11 NOTE — ED PROVIDER NOTES
EMERGENCY DEPARTMENT HISTORY AND PHYSICAL EXAM    4:09 PM  Date: 3/11/2020  Patient Name: Mason Zamarripa    History of Presenting Illness     Chief Complaint   Patient presents with    Chest Pain (Angina)       History Provided By: patient     HPI: Mason Zamarripa is a 75 y.o. female with medical history of end-stage renal disease on hemodialysis (M, W, F), hypertension, hyperlipidemia, asthma, diabetes, CVA presents from dialysis with chest pain, left-sided, sharp severe no radiation. Chest pain lasted for a few minutes. patient finished dialysis. No nausea vomiting no fever no chills. No shortness of breath         PCP: Cady Quiroga MD    Past History     Past Medical History:  Past Medical History:   Diagnosis Date    Asthma     [facility] [address]     Brain condition     Cataract     Chronic kidney disease     hemodialysis M-W-F    Diabetes ([facility] [address]     Hyperlipidemia     Hypertension     Paralytic strabismus, unspecified     Psychiatric disorder     Seizures ([facility] [address] 08/27/2018       Past Surgical History:  Past Surgical History:   Procedure Laterality Date    [place] INSJ TUNNELED CVC W/O SUBQ PORT/ AGE 5 YR/> N/A 8/9/2018     in-pt 474A, Insertion Tunneled Central Venous Catheter performed by Roney Squires MD at [address] [place] [address]    [place] INSJ TUNNELED CVC W/O SUBQ PORT/ AGE 5 YR/> N/A 11/8/2019    INSERTION TUNNELED CENTRAL VENOUS CATHETER performed by Nicolas Razo MD at [facility] LAB       Family History:  History reviewed. No pertinent family history. Social History:  Social History     Tobacco Use    Smoking status: Never Smoker    Smokeless tobacco: Never Used   Substance Use Topics    Alcohol use: No    Drug use: No       Allergies:   Allergies   Allergen Reactions    Amoxicillin Unknown (comments)    Cleocin [Clindamycin Hcl] Unknown (comments)    Codeine Unknown (comments)    Lorcet 10/650 [Hydrocodone-Acetaminophen] Unknown (comments)    Penicillin G Benzathine Unknown (comments)    Shellfish Derived Unknown (comments)       Review of Systems   Review of Systems   Constitutional: Negative for activity change, appetite change and chills. HENT: Negative for congestion, ear discharge, ear pain and sore throat. Eyes: Negative for photophobia and pain. Respiratory: Negative for cough and choking. Cardiovascular: Positive for chest pain. Negative for palpitations and leg swelling. Gastrointestinal: Negative for anal bleeding and rectal pain. Endocrine: Negative for polydipsia and polyuria. Genitourinary: Negative for genital sores and urgency. Musculoskeletal: Negative for arthralgias and myalgias. Neurological: Negative for dizziness, seizures and speech difficulty. Psychiatric/Behavioral: Negative for hallucinations, self-injury and suicidal ideas. Physical Exam     No data found. Physical Exam  Vitals signs and nursing note reviewed. Constitutional:       Appearance: She is well-developed. HENT:      Head: Normocephalic and atraumatic. Eyes:      General:         Right eye: No discharge. Left eye: No discharge. Neck:      Musculoskeletal: Normal range of motion and neck supple. Cardiovascular:      Rate and Rhythm: Normal rate and regular rhythm. Heart sounds: Normal heart sounds. No murmur. Comments: R hemodialysis catheter  Pulmonary:      Effort: Pulmonary effort is normal. No respiratory distress. Breath sounds: Normal breath sounds. No stridor. No wheezing or rales. Chest:      Chest wall: No tenderness. Abdominal:      General: Bowel sounds are normal. There is no distension. Palpations: Abdomen is soft. Tenderness: There is no abdominal tenderness. There is no guarding or rebound. Musculoskeletal: Normal range of motion. Skin:     General: Skin is warm and dry. Neurological:      Mental Status: She is alert and oriented to person, place, and time.          Diagnostic Study Results     Labs -  No results found for this or any previous visit (from the past 12 hour(s)). Radiologic Studies -   No results found. Medical Decision Making     ED Course: Progress Notes, Reevaluation, and Consults:    4:09 PM Initial assessment performed. The patients presenting problems have been discussed, and they/their family are in agreement with the care plan formulated and outlined with them. I have encouraged them to ask questions as they arise throughout their visit. Provider Notes (Medical Decision Making):     Patient with chest pain during hemodialysis. Patient arrived to the ED hypertensive with systolic blood pressure more than 200  Chest pain resolved after labetalol and blood pressure correction to 175/78  Patient also received nitroglycerin  Patient currently asymptomatic. Repeat troponin negatives. EKG no signs of ischemia  Patient will be discharged with PMD and cardiology follow-up        Vital Signs-Reviewed the patient's vital signs. Reviewed pt's pulse ox reading. Records Reviewed: Old medical records (Time of Review: 4:09 PM)  -I am the first provider for this patient.  -I reviewed the vital signs, available nursing notes, past medical history, past surgical history, family history and social history. Current Outpatient Medications   Medication Sig Dispense Refill    sevelamer carbonate (RENVELA) 800 mg tab tab Take 800 mg by mouth three (3) times daily.  traMADol (ULTRAM) 50 mg tablet Take 1 Tab by mouth every six (6) hours as needed for Pain. Max Daily Amount: 200 mg. 20 Tab 0    mirtazapine (REMERON) 15 mg tablet Take  by mouth nightly.  levETIRAcetam (KEPPRA) 250 mg tablet Take 1 Tab by mouth every Monday, Wednesday, Friday. 30 Tab 1    hydrALAZINE (APRESOLINE) 50 mg tablet Take 1 Tab by mouth three (3) times daily. 90 Tab 0    simvastatin (ZOCOR) 20 mg tablet Take 1 Tab by mouth nightly.  30 Tab 0    epoetin raphael (EPOGEN;PROCRIT) 3,000 unit/mL injection 1.73 mL by SubCUTAneous route Every Tuesday, Thursday and Saturday. Indications: ANEMIA DUE TO RENAL FAILURE, Renal Dialysis (Patient taking differently: 3,000 Units by SubCUTAneous route every Monday, Wednesday, Friday.) 1 Vial 0    insulin lispro (HUMALOG) 100 unit/mL injection INITIATE INSULIN CORRECTIVE PROTOCOL: Normal Insulin Sensitivity   For Blood Sugar (mg/dL) of:     Less than 150 =   0 units           150 -199 =   2 units  200 -249 =   4 units  250 -299 =   6 units  300 -349 =   8 units  350 and above = 10 units and Call Physician  If 2 glucose readings are above 1 Vial 0    losartan (COZAAR) 100 mg tablet Take 1 Tab by mouth daily. 30 Tab 0    aspirin 81 mg tablet Take 81 mg by mouth daily.  diltiazem CD (CARDIZEM CD) 240 mg ER capsule Take 240 mg by mouth daily.  cloNIDine (CATAPRES) 0.1 mg tablet Take 0.1 mg by mouth two (2) times a day. Clinical Impression     Clinical Impression:   1. Chest pain, unspecified type        Disposition: discharge      DISCHARGE NOTE:   Pt has been reexamined. Patient has no new complaints, changes, or physical findings. Care plan outlined and precautions discussed. Results were reviewed with the patient. All medications were reviewed with the patient; will d/c home with PMD f/u. All of pt's questions and concerns were addressed. Patient was instructed and agrees to follow up with PMD, as well as to return to the ED upon further deterioration. Patient is ready to go home. This note was dictated utilizing voice recognition software which may lead to typographical errors. I apologize in advance if the situation occurs. If questions arise please do not hesitate to contact me or call our department. This note was dictated utilizing voice recognition software which may lead to typographical errors. I apologize in advance if the situation occurs.   If questions arise please do not hesitate to contact me or call our department.     Dilma Matias MD  4:09 PM

## 2020-03-11 NOTE — ED TRIAGE NOTES
Patient presents to ED with C/O chest pain, SOB, and diarrhea that started today. The pt stated the chest pain started after dialysis. The pt has dialysis MWF. Patient is A&Ox3, side rails upx2, call bell w/in reach, and aware of plan of care. The patient is in NAD.

## 2020-03-12 VITALS
TEMPERATURE: 98.4 F | WEIGHT: 167 LBS | RESPIRATION RATE: 17 BRPM | DIASTOLIC BLOOD PRESSURE: 78 MMHG | HEIGHT: 63 IN | HEART RATE: 93 BPM | BODY MASS INDEX: 29.59 KG/M2 | SYSTOLIC BLOOD PRESSURE: 175 MMHG | OXYGEN SATURATION: 100 %

## 2020-03-12 LAB
ATRIAL RATE: 92 BPM
CALCULATED P AXIS, ECG09: 68 DEGREES
CALCULATED R AXIS, ECG10: -37 DEGREES
CALCULATED T AXIS, ECG11: 44 DEGREES
DIAGNOSIS, 93000: NORMAL
P-R INTERVAL, ECG05: 130 MS
Q-T INTERVAL, ECG07: 362 MS
QRS DURATION, ECG06: 82 MS
QTC CALCULATION (BEZET), ECG08: 447 MS
VENTRICULAR RATE, ECG03: 92 BPM

## 2020-03-12 PROCEDURE — 74011250636 HC RX REV CODE- 250/636: Performed by: EMERGENCY MEDICINE

## 2020-03-12 PROCEDURE — 74011250637 HC RX REV CODE- 250/637: Performed by: EMERGENCY MEDICINE

## 2020-03-12 RX ORDER — ONDANSETRON 2 MG/ML
4 INJECTION INTRAMUSCULAR; INTRAVENOUS
Status: COMPLETED | OUTPATIENT
Start: 2020-03-12 | End: 2020-03-12

## 2020-03-12 RX ORDER — CLONIDINE HYDROCHLORIDE 0.1 MG/1
0.2 TABLET ORAL
Status: COMPLETED | OUTPATIENT
Start: 2020-03-12 | End: 2020-03-12

## 2020-03-12 RX ADMIN — ONDANSETRON 4 MG: 2 INJECTION INTRAMUSCULAR; INTRAVENOUS at 01:22

## 2020-03-12 RX ADMIN — CLONIDINE HYDROCHLORIDE 0.2 MG: 0.1 TABLET ORAL at 01:26

## 2020-03-12 NOTE — ED NOTES
[facility]/O x 3, complaining of chest pain. Provider is aware, will send repeat cardiac panel. Patient denies SOB, N/V, abdominal pain.

## 2020-03-12 NOTE — ED NOTES
I have reviewed discharge instructions with the patient. The patient verbalized understanding. Patient looks comfortable, left ED in stable condition with fast track transport. No acute distress noted. No new complaints noted at this time.

## 2020-03-12 NOTE — ED NOTES
Patient sleeping comfortably on stretcher, chest rise and fall noted. No episode of vomiting noted at this time.

## 2020-03-12 NOTE — ED NOTES
Patient has stated several times, \"I'm not feeling well\". Patient vomited x 1, linens and patient's gown has been changed. Patient states she's feeling better after vomiting. Will inform provider.

## 2020-03-12 NOTE — DISCHARGE INSTRUCTIONS

## 2020-06-02 ENCOUNTER — HOSPITAL ENCOUNTER (EMERGENCY)
Age: 65
Discharge: NURSING, REHAB OR LONG-TERM CARE | End: 2020-06-03
Attending: EMERGENCY MEDICINE
Payer: MEDICARE

## 2020-06-02 ENCOUNTER — APPOINTMENT (OUTPATIENT)
Dept: CT IMAGING | Age: 65
End: 2020-06-02
Attending: STUDENT IN AN ORGANIZED HEALTH CARE EDUCATION/TRAINING PROGRAM
Payer: MEDICARE

## 2020-06-02 DIAGNOSIS — R11.2 INTRACTABLE VOMITING WITH NAUSEA: Primary | ICD-10-CM

## 2020-06-02 LAB
ANION GAP SERPL CALC-SCNC: 7 MMOL/L (ref 3–18)
APPEARANCE UR: CLEAR
BACTERIA URNS QL MICRO: NEGATIVE /HPF
BASOPHILS # BLD: 0 K/UL (ref 0–0.1)
BASOPHILS NFR BLD: 0 % (ref 0–2)
BILIRUB UR QL: NEGATIVE
BNP SERPL-MCNC: 3802 PG/ML (ref 0–900)
BUN SERPL-MCNC: 44 MG/DL (ref 7–18)
BUN/CREAT SERPL: 6 (ref 12–20)
CALCIUM SERPL-MCNC: 9.7 MG/DL (ref 8.5–10.1)
CHLORIDE SERPL-SCNC: 107 MMOL/L (ref 100–111)
CO2 SERPL-SCNC: 30 MMOL/L (ref 21–32)
COLOR UR: YELLOW
CREAT SERPL-MCNC: 7.35 MG/DL (ref 0.6–1.3)
DIFFERENTIAL METHOD BLD: ABNORMAL
EOSINOPHIL # BLD: 0.1 K/UL (ref 0–0.4)
EOSINOPHIL NFR BLD: 1 % (ref 0–5)
EPITH CASTS URNS QL MICRO: NORMAL /LPF (ref 0–5)
ERYTHROCYTE [DISTWIDTH] IN BLOOD BY AUTOMATED COUNT: 15.5 % (ref 11.6–14.5)
GLUCOSE SERPL-MCNC: 240 MG/DL (ref 74–99)
GLUCOSE UR STRIP.AUTO-MCNC: 250 MG/DL
HCT VFR BLD AUTO: 26.8 % (ref 35–45)
HGB BLD-MCNC: 8.9 G/DL (ref 12–16)
HGB UR QL STRIP: NEGATIVE
KETONES UR QL STRIP.AUTO: NEGATIVE MG/DL
LACTATE SERPL-SCNC: 2 MMOL/L (ref 0.4–2)
LEUKOCYTE ESTERASE UR QL STRIP.AUTO: NEGATIVE
LYMPHOCYTES # BLD: 1 K/UL (ref 0.9–3.6)
LYMPHOCYTES NFR BLD: 8 % (ref 21–52)
MCH RBC QN AUTO: 30.6 PG (ref 24–34)
MCHC RBC AUTO-ENTMCNC: 33.2 G/DL (ref 31–37)
MCV RBC AUTO: 92.1 FL (ref 74–97)
MONOCYTES # BLD: 0.8 K/UL (ref 0.05–1.2)
MONOCYTES NFR BLD: 6 % (ref 3–10)
NEUTS SEG # BLD: 10.6 K/UL (ref 1.8–8)
NEUTS SEG NFR BLD: 85 % (ref 40–73)
NITRITE UR QL STRIP.AUTO: NEGATIVE
PH UR STRIP: 7.5 [PH] (ref 5–8)
PLATELET # BLD AUTO: 290 K/UL (ref 135–420)
PMV BLD AUTO: 10.8 FL (ref 9.2–11.8)
POTASSIUM SERPL-SCNC: 3.8 MMOL/L (ref 3.5–5.5)
PROT UR STRIP-MCNC: 300 MG/DL
RBC # BLD AUTO: 2.91 M/UL (ref 4.2–5.3)
RBC #/AREA URNS HPF: NEGATIVE /HPF (ref 0–5)
SODIUM SERPL-SCNC: 144 MMOL/L (ref 136–145)
SP GR UR REFRACTOMETRY: 1.01 (ref 1–1.03)
TROPONIN I SERPL-MCNC: 0.02 NG/ML (ref 0–0.04)
UROBILINOGEN UR QL STRIP.AUTO: 0.2 EU/DL (ref 0.2–1)
WBC # BLD AUTO: 12.5 K/UL (ref 4.6–13.2)
WBC URNS QL MICRO: NORMAL /HPF (ref 0–5)

## 2020-06-02 PROCEDURE — 83880 ASSAY OF NATRIURETIC PEPTIDE: CPT

## 2020-06-02 PROCEDURE — 51701 INSERT BLADDER CATHETER: CPT

## 2020-06-02 PROCEDURE — 99285 EMERGENCY DEPT VISIT HI MDM: CPT

## 2020-06-02 PROCEDURE — 74011250636 HC RX REV CODE- 250/636: Performed by: STUDENT IN AN ORGANIZED HEALTH CARE EDUCATION/TRAINING PROGRAM

## 2020-06-02 PROCEDURE — 74177 CT ABD & PELVIS W/CONTRAST: CPT

## 2020-06-02 PROCEDURE — 80048 BASIC METABOLIC PNL TOTAL CA: CPT

## 2020-06-02 PROCEDURE — 96374 THER/PROPH/DIAG INJ IV PUSH: CPT

## 2020-06-02 PROCEDURE — 84484 ASSAY OF TROPONIN QUANT: CPT

## 2020-06-02 PROCEDURE — 85025 COMPLETE CBC W/AUTO DIFF WBC: CPT

## 2020-06-02 PROCEDURE — 83605 ASSAY OF LACTIC ACID: CPT

## 2020-06-02 PROCEDURE — 81001 URINALYSIS AUTO W/SCOPE: CPT

## 2020-06-02 PROCEDURE — 93005 ELECTROCARDIOGRAM TRACING: CPT

## 2020-06-02 RX ORDER — ONDANSETRON 2 MG/ML
4 INJECTION INTRAMUSCULAR; INTRAVENOUS
Status: COMPLETED | OUTPATIENT
Start: 2020-06-02 | End: 2020-06-02

## 2020-06-02 RX ADMIN — ONDANSETRON 4 MG: 2 INJECTION INTRAMUSCULAR; INTRAVENOUS at 22:04

## 2020-06-03 VITALS
SYSTOLIC BLOOD PRESSURE: 168 MMHG | TEMPERATURE: 98.3 F | HEART RATE: 97 BPM | HEIGHT: 63 IN | BODY MASS INDEX: 29.59 KG/M2 | OXYGEN SATURATION: 99 % | DIASTOLIC BLOOD PRESSURE: 66 MMHG | RESPIRATION RATE: 16 BRPM | WEIGHT: 167 LBS

## 2020-06-03 LAB — GLUCOSE BLD STRIP.AUTO-MCNC: 83 MG/DL (ref 70–110)

## 2020-06-03 PROCEDURE — 74011636320 HC RX REV CODE- 636/320: Performed by: EMERGENCY MEDICINE

## 2020-06-03 PROCEDURE — 82962 GLUCOSE BLOOD TEST: CPT

## 2020-06-03 RX ORDER — DEXTROSE 50 % IN WATER (D50W) INTRAVENOUS SYRINGE
25-50 AS NEEDED
Status: DISCONTINUED | OUTPATIENT
Start: 2020-06-03 | End: 2020-06-03 | Stop reason: HOSPADM

## 2020-06-03 RX ORDER — MAGNESIUM SULFATE 100 %
16 CRYSTALS MISCELLANEOUS AS NEEDED
Status: DISCONTINUED | OUTPATIENT
Start: 2020-06-03 | End: 2020-06-03 | Stop reason: HOSPADM

## 2020-06-03 RX ORDER — INSULIN LISPRO 100 [IU]/ML
INJECTION, SOLUTION INTRAVENOUS; SUBCUTANEOUS
Status: DISCONTINUED | OUTPATIENT
Start: 2020-06-03 | End: 2020-06-03 | Stop reason: HOSPADM

## 2020-06-03 RX ADMIN — IOPAMIDOL 60 ML: 612 INJECTION, SOLUTION INTRAVENOUS at 01:52

## 2020-06-03 NOTE — ED NOTES
EMERGENCY DEPARTMENT HISTORY AND PHYSICAL EXAM    10:04 PM      Date: 6/2/2020  Patient Name: Debby Eli    History of Presenting Illness     Chief Complaint   Patient presents with    High Blood Sugar         History Provided By: Patient  Location/Duration/Severity/Modifying factors   12-year-old female with past medical history of hypertension, ESRD on dialysis, type 2 diabetes, history of CVA x2 who presents to [facility] ED from [facility] due to hyperglycemia uncontrolled by her routine medications. Patient states that she has had nausea, vomiting x3 times, and diarrhea which began earlier today. She has not vomited in the ED. She denies abdominal pain. She last had dialysis yesterday. PCP: eVsna Carrizales MD    Current Facility-Administered Medications   Medication Dose Route Frequency Provider Last Rate Last Dose    ondansetron (ZOFRAN) injection 4 mg  4 mg IntraVENous NOW Freedom Duran, DO         Current Outpatient Medications   Medication Sig Dispense Refill    sevelamer carbonate (RENVELA) 800 mg tab tab Take 800 mg by mouth three (3) times daily.  traMADol (ULTRAM) 50 mg tablet Take 1 Tab by mouth every six (6) hours as needed for Pain. Max Daily Amount: 200 mg. 20 Tab 0    mirtazapine (REMERON) 15 mg tablet Take  by mouth nightly.  levETIRAcetam (KEPPRA) 250 mg tablet Take 1 Tab by mouth every Monday, Wednesday, Friday. 30 Tab 1    hydrALAZINE (APRESOLINE) 50 mg tablet Take 1 Tab by mouth three (3) times daily. 90 Tab 0    simvastatin (ZOCOR) 20 mg tablet Take 1 Tab by mouth nightly. 30 Tab 0    epoetin raphael (EPOGEN;PROCRIT) 3,000 unit/mL injection 1.73 mL by SubCUTAneous route Every Tuesday, Thursday and Saturday.  Indications: ANEMIA DUE TO RENAL FAILURE, Renal Dialysis (Patient taking differently: 3,000 Units by SubCUTAneous route every Monday, Wednesday, Friday.) 1 Vial 0    insulin lispro (HUMALOG) 100 unit/mL injection INITIATE INSULIN CORRECTIVE PROTOCOL: Normal Insulin Sensitivity   For Blood Sugar (mg/dL) of:     Less than 150 =   0 units           150 -199 =   2 units  200 -249 =   4 units  250 -299 =   6 units  300 -349 =   8 units  350 and above = 10 units and Call Physician  If 2 glucose readings are above 1 Vial 0    losartan (COZAAR) 100 mg tablet Take 1 Tab by mouth daily. 30 Tab 0    aspirin 81 mg tablet Take 81 mg by mouth daily.  diltiazem CD (CARDIZEM CD) 240 mg ER capsule Take 240 mg by mouth daily.  cloNIDine (CATAPRES) 0.1 mg tablet Take 0.1 mg by mouth two (2) times a day. Past History     Past Medical History:  Past Medical History:   Diagnosis Date    Asthma     [facility] ([facility] [address]     Brain condition     Cataract     Chronic kidney disease     hemodialysis M-W-F    Diabetes ([facility] [address]     Hyperlipidemia     Hypertension     Paralytic strabismus, unspecified     Psychiatric disorder     Seizures ([facility] [address] 08/27/2018       Past Surgical History:  Past Surgical History:   Procedure Laterality Date    [place] INSJ TUNNELED CVC W/O SUBQ PORT/ AGE 5 YR/> N/A 8/9/2018     in-pt 474A, Insertion Tunneled Central Venous Catheter performed by Tu High MD at [address] [place] [address] LAB    [place] INSJ TUNNELED CVC W/O SUBQ PORT/ AGE 5 YR/> N/A 11/8/2019    INSERTION TUNNELED CENTRAL VENOUS CATHETER performed by Estephanie James MD at [address] [place] [address] LAB       Family History:  No family history on file. Social History:  Social History     Tobacco Use    Smoking status: Never Smoker    Smokeless tobacco: Never Used   Substance Use Topics    Alcohol use: No    Drug use: No       Allergies:   Allergies   Allergen Reactions    Amoxicillin Unknown (comments)    Cleocin [Clindamycin Hcl] Unknown (comments)    Codeine Unknown (comments)    Lorcet 10/650 [Hydrocodone-Acetaminophen] Unknown (comments)    Penicillin G Benzathine Unknown (comments)    Shellfish Derived Unknown (comments)         Review of Systems Review of Systems   Respiratory: Negative for cough and shortness of breath. Cardiovascular: Negative for chest pain and palpitations. Gastrointestinal: Positive for diarrhea, nausea and vomiting. Negative for abdominal pain. Musculoskeletal: Positive for arthralgias. Neurological: Positive for tremors and weakness (left sided). Negative for dizziness, facial asymmetry, speech difficulty and light-headedness. Physical Exam     Visit Vitals  /78 (BP 1 Location: Left arm, BP Patient Position: At rest)   Pulse (!) 110   Temp 98.3 °F (36.8 °C)   Resp 19   Ht 5' 3\" (1.6 m)   Wt 75.8 kg (167 lb)   LMP  (LMP Unknown)   SpO2 100%   BMI 29.58 kg/m²     Physical Exam  Constitutional:       Appearance: Normal appearance. She is normal weight. Eyes:      Extraocular Movements: Extraocular movements intact. Conjunctiva/sclera: Conjunctivae normal.   Cardiovascular:      Rate and Rhythm: Regular rhythm. Tachycardia present. Heart sounds: Normal heart sounds. Pulmonary:      Effort: Pulmonary effort is normal.      Breath sounds: Normal breath sounds. Abdominal:      General: Abdomen is flat. Palpations: Abdomen is soft. Skin:     General: Skin is warm and dry. Neurological:      Mental Status: She is alert and oriented to person, place, and time. Motor: Weakness (strength 4/5 left upper extremity, right upper extremity 5/5) and tremor (worse on left than right. ) present.            Diagnostic Study Results     Labs -  Recent Results (from the past 12 hour(s))   CBC WITH AUTOMATED DIFF    Collection Time: 06/02/20  9:50 PM   Result Value Ref Range    WBC 12.5 4.6 - 13.2 K/uL    RBC 2.91 (L) 4.20 - 5.30 M/uL    HGB 8.9 (L) 12.0 - 16.0 g/dL    HCT 26.8 (L) 35.0 - 45.0 %    MCV 92.1 74.0 - 97.0 FL    MCH 30.6 24.0 - 34.0 PG    MCHC 33.2 31.0 - 37.0 g/dL    RDW 15.5 (H) 11.6 - 14.5 %    PLATELET 607 616 - 368 K/uL    MPV 10.8 9.2 - 11.8 FL    NEUTROPHILS 85 (H) 40 - 73 % LYMPHOCYTES 8 (L) 21 - 52 %    MONOCYTES 6 3 - 10 %    EOSINOPHILS 1 0 - 5 %    BASOPHILS 0 0 - 2 %    ABS. NEUTROPHILS 10.6 (H) 1.8 - 8.0 K/UL    ABS. LYMPHOCYTES 1.0 0.9 - 3.6 K/UL    ABS. MONOCYTES 0.8 0.05 - 1.2 K/UL    ABS. EOSINOPHILS 0.1 0.0 - 0.4 K/UL    ABS. BASOPHILS 0.0 0.0 - 0.1 K/UL    DF AUTOMATED         Radiologic Studies -   No orders to display         Medical Decision Making   I am the first provider for this patient. I reviewed the vital signs, available nursing notes, past medical history, past surgical history, family history and social history. Vital Signs-Reviewed the patient's vital signs. EKG: sinus tachycardia - Ventricular rate 102     Records Reviewed: Old Medical Records (Time of Review: 10:04 PM)    ED Course: Progress Notes, Reevaluation, and Consults:     02 13:     Provider Notes (Medical Decision Making):   MDM  75year old female with PMH of ESRD on dialysis, HTN, T2DM who presented to the ED with hyperglycemia and nausea/vomiting. DDx to include SBO, ileus, colitis, pancreatitis. Nausea relieved somewhat with IV Zofran. Patient did not vomit in ED. Abdominal exam benign. CT abdomen showed stool burden but no obstruction, ileus, or colitis. Patient to be discharged back to [facility]. She still has some mild nausea but no vomiting since she has been in the ED. Patient is agreeable. Procedures none     Critical Care Time: 0      Diagnosis     Clinical Impression: No diagnosis found. Disposition: discharge     Follow-up Information    None          Patient's Medications   Start Taking    No medications on file   Continue Taking    ASPIRIN 81 MG TABLET    Take 81 mg by mouth daily. CLONIDINE (CATAPRES) 0.1 MG TABLET    Take 0.1 mg by mouth two (2) times a day. DILTIAZEM CD (CARDIZEM CD) 240 MG ER CAPSULE    Take 240 mg by mouth daily.       EPOETIN RACHAEL (EPOGEN;PROCRIT) 3,000 UNIT/ML INJECTION    1.73 mL by SubCUTAneous route Every Tuesday, Thursday and Saturday. Indications: ANEMIA DUE TO RENAL FAILURE, Renal Dialysis    HYDRALAZINE (APRESOLINE) 50 MG TABLET    Take 1 Tab by mouth three (3) times daily. INSULIN LISPRO (HUMALOG) 100 UNIT/ML INJECTION    INITIATE INSULIN CORRECTIVE PROTOCOL: Normal Insulin Sensitivity   For Blood Sugar (mg/dL) of:     Less than 150 =   0 units           150 -199 =   2 units  200 -249 =   4 units  250 -299 =   6 units  300 -349 =   8 units  350 and above = 10 units and Call Physician  If 2 glucose readings are above    LEVETIRACETAM (KEPPRA) 250 MG TABLET    Take 1 Tab by mouth every Monday, Wednesday, Friday. LOSARTAN (COZAAR) 100 MG TABLET    Take 1 Tab by mouth daily. MIRTAZAPINE (REMERON) 15 MG TABLET    Take  by mouth nightly. SEVELAMER CARBONATE (RENVELA) 800 MG TAB TAB    Take 800 mg by mouth three (3) times daily. SIMVASTATIN (ZOCOR) 20 MG TABLET    Take 1 Tab by mouth nightly. TRAMADOL (ULTRAM) 50 MG TABLET    Take 1 Tab by mouth every six (6) hours as needed for Pain. Max Daily Amount: 200 mg. These Medications have changed    No medications on file   Stop Taking    No medications on file     Disclaimer: Sections of this note are dictated using utilizing voice recognition software. Minor typographical errors may be present. If questions arise, please do not hesitate to contact me or call our department.       Chaitanya Velez DO, PGY-2  [address]

## 2020-06-03 NOTE — ED TRIAGE NOTES
Arrived via EMS from [facility] due to inability to control blood glucose levels.     Hx including Dialysis MWF and stroke x's 2

## 2020-06-03 NOTE — ED NOTES
TRANSFER - OUT REPORT:    Verbal report given to Amber (name) on Sebastian Mathew  being transferred to [facility] (unit) for routine progression of care       Report consisted of patients Situation, Background, Assessment and   Recommendations(SBAR). Information from the following report(s) SBAR, Kardex, Procedure Summary, Recent Results and Med Rec Status was reviewed with the receiving nurse. Lines:       Opportunity for questions and clarification was provided.       Patient transported with:  [facility]

## 2020-06-03 NOTE — DISCHARGE INSTRUCTIONS
Patient Education        Nausea and Vomiting: Care Instructions  Your Care Instructions     When you are nauseated, you may feel weak and sweaty and notice a lot of saliva in your mouth. Nausea often leads to vomiting. Most of the time you do not need to worry about nausea and vomiting, but they can be signs of other illnesses. Two common causes of nausea and vomiting are stomach flu and food poisoning. Nausea and vomiting from viral stomach flu will usually start to improve within 24 hours. Nausea and vomiting from food poisoning may last from 12 to 48 hours. The doctor has checked you carefully, but problems can develop later. If you notice any problems or new symptoms, get medical treatment right away. Follow-up care is a key part of your treatment and safety. Be sure to make and go to all appointments, and call your doctor if you are having problems. It's also a good idea to know your test results and keep a list of the medicines you take. How can you care for yourself at home? · To prevent dehydration, drink plenty of fluids, enough so that your urine is light yellow or clear like water. Choose water and other caffeine-free clear liquids until you feel better. If you have kidney, heart, or liver disease and have to limit fluids, talk with your doctor before you increase the amount of fluids you drink. · Rest in bed until you feel better. · When you are able to eat, try clear soups, mild foods, and liquids until all symptoms are gone for 12 to 48 hours. Other good choices include dry toast, crackers, cooked cereal, and gelatin dessert, such as Jell-O. When should you call for help? [id number] anytime you think you may need emergency care. For example, call if:  · You passed out (lost consciousness). Call your doctor now or seek immediate medical care if:  · You have symptoms of dehydration, such as:  ? Dry eyes and a dry mouth. ? Passing only a little dark urine. ?  Feeling thirstier than usual.  · You have new or worsening belly pain. · You have a new or higher fever. · You vomit blood or what looks like coffee grounds. Watch closely for changes in your health, and be sure to contact your doctor if:  · You have ongoing nausea and vomiting. · Your vomiting is getting worse. · Your vomiting lasts longer than 2 days. · You are not getting better as expected. Where can you learn more? Go to http://izabela-jewell.info/  Enter H591 in the search box to learn more about \"Nausea and Vomiting: Care Instructions. \"  Current as of: June 26, 2019               Content Version: 12.5  © 2094-3767 [employer], [employer]. Care instructions adapted under license by [employer] (which disclaims liability or warranty for this information). If you have questions about a medical condition or this instruction, always ask your healthcare professional. [address] any warranty or liability for your use of this information.

## 2020-06-04 LAB
ATRIAL RATE: 102 BPM
CALCULATED P AXIS, ECG09: 77 DEGREES
CALCULATED R AXIS, ECG10: -28 DEGREES
CALCULATED T AXIS, ECG11: 25 DEGREES
DIAGNOSIS, 93000: NORMAL
P-R INTERVAL, ECG05: 126 MS
Q-T INTERVAL, ECG07: 354 MS
QRS DURATION, ECG06: 82 MS
QTC CALCULATION (BEZET), ECG08: 461 MS
VENTRICULAR RATE, ECG03: 102 BPM

## 2020-06-23 ENCOUNTER — APPOINTMENT (OUTPATIENT)
Dept: GENERAL RADIOLOGY | Age: 65
End: 2020-06-23
Attending: EMERGENCY MEDICINE
Payer: MEDICARE

## 2020-06-23 ENCOUNTER — HOSPITAL ENCOUNTER (EMERGENCY)
Age: 65
Discharge: HOME | End: 2020-06-24
Attending: EMERGENCY MEDICINE
Payer: MEDICARE

## 2020-06-23 ENCOUNTER — APPOINTMENT (OUTPATIENT)
Dept: VASCULAR SURGERY | Age: 65
End: 2020-06-23
Attending: EMERGENCY MEDICINE
Payer: MEDICARE

## 2020-06-23 VITALS
DIASTOLIC BLOOD PRESSURE: 91 MMHG | RESPIRATION RATE: 16 BRPM | BODY MASS INDEX: 29.58 KG/M2 | OXYGEN SATURATION: 100 % | WEIGHT: 167 LBS | SYSTOLIC BLOOD PRESSURE: 198 MMHG | HEART RATE: 68 BPM

## 2020-06-23 DIAGNOSIS — I10 ESSENTIAL HYPERTENSION: ICD-10-CM

## 2020-06-23 DIAGNOSIS — Z99.2 ESRD ON DIALYSIS (HCC): ICD-10-CM

## 2020-06-23 DIAGNOSIS — N18.6 ESRD ON DIALYSIS (HCC): ICD-10-CM

## 2020-06-23 DIAGNOSIS — M79.602 LEFT ARM PAIN: Primary | ICD-10-CM

## 2020-06-23 LAB
ALBUMIN SERPL-MCNC: 4.2 G/DL (ref 3.4–5)
ALBUMIN/GLOB SERPL: 1.3 {RATIO} (ref 0.8–1.7)
ALP SERPL-CCNC: 132 U/L (ref 45–117)
ALT SERPL-CCNC: 22 U/L (ref 13–56)
ANION GAP SERPL CALC-SCNC: 10 MMOL/L (ref 3–18)
AST SERPL-CCNC: 24 U/L (ref 10–38)
ATRIAL RATE: 82 BPM
BASOPHILS # BLD: 0 K/UL (ref 0–0.1)
BASOPHILS NFR BLD: 0 % (ref 0–2)
BILIRUB SERPL-MCNC: 0.3 MG/DL (ref 0.2–1)
BUN SERPL-MCNC: 33 MG/DL (ref 7–18)
BUN/CREAT SERPL: 5 (ref 12–20)
CALCIUM SERPL-MCNC: 9.2 MG/DL (ref 8.5–10.1)
CALCULATED P AXIS, ECG09: 61 DEGREES
CALCULATED R AXIS, ECG10: -21 DEGREES
CALCULATED T AXIS, ECG11: 31 DEGREES
CHLORIDE SERPL-SCNC: 106 MMOL/L (ref 100–111)
CO2 SERPL-SCNC: 26 MMOL/L (ref 21–32)
CREAT SERPL-MCNC: 6.59 MG/DL (ref 0.6–1.3)
DIAGNOSIS, 93000: NORMAL
DIFFERENTIAL METHOD BLD: ABNORMAL
EOSINOPHIL # BLD: 0.2 K/UL (ref 0–0.4)
EOSINOPHIL NFR BLD: 3 % (ref 0–5)
ERYTHROCYTE [DISTWIDTH] IN BLOOD BY AUTOMATED COUNT: 16.3 % (ref 11.6–14.5)
GLOBULIN SER CALC-MCNC: 3.2 G/DL (ref 2–4)
GLUCOSE SERPL-MCNC: 141 MG/DL (ref 74–99)
HCT VFR BLD AUTO: 34.7 % (ref 35–45)
HGB BLD-MCNC: 11.1 G/DL (ref 12–16)
LYMPHOCYTES # BLD: 1.4 K/UL (ref 0.9–3.6)
LYMPHOCYTES NFR BLD: 15 % (ref 21–52)
MCH RBC QN AUTO: 31.6 PG (ref 24–34)
MCHC RBC AUTO-ENTMCNC: 32 G/DL (ref 31–37)
MCV RBC AUTO: 98.9 FL (ref 74–97)
MONOCYTES # BLD: 0.7 K/UL (ref 0.05–1.2)
MONOCYTES NFR BLD: 8 % (ref 3–10)
NEUTS SEG # BLD: 7.2 K/UL (ref 1.8–8)
NEUTS SEG NFR BLD: 74 % (ref 40–73)
P-R INTERVAL, ECG05: 122 MS
PLATELET # BLD AUTO: 295 K/UL (ref 135–420)
PMV BLD AUTO: 11.6 FL (ref 9.2–11.8)
POTASSIUM SERPL-SCNC: 4.5 MMOL/L (ref 3.5–5.5)
PROT SERPL-MCNC: 7.4 G/DL (ref 6.4–8.2)
Q-T INTERVAL, ECG07: 410 MS
QRS DURATION, ECG06: 82 MS
QTC CALCULATION (BEZET), ECG08: 479 MS
RBC # BLD AUTO: 3.51 M/UL (ref 4.2–5.3)
SODIUM SERPL-SCNC: 142 MMOL/L (ref 136–145)
TROPONIN I SERPL-MCNC: <0.02 NG/ML (ref 0–0.04)
VENTRICULAR RATE, ECG03: 82 BPM
WBC # BLD AUTO: 9.5 K/UL (ref 4.6–13.2)

## 2020-06-23 PROCEDURE — 71045 X-RAY EXAM CHEST 1 VIEW: CPT

## 2020-06-23 PROCEDURE — 80053 COMPREHEN METABOLIC PANEL: CPT

## 2020-06-23 PROCEDURE — 99284 EMERGENCY DEPT VISIT MOD MDM: CPT

## 2020-06-23 PROCEDURE — 75810000455 HC PLCMT CENT VENOUS CATH LVL 2 5182

## 2020-06-23 PROCEDURE — 84484 ASSAY OF TROPONIN QUANT: CPT

## 2020-06-23 PROCEDURE — 93005 ELECTROCARDIOGRAM TRACING: CPT

## 2020-06-23 PROCEDURE — 85025 COMPLETE CBC W/AUTO DIFF WBC: CPT

## 2020-06-23 PROCEDURE — 93971 EXTREMITY STUDY: CPT

## 2020-06-23 RX ORDER — TRAMADOL HYDROCHLORIDE 50 MG/1
50 TABLET ORAL
Status: DISCONTINUED | OUTPATIENT
Start: 2020-06-23 | End: 2020-06-23

## 2020-06-23 RX ORDER — ACETAMINOPHEN 500 MG
1000 TABLET ORAL
Status: DISCONTINUED | OUTPATIENT
Start: 2020-06-23 | End: 2020-06-24 | Stop reason: HOSPADM

## 2020-06-23 RX ORDER — HYDRALAZINE HYDROCHLORIDE 50 MG/1
50 TABLET, FILM COATED ORAL
Status: DISCONTINUED | OUTPATIENT
Start: 2020-06-23 | End: 2020-06-24 | Stop reason: HOSPADM

## 2020-06-23 NOTE — ED PROVIDER NOTES
EMERGENCY DEPARTMENT HISTORY AND PHYSICAL EXAM    Date: 6/23/2020  Patient Name: Heron Evans    History of Presenting Illness     No chief complaint on file. History Provided By: Patient and Caregiver    Additional History (Context): Heron Evans is a 75 y.o. female with diabetes, hypertension and ESRD on dialysis MWF, dementia who presents with planes of left upper extremity discomfort to the charge nurse at [facility] where she is a resident. Pain began this morning. Denies any chest pain shortness of breath. History of an occluded AV fistula. Per Dr. Wava Gaucher, pt has dialysis scheduled MWF and was not at facility today. PCP: Johnny Ferrari MD    Current Facility-Administered Medications   Medication Dose Route Frequency Provider Last Rate Last Dose    hydrALAZINE (APRESOLINE) tablet 50 mg  50 mg Oral NOW Meme Martin PA        acetaminophen (TYLENOL) tablet 1,000 mg  1,000 mg Oral NOW Meme Martin PA         Current Outpatient Medications   Medication Sig Dispense Refill    sevelamer carbonate (RENVELA) 800 mg tab tab Take 800 mg by mouth three (3) times daily.  traMADol (ULTRAM) 50 mg tablet Take 1 Tab by mouth every six (6) hours as needed for Pain. Max Daily Amount: 200 mg. 20 Tab 0    mirtazapine (REMERON) 15 mg tablet Take  by mouth nightly.  levETIRAcetam (KEPPRA) 250 mg tablet Take 1 Tab by mouth every Monday, Wednesday, Friday. 30 Tab 1    hydrALAZINE (APRESOLINE) 50 mg tablet Take 1 Tab by mouth three (3) times daily. 90 Tab 0    simvastatin (ZOCOR) 20 mg tablet Take 1 Tab by mouth nightly. 30 Tab 0    epoetin raphael (EPOGEN;PROCRIT) 3,000 unit/mL injection 1.73 mL by SubCUTAneous route Every Tuesday, Thursday and Saturday.  Indications: ANEMIA DUE TO RENAL FAILURE, Renal Dialysis (Patient taking differently: 3,000 Units by SubCUTAneous route every Monday, Wednesday, Friday.) 1 Vial 0    insulin lispro (HUMALOG) 100 unit/mL injection INITIATE INSULIN CORRECTIVE PROTOCOL: Normal Insulin Sensitivity   For Blood Sugar (mg/dL) of:     Less than 150 =   0 units           150 -199 =   2 units  200 -249 =   4 units  250 -299 =   6 units  300 -349 =   8 units  350 and above = 10 units and Call Physician  If 2 glucose readings are above 1 Vial 0    losartan (COZAAR) 100 mg tablet Take 1 Tab by mouth daily. 30 Tab 0    aspirin 81 mg tablet Take 81 mg by mouth daily.  diltiazem CD (CARDIZEM CD) 240 mg ER capsule Take 240 mg by mouth daily.  cloNIDine (CATAPRES) 0.1 mg tablet Take 0.1 mg by mouth two (2) times a day. Past History     Past Medical History:  Past Medical History:   Diagnosis Date    Asthma     Bipolar affective disorder ([facility] [address]     Brain condition     Cataract     Chronic kidney disease     hemodialysis M-W-F    Diabetes ([facility] [address]     Hyperlipidemia     Hypertension     Paralytic strabismus, unspecified     Psychiatric disorder     Seizures ([facility] [address] 08/27/2018       Past Surgical History:  Past Surgical History:   Procedure Laterality Date    [place] INSJ TUNNELED CVC W/O SUBQ PORT/ AGE 3 YR/> N/A 8/9/2018     in-pt 474A, Insertion Tunneled Central Venous Catheter performed by Tammy Hinton MD at [address] [place] [address]    [place] INSJ TUNNELED CVC W/O SUBQ PORT/ AGE 5 YR/> N/A 11/8/2019    INSERTION TUNNELED CENTRAL VENOUS CATHETER performed by Zahraa Marino MD at [address] [place] [address]       Family History:  No family history on file. Social History:  Social History     Tobacco Use    Smoking status: Never Smoker    Smokeless tobacco: Never Used   Substance Use Topics    Alcohol use: No    Drug use: No       Allergies:   Allergies   Allergen Reactions    Amoxicillin Unknown (comments)    Cleocin [Clindamycin Hcl] Unknown (comments)    Codeine Unknown (comments)    Lorcet 10/650 [Hydrocodone-Acetaminophen] Unknown (comments)    Penicillin G Benzathine Unknown (comments)    Shellfish Derived Unknown (comments)         Review of Systems Review of Systems   Constitutional: Negative for fever. HENT: Negative. Eyes: Negative. Respiratory: Negative for shortness of breath. Cardiovascular: Negative for chest pain. Gastrointestinal: Negative. Endocrine: Negative. Genitourinary: Negative. Musculoskeletal: Positive for myalgias. Skin: Negative. Neurological: Negative. Hematological: Bruises/bleeds easily. Psychiatric/Behavioral: Negative. All Other Systems Negative  Physical Exam     Vitals:    06/23/20 1744 06/23/20 1745 06/23/20 1746 06/23/20 1747   BP:       Pulse: 64 64 66 68   Resp: 12 12 14 16   SpO2: 100% 100% 100% 100%     Physical Exam  Vitals signs and nursing note reviewed. Constitutional:       Appearance: She is well-developed. HENT:      Head: Normocephalic and atraumatic. Eyes:      Pupils: Pupils are equal, round, and reactive to light. Neck:      Thyroid: No thyromegaly. Vascular: No JVD. Trachea: No tracheal deviation. Cardiovascular:      Rate and Rhythm: Normal rate and regular rhythm. Heart sounds: Normal heart sounds. No murmur. No friction rub. No gallop. Pulmonary:      Effort: Pulmonary effort is normal. No respiratory distress. Breath sounds: Normal breath sounds. No stridor. No wheezing or rales. Chest:      Chest wall: No tenderness. Abdominal:      General: There is no distension. Palpations: Abdomen is soft. There is no mass. Tenderness: There is no abdominal tenderness. There is no guarding or rebound. Musculoskeletal:         General: No tenderness. Comments: Known occluded AV fistula in the left upper extremity. There is no palpable bruit. Radial pulse palpable. Distal arm is without redness or warmth. Lymphadenopathy:      Cervical: No cervical adenopathy. Skin:     General: Skin is warm and dry. Coloration: Skin is not pale. Findings: No erythema or rash.    Neurological:      Mental Status: She is alert and oriented to person, place, and time. Psychiatric:         Behavior: Behavior normal.         Thought Content: Thought content normal.            Diagnostic Study Results     Labs -     Recent Results (from the past 12 hour(s))   CBC WITH AUTOMATED DIFF    Collection Time: 06/23/20  5:00 PM   Result Value Ref Range    WBC 9.5 4.6 - 13.2 K/uL    RBC 3.51 (L) 4.20 - 5.30 M/uL    HGB 11.1 (L) 12.0 - 16.0 g/dL    HCT 34.7 (L) 35.0 - 45.0 %    MCV 98.9 (H) 74.0 - 97.0 FL    MCH 31.6 24.0 - 34.0 PG    MCHC 32.0 31.0 - 37.0 g/dL    RDW 16.3 (H) 11.6 - 14.5 %    PLATELET 514 528 - 044 K/uL    MPV 11.6 9.2 - 11.8 FL    NEUTROPHILS 74 (H) 40 - 73 %    LYMPHOCYTES 15 (L) 21 - 52 %    MONOCYTES 8 3 - 10 %    EOSINOPHILS 3 0 - 5 %    BASOPHILS 0 0 - 2 %    ABS. NEUTROPHILS 7.2 1.8 - 8.0 K/UL    ABS. LYMPHOCYTES 1.4 0.9 - 3.6 K/UL    ABS. MONOCYTES 0.7 0.05 - 1.2 K/UL    ABS. EOSINOPHILS 0.2 0.0 - 0.4 K/UL    ABS. BASOPHILS 0.0 0.0 - 0.1 K/UL    DF AUTOMATED     METABOLIC PANEL, COMPREHENSIVE    Collection Time: 06/23/20  5:00 PM   Result Value Ref Range    Sodium 142 136 - 145 mmol/L    Potassium 4.5 3.5 - 5.5 mmol/L    Chloride 106 100 - 111 mmol/L    CO2 26 21 - 32 mmol/L    Anion gap 10 3.0 - 18 mmol/L    Glucose 141 (H) 74 - 99 mg/dL    BUN 33 (H) 7.0 - 18 MG/DL    Creatinine 6.59 (H) 0.6 - 1.3 MG/DL    BUN/Creatinine ratio 5 (L) 12 - 20      GFR est AA 8 (L) >60 ml/min/1.73m2    GFR est non-AA 6 (L) >60 ml/min/1.73m2    Calcium 9.2 8.5 - 10.1 MG/DL    Bilirubin, total 0.3 0.2 - 1.0 MG/DL    ALT (SGPT) 22 13 - 56 U/L    AST (SGOT) 24 10 - 38 U/L    Alk.  phosphatase 132 (H) 45 - 117 U/L    Protein, total 7.4 6.4 - 8.2 g/dL    Albumin 4.2 3.4 - 5.0 g/dL    Globulin 3.2 2.0 - 4.0 g/dL    A-G Ratio 1.3 0.8 - 1.7     TROPONIN I    Collection Time: 06/23/20  5:00 PM   Result Value Ref Range    Troponin-I, QT <0.02 0.0 - 0.045 NG/ML   EKG, 12 LEAD, INITIAL    Collection Time: 06/23/20  5:17 PM   Result Value Ref Range Ventricular Rate 82 BPM    Atrial Rate 82 BPM    P-R Interval 122 ms    QRS Duration 82 ms    Q-T Interval 410 ms    QTC Calculation (Bezet) 479 ms    Calculated P Axis 61 degrees    Calculated R Axis -21 degrees    Calculated T Axis 31 degrees    Diagnosis       Sinus rhythm with possible PVC vs artifact  Confirmed by Raman Emerson MD, Ambreen Mendoza ([id number]) on 6/23/2020 5:26:26 PM         Radiologic Studies -   XR CHEST PORT    (Results Pending)     CT Results  (Last 48 hours)    None        CXR Results  (Last 48 hours)    None            Medical Decision Making   I am the first provider for this patient. I reviewed the vital signs, available nursing notes, past medical history, past surgical history, family history and social history. Vital Signs-Reviewed the patient's vital signs. Records Reviewed: Nursing Notes, Old Medical Records and Previous Laboratory Studies    Procedures:  Left external jugular IV placement and venous access  Date/Time: 6/23/2020 5:13 PM  Performed by: SUSI Bianchi  Authorized by: SUSI Bianchi     Consent:     Consent obtained:  Verbal    Consent given by:  Patient  Indications:     Indications:  No IV access  Pre-procedure details:     Skin preparation:  Antiseptic wash  Anesthesia (see MAR for exact dosages): Anesthesia method:  None  Post-procedure details:     Patient tolerance of procedure:   Tolerated well, no immediate complications    EKG  Date/Time: 6/23/2020 5:17 PM  Performed by: SUSI Bianchi  Authorized by: SUSI Bianchi     ECG reviewed by ED Physician in the absence of a cardiologist: yes    Interpretation:     Interpretation: abnormal    Rate:     ECG rate:  82    ECG rate assessment: normal    Rhythm:     Rhythm: sinus rhythm    Ectopy:     Ectopy: PVCs      PVCs:  Infrequent  QRS:     QRS axis:  Normal    QRS intervals:  Normal  Conduction:     Conduction: normal    ST segments:     ST segments:  Normal  T waves:     T waves: normal          Provider Notes (Medical Decision Making):   Differential includes DVT, cellulitis, NSTEMI    EKG, labs, CXR show nothing acute; no DVT on PVL. Clinically not cellulitic. Known occluded fistula. Can f/up with her PCP tomorrow. D/c home after PO Tylenol which she takes at her facility and PO hydralazine. MED RECONCILIATION:  Current Facility-Administered Medications   Medication Dose Route Frequency    hydrALAZINE (APRESOLINE) tablet 50 mg  50 mg Oral NOW    acetaminophen (TYLENOL) tablet 1,000 mg  1,000 mg Oral NOW     Current Outpatient Medications   Medication Sig    sevelamer carbonate (RENVELA) 800 mg tab tab Take 800 mg by mouth three (3) times daily.  traMADol (ULTRAM) 50 mg tablet Take 1 Tab by mouth every six (6) hours as needed for Pain. Max Daily Amount: 200 mg.    mirtazapine (REMERON) 15 mg tablet Take  by mouth nightly.  levETIRAcetam (KEPPRA) 250 mg tablet Take 1 Tab by mouth every Monday, Wednesday, Friday.  hydrALAZINE (APRESOLINE) 50 mg tablet Take 1 Tab by mouth three (3) times daily.  simvastatin (ZOCOR) 20 mg tablet Take 1 Tab by mouth nightly.  epoetin raphael (EPOGEN;PROCRIT) 3,000 unit/mL injection 1.73 mL by SubCUTAneous route Every Tuesday, Thursday and Saturday. Indications: ANEMIA DUE TO RENAL FAILURE, Renal Dialysis (Patient taking differently: 3,000 Units by SubCUTAneous route every Monday, Wednesday, Friday.)    insulin lispro (HUMALOG) 100 unit/mL injection INITIATE INSULIN CORRECTIVE PROTOCOL: Normal Insulin Sensitivity   For Blood Sugar (mg/dL) of:     Less than 150 =   0 units           150 -199 =   2 units  200 -249 =   4 units  250 -299 =   6 units  300 -349 =   8 units  350 and above = 10 units and Call Physician  If 2 glucose readings are above    losartan (COZAAR) 100 mg tablet Take 1 Tab by mouth daily.  aspirin 81 mg tablet Take 81 mg by mouth daily.  diltiazem CD (CARDIZEM CD) 240 mg ER capsule Take 240 mg by mouth daily.       cloNIDine (CATAPRES) 0.1 mg tablet Take 0.1 mg by mouth two (2) times a day. Disposition:  home    DISCHARGE NOTE:   5:49 PM    Pt has been reexamined. Patient has no new complaints, changes, or physical findings. Care plan outlined and precautions discussed. Results of labs, CXR were reviewed with the patient. All medications were reviewed with the patient. All of pt's questions and concerns were addressed. Patient was instructed and agrees to follow up with PCP, as well as to return to the ED upon further deterioration. Patient is ready to go home. Follow-up Information     Follow up With Specialties Details Why Verne Cooks, MD Nephrology Schedule an appointment as soon as possible for a visit in 1 day  Samantha Maxwell [address] [facility]      Nico Cardona MD Geriatric Medicine Schedule an appointment as soon as possible for a visit in 1 day  [address]  [facility] DEPT Emergency Medicine  If symptoms worsen return immediately [address] K[address] Young  [phone]          Current Discharge Medication List              Diagnosis     Clinical Impression:   1. Left arm pain    2. Essential hypertension    3.  ESRD on dialysis [facility])

## 2020-06-23 NOTE — DISCHARGE INSTRUCTIONS

## 2020-06-24 ENCOUNTER — PATIENT OUTREACH (OUTPATIENT)
Dept: CASE MANAGEMENT | Age: 65
End: 2020-06-24

## 2020-06-24 NOTE — PROGRESS NOTES
6/24/2020 11:19 AM     CTN attempted to contact patient for hospital follow up. Patient admitted to [facility] on 6/23/2020 and discharged on 6/23/2020 for left arm pain. Message left at nurses station at [facility] introducing myself, the purpose of the call and giving my contact information. Requested that nurse call back at her earliest convenience.

## 2020-06-25 ENCOUNTER — PATIENT OUTREACH (OUTPATIENT)
Dept: CASE MANAGEMENT | Age: 65
End: 2020-06-25

## 2020-06-25 ENCOUNTER — HOSPITAL ENCOUNTER (OUTPATIENT)
Dept: LAB | Age: 65
Discharge: HOME | End: 2020-06-25

## 2020-06-25 LAB
ANION GAP SERPL CALC-SCNC: 7 MMOL/L (ref 3–18)
BASOPHILS # BLD: 0 K/UL (ref 0–0.1)
BASOPHILS NFR BLD: 0 % (ref 0–2)
BUN SERPL-MCNC: 25 MG/DL (ref 7–18)
BUN/CREAT SERPL: 4 (ref 12–20)
CALCIUM SERPL-MCNC: 9.1 MG/DL (ref 8.5–10.1)
CHLORIDE SERPL-SCNC: 102 MMOL/L (ref 100–111)
CO2 SERPL-SCNC: 29 MMOL/L (ref 21–32)
CREAT SERPL-MCNC: 6.53 MG/DL (ref 0.6–1.3)
DIFFERENTIAL METHOD BLD: ABNORMAL
EOSINOPHIL # BLD: 0.2 K/UL (ref 0–0.4)
EOSINOPHIL NFR BLD: 2 % (ref 0–5)
ERYTHROCYTE [DISTWIDTH] IN BLOOD BY AUTOMATED COUNT: 16.1 % (ref 11.6–14.5)
GLUCOSE SERPL-MCNC: 364 MG/DL (ref 74–99)
HCT VFR BLD AUTO: 32.4 % (ref 35–45)
HGB BLD-MCNC: 10.4 G/DL (ref 12–16)
LYMPHOCYTES # BLD: 1.1 K/UL (ref 0.9–3.6)
LYMPHOCYTES NFR BLD: 11 % (ref 21–52)
MCH RBC QN AUTO: 32 PG (ref 24–34)
MCHC RBC AUTO-ENTMCNC: 32.1 G/DL (ref 31–37)
MCV RBC AUTO: 99.7 FL (ref 74–97)
MONOCYTES # BLD: 0.7 K/UL (ref 0.05–1.2)
MONOCYTES NFR BLD: 7 % (ref 3–10)
NEUTS SEG # BLD: 7.8 K/UL (ref 1.8–8)
NEUTS SEG NFR BLD: 80 % (ref 40–73)
PLATELET # BLD AUTO: 273 K/UL (ref 135–420)
PMV BLD AUTO: 11.8 FL (ref 9.2–11.8)
POTASSIUM SERPL-SCNC: 4.1 MMOL/L (ref 3.5–5.5)
RBC # BLD AUTO: 3.25 M/UL (ref 4.2–5.3)
SODIUM SERPL-SCNC: 138 MMOL/L (ref 136–145)
WBC # BLD AUTO: 9.8 K/UL (ref 4.6–13.2)

## 2020-06-25 PROCEDURE — 80048 BASIC METABOLIC PNL TOTAL CA: CPT

## 2020-06-25 PROCEDURE — 85025 COMPLETE CBC W/AUTO DIFF WBC: CPT

## 2020-06-25 NOTE — PROGRESS NOTES
6/25/2020 10:15 AM     CTN attempted to contact patient for hospital follow up. Patient admitted to [facility] on 6/23/2020 and discharged on 6/23/2020 for left arm pain. Message left at nurses station at [facility] [facility] introducing myself, the purpose of the call and giving my contact information. Requested that nurse call back at her earliest convenience. Second attempt to contact nurse station. Will resolve episode as unable to get in contact with patient at present.

## 2020-07-21 ENCOUNTER — HOSPITAL ENCOUNTER (OUTPATIENT)
Dept: LAB | Age: 65
Discharge: HOME | End: 2020-07-21

## 2020-07-21 LAB
ERYTHROCYTE [DISTWIDTH] IN BLOOD BY AUTOMATED COUNT: 15.2 % (ref 11.6–14.5)
HCT VFR BLD AUTO: 42.8 % (ref 35–45)
HGB BLD-MCNC: 13.5 G/DL (ref 12–16)
MCH RBC QN AUTO: 31.1 PG (ref 24–34)
MCHC RBC AUTO-ENTMCNC: 31.5 G/DL (ref 31–37)
MCV RBC AUTO: 98.6 FL (ref 74–97)
PLATELET # BLD AUTO: 268 K/UL (ref 135–420)
PMV BLD AUTO: 13 FL (ref 9.2–11.8)
RBC # BLD AUTO: 4.34 M/UL (ref 4.2–5.3)
WBC # BLD AUTO: 6.9 K/UL (ref 4.6–13.2)

## 2020-07-21 PROCEDURE — 85027 COMPLETE CBC AUTOMATED: CPT

## 2020-07-21 PROCEDURE — 80053 COMPREHEN METABOLIC PANEL: CPT

## 2020-07-22 LAB
ALBUMIN SERPL-MCNC: 3.6 G/DL (ref 3.4–5)
ALBUMIN/GLOB SERPL: 1.1 {RATIO} (ref 0.8–1.7)
ALP SERPL-CCNC: 242 U/L (ref 45–117)
ALT SERPL-CCNC: 87 U/L (ref 13–56)
ANION GAP SERPL CALC-SCNC: 9 MMOL/L (ref 3–18)
AST SERPL-CCNC: 20 U/L (ref 10–38)
BILIRUB SERPL-MCNC: 0.4 MG/DL (ref 0.2–1)
BUN SERPL-MCNC: 37 MG/DL (ref 7–18)
BUN/CREAT SERPL: 5 (ref 12–20)
CALCIUM SERPL-MCNC: 8.4 MG/DL (ref 8.5–10.1)
CHLORIDE SERPL-SCNC: 100 MMOL/L (ref 100–111)
CO2 SERPL-SCNC: 28 MMOL/L (ref 21–32)
CREAT SERPL-MCNC: 6.95 MG/DL (ref 0.6–1.3)
GLOBULIN SER CALC-MCNC: 3.4 G/DL (ref 2–4)
GLUCOSE SERPL-MCNC: 569 MG/DL (ref 74–99)
POTASSIUM SERPL-SCNC: 4.3 MMOL/L (ref 3.5–5.5)
PROT SERPL-MCNC: 7 G/DL (ref 6.4–8.2)
SODIUM SERPL-SCNC: 137 MMOL/L (ref 136–145)

## 2020-09-16 ENCOUNTER — HOSPITAL ENCOUNTER (EMERGENCY)
Age: 65
Discharge: HOME | End: 2020-09-17
Attending: EMERGENCY MEDICINE
Payer: MEDICARE

## 2020-09-16 ENCOUNTER — APPOINTMENT (OUTPATIENT)
Dept: GENERAL RADIOLOGY | Age: 65
End: 2020-09-16
Attending: STUDENT IN AN ORGANIZED HEALTH CARE EDUCATION/TRAINING PROGRAM
Payer: MEDICARE

## 2020-09-16 DIAGNOSIS — I10 UNCONTROLLED HYPERTENSION: ICD-10-CM

## 2020-09-16 DIAGNOSIS — R07.9 CHEST PAIN, UNSPECIFIED TYPE: Primary | ICD-10-CM

## 2020-09-16 DIAGNOSIS — R80.9 PROTEINURIA, UNSPECIFIED TYPE: ICD-10-CM

## 2020-09-16 LAB
ALBUMIN SERPL-MCNC: 4.2 G/DL (ref 3.4–5)
ALBUMIN/GLOB SERPL: 1.2 {RATIO} (ref 0.8–1.7)
ALP SERPL-CCNC: 158 U/L (ref 45–117)
ALT SERPL-CCNC: 44 U/L (ref 13–56)
ANION GAP SERPL CALC-SCNC: 9 MMOL/L (ref 3–18)
APPEARANCE UR: ABNORMAL
AST SERPL-CCNC: 27 U/L (ref 10–38)
ATRIAL RATE: 128 BPM
BACTERIA URNS QL MICRO: ABNORMAL /HPF
BASOPHILS # BLD: 0 K/UL (ref 0–0.1)
BASOPHILS NFR BLD: 0 % (ref 0–2)
BILIRUB SERPL-MCNC: 0.3 MG/DL (ref 0.2–1)
BILIRUB UR QL: NEGATIVE
BNP SERPL-MCNC: 1623 PG/ML (ref 0–900)
BUN SERPL-MCNC: 26 MG/DL (ref 7–18)
BUN/CREAT SERPL: 5 (ref 12–20)
CALCIUM SERPL-MCNC: 9.4 MG/DL (ref 8.5–10.1)
CALCULATED P AXIS, ECG09: 58 DEGREES
CALCULATED R AXIS, ECG10: -24 DEGREES
CALCULATED T AXIS, ECG11: 60 DEGREES
CHLORIDE SERPL-SCNC: 97 MMOL/L (ref 100–111)
CO2 SERPL-SCNC: 29 MMOL/L (ref 21–32)
COLOR UR: YELLOW
CREAT SERPL-MCNC: 4.94 MG/DL (ref 0.6–1.3)
DIAGNOSIS, 93000: NORMAL
DIFFERENTIAL METHOD BLD: ABNORMAL
EOSINOPHIL # BLD: 0.2 K/UL (ref 0–0.4)
EOSINOPHIL NFR BLD: 2 % (ref 0–5)
EPITH CASTS URNS QL MICRO: ABNORMAL /LPF (ref 0–5)
ERYTHROCYTE [DISTWIDTH] IN BLOOD BY AUTOMATED COUNT: 14.8 % (ref 11.6–14.5)
GLOBULIN SER CALC-MCNC: 3.6 G/DL (ref 2–4)
GLUCOSE SERPL-MCNC: 125 MG/DL (ref 74–99)
GLUCOSE UR STRIP.AUTO-MCNC: 100 MG/DL
HCT VFR BLD AUTO: 39.1 % (ref 35–45)
HGB BLD-MCNC: 12.6 G/DL (ref 12–16)
HGB UR QL STRIP: NEGATIVE
INR PPP: 0.9 (ref 0.8–1.2)
KETONES UR QL STRIP.AUTO: NEGATIVE MG/DL
LACTATE BLD-SCNC: 1.26 MMOL/L (ref 0.4–2)
LEUKOCYTE ESTERASE UR QL STRIP.AUTO: NEGATIVE
LYMPHOCYTES # BLD: 1.4 K/UL (ref 0.9–3.6)
LYMPHOCYTES NFR BLD: 16 % (ref 21–52)
MAGNESIUM SERPL-MCNC: 2.1 MG/DL (ref 1.6–2.6)
MCH RBC QN AUTO: 28.1 PG (ref 24–34)
MCHC RBC AUTO-ENTMCNC: 32.2 G/DL (ref 31–37)
MCV RBC AUTO: 87.3 FL (ref 74–97)
MONOCYTES # BLD: 0.8 K/UL (ref 0.05–1.2)
MONOCYTES NFR BLD: 9 % (ref 3–10)
NEUTS SEG # BLD: 6.2 K/UL (ref 1.8–8)
NEUTS SEG NFR BLD: 73 % (ref 40–73)
NITRITE UR QL STRIP.AUTO: NEGATIVE
P-R INTERVAL, ECG05: 124 MS
PH UR STRIP: 7.5 [PH] (ref 5–8)
PLATELET # BLD AUTO: 308 K/UL (ref 135–420)
PMV BLD AUTO: 12.5 FL (ref 9.2–11.8)
POTASSIUM SERPL-SCNC: 4.1 MMOL/L (ref 3.5–5.5)
PROT SERPL-MCNC: 7.8 G/DL (ref 6.4–8.2)
PROT UR STRIP-MCNC: >1000 MG/DL
PROTHROMBIN TIME: 11.8 SEC (ref 11.5–15.2)
Q-T INTERVAL, ECG07: 332 MS
QRS DURATION, ECG06: 80 MS
QTC CALCULATION (BEZET), ECG08: 484 MS
RBC # BLD AUTO: 4.48 M/UL (ref 4.2–5.3)
RBC #/AREA URNS HPF: ABNORMAL /HPF (ref 0–5)
SODIUM SERPL-SCNC: 135 MMOL/L (ref 136–145)
SP GR UR REFRACTOMETRY: 1.01 (ref 1–1.03)
TROPONIN I SERPL-MCNC: <0.02 NG/ML (ref 0–0.04)
UROBILINOGEN UR QL STRIP.AUTO: 0.2 EU/DL (ref 0.2–1)
VENTRICULAR RATE, ECG03: 128 BPM
WBC # BLD AUTO: 8.6 K/UL (ref 4.6–13.2)
WBC URNS QL MICRO: NEGATIVE /HPF (ref 0–4)

## 2020-09-16 PROCEDURE — 74011250637 HC RX REV CODE- 250/637: Performed by: EMERGENCY MEDICINE

## 2020-09-16 PROCEDURE — 74011000250 HC RX REV CODE- 250: Performed by: STUDENT IN AN ORGANIZED HEALTH CARE EDUCATION/TRAINING PROGRAM

## 2020-09-16 PROCEDURE — 84484 ASSAY OF TROPONIN QUANT: CPT

## 2020-09-16 PROCEDURE — 87040 BLOOD CULTURE FOR BACTERIA: CPT

## 2020-09-16 PROCEDURE — 85610 PROTHROMBIN TIME: CPT

## 2020-09-16 PROCEDURE — 74011250636 HC RX REV CODE- 250/636: Performed by: STUDENT IN AN ORGANIZED HEALTH CARE EDUCATION/TRAINING PROGRAM

## 2020-09-16 PROCEDURE — 83605 ASSAY OF LACTIC ACID: CPT

## 2020-09-16 PROCEDURE — 80053 COMPREHEN METABOLIC PANEL: CPT

## 2020-09-16 PROCEDURE — 96374 THER/PROPH/DIAG INJ IV PUSH: CPT

## 2020-09-16 PROCEDURE — 85025 COMPLETE CBC W/AUTO DIFF WBC: CPT

## 2020-09-16 PROCEDURE — 83735 ASSAY OF MAGNESIUM: CPT

## 2020-09-16 PROCEDURE — 81001 URINALYSIS AUTO W/SCOPE: CPT

## 2020-09-16 PROCEDURE — 74011250637 HC RX REV CODE- 250/637: Performed by: STUDENT IN AN ORGANIZED HEALTH CARE EDUCATION/TRAINING PROGRAM

## 2020-09-16 PROCEDURE — 83880 ASSAY OF NATRIURETIC PEPTIDE: CPT

## 2020-09-16 PROCEDURE — 71045 X-RAY EXAM CHEST 1 VIEW: CPT

## 2020-09-16 PROCEDURE — 93005 ELECTROCARDIOGRAM TRACING: CPT

## 2020-09-16 PROCEDURE — 96375 TX/PRO/DX INJ NEW DRUG ADDON: CPT

## 2020-09-16 PROCEDURE — 99285 EMERGENCY DEPT VISIT HI MDM: CPT

## 2020-09-16 PROCEDURE — 96361 HYDRATE IV INFUSION ADD-ON: CPT

## 2020-09-16 RX ORDER — CLONIDINE HYDROCHLORIDE 0.1 MG/1
0.1 TABLET ORAL
Status: COMPLETED | OUTPATIENT
Start: 2020-09-16 | End: 2020-09-16

## 2020-09-16 RX ORDER — METOPROLOL TARTRATE 5 MG/5ML
5 INJECTION INTRAVENOUS ONCE
Status: COMPLETED | OUTPATIENT
Start: 2020-09-16 | End: 2020-09-16

## 2020-09-16 RX ORDER — HYDRALAZINE HYDROCHLORIDE 20 MG/ML
10 INJECTION INTRAMUSCULAR; INTRAVENOUS ONCE
Status: DISCONTINUED | OUTPATIENT
Start: 2020-09-16 | End: 2020-09-16

## 2020-09-16 RX ORDER — TRAMADOL HYDROCHLORIDE 50 MG/1
50 TABLET ORAL
Status: COMPLETED | OUTPATIENT
Start: 2020-09-16 | End: 2020-09-16

## 2020-09-16 RX ORDER — METOPROLOL TARTRATE 25 MG/1
25 TABLET, FILM COATED ORAL ONCE
Status: COMPLETED | OUTPATIENT
Start: 2020-09-16 | End: 2020-09-16

## 2020-09-16 RX ORDER — HYDRALAZINE HYDROCHLORIDE 50 MG/1
50 TABLET, FILM COATED ORAL
Status: COMPLETED | OUTPATIENT
Start: 2020-09-16 | End: 2020-09-16

## 2020-09-16 RX ORDER — HYDRALAZINE HYDROCHLORIDE 20 MG/ML
10 INJECTION INTRAMUSCULAR; INTRAVENOUS ONCE
Status: COMPLETED | OUTPATIENT
Start: 2020-09-16 | End: 2020-09-16

## 2020-09-16 RX ADMIN — METOPROLOL TARTRATE 5 MG: 1 INJECTION, SOLUTION INTRAVENOUS at 17:38

## 2020-09-16 RX ADMIN — SODIUM CHLORIDE 250 ML: 900 INJECTION, SOLUTION INTRAVENOUS at 17:44

## 2020-09-16 RX ADMIN — HYDRALAZINE HYDROCHLORIDE 50 MG: 50 TABLET ORAL at 20:48

## 2020-09-16 RX ADMIN — CLONIDINE HYDROCHLORIDE 0.1 MG: 0.1 TABLET ORAL at 19:32

## 2020-09-16 RX ADMIN — METOPROLOL TARTRATE 25 MG: 25 TABLET ORAL at 20:48

## 2020-09-16 RX ADMIN — TRAMADOL HYDROCHLORIDE 50 MG: 50 TABLET, FILM COATED ORAL at 19:32

## 2020-09-16 RX ADMIN — HYDRALAZINE HYDROCHLORIDE 10 MG: 20 INJECTION INTRAMUSCULAR; INTRAVENOUS at 19:32

## 2020-09-16 NOTE — ED PROVIDER NOTES
EMERGENCY DEPARTMENT HISTORY AND PHYSICAL EXAM    3:35 PM      Date: 9/16/2020  Patient Name: Radha Ponce    History of Presenting Illness     Chief Complaint   Patient presents with    Chest Pain         History Provided By: Patient  Location/Duration/Severity/Modifying factors   66yo F w/ PMH of ESRD on dialysis, HTN, HLD, Asthma who presents to ED with sharp chest pain radiating to the neck, palpitations, SOB, lightheadedness that started today just prior to dialysis, which has progressively worsened. Pt got 1.7L off in dialysis before she suddenly complained of chest pain and sob, became tachycardic and hypertensive. Dialysis staff gave patient 0.2 clonidine but this did not decrease patient's blood pressure, sent her to the emergency department. Patient is a poor historian, describes medical history but is not verified or supported by her medical record. Patient denies other symptoms. PCP: Jena Thomas MD    Current Outpatient Medications   Medication Sig Dispense Refill    sevelamer carbonate (RENVELA) 800 mg tab tab Take 800 mg by mouth three (3) times daily.  traMADol (ULTRAM) 50 mg tablet Take 1 Tab by mouth every six (6) hours as needed for Pain. Max Daily Amount: 200 mg. 20 Tab 0    mirtazapine (REMERON) 15 mg tablet Take  by mouth nightly.  levETIRAcetam (KEPPRA) 250 mg tablet Take 1 Tab by mouth every Monday, Wednesday, Friday. 30 Tab 1    hydrALAZINE (APRESOLINE) 50 mg tablet Take 1 Tab by mouth three (3) times daily. 90 Tab 0    simvastatin (ZOCOR) 20 mg tablet Take 1 Tab by mouth nightly. 30 Tab 0    epoetin raphael (EPOGEN;PROCRIT) 3,000 unit/mL injection 1.73 mL by SubCUTAneous route Every Tuesday, Thursday and Saturday.  Indications: ANEMIA DUE TO RENAL FAILURE, Renal Dialysis (Patient taking differently: 3,000 Units by SubCUTAneous route every Monday, Wednesday, Friday.) 1 Vial 0    insulin lispro (HUMALOG) 100 unit/mL injection INITIATE INSULIN CORRECTIVE PROTOCOL: Normal Insulin Sensitivity   For Blood Sugar (mg/dL) of:     Less than 150 =   0 units           150 -199 =   2 units  200 -249 =   4 units  250 -299 =   6 units  300 -349 =   8 units  350 and above = 10 units and Call Physician  If 2 glucose readings are above 1 Vial 0    losartan (COZAAR) 100 mg tablet Take 1 Tab by mouth daily. 30 Tab 0    aspirin 81 mg tablet Take 81 mg by mouth daily.  diltiazem CD (CARDIZEM CD) 240 mg ER capsule Take 240 mg by mouth daily.  cloNIDine (CATAPRES) 0.1 mg tablet Take 0.1 mg by mouth two (2) times a day. Past History     Past Medical History:  Past Medical History:   Diagnosis Date    Asthma     Bipolar affective disorder ([facility]     Brain condition     Cataract     Chronic kidney disease     hemodialysis M-W-F    Diabetes ([facility]     Hyperlipidemia     Hypertension     Paralytic strabismus, unspecified     Psychiatric disorder     Seizures ([facility] 08/27/2018       Past Surgical History:  Past Surgical History:   Procedure Laterality Date    UT INSJ TUNNELED CVC W/O SUBQ PORT/ AGE 5 YR/> N/A 8/9/2018     in-pt 474A, Insertion Tunneled Central Venous Catheter performed by Kinga Foy MD at [address] [place] [address] LAB    UT INSJ TUNNELED CVC W/O SUBQ PORT/ AGE 5 YR/> N/A 11/8/2019    INSERTION TUNNELED CENTRAL VENOUS CATHETER performed by Jake Larson MD at [address] [place] [address] LAB       Family History:  No family history on file. Social History:  Social History     Tobacco Use    Smoking status: Never Smoker    Smokeless tobacco: Never Used   Substance Use Topics    Alcohol use: No    Drug use: No       Allergies:   Allergies   Allergen Reactions    Amoxicillin Unknown (comments)    Cleocin [Clindamycin Hcl] Unknown (comments)    Codeine Unknown (comments)    Lorcet 10/650 [Hydrocodone-Acetaminophen] Unknown (comments)    Penicillin G Benzathine Unknown (comments)    Shellfish Derived Unknown (comments)         Review of Systems Review of Systems   Constitutional: Negative for appetite change, chills and fever. HENT: Negative for sore throat. Eyes: Negative for visual disturbance. Respiratory: Positive for shortness of breath. Negative for cough. Cardiovascular: Positive for chest pain (sharp) and palpitations. Gastrointestinal: Negative for abdominal pain, diarrhea, nausea and vomiting. Genitourinary: Negative for dysuria. Musculoskeletal: Negative for neck pain. Skin: Positive for rash (R inner wrist). Neurological: Negative for light-headedness. Psychiatric/Behavioral: Negative for agitation. Physical Exam     Visit Vitals  BP (!) 186/89   Pulse 98   Temp 97.2 °F (36.2 °C)   Resp 27   Ht 5' 5\" (1.651 m)   Wt 75.8 kg (167 lb)   LMP  (LMP Unknown)   SpO2 98%   BMI 27.79 kg/m²         Physical Exam  Vitals signs and nursing note reviewed. Constitutional:       General: She is not in acute distress. Appearance: She is not ill-appearing, toxic-appearing or diaphoretic. HENT:      Head: Normocephalic and atraumatic. Cardiovascular:      Rate and Rhythm: Regular rhythm. Tachycardia present. Pulses: Normal pulses. Heart sounds: Normal heart sounds. No murmur. No friction rub. No gallop. Pulmonary:      Breath sounds: Normal breath sounds. No decreased breath sounds, wheezing, rhonchi or rales. Chest:      Chest wall: No tenderness. Abdominal:      Palpations: Abdomen is soft. Tenderness: There is no abdominal tenderness. There is no guarding or rebound. Musculoskeletal:      Right lower leg: No edema. Left lower leg: No edema. Skin:     General: Skin is warm and dry. Findings: Rash (Dry, eczema-like patch to ventral left wrist, pruritic) present. Neurological:      General: No focal deficit present.    Psychiatric:         Mood and Affect: Mood normal.      Comments: Patient often swearing, poor historian, but cooperative and pleasant           Diagnostic Study Results     Labs -  Recent Results (from the past 12 hour(s))   EKG, 12 LEAD, INITIAL    Collection Time: 09/16/20  3:26 PM   Result Value Ref Range    Ventricular Rate 128 BPM    Atrial Rate 128 BPM    P-R Interval 124 ms    QRS Duration 80 ms    Q-T Interval 332 ms    QTC Calculation (Bezet) 484 ms    Calculated P Axis 58 degrees    Calculated R Axis -24 degrees    Calculated T Axis 60 degrees    Diagnosis       Sinus tachycardia  Voltage criteria for left ventricular hypertrophy  Cannot rule out Septal infarct , age undetermined  Abnormal ECG  When compared with ECG of 23-JUN-2020 17:17,  T wave amplitude has increased in Anterior leads  Confirmed by Laura Medrano M.D., [address] [place] [address] ([id number]) on 9/16/2020 9:21:51 PM     CBC WITH AUTOMATED DIFF    Collection Time: 09/16/20  3:30 PM   Result Value Ref Range    WBC 8.6 4.6 - 13.2 K/uL    RBC 4.48 4.20 - 5.30 M/uL    HGB 12.6 12.0 - 16.0 g/dL    HCT 39.1 35.0 - 45.0 %    MCV 87.3 74.0 - 97.0 FL    MCH 28.1 24.0 - 34.0 PG    MCHC 32.2 31.0 - 37.0 g/dL    RDW 14.8 (H) 11.6 - 14.5 %    PLATELET 265 331 - 163 K/uL    MPV 12.5 (H) 9.2 - 11.8 FL    NEUTROPHILS 73 40 - 73 %    LYMPHOCYTES 16 (L) 21 - 52 %    MONOCYTES 9 3 - 10 %    EOSINOPHILS 2 0 - 5 %    BASOPHILS 0 0 - 2 %    ABS. NEUTROPHILS 6.2 1.8 - 8.0 K/UL    ABS. LYMPHOCYTES 1.4 0.9 - 3.6 K/UL    ABS. MONOCYTES 0.8 0.05 - 1.2 K/UL    ABS. EOSINOPHILS 0.2 0.0 - 0.4 K/UL    ABS.  BASOPHILS 0.0 0.0 - 0.1 K/UL    DF AUTOMATED     METABOLIC PANEL, COMPREHENSIVE    Collection Time: 09/16/20  3:30 PM   Result Value Ref Range    Sodium 135 (L) 136 - 145 mmol/L    Potassium 4.1 3.5 - 5.5 mmol/L    Chloride 97 (L) 100 - 111 mmol/L    CO2 29 21 - 32 mmol/L    Anion gap 9 3.0 - 18 mmol/L    Glucose 125 (H) 74 - 99 mg/dL    BUN 26 (H) 7.0 - 18 MG/DL    Creatinine 4.94 (H) 0.6 - 1.3 MG/DL    BUN/Creatinine ratio 5 (L) 12 - 20      GFR est AA 11 (L) >60 ml/min/1.73m2    GFR est non-AA 9 (L) >60 ml/min/1.73m2    Calcium 9.4 8.5 - 10.1 MG/DL Bilirubin, total 0.3 0.2 - 1.0 MG/DL    ALT (SGPT) 44 13 - 56 U/L    AST (SGOT) 27 10 - 38 U/L    Alk. phosphatase 158 (H) 45 - 117 U/L    Protein, total 7.8 6.4 - 8.2 g/dL    Albumin 4.2 3.4 - 5.0 g/dL    Globulin 3.6 2.0 - 4.0 g/dL    A-G Ratio 1.2 0.8 - 1.7     TROPONIN I    Collection Time: 09/16/20  3:30 PM   Result Value Ref Range    Troponin-I, QT <0.02 0.0 - 0.045 NG/ML   MAGNESIUM    Collection Time: 09/16/20  3:30 PM   Result Value Ref Range    Magnesium 2.1 1.6 - 2.6 mg/dL   PROTHROMBIN TIME + INR    Collection Time: 09/16/20  3:30 PM   Result Value Ref Range    Prothrombin time 11.8 11.5 - 15.2 sec    INR 0.9 0.8 - 1.2     NT-PRO BNP    Collection Time: 09/16/20  3:30 PM   Result Value Ref Range    NT pro-BNP 1,623 (H) 0 - 900 PG/ML   URINALYSIS W/ RFLX MICROSCOPIC    Collection Time: 09/16/20  5:27 PM   Result Value Ref Range    Color YELLOW      Appearance CLOUDY      Specific gravity 1.015 1.005 - 1.030      pH (UA) 7.5 5.0 - 8.0      Protein >1,000 (A) NEG mg/dL    Glucose 100 (A) NEG mg/dL    Ketone Negative NEG mg/dL    Bilirubin Negative NEG      Blood Negative NEG      Urobilinogen 0.2 0.2 - 1.0 EU/dL    Nitrites Negative NEG      Leukocyte Esterase Negative NEG     URINE MICROSCOPIC ONLY    Collection Time: 09/16/20  5:27 PM   Result Value Ref Range    WBC Negative 0 - 4 /hpf    RBC 1 to 3 0 - 5 /hpf    Epithelial cells 2+ 0 - 5 /lpf    Bacteria FEW (A) NEG /hpf   POC LACTIC ACID    Collection Time: 09/16/20  5:56 PM   Result Value Ref Range    Lactic Acid (POC) 1.26 0.40 - 2.00 mmol/L       Radiologic Studies -   XR CHEST PORT   Final Result   IMPRESSION:      No clearly acute findings. Slightly prominent perihilar interstitium may be   simulated by low lung volumes. Mild interstitial infiltrate/edema not excluded. Medical Decision Making   I am the first provider for this patient.     I reviewed the vital signs, available nursing notes, past medical history, past surgical history, family history and social history. Vital Signs-Reviewed the patient's vital signs. EKG: sinus tachycardia, hr 128, normal axis, normal intervals (pr 124, qrs 80, qtc 484), to ST elevations or depressions, no T wave abnormalities. Nonischemic, no concerning cardiac arrhythmias. Possible LVH. Records Reviewed: Nursing Notes, Old Medical Records, Previous electrocardiograms, Previous Radiology Studies and Previous Laboratory Studies (Time of Review: 3:35 PM)    ED Course: Progress Notes, Reevaluation, and Consults:     16:37PM  Spoke with Dr. Lisset Orozco, nephrology, regarding pt. He is comfortable with plan to give small IVF bolus, lopressor to decrease BP and HR, and then, if cardiac work-up is otherwise negative, will discharge back to [facility]. Dr. Lisset Orozco was comfortable with this plan. CBC without leukocytosis or anemia. Coags normal. CMP with no concerning electrolyte abnormalities. BUN/Cr 26/4.94, appropriate for ESRD after partial dialysis. Troponin negative. BNP 1,623; improved from prior, not concerning for CHF exacerbation. Low concern for PE as pt is tachycardic and hypertensive, SpO2 100% on RA, no clinical signs of DVT. Did not order CTA Chest.     CXR with no acute cardiopulmonary process. 7:15 PM  Patient received Lopressor 5 mg IV, blood pressure not significantly improved. Will give hydralazine 10 mg IV and home clonidine 0.1 mg PO. Also giving home tramadol for chest pain. 8:31 PM  Blood pressure remains elevated, 208/105, heart rate 100  Patient has missed several doses of her home BP meds  Giving home hydralazine 50 mg p.o., metoprolol 25mg PO. Will reassess after treatment.     Provider Notes (Medical Decision Making):   MDM  Number of Diagnoses or Management Options  Chest pain, unspecified type:   Proteinuria, unspecified type:   Uncontrolled hypertension:   Diagnosis management comments: 78-year-old female with a history of ESRD on dialysis, HTN, HLD, asthma presents to the ED after having new onset chest pain, palpitations, shortness of breath associated with tachycardia and hypotension during dialysis today. Blood pressure during dialysis was 253/103, received clonidine 0.2 mg, sent to emergency department. Vital signs concerning for hypertension, tachycardia, tachypnea. Physical exam with tachycardia, otherwise normal cardiopulmonary exam.  Labs with no leukocytosis, no anemia, electrolytes are appropriate, BUN/creatinine at pt's baseline, UA with proteinuria no concern for urinary tract infection, troponin and BUN negative, ECG nonischemic with sinus tachycardia, CXR with no acute cardiopulmonary findings. Patient received multiple treatments to lower her blood pressure including IV and oral meds, never dropped below a systolic of 936. Review of past medical records shows that patient has a chronically elevated blood pressure in this range, is managed by her PCP. Patient's chest pain resolved with home tramadol and Ativan 1 mg. No longer short of breath, satting 100% on room air. This time patient will be discharged back to [facility], instructed to follow-up with PCP. Diagnosis     Clinical Impression:   1. Chest pain, unspecified type    2. Uncontrolled hypertension    3. Proteinuria, unspecified type        Disposition: Discharged to [facility]    Follow-up Information     Follow up With Specialties Details Why Contact Info    Maxi Turk MD Geriatric Medicine Schedule an appointment as soon as possible for a visit in 1 week Please call as soon as possible to make a follow-up ointment with your primary care provider after being seen in the emergency department for chest pain and uncontrolled high blood pressure.  [address] [place] [facility] EMERGENCY DEPT Emergency Medicine  As needed, If symptoms worsen [address] [place] [address]    Adia Yates MD Nephrology Schedule an appointment as soon as possible for a visit in 1 week Please call to make a follow-up appointment with Dr. Sajan Cordova, your nephrologist, after being seen for chest pain and uncontrolled high blood pressure which occurred during dialysis today. [address] [facility]             Patient's Medications   Start Taking    No medications on file   Continue Taking    ASPIRIN 81 MG TABLET    Take 81 mg by mouth daily. CLONIDINE (CATAPRES) 0.1 MG TABLET    Take 0.1 mg by mouth two (2) times a day. DILTIAZEM CD (CARDIZEM CD) 240 MG ER CAPSULE    Take 240 mg by mouth daily. EPOETIN RACHAEL (EPOGEN;PROCRIT) 3,000 UNIT/ML INJECTION    1.73 mL by SubCUTAneous route Every Tuesday, Thursday and Saturday. Indications: ANEMIA DUE TO RENAL FAILURE, Renal Dialysis    HYDRALAZINE (APRESOLINE) 50 MG TABLET    Take 1 Tab by mouth three (3) times daily. INSULIN LISPRO (HUMALOG) 100 UNIT/ML INJECTION    INITIATE INSULIN CORRECTIVE PROTOCOL: Normal Insulin Sensitivity   For Blood Sugar (mg/dL) of:     Less than 150 =   0 units           150 -199 =   2 units  200 -249 =   4 units  250 -299 =   6 units  300 -349 =   8 units  350 and above = 10 units and Call Physician  If 2 glucose readings are above    LEVETIRACETAM (KEPPRA) 250 MG TABLET    Take 1 Tab by mouth every Monday, Wednesday, Friday. LOSARTAN (COZAAR) 100 MG TABLET    Take 1 Tab by mouth daily. MIRTAZAPINE (REMERON) 15 MG TABLET    Take  by mouth nightly. SEVELAMER CARBONATE (RENVELA) 800 MG TAB TAB    Take 800 mg by mouth three (3) times daily. SIMVASTATIN (ZOCOR) 20 MG TABLET    Take 1 Tab by mouth nightly. TRAMADOL (ULTRAM) 50 MG TABLET    Take 1 Tab by mouth every six (6) hours as needed for Pain. Max Daily Amount: 200 mg.    These Medications have changed    No medications on file   Stop Taking    No medications on file     Disclaimer: Sections of this note are dictated using utilizing voice recognition software. Minor typographical errors may be present. If questions arise, please do not hesitate to contact me or call our department. Melissa Severino MD, PGY-2  [facility] Emergency Medicine PGY-2    I personally saw and examined the patient. I have reviewed and agree with the resident's findings, including all diagnostic interpretations, and plans as written. I was present during the key portions of separately billed procedures.     [address], DO

## 2020-09-17 VITALS
SYSTOLIC BLOOD PRESSURE: 119 MMHG | TEMPERATURE: 97.2 F | BODY MASS INDEX: 27.82 KG/M2 | WEIGHT: 167 LBS | HEART RATE: 73 BPM | DIASTOLIC BLOOD PRESSURE: 51 MMHG | HEIGHT: 65 IN | OXYGEN SATURATION: 98 % | RESPIRATION RATE: 27 BRPM

## 2020-09-17 NOTE — DISCHARGE INSTRUCTIONS
You were seen in the emergency department for chest pain, fast heart rate, high blood pressure. Your chest pain resolved while you were in the ED. Your blood pressure was found to be high in the emergency department, it is important that you take your blood pressure medications daily as prescribed by your primary care doctor. Please follow-up with your primary care doctor and your nephrologist after being seen in the emergency department for chest pain and high blood pressure. Please return to the emergency department if you experience any new or worsening symptoms. Patient Education        Chest Pain: Care Instructions  Your Care Instructions     There are many things that can cause chest pain. Some are not serious and will get better on their own in a few days. But some kinds of chest pain need more testing and treatment. Your doctor may have recommended a follow-up visit in the next 8 to 12 hours. If you are not getting better, you may need more tests or treatment. Even though your doctor has released you, you still need to watch for any problems. The doctor carefully checked you, but sometimes problems can develop later. If you have new symptoms or if your symptoms do not get better, get medical care right away. If you have worse or different chest pain or pressure that lasts more than 5 minutes or you passed out (lost consciousness), call 911 or seek other emergency help right away. A medical visit is only one step in your treatment. Even if you feel better, you still need to do what your doctor recommends, such as going to all suggested follow-up appointments and taking medicines exactly as directed. This will help you recover and help prevent future problems. How can you care for yourself at home? · Rest until you feel better. · Take your medicine exactly as prescribed. Call your doctor if you think you are having a problem with your medicine.   · Do not drive after taking a prescription pain medicine. When should you call for help? Call 911 if:     · You passed out (lost consciousness). · You have severe difficulty breathing. · You have symptoms of a heart attack. These may include:  ? Chest pain or pressure, or a strange feeling in your chest.  ? Sweating. ? Shortness of breath. ? Nausea or vomiting. ? Pain, pressure, or a strange feeling in your back, neck, jaw, or upper belly or in one or both shoulders or arms. ? Lightheadedness or sudden weakness. ? A fast or irregular heartbeat. After you call 911, the  may tell you to chew 1 adult-strength or 2 to 4 low-dose aspirin. Wait for an ambulance. Do not try to drive yourself. Call your doctor today if:     · You have any trouble breathing. · Your chest pain gets worse. · You are dizzy or lightheaded, or you feel like you may faint. · You are not getting better as expected. · You are having new or different chest pain. Where can you learn more? Go to http://izabela-jewell.info/  Enter A120 in the search box to learn more about \"Chest Pain: Care Instructions. \"  Current as of: June 26, 2019               Content Version: 12.6  © 7896-8379 [employer]. Care instructions adapted under license by [employer] (which disclaims liability or warranty for this information). If you have questions about a medical condition or this instruction, always ask your healthcare professional. [place] [address] any warranty or liability for your use of this information.

## 2020-09-17 NOTE — ED NOTES
Discharge instructions reviewed with Mr Antonio Banks at [facility] home.   .  Transport here to take pt back to [facility]

## 2020-09-25 ENCOUNTER — APPOINTMENT (OUTPATIENT)
Dept: CT IMAGING | Age: 65
DRG: 304 | End: 2020-09-25
Attending: PHYSICIAN ASSISTANT
Payer: MEDICARE

## 2020-09-25 ENCOUNTER — HOSPITAL ENCOUNTER (INPATIENT)
Age: 65
LOS: 4 days | Discharge: NURSING, REHAB OR LONG-TERM CARE | DRG: 304 | End: 2020-09-29
Attending: EMERGENCY MEDICINE | Admitting: INTERNAL MEDICINE
Payer: MEDICARE

## 2020-09-25 ENCOUNTER — APPOINTMENT (OUTPATIENT)
Dept: GENERAL RADIOLOGY | Age: 65
DRG: 304 | End: 2020-09-25
Attending: PHYSICIAN ASSISTANT
Payer: MEDICARE

## 2020-09-25 DIAGNOSIS — G89.29 OTHER CHRONIC PAIN: ICD-10-CM

## 2020-09-25 DIAGNOSIS — R07.9 CHEST PAIN, UNSPECIFIED TYPE: ICD-10-CM

## 2020-09-25 DIAGNOSIS — R77.8 ELEVATED TROPONIN I LEVEL: ICD-10-CM

## 2020-09-25 DIAGNOSIS — R00.0 TACHYCARDIA: ICD-10-CM

## 2020-09-25 DIAGNOSIS — I16.1 HYPERTENSIVE EMERGENCY: Primary | ICD-10-CM

## 2020-09-25 DIAGNOSIS — N18.6 ESRD (END STAGE RENAL DISEASE) (HCC): ICD-10-CM

## 2020-09-25 LAB
ALBUMIN SERPL-MCNC: 4.1 G/DL (ref 3.4–5)
ALBUMIN/GLOB SERPL: 1 {RATIO} (ref 0.8–1.7)
ALP SERPL-CCNC: 133 U/L (ref 45–117)
ALT SERPL-CCNC: 36 U/L (ref 13–56)
ANION GAP SERPL CALC-SCNC: 10 MMOL/L (ref 3–18)
APTT PPP: 24.9 SEC (ref 23–36.4)
AST SERPL-CCNC: 30 U/L (ref 10–38)
BASOPHILS # BLD: 0 K/UL (ref 0–0.1)
BASOPHILS # BLD: 0 K/UL (ref 0–0.1)
BASOPHILS NFR BLD: 0 % (ref 0–2)
BASOPHILS NFR BLD: 0 % (ref 0–2)
BILIRUB SERPL-MCNC: 0.5 MG/DL (ref 0.2–1)
BNP SERPL-MCNC: 6473 PG/ML (ref 0–900)
BUN SERPL-MCNC: 40 MG/DL (ref 7–18)
BUN/CREAT SERPL: 5 (ref 12–20)
CALCIUM SERPL-MCNC: 9.7 MG/DL (ref 8.5–10.1)
CHLORIDE SERPL-SCNC: 103 MMOL/L (ref 100–111)
CO2 SERPL-SCNC: 25 MMOL/L (ref 21–32)
CREAT SERPL-MCNC: 7.78 MG/DL (ref 0.6–1.3)
DIFFERENTIAL METHOD BLD: ABNORMAL
DIFFERENTIAL METHOD BLD: ABNORMAL
EOSINOPHIL # BLD: 0.1 K/UL (ref 0–0.4)
EOSINOPHIL # BLD: 0.1 K/UL (ref 0–0.4)
EOSINOPHIL NFR BLD: 1 % (ref 0–5)
EOSINOPHIL NFR BLD: 1 % (ref 0–5)
ERYTHROCYTE [DISTWIDTH] IN BLOOD BY AUTOMATED COUNT: 15.2 % (ref 11.6–14.5)
ERYTHROCYTE [DISTWIDTH] IN BLOOD BY AUTOMATED COUNT: 15.3 % (ref 11.6–14.5)
GLOBULIN SER CALC-MCNC: 4 G/DL (ref 2–4)
GLUCOSE SERPL-MCNC: 186 MG/DL (ref 74–99)
HBV SURFACE AG SER QL: <0.1 INDEX
HBV SURFACE AG SER QL: NEGATIVE
HCT VFR BLD AUTO: 30.7 % (ref 35–45)
HCT VFR BLD AUTO: 34.7 % (ref 35–45)
HGB BLD-MCNC: 11.3 G/DL (ref 12–16)
HGB BLD-MCNC: 9.9 G/DL (ref 12–16)
LACTATE BLD-SCNC: 1.94 MMOL/L (ref 0.4–2)
LYMPHOCYTES # BLD: 1.2 K/UL (ref 0.9–3.6)
LYMPHOCYTES # BLD: 1.3 K/UL (ref 0.9–3.6)
LYMPHOCYTES NFR BLD: 10 % (ref 21–52)
LYMPHOCYTES NFR BLD: 12 % (ref 21–52)
MCH RBC QN AUTO: 27.9 PG (ref 24–34)
MCH RBC QN AUTO: 28.3 PG (ref 24–34)
MCHC RBC AUTO-ENTMCNC: 32.2 G/DL (ref 31–37)
MCHC RBC AUTO-ENTMCNC: 32.6 G/DL (ref 31–37)
MCV RBC AUTO: 86.5 FL (ref 74–97)
MCV RBC AUTO: 86.8 FL (ref 74–97)
MONOCYTES # BLD: 0.6 K/UL (ref 0.05–1.2)
MONOCYTES # BLD: 0.7 K/UL (ref 0.05–1.2)
MONOCYTES NFR BLD: 5 % (ref 3–10)
MONOCYTES NFR BLD: 7 % (ref 3–10)
NEUTS SEG # BLD: 10.3 K/UL (ref 1.8–8)
NEUTS SEG # BLD: 8.7 K/UL (ref 1.8–8)
NEUTS SEG NFR BLD: 80 % (ref 40–73)
NEUTS SEG NFR BLD: 84 % (ref 40–73)
PLATELET # BLD AUTO: 281 K/UL (ref 135–420)
PLATELET # BLD AUTO: 283 K/UL (ref 135–420)
PMV BLD AUTO: 11.3 FL (ref 9.2–11.8)
PMV BLD AUTO: 11.6 FL (ref 9.2–11.8)
POTASSIUM SERPL-SCNC: 4.3 MMOL/L (ref 3.5–5.5)
PROT SERPL-MCNC: 8.1 G/DL (ref 6.4–8.2)
RBC # BLD AUTO: 3.55 M/UL (ref 4.2–5.3)
RBC # BLD AUTO: 4 M/UL (ref 4.2–5.3)
SODIUM SERPL-SCNC: 138 MMOL/L (ref 136–145)
TROPONIN I SERPL-MCNC: 0.07 NG/ML (ref 0–0.04)
TROPONIN I SERPL-MCNC: 0.1 NG/ML (ref 0–0.04)
TROPONIN I SERPL-MCNC: 0.11 NG/ML (ref 0–0.04)
WBC # BLD AUTO: 10.8 K/UL (ref 4.6–13.2)
WBC # BLD AUTO: 12.2 K/UL (ref 4.6–13.2)

## 2020-09-25 PROCEDURE — 84484 ASSAY OF TROPONIN QUANT: CPT

## 2020-09-25 PROCEDURE — 99223 1ST HOSP IP/OBS HIGH 75: CPT | Performed by: INTERNAL MEDICINE

## 2020-09-25 PROCEDURE — 74011000258 HC RX REV CODE- 258: Performed by: INTERNAL MEDICINE

## 2020-09-25 PROCEDURE — 74011000636 HC RX REV CODE- 636: Performed by: EMERGENCY MEDICINE

## 2020-09-25 PROCEDURE — 80053 COMPREHEN METABOLIC PANEL: CPT

## 2020-09-25 PROCEDURE — 99285 EMERGENCY DEPT VISIT HI MDM: CPT

## 2020-09-25 PROCEDURE — 70450 CT HEAD/BRAIN W/O DYE: CPT

## 2020-09-25 PROCEDURE — 74011250637 HC RX REV CODE- 250/637: Performed by: INTERNAL MEDICINE

## 2020-09-25 PROCEDURE — 74011250637 HC RX REV CODE- 250/637: Performed by: PHYSICIAN ASSISTANT

## 2020-09-25 PROCEDURE — 74011000250 HC RX REV CODE- 250: Performed by: PHYSICIAN ASSISTANT

## 2020-09-25 PROCEDURE — 87340 HEPATITIS B SURFACE AG IA: CPT

## 2020-09-25 PROCEDURE — 71045 X-RAY EXAM CHEST 1 VIEW: CPT

## 2020-09-25 PROCEDURE — 85025 COMPLETE CBC W/AUTO DIFF WBC: CPT

## 2020-09-25 PROCEDURE — 93005 ELECTROCARDIOGRAM TRACING: CPT

## 2020-09-25 PROCEDURE — 85730 THROMBOPLASTIN TIME PARTIAL: CPT

## 2020-09-25 PROCEDURE — 71275 CT ANGIOGRAPHY CHEST: CPT

## 2020-09-25 PROCEDURE — 65270000029 HC RM PRIVATE

## 2020-09-25 PROCEDURE — 74011000250 HC RX REV CODE- 250: Performed by: EMERGENCY MEDICINE

## 2020-09-25 PROCEDURE — 81001 URINALYSIS AUTO W/SCOPE: CPT

## 2020-09-25 PROCEDURE — 74176 CT ABD & PELVIS W/O CONTRAST: CPT

## 2020-09-25 PROCEDURE — 74011000258 HC RX REV CODE- 258: Performed by: EMERGENCY MEDICINE

## 2020-09-25 PROCEDURE — 96365 THER/PROPH/DIAG IV INF INIT: CPT

## 2020-09-25 PROCEDURE — 87040 BLOOD CULTURE FOR BACTERIA: CPT

## 2020-09-25 PROCEDURE — 74011000250 HC RX REV CODE- 250: Performed by: INTERNAL MEDICINE

## 2020-09-25 PROCEDURE — 74011250636 HC RX REV CODE- 250/636: Performed by: PHYSICIAN ASSISTANT

## 2020-09-25 PROCEDURE — 86706 HEP B SURFACE ANTIBODY: CPT

## 2020-09-25 PROCEDURE — 96366 THER/PROPH/DIAG IV INF ADDON: CPT

## 2020-09-25 PROCEDURE — 83880 ASSAY OF NATRIURETIC PEPTIDE: CPT

## 2020-09-25 PROCEDURE — 74011250636 HC RX REV CODE- 250/636: Performed by: INTERNAL MEDICINE

## 2020-09-25 PROCEDURE — 96375 TX/PRO/DX INJ NEW DRUG ADDON: CPT

## 2020-09-25 PROCEDURE — 96367 TX/PROPH/DG ADDL SEQ IV INF: CPT

## 2020-09-25 PROCEDURE — 83605 ASSAY OF LACTIC ACID: CPT

## 2020-09-25 RX ORDER — HYDRALAZINE HYDROCHLORIDE 20 MG/ML
20 INJECTION INTRAMUSCULAR; INTRAVENOUS ONCE
Status: COMPLETED | OUTPATIENT
Start: 2020-09-25 | End: 2020-09-25

## 2020-09-25 RX ORDER — ONDANSETRON 2 MG/ML
4 INJECTION INTRAMUSCULAR; INTRAVENOUS
Status: ACTIVE | OUTPATIENT
Start: 2020-09-25 | End: 2020-09-26

## 2020-09-25 RX ORDER — LEVETIRACETAM 500 MG/1
500 TABLET ORAL 2 TIMES DAILY
Status: DISCONTINUED | OUTPATIENT
Start: 2020-09-25 | End: 2020-09-29 | Stop reason: HOSPADM

## 2020-09-25 RX ORDER — ACETAMINOPHEN 650 MG/1
650 SUPPOSITORY RECTAL
Status: DISCONTINUED | OUTPATIENT
Start: 2020-09-25 | End: 2020-09-29 | Stop reason: HOSPADM

## 2020-09-25 RX ORDER — SODIUM CHLORIDE 0.9 % (FLUSH) 0.9 %
5-10 SYRINGE (ML) INJECTION AS NEEDED
Status: DISCONTINUED | OUTPATIENT
Start: 2020-09-25 | End: 2020-09-29 | Stop reason: HOSPADM

## 2020-09-25 RX ORDER — TRAMADOL HYDROCHLORIDE 50 MG/1
50 TABLET ORAL
Status: DISCONTINUED | OUTPATIENT
Start: 2020-09-25 | End: 2020-09-29 | Stop reason: HOSPADM

## 2020-09-25 RX ORDER — FAMOTIDINE 10 MG/ML
20 INJECTION INTRAVENOUS DAILY
Status: DISCONTINUED | OUTPATIENT
Start: 2020-09-25 | End: 2020-09-29 | Stop reason: HOSPADM

## 2020-09-25 RX ORDER — LEVETIRACETAM 250 MG/1
250 TABLET ORAL
Status: DISCONTINUED | OUTPATIENT
Start: 2020-09-28 | End: 2020-09-29 | Stop reason: HOSPADM

## 2020-09-25 RX ORDER — HEPARIN SODIUM 10000 [USP'U]/100ML
12-25 INJECTION, SOLUTION INTRAVENOUS
Status: DISCONTINUED | OUTPATIENT
Start: 2020-09-25 | End: 2020-09-25

## 2020-09-25 RX ORDER — HEPARIN SODIUM 5000 [USP'U]/ML
5000 INJECTION, SOLUTION INTRAVENOUS; SUBCUTANEOUS EVERY 8 HOURS
Status: DISCONTINUED | OUTPATIENT
Start: 2020-09-25 | End: 2020-09-29 | Stop reason: HOSPADM

## 2020-09-25 RX ORDER — SODIUM CHLORIDE 0.9 % (FLUSH) 0.9 %
5-40 SYRINGE (ML) INJECTION AS NEEDED
Status: DISCONTINUED | OUTPATIENT
Start: 2020-09-25 | End: 2020-09-29 | Stop reason: HOSPADM

## 2020-09-25 RX ORDER — CLONIDINE HYDROCHLORIDE 0.1 MG/1
0.1 TABLET ORAL
Status: COMPLETED | OUTPATIENT
Start: 2020-09-25 | End: 2020-09-25

## 2020-09-25 RX ORDER — ACETAMINOPHEN 325 MG/1
650 TABLET ORAL
Status: DISCONTINUED | OUTPATIENT
Start: 2020-09-25 | End: 2020-09-29 | Stop reason: HOSPADM

## 2020-09-25 RX ORDER — LEVETIRACETAM 500 MG/1
500 TABLET ORAL 2 TIMES DAILY
COMMUNITY

## 2020-09-25 RX ORDER — HYDRALAZINE HYDROCHLORIDE 100 MG/1
100 TABLET, FILM COATED ORAL 3 TIMES DAILY
COMMUNITY
End: 2020-09-29

## 2020-09-25 RX ORDER — PROMETHAZINE HYDROCHLORIDE 25 MG/1
12.5 TABLET ORAL
Status: DISCONTINUED | OUTPATIENT
Start: 2020-09-25 | End: 2020-09-29 | Stop reason: HOSPADM

## 2020-09-25 RX ORDER — HEPARIN SODIUM 1000 [USP'U]/ML
4000 INJECTION, SOLUTION INTRAVENOUS; SUBCUTANEOUS ONCE
Status: COMPLETED | OUTPATIENT
Start: 2020-09-25 | End: 2020-09-25

## 2020-09-25 RX ORDER — HYDRALAZINE HYDROCHLORIDE 50 MG/1
100 TABLET, FILM COATED ORAL 3 TIMES DAILY
Status: DISCONTINUED | OUTPATIENT
Start: 2020-09-25 | End: 2020-09-26

## 2020-09-25 RX ORDER — NITROGLYCERIN 40 MG/100ML
0-200 INJECTION INTRAVENOUS
Status: DISCONTINUED | OUTPATIENT
Start: 2020-09-25 | End: 2020-09-25

## 2020-09-25 RX ORDER — POLYETHYLENE GLYCOL 3350 17 G/17G
17 POWDER, FOR SOLUTION ORAL DAILY PRN
Status: DISCONTINUED | OUTPATIENT
Start: 2020-09-25 | End: 2020-09-29 | Stop reason: HOSPADM

## 2020-09-25 RX ORDER — SEVELAMER CARBONATE 800 MG/1
800 TABLET, FILM COATED ORAL 3 TIMES DAILY
Status: DISCONTINUED | OUTPATIENT
Start: 2020-09-25 | End: 2020-09-29 | Stop reason: HOSPADM

## 2020-09-25 RX ORDER — ATORVASTATIN CALCIUM 10 MG/1
10 TABLET, FILM COATED ORAL DAILY
COMMUNITY

## 2020-09-25 RX ORDER — LABETALOL HCL 20 MG/4 ML
20 SYRINGE (ML) INTRAVENOUS ONCE
Status: COMPLETED | OUTPATIENT
Start: 2020-09-25 | End: 2020-09-25

## 2020-09-25 RX ORDER — DOXERCALCIFEROL 4 UG/2ML
1 INJECTION INTRAVENOUS
Status: ACTIVE | OUTPATIENT
Start: 2020-09-25 | End: 2020-09-26

## 2020-09-25 RX ORDER — ONDANSETRON 2 MG/ML
4 INJECTION INTRAMUSCULAR; INTRAVENOUS
Status: DISCONTINUED | OUTPATIENT
Start: 2020-09-25 | End: 2020-09-29 | Stop reason: HOSPADM

## 2020-09-25 RX ORDER — HYDRALAZINE HYDROCHLORIDE 20 MG/ML
10 INJECTION INTRAMUSCULAR; INTRAVENOUS
Status: DISCONTINUED | OUTPATIENT
Start: 2020-09-25 | End: 2020-09-29 | Stop reason: HOSPADM

## 2020-09-25 RX ORDER — SODIUM CHLORIDE 0.9 % (FLUSH) 0.9 %
5-40 SYRINGE (ML) INJECTION EVERY 8 HOURS
Status: DISCONTINUED | OUTPATIENT
Start: 2020-09-25 | End: 2020-09-29 | Stop reason: HOSPADM

## 2020-09-25 RX ORDER — ONDANSETRON 2 MG/ML
4 INJECTION INTRAMUSCULAR; INTRAVENOUS
Status: DISPENSED | OUTPATIENT
Start: 2020-09-25 | End: 2020-09-26

## 2020-09-25 RX ORDER — HYDRALAZINE HYDROCHLORIDE 20 MG/ML
10 INJECTION INTRAMUSCULAR; INTRAVENOUS EVERY 6 HOURS
Status: DISCONTINUED | OUTPATIENT
Start: 2020-09-25 | End: 2020-09-25

## 2020-09-25 RX ORDER — ATORVASTATIN CALCIUM 10 MG/1
10 TABLET, FILM COATED ORAL DAILY
Status: DISCONTINUED | OUTPATIENT
Start: 2020-09-26 | End: 2020-09-29 | Stop reason: HOSPADM

## 2020-09-25 RX ORDER — NITROGLYCERIN 40 MG/100ML
0-20 INJECTION INTRAVENOUS
Status: DISCONTINUED | OUTPATIENT
Start: 2020-09-25 | End: 2020-09-25

## 2020-09-25 RX ORDER — LEVETIRACETAM 500 MG/1
500 TABLET ORAL 2 TIMES DAILY
Status: DISCONTINUED | OUTPATIENT
Start: 2020-09-26 | End: 2020-09-25

## 2020-09-25 RX ADMIN — FAMOTIDINE 20 MG: 10 INJECTION INTRAVENOUS at 22:54

## 2020-09-25 RX ADMIN — Medication 10 ML: at 22:26

## 2020-09-25 RX ADMIN — HEPARIN SODIUM 4000 UNITS: 1000 INJECTION, SOLUTION INTRAVENOUS; SUBCUTANEOUS at 18:55

## 2020-09-25 RX ADMIN — NITROGLYCERIN 50 MCG/MIN: 40 INJECTION INTRAVENOUS at 17:25

## 2020-09-25 RX ADMIN — LABETALOL HYDROCHLORIDE 20 MG: 5 INJECTION, SOLUTION INTRAVENOUS at 23:15

## 2020-09-25 RX ADMIN — NITROGLYCERIN 1 INCH: 20 OINTMENT TOPICAL at 15:54

## 2020-09-25 RX ADMIN — SODIUM CHLORIDE 2.5 MG/HR: 900 INJECTION, SOLUTION INTRAVENOUS at 22:55

## 2020-09-25 RX ADMIN — TRAMADOL HYDROCHLORIDE 50 MG: 50 TABLET, FILM COATED ORAL at 15:23

## 2020-09-25 RX ADMIN — HYDRALAZINE HYDROCHLORIDE 20 MG: 20 INJECTION INTRAMUSCULAR; INTRAVENOUS at 14:06

## 2020-09-25 RX ADMIN — IOPAMIDOL 65 ML: 755 INJECTION, SOLUTION INTRAVENOUS at 19:46

## 2020-09-25 RX ADMIN — NITROGLYCERIN 10 MCG/MIN: 40 INJECTION INTRAVENOUS at 16:29

## 2020-09-25 RX ADMIN — LEVETIRACETAM 500 MG: 500 TABLET ORAL at 23:17

## 2020-09-25 RX ADMIN — SODIUM CHLORIDE 5 MG/HR: 900 INJECTION, SOLUTION INTRAVENOUS at 20:59

## 2020-09-25 RX ADMIN — CLONIDINE HYDROCHLORIDE 0.1 MG: 0.1 TABLET ORAL at 14:06

## 2020-09-25 RX ADMIN — HEPARIN SODIUM 5000 UNITS: 5000 INJECTION INTRAVENOUS; SUBCUTANEOUS at 23:13

## 2020-09-25 RX ADMIN — HEPARIN SODIUM 12 UNITS/KG/HR: 10000 INJECTION, SOLUTION INTRAVENOUS at 18:49

## 2020-09-25 RX ADMIN — NITROGLYCERIN 60 MCG/MIN: 40 INJECTION INTRAVENOUS at 17:36

## 2020-09-25 NOTE — ED PROVIDER NOTES
EMERGENCY DEPARTMENT HISTORY AND PHYSICAL EXAM    Date: 9/25/2020  Patient Name: Genny Schuster    History of Presenting Illness     Chief Complaint   Patient presents with    Chest Pain         History Provided By: Patient    Chief Complaint: Chest pain, shortness of breath  Duration: 1 hour prior to arrival  Timing: Acute  Location: Left-sided chest without radiation  Quality: Sharp  Severity: Severe  Modifying Factors: None  Associated Symptoms: none       Additional History (Context): Genny Schuster is a 77 y.o. female with a history of chronic kidney disease on dialysis Monday Wednesday Friday, hypertension, bipolar disorder who presents today for issues listed above. Patient reports she was at dialysis when she had acute onset of left-sided sharp chest pain without radiation. Denies any modifying factors. Denies any dyspnea or orthopnea. Denies any recent illness, fevers, chills, diaphoresis, diarrhea or constipation. Denies any abdominal pain. Reports she had one episode of nausea and vomiting. Did not complete dialysis.       PCP: Byron Puri MD    Current Facility-Administered Medications   Medication Dose Route Frequency Provider Last Rate Last Dose    sodium chloride (NS) flush 5-10 mL  5-10 mL IntraVENous PRN Toma Marinelli PA        doxercalciferoL (HECTOROL) 4 mcg/2 mL injection 1 mcg  1 mcg IntraVENous DIALYSIS ONE TIME DOSE Joy Kent MD        ondansetron [facility]) injection 4 mg  4 mg IntraVENous NOW Toma Marinelli PA        traMADoL (ULTRAM) tablet 50 mg  50 mg Oral Q6H PRN SUSI Tolbert   50 mg at 09/25/20 1523    heparin (porcine) 1,000 unit/mL injection 4,000 Units  4,000 Units IntraVENous ONCE Fabian Marinelli PA        heparin (porcine) 25,000 units in 0.45% saline 250 ml infusion  12-25 Units/kg/hr IntraVENous TITRATE Toma Marinelli PA        ondansetron (ZOFRAN) injection 4 mg  4 mg IntraVENous NOW Toma Marinelli [place]        nitroglycerin (TRIDIL) 400 mcg/ml infusion  0-200 mcg/min IntraVENous TITRATE Carlin MENA MD 9 mL/hr at 09/25/20 1736 60 mcg/min at 09/25/20 1736    hydrALAZINE (APRESOLINE) 20 mg/mL injection 10 mg  10 mg IntraVENous Q6H PRN SUSI Tolbert         Current Outpatient Medications   Medication Sig Dispense Refill    sevelamer carbonate (RENVELA) 800 mg tab tab Take 800 mg by mouth three (3) times daily.  traMADol (ULTRAM) 50 mg tablet Take 1 Tab by mouth every six (6) hours as needed for Pain. Max Daily Amount: 200 mg. 20 Tab 0    mirtazapine (REMERON) 15 mg tablet Take  by mouth nightly.  levETIRAcetam (KEPPRA) 250 mg tablet Take 1 Tab by mouth every Monday, Wednesday, Friday. 30 Tab 1    hydrALAZINE (APRESOLINE) 50 mg tablet Take 1 Tab by mouth three (3) times daily. 90 Tab 0    simvastatin (ZOCOR) 20 mg tablet Take 1 Tab by mouth nightly. 30 Tab 0    epoetin raphael (EPOGEN;PROCRIT) 3,000 unit/mL injection 1.73 mL by SubCUTAneous route Every Tuesday, Thursday and Saturday. Indications: ANEMIA DUE TO RENAL FAILURE, Renal Dialysis (Patient taking differently: 3,000 Units by SubCUTAneous route every Monday, Wednesday, Friday.) 1 Vial 0    insulin lispro (HUMALOG) 100 unit/mL injection INITIATE INSULIN CORRECTIVE PROTOCOL: Normal Insulin Sensitivity   For Blood Sugar (mg/dL) of:     Less than 150 =   0 units           150 -199 =   2 units  200 -249 =   4 units  250 -299 =   6 units  300 -349 =   8 units  350 and above = 10 units and Call Physician  If 2 glucose readings are above 1 Vial 0    losartan (COZAAR) 100 mg tablet Take 1 Tab by mouth daily. 30 Tab 0    aspirin 81 mg tablet Take 81 mg by mouth daily.  diltiazem CD (CARDIZEM CD) 240 mg ER capsule Take 240 mg by mouth daily.  cloNIDine (CATAPRES) 0.1 mg tablet Take 0.1 mg by mouth two (2) times a day.            Past History     Past Medical History:  Past Medical History:   Diagnosis Date    Asthma     [facility]     Brain condition     Cataract     Chronic kidney disease     hemodialysis M-W-F    Diabetes [facility]     Hyperlipidemia     Hypertension     Paralytic strabismus, unspecified     Psychiatric disorder     Seizures ([facility] 08/27/2018       Past Surgical History:  Past Surgical History:   Procedure Laterality Date    [place] INSJ TUNNELED CVC W/O SUBQ PORT/ AGE 3 YR/> N/A 8/9/2018     in-pt 474A, Insertion Tunneled Central Venous Catheter performed by Jacklyn See MD at [address] [place] [address] LAB    [place] INSJ TUNNELED CVC W/O SUBQ PORT/ AGE 5 YR/> N/A 11/8/2019    INSERTION TUNNELED CENTRAL VENOUS CATHETER performed by Olena Hlalman MD at [address] [place] [address]       Family History:  No family history on file. Social History:  Social History     Tobacco Use    Smoking status: Never Smoker    Smokeless tobacco: Never Used   Substance Use Topics    Alcohol use: No    Drug use: No       Allergies: Allergies   Allergen Reactions    Amoxicillin Unknown (comments)    Cleocin [Clindamycin Hcl] Unknown (comments)    Codeine Unknown (comments)    Lorcet 10/650 [Hydrocodone-Acetaminophen] Unknown (comments)    Penicillin G Benzathine Unknown (comments)    Shellfish Derived Unknown (comments)         Review of Systems   Review of Systems   Constitutional: Negative for chills and fever. HENT: Negative for congestion, rhinorrhea and sore throat. Respiratory: Positive for shortness of breath. Negative for cough. Cardiovascular: Positive for chest pain. Gastrointestinal: Positive for nausea and vomiting. Negative for abdominal pain, blood in stool, constipation and diarrhea. Genitourinary: Negative for dysuria, frequency and hematuria. Musculoskeletal: Negative for back pain and myalgias. Skin: Negative for rash and wound. Neurological: Negative for dizziness and headaches. All other systems reviewed and are negative.     All Other Systems Negative  Physical Exam     Vitals:    09/25/20 1700 09/25/20 1725 09/25/20 1735 09/25/20 1740   BP: (!) 212/197 (!) 181/83 (!) 179/87 (!) 198/86   Pulse: (!) 130 (!) 125 (!) 120 (!) 110   Resp: (!) 41 30 26 16   Temp:       SpO2: 98%  96% 92%   Weight:       Height:         Physical Exam  Vitals signs and nursing note reviewed. Constitutional:       General: She is not in acute distress. Appearance: She is well-developed. She is not diaphoretic. HENT:      Head: Normocephalic and atraumatic. Eyes:      Conjunctiva/sclera: Conjunctivae normal.   Neck:      Musculoskeletal: Normal range of motion and neck supple. Cardiovascular:      Rate and Rhythm: Normal rate and regular rhythm. Pulses: No decreased pulses. Heart sounds: Normal heart sounds. No systolic murmur. No diastolic murmur. Pulmonary:      Effort: Pulmonary effort is normal. No respiratory distress. Breath sounds: Normal breath sounds. No stridor, decreased air movement or transmitted upper airway sounds. No wheezing or rhonchi. Chest:      Chest wall: No tenderness. Abdominal:      General: Bowel sounds are normal. There is no distension. Palpations: Abdomen is soft. Tenderness: There is no abdominal tenderness. There is no guarding or rebound. Musculoskeletal:         General: No deformity. Right lower leg: No edema. Left lower leg: No edema. Skin:     General: Skin is warm and dry. Neurological:      Mental Status: She is alert and oriented to person, place, and time. Deep Tendon Reflexes: Reflexes are normal and symmetric.            Diagnostic Study Results     Labs -     Recent Results (from the past 12 hour(s))   EKG, 12 LEAD, INITIAL    Collection Time: 09/25/20 11:43 AM   Result Value Ref Range    Ventricular Rate 110 BPM    Atrial Rate 110 BPM    P-R Interval 118 ms    QRS Duration 72 ms    Q-T Interval 292 ms    QTC Calculation (Bezet) 395 ms    Calculated P Axis 76 degrees    Calculated R Axis -23 degrees    Calculated T Axis 51 degrees Diagnosis       Sinus tachycardia  Otherwise normal ECG  When compared with ECG of 16-SEP-2020 15:26,  Minimal criteria for Septal infarct are no longer present  T wave amplitude has decreased in Anterior leads     CBC WITH AUTOMATED DIFF    Collection Time: 09/25/20 11:56 AM   Result Value Ref Range    WBC 12.2 4.6 - 13.2 K/uL    RBC 4.00 (L) 4.20 - 5.30 M/uL    HGB 11.3 (L) 12.0 - 16.0 g/dL    HCT 34.7 (L) 35.0 - 45.0 %    MCV 86.8 74.0 - 97.0 FL    MCH 28.3 24.0 - 34.0 PG    MCHC 32.6 31.0 - 37.0 g/dL    RDW 15.2 (H) 11.6 - 14.5 %    PLATELET 858 915 - 743 K/uL    MPV 11.6 9.2 - 11.8 FL    NEUTROPHILS 84 (H) 40 - 73 %    LYMPHOCYTES 10 (L) 21 - 52 %    MONOCYTES 5 3 - 10 %    EOSINOPHILS 1 0 - 5 %    BASOPHILS 0 0 - 2 %    ABS. NEUTROPHILS 10.3 (H) 1.8 - 8.0 K/UL    ABS. LYMPHOCYTES 1.2 0.9 - 3.6 K/UL    ABS. MONOCYTES 0.6 0.05 - 1.2 K/UL    ABS. EOSINOPHILS 0.1 0.0 - 0.4 K/UL    ABS. BASOPHILS 0.0 0.0 - 0.1 K/UL    DF AUTOMATED     NT-PRO BNP    Collection Time: 09/25/20 11:56 AM   Result Value Ref Range    NT pro-BNP 6,473 (H) 0 - 900 PG/ML   TROPONIN I    Collection Time: 09/25/20 11:56 AM   Result Value Ref Range    Troponin-I, QT 0.07 (H) 0.0 - 1.123 NG/ML   METABOLIC PANEL, COMPREHENSIVE    Collection Time: 09/25/20 11:56 AM   Result Value Ref Range    Sodium 138 136 - 145 mmol/L    Potassium 4.3 3.5 - 5.5 mmol/L    Chloride 103 100 - 111 mmol/L    CO2 25 21 - 32 mmol/L    Anion gap 10 3.0 - 18 mmol/L    Glucose 186 (H) 74 - 99 mg/dL    BUN 40 (H) 7.0 - 18 MG/DL    Creatinine 7.78 (H) 0.6 - 1.3 MG/DL    BUN/Creatinine ratio 5 (L) 12 - 20      GFR est AA 6 (L) >60 ml/min/1.73m2    GFR est non-AA 5 (L) >60 ml/min/1.73m2    Calcium 9.7 8.5 - 10.1 MG/DL    Bilirubin, total 0.5 0.2 - 1.0 MG/DL    ALT (SGPT) 36 13 - 56 U/L    AST (SGOT) 30 10 - 38 U/L    Alk.  phosphatase 133 (H) 45 - 117 U/L    Protein, total 8.1 6.4 - 8.2 g/dL    Albumin 4.1 3.4 - 5.0 g/dL    Globulin 4.0 2.0 - 4.0 g/dL    A-G Ratio 1.0 0.8 - 1. 7     POC LACTIC ACID    Collection Time: 09/25/20 12:05 PM   Result Value Ref Range    Lactic Acid (POC) 1.94 0.40 - 2.00 mmol/L   EKG, 12 LEAD, SUBSEQUENT    Collection Time: 09/25/20  2:17 PM   Result Value Ref Range    Ventricular Rate 112 BPM    Atrial Rate 112 BPM    P-R Interval 124 ms    QRS Duration 74 ms    Q-T Interval 342 ms    QTC Calculation (Bezet) 466 ms    Calculated P Axis 60 degrees    Calculated R Axis -27 degrees    Calculated T Axis 27 degrees    Diagnosis       Sinus tachycardia  Possible Left atrial enlargement  Borderline ECG  When compared with ECG of 25-SEP-2020 11:43,  No significant change was found     TROPONIN I    Collection Time: 09/25/20  2:19 PM   Result Value Ref Range    Troponin-I, QT 0.10 (H) 0.0 - 0.045 NG/ML   CBC WITH AUTOMATED DIFF    Collection Time: 09/25/20  5:31 PM   Result Value Ref Range    WBC 10.8 4.6 - 13.2 K/uL    RBC 3.55 (L) 4.20 - 5.30 M/uL    HGB 9.9 (L) 12.0 - 16.0 g/dL    HCT 30.7 (L) 35.0 - 45.0 %    MCV 86.5 74.0 - 97.0 FL    MCH 27.9 24.0 - 34.0 PG    MCHC 32.2 31.0 - 37.0 g/dL    RDW 15.3 (H) 11.6 - 14.5 %    PLATELET 476 628 - 279 K/uL    MPV 11.3 9.2 - 11.8 FL    NEUTROPHILS 80 (H) 40 - 73 %    LYMPHOCYTES 12 (L) 21 - 52 %    MONOCYTES 7 3 - 10 %    EOSINOPHILS 1 0 - 5 %    BASOPHILS 0 0 - 2 %    ABS. NEUTROPHILS 8.7 (H) 1.8 - 8.0 K/UL    ABS. LYMPHOCYTES 1.3 0.9 - 3.6 K/UL    ABS. MONOCYTES 0.7 0.05 - 1.2 K/UL    ABS. EOSINOPHILS 0.1 0.0 - 0.4 K/UL    ABS. BASOPHILS 0.0 0.0 - 0.1 K/UL    DF AUTOMATED     PTT    Collection Time: 09/25/20  5:31 PM   Result Value Ref Range    aPTT 24.9 23.0 - 36.4 SEC       Radiologic Studies -   CT ABD PELV WO CONT   Final Result   IMPRESSION:       Mild nonspecific gaseous distention of small and large bowel without definite   evidence of obstruction. Bilateral renal atrophy and bilateral renal cysts. Cardiomegaly and small pericardial effusion partially evaluated. Atherosclerosis.       Please see report for additional findings and further details. XR CHEST PORT   Final Result   IMPRESSION:      No evidence of acute cardiopulmonary abnormality. XR CHEST PORT    (Results Pending)   CT HEAD WO CONT    (Results Pending)     CT Results  (Last 48 hours)               09/25/20 1458  CT ABD PELV WO CONT Final result    Impression:  IMPRESSION:        Mild nonspecific gaseous distention of small and large bowel without definite   evidence of obstruction. Bilateral renal atrophy and bilateral renal cysts. Cardiomegaly and small pericardial effusion partially evaluated. Atherosclerosis. Please see report for additional findings and further details. Narrative:  EXAM: CT ABD PELV WO CONT       INDICATION: vomiting       COMPARISON: [facility] 3, 2020. CONTRAST: None. TECHNIQUE:    Unenhanced multislice helical CT was performed from the diaphragm to the   symphysis pubis without oral or intravenous contrast administration. Contiguous   5 mm axial images were reconstructed and lung and soft tissue windows were   generated. Coronal and sagittal reformations were generated. CT dose reduction   was achieved through use of a standardized protocol tailored for this   examination and automatic exposure control for dose modulation. FINDINGS:   Cardiomegaly. Small pericardial effusion. Dual-lumen catheter partially   evaluated. .       The absence of intravenous contrast material reduces the sensitivity for   evaluation of the solid parenchymal organs of the abdomen. No focal   abnormalities are seen within the liver, spleen, pancreas or adrenal glands . No renal or ureteral calculus or obstruction is identified. Bilateral renal   atrophy again noted. Right renal cyst stable. Left renal cyst stable. The aorta demonstrates diffuse atherosclerosis and minimal stable ectasia. .   There is no retroperitoneal adenopathy or mass.        Mildly prominent gaseous distended loops of small and large bowel without   definite dilatation or obstruction. No air-fluid levels. No pneumoperitoneum. .   The appendix is not delineated. There is no ascites or free intraperitoneal air. The nurse on delineated. . There is no pelvic mass or adenopathy. No acute bone   findings. CXR Results  (Last 48 hours)               09/25/20 1222  XR CHEST PORT Final result    Impression:  IMPRESSION:       No evidence of acute cardiopulmonary abnormality. Narrative:  EXAM:  XR CHEST PORT       INDICATION:   chest pain       COMPARISON: 9/16/2020. FINDINGS:   Stable right jugular dialysis catheter. Stable mildly enlarged cardiac   silhouette. Mild hypoinflation and similar elevation of the left hemidiaphragm. No pneumothorax or pleural effusion or consolidation. Intact osseous structures. Medical Decision Making   I am the first provider for this patient. I reviewed the vital signs, available nursing notes, past medical history, past surgical history, family history and social history. Vital Signs-Reviewed the patient's vital signs. Records Reviewed: Nursing Notes and Old Medical Records     Procedures: None   Procedures    Provider Notes (Medical Decision Making):     Differential: Hypertensive urgency, hypertensive emergency, ACS, arrhythmia, Sirs/sepsis, electrolyte abnormality    Plan:  12:00 PM  Patient met sirs criteria upon arrival with a heart rate of 120 and respirations of 24. Sepsis bundle was initiated. Will not order 30 cc/kg fluid boluses patient is a dialysis patient. Will order full cardiac work-up call her nephrologist.    12:05 PM initial lactic was 194, will not order 30 cc/kg fluid bolus or antibiotics as I do not believe there is an infectious source causing her elevated heart rate and tachypnea. Per Dr. Nilay Alvarez, nephrology who states she cannot be dialyzed until her nausea vomiting is under control.   Will order hydralazine and clonidine for blood pressure. We will also order CT of abdomen and pelvis. 1:58 PM  Patient's initial troponin was 0.07, will repeat at our 3.    3:32 PM  Repeat troponin was increased 2.10. Will discuss case with Dr. Suze Silverman. Patient is still persistently hypertensive after hydralazine and clonidine. 3:47 PM  Patient has been evaluated by Dr. Suze Silverman, will order heparin, Nitropaste and repeat troponin. Patient is still symptomatic and having chest pain.    4:21 PM  Patient reevaluated by Dr. Suze Silverman and I at beside, patient still has active chest pain, will place in nitro drip and get BP under control. 5:30 PM  Discussed case with SUSI Ackerman, cardiology who agrees to consult. Jose advised 10 mg IV hydralazine every 6 hours for systolic blood pressure over 160 as needed. Order has been placed. Will consult hospitalist    5:52 PM   Dr. Kaleigh Ray refused admission, consulted Dr. Barb Arciniega intensivist who also refused admission. 6:27 PM  Dr. Kaleigh Ray is now agreeing to be the admitting physician after a CTA has been done. However, Dr. Kaleigh Rya refuses admission until CTA has been done. Order CTA at this time. Dr. Mary Guerrero agrees with CTA from a nephrology standpoint. Have also discussed case with Dr. Catherine Almeida and will be transferring patient's care to him.     MED RECONCILIATION:  Current Facility-Administered Medications   Medication Dose Route Frequency    sodium chloride (NS) flush 5-10 mL  5-10 mL IntraVENous PRN    doxercalciferoL (HECTOROL) 4 mcg/2 mL injection 1 mcg  1 mcg IntraVENous DIALYSIS ONE TIME DOSE    ondansetron (ZOFRAN) injection 4 mg  4 mg IntraVENous NOW    traMADoL (ULTRAM) tablet 50 mg  50 mg Oral Q6H PRN    heparin (porcine) 1,000 unit/mL injection 4,000 Units  4,000 Units IntraVENous ONCE    heparin (porcine) 25,000 units in 0.45% saline 250 ml infusion  12-25 Units/kg/hr IntraVENous TITRATE    ondansetron (ZOFRAN) injection 4 mg  4 mg IntraVENous NOW    nitroglycerin (TRIDIL) 400 mcg/ml infusion  0-200 mcg/min IntraVENous TITRATE    hydrALAZINE (APRESOLINE) 20 mg/mL injection 10 mg  10 mg IntraVENous Q6H PRN     Current Outpatient Medications   Medication Sig    sevelamer carbonate (RENVELA) 800 mg tab tab Take 800 mg by mouth three (3) times daily.  traMADol (ULTRAM) 50 mg tablet Take 1 Tab by mouth every six (6) hours as needed for Pain. Max Daily Amount: 200 mg.    mirtazapine (REMERON) 15 mg tablet Take  by mouth nightly.  levETIRAcetam (KEPPRA) 250 mg tablet Take 1 Tab by mouth every Monday, Wednesday, Friday.  hydrALAZINE (APRESOLINE) 50 mg tablet Take 1 Tab by mouth three (3) times daily.  simvastatin (ZOCOR) 20 mg tablet Take 1 Tab by mouth nightly.  epoetin raphael (EPOGEN;PROCRIT) 3,000 unit/mL injection 1.73 mL by SubCUTAneous route Every Tuesday, Thursday and Saturday. Indications: ANEMIA DUE TO RENAL FAILURE, Renal Dialysis (Patient taking differently: 3,000 Units by SubCUTAneous route every Monday, Wednesday, Friday.)    insulin lispro (HUMALOG) 100 unit/mL injection INITIATE INSULIN CORRECTIVE PROTOCOL: Normal Insulin Sensitivity   For Blood Sugar (mg/dL) of:     Less than 150 =   0 units           150 -199 =   2 units  200 -249 =   4 units  250 -299 =   6 units  300 -349 =   8 units  350 and above = 10 units and Call Physician  If 2 glucose readings are above    losartan (COZAAR) 100 mg tablet Take 1 Tab by mouth daily.  aspirin 81 mg tablet Take 81 mg by mouth daily.  diltiazem CD (CARDIZEM CD) 240 mg ER capsule Take 240 mg by mouth daily.  cloNIDine (CATAPRES) 0.1 mg tablet Take 0.1 mg by mouth two (2) times a day. Disposition:  TBD      Diagnosis     Clinical Impression:   1. Hypertensive emergency    2. Elevated troponin I level    3. Tachycardia          \"Please note that this dictation was completed with [employer] voice recognition software.  Quite often unanticipated grammatical, syntax, homophones, and other interpretive errors are inadvertently transcribed by the computer software. Please disregard these errors. Please excuse any errors that have escaped final proofreading. \"

## 2020-09-25 NOTE — ED NOTES
Patient care was turned over to me the real change of shift by Dr. Micah Noriega and SUSI Marcelino Plan. Currently patient needs to be admitted, there has been discussion between the intensivist service and the hospitalist service about placement for the patient. Requested by the hospitalist that we obtain a CT angiogram of the chest as well as a noncontrast CT of the head to rule out intracranial hemorrhage or aortic dissection. I will follow the CT scans to ensure there is no dissection or acute intracranial process and then the plan will be to admit the patient to the hospitalist service. Case was discussed with Dr. Eleazar Avitia, CT brain shows a new chronic infarct however no hypertensive bleed or other acute process, CT chest shows no evidence of dissection. Plan will be to admit patient to the hospitalist service in the intensive care unit under the hospitalist service as she is still requiring titratable nitroglycerin drip. I understand nephrology has been consulted to dialyze the patient tomorrow and patient's clinical status is unchanged however still requiring nitroglycerin drip for hypertension.

## 2020-09-25 NOTE — CONSULTS
Hospitalist consultation    NAME:  Bry Cast   :   1955   MRN:   [id number]     PCP:  Fran Griffith MD  Date/Time:  2020   Subjective:   Reason for consultationhypertensive emergency and chest pain    HISTORY OF PRESENT ILLNESS:   This is a 75-year-old -American female who was sent to the ED from the dialysis center with complaints of very elevated blood pressure and chest pain. Patient has a past medical history of end-stage renal disease and hypertension and a history of stroke and history of seizure disorder. On initial arrival to the ED patient was noted to have some elevated troponins and nonspecific EKG changes and was started on a heparin drip. Patient also received clonidine 0.1 mg tablet and hydralazine 20 mg IV and Nitro-Bid 2% ointment (1 inch). Patient continued to have chest pain and her blood pressures were significantly elevated and so the patient was started on a nitroglycerin infusion by the ED. Cardiology was also consulted. I received a phone call from the ED APC at around 5:50 PM and given the patient's hypertensive emergency with ongoing chest pain while on a titratable nitroglycerin drip, I advised the ED APC to talk to the ICU team regarding admitting this patient to ICU. Subsequently I received a phone call from the ICU doctor Dr Squires [place] advising that this patient should be admitted to CVT ICU. I called the nursing supervisor and there are no beds available in CVT ICU. I contacted Dr. Squires [place] and we both discussed that I would go see the patient and if needed patient could come to the intensive care unit. Subsequently I discussed again with the ED APC and given the patient's elevated blood pressures and ongoing chest pain I recommended obtaining CTA chest to rule out dissection. I did call patient's nephrologist Dr. Miguelito Carlson and he was okay with obtaining the CTA chest and stated that he plans to dialyze the patient tomorrow.   The ED APC mentioned that patient has progressively gotten more confused in the ED and that she would also be ordering CT of the head. In the meantime I also went and personally evaluated the patient myself. Patient was laying in bed, confused. Patient is able to follow only very simple commands. Patient is able to move all 4 extremities. When I questioned the patient regarding pain she pointed to her chest.  Patient is confused and a poor historian and is not able to provide more details. I also discussed with the nurse taking care of the patient. I also discussed with the covering ED physician Dr. Romana Pimple and he will call once the CT results are available. I also discussed with Dr. Romana Pimple regarding holding off the heparin drip until the CTA chest is done which he agreed to. Subsequently I called and discussed with the ICU doctor Dr. Dariel Nava again and we agreed with changing the nitro infusion to a nicardipine drip and with discontinuing the heparin drip at this time. Dr. Dariel Nava agreed that his team will evaluate the patient once the CT results are available. In the meantime I also called [address] [place] [address] (where the patient currently resides) and discussed with the nursing supervisor there. As per the nursing supervisor of [facility] at the baseline patient is fairly functional and cognizant and is able to talk and participate in her care. Patient apparently has a history of a stroke and ambulates with assistance but is fairly functional and can participate in her care. As per the nursing supervisor at [facility] patient was last COVID negative on September 14 at that facility. As per the nursing supervisor at [facility] patient has been afebrile there and has not been hypoxic. I did obtain the list of patient's medications which I will place in the prior to admission medication list below. As per the nursing supervisor at [facility] patient is a full code.           Past Medical History:   Diagnosis Date    Asthma     B[facility] ([facility]     Brain condition     Cataract     Chronic kidney disease     hemodialysis M-W-F    Diabetes ([facility]     Hyperlipidemia     Hypertension     Paralytic strabismus, unspecified     Psychiatric disorder     Seizures ([facility] [address] [facility] 2018        Past Surgical History:   Procedure Laterality Date    TX INSJ TUNNELED CVC W/O SUBQ PORT/ AGE 5 YR/> N/A 2018     in-pt 474A, Insertion Tunneled Central Venous Catheter performed by Ryan Joya MD at [address] [place] [address]    TX INSJ TUNNELED CVC W/O SUBQ PORT/ AGE 5 YR/> N/A 2019    INSERTION TUNNELED CENTRAL VENOUS CATHETER performed by Teresa Gambino MD at [facility]       Social History     Tobacco Use    Smoking status: Never Smoker    Smokeless tobacco: Never Used   Substance Use Topics    Alcohol use: No        No family history on file. Allergies   Allergen Reactions    Amoxicillin Unknown (comments)    Cleocin [Clindamycin Hcl] Unknown (comments)    Codeine Unknown (comments)    Lorcet 10/650 [Hydrocodone-Acetaminophen] Unknown (comments)    Penicillin G Benzathine Unknown (comments)    Shellfish Derived Unknown (comments)        Prior to Admission Medications     List of prior to admission medications that I obtained by talking to the nursing supervisor at [facility] include    Hydralazine 100 mg 3 times daily  Cardizem  mg daily  Clonidine 0.2 mg twice daily  Lipitor 10 mg at bedtime  Renvela 800 mg 3 times daily  Vida-Diya 1 tablet p.o. daily  Tramadol PRN for pain  Keppra 500 mg twice daily on non-hemodialysis days and 750 mg twice daily on dialysis days    REVIEW OF SYSTEMS:    Unable to obtain a clear review of systems as patient is confused.     Objective:   VITALS:    Visit Vitals  BP (!) 198/86   Pulse (!) 110   Temp 99.7 °F (37.6 °C)   Resp 16   Ht 5' 3\" (1.6 m)   Wt 75.8 kg (167 lb)   LMP  (LMP Unknown)   SpO2 92%   BMI 29.58 kg/m²     Temp (24hrs), Av.7 °F (37.6 °C), Min:99.7 °F (37.6 °C), Max:99.7 °F (37.6 °C)      PHYSICAL EXAM:   General:    Lying in bed, confused, appears a little uncomfortable  HEENT:  Pupils equal.  Sclera anicteric. Conjunctiva pink. Mucous membranes                           Moist, no ear or nasal discharge  Neck:  Supple. Trachea midline. No accessory muscle use. No thyromegaly. No jugular venous distention, no carotid bruit  CV:                  Regular rate and rhythm. S1S2+  Lungs:   Clear to auscultation bilaterally. No Wheezing or Rhonchi. No rales. Abdomen:   Soft, non-tender. Not distended. Bowel sounds normal.   Extremities: No cyanosis. No edema. Pulses 1+ b/l  Neurologic: Patient is confused, follows some simple commands, noted to be able to move all 4 extremities  Skin:                Warm and dry. No rashes. I was not able to discern a radial femoral delay      LAB DATA REVIEWED:    No components found for: [address]     09/25/20  1731 09/25/20  1156   NA  --  138   K  --  4.3   CL  --  103   CO2  --  25   BUN  --  40*   CREA  --  7.78*   GLU  --  186*   CA  --  9.7   ALB  --  4.1   WBC 10.8 12.2   HGB 9.9* 11.3*   HCT 30.7* 34.7*    283         CT Results  (Last 48 hours)               09/25/20 1458  CT ABD PELV WO CONT Final result    Impression:  IMPRESSION:        Mild nonspecific gaseous distention of small and large bowel without definite   evidence of obstruction. Bilateral renal atrophy and bilateral renal cysts. Cardiomegaly and small pericardial effusion partially evaluated. Atherosclerosis. Please see report for additional findings and further details. Narrative:  EXAM: CT ABD PELV WO CONT       INDICATION: vomiting       COMPARISON: [facility], 2020. CONTRAST: None. TECHNIQUE:    Unenhanced multislice helical CT was performed from the diaphragm to the   symphysis pubis without oral or intravenous contrast administration.  Contiguous   5 mm axial images were reconstructed and lung and soft tissue windows were   generated. Coronal and sagittal reformations were generated. CT dose reduction   was achieved through use of a standardized protocol tailored for this   examination and automatic exposure control for dose modulation. FINDINGS:   Cardiomegaly. Small pericardial effusion. Dual-lumen catheter partially   evaluated. .       The absence of intravenous contrast material reduces the sensitivity for   evaluation of the solid parenchymal organs of the abdomen. No focal   abnormalities are seen within the liver, spleen, pancreas or adrenal glands . No renal or ureteral calculus or obstruction is identified. Bilateral renal   atrophy again noted. Right renal cyst stable. Left renal cyst stable. The aorta demonstrates diffuse atherosclerosis and minimal stable ectasia. .   There is no retroperitoneal adenopathy or mass. Mildly prominent gaseous distended loops of small and large bowel without   definite dilatation or obstruction. No air-fluid levels. No pneumoperitoneum. .   The appendix is not delineated. There is no ascites or free intraperitoneal air. The nurse on delineated. . There is no pelvic mass or adenopathy. No acute bone   findings. Assessment      Hypertensive emergency  Ongoing chest pain  Acute encephalopathy, may be hypertensive  End-stage renal disease  History of seizure disorder  History of CVA  History of dyslipidemia  ___________________________________________________  Recommendation  Continue nicardipine drip and control blood pressure slowly  Once the CT scan results are available please contact the hospitalist service and the ICU team.  Further plans will be determined based on CT results. I have discussed in detail with the nighttime hospitalist Dr. Giles Ly.   If the patient is admitted further recommendations will be made by Dr. Maye Knight and by the ICU team.  Given that the patient is coming from a [facility] facility please do obtain COVID-19 test.  Please also start the patient on Keppra for seizures.     ___________________________________________________      ___________________________________________________  Admitting Physician: Mary Antunez MD

## 2020-09-25 NOTE — PROGRESS NOTES
Sent from Dialysis unit with very high BP, Vomiting, not feeling  Good, did receive clonidine & Zofran orally  In HD unit ,non significant IDWG, work up in progress , discussed with provider in  ER . Will arrange HD , discussed with HD nurse, await lab .

## 2020-09-25 NOTE — ED TRIAGE NOTES
Pt presents to ED via EMS from [facility] with complaints of Chest pain since early morning. EMS sts pt completed approx 10 min of dialysis before c/o CP. Pt lives at [facility].        Past Medical History:   Diagnosis Date    Asthma     Bipolar affective disorder ([facility] [address]     Brain condition     Cataract     Chronic kidney disease     hemodialysis M-W-F    Diabetes ([facility] [address] [facility]     Hyperlipidemia     Hypertension     Paralytic strabismus, unspecified     Psychiatric disorder     Seizures ([facility] [address] 08/27/2018

## 2020-09-26 LAB
ANION GAP SERPL CALC-SCNC: 10 MMOL/L (ref 3–18)
APPEARANCE UR: CLEAR
APTT PPP: 26.5 SEC (ref 23–36.4)
ATRIAL RATE: 110 BPM
ATRIAL RATE: 112 BPM
BASOPHILS # BLD: 0 K/UL (ref 0–0.1)
BASOPHILS NFR BLD: 0 % (ref 0–2)
BILIRUB UR QL: NEGATIVE
BUN SERPL-MCNC: 42 MG/DL (ref 7–18)
BUN/CREAT SERPL: 4 (ref 12–20)
CALCIUM SERPL-MCNC: 9.1 MG/DL (ref 8.5–10.1)
CALCULATED P AXIS, ECG09: 60 DEGREES
CALCULATED P AXIS, ECG09: 76 DEGREES
CALCULATED R AXIS, ECG10: -23 DEGREES
CALCULATED R AXIS, ECG10: -27 DEGREES
CALCULATED T AXIS, ECG11: 27 DEGREES
CALCULATED T AXIS, ECG11: 51 DEGREES
CHLORIDE SERPL-SCNC: 103 MMOL/L (ref 100–111)
CK MB CFR SERPL CALC: 0.7 % (ref 0–4)
CK MB SERPL-MCNC: 2.2 NG/ML (ref 5–25)
CK SERPL-CCNC: 299 U/L (ref 26–192)
CO2 SERPL-SCNC: 25 MMOL/L (ref 21–32)
COLOR UR: YELLOW
COVID-19 RAPID TEST, COVR: NOT DETECTED
CREAT SERPL-MCNC: 9.34 MG/DL (ref 0.6–1.3)
DIAGNOSIS, 93000: NORMAL
DIAGNOSIS, 93000: NORMAL
DIFFERENTIAL METHOD BLD: ABNORMAL
EOSINOPHIL # BLD: 0.1 K/UL (ref 0–0.4)
EOSINOPHIL NFR BLD: 1 % (ref 0–5)
EPITH CASTS URNS QL MICRO: NORMAL /LPF (ref 0–5)
ERYTHROCYTE [DISTWIDTH] IN BLOOD BY AUTOMATED COUNT: 15.6 % (ref 11.6–14.5)
GLUCOSE BLD STRIP.AUTO-MCNC: 172 MG/DL (ref 70–110)
GLUCOSE SERPL-MCNC: 240 MG/DL (ref 74–99)
GLUCOSE UR STRIP.AUTO-MCNC: 250 MG/DL
HCT VFR BLD AUTO: 28.9 % (ref 35–45)
HEALTH STATUS, XMCV2T: NORMAL
HGB BLD-MCNC: 9.3 G/DL (ref 12–16)
HGB UR QL STRIP: NEGATIVE
KETONES UR QL STRIP.AUTO: 15 MG/DL
LEUKOCYTE ESTERASE UR QL STRIP.AUTO: NEGATIVE
LYMPHOCYTES # BLD: 1.2 K/UL (ref 0.9–3.6)
LYMPHOCYTES NFR BLD: 12 % (ref 21–52)
MCH RBC QN AUTO: 28 PG (ref 24–34)
MCHC RBC AUTO-ENTMCNC: 32.2 G/DL (ref 31–37)
MCV RBC AUTO: 87 FL (ref 74–97)
MONOCYTES # BLD: 1.2 K/UL (ref 0.05–1.2)
MONOCYTES NFR BLD: 12 % (ref 3–10)
NEUTS SEG # BLD: 7.7 K/UL (ref 1.8–8)
NEUTS SEG NFR BLD: 75 % (ref 40–73)
NITRITE UR QL STRIP.AUTO: NEGATIVE
P-R INTERVAL, ECG05: 118 MS
P-R INTERVAL, ECG05: 124 MS
PH UR STRIP: 8 [PH] (ref 5–8)
PLATELET # BLD AUTO: 268 K/UL (ref 135–420)
PMV BLD AUTO: 11.6 FL (ref 9.2–11.8)
POTASSIUM SERPL-SCNC: 4.5 MMOL/L (ref 3.5–5.5)
PROT UR STRIP-MCNC: >1000 MG/DL
Q-T INTERVAL, ECG07: 292 MS
Q-T INTERVAL, ECG07: 342 MS
QRS DURATION, ECG06: 72 MS
QRS DURATION, ECG06: 74 MS
QTC CALCULATION (BEZET), ECG08: 395 MS
QTC CALCULATION (BEZET), ECG08: 466 MS
RBC # BLD AUTO: 3.32 M/UL (ref 4.2–5.3)
RBC #/AREA URNS HPF: NEGATIVE /HPF (ref 0–5)
SODIUM SERPL-SCNC: 138 MMOL/L (ref 136–145)
SOURCE, COVRS: NORMAL
SP GR UR REFRACTOMETRY: 1.02 (ref 1–1.03)
SPECIMEN TYPE, XMCV1T: NORMAL
TROPONIN I SERPL-MCNC: 0.13 NG/ML (ref 0–0.04)
UROBILINOGEN UR QL STRIP.AUTO: 0.2 EU/DL (ref 0.2–1)
VENTRICULAR RATE, ECG03: 110 BPM
VENTRICULAR RATE, ECG03: 112 BPM
WBC # BLD AUTO: 10.2 K/UL (ref 4.6–13.2)
WBC URNS QL MICRO: NORMAL /HPF (ref 0–4)

## 2020-09-26 PROCEDURE — 5A1D70Z PERFORMANCE OF URINARY FILTRATION, INTERMITTENT, LESS THAN 6 HOURS PER DAY: ICD-10-PCS | Performed by: INTERNAL MEDICINE

## 2020-09-26 PROCEDURE — 80048 BASIC METABOLIC PNL TOTAL CA: CPT

## 2020-09-26 PROCEDURE — 90935 HEMODIALYSIS ONE EVALUATION: CPT

## 2020-09-26 PROCEDURE — 74011250637 HC RX REV CODE- 250/637: Performed by: INTERNAL MEDICINE

## 2020-09-26 PROCEDURE — 87635 SARS-COV-2 COVID-19 AMP PRB: CPT

## 2020-09-26 PROCEDURE — 85730 THROMBOPLASTIN TIME PARTIAL: CPT

## 2020-09-26 PROCEDURE — 74011250637 HC RX REV CODE- 250/637: Performed by: HOSPITALIST

## 2020-09-26 PROCEDURE — 85025 COMPLETE CBC W/AUTO DIFF WBC: CPT

## 2020-09-26 PROCEDURE — 65660000004 HC RM CVT STEPDOWN

## 2020-09-26 PROCEDURE — 82550 ASSAY OF CK (CPK): CPT

## 2020-09-26 PROCEDURE — 82962 GLUCOSE BLOOD TEST: CPT

## 2020-09-26 PROCEDURE — 99233 SBSQ HOSP IP/OBS HIGH 50: CPT | Performed by: HOSPITALIST

## 2020-09-26 PROCEDURE — 74011250636 HC RX REV CODE- 250/636: Performed by: INTERNAL MEDICINE

## 2020-09-26 RX ORDER — LOSARTAN POTASSIUM 50 MG/1
100 TABLET ORAL DAILY
Status: DISCONTINUED | OUTPATIENT
Start: 2020-09-27 | End: 2020-09-29 | Stop reason: HOSPADM

## 2020-09-26 RX ORDER — SUCRALFATE 1 G/1
1 TABLET ORAL
Status: DISCONTINUED | OUTPATIENT
Start: 2020-09-26 | End: 2020-09-29 | Stop reason: HOSPADM

## 2020-09-26 RX ORDER — CLONIDINE 0.2 MG/24H
1 PATCH, EXTENDED RELEASE TRANSDERMAL
Status: DISCONTINUED | OUTPATIENT
Start: 2020-09-26 | End: 2020-09-27

## 2020-09-26 RX ORDER — CLONIDINE HYDROCHLORIDE 0.1 MG/1
0.2 TABLET ORAL 2 TIMES DAILY
Status: DISCONTINUED | OUTPATIENT
Start: 2020-09-26 | End: 2020-09-26

## 2020-09-26 RX ORDER — LABETALOL HCL 20 MG/4 ML
10 SYRINGE (ML) INTRAVENOUS
Status: DISCONTINUED | OUTPATIENT
Start: 2020-09-26 | End: 2020-09-29 | Stop reason: HOSPADM

## 2020-09-26 RX ORDER — TRIAMCINOLONE ACETONIDE 1 MG/G
CREAM TOPICAL 2 TIMES DAILY
Status: DISCONTINUED | OUTPATIENT
Start: 2020-09-26 | End: 2020-09-29 | Stop reason: HOSPADM

## 2020-09-26 RX ORDER — MINOXIDIL 2.5 MG/1
5 TABLET ORAL 2 TIMES DAILY
Status: DISCONTINUED | OUTPATIENT
Start: 2020-09-26 | End: 2020-09-27

## 2020-09-26 RX ORDER — CLONIDINE HYDROCHLORIDE 0.1 MG/1
0.2 TABLET ORAL
Status: COMPLETED | OUTPATIENT
Start: 2020-09-26 | End: 2020-09-26

## 2020-09-26 RX ORDER — MINOXIDIL 2.5 MG/1
5 TABLET ORAL 2 TIMES DAILY
Status: DISCONTINUED | OUTPATIENT
Start: 2020-09-26 | End: 2020-09-26

## 2020-09-26 RX ORDER — DILTIAZEM HYDROCHLORIDE 240 MG/1
240 CAPSULE, COATED, EXTENDED RELEASE ORAL DAILY
Status: DISCONTINUED | OUTPATIENT
Start: 2020-09-26 | End: 2020-09-27

## 2020-09-26 RX ADMIN — LEVETIRACETAM 500 MG: 500 TABLET ORAL at 10:17

## 2020-09-26 RX ADMIN — SEVELAMER CARBONATE 800 MG: 800 TABLET, FILM COATED ORAL at 10:17

## 2020-09-26 RX ADMIN — ACETAMINOPHEN 650 MG: 325 TABLET ORAL at 12:02

## 2020-09-26 RX ADMIN — HYDRALAZINE HYDROCHLORIDE 100 MG: 50 TABLET, FILM COATED ORAL at 12:34

## 2020-09-26 RX ADMIN — SEVELAMER CARBONATE 800 MG: 800 TABLET, FILM COATED ORAL at 18:47

## 2020-09-26 RX ADMIN — SUCRALFATE 1 G: 1 TABLET ORAL at 10:17

## 2020-09-26 RX ADMIN — CLONIDINE HYDROCHLORIDE 0.2 MG: 0.1 TABLET ORAL at 12:02

## 2020-09-26 RX ADMIN — ATORVASTATIN CALCIUM 10 MG: 10 TABLET, FILM COATED ORAL at 10:17

## 2020-09-26 RX ADMIN — SUCRALFATE 1 G: 1 TABLET ORAL at 18:49

## 2020-09-26 RX ADMIN — LOSARTAN POTASSIUM 100 MG: 25 TABLET, FILM COATED ORAL at 13:15

## 2020-09-26 RX ADMIN — FAMOTIDINE 20 MG: 10 INJECTION INTRAVENOUS at 10:19

## 2020-09-26 RX ADMIN — LEVETIRACETAM 500 MG: 500 TABLET ORAL at 18:49

## 2020-09-26 RX ADMIN — MINOXIDIL 5 MG: 2.5 TABLET ORAL at 18:48

## 2020-09-26 NOTE — CONSULTS
[address] [facility]    Name:  Hang Arciniega  MR#:   [id number]  :  1955  ACCOUNT #:  [de-identified]  DATE OF SERVICE:  2020     RENAL CONSULTATION    REQUESTED PHYSICIAN:  Emergency room physician. REASON FOR CONSULTATION:  Dialysis patient, admitted with uncontrolled blood pressure. HISTORY OF PRESENT ILLNESS:  This is a 68[facility], a well-known dialysis patient of mine for a long period of time who comes for dialysis every Monday, Wednesday, and Friday from the [facility]. As usual she did come for dialysis on the second shift and during that time, the patient was not feeling good, made vague complaint as she usually does that she is not feeling good. She was complaining of nausea and vomiting and she in fact did vomit once in the dialysis unit and the blood pressure was also quite high. Given the situation, the dialysis nurse gave her clonidine as well as Zofran for her GI symptoms. The patient did not have significant interdialytic weight gain that is even 1 kg. After giving those medications and noting for 5 to 10 minutes, as the patient is still not feeling good without any definitive complaint, I was notified and I advised the patient's nurse to send the patient to the emergency room to be evaluated. Blood pressure throughout in the dialysis unit, systolic pressure was more than 200. The patient did not complain of any chest pain or any headache. Subsequently, I explained some of the information to the evaluating nurse practitioner in the emergency room. I also requested to do a CT of the abdomen and pelvis to see if any GI process is going on or not because of ongoing GI symptoms of vomiting. Also, at the same time given the high blood pressure, cardiac workup should be done. They agreed. The patient was kept in the emergency room. I did see her twice in the emergency room. During that time, she was doing okay.   No specific complaint. The patient did receive clonidine as well as hydralazine. They also wanted to give more Zofran, but the patient said she was not feeling any nausea or any vomiting, so Zofran was not given. The patient did not complain at all of any chest pain. The patient is a very poor historian. She has multi-infarct dementia also. The only complaint she does is that she knows her name, mainly she complains, she does not know. She has a dialysis catheter through which she gets dialysis and also has a clotted AV graft and the vascular surgeon decided not to do any AV graft or AV fistula on her. Subsequently, her cardiac enzymes came slightly elevated today with troponin little high and the patient was started on the nitroglycerin drip. We could not dialyze the patient as there were too many patients earlier in the day and then subsequently when we wanted to dialyze, the patient was on nitro drip and it was decided as there is no volume overload and her potassium was on the safe side, we will postpone the dialysis to tomorrow. .  The patient again is a very poor historian. I do not know how significant chest pain she had. She did not complain at all of any chest pain today as well as on last Wednesday. The patient has underlying severe dementia and underlying psychiatric problem. She also has a history of type 2 diabetes mellitus. Subsequently, I was requested by Dr. Vallecillo Seen  given the chest pain and high blood pressure, whether they can do a CTA of the chest to rule out aortic dissection. Concern is definitely valid, but given her noncompliance with the medications and vague complaint, I am not sure whether exactly the chest pain is acute or not. As the blood pressure is still high, ICU team was consulted that nitroglycerin drip was switched to nicardipine drip and heparin drip was on hold until a CTA is done.   In the meanwhile, the patient is in the emergency room and waiting for the imaging study including the CT of the head. PAST MEDICAL HISTORY:  Includes hypertension, type 2 diabetes mellitus, hyperlipidemia, multi-infarct dementia, cataract, psychiatric disorders, questionable seizure, bipolar affective disorder also. PAST SURGICAL HISTORY:  Tunneled dialysis catheter, unsuccessful AV fistula placement in the past.    SOCIAL HISTORY:  Never smoked. No drugs or alcohol. FAMILY HISTORY:  Not in the file. ALLERGIES:  ALLERGIC TO AMOXICILLIN, CLINDAMYCIN, CODEINE, LORCET, PENICILLIN, SHELLFISH. MEDICATIONS:  List of medications before admission:  Hydralazine three times daily, Cardizem  mg daily, clonidine 0.2 mg twice daily, Lipitor 10 mg at bedtime, Renvela 800 mg three times daily, Vida-Diya one tablet daily, tramadol for pain on p.r.n. basis, Keppra 500 mg twice daily on non-hemodialysis day and 750 mg on dialysis day also. REVIEW OF SYSTEMS:  Unable to obtain a clear review of systems as the patient's history is confused and change  time to time. PHYSICAL EXAMINATION:  VITAL SIGNS:  Temperature 99.7, heart rate 110, blood pressure 198/86 on nitro drip. HEENT:  Oral mucosa moist.  NECK:  Supple. Jugular venous pressure not distended. Carotid, no audible bruit. LUNGS:  Clear to auscultation. HEART:  Mildly tachycardic with S4. No S3.  ABDOMEN:  Soft. No palpable mass. Has a right tunneled IJ dialysis catheter. LABORATORY DATA:  Labs that is done:  WBC of 10.8 with a hemoglobin of 9.9, hematocrit 30.7, platelets of 680,605, neutrophil 80%, lymphocyte 12%, monocyte 7%, eosinophil 1%. Chemistry on 09/25:  Sodium 138, potassium 4.3, chloride 103, CO2 of 25, anion gap of 10, glucose of 186, blood urea nitrogen 40, creatinine is 7.78, calcium of 9.7. EGFR of 6 mL/min. Total protein of 8.1 with albumin of 4.1. Troponin 0.07, 0.10, and 0.11. BNP 6473. Chest x-ray was done, no signs of any volume overload.   CT of the head without contrast done by this time which shows * chronic infarct with encephalomalacia at the frontal lobe with stable chronic atrophy with mild compensated changes    CT of the chest with and without IV contrast, impression:  No evidence of any pulmonary embolism or any aortic aneurysm or dissection. No acute findings. IMPRESSION:  1. Hypertensive emergency in a dialysis patient, who is mostly demented. 2.  Possible encephalopathy, uncontrolled hypertension. 3.  No evidence of any dissection. 4.  Non-ST segment elevation myocardial infarction. PLAN:  I agree with continuation of the Nicardipine drip. Not sure if that she should be using any heparin drip or not. No need for any dialysis tonight. If possible, the patient should be kept in the ICU for better blood pressure control and then get all her medications. It is very difficult to control her blood pressure because she most of the time is declining to take medicine. Again, we will plan to dialyze her tomorrow. Her last COVID test was negative and then again I understand as she came from the nursing home, another COVID test being ordered. Further recommendations will be given based on the hospital course.         MD RENETTA [email or web address]  D:  09/25/2020 21:54  T:  09/26/2020 5:07  JOB #:  [id number]

## 2020-09-26 NOTE — CONSULTS
[facility] - Consult Note    Consultation request by Shavonne Phillips MD for advice/opinion related to evaluating Hypertensive emergency [I16.1], Indeterminate trop, question of r/o aortic dissection    Date of  Admission: 9/25/2020 11:37 AM   Primary Care Physician:  Gregor Daily MD     Cards Attending: Dr. Dread Almanzar DO  Pt seen and examined in ER, chart reviewed, assessment as follows:     Assessment:     Patient Active Problem List   Diagnosis Code    Acute on chronic renal insufficiency N28.9, N18.9    Hypertension I10    ESRD (end stage renal disease) ([facility] N18.6    Secondary hyperparathyroidism of renal origin ([facility] N25.81    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 ([facility] [address] E11.22, I12.0, Z99.2, N18.6    CVA, old, cognitive deficits I69.319    Bandemia D72.825    New onset seizure ([facility] R56.9    Hypertensive emergency I16.1    Chest pain R07.9    Tachycardia R00.0     -HTN out of control  -Pt denies chest pain (also documented by Nephrologist)- regardless, no acute changes on EKG. No PE or dissection noted on CT  -Indeterminate troponin- likely secondary to demand ischemia from elevated BP  -Hypertensive encephalopathy on arrival  -History of seizure disorder  -Dyslipidemia- on statin  -Type II DM  -Bipolar disease/ poor historian    -No primary cardiologist     Plan:     -Pt is a poor historian but very well known to her nephrologist, I reviewed his note in detail. She was sent to ER due to vomiting x 1 and HTN, found :    -She is stable/ ekg unchanged, CP free, BP improving and on her way to HD  -Will obtain Echo to assess LV function, EF for completeness but do not suspect ACS/ no further CV eval planned  -Will continue on CCB, Hydralazine, and adjust and consider adding agent if BP remains higher.  -More recommendations pending clinical course, test results  -Will follow. History of Present Illness:      This is a 77 y.o. female admitted for Hypertensive emergency [I16.1]. Patient complains of:  Patient brought to the ER from dialysis after it was noted that she had elevated blood pressures and complained of vague \"not feeling well\" and vomited once. She is able to answer most questions but answers are limited. She denies any pain. Cardiac risk factors: sedentary life style, hypertension, post-menopausal    Review of Symptoms:  Except as stated above include:  Constitutional:  negative  Respiratory:  negative  Cardiovascular:  negative  Gastrointestinal: negative  Genitourinary:  negative  Musculoskeletal:  Negative  Neurological:  Negative  Dermatological:  Negative  Endocrinological: Negative  Psychological:  Negative    Pertinent items are noted in HPI. Past Medical History:     Past Medical History:   Diagnosis Date    Asthma     [facility]     Brain condition     Cataract     Chronic kidney disease     hemodialysis M-W-F    Diabetes ([facility]     Hyperlipidemia     Hypertension     Paralytic strabismus, unspecified     Psychiatric disorder     Seizures ([facility] 08/27/2018         Social History:     Social History     Socioeconomic History    Marital status: SINGLE     Spouse name: Not on file    Number of children: Not on file    Years of education: Not on file    Highest education level: Not on file   Tobacco Use    Smoking status: Never Smoker    Smokeless tobacco: Never Used   Substance and Sexual Activity    Alcohol use: No    Drug use: No    Sexual activity: Never        Family History:   No family history on file. Medications:      Allergies   Allergen Reactions    Amoxicillin Unknown (comments)    Cleocin [Clindamycin Hcl] Unknown (comments)    Codeine Unknown (comments)    Lorcet 10/650 [Hydrocodone-Acetaminophen] Unknown (comments)    Penicillin G Benzathine Unknown (comments)    Shellfish Derived Unknown (comments)        Current Facility-Administered Medications   Medication Dose Route Frequency    sucralfate (CARAFATE) tablet 1 g  1 g Oral AC&HS    dilTIAZem ER (CARDIZEM CD) capsule 240 mg  240 mg Oral DAILY    sodium chloride (NS) flush 5-10 mL  5-10 mL IntraVENous PRN    traMADoL (ULTRAM) tablet 50 mg  50 mg Oral Q6H PRN    hydrALAZINE (APRESOLINE) 20 mg/mL injection 10 mg  10 mg IntraVENous Q6H PRN    famotidine (PF) (PEPCID) injection 20 mg  20 mg IntraVENous DAILY    atorvastatin (LIPITOR) tablet 10 mg  10 mg Oral DAILY    hydrALAZINE (APRESOLINE) tablet 100 mg  100 mg Oral TID    [START ON 9/28/2020] levETIRAcetam (KEPPRA) tablet 250 mg  250 mg Oral Q MON, WED & FRI    sevelamer carbonate (RENVELA) tab 800 mg  800 mg Oral TID    sodium chloride (NS) flush 5-40 mL  5-40 mL IntraVENous Q8H    sodium chloride (NS) flush 5-40 mL  5-40 mL IntraVENous PRN    acetaminophen (TYLENOL) tablet 650 mg  650 mg Oral Q6H PRN    Or    acetaminophen (TYLENOL) suppository 650 mg  650 mg Rectal Q6H PRN    polyethylene glycol (MIRALAX) packet 17 g  17 g Oral DAILY PRN    promethazine (PHENERGAN) tablet 12.5 mg  12.5 mg Oral Q6H PRN    Or    ondansetron (ZOFRAN) injection 4 mg  4 mg IntraVENous Q6H PRN    heparin (porcine) injection 5,000 Units  5,000 Units SubCUTAneous Q8H    levETIRAcetam (KEPPRA) tablet 500 mg  500 mg Oral BID     Current Outpatient Medications   Medication Sig    hydrALAZINE (APRESOLINE) 100 mg tablet Take 100 mg by mouth three (3) times daily. Indications: high blood pressure    atorvastatin (Lipitor) 10 mg tablet Take 10 mg by mouth daily. Indications: high amount of triglyceride in the blood    b complex-vitamin c-folic acid 0.8 mg (NEPHRO-DORIAN) 0.8 mg tab tablet Take 1 Tab by mouth daily.  levETIRAcetam (Keppra) 500 mg tablet Take 500 mg by mouth two (2) times a day.  sevelamer carbonate (RENVELA) 800 mg tab tab Take 800 mg by mouth three (3) times daily.  traMADol (ULTRAM) 50 mg tablet Take 1 Tab by mouth every six (6) hours as needed for Pain.  Max Daily Amount: 200 mg.  levETIRAcetam (KEPPRA) 250 mg tablet Take 1 Tab by mouth every Monday, Wednesday, Friday.  aspirin 81 mg tablet Take 81 mg by mouth daily.  diltiazem CD (CARDIZEM CD) 240 mg ER capsule Take 240 mg by mouth daily.  cloNIDine HCL (Catapres) 0.2 mg tablet Take 0.2 mg by mouth two (2) times a day.  epoetin raphael (EPOGEN;PROCRIT) 3,000 unit/mL injection 1.73 mL by SubCUTAneous route Every Tuesday, Thursday and Saturday. Indications: ANEMIA DUE TO RENAL FAILURE, Renal Dialysis (Patient taking differently: 3,000 Units by SubCUTAneous route every Monday, Wednesday, Friday.)    insulin lispro (HUMALOG) 100 unit/mL injection INITIATE INSULIN CORRECTIVE PROTOCOL: Normal Insulin Sensitivity   For Blood Sugar (mg/dL) of:     Less than 150 =   0 units           150 -199 =   2 units  200 -249 =   4 units  250 -299 =   6 units  300 -349 =   8 units  350 and above = 10 units and Call Physician  If 2 glucose readings are above         Physical Exam:     Visit Vitals  BP (!) 172/72   Pulse (!) 101   Temp 98 °F (36.7 °C)   Resp 15   Ht 5' 3\" (1.6 m)   Wt 75.8 kg (167 lb)   SpO2 97%   BMI 29.58 kg/m²     BP Readings from Last 3 Encounters:   09/26/20 (!) 172/72   09/17/20 (!) 119/51   06/23/20 (!) 198/91     Pulse Readings from Last 3 Encounters:   09/26/20 (!) 101   09/17/20 73   06/23/20 68     Wt Readings from Last 3 Encounters:   09/25/20 75.8 kg (167 lb)   09/16/20 75.8 kg (167 lb)   06/23/20 75.8 kg (167 lb)       General:  alert, cooperative, no distress, appears stated age  Neck:  nontender, no carotid bruit, no JVD  Lungs:  clear to auscultation bilaterally  Heart:  regular rate and rhythm, S1, S2 normal, no murmur, click, rub or gallop  Abdomen:  abdomen is soft without significant tenderness, masses, organomegaly or guarding  Extremities:  extremities normal, atraumatic, no cyanosis or edema  Skin: Warm and dry.  no hyperpigmentation, vitiligo, or suspicious lesions  Neuro: alert, oriented x3, affect appropriate, moves all extremities well, no involuntary movements, L sided weakness  Psych: non focal     Data Review:     Recent Labs     09/26/20  0455 09/25/20  1731 09/25/20  1156   WBC 10.2 10.8 12.2   HGB 9.3* 9.9* 11.3*   HCT 28.9* 30.7* 34.7*    281 283     Recent Labs     09/26/20  0455 09/25/20  1156    138   K 4.5 4.3    103   CO2 25 25   * 186*   BUN 42* 40*   CREA 9.34* 7.78*   CA 9.1 9.7   ALB  --  4.1   ALT  --  36       Results for orders placed or performed during the hospital encounter of 09/25/20   EKG, 12 LEAD, INITIAL   Result Value Ref Range    Ventricular Rate 110 BPM    Atrial Rate 110 BPM    P-R Interval 118 ms    QRS Duration 72 ms    Q-T Interval 292 ms    QTC Calculation (Bezet) 395 ms    Calculated P Axis 76 degrees    Calculated R Axis -23 degrees    Calculated T Axis 51 degrees    Diagnosis       Sinus tachycardia  Otherwise normal ECG  When compared with ECG of 16-SEP-2020 15:26,  Minimal criteria for Septal infarct are no longer present  T wave amplitude has decreased in Anterior leads         All Cardiac Markers in the last 24 hours:    Lab Results   Component Value Date/Time    TROIQ 0.11 (H) 09/25/2020 05:31 PM    TROIQ 0.10 (H) 09/25/2020 02:19 PM    TROIQ 0.07 (H) 09/25/2020 11:56 AM       Last Lipid:  No results found for: CHOL, CHOLX, CHLST, CHOLV, HDL, HDLP, LDL, LDLC, DLDLP, TGLX, TRIGL, TRIGP, CHHD, CHHDX    Signed By: SUSI Peng     September 26, 2020

## 2020-09-26 NOTE — ED NOTES
Patient finished with dialysis. Patient BP ran high during the treatment. All BP medications were given during treatment.

## 2020-09-26 NOTE — ED NOTES
Patient back from dialysis in hallway F, plan is to transfer back to a room in ED. Will continue to monitor patient for any changes.

## 2020-09-26 NOTE — ED NOTES
Patient resting on stretcher. No signs of distress. Chest rise and fall present. Will continue to monitor patient and her vital signs.

## 2020-09-26 NOTE — H&P
History & Physical    Patient: Malcolm Hagan MRN: [id number]  CSN: [id number]    YOB: 1955  Age: 77 y.o. Sex: female      DOA: 9/25/2020  CC: chest pain     PCP: Min Enriquez MD       HPI:     Malcolm Hagan is a 77 y.o. female with medical co-morbidities including HTN, ESRD on HD, T2DM, hyperlipidemia, h/o stroke, mild intermittent asthma, seizure disorder presented from dialysis with chest pain and elevated BP. She is a poor historian and noted to have mild confusion per ER MD. She expressed substernal chest pain that is on-going. IN the ER, she was started on Nitroglycerin infusion but continue to have chest pain. She has non specific troponin elevation. There was initial concern for ACS and heparin gtt was order however, it was stopped due to concern for her confusion. CT head was done but without event. CTA chest was done without PE or dissection. She was given GI regiment and started on Nicardipine gtt with improvement. She remains tachycardic on telemetry. UA was negative. Her troponin remained flat. Review of Systems  GENERAL: No fever, No chill, ++ malaise   HEENT: No change in vision, no ear ache, tinnitus, no sore throat or sinus congestion. NECK: No pain or stiffness. PULMONARY: No shortness of breath, no cough or wheeze. Cardiovascular: no pnd / orthopnea, ++Chest Pain  GASTROINTESTINAL: No abd pain, No nausea/vomiting, No diarrhea, No bright red blood per rectum. GENITOURINARY: No urinary frequency, No urgency or pain with urination. MUSCULOSKELETAL: No joint or muscle pain, no back pain, no recent trauma. DERMATOLOGIC: No rash, no itching, no lesions. ENDOCRINE: No polyuria, polydipsia,  No recent change in weight. HEMATOLOGICAL: No easy bruising or bleeding.    NEUROLOGIC: No headache, No seizures, No generalized weakness         Past Medical History:   Diagnosis Date    Asthma     Bipolar affective disorder ([facility]     Brain condition     Cataract     Chronic kidney disease     hemodialysis M-W-F    Diabetes ([facility]     Hyperlipidemia     Hypertension     Paralytic strabismus, unspecified     Psychiatric disorder     Seizures ([facility] 08/27/2018       Past Surgical History:   Procedure Laterality Date    NE INSJ TUNNELED CVC W/O SUBQ PORT/ AGE 5 YR/> N/A 8/9/2018     in-pt 474A, Insertion Tunneled Central Venous Catheter performed by Paige Thapa MD at [address] [place] [address]    NE INSJ TUNNELED CVC W/O SUBQ PORT/ AGE 5 YR/> N/A 11/8/2019    INSERTION TUNNELED CENTRAL VENOUS CATHETER performed by Angela Turner MD at [facility]       No family history on file. Social History     Socioeconomic History    Marital status: SINGLE     Spouse name: Not on file    Number of children: Not on file    Years of education: Not on file    Highest education level: Not on file   Tobacco Use    Smoking status: Never Smoker    Smokeless tobacco: Never Used   Substance and Sexual Activity    Alcohol use: No    Drug use: No    Sexual activity: Never       Prior to Admission medications    Medication Sig Start Date End Date Taking? Authorizing Provider   sevelamer carbonate (RENVELA) 800 mg tab tab Take 800 mg by mouth three (3) times daily. Provider, Historical   traMADol (ULTRAM) 50 mg tablet Take 1 Tab by mouth every six (6) hours as needed for Pain. Max Daily Amount: 200 mg. 11/20/18   Hayley Leung MD   mirtazapine (REMERON) 15 mg tablet Take  by mouth nightly. Provider, Historical   levETIRAcetam (KEPPRA) 250 mg tablet Take 1 Tab by mouth every Monday, Wednesday, Friday. 8/31/18   Beck Ramirez MD   hydrALAZINE (APRESOLINE) 50 mg tablet Take 1 Tab by mouth three (3) times daily. 8/14/18   Al Kirk MD   simvastatin (ZOCOR) 20 mg tablet Take 1 Tab by mouth nightly.  8/14/18   Al Kirk MD   epoetin raphael (EPOGEN;PROCRIT) 3,000 unit/mL injection 1.73 mL by SubCUTAneous route Every Tuesday, Thursday and Saturday. Indications: ANEMIA DUE TO RENAL FAILURE, Renal Dialysis  Patient taking differently: 3,000 Units by SubCUTAneous route every Monday, Wednesday, Friday. 8/14/18   Alek Kirk MD   insulin lispro (HUMALOG) 100 unit/mL injection INITIATE INSULIN CORRECTIVE PROTOCOL: Normal Insulin Sensitivity   For Blood Sugar (mg/dL) of:     Less than 150 =   0 units           150 -199 =   2 units  200 -249 =   4 units  250 -299 =   6 units  300 -349 =   8 units  350 and above = 10 units and Call Physician  If 2 glucose readings are above 8/14/18   Christophe Kirk MD   losartan (COZAAR) 100 mg tablet Take 1 Tab by mouth daily. 8/15/18   Alek Kirk MD   aspirin 81 mg tablet Take 81 mg by mouth daily. Elina Light MD   diltiazem CD (CARDIZEM CD) 240 mg ER capsule Take 240 mg by mouth daily. Elina Light MD   cloNIDine (CATAPRES) 0.1 mg tablet Take 0.1 mg by mouth two (2) times a day.       Elina Light MD       Allergies   Allergen Reactions    Amoxicillin Unknown (comments)    Cleocin [Clindamycin Hcl] Unknown (comments)    Codeine Unknown (comments)    Lorcet 10/650 [Hydrocodone-Acetaminophen] Unknown (comments)    Penicillin G Benzathine Unknown (comments)    Shellfish Derived Unknown (comments)              Physical Exam:      Visit Vitals  BP (!) 171/74   Pulse (!) 118   Temp 98 °F (36.7 °C)   Resp 9   Ht 5' 3\" (1.6 m)   Wt 75.8 kg (167 lb)   SpO2 96%   BMI 29.58 kg/m²       Physical Exam:  Tele: sinus tachycardia  General:  Cooperative, Not in acute distress, speaks in full sentence while in bed  HEENT: PERRL, EOMI, supple neck, no JVD, dry oral mucosa  Cardiovascular: S1S2 tachycardia, no rub/gallop   Pulmonary: air entry bilaterally, no wheezing, ++ crackle  GI:  Soft, non tender, non distended, +bs, no guarding   Extremities:  No pedal edema, +distal pulses appreciated   Neuro: AOx2, moving all extremities    Lab/Data Review:  Labs: Results: Chemistry Recent Labs     09/25/20  1156   *      K 4.3      CO2 25   BUN 40*   CREA 7.78*   CA 9.7   AGAP 10   BUCR 5*   *   TP 8.1   ALB 4.1   GLOB 4.0   AGRAT 1.0      CBC w/Diff Recent Labs     09/25/20  1731 09/25/20  1156   WBC 10.8 12.2   RBC 3.55* 4.00*   HGB 9.9* 11.3*   HCT 30.7* 34.7*    283   GRANS 80* 84*   LYMPH 12* 10*   EOS 1 1      Coagulation Recent Labs     09/25/20  1731   APTT 24.9       Iron/Ferritin No results for input(s): IRON in the last 72 hours. No lab exists for component: TIBCCALC   BNP No results for input(s): BNPP in the last 72 hours. Cardiac Enzymes No results for input(s): CPK, CKND1, SONG in the last 72 hours. No lab exists for component: CKRMB, TROIP   Liver Enzymes Recent Labs     09/25/20  1156   TP 8.1   ALB 4.1   *      Thyroid Studies No results found for: T4, T3U, TSH, TSHEXT       All Micro Results     Procedure Component Value Units Date/Time    CULTURE, BLOOD [[id number]] Collected:  09/25/20 1156    Order Status:  Completed Specimen:  Blood Updated:  09/25/20 1229    CULTURE, BLOOD [[id number]] Collected:  09/25/20 1205    Order Status:  Completed Specimen:  Blood Updated:  09/25/20 1228          Imaging Reviewed:  CT Results (most recent):  Results from Hospital Encounter encounter on 09/25/20   CTA CHEST W OR W WO CONT    Narrative CTA CHEST PULMONARY EMBOLISM PROTOCOL      INDICATION: Patient with history of chronic kidney disease on dialysis presents  with acute onset of left-sided sharp chest pain without radiation. ? one episode  of nausea and vomiting.  ?    TECHNIQUE: Thin collimation axial images obtained through the level of the  pulmonary arteries with additional imaging through the chest following the  uneventful administration of 65 cc Isovue-370 intravenous contrast.  Images  reconstructed into MIP coronal and sagittal projections for complete evaluation  of the tortuous and overlapping pulmonary vascular structures and to reduce  patient radiation dose. All CT scans are performed using dose optimization  techniques as appropriate to the performed exam including the following:  Automated exposure control, adjustment of mA and/or kV according to patient  size, and use of iterative reconstructive technique. COMPARISON: No prior CT chest. CT abdomen and pelvis 9/25/2020. FINDINGS:    No filling defects are appreciated within the main, left, right, lobar or  visualized segmental pulmonary arteries to suggest embolism. Motion prevents  subsegmental branch assessment. The thoracic aorta is not aneurysmal.  No  evidence for dissection. Minimal pericardial effusion. Right dialysis catheter. No pleural effusion. No  enlarged axillary, hilar, or mediastinal lymphadenopathy. No acute bone finding. No lung infiltrate or mass. No acute findings in the upper abdomen. Renal cysts. Impression IMPRESSION:    No evidence of pulmonary embolism, aortic aneurysm or dissection. No acute  findings. CT head:  FINDINGS:      Stable chronic cortical atrophy and compensatory enlargement of the lateral and  third ventricles. Stable mild periventricular white matter hypodensities. New  but chronic appearing site of encephalomalacia at the anterior left frontal  lobe. No intracranial hemorrhage. No mass effect. The visualized paranasal  sinuses are clear. New fluid in the left mastoid sinuses. The visualized bony  structures are unremarkable.     IMPRESSION  IMPRESSION:      1. New left mastoid sinus effusion. 2. New chronic infarct with encephalomalacia at left frontal lobe. 3. Stable chronic atrophy with mild compensatory ventriculomegaly. Assessment:   Active Problems:  1. Hypertensive emergency (9/25/2020)  2. Chest pain, possible angina vs GI, negative dissection or PE on CTA   3. Acute hypertensive encephalopathy   4. Seizure disorder   5. ESRD on HD   6. Dyslipidemia   7.   H/o CVA with new chronic infarct with encephalomalacia on left frontal lobe   8. Left mastoid sinus effusion   9. Suspected COVID-19 infection   10. Normocytic anemia of CKD   11. Bipolar affective disorder   12. T2DM      Plan: Will admit to step down, wean her off Nicardipine gtt. Can start her on prn Labetalol for now. She can resume her Diltiazem as she is weaned off Nicardipine   Continue to trend cardiac enzyme, if it is elevated, then will resume her Hep gtt.   Cardiology consult  Echo follow up  Resume her Keppra, resume her lipitor, hydralazine, clonidine   Nephrology follows for dialysis   Hold off antibiotic as she has no evidence of infection   pepcid   Droplet precaution, COVID-19 test follow up         Risk of deterioration:  []Low    []Moderate  [x]High     Prophylaxis:  []Lovenox  []Coumadin  [x]Hep SQ  []SCDs  []H2B/PPI     Disposition:  []Home w/ Family   [x] PT,OT,RN   [x]SNF/LTC   []SAH/Rehab     Discussed Code Status:         [x]Full Code      []DNR         ___________________________________________________     Care Plan discussed with:    [x]Patient   []Family    [x]ED Care Manager  [x]ED Doc   [x]Specialist :  Total Time Coordinating Admission:  70    minutes    []Total Critical Care Time:       Frantz Duffy MD  9/25/2020, 10:05 PM

## 2020-09-26 NOTE — ED NOTES
Consuelo Pierce from Respiratory Therapy  Is currently in with patient educating and performing incentive spirometry.

## 2020-09-26 NOTE — ED NOTES
Patient sitting up in bed watching television, no signs of distress noted at this time. Will continue to monitor.

## 2020-09-26 NOTE — ED NOTES
Called dialysis, patient is laying in bed alert and oriented. She is on room air, talking in complete sentences. No signs of distress noted at this time.

## 2020-09-26 NOTE — PROGRESS NOTES
[facility]  [facility]    Patient: Fede Hartmann Age: 77 y.o. : 1955 MR#: [id number] SSN: [id number]  Date/Time: 2020     Subjective: Patient alert awake alert x2, seen in dialysis. Feels okay, has mild headache. Patient states she did not sleep all night and that is causing her headache. Denies any chest pain, no shortness of breath. BP elevated in the dialysis. Did not receive all or hypertensive medication this morning. Per nephrologist, patient's blood pressure usually elevated and responds well postdialysis. Assessment/Plan:   1. Hypertensive emergency  2. Chest pain, possible angina vs GI, negative dissection or PE on CTA, resolved now  3. Mild elevated troponin due to #1 and underlying ESRD. 3.  Acute hypertensive encephalopathy   4. Seizure disorder   5. ESRD on HD   6. Dyslipidemia   7. H/o CVA with new chronic infarct with encephalomalacia on left frontal lobe   8. Left mastoid sinus effusion   9. Suspected COVID-19 infection, low suspicion  10. Normocytic anemia of CKD   11. Bipolar affective disorder   12. T2DM        Plan:      We will resume clonidine, hydralazine, losartan and Cardizem. We will also add hydralazine and labetalol IV as needed. Discussed with nephrology, recommend to give clonidine and losartan now p.o. and monitor blood pressure with HD. Nephrology suspects blood pressure will drop with HD. Discussed with RN at HD and start clonidine and losartan were given. Discussed with RN to give hydralazine if blood pressure does not improve. We will continue to monitor blood pressure if does not improve will consider starting nicardipine drip and may need ICU level of care. Cardiology input noted,  Echo pending  Continue Keppra and Lipitor.   Hold off antibiotic as she has no evidence of infection   pepcid   Droplet precaution, COVID-19 test follow up     We will obtain records from [facility]  Discussed with the patient and also with her sister over the phone in detail about my above plan of care. Per sister she has mild confusion, sister also thinks her blood pressure always runs higher side. Addendum  4:40 PM  Blood pressure improving, discussed with nephrology minoxidil has been added. Discussed with the ED RN to start minoxidil ASAP. Visit Vitals  BP (!) 173/84   Pulse (!) 105   Temp 98.4 °F (36.9 °C) (Oral)   Resp 21   Ht 5' 3\" (1.6 m)   Wt 75.8 kg (167 lb)   SpO2 98%   BMI 29.58 kg/m²         I spent 40 minutes with the patient in face-to-face consultation, of which greater than 50% was spent in counseling and coordination of care as described above. Case discussed with:  [x]Patient  [x]Family  []Nursing  []Case Management  DVT Prophylaxis:  []Lovenox  [x]Hep SQ  []SCDs  []Coumadin   []Eliquis/Xarelto     Objective:   VS:   Visit Vitals  BP (!) 226/126 Comment: b/p sd    Pulse (!) 101   Temp 99.4 °F (37.4 °C) (Oral)   Resp 18   Ht 5' 3\" (1.6 m)   Wt 75.8 kg (167 lb)   LMP  (LMP Unknown)   SpO2 98%   BMI 29.58 kg/m²      Tmax/24hrs: Temp (24hrs), Av.8 °F (37.1 °C), Min:98 °F (36.7 °C), Max:99.4 °F (37.4 °C)  IOBRIEF    Intake/Output Summary (Last 24 hours) at 2020 1210  Last data filed at 2020 0610  Gross per 24 hour   Intake 288.52 ml   Output    Net 288.52 ml       General:  Alert, cooperative, no acute distress    Pulmonary:  CTA Bilaterally. No Wheezing/Rales. Cardiovascular: Regular rate and Rhythm. GI:  Soft, Non distended, Non tender. + Bowel sounds. Extremities:  No edema. No calf tenderness. Psych: Not anxious or agitated. Neurologic: Alert and oriented X 2. Moves all ext.   Additional:     Medications:   Current Facility-Administered Medications   Medication Dose Route Frequency    sucralfate (CARAFATE) tablet 1 g  1 g Oral AC&HS    dilTIAZem ER (CARDIZEM CD) capsule 240 mg  240 mg Oral DAILY    [START ON 2020] losartan (COZAAR) tablet 100 mg  100 mg Oral DAILY    cloNIDine (CATAPRES) 0.2 mg/24 hr patch 1 Patch  1 Patch TransDERmal Q7D    sodium chloride (NS) flush 5-10 mL  5-10 mL IntraVENous PRN    traMADoL (ULTRAM) tablet 50 mg  50 mg Oral Q6H PRN    hydrALAZINE (APRESOLINE) 20 mg/mL injection 10 mg  10 mg IntraVENous Q6H PRN    famotidine (PF) (PEPCID) injection 20 mg  20 mg IntraVENous DAILY    atorvastatin (LIPITOR) tablet 10 mg  10 mg Oral DAILY    hydrALAZINE (APRESOLINE) tablet 100 mg  100 mg Oral TID    [START ON 9/28/2020] levETIRAcetam (KEPPRA) tablet 250 mg  250 mg Oral Q MON, WED & FRI    sevelamer carbonate (RENVELA) tab 800 mg  800 mg Oral TID    sodium chloride (NS) flush 5-40 mL  5-40 mL IntraVENous Q8H    sodium chloride (NS) flush 5-40 mL  5-40 mL IntraVENous PRN    acetaminophen (TYLENOL) tablet 650 mg  650 mg Oral Q6H PRN    Or    acetaminophen (TYLENOL) suppository 650 mg  650 mg Rectal Q6H PRN    polyethylene glycol (MIRALAX) packet 17 g  17 g Oral DAILY PRN    promethazine (PHENERGAN) tablet 12.5 mg  12.5 mg Oral Q6H PRN    Or    ondansetron (ZOFRAN) injection 4 mg  4 mg IntraVENous Q6H PRN    heparin (porcine) injection 5,000 Units  5,000 Units SubCUTAneous Q8H    levETIRAcetam (KEPPRA) tablet 500 mg  500 mg Oral BID     Current Outpatient Medications   Medication Sig    hydrALAZINE (APRESOLINE) 100 mg tablet Take 100 mg by mouth three (3) times daily. Indications: high blood pressure    atorvastatin (Lipitor) 10 mg tablet Take 10 mg by mouth daily. Indications: high amount of triglyceride in the blood    b complex-vitamin c-folic acid 0.8 mg (NEPHRO-DORIAN) 0.8 mg tab tablet Take 1 Tab by mouth daily.  levETIRAcetam (Keppra) 500 mg tablet Take 500 mg by mouth two (2) times a day.  sevelamer carbonate (RENVELA) 800 mg tab tab Take 800 mg by mouth three (3) times daily.  traMADol (ULTRAM) 50 mg tablet Take 1 Tab by mouth every six (6) hours as needed for Pain.  Max Daily Amount: 200 mg.   24 [facility] levETIRAcetam (KEPPRA) 250 mg tablet Take 1 Tab by mouth every Monday, Wednesday, Friday.  aspirin 81 mg tablet Take 81 mg by mouth daily.  diltiazem CD (CARDIZEM CD) 240 mg ER capsule Take 240 mg by mouth daily.  cloNIDine HCL (Catapres) 0.2 mg tablet Take 0.2 mg by mouth two (2) times a day.  epoetin raphael (EPOGEN;PROCRIT) 3,000 unit/mL injection 1.73 mL by SubCUTAneous route Every Tuesday, Thursday and Saturday.  Indications: ANEMIA DUE TO RENAL FAILURE, Renal Dialysis (Patient taking differently: 3,000 Units by SubCUTAneous route every Monday, Wednesday, Friday.)    insulin lispro (HUMALOG) 100 unit/mL injection INITIATE INSULIN CORRECTIVE PROTOCOL: Normal Insulin Sensitivity   For Blood Sugar (mg/dL) of:     Less than 150 =   0 units           150 -199 =   2 units  200 -249 =   4 units  250 -299 =   6 units  300 -349 =   8 units  350 and above = 10 units and Call Physician  If 2 glucose readings are above       Labs:    Recent Results (from the past 24 hour(s))   EKG, 12 LEAD, SUBSEQUENT    Collection Time: 09/25/20  2:17 PM   Result Value Ref Range    Ventricular Rate 112 BPM    Atrial Rate 112 BPM    P-R Interval 124 ms    QRS Duration 74 ms    Q-T Interval 342 ms    QTC Calculation (Bezet) 466 ms    Calculated P Axis 60 degrees    Calculated R Axis -27 degrees    Calculated T Axis 27 degrees    Diagnosis       Sinus tachycardia  Possible Left atrial enlargement  Borderline ECG  When compared with ECG of 25-SEP-2020 11:43,  No significant change was found     TROPONIN I    Collection Time: 09/25/20  2:19 PM   Result Value Ref Range    Troponin-I, QT 0.10 (H) 0.0 - 0.045 NG/ML   CBC WITH AUTOMATED DIFF    Collection Time: 09/25/20  5:31 PM   Result Value Ref Range    WBC 10.8 4.6 - 13.2 K/uL    RBC 3.55 (L) 4.20 - 5.30 M/uL    HGB 9.9 (L) 12.0 - 16.0 g/dL    HCT 30.7 (L) 35.0 - 45.0 %    MCV 86.5 74.0 - 97.0 FL    MCH 27.9 24.0 - 34.0 PG    MCHC 32.2 31.0 - 37.0 g/dL    RDW 15.3 (H) 11.6 - 14.5 %    PLATELET 164 786 - 058 K/uL    MPV 11.3 9.2 - 11.8 FL    NEUTROPHILS 80 (H) 40 - 73 %    LYMPHOCYTES 12 (L) 21 - 52 %    MONOCYTES 7 3 - 10 %    EOSINOPHILS 1 0 - 5 %    BASOPHILS 0 0 - 2 %    ABS. NEUTROPHILS 8.7 (H) 1.8 - 8.0 K/UL    ABS. LYMPHOCYTES 1.3 0.9 - 3.6 K/UL    ABS. MONOCYTES 0.7 0.05 - 1.2 K/UL    ABS. EOSINOPHILS 0.1 0.0 - 0.4 K/UL    ABS. BASOPHILS 0.0 0.0 - 0.1 K/UL    DF AUTOMATED     PTT    Collection Time: 09/25/20  5:31 PM   Result Value Ref Range    aPTT 24.9 23.0 - 36.4 SEC   HEP B SURFACE AG    Collection Time: 09/25/20  5:31 PM   Result Value Ref Range    Hepatitis B surface Ag <0.10 <1.00 Index    Hep B surface Ag Interp.  Negative NEG     TROPONIN I    Collection Time: 09/25/20  5:31 PM   Result Value Ref Range    Troponin-I, QT 0.11 (H) 0.0 - 0.045 NG/ML   URINALYSIS W/ RFLX MICROSCOPIC    Collection Time: 09/25/20 11:35 PM   Result Value Ref Range    Color YELLOW      Appearance CLEAR      Specific gravity 1.018 1.005 - 1.030      pH (UA) 8.0 5.0 - 8.0      Protein >1,000 (A) NEG mg/dL    Glucose 250 (A) NEG mg/dL    Ketone 15 (A) NEG mg/dL    Bilirubin Negative NEG      Blood Negative NEG      Urobilinogen 0.2 0.2 - 1.0 EU/dL    Nitrites Negative NEG      Leukocyte Esterase Negative NEG     URINE MICROSCOPIC ONLY    Collection Time: 09/25/20 11:35 PM   Result Value Ref Range    WBC 0 to 5 0 - 4 /hpf    RBC Negative 0 - 5 /hpf    Epithelial cells 2+ 0 - 5 /lpf   SARS-COV-2    Collection Time: 09/26/20  2:35 AM   Result Value Ref Range    Specimen source Nasopharyngeal      COVID-19 rapid test Not detected NOTD      Specimen type NP Swab      Health status Nasopharyngeal     PTT    Collection Time: 09/26/20  4:55 AM   Result Value Ref Range    aPTT 26.5 23.0 - 36.4 SEC   CBC WITH AUTOMATED DIFF    Collection Time: 09/26/20  4:55 AM   Result Value Ref Range    WBC 10.2 4.6 - 13.2 K/uL    RBC 3.32 (L) 4.20 - 5.30 M/uL    HGB 9.3 (L) 12.0 - 16.0 g/dL    HCT 28.9 (L) 35.0 - 45.0 %    MCV 87.0 74.0 - 97.0 FL    MCH 28.0 24.0 - 34.0 PG    MCHC 32.2 31.0 - 37.0 g/dL    RDW 15.6 (H) 11.6 - 14.5 %    PLATELET 533 831 - 785 K/uL    MPV 11.6 9.2 - 11.8 FL    NEUTROPHILS 75 (H) 40 - 73 %    LYMPHOCYTES 12 (L) 21 - 52 %    MONOCYTES 12 (H) 3 - 10 %    EOSINOPHILS 1 0 - 5 %    BASOPHILS 0 0 - 2 %    ABS. NEUTROPHILS 7.7 1.8 - 8.0 K/UL    ABS. LYMPHOCYTES 1.2 0.9 - 3.6 K/UL    ABS. MONOCYTES 1.2 0.05 - 1.2 K/UL    ABS. EOSINOPHILS 0.1 0.0 - 0.4 K/UL    ABS. BASOPHILS 0.0 0.0 - 0.1 K/UL    DF AUTOMATED     METABOLIC PANEL, BASIC    Collection Time: 09/26/20  4:55 AM   Result Value Ref Range    Sodium 138 136 - 145 mmol/L    Potassium 4.5 3.5 - 5.5 mmol/L    Chloride 103 100 - 111 mmol/L    CO2 25 21 - 32 mmol/L    Anion gap 10 3.0 - 18 mmol/L    Glucose 240 (H) 74 - 99 mg/dL    BUN 42 (H) 7.0 - 18 MG/DL    Creatinine 9.34 (H) 0.6 - 1.3 MG/DL    BUN/Creatinine ratio 4 (L) 12 - 20      GFR est AA 5 (L) >60 ml/min/1.73m2    GFR est non-AA 4 (L) >60 ml/min/1.73m2    Calcium 9.1 8.5 - 10.1 MG/DL   CARDIAC PANEL,(CK, CKMB & TROPONIN)    Collection Time: 09/26/20  4:55 AM   Result Value Ref Range    CK - MB 2.2 <3.6 ng/ml    CK-MB Index 0.7 0.0 - 4.0 %     (H) 26 - 192 U/L    Troponin-I, QT 0.13 (H) 0.0 - 0.045 NG/ML   GLUCOSE, POC    Collection Time: 09/26/20 11:43 AM   Result Value Ref Range    Glucose (POC) 172 (H) 70 - 110 mg/dL       Signed By: Bulmaro Schwarz MD     September 26, 2020      Disclaimer: Sections of this note are dictated using utilizing voice recognition software. Minor typographical errors may be present. If questions arise, please do not hesitate to contact me or call our department.

## 2020-09-26 NOTE — PROGRESS NOTES
RENAL PROGRESS NOTE: Pt seen on dialysis. Follow up of ESRD     All events noted,Discussed with Dr. Crow Livingston  Last night & Dr Antonio Lundy this Morning      Subjective: Says feeling good, no Headache, no Chest pain, no SOB, as usual pointing her left wrist skin area where there is mild eczema looking rash. As Usual BP is high, no Dissection, No PE. Was not Getting her Oral Medicine,testerday Tried with Nitro drip for a while & thenNicardipine . Now off all drips. Patient is on Dialysis.      Objective:    Patient Vitals for the past 12 hrs:   Temp Pulse Resp BP SpO2   09/26/20 1245  100  (!) 243/122    09/26/20 1230  96  (!) 231/119    09/26/20 1215  98  (!) 221/111    09/26/20 1200  (!) 103  (!) 218/104    09/26/20 1155  (!) 101  (!) 226/126    09/26/20 1145  (!) 103  (!) 244/122    09/26/20 1130  (!) 106  (!) 245/113    09/26/20 1115  (!) 101  (!) 222/117    09/26/20 1100  (!) 102  (!) 222/113    09/26/20 1045  (!) 109  (!) 214/113    09/26/20 1034  (!) 113  (!) 196/108    09/26/20 1030 99.4 °F (37.4 °C) (!) 114 18 (!) 195/109    09/26/20 1000 99 °F (37.2 °C) (!) 106 16 (!) 184/76 98 %   09/26/20 0930  (!) 112 25 (!) 175/105    09/26/20 0830  100 16 (!) 179/68 95 %   09/26/20 0800  (!) 101 15 (!) 187/79 100 %   09/26/20 0730  (!) 108 20 (!) 208/85 99 %   09/26/20 0640  (!) 101 15 (!) 172/72 97 %   09/26/20 0630  100 13 (!) 172/65 98 %   09/26/20 0610  (!) 101 14 (!) 166/80 97 %   09/26/20 0540  (!) 102 15 (!) 168/72 96 %   09/26/20 0530  (!) 104 20 (!) 170/74 97 %   09/26/20 0500  (!) 107 17 (!) 173/78 97 %   09/26/20 0440  (!) 105 16 (!) 158/68 98 %   09/26/20 0410  (!) 104 15 (!) 166/74 97 %   09/26/20 0350  (!) 108  (!) 163/71 97 %   09/26/20 0330  (!) 107  (!) 145/65 97 %   09/26/20 0310  (!) 107  (!) 150/67 96 %   09/26/20 0300  (!) 107 16 (!) 148/68 98 %   09/26/20 0240  (!) 107  (!) 143/65 97 %   09/26/20 0230  (!) 106  (!) 144/71 97 %   09/26/20 0200  (!) 105 12 (!) 144/64 97 %   09/26/20 0140  (!) 104 15 (!) 152/64 97 %        Intake/Output Summary (Last 24 hours) at 9/26/2020 1314  Last data filed at 9/26/2020 0610  Gross per 24 hour   Intake 288.52 ml   Output    Net 288.52 ml       Physical Assessment:     General Appearance: NAD  Lung: clear to auscultation  Heart: regular rate and rhythm and no murmurs, clicks or gallops  Lower Extremities:no  edema   Access: [facility] , [id number]    Labs    CBC w/Diff    Recent Labs     09/26/20  0455 09/25/20  1731 09/25/20  1156   WBC 10.2 10.8 12.2   RBC 3.32* 3.55* 4.00*   HGB 9.3* 9.9* 11.3*   HCT 28.9* 30.7* 34.7*   MCV 87.0 86.5 86.8   MCH 28.0 27.9 28.3   MCHC 32.2 32.2 32.6   RDW 15.6* 15.3* 15.2*    Recent Labs     09/26/20  0455 09/25/20  1731 09/25/20  1156   MONOS 12* 7 5   EOS 1 1 1   BASOS 0 0 0   RDW 15.6* 15.3* 15.2*        Comprehensive Metabolic Profile    Recent Labs     09/26/20  0455 09/25/20  1156    138   K 4.5 4.3    103   CO2 25 25   BUN 42* 40*   CREA 9.34* 7.78*    Recent Labs     09/26/20  0455 09/25/20  1156   CA 9.1 9.7   ALB  --  4.1   TP  --  8.1   TBILI  --  0.5          Basic Metabolic Profile       results  reviwed. Study Result     CT OF THE HEAD WITHOUT CONTRAST.     CLINICAL HISTORY: Altered mental status nausea and vomiting. History of  hypertension and bipolar disorder.     TECHNIQUE: Helical scan obtained of the head were obtained from the skull vertex  through the base of the skull without intravenous contrast.    All CT scans are  performed using dose optimization techniques as appropriate to the performed  exam including the following: Automated exposure control, adjustment of mA  and/or kV according to patient size, and use of iterative reconstructive  technique.      COMPARISON: 5/20/2019.     FINDINGS:      Stable chronic cortical atrophy and compensatory enlargement of the lateral and  third ventricles. Stable mild periventricular white matter hypodensities. New  but chronic appearing site of encephalomalacia at the anterior left frontal  lobe. No intracranial hemorrhage. No mass effect. The visualized paranasal  sinuses are clear. New fluid in the left mastoid sinuses. The visualized bony  structures are unremarkable.     IMPRESSION  IMPRESSION:      1. New left mastoid sinus effusion. 2. New chronic infarct with encephalomalacia at left frontal lobe. 3. Stable chronic atrophy with mild compensatory ventriculomegaly.        result (Exam End: 9/25/2020 19:52)  Provider Status: Open    Study Result     CTA CHEST PULMONARY EMBOLISM PROTOCOL       INDICATION: Patient with history of chronic kidney disease on dialysis presents  with acute onset of left-sided sharp chest pain without radiation. ? one episode  of nausea and vomiting. ?     TECHNIQUE: Thin collimation axial images obtained through the level of the  pulmonary arteries with additional imaging through the chest following the  uneventful administration of 65 cc Isovue-370 intravenous contrast.  Images  reconstructed into MIP coronal and sagittal projections for complete evaluation  of the tortuous and overlapping pulmonary vascular structures and to reduce  patient radiation dose. All CT scans are performed using dose optimization  techniques as appropriate to the performed exam including the following:  Automated exposure control, adjustment of mA and/or kV according to patient  size, and use of iterative reconstructive technique.      COMPARISON: No prior CT chest. CT abdomen and pelvis 9/25/2020.     FINDINGS:     No filling defects are appreciated within the main, left, right, lobar or  visualized segmental pulmonary arteries to suggest embolism. Motion prevents  subsegmental branch assessment. The thoracic aorta is not aneurysmal.  No  evidence for dissection.     Minimal pericardial effusion. Right dialysis catheter. No pleural effusion. No  enlarged axillary, hilar, or mediastinal lymphadenopathy.  No acute bone finding.     No lung infiltrate or mass.     No acute findings in the upper abdomen. Renal cysts.        IMPRESSION  IMPRESSION:     No evidence of pulmonary embolism, aortic aneurysm or dissection. No acute  findings           MEDS:Reviwed.   Current Facility-Administered Medications   Medication Dose Route Frequency Provider Last Rate Last Dose    sucralfate (CARAFATE) tablet 1 g  1 g Oral AC&HS Shanthi Crouch MD   1 g at 09/26/20 1017    dilTIAZem ER (CARDIZEM CD) capsule 240 mg  240 mg Oral DAILY MD Bishnu Moseleyin Magdiel Cherryle ON 9/27/2020] losartan (COZAAR) tablet 100 mg  100 mg Oral DAILY Mi Ramos MD        cloNIDine (CATAPRES) 0.2 mg/24 hr patch 1 Patch  1 Patch TransDERmal Q7D Hola Long MD        labetaloL (NORMODYNE;TRANDATE) 20 mg/4 mL (5 mg/mL) injection 10 mg  10 mg IntraVENous Q4H PRN Lizzy Sung MD        triamcinolone acetonide (KENALOG) 0.1 % cream   Topical BID Mi Ramos MD        sodium chloride (NS) flush 5-10 mL  5-10 mL IntraVENous PRN Shanthi Crouch MD        traMADoL Thao Dobson) tablet 50 mg  50 mg Oral Q6H PRN Shanthi Crouch MD   50 mg at 09/25/20 1523    hydrALAZINE (APRESOLINE) 20 mg/mL injection 10 mg  10 mg IntraVENous Q6H PRN Shanthi Crouch MD        famotidine (PF) (PEPCID) injection 20 mg  20 mg IntraVENous DAILY Shanthi Crouch MD   20 mg at 09/26/20 1019    atorvastatin (LIPITOR) tablet 10 mg  10 mg Oral DAILY Shanthi Crouch MD   10 mg at 09/26/20 1017    hydrALAZINE (APRESOLINE) tablet 100 mg  100 mg Oral TID Shanthi Crouch MD   100 mg at 09/26/20 1234    [START ON 9/28/2020] levETIRAcetam (KEPPRA) tablet 250 mg  250 mg Oral Q MON, WED & FRI Shanthi Crouch MD        sevelamer carbonate (RENVELA) tab 800 mg  800 mg Oral TID Shanthi Crouch MD   800 mg at 09/26/20 1017    sodium chloride (NS) flush 5-40 mL  5-40 mL IntraVENous Q8H Obed Saenz MD   10 mL at 09/25/20 2226    sodium chloride (NS) flush 5-40 mL  5-40 mL IntraVENous PRN Leni Nunez MD        acetaminophen (TYLENOL) tablet 650 mg  650 mg Oral Q6H PRN Leni Nunez MD   650 mg at 09/26/20 1202    Or    acetaminophen (TYLENOL) suppository 650 mg  650 mg Rectal Q6H PRN Leni Nunez MD        polyethylene glycol (MIRALAX) packet 17 g  17 g Oral DAILY PRN Leni Nunez MD        promethazine (PHENERGAN) tablet 12.5 mg  12.5 mg Oral Q6H PRN Leni Nunez MD        Or    [facility] injection 4 mg  4 mg IntraVENous Q6H PRN Leni Nunez MD        heparin (porcine) injection 5,000 Units  5,000 Units SubCUTAneous Q8H Leni Nunez MD   5,000 Units at 09/25/20 2313    levETIRAcetam (KEPPRA) tablet 500 mg  500 mg Oral BID Leni Nunez MD   500 mg at 09/26/20 1017     Current Outpatient Medications   Medication Sig Dispense Refill    hydrALAZINE (APRESOLINE) 100 mg tablet Take 100 mg by mouth three (3) times daily. Indications: high blood pressure      atorvastatin (Lipitor) 10 mg tablet Take 10 mg by mouth daily. Indications: high amount of triglyceride in the blood      b complex-vitamin c-folic acid 0.8 mg (NEPHRO-DORIAN) 0.8 mg tab tablet Take 1 Tab by mouth daily.  levETIRAcetam (Keppra) 500 mg tablet Take 500 mg by mouth two (2) times a day.  sevelamer carbonate (RENVELA) 800 mg tab tab Take 800 mg by mouth three (3) times daily.  traMADol (ULTRAM) 50 mg tablet Take 1 Tab by mouth every six (6) hours as needed for Pain. Max Daily Amount: 200 mg. 20 Tab 0    levETIRAcetam (KEPPRA) 250 mg tablet Take 1 Tab by mouth every Monday, Wednesday, Friday. 30 Tab 1    aspirin 81 mg tablet Take 81 mg by mouth daily.  diltiazem CD (CARDIZEM CD) 240 mg ER capsule Take 240 mg by mouth daily.  cloNIDine HCL (Catapres) 0.2 mg tablet Take 0.2 mg by mouth two (2) times a day.  epoetin raphael (EPOGEN;PROCRIT) 3,000 unit/mL injection 1.73 mL by SubCUTAneous route Every Tuesday, Thursday and Saturday.  Indications: ANEMIA DUE TO RENAL FAILURE, Renal Dialysis (Patient taking differently: 3,000 Units by SubCUTAneous route every Monday, Wednesday, Friday.) 1 Vial 0    insulin lispro (HUMALOG) 100 unit/mL injection INITIATE INSULIN CORRECTIVE PROTOCOL: Normal Insulin Sensitivity   For Blood Sugar (mg/dL) of:     Less than 150 =   0 units           150 -199 =   2 units  200 -249 =   4 units  250 -299 =   6 units  300 -349 =   8 units  350 and above = 10 units and Call Physician  If 2 glucose readings are above 1 Vial 0       Impression:    ESRD: Tolerating HD well. Anemia of CKD: on  Epogen. Hyperparathyroidism Yes  Hypertensive Emergency   Plan  Dialysis for volume and solute management  Epogen  : not today unless BP comes down  Hectorol: 1 microgram.  Added Losartan, also on Hydralazine, added Oral Clonidine once & then Catapress patch. IF BP does not come down to  < 170 mmhg then recommend to start Nicardipine Drip.    Also may need to Give Minoxidil in place of Hydralazine         Geoff Hassan MD

## 2020-09-27 ENCOUNTER — APPOINTMENT (OUTPATIENT)
Dept: NON INVASIVE DIAGNOSTICS | Age: 65
DRG: 304 | End: 2020-09-27
Attending: INTERNAL MEDICINE
Payer: MEDICARE

## 2020-09-27 PROBLEM — R07.9 CHEST PAIN: Status: RESOLVED | Noted: 2020-09-25 | Resolved: 2020-09-27

## 2020-09-27 LAB
APTT PPP: 26 SEC (ref 23–36.4)
BASOPHILS # BLD: 0 K/UL (ref 0–0.1)
BASOPHILS NFR BLD: 0 % (ref 0–2)
DIFFERENTIAL METHOD BLD: ABNORMAL
ECHO AO ROOT DIAM: 3.32 CM
ECHO LA AREA 4C: 11.98 CM2
ECHO LA VOL 2C: 36.86 ML (ref 22–52)
ECHO LA VOL 4C: 30.58 ML (ref 22–52)
ECHO LA VOL BP: 37.17 ML (ref 22–52)
ECHO LA VOL/BSA BIPLANE: 20.75 ML/M2 (ref 16–28)
ECHO LA VOLUME INDEX A2C: 20.58 ML/M2 (ref 16–28)
ECHO LA VOLUME INDEX A4C: 17.07 ML/M2 (ref 16–28)
ECHO LV INTERNAL DIMENSION DIASTOLIC: 2.5 CM (ref 3.9–5.3)
ECHO LV INTERNAL DIMENSION SYSTOLIC: 1.81 CM
ECHO LV IVSD: 1.39 CM (ref 0.6–0.9)
ECHO LV MASS 2D: 110.1 G (ref 67–162)
ECHO LV MASS INDEX 2D: 61.5 G/M2 (ref 43–95)
ECHO LV POSTERIOR WALL DIASTOLIC: 1.39 CM (ref 0.6–0.9)
ECHO LVOT DIAM: 1.86 CM
ECHO MV A VELOCITY: 116.71 CM/S
ECHO MV E DECELERATION TIME (DT): 0.12 S
ECHO MV E VELOCITY: 71.03 CM/S
ECHO MV E/A RATIO: 0.61
ECHO RV INTERNAL DIMENSION: 2.89 CM
EOSINOPHIL # BLD: 0.4 K/UL (ref 0–0.4)
EOSINOPHIL NFR BLD: 3 % (ref 0–5)
ERYTHROCYTE [DISTWIDTH] IN BLOOD BY AUTOMATED COUNT: 15.4 % (ref 11.6–14.5)
HCT VFR BLD AUTO: 32.9 % (ref 35–45)
HGB BLD-MCNC: 10.7 G/DL (ref 12–16)
LYMPHOCYTES # BLD: 1.3 K/UL (ref 0.9–3.6)
LYMPHOCYTES NFR BLD: 11 % (ref 21–52)
MCH RBC QN AUTO: 28.1 PG (ref 24–34)
MCHC RBC AUTO-ENTMCNC: 32.5 G/DL (ref 31–37)
MCV RBC AUTO: 86.4 FL (ref 74–97)
MONOCYTES # BLD: 1.2 K/UL (ref 0.05–1.2)
MONOCYTES NFR BLD: 10 % (ref 3–10)
NEUTS SEG # BLD: 9 K/UL (ref 1.8–8)
NEUTS SEG NFR BLD: 76 % (ref 40–73)
PLATELET # BLD AUTO: 266 K/UL (ref 135–420)
PMV BLD AUTO: 10.7 FL (ref 9.2–11.8)
RBC # BLD AUTO: 3.81 M/UL (ref 4.2–5.3)
WBC # BLD AUTO: 11.9 K/UL (ref 4.6–13.2)

## 2020-09-27 PROCEDURE — 74011250637 HC RX REV CODE- 250/637: Performed by: INTERNAL MEDICINE

## 2020-09-27 PROCEDURE — 85730 THROMBOPLASTIN TIME PARTIAL: CPT

## 2020-09-27 PROCEDURE — 97535 SELF CARE MNGMENT TRAINING: CPT

## 2020-09-27 PROCEDURE — 99232 SBSQ HOSP IP/OBS MODERATE 35: CPT | Performed by: PHYSICIAN ASSISTANT

## 2020-09-27 PROCEDURE — 74011250637 HC RX REV CODE- 250/637: Performed by: PHYSICIAN ASSISTANT

## 2020-09-27 PROCEDURE — 85025 COMPLETE CBC W/AUTO DIFF WBC: CPT

## 2020-09-27 PROCEDURE — 97166 OT EVAL MOD COMPLEX 45 MIN: CPT

## 2020-09-27 PROCEDURE — 93306 TTE W/DOPPLER COMPLETE: CPT

## 2020-09-27 PROCEDURE — 92610 EVALUATE SWALLOWING FUNCTION: CPT

## 2020-09-27 PROCEDURE — 65660000004 HC RM CVT STEPDOWN

## 2020-09-27 PROCEDURE — 99232 SBSQ HOSP IP/OBS MODERATE 35: CPT | Performed by: HOSPITALIST

## 2020-09-27 PROCEDURE — 74011250636 HC RX REV CODE- 250/636: Performed by: INTERNAL MEDICINE

## 2020-09-27 PROCEDURE — 74011250637 HC RX REV CODE- 250/637: Performed by: HOSPITALIST

## 2020-09-27 RX ORDER — DOXAZOSIN 4 MG/1
2 TABLET ORAL DAILY
Status: DISCONTINUED | OUTPATIENT
Start: 2020-09-27 | End: 2020-09-28

## 2020-09-27 RX ORDER — CLONIDINE HYDROCHLORIDE 0.1 MG/1
0.1 TABLET ORAL
Status: COMPLETED | OUTPATIENT
Start: 2020-09-27 | End: 2020-09-27

## 2020-09-27 RX ORDER — METOPROLOL TARTRATE 25 MG/1
25 TABLET, FILM COATED ORAL EVERY 12 HOURS
Status: DISCONTINUED | OUTPATIENT
Start: 2020-09-27 | End: 2020-09-29 | Stop reason: HOSPADM

## 2020-09-27 RX ORDER — MINOXIDIL 2.5 MG/1
2.5 TABLET ORAL
Status: COMPLETED | OUTPATIENT
Start: 2020-09-27 | End: 2020-09-27

## 2020-09-27 RX ORDER — CLONIDINE 0.3 MG/24H
1 PATCH, EXTENDED RELEASE TRANSDERMAL
Status: DISCONTINUED | OUTPATIENT
Start: 2020-09-27 | End: 2020-09-29

## 2020-09-27 RX ORDER — MINOXIDIL 2.5 MG/1
7.5 TABLET ORAL 2 TIMES DAILY
Status: DISCONTINUED | OUTPATIENT
Start: 2020-09-27 | End: 2020-09-28

## 2020-09-27 RX ADMIN — LOSARTAN POTASSIUM 100 MG: 25 TABLET, FILM COATED ORAL at 10:06

## 2020-09-27 RX ADMIN — SUCRALFATE 1 G: 1 TABLET ORAL at 00:56

## 2020-09-27 RX ADMIN — LEVETIRACETAM 500 MG: 500 TABLET ORAL at 10:08

## 2020-09-27 RX ADMIN — SUCRALFATE 1 G: 1 TABLET ORAL at 17:59

## 2020-09-27 RX ADMIN — PROMETHAZINE HYDROCHLORIDE 12.5 MG: 25 TABLET ORAL at 18:50

## 2020-09-27 RX ADMIN — LEVETIRACETAM 500 MG: 500 TABLET ORAL at 17:59

## 2020-09-27 RX ADMIN — Medication 10 ML: at 10:15

## 2020-09-27 RX ADMIN — HEPARIN SODIUM 5000 UNITS: 5000 INJECTION INTRAVENOUS; SUBCUTANEOUS at 14:58

## 2020-09-27 RX ADMIN — CLONIDINE HYDROCHLORIDE 0.1 MG: 0.1 TABLET ORAL at 14:58

## 2020-09-27 RX ADMIN — TRIAMCINOLONE ACETONIDE: 1 CREAM TOPICAL at 18:51

## 2020-09-27 RX ADMIN — SUCRALFATE 1 G: 1 TABLET ORAL at 10:05

## 2020-09-27 RX ADMIN — MINOXIDIL 5 MG: 2.5 TABLET ORAL at 10:08

## 2020-09-27 RX ADMIN — FAMOTIDINE 20 MG: 10 INJECTION INTRAVENOUS at 10:14

## 2020-09-27 RX ADMIN — METOPROLOL TARTRATE 25 MG: 25 TABLET, FILM COATED ORAL at 17:59

## 2020-09-27 RX ADMIN — SEVELAMER CARBONATE 800 MG: 800 TABLET, FILM COATED ORAL at 18:00

## 2020-09-27 RX ADMIN — HEPARIN SODIUM 5000 UNITS: 5000 INJECTION INTRAVENOUS; SUBCUTANEOUS at 10:10

## 2020-09-27 RX ADMIN — SEVELAMER CARBONATE 800 MG: 800 TABLET, FILM COATED ORAL at 00:56

## 2020-09-27 RX ADMIN — SUCRALFATE 1 G: 1 TABLET ORAL at 13:00

## 2020-09-27 RX ADMIN — SEVELAMER CARBONATE 800 MG: 800 TABLET, FILM COATED ORAL at 10:07

## 2020-09-27 RX ADMIN — MINOXIDIL 2.5 MG: 2.5 TABLET ORAL at 14:57

## 2020-09-27 RX ADMIN — MINOXIDIL 7.5 MG: 2.5 TABLET ORAL at 18:50

## 2020-09-27 RX ADMIN — ATORVASTATIN CALCIUM 10 MG: 10 TABLET, FILM COATED ORAL at 10:08

## 2020-09-27 RX ADMIN — DOXAZOSIN 2 MG: 1 TABLET ORAL at 17:58

## 2020-09-27 RX ADMIN — DILTIAZEM HYDROCHLORIDE 300 MG: 120 CAPSULE, COATED, EXTENDED RELEASE ORAL at 09:51

## 2020-09-27 NOTE — PROGRESS NOTES
Problem: Dysphagia (Adult)  Goal: *Acute Goals and Plan of Care (Insert Text)  Description: Patient will:  1. Tolerate PO trials with 0 s/s overt distress in 4/5 trials  2. Utilize compensatory swallow strategies/maneuvers (decrease bite/sip, size/rate, alt. liq/sol) with min cues in 4/5 trials  3. Perform oral-motor/laryngeal exercises to increase oropharyngeal swallow function with min cues  4. Complete an objective swallow study (i.e., MBSS) to assess swallow integrity, r/o aspiration, and determine of safest LRD, min A    Rec:     Puree with nectar-thick liquids  Aspiration precautions  HOB >45 during po intake, remain >30 for 30-45 minutes after po   Small bites/sips; alternate liquid/solid with slow feeding rate   Oral care TID  Meds crushed in puree  Assistance with feeds    Outcome: Progressing Towards Goal    SPEECH LANGUAGE PATHOLOGY BEDSIDE SWALLOW EVALUATION    Patient: Billie Nino (13 y.o. female)  Date: 9/27/2020  Primary Diagnosis: Hypertensive emergency [I16.1]        Precautions: aspiration     PLOF: As per H&P    ASSESSMENT :  Based on the objective data described below, the patient presents with mod oropharyngeal dysphagia. She was aphonic throughout evaluation, but able to answer y/n questions appropriately. Pt with poor bolus manipulation of mech soft solids with right pocketing observed. Improved tolerance of puree with positive oral clearance. She tolerated solid, puree, and nectar-thick liquids with no overt s/sx of aspiration. Multiple audible swallows observed with thin with delayed watery eyes/throat clear. Laryngeal elevation was weak to palpation throughout evaluation. Recommend puree diet with nectar-thick liquids, aspiration precautions, oral care TID, and meds crushed. She will require assistance with Pina Beckham will continue to follow for further dysphagia management. Patient will benefit from skilled intervention to address the above impairments.   Patient's rehabilitation potential is considered to be Fair  Factors which may influence rehabilitation potential include:   []            None noted  [x]            Mental ability/status  [x]            Medical condition  []            Home/family situation and support systems  [x]            Safety awareness  []            Pain tolerance/management  []            Other:      PLAN :  Recommendations and Planned Interventions: See above  Frequency/Duration: Patient will be followed by speech-language pathology 1-2 times per day/3-5 days per week to address goals. Discharge Recommendations: Elvis Mahajan and To Be Determined     SUBJECTIVE:   Patient aphonic.      OBJECTIVE:     Past Medical History:   Diagnosis Date    Asthma     Bipolar affective disorder ([facility]     Brain condition     Cataract     Chronic kidney disease     hemodialysis M-W-F    Diabetes ([facility]     Hyperlipidemia     Hypertension     Paralytic strabismus, unspecified     Psychiatric disorder     Seizures ([facility] 08/27/2018     Past Surgical History:   Procedure Laterality Date    SC INSJ TUNNELED CVC W/O SUBQ PORT/ AGE 3 YR/> N/A 8/9/2018     in-pt 474A, Insertion Tunneled Central Venous Catheter performed by Kleber Mcclellan MD at [address] [place] [address]    SC INSJ TUNNELED CVC W/O SUBQ PORT/ AGE 3 YR/> N/A 11/8/2019    INSERTION TUNNELED CENTRAL VENOUS CATHETER performed by Maximiliano Marreor MD at [facility] CATH LAB     Prior Level of Function/Home Situation: unknown  Diet prior to admission: unknown  Current Diet:  puree with nectar-thick liquids, meds crushed   Cognitive and Communication Status:  Neurologic State: Alert  Orientation Level: Oriented to person, Oriented to place, Disoriented to time, Disoriented to situation  Cognition: Follows commands  Oral Assessment:  Oral Assessment  Labial: No impairment  Dentition: Limited;Natural  Oral Hygiene: Adequate  Lingual: Decreased rate;Decreased strength  Velum: No impairment  Mandible: No impairment  P.O. Trials:  Patient Position: 45 at [address] [facility],[address]  Vocal quality prior to P.O.: Aphonic(head nod y/n appropriately to questions)  Consistency Presented: Thin liquid;Mechanical soft;Puree;Nectar thick liquid  How Presented: Cup/sip;Spoon;Straw;SLP-fed/presented  Bolus Acceptance: No impairment  Bolus Formation/Control: Impaired  Type of Impairment: Delayed;Mastication; Incomplete  Propulsion: Delayed (# of seconds)  Oral Residue: Pocketing;Right;10-50% of bolus  Initiation of Swallow: Delayed (# of seconds)  Laryngeal Elevation: Weak;Decreased  Aspiration Signs/Symptoms: Clear throat  Pharyngeal Phase Characteristics: Audible swallow;Multiple swallows; Suspected pharyngeal residue  Effective Modifications: Small sips and bites; Alternate liquids/solids  Cues for Modifications: Moderate    Oral Phase Severity: Moderate  Pharyngeal Phase Severity : Moderate    PAIN:  Start of Eval: 0  End of Eval: 0     After treatment:   []            Patient left in no apparent distress sitting up in chair  [x]            Patient left in no apparent distress in bed  [x]            Call bell left within reach  [x]            Nursing notified  []            Family present  []            Caregiver present  []            Bed alarm activated    COMMUNICATION/EDUCATION:   [x]            Aspiration precautions; swallow safety; compensatory techniques. []            Patient/family have participated as able in goal setting and plan of care. []            Patient/family agree to work toward stated goals and plan of care. [x]            Patient understands intent and goals of therapy; neutral about participation. []            Patient unable to participate in goal setting/plan of care; educ ongoing with interdisciplinary staff  []         Posted safety precautions in patient's room.     Thank you for this referral.    Lamonte Mandel M.S. CCC-SLP/L  Speech-Language Pathologist

## 2020-09-27 NOTE — PROGRESS NOTES
Progress Note    Myriam Correa  77 y.o. 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 A comprehensive review of systems was negative except for that written in the History of Present Illness.     Objective:     Visit Vitals  BP (!) 194/82   Pulse (!) 111   Temp 98.5 °F (36.9 °C)   Resp 25   Ht 5' 3\" (1.6 m)   Wt 75.8 kg (167 lb)   LMP  (LMP Unknown)   SpO2 96%   BMI 29.58 kg/m²       No intake or output data in the 24 hours ending 09/27/20 1406    Current Facility-Administered Medications   Medication Dose Route Frequency Provider Last Rate Last Dose    dilTIAZem ER (CARDIZEM CD) capsule 300 mg  300 mg Oral DAILY SUSI Velazquez   300 mg at 09/27/20 3054    doxazosin (CARDURA) tablet 2 mg  2 mg Oral DAILY Carissa Leger MD        sucralfate (CARAFATE) tablet 1 g  1 g Oral AC&HS Katt Farooq MD   1 g at 09/27/20 1300    losartan (COZAAR) tablet 100 mg  100 mg Oral DAILY Carissa Leger MD   100 mg at 09/27/20 1006    cloNIDine (CATAPRES) 0.2 mg/24 hr patch 1 Patch  1 Patch TransDERmal Q7D Zehra Matos MD   1 Patch at 09/27/20 0956    labetaloL (NORMODYNE;TRANDATE) 20 mg/4 mL (5 mg/mL) injection 10 mg  10 mg IntraVENous Q4H PRN January Sung MD        triamcinolone acetonide (KENALOG) 0.1 % cream   Topical BID Carissa Leger MD        minoxidiL (LONITEN) tablet 5 mg  5 mg Oral BID Zehra Matos MD   5 mg at 09/27/20 1008    sodium chloride (NS) flush 5-10 mL  5-10 mL IntraVENous PRN Radha Anguiano MD Obed        traMADoL (ULTRAM) tablet 50 mg  50 mg Oral Q6H PRN Meredeth Cushing, MD   50 mg at 09/25/20 1523    hydrALAZINE (APRESOLINE) 20 mg/mL injection 10 mg  10 mg IntraVENous Q6H PRN Meredeth Cushing, MD        famotidine (PF) (PEPCID) injection 20 mg  20 mg IntraVENous DAILY Meredeth Cushing, MD   20 mg at 09/27/20 1014    atorvastatin (LIPITOR) tablet 10 mg  10 mg Oral DAILY Meredeth Cushing, MD   10 mg at 09/27/20 1008    [START ON 9/28/2020] levETIRAcetam (KEPPRA) tablet 250 mg  250 mg Oral Q MON, WED & FRI Meredeth Cushing, MD        sevelamer carbonate (RENVELA) tab 800 mg  800 mg Oral TID Meredeth Cushing, MD   800 mg at 09/27/20 1007    sodium chloride (NS) flush 5-40 mL  5-40 mL IntraVENous Q8H Meredeth Cushing, MD   10 mL at 09/27/20 1015    sodium chloride (NS) flush 5-40 mL  5-40 mL IntraVENous PRN Meredeth Cushing, MD        acetaminophen (TYLENOL) tablet 650 mg  650 mg Oral Q6H PRN Meredeth Cushing, MD   650 mg at 09/26/20 1202    Or    acetaminophen (TYLENOL) suppository 650 mg  650 mg Rectal Q6H PRN Meredeth Cushing, MD        polyethylene glycol (MIRALAX) packet 17 g  17 g Oral DAILY PRN Meredeth Cushing, MD        promethazine (PHENERGAN) tablet 12.5 mg  12.5 mg Oral Q6H PRN Meredeth Cushing, MD        Or    ondansetron [facility]) injection 4 mg  4 mg IntraVENous Q6H PRN Meredeth Cushing, MD        heparin (porcine) injection 5,000 Units  5,000 Units SubCUTAneous Q8H Meredeth Cushing, MD   5,000 Units at 09/27/20 1010    levETIRAcetam (KEPPRA) tablet 500 mg  500 mg Oral BID Meredeth Cushing, MD   500 mg at 09/27/20 1008     Current Outpatient Medications   Medication Sig Dispense Refill    hydrALAZINE (APRESOLINE) 100 mg tablet Take 100 mg by mouth three (3) times daily. Indications: high blood pressure      atorvastatin (Lipitor) 10 mg tablet Take 10 mg by mouth daily. Indications: high amount of triglyceride in the blood      b complex-vitamin c-folic acid 0.8 mg (NEPHRO-DORIAN) 0.8 mg tab tablet Take 1 Tab by mouth daily.       levETIRAcetam (Keppra) 500 mg tablet Take 500 mg by mouth two (2) times a day.  sevelamer carbonate (RENVELA) 800 mg tab tab Take 800 mg by mouth three (3) times daily.  traMADol (ULTRAM) 50 mg tablet Take 1 Tab by mouth every six (6) hours as needed for Pain. Max Daily Amount: 200 mg. 20 Tab 0    levETIRAcetam (KEPPRA) 250 mg tablet Take 1 Tab by mouth every Monday, Wednesday, Friday. 30 Tab 1    aspirin 81 mg tablet Take 81 mg by mouth daily.  diltiazem CD (CARDIZEM CD) 240 mg ER capsule Take 240 mg by mouth daily.  cloNIDine HCL (Catapres) 0.2 mg tablet Take 0.2 mg by mouth two (2) times a day.  epoetin raphael (EPOGEN;PROCRIT) 3,000 unit/mL injection 1.73 mL by SubCUTAneous route Every Tuesday, Thursday and Saturday. Indications: ANEMIA DUE TO RENAL FAILURE, Renal Dialysis (Patient taking differently: 3,000 Units by SubCUTAneous route every Monday, Wednesday, Friday.) 1 Vial 0    insulin lispro (HUMALOG) 100 unit/mL injection INITIATE INSULIN CORRECTIVE PROTOCOL: Normal Insulin Sensitivity   For Blood Sugar (mg/dL) of:     Less than 150 =   0 units           150 -199 =   2 units  200 -249 =   4 units  250 -299 =   6 units  300 -349 =   8 units  350 and above = 10 units and Call Physician  If 2 glucose readings are above 1 Vial 0        Physical Exam:     Physical Exam:   General:  Alert, cooperative, no distress, appears stated age. Neck: Supple, symmetrical, trachea midline, no adenopathy, thyroid: no enlargement/tenderness/nodules, no carotid bruit and no JVD. Lungs:   Clear to auscultation bilaterally. Heart:  Regular rate and rhythm, S1, S2 normal, no murmur, click, rub or gallop. Abdomen:   Soft, non-tender. Bowel sounds normal. No masses,  No organomegaly. Extremities: Extremities normal, atraumatic, no cyanosis or edema, TDC i s well dressed.          Data Review:    CBC w/Diff    Recent Labs     09/27/20  0530 09/26/20  0455 09/25/20  1731   WBC 11.9 10.2 10.8   RBC 3.81* 3.32* 3.55*   HGB 10.7* 9.3* 9.9*   HCT 32.9* 28.9* 30.7*   MCV 86.4 87.0 86.5   MCH 28.1 28.0 27.9   MCHC 32.5 32.2 32.2   RDW 15.4* 15.6* 15.3*    Recent Labs     09/27/20  0530 09/26/20 0455 09/25/20  1731   MONOS 10 12* 7   EOS 3 1 1   BASOS 0 0 0   RDW 15.4* 15.6* 15.3*        Comprehensive Metabolic Profile    Recent Labs     09/26/20 0455 09/25/20  1156    138   K 4.5 4.3    103   CO2 25 25   BUN 42* 40*   CREA 9.34* 7.78*    Recent Labs     09/26/20 0455 09/25/20  1156   CA 9.1 9.7   ALB  --  4.1   TP  --  8.1   TBILI  --  0.5                        Impression:       Active Hospital Problems    Diagnosis Date Noted    Hypertensive emergency 09/25/2020    Tachycardia 09/25/2020    ESRD [facility]end stage [facility]) ([facility] 08/08/2018    CVA, old, cognitive deficits 08/08/2018    Secondary hyperparathyroidism of renal origin ([facility] 08/08/2018    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 Dialysis tomorrow.       Adelia Matias MD

## 2020-09-27 NOTE — DIALYSIS
ACUTE HEMODIALYSIS FLOW SHEET    HEMODIALYSIS ORDERS: Physician: Dr. Ward Groves: Mildred   Duration: 3 hr   BFR: 400   DFR: 600   Dialysate:  Temp 36-37*C   K+  2.0    Ca+ 2.5   Na 138   Bicarb 35   Wt Readings from Last 1 Encounters:   09/25/20 75.8 kg (167 lb)    Patient Chart [x]   Unable to Obtain []  Dry weight/UF Goal: 1000 ml    Heparin []  Bolus    Units    [] Hourly    Units    [x]None       Pre BP:   196/108    Pulse:  113   Respirations: 20    Temperature:  99.4    Tx: NSS    ml/Bolus   [x] N/A   Labs: []  Pre  []  Post:   [x] N/A   Additional Orders(medications, blood products, hypotension management): [] Yes   [x] No     [x]  [facility] Consent Verified     CATHETER ACCESS:  []N/A   [x]Right   []Left   []IJ   []Fem  [x]Chest wall  []TransHepatic   [] First use X-ray verified     [x]Tunnel    [] Non Tunneled   [x]No S/S infection  []Redness  []Drainage []Cultured []Swelling []Pain   [x]Medical Aseptic Prep Utilized   [x]Dressing Changed  [x] Biopatch  Date: 9/26/2020   []Clotted   [x]Patent   Flows: [x]Good  []Poor  []Reversed   If access problem,  notified: []Yes    [x]N/A        GRAFT/FISTULA ACCESS:   [x]N/A     []Right     []Left     []UE     []LE   []AVG   []AVF       []Medical Aseptic Prep Utilized   []No S/S infection  []Redness  []Drainage [] Cultured  [] Swelling  [] Pain  Bruit:   [] Strong    [] Weak       Thrill :   [] Strong    [] Weak     Needle Gauge: 15   Length: 1 inch   If access problem,  notified: []Yes     []N/A          GENERAL ASSESSMENT:    LUNGS:  Rate 20   SaO2%      [] Clear  [x] Coarse  [] Crackles  [] Wheezing                                                [] Diminished     Location : []RLL   []LLL    []RUL  []STEPHY   Cough:      []Productive  []Dry  [x]N/A   Respirations:  [x]Easy  []Labored   Therapy:  [x]RA  O2 Type:  []NC  Mask: []   NRB    [] BiPaP  Flow:  l/min                   [] Ventilator  [] Intubated  [] Trach     CARDIAC: [x] Regular      [] Irregular   [] Pericardial Rub            []  Monitored  [] Bedside  [] Remotely monitored     EDEMA: [] None   [x]Generalized  [] Pitting [] 1+   [] 2 +   [] 3+    [] 4+  [] Pedal    SKIN:   [x] Warm  [] Hot     [] Cold   [x] Dry     [] Pale   [] Diaphoretic                  [] Flushed  [] Jaundiced  [] Cyanotic     LOC:    [x] Alert      [x]Oriented:    [x] Person     [] Place  []Time               [] Confused  [] Lethargic  [] Medicated  [] Non-responsive   GI / ABDOMEN:                     [] Flat    [] Distended    [x] Soft    [] Firm   []  Obese                   [] Diarrhea   [] FMS [x] Bowel Sounds  [] Nausea  [] Vomiting     / URINE ASSESSMENT:                   [] Voiding    [] Oliguria  [x] Anuria   []  Khan                  [] Incontinent  []  Incontinent Brief   []  PureWick     PAIN:  [x] 0 []1  []2   []3   []4   []5   []6   []7   []8   []9   []10            Scale 0-10  Action/Follow Up:    MOBILITY:  [x] Bed    [] Stretcher      All Vitals and Treatment Details on Attached [employer] [address]: [facility]          Room # XJ47/72    [x] Routine         [] 1st Time Acute    [] Urgent      [] Stat            [x] Acute Room   []  Bedside    [] ICU/CCU     [] ER   Isolation Precautions:  [x] Dialysis    Droplet     ALLERGIES:     Allergies   Allergen Reactions    Amoxicillin Unknown (comments)    Cleocin [Clindamycin Hcl] Unknown (comments)    Codeine Unknown (comments)    Lorcet 10/650 [Hydrocodone-Acetaminophen] Unknown (comments)    Penicillin G Benzathine Unknown (comments)    Shellfish Derived Unknown (comments)      Code Status:  Full Code     Hepatitis Status      Lab Results   Component Value Date/Time    Hepatitis B surface Ag <0.10 09/25/2020 05:31 PM    Hepatitis B surface Ab <3.10 (L) 08/13/2018 04:34 AM    Hepatitis B core, IgM NEGATIVE  08/11/2018 03:29 AM    Hep B Core Ab, IgM NEGATIVE  08/13/2018 04:34 AM    Hep B Core Ab, total NEGATIVE  08/13/2018 04:34 AM        Current Labs: Lab Results   Component Value Date/Time    WBC 10.2 09/26/2020 04:55 AM    Hemoglobin, POC 13.3 11/08/2019 07:26 AM    HGB 9.3 (L) 09/26/2020 04:55 AM    Hematocrit, POC 39 11/08/2019 07:26 AM    HCT 28.9 (L) 09/26/2020 04:55 AM    PLATELET 656 04/28/8223 04:55 AM    MCV 87.0 09/26/2020 04:55 AM     Lab Results   Component Value Date/Time    Sodium 138 09/26/2020 04:55 AM    Potassium 4.5 09/26/2020 04:55 AM    Chloride 103 09/26/2020 04:55 AM    CO2 25 09/26/2020 04:55 AM    Anion gap 10 09/26/2020 04:55 AM    Glucose 240 (H) 09/26/2020 04:55 AM    BUN 42 (H) 09/26/2020 04:55 AM    Creatinine 9.34 (H) 09/26/2020 04:55 AM    BUN/Creatinine ratio 4 (L) 09/26/2020 04:55 AM    GFR est AA 5 (L) 09/26/2020 04:55 AM    GFR est non-AA 4 (L) 09/26/2020 04:55 AM    Calcium 9.1 09/26/2020 04:55 AM          DIET:  DIET RENAL     PRIMARY NURSE REPORT:   Pre Dialysis: Darleen Lang RN     Time: 1000      EDUCATION:    [x] Patient [] Other           Knowledge Basis: []None [x]Minimal [] Substantial   Barriers to learning  [x]N/A   [] Access Care     [] S&S of infection  [] Fluid Management  [] K+   [x] Procedural    []Albumin   [] Medications   [] Tx Options   [] Transplant   [] Diet   [] Other   Teaching Tools:  [x] Explain  [] Demo  [] Handouts [] Video  Patient response: [x] Verbalized understanding  [] Teach back  [] Return demonstration   [] Requires follow up      [x]Time Out/Safety Check  [x] Extracorporeal Circuit Tested for integrity       RO/HEMODIALYSIS MACHINE SAFETY CHECKS  Before each treatment:      1000 [facility]                                     [x] Unit Machine # 3 with centralized RO                                                                                                                                              Alarm Test:  Pass time 0930            [x] RO/Machine Log Complete    Machine Temp    36*C             Dialysate: pH  7.4    Conductivity: Meter 14.0     HD Machine  14.0 TCD: 13.8  Dialyzer Lot # [id number]     Blood Tubing Lot # [id number]     Saline Lot # [id number]     CHLORINE TESTING-Before each treatment and every 4 hours    Total Chlorine: [x] less than 0.1 ppm  Initial Time Check: 0900       4 Hr/2nd Check Time: 1300   (if greater than 0.1 ppm from Primary then every 30 minutes from Secondary)     TREATMENT INITIATION  with Dialysis Precautions:   [x] All Connections Secured              [x] Saline Line Double Clamped   [x] Venous Parameters Set               [x] Arterial Parameters Set    [x] Prime Given 250ml NSS              [x]Air Foam Detector Engaged      Treatment Initiation Note:  1000  Pt arrived to dialysis unit awake and alert. 56  Rt tunneled dialysis CVC intact, patent and flushes well. Dialysis initiated by Landen Yang, PCT  Dr Miguelito Carlson at bedside. During Treatment Notes:  1130  Vascular access visible with arterial and venous line connections intact. Pt resting comfortably. 1200  Pt complaining of severe headache and not feeling well. BP extremely elevated and Dr Miguelito Carlson notified. Plan is to give 3 BP medications 20-30 minutes apart and continue to monitor BP and continue dialysis. Tylenol 650 mg also given to pt at this time. 1230  Vascular access visible with arterial and venous line connections intact. 1330  Pt now feeling much better and no headache. BP stable. Vascular access visible with arterial and venous line connections intact. Pt resting comfortably.        Medication Dose Volume Route Time [place] Nurse, Title   Catapres 0.2 mg 2 tabs PO 1200 Jodee Hand, RN   Tylenol 650 mg 2 tabs PO 1200 Jodee Duenas, RN   Apresoline 100 mg  PO 1230 Jodee Duenas RN   Losartan 100 mg  PO 1300 Jodee Duenas RN     Post Assessment  Dialyzer Cleared:   [] Good  [x] Fair  [] Poor  Blood processed:  67.5 L  UF Removed:  1000 Ml    Post /100   Pulse  99 Resp  20  Temp 98.4 Lungs: [x] Clear [] Course  [] Crackles                 [] Wheezing   [] Diminished   Post Tx Vascular Access: [x] N/A       Cardiac :[x] Regular   [] Irregular   Rhythm:  [] Monitored   [] Not Monitored    CVC Catheter: [] N/A  Locking solution: Heparin 1:1000 U  Arterial port 2.1 ml   Venous port 2.1 ml   Edema:  [] None  [x] Generalized                     Skin:[x] Warm  [x] Dry [] Diaphoretic               [] Flushed  [] Pale [] Cyanotic Pain:  [x]0  []1 []2  []3 []4  []5  []6  []7 []8  []9  []10     Post Treatment Note:   2776  Pt tolerated dialysis well after BP stabilization. Dialysis catheter intact, patent and heplocked as noted above.      POST TREATMENT PRIMARY NURSE HANDOFF REPORT:   Post Dialysis: Gerardo Wills RN                Time:  1400       Abbreviations: AVG-arterial venous graft, AVF-arterial venous fistula, IJ-Internal Jugular, Subcl-Subclavian, Fem-Femoral, Tx-treatment, AP/HR-apical heart rate, VSS- Vital Signs Stable, CVC- Central Venous Catheter, DFR-dialysate flow rate, BFR-blood flow rate, AP-arterial pressure, -venous pressure, UF-ultrafiltrate, TMP-transmembrane pressure, Ray-Venous, Art-Arterial, RO-Reverse Osmosis

## 2020-09-27 NOTE — PROGRESS NOTES
[facility]  -  Progress Note      Patient: Jazmín Conte MRN: [id number]  SSN: [id number]    YOB: 1955  Age: 77 y.o. Sex: female      Admit Date: 9/25/2020    Assessment:     Hospital Problems  Date Reviewed: 5/29/2019          Codes Class Noted POA    Hypertensive emergency ICD-10-CM: I16.1  ICD-9-CM: 401.9  9/25/2020 Unknown        Chest pain ICD-10-CM: R07.9  ICD-9-CM: 786.50  9/25/2020 Unknown        Tachycardia ICD-10-CM: R00.0  ICD-9-CM: 785.0  9/25/2020 Unknown            -HTN out of control  -Pt denies chest pain (also documented by Nephrologist)- regardless, no acute changes on EKG. No PE or dissection noted on CT  -Indeterminate troponin- likely secondary to demand ischemia from elevated BP  -Hypertensive encephalopathy on arrival  -History of seizure disorder  -Dyslipidemia- on statin  -Type II DM  -Bipolar disease/ poor historian     -No primary cardiologist    Plan:     Stable but still with elevated pressures. Have increased CCB and will monitor. Echo today with results pending. Continue with all other meds. Subjective:     No new complaints. Denies any pain. No events overnight.     Objective:      Patient Vitals for the past 8 hrs:   Temp Pulse Resp BP SpO2   09/27/20 0600    (!) 183/86    09/27/20 0500    (!) 197/88    09/27/20 0300 98 °F (36.7 °C) 100 14 (!) 138/102 98 %   09/27/20 0100    (!) 195/90 98 %         Patient Vitals for the past 96 hrs:   Weight   09/25/20 1147 75.8 kg (167 lb)         Intake/Output Summary (Last 24 hours) at 9/27/2020 0810  Last data filed at 9/26/2020 1350  Gross per 24 hour   Intake    Output 100 ml   Net -100 ml       Physical Exam:  General:  alert, cooperative, no distress, appears stated age  Neck:  nontender, no JVD  Lungs:  clear to auscultation bilaterally  Heart:  regular rate and rhythm, S1, S2 normal, no murmur, click, rub or gallop  Extremities:  extremities normal, atraumatic, no cyanosis or edema    Data Review:     Labs: Results:       Chemistry Recent Labs     09/26/20  0455 09/25/20  1156   * 186*    138   K 4.5 4.3    103   CO2 25 25   BUN 42* 40*   CREA 9.34* 7.78*   CA 9.1 9.7   AGAP 10 10   BUCR 4* 5*   AP  --  133*   TP  --  8.1   ALB  --  4.1   GLOB  --  4.0   AGRAT  --  1.0      CBC w/Diff Recent Labs     09/27/20  0530 09/26/20 0455 09/25/20  1731   WBC 11.9 10.2 10.8   RBC 3.81* 3.32* 3.55*   HGB 10.7* 9.3* 9.9*   HCT 32.9* 28.9* 30.7*    268 281   GRANS 76* 75* 80*   LYMPH 11* 12* 12*   EOS 3 1 1      Cardiac Enzymes No results found for: CPK, CK, CKMMB, CKMB, RCK3, CKMBT, CKNDX, CKND1, SONG, TROPT, TROIQ, KARINA, TROPT, TNIPOC, BNP, BNPP   Coagulation Recent Labs     09/27/20 0530 09/26/20 0455   APTT 26.0 26.5       Lipid Panel No results found for: CHOL, CHOLPOCT, CHOLX, CHLST, CHOLV, 701093, HDL, HDLP, LDL, LDLC, DLDLP, 863479, VLDLC, VLDL, TGLX, TRIGL, TRIGP, TGLPOCT, CHHD, CHHDX   BNP No results found for: BNP, BNPP, XBNPT   Liver Enzymes Recent Labs     09/25/20  1156   TP 8.1   ALB 4.1   *      Digoxin    Thyroid Studies No results found for: T4, T3U, TSH, TSHEXT

## 2020-09-27 NOTE — PROGRESS NOTES
[facility]  Progress Note    Patient: Lovely Every Age: 77 y.o. : 1955 MR#: [id number] SSN: [id number]  Date/Time: 2020     Subjective: Patient alert awake alert x2, Feels okay, no headache. Denies any chest pain, no shortness of breath. BP elevated. Did not receive all or hypertensive medication this morning. Assessment/Plan:   1. Hypertensive emergency  2. Chest pain, possible angina vs GI, negative dissection or PE on CTA, resolved now  3. Mild elevated troponin due to #1 and underlying ESRD. 3.  Acute hypertensive encephalopathy   4. Seizure disorder   5. ESRD on HD   6. Dyslipidemia   7. H/o CVA with new chronic infarct with encephalomalacia on left frontal lobe   8. Left mastoid sinus effusion   9. Suspected COVID-19 infection, low suspicion  10. Normocytic anemia of CKD   11. Bipolar affective disorder   12. T2DM        Plan:      Increase clonidine and minoxidil, continue losartan and Cardizem. We will also add hydralazine and labetalol IV as needed. Per RN her IV nonfunctioning, she is a very hard stick, ED nurses to attempt to get an IV access. If difficult IV access then will consider midline tomorrow. Discussed with nephrology, agree with the plan. We will continue to monitor blood pressure if does not improve will consider starting nicardipine drip and may need ICU level of care. Cardiology input noted,  Echo noted  Continue Keppra and Lipitor. Hold off antibiotic as she has no evidence of infection   pepcid   COVID-19 negative    We will obtain [facility]  Per sister she has mild confusion, sister also thinks her blood pressure always runs higher side.       Case discussed with:  [x]Patient  [x]Family  []Nursing  []Case Management  DVT Prophylaxis:  []Lovenox  [x]Hep SQ  []SCDs  []Coumadin   []Eliquis/Xarelto     Objective:   VS:   Visit Vitals  BP (!) 186/86   Pulse (!) 119   Temp 98.5 °F (36.9 °C)   Resp 27   Ht 5' 3\" (1.6 m)   Wt 75.8 kg (167 lb)   LMP  (LMP Unknown)   SpO2 99%   BMI 29.58 kg/m²      Tmax/24hrs: Temp (24hrs), Av.3 °F (36.8 °C), Min:98 °F (36.7 °C), Max:98.5 °F (36.9 °C)  IOBRIEF  No intake or output data in the 24 hours ending 20 1439    General:  Alert, cooperative, no acute distress    Pulmonary:  CTA Bilaterally. No Wheezing/Rales. Cardiovascular: Regular rate and Rhythm. GI:  Soft, Non distended, Non tender. + Bowel sounds. Extremities:  No edema. No calf tenderness. Psych: Not anxious or agitated. Neurologic: Alert and oriented X 2. Moves all ext.   Additional:     Medications:   Current Facility-Administered Medications   Medication Dose Route Frequency    dilTIAZem ER (CARDIZEM CD) capsule 300 mg  300 mg Oral DAILY    doxazosin (CARDURA) tablet 2 mg  2 mg Oral DAILY    minoxidiL (LONITEN) tablet 7.5 mg  7.5 mg Oral BID    minoxidiL (LONITEN) tablet 2.5 mg  2.5 mg Oral NOW    cloNIDine HCL (CATAPRES) tablet 0.1 mg  0.1 mg Oral NOW    cloNIDine (CATAPRES) 0.3 mg/24 hr patch 1 Patch  1 Patch TransDERmal Q7D    sucralfate (CARAFATE) tablet 1 g  1 g Oral AC&HS    losartan (COZAAR) tablet 100 mg  100 mg Oral DAILY    labetaloL (NORMODYNE;TRANDATE) 20 mg/4 mL (5 mg/mL) injection 10 mg  10 mg IntraVENous Q4H PRN    triamcinolone acetonide (KENALOG) 0.1 % cream   Topical BID    sodium chloride (NS) flush 5-10 mL  5-10 mL IntraVENous PRN    traMADoL (ULTRAM) tablet 50 mg  50 mg Oral Q6H PRN    hydrALAZINE (APRESOLINE) 20 mg/mL injection 10 mg  10 mg IntraVENous Q6H PRN    famotidine (PF) (PEPCID) injection 20 mg  20 mg IntraVENous DAILY    atorvastatin (LIPITOR) tablet 10 mg  10 mg Oral DAILY    [START ON 2020] levETIRAcetam (KEPPRA) tablet 250 mg  250 mg Oral Q MON, WED & FRI    sevelamer carbonate (RENVELA) tab 800 mg  800 mg Oral TID    sodium chloride (NS) flush 5-40 mL  5-40 mL IntraVENous Q8H    sodium chloride (NS) flush 5-40 mL  5-40 mL IntraVENous PRN    acetaminophen (TYLENOL) tablet 650 mg  650 mg Oral Q6H PRN    Or    acetaminophen (TYLENOL) suppository 650 mg  650 mg Rectal Q6H PRN    polyethylene glycol (MIRALAX) packet 17 g  17 g Oral DAILY PRN    promethazine (PHENERGAN) tablet 12.5 mg  12.5 mg Oral Q6H PRN    Or    ondansetron (ZOFRAN) injection 4 mg  4 mg IntraVENous Q6H PRN    heparin (porcine) injection 5,000 Units  5,000 Units SubCUTAneous Q8H    levETIRAcetam (KEPPRA) tablet 500 mg  500 mg Oral BID     Current Outpatient Medications   Medication Sig    hydrALAZINE (APRESOLINE) 100 mg tablet Take 100 mg by mouth three (3) times daily. Indications: high blood pressure    atorvastatin (Lipitor) 10 mg tablet Take 10 mg by mouth daily. Indications: high amount of triglyceride in the blood    b complex-vitamin c-folic acid 0.8 mg (NEPHRO-DORIAN) 0.8 mg tab tablet Take 1 Tab by mouth daily.  levETIRAcetam (Keppra) 500 mg tablet Take 500 mg by mouth two (2) times a day.  sevelamer carbonate (RENVELA) 800 mg tab tab Take 800 mg by mouth three (3) times daily.  traMADol (ULTRAM) 50 mg tablet Take 1 Tab by mouth every six (6) hours as needed for Pain. Max Daily Amount: 200 mg.  levETIRAcetam (KEPPRA) 250 mg tablet Take 1 Tab by mouth every Monday, Wednesday, Friday.  aspirin 81 mg tablet Take 81 mg by mouth daily.  diltiazem CD (CARDIZEM CD) 240 mg ER capsule Take 240 mg by mouth daily.  cloNIDine HCL (Catapres) 0.2 mg tablet Take 0.2 mg by mouth two (2) times a day.  epoetin raphael (EPOGEN;PROCRIT) 3,000 unit/mL injection 1.73 mL by SubCUTAneous route Every Tuesday, Thursday and Saturday.  Indications: ANEMIA DUE TO RENAL FAILURE, Renal Dialysis (Patient taking differently: 3,000 Units by SubCUTAneous route every Monday, Wednesday, Friday.)    insulin lispro (HUMALOG) 100 unit/mL injection INITIATE INSULIN CORRECTIVE PROTOCOL: Normal Insulin Sensitivity   For Blood Sugar (mg/dL) of:     Less than 150 =   0 units           150 -199 =   2 units  200 -249 =   4 units  250 -299 =   6 units  300 -349 =   8 units  350 and above = 10 units and Call Physician  If 2 glucose readings are above       Labs:    Recent Results (from the past 24 hour(s))   PTT    Collection Time: 09/27/20  5:30 AM   Result Value Ref Range    aPTT 26.0 23.0 - 36.4 SEC   CBC WITH AUTOMATED DIFF    Collection Time: 09/27/20  5:30 AM   Result Value Ref Range    WBC 11.9 4.6 - 13.2 K/uL    RBC 3.81 (L) 4.20 - 5.30 M/uL    HGB 10.7 (L) 12.0 - 16.0 g/dL    HCT 32.9 (L) 35.0 - 45.0 %    MCV 86.4 74.0 - 97.0 FL    MCH 28.1 24.0 - 34.0 PG    MCHC 32.5 31.0 - 37.0 g/dL    RDW 15.4 (H) 11.6 - 14.5 %    PLATELET 951 864 - 570 K/uL    MPV 10.7 9.2 - 11.8 FL    NEUTROPHILS 76 (H) 40 - 73 %    LYMPHOCYTES 11 (L) 21 - 52 %    MONOCYTES 10 3 - 10 %    EOSINOPHILS 3 0 - 5 %    BASOPHILS 0 0 - 2 %    ABS. NEUTROPHILS 9.0 (H) 1.8 - 8.0 K/UL    ABS. LYMPHOCYTES 1.3 0.9 - 3.6 K/UL    ABS. MONOCYTES 1.2 0.05 - 1.2 K/UL    ABS. EOSINOPHILS 0.4 0.0 - 0.4 K/UL    ABS. BASOPHILS 0.0 0.0 - 0.1 K/UL    DF AUTOMATED     ECHO ADULT COMPLETE    Collection Time: 09/27/20  9:35 AM   Result Value Ref Range    IVSd 1.39 (A) 0.6 - 0.9 cm    LVIDd 2.50 (A) 3.9 - 5.3 cm    LVIDs 1.81 cm    LVOT d 1.86 cm    LVPWd 1.39 (A) 0.6 - 0.9 cm    RVIDd 2.89 cm    LA Volume 37.17 22 - 52 mL    LA Area 4C 11.98 cm2    LA Vol 2C 36.86 22 - 52 mL    LA Vol 4C 30.58 22 - 52 mL    MV A Clayton 116.71 cm/s    Mitral Valve E Wave Deceleration Time 0.12 s    MV E [place] 71.03 cm/s    MV E/A 0.61     Ao Root D 3.32 cm    LV Mass .1 67 - 162 g    LV Mass AL Index 61.5 43 - 95 g/m2    LA Vol Index 20.75 16 - 28 ml/m2    LA Vol Index 20.58 16 - 28 ml/m2    LA Vol Index 17.07 16 - 28 ml/m2       Signed By: Dennis Lawrence MD     September 27, 2020      Disclaimer: Sections of this note are dictated using utilizing voice recognition software. Minor typographical errors may be present.  If questions arise, please do not hesitate to contact me or call our department.

## 2020-09-27 NOTE — ED NOTES
RN notified admitting MD of pt having difficulty swallowing noted when eating breakfast and difficulty with maintaining IV access. Per ERMD directions RN placed call and requests that Speech Therapist on call be notified of need to see pt.

## 2020-09-27 NOTE — PROGRESS NOTES
Problem: Self Care Deficits Care Plan (Adult)  Goal: *Acute Goals and Plan of Care (Insert Text)  Description: Occupational Therapy Goals  Initiated 9/27/2020 within 7 day(s). 1.  Patient will perform grooming with modified independence seated on edge of bed with good balance. 2.  Patient will perform bathing with modified independence. 3.  Patient will perform upper body dressing and lower body dressing with modified independence. 4.  Patient will perform bedside commode transfers with modified independence using RW. 5.  Patient will perform all aspects of toileting with modified independence. 6.  Patient will participate in upper extremity therapeutic exercise/activities with modified independence for 8 minutes. 7.  Patient will utilize energy conservation techniques during functional activities with min verbal cues. Prior Level of Function: pt reports she was performing ADLs w/ mod I, w/c for mobility with pt report she was Mod I for SPT from bed <-> w/c and w/c <->  toilet using grab bar next to toilet     OCCUPATIONAL THERAPY EVALUATION    Patient: Billie Nino (71 y.o. female)  Date: 9/27/2020  Primary Diagnosis: Hypertensive emergency [I16.1]        Precautions:   Fall, seizures  PLOF: see above    ASSESSMENT :  Nursing/RN cleared for pt to participate in OT evaluation and tx session. Patient is a LTC resident of [facility], admitted to hospital with hypertensive emergency while at HD, c/o chest pain and SOB, h/o CVA with new chronic infarct with encephalomalacia (L) frontal lobe. Based on the objective data described below, the patient presents with impaired strength, balance, functional activity tolerance and safety awareness, which is inhibiting independence for ADLs and functional mobility. Patient presents lying semi-reclined on stretcher in ED, A & O x 1 -self, re-orientated to place, date and situation. Supine <-> sit w/ Max A, noted BUE tremors and clonus of BLEs.  Patient reports 10/10 pain LUE-nursing notified and Picc line requiring removal. Patient noted with UI, Max A rolling R <-> L for donning of adult brief with Dep, vc's to initiate perihygiene w/ SBA. Patient with nursing at end of tx session addressing LUE picc line. Call bell within reach & pt verbalized understanding and provided return demonstration to utilize for assist e.g. functional transfers in order to prevent falls. Patient will benefit from skilled intervention to address the above impairments. Patient's rehabilitation potential is considered to be Good  Factors which may influence rehabilitation potential include:   []             None noted  [x]             Mental ability/status  []             Medical condition  []             Home/family situation and support systems  [x]             Safety awareness  []             Pain tolerance/management  []             Other:      PLAN :  Recommendations and Planned Interventions:   [x]               Self Care Training                  [x]      Therapeutic Activities  [x]               Functional Mobility Training   []      Cognitive Retraining  [x]               Therapeutic Exercises           [x]      Endurance Activities  [x]               Balance Training                    [x]      Neuromuscular Re-Education  []               Visual/Perceptual Training     [x]      Home Safety Training  [x]               Patient Education                   [x]      Family Training/Education  []               Other (comment):    Frequency/Duration: Patient will be followed by occupational therapy 1-2 times per day/4-7 days per week to address goals.   Discharge Recommendations: Skilled Nursing Facility  Further Equipment Recommendations for Discharge: to be determined as progress is made with therapy     SUBJECTIVE:   Patient stated I have a w/c.    OBJECTIVE DATA SUMMARY:     Past Medical History:   Diagnosis Date    Asthma     Bipolar affective disorder ([facility] [address]     Brain condition     Cataract     Chronic kidney disease     hemodialysis M-W-F    Diabetes ([facility] [address]     Hyperlipidemia     Hypertension     Paralytic strabismus, unspecified     Psychiatric disorder     Seizures ([facility] [address] 08/27/2018     Past Surgical History:   Procedure Laterality Date    ND INSJ TUNNELED CVC W/O SUBQ PORT/ AGE 3 YR/> N/A 8/9/2018     in-pt 474A, Insertion Tunneled Central Venous Catheter performed by Delvis Alicea MD at [address] [place] [address]    ND INSJ TUNNELED CVC W/O SUBQ PORT/ AGE 3 YR/> N/A 11/8/2019    INSERTION TUNNELED CENTRAL VENOUS CATHETER performed by Jovany Bustamante MD at [facility]     Barriers to Learning/Limitations: yes;  cognitive and altered mental status (i.e.Sedation, Confusion)  Compensate with: visual, verbal, tactile, kinesthetic cues/model    Home Situation:   Home Situation  Home Environment: [address] [place] [address] Name: Angel Vasquez)  # Steps to Enter: 0  One/Two Story Residence: One story  Living Alone: No  Support Systems: Skilled nursing facility  Patient Expects to be Discharged to[de-identified] Skilled nursing facility  Current DME Used/Available at Home: Hospital bed, Wheelchair, Shower chair, Grab bars  Tub or Shower Type: Shower  [x]  Right hand dominant   []  Left hand dominant    Cognitive/Behavioral Status:  Neurologic State: Alert  Orientation Level: Oriented to person;Disoriented to place; Disoriented to situation;Disoriented to time  Cognition: Follows commands  Safety/Judgement: Fall prevention    Skin: appears intact    Edema: none noted    Vision/Perceptual:  appears intact       Coordination: BUE  Coordination: Generally decreased, functional(BUEs)  Fine Motor Skills-Upper: Left Impaired;Right Impaired(tremors noted)    Gross Motor Skills-Upper: Left Impaired;Right Impaired(tremors noted)    Balance:  Sitting: Impaired; With support    Strength: BUE  Strength: Generally decreased, functional(BUEs)     Tone & Sensation: BUE  Tone: Normal(BUEs)  Sensation: Intact(BUEs)     Range of Motion: BUE  AROM: Generally decreased, functional(BUEs)     Functional Mobility and Transfers for ADLs:  Bed Mobility:  Rolling: Maximum assistance  Supine to Sit: Maximum assistance  Sit to Supine: Maximum assistance     Transfers: Toilet Transfer : Other (comment)(unable to achieve)     ADL Assessment:   Feeding: Supervision;Setup    Oral Facial Hygiene/Grooming: Moderate assistance    Bathing: Maximum assistance    Upper Body Dressing: Maximum assistance    Lower Body Dressing: Total assistance    Toileting: Total assistance    ADL Intervention: Supine <-> sit w/ Max A, noted BUE tremors and clonus of BLEs. Patient reports 10/10 pain LUE-nursing notified and Picc line requiring removal. Patient noted with UI, Max A rolling R <-> L for donning of adult brief with Dep, vc's to initiate perihygiene w/ SBA. Cognitive Retraining  Safety/Judgement: Fall prevention    Pain:  Pain level pre-treatment: 10/10   Pain level post-treatment: 10/10   Pain Intervention(s): Medication (see MAR); Rest, Ice, Repositioning   Response to intervention: Nurse notified, See doc flow    Activity Tolerance:   poor  Please refer to the flowsheet for vital signs taken during this treatment. After treatment:   [] Patient left in no apparent distress sitting up in chair  [x] Patient left in no apparent distress in bed  [x] Call bell left within reach  [x] Nursing notified  [] Caregiver present  [] Bed alarm activated    COMMUNICATION/EDUCATION:   [x] Role of Occupational Therapy in the acute care setting  [x] Home safety education was provided and the patient/caregiver indicated understanding. [x] Patient/family have participated as able in goal setting and plan of care. [x] Patient/family agree to work toward stated goals and plan of care. [] Patient understands intent and goals of therapy, but is neutral about his/her participation.   [] Patient is unable to participate in goal setting and plan of care. Thank you for this referral.  Cat Javier  Time Calculation: 25 mins    Eval Complexity: History: MEDIUM Complexity : Expanded review of history including physical, cognitive and psychosocial  history ; Examination: MEDIUM Complexity : 3-5 performance deficits relating to physical, cognitive , or psychosocial skils that result in activity limitations and / or participation restrictions; Decision Making:MEDIUM Complexity : Patient may present with comorbidities that affect occupational performnce.  Miniml to moderate modification of tasks or assistance (eg, physical or verbal ) with assesment(s) is necessary to enable patient to complete evaluation

## 2020-09-28 LAB
ANION GAP SERPL CALC-SCNC: 10 MMOL/L (ref 3–18)
APTT PPP: 25.7 SEC (ref 23–36.4)
APTT PPP: 34.7 SEC (ref 23–36.4)
BASOPHILS # BLD: 0 K/UL (ref 0–0.1)
BASOPHILS NFR BLD: 0 % (ref 0–2)
BUN SERPL-MCNC: 40 MG/DL (ref 7–18)
BUN/CREAT SERPL: 4 (ref 12–20)
CALCIUM SERPL-MCNC: 9.5 MG/DL (ref 8.5–10.1)
CHLORIDE SERPL-SCNC: 101 MMOL/L (ref 100–111)
CO2 SERPL-SCNC: 24 MMOL/L (ref 21–32)
CREAT SERPL-MCNC: 9.81 MG/DL (ref 0.6–1.3)
DIFFERENTIAL METHOD BLD: ABNORMAL
EOSINOPHIL # BLD: 0.3 K/UL (ref 0–0.4)
EOSINOPHIL NFR BLD: 3 % (ref 0–5)
ERYTHROCYTE [DISTWIDTH] IN BLOOD BY AUTOMATED COUNT: 15.4 % (ref 11.6–14.5)
GLUCOSE SERPL-MCNC: 223 MG/DL (ref 74–99)
HBV SURFACE AB SER QL IA: POSITIVE
HBV SURFACE AB SERPL IA-ACNC: 63.01 MIU/ML
HCT VFR BLD AUTO: 31.2 % (ref 35–45)
HEP BS AB COMMENT,HBSAC: NORMAL
HGB BLD-MCNC: 10.2 G/DL (ref 12–16)
LYMPHOCYTES # BLD: 1.3 K/UL (ref 0.9–3.6)
LYMPHOCYTES NFR BLD: 12 % (ref 21–52)
MCH RBC QN AUTO: 28 PG (ref 24–34)
MCHC RBC AUTO-ENTMCNC: 32.7 G/DL (ref 31–37)
MCV RBC AUTO: 85.7 FL (ref 74–97)
MONOCYTES # BLD: 0.7 K/UL (ref 0.05–1.2)
MONOCYTES NFR BLD: 7 % (ref 3–10)
NEUTS SEG # BLD: 8 K/UL (ref 1.8–8)
NEUTS SEG NFR BLD: 78 % (ref 40–73)
PHOSPHATE SERPL-MCNC: 5.2 MG/DL (ref 2.5–4.9)
PLATELET # BLD AUTO: 311 K/UL (ref 135–420)
PMV BLD AUTO: 11.3 FL (ref 9.2–11.8)
POTASSIUM SERPL-SCNC: 4.4 MMOL/L (ref 3.5–5.5)
RBC # BLD AUTO: 3.64 M/UL (ref 4.2–5.3)
SODIUM SERPL-SCNC: 135 MMOL/L (ref 136–145)
WBC # BLD AUTO: 10.2 K/UL (ref 4.6–13.2)

## 2020-09-28 PROCEDURE — 74011250636 HC RX REV CODE- 250/636: Performed by: INTERNAL MEDICINE

## 2020-09-28 PROCEDURE — 85025 COMPLETE CBC W/AUTO DIFF WBC: CPT

## 2020-09-28 PROCEDURE — 99232 SBSQ HOSP IP/OBS MODERATE 35: CPT | Performed by: HOSPITALIST

## 2020-09-28 PROCEDURE — 65660000000 HC RM CCU STEPDOWN

## 2020-09-28 PROCEDURE — 74011250637 HC RX REV CODE- 250/637: Performed by: INTERNAL MEDICINE

## 2020-09-28 PROCEDURE — 85730 THROMBOPLASTIN TIME PARTIAL: CPT

## 2020-09-28 PROCEDURE — 84100 ASSAY OF PHOSPHORUS: CPT

## 2020-09-28 PROCEDURE — 90935 HEMODIALYSIS ONE EVALUATION: CPT

## 2020-09-28 PROCEDURE — 99232 SBSQ HOSP IP/OBS MODERATE 35: CPT | Performed by: INTERNAL MEDICINE

## 2020-09-28 PROCEDURE — 74011250637 HC RX REV CODE- 250/637: Performed by: HOSPITALIST

## 2020-09-28 PROCEDURE — 36415 COLL VENOUS BLD VENIPUNCTURE: CPT

## 2020-09-28 PROCEDURE — 80048 BASIC METABOLIC PNL TOTAL CA: CPT

## 2020-09-28 RX ORDER — MINOXIDIL 2.5 MG/1
5 TABLET ORAL 2 TIMES DAILY
Status: DISCONTINUED | OUTPATIENT
Start: 2020-09-28 | End: 2020-09-28

## 2020-09-28 RX ORDER — LORAZEPAM 2 MG/ML
0.5 INJECTION INTRAMUSCULAR ONCE
Status: COMPLETED | OUTPATIENT
Start: 2020-09-28 | End: 2020-09-28

## 2020-09-28 RX ADMIN — Medication 10 ML: at 22:00

## 2020-09-28 RX ADMIN — LEVETIRACETAM 500 MG: 500 TABLET ORAL at 17:30

## 2020-09-28 RX ADMIN — SUCRALFATE 1 G: 1 TABLET ORAL at 08:10

## 2020-09-28 RX ADMIN — SUCRALFATE 1 G: 1 TABLET ORAL at 21:32

## 2020-09-28 RX ADMIN — METOPROLOL TARTRATE 25 MG: 25 TABLET, FILM COATED ORAL at 21:32

## 2020-09-28 RX ADMIN — SEVELAMER CARBONATE 800 MG: 800 TABLET, FILM COATED ORAL at 21:32

## 2020-09-28 RX ADMIN — HEPARIN SODIUM 5000 UNITS: 5000 INJECTION INTRAVENOUS; SUBCUTANEOUS at 21:32

## 2020-09-28 RX ADMIN — HEPARIN SODIUM 5000 UNITS: 5000 INJECTION INTRAVENOUS; SUBCUTANEOUS at 05:47

## 2020-09-28 RX ADMIN — TRAMADOL HYDROCHLORIDE 50 MG: 50 TABLET, FILM COATED ORAL at 17:33

## 2020-09-28 RX ADMIN — SEVELAMER CARBONATE 800 MG: 800 TABLET, FILM COATED ORAL at 17:30

## 2020-09-28 RX ADMIN — SUCRALFATE 1 G: 1 TABLET ORAL at 17:30

## 2020-09-28 RX ADMIN — LORAZEPAM 0.5 MG: 2 INJECTION INTRAMUSCULAR; INTRAVENOUS at 13:36

## 2020-09-28 RX ADMIN — TRIAMCINOLONE ACETONIDE: 1 CREAM TOPICAL at 09:00

## 2020-09-28 NOTE — PROGRESS NOTES
[facility]  -  Progress Note      Patient: Travis Bailey MRN: [id number]  SSN: [id number]    YOB: 1955  Age: 77 y.o. Sex: female      Admit Date: 9/25/2020    Assessment:     -HTN urgency - improved  -Indeterminate troponin in setting of HTN/ESRD, not consistent with ACS  -Echo 9/27/20 with hyperdynamic EF, > 70%, no wall motion abnormality  -Hypertensive encephalopathy on arrival  -ESRD on HD  -History of seizure disorder  -Dyslipidemia- on statin  -Type II DM  -Bipolar disease/ poor historian     No primary cardiologist    Plan:     Stable CV status with improved BP, HD planned for today. Echo yesterday with hyperdynamic EF > 70%. No plans for further cardiac workup this admission. Subjective:     No new complaints.  Denies any chest pain or shortness of breath    Objective:      Patient Vitals for the past 8 hrs:   Temp Pulse Resp BP SpO2   09/28/20 0835 98.3 °F (36.8 °C) 97 14 (!) 141/62 100 %   09/28/20 0700  98 14 (!) 155/64 92 %   09/28/20 0600  92 14 (!) 150/52 100 %   09/28/20 0500  91 15 (!) 131/49    09/28/20 0400  93 17 (!) 151/60          Patient Vitals for the past 96 hrs:   Weight   09/27/20 0912 75.8 kg (167 lb)   09/25/20 1147 75.8 kg (167 lb)       No intake or output data in the 24 hours ending 09/28/20 6861    Physical Exam:  General:  alert, cooperative, no distress, appears stated age  Neck:  nontender, no JVD  Lungs:  clear to auscultation bilaterally  Heart:  regular rate and rhythm, S1, S2 normal, no murmur, click, rub or gallop  Extremities:  extremities normal, atraumatic, no cyanosis or edema    Data Review:     Labs: Results:       Chemistry Recent Labs     09/26/20  0455 09/25/20  1156   * 186*    138   K 4.5 4.3    103   CO2 25 25   BUN 42* 40*   CREA 9.34* 7.78*   CA 9.1 9.7   AGAP 10 10   BUCR 4* 5*   AP  --  133*   TP  --  8.1   ALB  --  4.1   GLOB  --  4.0   AGRAT  --  1.0      CBC w/Diff Recent Labs 09/27/20  0530 09/26/20  0455 09/25/20  1731   WBC 11.9 10.2 10.8   RBC 3.81* 3.32* 3.55*   HGB 10.7* 9.3* 9.9*   HCT 32.9* 28.9* 30.7*    268 281   GRANS 76* 75* 80*   LYMPH 11* 12* 12*   EOS 3 1 1      Cardiac Enzymes No results found for: CPK, CK, CKMMB, CKMB, RCK3, CKMBT, CKNDX, CKND1, SONG, TROPT, TROIQ, KARINA, TROPT, TNIPOC, BNP, BNPP   Coagulation Recent Labs     09/27/20 0530 09/26/20  0455   APTT 26.0 26.5       Lipid Panel No results found for: CHOL, CHOLPOCT, CHOLX, CHLST, CHOLV, 229991, HDL, HDLP, LDL, LDLC, DLDLP, 128880, VLDLC, VLDL, TGLX, TRIGL, TRIGP, TGLPOCT, CHHD, CHHDX   BNP No results found for: BNP, BNPP, XBNPT   Liver Enzymes Recent Labs     09/25/20  1156   TP 8.1   ALB 4.1   *      Digoxin    Thyroid Studies No results found for: T4, T3U, TSH, TSHEXT

## 2020-09-28 NOTE — PROGRESS NOTES
PT order received and chart reviewed. Attempted x2 to see patient for skilled PT evaluation, pt CHOCO. Will follow up as schedule permits.

## 2020-09-28 NOTE — PROGRESS NOTES
Patient arrived to [facility] from dialysis via stretcher. Patient alert an stable upon arrival to floor. Will continue to monitor.

## 2020-09-28 NOTE — ED NOTES
Pt states she wants to go home. RN advised pt that she did not know when she would be discharged. Pt resting on her right side. Pt pulled of O2 saturation off of ear. No current signs or symptoms of distress.

## 2020-09-28 NOTE — PROGRESS NOTES
Seen During Dialysis, wide Fluctuation of BP ,Hig & Low, Sinus Tachy, rest less. Decreased Blood Flow to 300, Dialysate flow to 600, not Pulling Fluid, will give Ativan 0.5 mg one time to calm her down, Will hold Transfer today. Discussed with Alexander.

## 2020-09-28 NOTE — PROGRESS NOTES
[facility]ist [facility]  Progress Note    Patient: Emily Duff Age: 77 y.o. : 1955 MR#: [id number] SSN: [id number]  Date/Time: 2020     Subjective: Patient Feels okay, no headache. Denies any chest pain, no shortness of breath. BP better but dropped in HD. Assessment/Plan:   1. Hypertensive emergency, very labile BP  2. Chest pain, possible angina vs GI, negative dissection or PE on CTA, resolved now  3. Mild elevated troponin due to #1 and underlying ESRD. 3.  Acute hypertensive encephalopathy   4. Seizure disorder   5. ESRD on HD   6. Dyslipidemia   7. H/o CVA with new chronic infarct with encephalomalacia on left frontal lobe   8. Left mastoid sinus effusion   9. Suspected COVID-19 infection, low suspicion  10. Normocytic anemia of CKD   11. Bipolar affective disorder   12. T2DM        Plan:   Patient blood pressure lower side during HD, nephrology recommends hold discharge till tomorrow. Continue clonidine patch, losartan, Cardizem, metoprolol. Will discontinue Cardura and minoxidil. Continue hydralazine and labetalol IV as needed. No IV access this morning but discussed with ED RN to get an IV access. No midline since patient possibly discharge tomorrow. Cardiology input noted,  Echo noted  Continue Keppra and Lipitor. Hold off antibiotic as she has no evidence of infection   pepcid   COVID-19 negative  Called sister but no response, left a message. Will transfer to SNF tomorrow if blood pressure remains stable.       Case discussed with:  [x]Patient  [x]Family  []Nursing  []Case Management  DVT Prophylaxis:  []Lovenox  [x]Hep SQ  []SCDs  []Coumadin   []Eliquis/Xarelto     Objective:   VS:   Visit Vitals  /62 (BP 1 Location: Left arm, BP Patient Position: At rest)   Pulse 100   Temp 98.8 °F (37.1 °C)   Resp 20   Ht 5' 3\" (1.6 m)   Wt 75.8 kg (167 lb)   LMP  (LMP Unknown)   SpO2 100%   BMI 29.58 kg/m²      Tmax/24hrs: Temp (24hrs), Av.8 °F (37.1 °C), Min:98.3 °F (36.8 °C), Max:99.1 °F (37.3 °C)  IOBRIEF  No intake or output data in the 24 hours ending 20 1647    General:  Alert, cooperative, no acute distress    Pulmonary:  CTA Bilaterally. No Wheezing/Rales. Cardiovascular: Regular rate and Rhythm. GI:  Soft, Non distended, Non tender. + Bowel sounds. Extremities:  No edema. No calf tenderness. Psych: Not anxious or agitated. Neurologic: Alert and oriented X 2. Moves all ext.   Additional:     Medications:   Current Facility-Administered Medications   Medication Dose Route Frequency    dilTIAZem ER (CARDIZEM CD) capsule 300 mg  300 mg Oral DAILY    cloNIDine (CATAPRES) 0.3 mg/24 hr patch 1 Patch  1 Patch TransDERmal Q7D    metoprolol tartrate (LOPRESSOR) tablet 25 mg  25 mg Oral Q12H    sucralfate (CARAFATE) tablet 1 g  1 g Oral AC&HS    losartan (COZAAR) tablet 100 mg  100 mg Oral DAILY    labetaloL (NORMODYNE;TRANDATE) 20 mg/4 mL (5 mg/mL) injection 10 mg  10 mg IntraVENous Q4H PRN    triamcinolone acetonide (KENALOG) 0.1 % cream   Topical BID    sodium chloride (NS) flush 5-10 mL  5-10 mL IntraVENous PRN    traMADoL (ULTRAM) tablet 50 mg  50 mg Oral Q6H PRN    hydrALAZINE (APRESOLINE) 20 mg/mL injection 10 mg  10 mg IntraVENous Q6H PRN    famotidine (PF) (PEPCID) injection 20 mg  20 mg IntraVENous DAILY    atorvastatin (LIPITOR) tablet 10 mg  10 mg Oral DAILY    levETIRAcetam (KEPPRA) tablet 250 mg  250 mg Oral Q MON, WED & FRI    sevelamer carbonate (RENVELA) tab 800 mg  800 mg Oral TID    sodium chloride (NS) flush 5-40 mL  5-40 mL IntraVENous Q8H    sodium chloride (NS) flush 5-40 mL  5-40 mL IntraVENous PRN    acetaminophen (TYLENOL) tablet 650 mg  650 mg Oral Q6H PRN    Or    acetaminophen (TYLENOL) suppository 650 mg  650 mg Rectal Q6H PRN    polyethylene glycol (MIRALAX) packet 17 g  17 g Oral DAILY PRN    promethazine (PHENERGAN) tablet 12.5 mg  12.5 mg Oral Q6H PRN    Or    ondansetron (ZOFRAN) injection 4 mg  4 mg IntraVENous Q6H PRN    heparin (porcine) injection 5,000 Units  5,000 Units SubCUTAneous Q8H    levETIRAcetam (KEPPRA) tablet 500 mg  500 mg Oral BID       Labs:    Recent Results (from the past 24 hour(s))   PTT    Collection Time: 09/28/20  8:00 AM   Result Value Ref Range    aPTT 34.7 23.0 - 36.4 SEC   CBC WITH AUTOMATED DIFF    Collection Time: 09/28/20  8:00 AM   Result Value Ref Range    WBC 10.2 4.6 - 13.2 K/uL    RBC 3.64 (L) 4.20 - 5.30 M/uL    HGB 10.2 (L) 12.0 - 16.0 g/dL    HCT 31.2 (L) 35.0 - 45.0 %    MCV 85.7 74.0 - 97.0 FL    MCH 28.0 24.0 - 34.0 PG    MCHC 32.7 31.0 - 37.0 g/dL    RDW 15.4 (H) 11.6 - 14.5 %    PLATELET 664 901 - 339 K/uL    MPV 11.3 9.2 - 11.8 FL    NEUTROPHILS 78 (H) 40 - 73 %    LYMPHOCYTES 12 (L) 21 - 52 %    MONOCYTES 7 3 - 10 %    EOSINOPHILS 3 0 - 5 %    BASOPHILS 0 0 - 2 %    ABS. NEUTROPHILS 8.0 1.8 - 8.0 K/UL    ABS. LYMPHOCYTES 1.3 0.9 - 3.6 K/UL    ABS. MONOCYTES 0.7 0.05 - 1.2 K/UL    ABS. EOSINOPHILS 0.3 0.0 - 0.4 K/UL    ABS. BASOPHILS 0.0 0.0 - 0.1 K/UL    DF AUTOMATED     METABOLIC PANEL, BASIC    Collection Time: 09/28/20  8:00 AM   Result Value Ref Range    Sodium 135 (L) 136 - 145 mmol/L    Potassium 4.4 3.5 - 5.5 mmol/L    Chloride 101 100 - 111 mmol/L    CO2 24 21 - 32 mmol/L    Anion gap 10 3.0 - 18 mmol/L    Glucose 223 (H) 74 - 99 mg/dL    BUN 40 (H) 7.0 - 18 MG/DL    Creatinine 9.81 (H) 0.6 - 1.3 MG/DL    BUN/Creatinine ratio 4 (L) 12 - 20      GFR est AA 5 (L) >60 ml/min/1.73m2    GFR est non-AA 4 (L) >60 ml/min/1.73m2    Calcium 9.5 8.5 - 10.1 MG/DL   PHOSPHORUS    Collection Time: 09/28/20  8:00 AM   Result Value Ref Range    Phosphorus 5.2 (H) 2.5 - 4.9 MG/DL       Signed By: Jimmy Mancuso MD     September 28, 2020      Disclaimer: Sections of this note are dictated using utilizing voice recognition software. Minor typographical errors may be present.  If questions arise, please do not hesitate to contact me or call our department.

## 2020-09-28 NOTE — ED NOTES
Pt alert and asking if her blood was drawn. Lab in this morning and obtained blood work. Pt's bedding changed and she was repositioned. Took morning medication with thickened liquids w/o difficulty.  V.S.S.

## 2020-09-28 NOTE — PROGRESS NOTES
Patient feeling fine, blood pressure much better today. No complaints  Discussed with nephrology, plan for HD today, recommend to hold hypertensive medications. If blood pressure remains stable post HD then possible discharge back to SNF. Discussed with case management in ED for possible transfer to SNF today. Discussed with RN to hold hypertensive medication for HD.

## 2020-09-28 NOTE — PROGRESS NOTES
CM confirmed with patient's sister Arthur Howard [phone], that the plan is for patient to return to [facility] when medically cleared. [facility] [address] [place] [address], Deborahalphonso Jacques said no other Covid Testing needed for patient to return to [address] [place] [address].        Jazmyn Linares, RN  Case Management [phone]

## 2020-09-28 NOTE — ED NOTES
Called lab to request for phlebotomy. Pt resting comfortably. No current signs and symptoms of distress.

## 2020-09-28 NOTE — PROGRESS NOTES
Progress Note    Fede Hartmann  77 y.o. Admit Date: 9/25/2020  Active Problems:    ESRD (end stage renal disease) ([facility] (8/8/2018) POA: Yes      Secondary hyperparathyroidism of renal origin ([facility] (8/8/2018) POA: Yes      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 ([facility] (8/8/2018) POA: Yes      CVA, old, cognitive deficits (8/8/2018) POA: Yes      Hypertensive emergency (9/25/2020) POA: No      Tachycardia (9/25/2020) POA: Unknown            Subjective:     Patient feels good, no chest pain, no SOB, no headache. A comprehensive review of systems was negative except for that written in the History of Present Illness.     Objective:     Visit Vitals  BP (!) 152/83   Pulse 85   Temp 98.3 °F (36.8 °C)   Resp 13   Ht 5' 3\" (1.6 m)   Wt 75.8 kg (167 lb)   LMP  (LMP Unknown)   SpO2 (!) 79%   BMI 29.58 kg/m²       No intake or output data in the 24 hours ending 09/28/20 1123    Current Facility-Administered Medications   Medication Dose Route Frequency Provider Last Rate Last Dose    dilTIAZem ER (CARDIZEM CD) capsule 300 mg  300 mg Oral DAILY Miley Clark, [address]   Stopped at 09/28/20 0900    doxazosin (CARDURA) tablet 2 mg  2 mg Oral DAILY Ankit Nieto MD   Stopped at 09/28/20 0900    minoxidiL (LONITEN) tablet 7.5 mg  7.5 mg Oral BID Eric Amado MD   Stopped at 09/28/20 0900    cloNIDine (CATAPRES) 0.3 mg/24 hr patch 1 Patch  1 Patch TransDERmal Q7D Eric Amado MD   1 Patch at 09/27/20 1756    metoprolol tartrate (LOPRESSOR) tablet 25 mg  25 mg Oral Q12H Eric Amado MD   Stopped at 09/28/20 0900    sucralfate (CARAFATE) tablet 1 g  1 g Oral AC&HS Brian Crews MD   1 g at 09/28/20 0810    losartan (COZAAR) tablet 100 mg  100 mg Oral DAILY Ankit Nieto MD   Stopped at 09/28/20 0900    labetaloL (NORMODYNE;TRANDATE) 20 mg/4 mL (5 mg/mL) injection 10 mg  10 mg IntraVENous Q4H PRN Tamela Sung MD        triamcinolone acetonide (KENALOG) 0.1 % cream   Topical BID Ankit Nieto MD        sodium chloride (NS) flush 5-10 mL  5-10 mL IntraVENous PRN Brian Crews MD        traMADoL Waynetta Staff) tablet 50 mg  50 mg Oral Q6H PRN Brian Crews MD   50 mg at 09/25/20 1523    hydrALAZINE (APRESOLINE) 20 mg/mL injection 10 mg  10 mg IntraVENous Q6H PRN Brian Crews MD        famotidine (PF) (PEPCID) injection 20 mg  20 mg IntraVENous DAILY Brian Crews MD   Stopped at 09/28/20 0900    atorvastatin (LIPITOR) tablet 10 mg  10 mg Oral DAILY Brian Crews MD   Stopped at 09/28/20 0900    levETIRAcetam (KEPPRA) tablet 250 mg  250 mg Oral Q MON, WED & FRI Brian Crews MD        sevelamer carbonate (RENVELA) tab 800 mg  800 mg Oral TID Brian Crews MD   Stopped at 09/28/20 0900    sodium chloride (NS) flush 5-40 mL  5-40 mL IntraVENous Q8H Obed English MD   10 mL at 09/27/20 1015    sodium chloride (NS) flush 5-40 mL  5-40 mL IntraVENous PRN Brian Crews MD        acetaminophen (TYLENOL) tablet 650 mg  650 mg Oral Q6H PRN Brian Crews MD   650 mg at 09/26/20 1202    Or    acetaminophen (TYLENOL) suppository 650 mg  650 mg Rectal Q6H PRN Brian Crews MD        polyethylene glycol (MIRALAX) packet 17 g  17 g Oral DAILY PRN Brian Crews MD        promethazine (PHENERGAN) tablet 12.5 mg  12.5 mg Oral Q6H PRN Brian Crews MD   12.5 mg at 09/27/20 1850    Or    ondansetron (ZOFRAN) injection 4 mg  4 mg IntraVENous Q6H PRN Brian Crews MD        heparin (porcine) injection 5,000 Units  5,000 Units SubCUTAneous Q8H Brian Crews MD   5,000 Units at 09/28/20 0547    levETIRAcetam (KEPPRA) tablet 500 mg  500 mg Oral BID Brian Crews MD   Stopped at 09/28/20 0900     Current Outpatient Medications   Medication Sig Dispense Refill    hydrALAZINE (APRESOLINE) 100 mg tablet Take 100 mg by mouth three (3) times daily. Indications: high blood pressure      atorvastatin (Lipitor) 10 mg tablet Take 10 mg by mouth daily.  Indications: high amount of triglyceride in the blood      b complex-vitamin c-folic acid 0.8 mg (NEPHRO-DORIAN) 0.8 mg tab tablet Take 1 Tab by mouth daily.  levETIRAcetam (Keppra) 500 mg tablet Take 500 mg by mouth two (2) times a day.  sevelamer carbonate (RENVELA) 800 mg tab tab Take 800 mg by mouth three (3) times daily.  traMADol (ULTRAM) 50 mg tablet Take 1 Tab by mouth every six (6) hours as needed for Pain. Max Daily Amount: 200 mg. 20 Tab 0    levETIRAcetam (KEPPRA) 250 mg tablet Take 1 Tab by mouth every Monday, Wednesday, Friday. 30 Tab 1    aspirin 81 mg tablet Take 81 mg by mouth daily.  diltiazem CD (CARDIZEM CD) 240 mg ER capsule Take 240 mg by mouth daily.  cloNIDine HCL (Catapres) 0.2 mg tablet Take 0.2 mg by mouth two (2) times a day.  epoetin raphael (EPOGEN;PROCRIT) 3,000 unit/mL injection 1.73 mL by SubCUTAneous route Every Tuesday, Thursday and Saturday. Indications: ANEMIA DUE TO RENAL FAILURE, Renal Dialysis (Patient taking differently: 3,000 Units by SubCUTAneous route every Monday, Wednesday, Friday.) 1 Vial 0    insulin lispro (HUMALOG) 100 unit/mL injection INITIATE INSULIN CORRECTIVE PROTOCOL: Normal Insulin Sensitivity   For Blood Sugar (mg/dL) of:     Less than 150 =   0 units           150 -199 =   2 units  200 -249 =   4 units  250 -299 =   6 units  300 -349 =   8 units  350 and above = 10 units and Call Physician  If 2 glucose readings are above 1 Vial 0        Physical Exam:     Physical Exam:   General:  Alert, cooperative, no distress, appears stated age. Neck: Supple, symmetrical, trachea midline, no adenopathy, thyroid: no enlargement/tenderness/nodules, no carotid bruit and no JVD. Lungs:   Clear to auscultation bilaterally. Heart:  Regular rate and rhythm, S1, S2 normal, no murmur, click, rub or gallop. Abdomen:   Soft, non-tender. Bowel sounds normal. No masses,  No organomegaly.    Extremities: Extremities normal, atraumatic, no cyanosis or edema, HD catheter is well dressed Skin: Skin color, texture, turgor normal. No rashes or lesions         Data Review:    CBC w/Diff    Recent Labs     09/28/20  0800 09/27/20  0530 09/26/20  0455   WBC 10.2 11.9 10.2   RBC 3.64* 3.81* 3.32*   HGB 10.2* 10.7* 9.3*   HCT 31.2* 32.9* 28.9*   MCV 85.7 86.4 87.0   MCH 28.0 28.1 28.0   MCHC 32.7 32.5 32.2   RDW 15.4* 15.4* 15.6*    Recent Labs     09/28/20  0800 09/27/20  0530 09/26/20  0455   MONOS 7 10 12*   EOS 3 3 1   BASOS 0 0 0   RDW 15.4* 15.4* 15.6*        Comprehensive Metabolic Profile    Recent Labs     09/28/20  0800 09/26/20  0455 09/25/20  1156   * 138 138   K 4.4 4.5 4.3    103 103   CO2 24 25 25   BUN 40* 42* 40*   CREA 9.81* 9.34* 7.78*    Recent Labs     09/28/20  0800 09/26/20  0455 09/25/20  1156   CA 9.5 9.1 9.7   PHOS 5.2*  --   --    ALB  --   --  4.1   TP  --   --  8.1   TBILI  --   --  0.5                        Impression:       Active Hospital Problems    Diagnosis Date Noted    Hypertensive emergency 09/25/2020    Tachycardia 09/25/2020    ESRD (end stage renal disease) ([facility] 08/08/2018    CVA, old, cognitive deficits 08/08/2018    Secondary hyperparathyroidism of renal origin ([facility] 08/08/2018    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 08/08/2018            Plan:     BP is Finally better with multiple  dose adjustment, will Dialyze  Today  & afterwards can be transferred to Nursing Home,Continue Dialysis as OP as scheduled. BP medicine will be Adjusted as time come, will decrease Minoxidil dose to 5 mg BALAJIBIJOSE Castañeda MD

## 2020-09-28 NOTE — PROGRESS NOTES
Reason for Admission:  Hypertensive emergency [I16.1]                 RUR Score:    23%            Plan for utilizing home health:    No, not at this time. Likelihood of Readmission:   Moderate                         Do you (patient/family) have any concerns for transition/discharge?  no    Transition of Care Plan:       Initial assessment completed with relative(s), Nilton Dunham, patient's sister. Cognitive status of patient: Unable to assess, patient in Isolation, Droplet. .     Face sheet information confirmed:  yes. The patient's sister Nilton Dunham [phone] agrees  to participate in her discharge plan and to receive any needed information. This patient resides in [place] [address]. Patient is not able to navigate steps as needed. Prior to hospitalization, patient was considered to be independent with ADLs/IADLS : no . If not independent,  patient needs assist with : dressing, bathing, food preparation, cooking, toileting and grooming. Patient has a current ACP document on file: no. The patient will need Medicaid transport to be available to transport patient home upon discharge. The patient already has medical equipment available at [address] [place] [address]. Patient is not currently active with home health. Patient has stayed in a skilled nursing facility or rehab. Was  stay within last 60 days : no. This patient is on dialysis :yes    If yes, hemodialysis patient and receives treatment on Monday/Wednesday/Friday at [address] time is unknown. Pt is transported to/from dialysis by Medicaid transport. List of available LTC agencies were provided and reviewed with the patient prior to discharge. [place] of choice verbal consent given: yes, for [address] [place] [address]. Currently, the discharge plan is LTC, back to [address] [place] [address].     The patient states that she can obtain her medications from the pharmacy, and take her medications as directed. Patient's current insurance is [employer] Medicare and [facility] Medicaid. Care Management Interventions  PCP Verified by CM:  Yes  Mode of Transport at Discharge: BLS(Patient will need Medical transport.)  Transition of Care Consult (CM Consult): Long Term Care  Discharge Durable Medical Equipment: No  Occupational Therapy Consult: Yes  Current Support Network: Nursing Facility  Confirm Follow Up Transport: Other (see comment)  The Plan for Transition of Care is Related to the Following Treatment Goals : [address]  The Patient and/or Patient Representative was Provided with a Choice of Provider and Agrees with the Discharge Plan?: Yes  Name of the Patient Representative Who was Provided with a Choice of Provider and Agrees with the Discharge Plan: Omega Hull (Patient's sister)  [place] of Choice List was Provided with Basic Dialogue that Supports the Patient's Individualized Plan of Care/Goals, Treatment Preferences and Shares the Quality Data Associated with the Providers?: Yes  Discharge Location  Discharge Placement: [address] RN  Case Management [phone]

## 2020-09-28 NOTE — ED NOTES
Pt yelling out. Pt advised of date and situation. Pt adjusted in bed. No current signs or symptoms of distress.

## 2020-09-28 NOTE — PROGRESS NOTES
Dr. Dot Patino came and spoke to [address] [place] [address] about patient possibly discharging from ED today. SALAZAR messaged Kim at [facility], let her know Covid test negative from 09/25/2020. Fracisco Yan said she could take patient back today at SNF. SALAZAR called and spoke to Dr. Dot Patino and let him know, he said he will let CM know for sure about d/c.  SALAZAR made Kim aware.        Casandra Melendez, RN  Case Management [phone]

## 2020-09-29 VITALS
TEMPERATURE: 98 F | HEART RATE: 87 BPM | BODY MASS INDEX: 19.91 KG/M2 | WEIGHT: 112.4 LBS | OXYGEN SATURATION: 95 % | RESPIRATION RATE: 20 BRPM | SYSTOLIC BLOOD PRESSURE: 134 MMHG | DIASTOLIC BLOOD PRESSURE: 66 MMHG | HEIGHT: 63 IN

## 2020-09-29 LAB
APTT PPP: 26.6 SEC (ref 23–36.4)
APTT PPP: 30.5 SEC (ref 23–36.4)

## 2020-09-29 PROCEDURE — 97162 PT EVAL MOD COMPLEX 30 MIN: CPT

## 2020-09-29 PROCEDURE — 2709999900 HC NON-CHARGEABLE SUPPLY

## 2020-09-29 PROCEDURE — 97530 THERAPEUTIC ACTIVITIES: CPT

## 2020-09-29 PROCEDURE — 99232 SBSQ HOSP IP/OBS MODERATE 35: CPT | Performed by: PHYSICIAN ASSISTANT

## 2020-09-29 PROCEDURE — 36415 COLL VENOUS BLD VENIPUNCTURE: CPT

## 2020-09-29 PROCEDURE — 74011250637 HC RX REV CODE- 250/637: Performed by: INTERNAL MEDICINE

## 2020-09-29 PROCEDURE — 99239 HOSP IP/OBS DSCHRG MGMT >30: CPT | Performed by: HOSPITALIST

## 2020-09-29 PROCEDURE — 85730 THROMBOPLASTIN TIME PARTIAL: CPT

## 2020-09-29 PROCEDURE — 74011250636 HC RX REV CODE- 250/636: Performed by: INTERNAL MEDICINE

## 2020-09-29 PROCEDURE — 74011250637 HC RX REV CODE- 250/637: Performed by: HOSPITALIST

## 2020-09-29 PROCEDURE — 74011250637 HC RX REV CODE- 250/637: Performed by: PHYSICIAN ASSISTANT

## 2020-09-29 RX ORDER — METOPROLOL TARTRATE 25 MG/1
25 TABLET, FILM COATED ORAL EVERY 12 HOURS
Qty: 60 TAB | Refills: 0 | Status: ON HOLD
Start: 2020-09-29 | End: 2021-03-20 | Stop reason: SDUPTHER

## 2020-09-29 RX ORDER — CLONIDINE 0.2 MG/24H
1 PATCH, EXTENDED RELEASE TRANSDERMAL
Status: DISCONTINUED | OUTPATIENT
Start: 2020-09-29 | End: 2020-09-29 | Stop reason: HOSPADM

## 2020-09-29 RX ORDER — CLONIDINE 0.2 MG/24H
1 PATCH, EXTENDED RELEASE TRANSDERMAL
Qty: 4 PATCH | Refills: 0 | Status: SHIPPED
Start: 2020-09-29 | End: 2021-07-22

## 2020-09-29 RX ORDER — FAMOTIDINE 40 MG/1
40 TABLET, FILM COATED ORAL DAILY
Qty: 30 TAB | Refills: 0 | Status: ON HOLD
Start: 2020-09-29 | End: 2021-06-03 | Stop reason: SDUPTHER

## 2020-09-29 RX ORDER — TRAMADOL HYDROCHLORIDE 50 MG/1
50 TABLET ORAL
Qty: 4 TAB | Refills: 0 | Status: SHIPPED | OUTPATIENT
Start: 2020-09-29 | End: 2020-10-02

## 2020-09-29 RX ORDER — LOSARTAN POTASSIUM 100 MG/1
100 TABLET ORAL DAILY
Qty: 30 TAB | Refills: 0 | Status: ON HOLD
Start: 2020-09-30 | End: 2021-03-20 | Stop reason: SDUPTHER

## 2020-09-29 RX ADMIN — Medication 10 ML: at 05:25

## 2020-09-29 RX ADMIN — SEVELAMER CARBONATE 800 MG: 800 TABLET, FILM COATED ORAL at 08:22

## 2020-09-29 RX ADMIN — DILTIAZEM HYDROCHLORIDE 300 MG: 120 CAPSULE, COATED, EXTENDED RELEASE ORAL at 08:21

## 2020-09-29 RX ADMIN — LEVETIRACETAM 500 MG: 500 TABLET ORAL at 08:21

## 2020-09-29 RX ADMIN — SUCRALFATE 1 G: 1 TABLET ORAL at 11:34

## 2020-09-29 RX ADMIN — LOSARTAN POTASSIUM 100 MG: 25 TABLET, FILM COATED ORAL at 08:21

## 2020-09-29 RX ADMIN — SUCRALFATE 1 G: 1 TABLET ORAL at 08:21

## 2020-09-29 RX ADMIN — FAMOTIDINE 20 MG: 10 INJECTION INTRAVENOUS at 08:22

## 2020-09-29 RX ADMIN — HEPARIN SODIUM 5000 UNITS: 5000 INJECTION INTRAVENOUS; SUBCUTANEOUS at 05:25

## 2020-09-29 RX ADMIN — ATORVASTATIN CALCIUM 10 MG: 10 TABLET, FILM COATED ORAL at 08:22

## 2020-09-29 RX ADMIN — METOPROLOL TARTRATE 25 MG: 25 TABLET, FILM COATED ORAL at 08:21

## 2020-09-29 NOTE — PROGRESS NOTES
Problem: Falls - Risk of  Goal: *Absence of Falls  Description: Document [facility] Fall Risk and appropriate interventions in the flowsheet.   Outcome: Progressing Towards Goal  Note: Fall Risk Interventions:  Mobility Interventions: Bed/chair exit alarm, Patient to call before getting OOB, Communicate number of staff needed for ambulation/transfer    Mentation Interventions: Adequate sleep, hydration, pain control, Bed/chair exit alarm, Reorient patient, More frequent rounding    Medication Interventions: Bed/chair exit alarm, Patient to call before getting OOB    Elimination Interventions: Bed/chair exit alarm, Call light in reach, Patient to call for help with toileting needs, Toilet paper/wipes in reach, Toileting schedule/hourly rounds

## 2020-09-29 NOTE — PROGRESS NOTES
Report called to Bonifacio Maguire RN at [facility] with understanding noted. No questions or concerns at discharge. PIVs removed per protocol. Patient awaiting transport to facility at this time. Patient stable at time of discharge.

## 2020-09-29 NOTE — PROGRESS NOTES
Transition of Care Plan to LTC    LTC Transition:  Patient has been accepted to [facility] and meets criteria for admission. Patient will  be transported by [facility] stretcher [phone] Conf # [id number] and expected to leave at 1pm.    Communication to Patient/Family:  Called pt's sister Jasmin Avila and they are agreeable to the transition plan. Communication to SNF/Rehab:  Bedside RN, has been notified of the transition plan to the facility and to call report (phone number [phone]). Discharge information has been updated on the AVS. And communicated to facility via [facility], or CC link. SNF/Rehab Transition:    PCP/Specialist: Marie Britton Please include all hard scripts for controlled substances, med rec and dc summary, and AVS in packet.      Reviewed and confirmed with facility representative, Álvaro Blackburn at [address] [place] [address] they can manage the patient care needs for the following:     Megan Dumas with (X) only those applicable:    Medication:  [x]  Medications will be available at the facility  []  IV Antibiotics   []  Controlled Substance - hard copy to be sent with patient   []  Weekly Labs    Documents:  [] Hard RX  Number sent   [] MAR  [] Kardex  [x] AVS  [] Wound care note  [x]Transfer Summary/Discharge Summary    Equipment:  []  CPAP/BiPAP  []  Wound Vacuum  []  Khan or Urinary Device  []  PICC/Central Line  []  Nebulizer  []  Ventilator    Treatment:  []Isolation (for MRSA, VRE, etc.)  []Surgical Drain Management  []Tracheostomy Care  []Dressing Changes  [x]Dialysis with transportation and chair time  [address] [facility]  []PEG Care  []Oxygen  []Daily Weights for Heart Failure Confirmed with Casie Baca from [facility] transport that pt's HD trips are still set up beginning tomorrow 9/30   Dietary:  []Any diet limitations  []Tube Feedings   []Total Parenteral Management (TPN)    Eligible for Medicaid Long Term Services and Supports  Yes:  [] Eligible for medical assistance or will become eligible within 180 days and LTSS completed. [] Provider/Patient and/or support system has requested screening. [x] LTSS copy provided to patient or responsible party, on file at [address] [facility] [address]. [] LTSS unavailable at discharge will send once processed to SNF provider.  [] LTSS  unavailable at discharged mailed to patient  [] LTSS performed by outside agency  on  with tracking number   No:   [] Private pay and is not financially eligible for Medicaid within the next 180 days. [] Reside out-of-state. [] A residents of a state owned/operated facility that is licensed  by [facility] or [facility]  [] Enrollment in [facility] hospice services  [] [address] [facility]  [] Patient /Family declines to have screening completed or provide financial information for screening          Financial Resources:  Medicaid    [x] Initiated and application pending   [] Full coverage      Advanced Care Plan:  []Surrogate Decision Maker of Care  []POA  [x]Communicated Code Status/ sent FULL CODE    Other      Important Message from Medicare\" reviewed and explained with the sister over the phone. A copy provided to patient/representative. Original signed document placed in patient's chart.      Rich Parrish RN BSN  Care Manager  [phone]

## 2020-09-29 NOTE — PROGRESS NOTES
Progress Note    Fede Hartmann  77 y.o. 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     Objective:     Visit Vitals  /66 (BP 1 Location: Right arm, BP Patient Position: At rest)   Pulse 87   Temp 98 °F (36.7 °C)   Resp 20   Ht 5' 3\" (1.6 m)   Wt 51 kg (112 lb 6.4 oz)   LMP  (LMP Unknown)   SpO2 95%   BMI 19.91 kg/m²         Intake/Output Summary (Last 24 hours) at 9/29/2020 1151  Last data filed at 9/28/2020 1500  Gross per 24 hour   Intake    Output 1000 ml   Net -1000 ml       Current Facility-Administered Medications   Medication Dose Route Frequency Provider Last Rate Last Dose    cloNIDine (CATAPRES) 0.2 mg/24 hr patch 1 Patch  1 Patch TransDERmal Q7D Eric Amado MD   1 Patch at 09/29/20 1135    dilTIAZem ER (CARDIZEM CD) capsule 300 mg  300 mg Oral DAILY SUSI Samuel   300 mg at 09/29/20 1604    metoprolol tartrate (LOPRESSOR) tablet 25 mg  25 mg Oral Q12H Eric Amado MD   25 mg at 09/29/20 2914    sucralfate (CARAFATE) tablet 1 g  1 g Oral AC&HS Brian Crews MD   1 g at 09/29/20 1134    losartan (COZAAR) tablet 100 mg  100 mg Oral DAILY Ankit Nieto MD   100 mg at 09/29/20 9353    labetaloL (NORMODYNE;TRANDATE) 20 mg/4 mL (5 mg/mL) injection 10 mg  10 mg IntraVENous Q4H PRN Tamela Sung MD        triamcinolone acetonide (KENALOG) 0.1 % cream   Topical BID Ankit Nieto MD        sodium chloride (NS) flush 5-10 mL  5-10 mL IntraVENous PRN Shanthi Crouch MD        traMADoL [place]) tablet 50 mg  50 mg Oral Q6H PRN Shanthi Crouch MD   50 mg at 09/28/20 1733    hydrALAZINE (APRESOLINE) 20 mg/mL injection 10 mg  10 mg IntraVENous Q6H PRN Shanthi Crouch MD        famotidine (PF) (PEPCID) injection 20 mg  20 mg IntraVENous DAILY Shanthi Crouch MD   20 mg at 09/29/20 6845    atorvastatin (LIPITOR) tablet 10 mg  10 mg Oral DAILY Shanthi Crouch MD   10 mg at 09/29/20 0605    levETIRAcetam (KEPPRA) tablet 250 mg  250 mg Oral Q MON, WED & Ramos QuailsObed MD   Stopped at 09/28/20 2100    sevelamer carbonate (RENVELA) tab 800 mg  800 mg Oral TID Shanthi Crouch MD   800 mg at 09/29/20 8318    sodium chloride (NS) flush 5-40 mL  5-40 mL IntraVENous Q8H NemoObed Chapman MD   10 mL at 09/29/20 0525    sodium chloride (NS) flush 5-40 mL  5-40 mL IntraVENous PRN Shanthi Crouch MD        acetaminophen (TYLENOL) tablet 650 mg  650 mg Oral Q6H PRN Shanthi Crouch MD   650 mg at 09/26/20 1202    Or    acetaminophen (TYLENOL) suppository 650 mg  650 mg Rectal Q6H PRN Shanthi Crouch MD        polyethylene glycol (MIRALAX) packet 17 g  17 g Oral DAILY PRN Shanthi Crouch MD        promethazine (PHENERGAN) tablet 12.5 mg  12.5 mg Oral Q6H PRN Shanthi Crouch MD   12.5 mg at 09/27/20 1850    Or    ondansetron (ZOFRAN) injection 4 mg  4 mg IntraVENous Q6H PRN Shanthi Cruoch MD        heparin (porcine) injection 5,000 Units  5,000 Units SubCUTAneous Q8H Shanthi Crouch MD   5,000 Units at 09/29/20 0525    levETIRAcetam (KEPPRA) tablet 500 mg  500 mg Oral BID Shanthi Crouch MD   500 mg at 09/29/20 6068        Physical Exam:     Physical Exam:   General:  Alert, cooperative, no distress, appears stated age. Neck: Supple, symmetrical, trachea midline, no adenopathy, thyroid: no enlargement/tenderness/nodules, no carotid bruit and no JVD. Lungs:   Clear to auscultation bilaterally.    Heart:  Regular rate and rhythm, S1, S2 normal, no murmur, click, rub or gallop. Abdomen:   Soft, non-tender. Bowel sounds normal. No masses,  No organomegaly. Extremities: Extremities normal, atraumatic, no cyanosis or edema, HD catheter is well dressed. Skin: Skin color, texture, turgor normal. No rashes or lesions         Data Review:    CBC w/Diff    Recent Labs     09/28/20  0800 09/27/20  0530   WBC 10.2 11.9   RBC 3.64* 3.81*   HGB 10.2* 10.7*   HCT 31.2* 32.9*   MCV 85.7 86.4   MCH 28.0 28.1   MCHC 32.7 32.5   RDW 15.4* 15.4*    Recent Labs     09/28/20  0800 09/27/20  0530   MONOS 7 10   EOS 3 3   BASOS 0 0   RDW 15.4* 15.4*        Comprehensive Metabolic Profile    Recent Labs     09/28/20  0800   *   K 4.4      CO2 24   BUN 40*   CREA 9.81*    Recent Labs     09/28/20  0800   CA 9.5   PHOS 5.2*                        Impression:       Active Hospital Problems    Diagnosis Date Noted    Hypertensive emergency 09/25/2020    Tachycardia 09/25/2020    ESRD (end stage renal disease) ([facility] 08/08/2018    CVA, old, cognitive deficits 08/08/2018    Secondary hyperparathyroidism of renal origin ([facility] 08/08/2018    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 ([facility] 08/08/2018      BP is better  & Toleaating relatively Low BP for her. Plan:     DC to Nursing Home today & continue Dialysis as OP as scheduled.       Obed Yang MD

## 2020-09-29 NOTE — PROGRESS NOTES
Problem: Mobility Impaired (Adult and Pediatric)  Goal: *Acute Goals and Plan of Care (Insert Text)  Description: Pt able to perform BM with minAx1 and ambulate 10 steps with RW and modAx1. Pt appears to be back at baseline mobility. Recommend d/c back to LTC at [facility]. PLOF: Per pt, she states she receives PT at [facility] and they help with with dressing, getting in/out of bed, and walking short distances within her room with a rolling walker. Pt is at baseline mobility. Outcome: Resolved/Met     PHYSICAL THERAPY EVALUATION AND DISCHARGE    Patient: Billie Nino (67 y.o. female)  Date: 9/29/2020  Primary Diagnosis: Hypertensive emergency [I16.1]        Precautions:  Fall    ASSESSMENT :  Based on the objective data described below, the patient presents with decreased cognition at baseline, decreased functional mobility capacity, poor endurance, and requires assist with all mobility tasks. RN cleared for pt to work with PT. Pt found supine in bed, RN in room to administer medications, willing to work with PT. Pt is pleasantly oriented to name, disoriented to place/situation/time. She is able to answer simple questions and follow 2-step commands. Pt able to perform supine-sit transfer with SBA. Once sitting, pt required total dependence to don socks on. Pt states she gets help from [facility] staff for all tasks. Pt able to stand minAx1 with RW and ambulate forward/backward total of 10 steps. Pt unable to coordinate movement with RW and tends to keep RW out in front of her even with cueing. Pt needs constant cueing for navigated next steps. Narrow HUGO and scissoring of B LE's observed during gait. Pt back sitting and performed exercises per flow chart without difficulty. Pt helped back supine with minAx1 for B Le's and she was able to independently scoot towards Hob with bed flat. Pt left Hob elevated with breakfast in front of her, call bell nearby, no new questions or concerns.  RN made aware of pt being back at baseline. Recommend discharge back to LTC. Pt not in need of acute PT as she is at baseline mobility. Patient does not require further skilled intervention at this level of care. PLAN :  Recommendations and Planned Interventions:   No formal PT needs identified at this time. Discharge Recommendations: [address] [facility])  Further Equipment Recommendations for Discharge: N/A     SUBJECTIVE:   Patient stated I feel much better.     OBJECTIVE DATA SUMMARY:     Past Medical History:   Diagnosis Date    Asthma     Bipolar affective disorder ([facility]     Brain condition     Cataract     Chronic kidney disease     hemodialysis M-W-F    Diabetes ([facility]     Hyperlipidemia     Hypertension     Paralytic strabismus, unspecified     Psychiatric disorder     Seizures ([facility] 08/27/2018     Past Surgical History:   Procedure Laterality Date    PA INSJ TUNNELED CVC W/O SUBQ PORT/ AGE 3 YR/> N/A 8/9/2018     in-pt 474A, Insertion Tunneled Central Venous Catheter performed by Dolores Snellen, MD at [address] [place] [address]    PA INSJ TUNNELED CVC W/O SUBQ PORT/ AGE 3 YR/> N/A 11/8/2019    INSERTION TUNNELED CENTRAL VENOUS CATHETER performed by Wayne Eng MD at [facility] CATH LAB     Barriers to Learning/Limitations: yes;  cognitive  Compensate with: N/A  Home Situation:   Home Situation  Home Environment: Long term care  Care Facility [facility]: [facility]  # Steps to Enter: 0  One/Two Story Residence: One story  Living Alone: No  Support Systems: Skilled nursing facility  Patient Expects to be Discharged to[de-identified] Skilled nursing facility  Current DME Used/Available at Home: Garth Libman, rolling  Tub or Shower Type: Shower  Critical Behavior:  Neurologic State: Alert  Orientation Level: Oriented to person  Cognition: Follows commands; Impaired decision making  Safety/Judgement: Fall prevention  Psychosocial  Patient Behaviors: Calm; Cooperative    Strength:    Strength: Generally decreased, functional Tone & Sensation:   Tone: Normal    Sensation: Impaired(R UE/LE decreased light touch)    Range Of Motion:  AROM: Generally decreased, functional       Functional Mobility:  Bed Mobility:  Rolling: Stand-by assistance  Supine to Sit: Stand-by assistance  Sit to Supine: Minimum assistance     Transfers:  Sit to Stand: Minimum assistance  Stand to Sit: Minimum assistance    Balance:   Sitting: Intact; Without support  Standing: Impaired; With support  Standing - Static: Fair;Occasional  Standing - Dynamic : Poor    Ambulation/Gait Training:  Distance (ft): 10 Feet (ft)  Assistive Device: Walker, rolling  Ambulation - Level of Assistance: Moderate assistance;Assist x1;Additional time        Gait Abnormalities: Decreased step clearance;Scissoring;Path deviations        Base of Support: Narrowed     Speed/Susan: Pace decreased (<100 feet/min)  Step Length: Right shortened;Left shortened    Therapeutic Exercises:   Pt performed exercises per flow chart sitting EOB at end of session. EXERCISE   Sets   Reps   Active Active Assist   Passive Self ROM   Comments   Ankle Pumps    [] [] [] []    Quad Sets/Glut Sets   [] [] [] []    Hamstring Sets   [] [] [] []    Short Arc Quads   [] [] [] []    Heel Slides   [] [] [] []    Straight Leg Raises   [] [] [] []    Hip Add 1 15 [x] [] [] []    Long Arc Quads 1 30 [x] [] [] []    Seated Marching 1 25 [x] [] [] []    Standing Marching   [] [] [] []       [] [] [] []       Pain:  Pain level pre-treatment: 0/10   Pain level post-treatment: 0/10  Pain Intervention(s): Medication (see MAR); Rest, Repositioning   Response to intervention: Nurse notified, See doc flow    Activity Tolerance:   Pt tolerated mobility okay, needed assist with all mobility but back at baseline  Please refer to the flowsheet for vital signs taken during this treatment.   After treatment:   []         Patient left in no apparent distress sitting up in chair  [x]         Patient left in no apparent distress in bed  [x]         Call bell left within reach  [x]         Nursing notified  []         Caregiver present  []         Bed alarm activated  []         SCDs applied    COMMUNICATION/EDUCATION:   [x]         Role of Physical Therapy in the acute care setting. [x]         Fall prevention education was provided and the patient/caregiver indicated understanding. [x]         Patient/family have participated as able in goal setting and plan of care. []         Patient/family agree to work toward stated goals and plan of care. []         Patient understands intent and goals of therapy, but is neutral about his/her participation. []         Patient is unable to participate in goal setting/plan of care: ongoing with therapy staff.  []         Other:     Thank you for this referral.  Leoncio Egan   Time Calculation: 26 mins      Eval Complexity: History: MEDIUM  Complexity : 1-2 comorbidities / personal factors will impact the outcome/ POC Exam:MEDIUM Complexity : 3 Standardized tests and measures addressing body structure, function, activity limitation and / or participation in recreation  Presentation: MEDIUM Complexity : Evolving with changing characteristics  Clinical Decision Making:Medium Complexity    Overall Complexity:MEDIUM

## 2020-09-29 NOTE — PROGRESS NOTES
[facility] - Progress Note  Admit Date: 9/25/2020    Assessment:     Hospital Problems  Date Reviewed: 5/29/2019          Codes Class Noted POA    Hypertensive emergency ICD-10-CM: I16.1  ICD-9-CM: 401.9  9/25/2020 No        Tachycardia ICD-10-CM: R00.0  ICD-9-CM: 785.0  9/25/2020 Unknown        ESRD (end stage renal disease) ([facility] ICD-10-CM: N18.6  ICD-9-CM: 585.6  8/8/2018 Yes        Secondary hyperparathyroidism of renal origin [facility]) ICD-10-CM: N25.81  ICD-9-CM: 588.81  8/8/2018 Yes        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 ([facility] ICD-10-CM: E11.22, I12.0, Z99.2, N18.6  ICD-9-CM: 250.40, 403.91, V45.11, 585.6  8/8/2018 Yes        CVA, old, cognitive deficits ICD-10-CM: I69.319  ICD-9-CM: 438.0  8/8/2018 Yes              -HTN urgency - improved  -Indeterminate troponin in setting of HTN/ESRD, not consistent with ACS  -Echo 9/27/20 with hyperdynamic EF, > 70%, no wall motion abnormality  -Hypertensive encephalopathy on arrival  -ESRD on HD  -History of seizure disorder  -Dyslipidemia- on statin  -Type II DM  -Bipolar disease/ poor historian     No primary cardiologist      Plan:     Hemodynamics stable. Volume per HD. Plan for transfer to nursing home. No further cardiac work up. Subjective:     No new complaints.      Objective:      Patient Vitals for the past 8 hrs:   Temp Pulse Resp BP SpO2   09/29/20 1137 98 °F (36.7 °C) 87 20 134/66 95 %   09/29/20 0744 98.6 °F (37 °C) 100 20 117/60 96 %         Patient Vitals for the past 96 hrs:   Weight   09/29/20 0118 51 kg (112 lb 6.4 oz)   09/27/20 0912 75.8 kg (167 lb)                    Intake/Output Summary (Last 24 hours) at 9/29/2020 1257  Last data filed at 9/28/2020 1500  Gross per 24 hour   Intake    Output 1000 ml   Net -1000 ml       Physical Exam:  General:  alert, cooperative, no distress, appears stated age  Neck:  no JVD  Lungs:  clear to auscultation bilaterally  Heart:  regular rate and rhythm  Abdomen:  abdomen is soft without significant tenderness, masses, organomegaly or guarding  Extremities:  extremities normal, atraumatic, no cyanosis or edema    Data Review:     Labs: Results:       Chemistry Recent Labs     09/28/20  0800   *   *   K 4.4      CO2 24   BUN 40*   CREA 9.81*   CA 9.5   PHOS 5.2*   AGAP 10   BUCR 4*      CBC w/Diff Recent Labs     09/28/20  0800 09/27/20  0530   WBC 10.2 11.9   RBC 3.64* 3.81*   HGB 10.2* 10.7*   HCT 31.2* 32.9*    266   GRANS 78* 76*   LYMPH 12* 11*   EOS 3 3      Cardiac Enzymes No results found for: CPK, CK, CKMMB, CKMB, RCK3, CKMBT, CKNDX, CKND1, SONG, TROPT, TROIQ, KAIRNA, TROPT, TNIPOC, BNP, BNPP   Coagulation Recent Labs     09/29/20  0927 09/29/20  0418   APTT 26.6 30.5       Lipid Panel No results found for: CHOL, CHOLPOCT, CHOLX, CHLST, CHOLV, 832920, HDL, HDLP, LDL, LDLC, DLDLP, 905693, VLDLC, VLDL, TGLX, TRIGL, TRIGP, TGLPOCT, CHHD, CHHDX   BNP No results found for: BNP, BNPP, XBNPT   Liver Enzymes No results for input(s): TP, ALB, TBIL, AP in the last 72 hours.     No lab exists for component: SGOT, GPT, DBIL   Digoxin    Thyroid Studies No results found for: T4, T3U, TSH, TSHEXT       Signed By: Charolotte Severe, PA     September 29, 2020

## 2020-09-29 NOTE — DISCHARGE SUMMARY
Transfer Summary    Patient: Myriam Correa MRN: [id number]  [facility]: [id number]    YOB: 1955  Age: 77 y.o. Sex: female    DOA: 9/25/2020 LOS:  LOS: 4 days   Discharge Date:      Disposition: Transfer to SNF    Admission Diagnoses: Hypertensive emergency [I16.1]    Discharge Diagnoses:    1.  Hypertensive emergency. 2.  Chest pain, possible GI, negative dissection or PE on CTA, resolved now  3. Mild elevated troponin due to #1 and underlying ESRD. 3.  Acute hypertensive encephalopathy, improved. 4.  Seizure disorder   5.  ESRD on HD   6.  Dyslipidemia   7.  H/o CVA with new chronic infarct with encephalomalacia on left frontal lobe   8.  Left mastoid sinus effusion   9.  COVID-19 neg  10.  Normocytic anemia of CKD   11.  Bipolar affective disorder   12.  T2DM    Discharge Condition: Stable    PHYSICAL EXAM  Visit Vitals  /60 (BP 1 Location: Right arm, BP Patient Position: At rest)   Pulse 100   Temp 98.6 °F (37 °C)   Resp 20   Ht 5' 3\" (1.6 m)   Wt 51 kg (112 lb 6.4 oz)   SpO2 96%   BMI 19.91 kg/m²       General:  Alert, cooperative, no acute distress    Pulmonary:  CTA Bilaterally. No Wheezing/Rales. Cardiovascular: Regular rate and Rhythm. GI:  Soft, Non distended, Non tender. + Bowel sounds. Extremities:  No edema. No calf tenderness. Psych: Not anxious or agitated. Neurologic: Alert and oriented X 2. Moves all ext. Hospital Course: Myriam Correa is a 77 y.o. female with medical co-morbidities including HTN, ESRD on HD, T2DM, hyperlipidemia, h/o stroke, mild intermittent asthma, seizure disorder presented from dialysis with chest pain and elevated BP. She is a poor historian and noted to have mild confusion per ER MD. She expressed substernal chest pain that is on-going. IN the ER, she was started on Nitroglycerin infusion but continue to have chest pain. She has non specific troponin elevation.  There was initial concern for ACS and heparin gtt was order however, it was stopped due to concern for her confusion. CT head was done but without event. CTA chest was done without PE or dissection. She was given GI regiment and started on Nicardipine gtt with improvement in blood pressure. Patient was admitted to the stepdown unit, was started on p.o. medications. Patient had a very labile blood pressure in the hospital.  Blood pressure ranges anywhere from 120s to all the way to 240s. Patient's hypertensive medications were adjusted in the hospital and her blood pressure stabilized. Patient also underwent hemodialysis in the hospital.  Nephrology was consulted, cardiology was also consulted. Patient had mild elevated troponin but not consistent with ACS, cardio recommended echo which was within normal limits. Cardiology recommended no further intervention. Patient is currently asymptomatic, no chest pain and her blood pressure has been stable. Discussed with nephrology okay for discharge from the standpoint. Patient also had a COVID-19 testing, negative. Patient is currently metallic medically stable for discharge patient will be transferred to SNF today. Discussed with the patient and also with her sister Ms. Reji Davis over the phone about the discharge plan, follow-up appointments, medication changes and also side effects of new medications. Both of them understood and agree with my plan. Also answered all the questions and concerns appropriate. Procedures: None     Consults:   Dr. Kori Garcia, cardiology  [place] [id number], nephrology    Imaging studies:   CT Results (most recent):  Results from Hospital Encounter encounter on 09/25/20   CTA CHEST W OR W WO CONT    Narrative CTA CHEST PULMONARY EMBOLISM PROTOCOL      INDICATION: Patient with history of chronic kidney disease on dialysis presents  with acute onset of left-sided sharp chest pain without radiation. ? one episode  of nausea and vomiting.  ?    TECHNIQUE: Thin collimation axial images obtained through the level of the  pulmonary arteries with additional imaging through the chest following the  uneventful administration of 65 cc Isovue-370 intravenous contrast.  Images  reconstructed into MIP coronal and sagittal projections for complete evaluation  of the tortuous and overlapping pulmonary vascular structures and to reduce  patient radiation dose. All CT scans are performed using dose optimization  techniques as appropriate to the performed exam including the following:  Automated exposure control, adjustment of mA and/or kV according to patient  size, and use of iterative reconstructive technique. COMPARISON: No prior CT chest. CT abdomen and pelvis 9/25/2020. FINDINGS:    No filling defects are appreciated within the main, left, right, lobar or  visualized segmental pulmonary arteries to suggest embolism. Motion prevents  subsegmental branch assessment. The thoracic aorta is not aneurysmal.  No  evidence for dissection. Minimal pericardial effusion. Right dialysis catheter. No pleural effusion. No  enlarged axillary, hilar, or mediastinal lymphadenopathy. No acute bone finding. No lung infiltrate or mass. No acute findings in the upper abdomen. Renal cysts. Impression IMPRESSION:    No evidence of pulmonary embolism, aortic aneurysm or dissection. No acute  findings. Discharge Medications:     Current Discharge Medication List      START taking these medications    Details   cloNIDine (CATAPRES) 0.2 mg/24 hr ptwk 1 Patch by TransDERmal route every seven (7) days. Qty: 4 Patch, Refills: 0      losartan (COZAAR) 100 mg tablet Take 1 Tab by mouth daily. Qty: 30 Tab, Refills: 0      metoprolol tartrate (LOPRESSOR) 25 mg tablet Take 1 Tab by mouth every twelve (12) hours. Qty: 60 Tab, Refills: 0      famotidine (Pepcid) 40 mg tablet Take 1 Tab by mouth daily.   Qty: 30 Tab, Refills: 0         CONTINUE these medications which have CHANGED    Details   traMADoL (ULTRAM) 50 mg tablet Take 1 Tab by mouth every six (6) hours as needed for Pain for up to 3 days. Max Daily Amount: 200 mg. Qty: 4 Tab, Refills: 0    Associated Diagnoses: ESRD (end stage renal disease) ([facility] [address] Other chronic pain         CONTINUE these medications which have NOT CHANGED    Details   atorvastatin (Lipitor) 10 mg tablet Take 10 mg by mouth daily. Indications: high amount of triglyceride in the blood      b complex-vitamin c-folic acid 0.8 mg (NEPHRO-DORIAN) 0.8 mg tab tablet Take 1 Tab by mouth daily. !! levETIRAcetam (Keppra) 500 mg tablet Take 500 mg by mouth two (2) times a day. sevelamer carbonate (RENVELA) 800 mg tab tab Take 800 mg by mouth three (3) times daily. !! levETIRAcetam (KEPPRA) 250 mg tablet Take 1 Tab by mouth every Monday, Wednesday, Friday. Qty: 30 Tab, Refills: 1      aspirin 81 mg tablet Take 81 mg by mouth daily. diltiazem CD (CARDIZEM CD) 240 mg ER capsule Take 240 mg by mouth daily. epoetin raphael (EPOGEN;PROCRIT) 3,000 unit/mL injection 1.73 mL by SubCUTAneous route Every Tuesday, Thursday and Saturday. Indications: ANEMIA DUE TO RENAL FAILURE, Renal Dialysis  Qty: 1 Vial, Refills: 0      insulin lispro (HUMALOG) 100 unit/mL injection INITIATE INSULIN CORRECTIVE PROTOCOL: Normal Insulin Sensitivity   For Blood Sugar (mg/dL) of:     Less than 150 =   0 units           150 -199 =   2 units  200 -249 =   4 units  250 -299 =   6 units  300 -349 =   8 units  350 and above = 10 units and Call Physician  If 2 glucose readings are above  Qty: 1 Vial, Refills: 0       !! - Potential duplicate medications found. Please discuss with provider.       STOP taking these medications       hydrALAZINE (APRESOLINE) 100 mg tablet Comments:   Reason for Stopping:         cloNIDine HCL (Catapres) 0.2 mg tablet Comments:   Reason for Stopping:               DIET:  Diabetic and Renal dysphagia puree Diet with necator thick liquids     ACTIVITY: Activity as tolerated  Patient needs to be on Fall, aspiration, decubitus precaution. ·    PT/OT consult  ·             Speech consult  ·             DVT prophylaxis     ADDITIONAL INFORMATION: If you experience any of the following symptoms but not limited to Fever, chills, nausea, vomiting, diarrhea, change in mentation, falling, bleeding, shortness of breath, chest pain, please call your primary care physician or return to the emergency room if you cannot get hold of your doctor:     FOLLOW UP CARE:  Follow-up with 1. Physician at SNF in 1-2 days with Cbc with diff, bmp, mg.                               Pt's PCP: Jesus Hines MD.    Minutes spent on discharge: >40 minutes spent coordinating this discharge (review instructions/follow-up, prescriptions, preparing report for sign off)    Dennis Lawrence MD  9/29/2020 9:36 AM    Disclaimer: Sections of this note are dictated using utilizing voice recognition software. Minor typographical errors may be present. If questions arise, please do not hesitate to contact me or call our department.

## 2020-09-29 NOTE — DIALYSIS
ACUTE HEMODIALYSIS FLOW SHEET    HEMODIALYSIS ORDERS: Physician: Dr. Markos Duran: Mildred   Duration: 3 hr   BFR: 400   DFR: 800   Dialysate:  Temp 36-37*C   K+  2.0    Ca+ 2.5   Na 138   Bicarb 35   Wt Readings from Last 1 Encounters:   09/27/20 75.8 kg (167 lb)    Patient Chart [x]   Unable to Obtain []  Dry weight/UF Goal: 1000 ml    Heparin []  Bolus    Units    [] Hourly    Units    []None       Pre BP:   161/60    Pulse:  109   Respirations: 20    Temperature:  99    Tx: NSS    ml/Bolus   [x] N/A   Labs: []  Pre  []  Post:   [x] N/A   Additional Orders(medications, blood products, hypotension management): [] Yes   [x] No     [x]  [facility] Consent Verified     CATHETER ACCESS:  []N/A   [x]Right   []Left   []IJ   []Fem  [x]Chest wall  []TransHepatic   [] First use X-ray verified     [x]Tunnel    [] Non Tunneled   [x]No S/S infection  []Redness  []Drainage []Cultured []Swelling []Pain   [x]Medical Aseptic Prep Utilized   [x]Dressing Changed  [x] Biopatch  Date: 9/28/2020   []Clotted   [x]Patent   Flows: [x]Good  []Poor  []Reversed   If access problem,  notified: []Yes    [x]N/A        GRAFT/FISTULA ACCESS:   [x]N/A     []Right     []Left     []UE     []LE   []AVG   []AVF       []Medical Aseptic Prep Utilized   []No S/S infection  []Redness  []Drainage [] Cultured  [] Swelling  [] Pain  Bruit:   [] Strong    [] Weak       Thrill :   [] Strong    [] Weak     Needle Gauge: 15   Length: 1 inch   If access problem,  notified: []Yes     []N/A          GENERAL ASSESSMENT:    LUNGS:  Rate 20   SaO2%      [x] Clear  [] Coarse  [] Crackles  [] Wheezing                                                [] Diminished     Location : []RLL   []LLL    []RUL  []STEPHY   Cough:      []Productive  []Dry  [x]N/A   Respirations:  [x]Easy  []Labored   Therapy:  [x]RA  O2 Type:  []NC  Mask: []   NRB    [] BiPaP  Flow:  l/min                   [] Ventilator  [] Intubated  [] Trach     CARDIAC: [x] Regular      [] Irregular   [] Pericardial Rub            [x]  Monitored  [x] Bedside  [] Remotely monitored     EDEMA: [x] None   []Generalized  [] Pitting [] 1+   [] 2 +   [] 3+    [] 4+  [] Pedal    SKIN:   [x] Warm  [] Hot     [] Cold   [x] Dry     [] Pale   [] Diaphoretic                  [] Flushed  [] Jaundiced  [] Cyanotic     LOC:    [x] Alert      [x]Oriented:    [x] Person     [] Place  []Time               [] Confused  [] Lethargic  [] Medicated  [] Non-responsive   GI / ABDOMEN:                     [] Flat    [] Distended    [x] Soft    [] Firm   []  Obese                   [] Diarrhea   [] FMS [x] Bowel Sounds  [] Nausea  [] Vomiting     / URINE ASSESSMENT:                   [] Voiding    [] Oliguria  [x] Anuria   []  Khan                  [] Incontinent  []  Incontinent Brief   []  PureWick     PAIN:  [x] 0 []1  []2   []3   []4   []5   []6   []7   []8   []9   []10            Scale 0-10  Action/Follow Up:    MOBILITY:  [x] Bed    [] Stretcher      All Vitals and Treatment Details on Attached [address]: [facility]I [facility]          Room # 134/64    [x] Routine         [] 1st Time Acute    [] Urgent      [] Stat            [x] Acute Room   []  Bedside    [] ICU/CCU     [] ER   Isolation Precautions:  [x] Dialysis    Droplet     ALLERGIES:     Allergies   Allergen Reactions    Amoxicillin Unknown (comments)    Cleocin [Clindamycin Hcl] Unknown (comments)    Codeine Unknown (comments)    Lorcet 10/650 [Hydrocodone-Acetaminophen] Unknown (comments)    Penicillin G Benzathine Unknown (comments)    Shellfish Derived Unknown (comments)      Code Status:  Full Code     Hepatitis Status      Lab Results   Component Value Date/Time    Hepatitis B surface Ag <0.10 09/25/2020 05:31 PM    Hepatitis B surface Ab 63.01 09/25/2020 05:31 PM    Hepatitis B core, IgM NEGATIVE  08/11/2018 03:29 AM    Hep B Core Ab, IgM NEGATIVE  08/13/2018 04:34 AM    Hep B Core Ab, total NEGATIVE  08/13/2018 04:34 AM        Current Labs:      Lab Results   Component Value Date/Time    WBC 10.2 09/28/2020 08:00 AM    Hemoglobin, POC 13.3 11/08/2019 07:26 AM    HGB 10.2 (L) 09/28/2020 08:00 AM    Hematocrit, POC 39 11/08/2019 07:26 AM    HCT 31.2 (L) 09/28/2020 08:00 AM    PLATELET 940 93/84/4716 08:00 AM    MCV 85.7 09/28/2020 08:00 AM     Lab Results   Component Value Date/Time    Sodium 135 (L) 09/28/2020 08:00 AM    Potassium 4.4 09/28/2020 08:00 AM    Chloride 101 09/28/2020 08:00 AM    CO2 24 09/28/2020 08:00 AM    Anion gap 10 09/28/2020 08:00 AM    Glucose 223 (H) 09/28/2020 08:00 AM    BUN 40 (H) 09/28/2020 08:00 AM    Creatinine 9.81 (H) 09/28/2020 08:00 AM    BUN/Creatinine ratio 4 (L) 09/28/2020 08:00 AM    GFR est AA 5 (L) 09/28/2020 08:00 AM    GFR est non-AA 4 (L) 09/28/2020 08:00 AM    Calcium 9.5 09/28/2020 08:00 AM          DIET:  DIET DYSPHAGIA PUREED (NDD1)      PRIMARY NURSE REPORT:   Pre Dialysis: Ravindra Grant RN     Time: 1100      EDUCATION:    [x] Patient [] Other           Knowledge Basis: []None [x]Minimal [] Substantial   Barriers to learning  [x]N/A   [] Access Care     [] S&S of infection  [] Fluid Management  [] K+   [x] Procedural    []Albumin   [] Medications   [] Tx Options   [] Transplant   [] Diet   [] Other   Teaching Tools:  [x] Explain  [] Demo  [] Handouts [] Video  Patient response: [x] Verbalized understanding  [] Teach back  [] Return demonstration   [] Requires follow up      [x]Time Out/Safety Check  [x] Extracorporeal Circuit Tested for integrity       RO/HEMODIALYSIS MACHINE SAFETY CHECKS  Before each treatment:      [facility]                                    [x] Unit Machine # 9 with centralized RO                                                                                                                                           Alarm Test:  Pass time 1100            [x] RO/Machine Log Complete    Machine Temp    36*C             Dialysate: pH  7.4    Conductivity: Meter 14.0     HD Machine  14.0      TCD: 13.8  Dialyzer Lot # [id number]     Blood Tubing Lot # [id number]     Saline Lot # [id number]     CHLORINE TESTING-Before each treatment and every 4 hours    Total Chlorine: [x] less than 0.1 ppm  Initial Time Check: 0900       4 Hr/2nd Check Time: 1300   (if greater than 0.1 ppm from Primary then every 30 minutes from Secondary)     TREATMENT INITIATION  with Dialysis Precautions:   [x] All Connections Secured              [x] Saline Line Double Clamped   [x] Venous Parameters Set               [x] Arterial Parameters Set    [x] Prime Given 250ml NSS              [x]Air Foam Detector Engaged      Treatment Initiation Note:  0871  Pt received to dialysis unit from the ED,  Awake and feeling well. 1155  Rt tunneled chest wall CVC intact, patent and flushes well. Dialysis initiated without difficulty. Dr Martinez Oconnor at bedside. During Treatment Notes:  1300   Vascular access visible with arterial and venous line connections intact. Pt resting but continually asking what day it is which is normal for her. 18 Pt becoming more restless and saying her heart hurts. Attached to cardiac monitor which shown NSR. O2 per nc applied at 3L. Pt does not appear to have any difficulty breathing. BFR decreased to 300.  1320  Pt asking for Dr Martinez Oconnor and becoming agitated. Dr Martinez Oconnor ordered Ativan 0.5 mg given IV.  1345  Pt appears more comfortable with eyes closed. Monitor remains NSR  1430  Vascular access visible with arterial and venous line connections intact. Pt resting comfortably. 1500  Vascular access visible with arterial and venous line connections intact. Pt resting comfortably.        Medication Dose Volume Route Time [place] Nurse, Title   Ativan 0.5 mg 0.25 ml HD 1320 Lolly Ocampo RN     Post Assessment  Dialyzer Cleared:   [] Good  [x] Fair  [] Poor  Blood processed:  57 L  UF Removed:  1000 Ml    Post /67   Pulse  110 Resp  20  Temp 98.7 Lungs: [x] Clear [] Course  [] Crackles                 []  Wheezing   [] Diminished   Post Tx Vascular Access: [x] N/A        Cardiac :[x] Regular   [] Irregular   Rhythm:  [x] Monitored   [] Not Monitored    CVC Catheter: [] N/A  Locking solution: Heparin 1:1000 U  Arterial port 1.3 ml   Venous port 1.3 ml   Edema:  [x] None  [] Generalized                     Skin:[x] Warm  [x] Dry [] Diaphoretic               [] Flushed  [] Pale [] Cyanotic Pain:  [x]0  []1 []2  []3 []4  []5  []6  []7 []8  []9  []10     Post Treatment Note:   5944  Pt tolerated dialysis well after receiving Ativan. Dialysis catheter intact, patent and heplocked as noted above.      POST TREATMENT PRIMARY NURSE HANDOFF REPORT:   Post Dialysis: Gerardo Collins RN                Time:  0040       Abbreviations: AVG-arterial venous graft, AVF-arterial venous fistula, IJ-Internal Jugular, Subcl-Subclavian, Fem-Femoral, Tx-treatment, AP/HR-apical heart rate, VSS- Vital Signs Stable, CVC- Central Venous Catheter, DFR-dialysate flow rate, BFR-blood flow rate, AP-arterial pressure, -venous pressure, UF-ultrafiltrate, TMP-transmembrane pressure, Ray-Venous, Art-Arterial, RO-Reverse Osmosis

## 2020-11-11 NOTE — Clinical Note
Pt discharged home, undelivered, in stable condition. All discharge instructions reviewed with pt, including new prescription for PO Labetalol to be started this evening. Pt verbalizes understanding and states she will call OB office to schedule follow up appt tomorrow. Pt ambulated from unit at 1610.   Right chest and right neck prepped with ChloraPrep and draped. Wet prep solution applied at: 1405. Wet prep solution dried at: 1408. Wet prep elapsed drying time: 3 mins.

## 2020-12-22 ENCOUNTER — HOSPITAL ENCOUNTER (EMERGENCY)
Age: 65
Discharge: ANOTHER HOSPITAL | End: 2020-12-23
Attending: EMERGENCY MEDICINE
Payer: MEDICARE

## 2020-12-22 ENCOUNTER — APPOINTMENT (OUTPATIENT)
Dept: GENERAL RADIOLOGY | Age: 65
End: 2020-12-22
Attending: EMERGENCY MEDICINE
Payer: MEDICARE

## 2020-12-22 VITALS
RESPIRATION RATE: 18 BRPM | SYSTOLIC BLOOD PRESSURE: 201 MMHG | TEMPERATURE: 99.1 F | HEART RATE: 88 BPM | OXYGEN SATURATION: 100 % | DIASTOLIC BLOOD PRESSURE: 102 MMHG

## 2020-12-22 DIAGNOSIS — R06.02 SOB (SHORTNESS OF BREATH): Primary | ICD-10-CM

## 2020-12-22 LAB
ALBUMIN SERPL-MCNC: 4.3 G/DL (ref 3.4–5)
ALBUMIN/GLOB SERPL: 1 {RATIO} (ref 0.8–1.7)
ALP SERPL-CCNC: 123 U/L (ref 45–117)
ALT SERPL-CCNC: 20 U/L (ref 13–56)
ANION GAP SERPL CALC-SCNC: 10 MMOL/L (ref 3–18)
AST SERPL-CCNC: 14 U/L (ref 10–38)
ATRIAL RATE: 99 BPM
BASOPHILS # BLD: 0 K/UL (ref 0–0.1)
BASOPHILS NFR BLD: 0 % (ref 0–2)
BILIRUB SERPL-MCNC: 0.3 MG/DL (ref 0.2–1)
BUN SERPL-MCNC: 23 MG/DL (ref 7–18)
BUN/CREAT SERPL: 4 (ref 12–20)
CALCIUM SERPL-MCNC: 9.6 MG/DL (ref 8.5–10.1)
CALCULATED P AXIS, ECG09: 77 DEGREES
CALCULATED R AXIS, ECG10: -26 DEGREES
CALCULATED T AXIS, ECG11: 59 DEGREES
CHLORIDE SERPL-SCNC: 94 MMOL/L (ref 100–111)
CK MB CFR SERPL CALC: 1.8 % (ref 0–4)
CK MB CFR SERPL CALC: 3.2 % (ref 0–4)
CK MB SERPL-MCNC: 1.5 NG/ML (ref 5–25)
CK MB SERPL-MCNC: 2.5 NG/ML (ref 5–25)
CK SERPL-CCNC: 78 U/L (ref 26–192)
CK SERPL-CCNC: 84 U/L (ref 26–192)
CO2 SERPL-SCNC: 29 MMOL/L (ref 21–32)
CREAT SERPL-MCNC: 5.67 MG/DL (ref 0.6–1.3)
DIAGNOSIS, 93000: NORMAL
DIFFERENTIAL METHOD BLD: ABNORMAL
EOSINOPHIL # BLD: 0.1 K/UL (ref 0–0.4)
EOSINOPHIL NFR BLD: 2 % (ref 0–5)
ERYTHROCYTE [DISTWIDTH] IN BLOOD BY AUTOMATED COUNT: 13.2 % (ref 11.6–14.5)
GLOBULIN SER CALC-MCNC: 4.2 G/DL (ref 2–4)
GLUCOSE SERPL-MCNC: 151 MG/DL (ref 74–99)
HCT VFR BLD AUTO: 41.6 % (ref 35–45)
HGB BLD-MCNC: 14.3 G/DL (ref 12–16)
INR PPP: 1 (ref 0.8–1.2)
LYMPHOCYTES # BLD: 1 K/UL (ref 0.9–3.6)
LYMPHOCYTES NFR BLD: 14 % (ref 21–52)
MCH RBC QN AUTO: 31.2 PG (ref 24–34)
MCHC RBC AUTO-ENTMCNC: 34.4 G/DL (ref 31–37)
MCV RBC AUTO: 90.8 FL (ref 74–97)
MONOCYTES # BLD: 0.6 K/UL (ref 0.05–1.2)
MONOCYTES NFR BLD: 8 % (ref 3–10)
NEUTS SEG # BLD: 5.4 K/UL (ref 1.8–8)
NEUTS SEG NFR BLD: 76 % (ref 40–73)
P-R INTERVAL, ECG05: 130 MS
PLATELET # BLD AUTO: 301 K/UL (ref 135–420)
PLATELET COMMENTS,PCOM: ABNORMAL
PMV BLD AUTO: 11.7 FL (ref 9.2–11.8)
POTASSIUM SERPL-SCNC: 3.5 MMOL/L (ref 3.5–5.5)
PROT SERPL-MCNC: 8.5 G/DL (ref 6.4–8.2)
PROTHROMBIN TIME: 12.9 SEC (ref 11.5–15.2)
Q-T INTERVAL, ECG07: 366 MS
QRS DURATION, ECG06: 84 MS
QTC CALCULATION (BEZET), ECG08: 469 MS
RBC # BLD AUTO: 4.58 M/UL (ref 4.2–5.3)
RBC MORPH BLD: ABNORMAL
SODIUM SERPL-SCNC: 133 MMOL/L (ref 136–145)
TROPONIN I SERPL-MCNC: 0.03 NG/ML (ref 0–0.04)
TROPONIN I SERPL-MCNC: <0.02 NG/ML (ref 0–0.04)
VENTRICULAR RATE, ECG03: 99 BPM
WBC # BLD AUTO: 7.1 K/UL (ref 4.6–13.2)

## 2020-12-22 PROCEDURE — 99283 EMERGENCY DEPT VISIT LOW MDM: CPT

## 2020-12-22 PROCEDURE — 85610 PROTHROMBIN TIME: CPT

## 2020-12-22 PROCEDURE — 85025 COMPLETE CBC W/AUTO DIFF WBC: CPT

## 2020-12-22 PROCEDURE — 82550 ASSAY OF CK (CPK): CPT

## 2020-12-22 PROCEDURE — 80053 COMPREHEN METABOLIC PANEL: CPT

## 2020-12-22 PROCEDURE — 93005 ELECTROCARDIOGRAM TRACING: CPT

## 2020-12-22 PROCEDURE — 71045 X-RAY EXAM CHEST 1 VIEW: CPT

## 2020-12-22 NOTE — ED PROVIDER NOTES
EMERGENCY DEPARTMENT HISTORY AND PHYSICAL EXAM  This was created with voice recognition software and transcription errors may be present. 2:22 PM  Date: 12/22/2020  Patient Name: Lauren Bourne    History of Presenting Illness     Chief Complaint:    History Provided By:     HPI: Lauren Bourne is a 77 y.o. female past medical history of asthma bipolar disorder cataracts on dialysis Monday Wednesday Friday dialyzed this morning hyperlipidemia hypertension who presents with shortness of breath. Patient was reportedly at radiate and felt very short of breath had her heart racing after getting dialyzed this morning per Dr. Robel Selby     PCP: Ayesha Sung MD      Past History     Past Medical History:  Past Medical History:   Diagnosis Date    Asthma     Bipolar affective disorder ([facility] [facility]     Brain condition     Cataract     Chronic kidney disease     hemodialysis M-W-F    Diabetes ([facility] [facility]     Hyperlipidemia     Hypertension     Paralytic strabismus, unspecified     Psychiatric disorder     Seizures ([facility] [address] 08/27/2018       Past Surgical History:  Past Surgical History:   Procedure Laterality Date    [place] INSJ TUNNELED CVC W/O SUBQ PORT/ AGE 5 YR/> N/A 8/9/2018     in-pt 474A, Insertion Tunneled Central Venous Catheter performed by Moises Christianson MD at [address] [place] [address]    [place] INSJ TUNNELED CVC W/O SUBQ PORT/ AGE 5 YR/> N/A 11/8/2019    INSERTION TUNNELED CENTRAL VENOUS CATHETER performed by Ratna Wood MD at [address] [place] [address]       Family History:  No family history on file. Social History:  Social History     Tobacco Use    Smoking status: Never Smoker    Smokeless tobacco: Never Used   Substance Use Topics    Alcohol use: No    Drug use: No       Allergies:   Allergies   Allergen Reactions    Amoxicillin Unknown (comments)    Cleocin [Clindamycin Hcl] Unknown (comments)    Codeine Unknown (comments)    Lorcet 10/650 [Hydrocodone-Acetaminophen] Unknown (comments)    Penicillin G Benzathine Unknown (comments)    Shellfish Derived Unknown (comments)       Review of Systems     Review of Systems   HENT: Negative for congestion. Respiratory: Negative for apnea. All other systems reviewed and are negative. 10 point review of systems otherwise negative unless noted in HPI. Physical Exam       Physical Exam  Constitutional:       Appearance: She is well-developed. HENT:      Head: Normocephalic and atraumatic. Eyes:      Pupils: Pupils are equal, round, and reactive to light. Neck:      Musculoskeletal: Normal range of motion and neck supple. Cardiovascular:      Rate and Rhythm: Normal rate and regular rhythm. Heart sounds: Normal heart sounds. No murmur. No friction rub. Pulmonary:      Effort: Pulmonary effort is normal. No respiratory distress. Breath sounds: Normal breath sounds. No wheezing. Abdominal:      General: There is no distension. Palpations: Abdomen is soft. Tenderness: There is no abdominal tenderness. There is no guarding or rebound. Musculoskeletal: Normal range of motion. Skin:     General: Skin is warm and dry. Neurological:      Mental Status: She is alert and oriented to person, place, and time. Psychiatric:         Behavior: Behavior normal.         Thought Content: Thought content normal.         Diagnostic Study Results     Vital Signs  EKG: EKG shows sinus at 99 normal axis normal intervals there is no ST elevation or depression no hypertrophy  Labs: CBC chemistry unremarkable chemistry consistent with chronic renal disease Trop negative x2  Imaging:   X-rays negative  Medical Decision Making     ED Course: Progress Notes, Reevaluation, and Consults:      Provider Notes (Medical Decision Making): This is a 41-year-old female who presents with shortness of breath and tachycardia following dialysis. Her heart rate improved to 99 on EKG and now on examination is even slower probably around 70 or 80.   No fevers or chills no nausea or vomiting. She is resting comfortably. Will evaluate chest x-ray EKG basic blood work likely repeat a troponin and then reassess. Labs unremarkable patient feels better will discharge       Diagnosis     Clinical Impression: No diagnosis found. Disposition:    Patient's Medications   Start Taking    No medications on file   Continue Taking    ASPIRIN 81 MG TABLET    Take 81 mg by mouth daily. ATORVASTATIN (LIPITOR) 10 MG TABLET    Take 10 mg by mouth daily. Indications: high amount of triglyceride in the blood    B COMPLEX-VITAMIN C-FOLIC ACID 0.8 MG (NEPHRO-DORIAN) 0.8 MG TAB TABLET    Take 1 Tab by mouth daily. CLONIDINE (CATAPRES) 0.2 MG/24 HR PTWK    1 Patch by TransDERmal route every seven (7) days. DILTIAZEM CD (CARDIZEM CD) 240 MG ER CAPSULE    Take 240 mg by mouth daily. EPOETIN RACHAEL (EPOGEN;PROCRIT) 3,000 UNIT/ML INJECTION    1.73 mL by SubCUTAneous route Every Tuesday, Thursday and Saturday. Indications: ANEMIA DUE TO RENAL FAILURE, Renal Dialysis    FAMOTIDINE (PEPCID) 40 MG TABLET    Take 1 Tab by mouth daily. INSULIN LISPRO (HUMALOG) 100 UNIT/ML INJECTION    INITIATE INSULIN CORRECTIVE PROTOCOL: Normal Insulin Sensitivity   For Blood Sugar (mg/dL) of:     Less than 150 =   0 units           150 -199 =   2 units  200 -249 =   4 units  250 -299 =   6 units  300 -349 =   8 units  350 and above = 10 units and Call Physician  If 2 glucose readings are above    LEVETIRACETAM (KEPPRA) 250 MG TABLET    Take 1 Tab by mouth every Monday, Wednesday, Friday. LEVETIRACETAM (KEPPRA) 500 MG TABLET    Take 500 mg by mouth two (2) times a day. LOSARTAN (COZAAR) 100 MG TABLET    Take 1 Tab by mouth daily. METOPROLOL TARTRATE (LOPRESSOR) 25 MG TABLET    Take 1 Tab by mouth every twelve (12) hours. SEVELAMER CARBONATE (RENVELA) 800 MG TAB TAB    Take 800 mg by mouth three (3) times daily.    These Medications have changed    No medications on file   Stop Taking    No medications on file

## 2020-12-23 PROCEDURE — 94762 N-INVAS EAR/PLS OXIMTRY CONT: CPT

## 2020-12-23 NOTE — ED TRIAGE NOTES
Per medic: \"Patient was into her 1st hour of dialysis and began complaining of shortness of breath.  Patient was also noted to hypertensive

## 2020-12-23 NOTE — DISCHARGE INSTRUCTIONS

## 2021-03-17 ENCOUNTER — APPOINTMENT (OUTPATIENT)
Dept: GENERAL RADIOLOGY | Age: 66
DRG: 308 | End: 2021-03-17
Attending: STUDENT IN AN ORGANIZED HEALTH CARE EDUCATION/TRAINING PROGRAM
Payer: MEDICARE

## 2021-03-17 ENCOUNTER — HOSPITAL ENCOUNTER (INPATIENT)
Age: 66
LOS: 3 days | Discharge: NURSING, REHAB OR LONG-TERM CARE | DRG: 308 | End: 2021-03-20
Attending: EMERGENCY MEDICINE | Admitting: HOSPITALIST
Payer: MEDICARE

## 2021-03-17 ENCOUNTER — APPOINTMENT (OUTPATIENT)
Dept: CT IMAGING | Age: 66
DRG: 308 | End: 2021-03-17
Attending: STUDENT IN AN ORGANIZED HEALTH CARE EDUCATION/TRAINING PROGRAM
Payer: MEDICARE

## 2021-03-17 DIAGNOSIS — R00.0 TACHYCARDIA: ICD-10-CM

## 2021-03-17 DIAGNOSIS — I69.319 CVA, OLD, COGNITIVE DEFICITS: ICD-10-CM

## 2021-03-17 DIAGNOSIS — I47.1 SVT (SUPRAVENTRICULAR TACHYCARDIA) (HCC): ICD-10-CM

## 2021-03-17 DIAGNOSIS — I16.1 HYPERTENSIVE EMERGENCY: ICD-10-CM

## 2021-03-17 DIAGNOSIS — N18.6 ESRD (END STAGE RENAL DISEASE) (HCC): ICD-10-CM

## 2021-03-17 LAB
ALBUMIN SERPL-MCNC: 3.4 G/DL (ref 3.4–5)
ALBUMIN/GLOB SERPL: 0.7 {RATIO} (ref 0.8–1.7)
ALP SERPL-CCNC: 132 U/L (ref 45–117)
ALT SERPL-CCNC: 15 U/L (ref 13–56)
ANION GAP SERPL CALC-SCNC: 12 MMOL/L (ref 3–18)
AST SERPL-CCNC: 16 U/L (ref 10–38)
ATRIAL RATE: 105 BPM
ATRIAL RATE: 159 BPM
BASOPHILS # BLD: 0 K/UL (ref 0–0.1)
BASOPHILS NFR BLD: 0 % (ref 0–2)
BILIRUB SERPL-MCNC: 0.3 MG/DL (ref 0.2–1)
BNP SERPL-MCNC: 5175 PG/ML (ref 0–900)
BUN SERPL-MCNC: 26 MG/DL (ref 7–18)
BUN/CREAT SERPL: 5 (ref 12–20)
CALCIUM SERPL-MCNC: 9.3 MG/DL (ref 8.5–10.1)
CALCULATED P AXIS, ECG09: 71 DEGREES
CALCULATED R AXIS, ECG10: -22 DEGREES
CALCULATED R AXIS, ECG10: -4 DEGREES
CALCULATED T AXIS, ECG11: -60 DEGREES
CALCULATED T AXIS, ECG11: 64 DEGREES
CHLORIDE SERPL-SCNC: 102 MMOL/L (ref 100–111)
CO2 SERPL-SCNC: 26 MMOL/L (ref 21–32)
CREAT SERPL-MCNC: 4.94 MG/DL (ref 0.6–1.3)
DIAGNOSIS, 93000: NORMAL
DIAGNOSIS, 93000: NORMAL
DIFFERENTIAL METHOD BLD: ABNORMAL
EOSINOPHIL # BLD: 0.1 K/UL (ref 0–0.4)
EOSINOPHIL NFR BLD: 1 % (ref 0–5)
ERYTHROCYTE [DISTWIDTH] IN BLOOD BY AUTOMATED COUNT: 14.8 % (ref 11.6–14.5)
GLOBULIN SER CALC-MCNC: 4.7 G/DL (ref 2–4)
GLUCOSE SERPL-MCNC: 132 MG/DL (ref 74–99)
HCT VFR BLD AUTO: 26.2 % (ref 35–45)
HGB BLD-MCNC: 8.6 G/DL (ref 12–16)
LACTATE BLD-SCNC: 0.5 MMOL/L (ref 0.4–2)
LYMPHOCYTES # BLD: 1.9 K/UL (ref 0.9–3.6)
LYMPHOCYTES NFR BLD: 22 % (ref 21–52)
MAGNESIUM SERPL-MCNC: 2.2 MG/DL (ref 1.6–2.6)
MCH RBC QN AUTO: 29 PG (ref 24–34)
MCHC RBC AUTO-ENTMCNC: 32.8 G/DL (ref 31–37)
MCV RBC AUTO: 88.2 FL (ref 74–97)
MONOCYTES # BLD: 0.2 K/UL (ref 0.05–1.2)
MONOCYTES NFR BLD: 2 % (ref 3–10)
NEUTS SEG # BLD: 6.8 K/UL (ref 1.8–8)
NEUTS SEG NFR BLD: 75 % (ref 40–73)
P-R INTERVAL, ECG05: 162 MS
PHOSPHATE SERPL-MCNC: 2.8 MG/DL (ref 2.5–4.9)
PLATELET # BLD AUTO: 380 K/UL (ref 135–420)
PMV BLD AUTO: 11 FL (ref 9.2–11.8)
POTASSIUM SERPL-SCNC: 3.4 MMOL/L (ref 3.5–5.5)
PROT SERPL-MCNC: 8.1 G/DL (ref 6.4–8.2)
Q-T INTERVAL, ECG07: 320 MS
Q-T INTERVAL, ECG07: 358 MS
QRS DURATION, ECG06: 80 MS
QRS DURATION, ECG06: 82 MS
QTC CALCULATION (BEZET), ECG08: 473 MS
QTC CALCULATION (BEZET), ECG08: 522 MS
RBC # BLD AUTO: 2.97 M/UL (ref 4.2–5.3)
SODIUM SERPL-SCNC: 140 MMOL/L (ref 136–145)
TROPONIN I SERPL-MCNC: <0.02 NG/ML (ref 0–0.04)
VENTRICULAR RATE, ECG03: 105 BPM
VENTRICULAR RATE, ECG03: 160 BPM
WBC # BLD AUTO: 9 K/UL (ref 4.6–13.2)

## 2021-03-17 PROCEDURE — 70450 CT HEAD/BRAIN W/O DYE: CPT

## 2021-03-17 PROCEDURE — 80053 COMPREHEN METABOLIC PANEL: CPT

## 2021-03-17 PROCEDURE — 84100 ASSAY OF PHOSPHORUS: CPT

## 2021-03-17 PROCEDURE — 93005 ELECTROCARDIOGRAM TRACING: CPT

## 2021-03-17 PROCEDURE — 96374 THER/PROPH/DIAG INJ IV PUSH: CPT

## 2021-03-17 PROCEDURE — 83735 ASSAY OF MAGNESIUM: CPT

## 2021-03-17 PROCEDURE — 83880 ASSAY OF NATRIURETIC PEPTIDE: CPT

## 2021-03-17 PROCEDURE — 99223 1ST HOSP IP/OBS HIGH 75: CPT | Performed by: HOSPITALIST

## 2021-03-17 PROCEDURE — 74011250636 HC RX REV CODE- 250/636: Performed by: EMERGENCY MEDICINE

## 2021-03-17 PROCEDURE — 74011000258 HC RX REV CODE- 258: Performed by: EMERGENCY MEDICINE

## 2021-03-17 PROCEDURE — 85025 COMPLETE CBC W/AUTO DIFF WBC: CPT

## 2021-03-17 PROCEDURE — 84484 ASSAY OF TROPONIN QUANT: CPT

## 2021-03-17 PROCEDURE — 71045 X-RAY EXAM CHEST 1 VIEW: CPT

## 2021-03-17 PROCEDURE — 99285 EMERGENCY DEPT VISIT HI MDM: CPT

## 2021-03-17 PROCEDURE — 74011250637 HC RX REV CODE- 250/637: Performed by: PHYSICIAN ASSISTANT

## 2021-03-17 PROCEDURE — 83605 ASSAY OF LACTIC ACID: CPT

## 2021-03-17 PROCEDURE — 74011250636 HC RX REV CODE- 250/636: Performed by: PHYSICIAN ASSISTANT

## 2021-03-17 PROCEDURE — 65660000004 HC RM CVT STEPDOWN

## 2021-03-17 RX ORDER — ADENOSINE 3 MG/ML
6 INJECTION, SOLUTION INTRAVENOUS
Status: DISPENSED | OUTPATIENT
Start: 2021-03-17 | End: 2021-03-18

## 2021-03-17 RX ORDER — CLONIDINE 0.2 MG/24H
1 PATCH, EXTENDED RELEASE TRANSDERMAL
Status: DISCONTINUED | OUTPATIENT
Start: 2021-03-17 | End: 2021-03-20 | Stop reason: HOSPADM

## 2021-03-17 RX ORDER — LEVETIRACETAM 250 MG/1
250 TABLET ORAL
Status: DISCONTINUED | OUTPATIENT
Start: 2021-03-17 | End: 2021-03-20 | Stop reason: HOSPADM

## 2021-03-17 RX ORDER — CLONIDINE HYDROCHLORIDE 0.1 MG/1
0.2 TABLET ORAL
Status: COMPLETED | OUTPATIENT
Start: 2021-03-17 | End: 2021-03-17

## 2021-03-17 RX ORDER — ONDANSETRON 2 MG/ML
4 INJECTION INTRAMUSCULAR; INTRAVENOUS
Status: DISCONTINUED | OUTPATIENT
Start: 2021-03-17 | End: 2021-03-20 | Stop reason: HOSPADM

## 2021-03-17 RX ORDER — POTASSIUM CHLORIDE 7.45 MG/ML
10 INJECTION INTRAVENOUS
Status: DISCONTINUED | OUTPATIENT
Start: 2021-03-17 | End: 2021-03-17

## 2021-03-17 RX ORDER — HEPARIN SODIUM 5000 [USP'U]/ML
5000 INJECTION, SOLUTION INTRAVENOUS; SUBCUTANEOUS EVERY 8 HOURS
Status: DISCONTINUED | OUTPATIENT
Start: 2021-03-17 | End: 2021-03-20 | Stop reason: HOSPADM

## 2021-03-17 RX ORDER — LEVETIRACETAM 500 MG/1
500 TABLET ORAL 2 TIMES DAILY
Status: DISCONTINUED | OUTPATIENT
Start: 2021-03-17 | End: 2021-03-17

## 2021-03-17 RX ORDER — DILTIAZEM HYDROCHLORIDE 120 MG/1
240 CAPSULE, COATED, EXTENDED RELEASE ORAL DAILY
Status: DISCONTINUED | OUTPATIENT
Start: 2021-03-18 | End: 2021-03-18

## 2021-03-17 RX ORDER — LABETALOL HCL 20 MG/4 ML
20 SYRINGE (ML) INTRAVENOUS ONCE
Status: COMPLETED | OUTPATIENT
Start: 2021-03-17 | End: 2021-03-17

## 2021-03-17 RX ORDER — LEVETIRACETAM 500 MG/1
500 TABLET ORAL EVERY 12 HOURS
Status: DISCONTINUED | OUTPATIENT
Start: 2021-03-17 | End: 2021-03-20 | Stop reason: HOSPADM

## 2021-03-17 RX ORDER — METOPROLOL TARTRATE 25 MG/1
25 TABLET, FILM COATED ORAL EVERY 12 HOURS
Status: DISCONTINUED | OUTPATIENT
Start: 2021-03-17 | End: 2021-03-18

## 2021-03-17 RX ORDER — HYDRALAZINE HYDROCHLORIDE 20 MG/ML
20 INJECTION INTRAMUSCULAR; INTRAVENOUS
Status: DISCONTINUED | OUTPATIENT
Start: 2021-03-17 | End: 2021-03-20 | Stop reason: HOSPADM

## 2021-03-17 RX ADMIN — METOPROLOL TARTRATE 25 MG: 25 TABLET, FILM COATED ORAL at 22:41

## 2021-03-17 RX ADMIN — SODIUM CHLORIDE 10 MG/HR: 900 INJECTION, SOLUTION INTRAVENOUS at 19:06

## 2021-03-17 RX ADMIN — POTASSIUM BICARBONATE 40 MEQ: 782 TABLET, EFFERVESCENT ORAL at 22:42

## 2021-03-17 RX ADMIN — LABETALOL HYDROCHLORIDE 20 MG: 5 INJECTION, SOLUTION INTRAVENOUS at 20:55

## 2021-03-17 RX ADMIN — LEVETIRACETAM 500 MG: 500 TABLET ORAL at 22:42

## 2021-03-17 RX ADMIN — SODIUM CHLORIDE 10 MG/HR: 900 INJECTION, SOLUTION INTRAVENOUS at 20:58

## 2021-03-17 RX ADMIN — CLONIDINE HYDROCHLORIDE 0.2 MG: 0.1 TABLET ORAL at 22:40

## 2021-03-17 RX ADMIN — LEVETIRACETAM 250 MG: 500 TABLET ORAL at 22:41

## 2021-03-17 RX ADMIN — HEPARIN SODIUM 5000 UNITS: 5000 INJECTION INTRAVENOUS; SUBCUTANEOUS at 21:00

## 2021-03-17 NOTE — ED PROVIDER NOTES
EMERGENCY DEPARTMENT HISTORY AND PHYSICAL EXAM    3:25 PM  Date: 3/17/2021  Patient Name: Donovan Ta    History of Presenting Illness       History Provided By:     HPI: Donovan Ta is a 77 y.o. female with past medical history of end-stage renal disease, type 2 diabetes, CVA, dementia presents with  tachycardia. Patient completed dialysis and they called medics because her heart rate was in the 160s-170s possible SVT according to paramedic report and rhythm strip. Patient temporarily converted to sinus with vagal maneuver in route however she had several more episodes of tachycardia. Patient denies any chest pain. States that t she feels palpitations, denies any shortness of breath, fever. Denies any abdominal pain, dysuria. PCP: Ashwin Banerjee MD    Past History     Past Medical History:  Past Medical History:   Diagnosis Date    Asthma     Bipolar affective disorder ([facility] [address]     Brain condition     Cataract     Chronic kidney disease     hemodialysis M-W-F    Diabetes ([facility] [address]     Hyperlipidemia     Hypertension     Paralytic strabismus, unspecified     Psychiatric disorder     Seizures ([facility] [address] 08/27/2018       Past Surgical History:  Past Surgical History:   Procedure Laterality Date    [place] INSJ TUNNELED CVC W/O SUBQ PORT/ AGE 3 YR/> N/A 8/9/2018     in-pt 474A, Insertion Tunneled Central Venous Catheter performed by Abida Javier MD at [facility]    [place] INSJ TUNNELED CVC W/O SUBQ PORT/ AGE 5 YR/> N/A 11/8/2019    INSERTION TUNNELED CENTRAL VENOUS CATHETER performed by Jo Ann Mcmillan MD at [address] [place] [address]       Family History:  No family history on file. Social History:  Social History     Tobacco Use    Smoking status: Never Smoker    Smokeless tobacco: Never Used   Substance Use Topics    Alcohol use: No    Drug use: No       Allergies:   Allergies   Allergen Reactions    Amoxicillin Unknown (comments)    Cleocin [Clindamycin Hcl] Unknown (comments)    Codeine Unknown (comments)    Lorcet 10/650 [Hydrocodone-Acetaminophen] Unknown (comments)    Penicillin G Benzathine Unknown (comments)    Shellfish Derived Unknown (comments)       Review of Systems   Review of Systems   Constitutional: Negative for activity change, appetite change and chills. HENT: Negative for congestion, ear discharge, ear pain and sore throat. Eyes: Negative for photophobia and pain. Respiratory: Negative for cough and choking. Cardiovascular: Positive for palpitations. Negative for chest pain and leg swelling. Gastrointestinal: Negative for anal bleeding and rectal pain. Endocrine: Negative for polydipsia and polyuria. Genitourinary: Negative for genital sores and urgency. Musculoskeletal: Negative for arthralgias and myalgias. Neurological: Negative for dizziness, seizures and speech difficulty. Psychiatric/Behavioral: Negative for hallucinations, self-injury and suicidal ideas. Physical Exam     Patient Vitals for the past 12 hrs:   Pulse Resp BP SpO2   03/17/21 1730 91 (!) 6 (!) 204/96 100 %   03/17/21 1715 (!) 108 29 (!) 191/99 96 %   03/17/21 1700 90 13 (!) 169/93 100 %   03/17/21 1645 92 13 (!) 148/111 100 %   03/17/21 1630 (!) 105 22 (!) 138/109 100 %   03/17/21 1615 (!) 103 22 (!) 192/127 100 %   03/17/21 1600 100 20 (!) 177/105 100 %   03/17/21 1536 (!) 164 22 (!) 227/132 100 %       Physical Exam  Vitals signs and nursing note reviewed. Constitutional:       Appearance: She is well-developed. HENT:      Head: Normocephalic and atraumatic. Eyes:      General:         Right eye: No discharge. Left eye: No discharge. Neck:      Musculoskeletal: Normal range of motion and neck supple. Cardiovascular:      Rate and Rhythm: Regular rhythm. Tachycardia present. Heart sounds: Normal heart sounds. No murmur. Comments: Tunneled central venous catheter  Pulmonary:      Effort: Pulmonary effort is normal. No respiratory distress.       Breath sounds: Normal breath sounds. No stridor. No wheezing or rales. Chest:      Chest wall: No tenderness. Abdominal:      General: Bowel sounds are normal. There is no distension. Palpations: Abdomen is soft. Tenderness: There is no abdominal tenderness. There is no guarding or rebound. Musculoskeletal: Normal range of motion. Skin:     General: Skin is warm and dry. Neurological:      Mental Status: She is alert. Mental status is at baseline. Diagnostic Study Results     Labs -  Recent Results (from the past 12 hour(s))   EKG, 12 LEAD, INITIAL    Collection Time: 03/17/21  3:25 PM   Result Value Ref Range    Ventricular Rate 160 BPM    Atrial Rate 159 BPM    QRS Duration 80 ms    Q-T Interval 320 ms    QTC Calculation (Bezet) 522 ms    Calculated R Axis -22 degrees    Calculated T Axis -60 degrees    Diagnosis       Supraventricular tachycardia  ST & T wave abnormality, consider inferior ischemia  ST & T wave abnormality, consider anterior ischemia  Abnormal ECG  Confirmed by Sherie Ramires MD, Elisa Bruce ([id number]) on 3/17/2021 3:59:55 PM     CBC WITH AUTOMATED DIFF    Collection Time: 03/17/21  3:43 PM   Result Value Ref Range    WBC 9.0 4.6 - 13.2 K/uL    RBC 2.97 (L) 4.20 - 5.30 M/uL    HGB 8.6 (L) 12.0 - 16.0 g/dL    HCT 26.2 (L) 35.0 - 45.0 %    MCV 88.2 74.0 - 97.0 FL    MCH 29.0 24.0 - 34.0 PG    MCHC 32.8 31.0 - 37.0 g/dL    RDW 14.8 (H) 11.6 - 14.5 %    PLATELET 783 962 - 616 K/uL    MPV 11.0 9.2 - 11.8 FL    NEUTROPHILS 75 (H) 40 - 73 %    LYMPHOCYTES 22 21 - 52 %    MONOCYTES 2 (L) 3 - 10 %    EOSINOPHILS 1 0 - 5 %    BASOPHILS 0 0 - 2 %    ABS. NEUTROPHILS 6.8 1.8 - 8.0 K/UL    ABS. LYMPHOCYTES 1.9 0.9 - 3.6 K/UL    ABS. MONOCYTES 0.2 0.05 - 1.2 K/UL    ABS. EOSINOPHILS 0.1 0.0 - 0.4 K/UL    ABS.  BASOPHILS 0.0 0.0 - 0.1 K/UL    DF AUTOMATED     METABOLIC PANEL, COMPREHENSIVE    Collection Time: 03/17/21  3:43 PM   Result Value Ref Range    Sodium 140 136 - 145 mmol/L    Potassium 3.4 (L) 3.5 - 5.5 mmol/L    Chloride 102 100 - 111 mmol/L    CO2 26 21 - 32 mmol/L    Anion gap 12 3.0 - 18 mmol/L    Glucose 132 (H) 74 - 99 mg/dL    BUN 26 (H) 7.0 - 18 MG/DL    Creatinine 4.94 (H) 0.6 - 1.3 MG/DL    BUN/Creatinine ratio 5 (L) 12 - 20      GFR est AA 11 (L) >60 ml/min/1.73m2    GFR est non-AA 9 (L) >60 ml/min/1.73m2    Calcium 9.3 8.5 - 10.1 MG/DL    Bilirubin, total 0.3 0.2 - 1.0 MG/DL    ALT (SGPT) 15 13 - 56 U/L    AST (SGOT) 16 10 - 38 U/L    Alk. phosphatase 132 (H) 45 - 117 U/L    Protein, total 8.1 6.4 - 8.2 g/dL    Albumin 3.4 3.4 - 5.0 g/dL    Globulin 4.7 (H) 2.0 - 4.0 g/dL    A-G Ratio 0.7 (L) 0.8 - 1.7     TROPONIN I    Collection Time: 03/17/21  3:43 PM   Result Value Ref Range    Troponin-I, QT <0.02 0.0 - 0.045 NG/ML   MAGNESIUM    Collection Time: 03/17/21  3:43 PM   Result Value Ref Range    Magnesium 2.2 1.6 - 2.6 mg/dL   PHOSPHORUS    Collection Time: 03/17/21  3:43 PM   Result Value Ref Range    Phosphorus 2.8 2.5 - 4.9 MG/DL   NT-PRO BNP    Collection Time: 03/17/21  3:43 PM   Result Value Ref Range    NT pro-BNP 5,175 (H) 0 - 900 PG/ML   EKG, 12 LEAD, INITIAL    Collection Time: 03/17/21  3:45 PM   Result Value Ref Range    Ventricular Rate 105 BPM    Atrial Rate 105 BPM    P-R Interval 162 ms    QRS Duration 82 ms    Q-T Interval 358 ms    QTC Calculation (Bezet) 473 ms    Calculated P Axis 71 degrees    Calculated R Axis -4 degrees    Calculated T Axis 64 degrees    Diagnosis       Sinus tachycardia  Otherwise normal ECG    Confirmed by Chelita Mcknight MD, Saida Harman ([id number]) on 3/17/2021 4:00:08 PM         Radiologic Studies -   Xr Chest Port    Result Date: 3/17/2021  : 1. No acute cardiopulmonary disease. Medical Decision Making     ED Course: Progress Notes, Reevaluation, and Consults:    3:25 PM Initial assessment performed. The patients presenting problems have been discussed, and they/their family are in agreement with the care plan formulated and outlined with them.   I have encouraged them to ask questions as they arise throughout their visit. Provider Notes (Medical Decision Making):   Patient presents with tachycardia 160-170s  Immediately at the patient's bedside since there was a risk for deterioration  Possible SVT on EKG, ?atrial flutter  attempted vagal maneuvers without success  Difficulty with accesss, R EJ iv inserted by me  Patient converted back to sinus spontaneously  Plan to obtain labs, imaging  Old medical records reviewed:  EKG as interpreted by me at 15:25 SVT/? atrial flutter, 160, 1 with T wave inversions   EKG as interpreted by me at 15:45: Sinus tachycardia, 105, no ST changes, normal intervals  Labs as interpreted by me: No leukocytosis, no electrolyte abnormality  X-ray chest as interpreted by me no pneumonia  Initially were not sure with what was initially we will answer when the patient was upset baseline mental status so CT head obtained no acute abnormality. Discussed with Dr. Akilah Yuan nephrologist who is familiar with patient confirmed that patient has dementia. First troponin negative, proBNP 5175  Creatinine 4.94 at baseline  Discussed with cardiology SUSI Tan recommended Cardizem drip  Patient with elevated BP she will receive labetalol IV as needed  Discussed with Dr. Akilah Yuan nephrology who is going to see patient tomorrow  Resident Jillian Parker also involved in the management of this patient under my supervision      Critical Care:  CRITICAL CARE:    I have spent 45 minutes of critical care time involved in lab review, consultations with specialist, family decision-making, and documentation. This time does not include separately billable procedures. During this entire length of time I was immediately available to the patient. Critical Care:   The reason for providing this level of medical care for this critically ill patient was due a critical illness that impaired one or more vital organ systems such that there was a high probability of imminent or life threatening deterioration in the patients condition. This care involved high complexity decision making to assess, manipulate, and support vital system functions, to treat this degreee vital organ system failure and to prevent further life threatening deterioration of the patients condition. Vital Signs-Reviewed the patient's vital signs. Reviewed pt's pulse ox reading. Records Reviewed: old medical records  -I am the first provider for this patient.  -I reviewed the vital signs, available nursing notes, past medical history, past surgical history, family history and social history. Current Facility-Administered Medications   Medication Dose Route Frequency Provider Last Rate Last Admin    adenosine (ADENOCARD) injection 6 mg  6 mg IntraVENous NOW Ricarda Covarrubias MD        dilTIAZem (CARDIZEM) 100 mg in 0.9% sodium chloride (MBP/ADV) 100 mL infusion  0-15 mg/hr IntraVENous TITRATE Ricarda Covarrubias MD         Current Outpatient Medications   Medication Sig Dispense Refill    cloNIDine (CATAPRES) 0.2 mg/24 hr ptwk 1 Patch by TransDERmal route every seven (7) days. 4 Patch 0    losartan (COZAAR) 100 mg tablet Take 1 Tab by mouth daily. 30 Tab 0    metoprolol tartrate (LOPRESSOR) 25 mg tablet Take 1 Tab by mouth every twelve (12) hours. 60 Tab 0    famotidine (Pepcid) 40 mg tablet Take 1 Tab by mouth daily. 30 Tab 0    atorvastatin (Lipitor) 10 mg tablet Take 10 mg by mouth daily. Indications: high amount of triglyceride in the blood      b complex-vitamin c-folic acid 0.8 mg (NEPHRO-DORIAN) 0.8 mg tab tablet Take 1 Tab by mouth daily.  levETIRAcetam (Keppra) 500 mg tablet Take 500 mg by mouth two (2) times a day.  sevelamer carbonate (RENVELA) 800 mg tab tab Take 800 mg by mouth three (3) times daily.  levETIRAcetam (KEPPRA) 250 mg tablet Take 1 Tab by mouth every Monday, Wednesday, Friday.  30 Tab 1    epoetin raphael (EPOGEN;PROCRIT) 3,000 unit/mL injection 1.73 mL by SubCUTAneous route Every Tuesday, Thursday and Saturday. Indications: ANEMIA DUE TO RENAL FAILURE, Renal Dialysis (Patient taking differently: 3,000 Units by SubCUTAneous route every Monday, Wednesday, Friday.) 1 Vial 0    insulin lispro (HUMALOG) 100 unit/mL injection INITIATE INSULIN CORRECTIVE PROTOCOL: Normal Insulin Sensitivity   For Blood Sugar (mg/dL) of:     Less than 150 =   0 units           150 -199 =   2 units  200 -249 =   4 units  250 -299 =   6 units  300 -349 =   8 units  350 and above = 10 units and Call Physician  If 2 glucose readings are above 1 Vial 0    aspirin 81 mg tablet Take 81 mg by mouth daily.  diltiazem CD (CARDIZEM CD) 240 mg ER capsule Take 240 mg by mouth daily. Clinical Impression     Clinical Impression: No diagnosis found. Disposition: This note was dictated utilizing voice recognition software which may lead to typographical errors. I apologize in advance if the situation occurs. If questions arise please do not hesitate to contact me or call our department.     Cecile Germain MD  3:25 PM

## 2021-03-17 NOTE — H&P
History & Physical    Patient: Raymond Patton MRN: [id number]  CSN: [id number]    YOB: 1955  Age: 77 y.o. Sex: female      DOA: 3/17/2021  CC: elevated HR     PCP: Kayleen Dixon MD       HPI:     Raymond Patton is a 77 y.o. female w/ PMH of stroke, psychiatric disorder, ESRD, seizures, HTN, asthma presenting to [facility] on 3/17/21 from dialysis for SVT. Patient normally resides at [facility]. Patient converted to normal sinus on own without adenosine. Patient c/o elevated HR, mild CP. When asked about shortness of breath or cough patient reports Stoney Newness has asthma\" Chart review states she also complained of weakness. Denies abdominal pain, n/v/d, sore throat, fevers, chills. Patient cursing during most of interview. Blood pressure increasing in ED. Sister reports patient just got released from isolation last Wednesday after having COVID. Past Medical History:   Diagnosis Date    Asthma     Bipolar affective disorder ([facility]     Brain condition     Cataract     Chronic kidney disease     hemodialysis M-W-F    Diabetes ([facility]     Hyperlipidemia     Hypertension     Paralytic strabismus, unspecified     Psychiatric disorder     Seizures ([facility] 08/27/2018       Past Surgical History:   Procedure Laterality Date    [facility] INSJ TUNNELED CVC W/O SUBQ PORT/ AGE 5 YR/> N/A 8/9/2018     in-pt 474A, Insertion Tunneled Central Venous Catheter performed by Jitendra Ghotra MD at [address] [place] [address]    VA INSJ TUNNELED CVC W/O SUBQ PORT/ AGE 5 YR/> N/A 11/8/2019    INSERTION TUNNELED CENTRAL VENOUS CATHETER performed by Enrique Trejo MD at [facility]       No family history on file.     Social History     Socioeconomic History    Marital status: SINGLE     Spouse name: Not on file    Number of children: Not on file    Years of education: Not on file    Highest education level: Not on file   Tobacco Use    Smoking status: Never Smoker    Smokeless tobacco: Never Used Substance and Sexual Activity    Alcohol use: No    Drug use: No    Sexual activity: Never       Prior to Admission medications    Medication Sig Start Date End Date Taking? Authorizing Provider   cloNIDine (CATAPRES) 0.2 mg/24 hr ptwk 1 Patch by TransDERmal route every seven (7) days. 9/29/20   Jas Tim MD   losartan (COZAAR) 100 mg tablet Take 1 Tab by mouth daily. 9/30/20   Jas Tim MD   metoprolol tartrate (LOPRESSOR) 25 mg tablet Take 1 Tab by mouth every twelve (12) hours. 9/29/20   Jas Tim MD   famotidine (Pepcid) 40 mg tablet Take 1 Tab by mouth daily. 9/29/20   Jas Tim MD   atorvastatin (Lipitor) 10 mg tablet Take 10 mg by mouth daily. Indications: high amount of triglyceride in the blood    Provider, Historical   b complex-vitamin c-folic acid 0.8 mg (NEPHRO-DORIAN) 0.8 mg tab tablet Take 1 Tab by mouth daily. Provider, Historical   levETIRAcetam (Keppra) 500 mg tablet Take 500 mg by mouth two (2) times a day. Provider, Historical   sevelamer carbonate (RENVELA) 800 mg tab tab Take 800 mg by mouth three (3) times daily. Provider, Historical   levETIRAcetam (KEPPRA) 250 mg tablet Take 1 Tab by mouth every Monday, Wednesday, Friday. 8/31/18   Carlie Mir MD   epoetin raphael (EPOGEN;PROCRIT) 3,000 unit/mL injection 1.73 mL by SubCUTAneous route Every Tuesday, Thursday and Saturday. Indications: ANEMIA DUE TO RENAL FAILURE, Renal Dialysis  Patient taking differently: 3,000 Units by SubCUTAneous route every Monday, Wednesday, Friday.  8/14/18   Eva Kirk MD   insulin lispro (HUMALOG) 100 unit/mL injection INITIATE INSULIN CORRECTIVE PROTOCOL: Normal Insulin Sensitivity   For Blood Sugar (mg/dL) of:     Less than 150 =   0 units           150 -199 =   2 units  200 -249 =   4 units  250 -299 =   6 units  300 -349 =   8 units  350 and above = 10 units and Call Physician  If 2 glucose readings are above 8/14/18   Ronal Kirk MD   aspirin 81 mg tablet Take 81 mg by mouth daily. OtherElina MD   diltiazem CD (CARDIZEM CD) 240 mg ER capsule Take 240 mg by mouth daily. Other, MD Elina       Allergies   Allergen Reactions    Amoxicillin Unknown (comments)    Cleocin [Clindamycin Hcl] Unknown (comments)    Codeine Unknown (comments)    Lorcet 10/650 [Hydrocodone-Acetaminophen] Unknown (comments)    Penicillin G Benzathine Unknown (comments)    Shellfish Derived Unknown (comments)              Physical Exam:      Visit Vitals  BP (!) 204/96   Pulse 91   Resp 29   SpO2 100%       Physical Exam:      Physical Exam  Constitutional:       General: She is not in acute distress. Appearance: Normal appearance. HENT:      Head: Normocephalic and atraumatic. Right Ear: External ear normal.      Left Ear: External ear normal.      Nose: Nose normal.   Eyes:      Pupils: Pupils are equal, round, and reactive to light. Neck:      Musculoskeletal: No muscular tenderness. Cardiovascular:      Rate and Rhythm: Regular rhythm. Tachycardia present. Pulses: Normal pulses. Pulmonary:      Effort: Pulmonary effort is normal.      Breath sounds: No stridor. No wheezing. Abdominal:      General: Bowel sounds are normal. There is no distension. Tenderness: There is no abdominal tenderness. Musculoskeletal: Normal range of motion. Right lower leg: No edema. Lymphadenopathy:      Cervical: No cervical adenopathy. Skin:     General: Skin is warm. Capillary Refill: Capillary refill takes less than 2 seconds. Findings: No bruising. Comments: Left arm fistula with chronic appearing raised lesion. Right chest wall catheter in place. Neurological:      Mental Status: She is alert. Mental status is at baseline. Psychiatric:      Comments: Cursing every few words.             Lab/Data Review:  Labs: Results:       Chemistry Recent Labs     03/17/21  1543   GLU 132*      K 3.4*      CO2 26   BUN 26*   CREA 4.94*   CA 9.3   AGAP 12   BUCR 5*   *   TP 8.1   ALB 3.4   GLOB 4.7*   AGRAT 0.7*      CBC w/Diff Recent Labs     03/17/21  1543   WBC 9.0   RBC 2.97*   HGB 8.6*   HCT 26.2*      GRANS 75*   LYMPH 22   EOS 1      Coagulation No results for input(s): PTP, INR, APTT, INREXT, INREXT in the last 72 hours. Iron/Ferritin No results for input(s): IRON in the last 72 hours. No lab exists for component: TIBCCALC   BNP No results for input(s): BNPP in the last 72 hours. Cardiac Enzymes No results for input(s): CPK, CKND1, SONG in the last 72 hours. No lab exists for component: CKRMB, TROIP   Liver Enzymes Recent Labs     03/17/21  1543   TP 8.1   ALB 3.4   *      Thyroid Studies No results found for: T4, T3U, TSH, TSHEXT, TSHEXT       All Micro Results     None          Imaging Reviewed:  CT Results  (Last 48 hours)               03/17/21 1805  CT HEAD WO CONT Final result    Impression:      No clearly acute findings. Small remote left frontal infarct. Mild burden of periventricular white matter   low-attenuation, likely chronic microvascular ischemic change. Mild cerebral   cortical atrophy. Narrative:  EXAMINATION: CT head without contrast       INDICATION: General weakness, tachycardia, altered mental status       COMPARISON: CT 9/25/2020       TECHNIQUE: CT head without contrast with multiplanar reformations obtained. FINDINGS:       No focal mass effect or shift of midline structures. No abnormal extra-axial   collection identified. Ventricles are not hydrocephalic. Mild cerebral cortical   atrophy. Evidence of small remote left frontal infarct. Mild burden of   periventricular white matter low-attenuation. Imaged paranasal sinuses and mastoids are well-aerated. Intact. Superficial soft   tissues unremarkable.                CXR Results  (Last 48 hours)               03/17/21 1615  XR CHEST PORT Final result Impression:  :       1.  No acute cardiopulmonary disease. Narrative:  EXAM: AP portable chest.       Indications: sob , weakness       Time stamp: 1612 hours   Comparison: December 2020       Findings:       Lines/Tubes/Devices:  Right-sided dialysis catheter tip at right atrium   LUNGS: No acute findings. Small oblong nodular opacity projecting at the left   upper lateral chest which cannot be further localized and likely extrinsic to   the patient. Not evident on relatively recent preceding chest x-ray or on CT of   September 2020; favored as artifact/benign etiology. MEDIASTINUM: Mildly enlarged cardiac silhouette. BONES/SOFT TISSUES: No acute findings                     Assessment:   Active Problems:    ESRD (end stage renal disease) ([facility] (8/8/2018)      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 [facility]) (8/8/2018)      CVA, old, cognitive deficits (8/8/2018)      Hypertensive emergency (9/25/2020)      Tachycardia (9/25/2020)            Plan:   1. Tachycardia requiring cardizem gtt  · Suspect due to clonidine withdrawal as patch not present  · nontitrating cardizem gtt   · Check lactic acid to r/o sepsis  · Cards consulted per ED staff   · K a little low, will replace   2. Hypertensive urgency  · Acutely developing during stay in ED  · Treat acutely given rapid development   · Suspect due to clonidine withdrawal as well  · Apply clonidine patch, lopressor tonight, PRN labetalol   · Restart losartan tomorrow   3. ESRD  · HD per nephro  4.  Chronic encephalopathy due to underlying strokes, psychiatric disorder, seizure disorder  · CT head with no acute changes   · Cont' keppra PO    Discussed with sister  Best Practice   [facility]- [facility]   SUP- not indicated   Home medications- reviewed and reordered  Full Code        Derek Salguero PA-C  3/17/2021, 7:12 PM

## 2021-03-17 NOTE — ED TRIAGE NOTES
Pt arrived via [employer]. Per medic pt was at dialysis, when staff noticed her heart rate in the 160's.  Pt is complaining of general weakness

## 2021-03-18 LAB
ALBUMIN SERPL-MCNC: 3.2 G/DL (ref 3.4–5)
ANION GAP SERPL CALC-SCNC: 8 MMOL/L (ref 3–18)
BASOPHILS # BLD: 0 K/UL (ref 0–0.1)
BASOPHILS NFR BLD: 0 % (ref 0–2)
BUN SERPL-MCNC: 34 MG/DL (ref 7–18)
BUN/CREAT SERPL: 5 (ref 12–20)
CALCIUM SERPL-MCNC: 8.8 MG/DL (ref 8.5–10.1)
CHLORIDE SERPL-SCNC: 103 MMOL/L (ref 100–111)
CO2 SERPL-SCNC: 26 MMOL/L (ref 21–32)
CREAT SERPL-MCNC: 6.89 MG/DL (ref 0.6–1.3)
DIFFERENTIAL METHOD BLD: ABNORMAL
EOSINOPHIL # BLD: 0.2 K/UL (ref 0–0.4)
EOSINOPHIL NFR BLD: 2 % (ref 0–5)
ERYTHROCYTE [DISTWIDTH] IN BLOOD BY AUTOMATED COUNT: 15.2 % (ref 11.6–14.5)
EST. AVERAGE GLUCOSE BLD GHB EST-MCNC: 160 MG/DL
GLUCOSE BLD STRIP.AUTO-MCNC: 105 MG/DL (ref 70–110)
GLUCOSE BLD STRIP.AUTO-MCNC: 304 MG/DL (ref 70–110)
GLUCOSE SERPL-MCNC: 233 MG/DL (ref 74–99)
HBA1C MFR BLD: 7.2 % (ref 4.2–5.6)
HCT VFR BLD AUTO: 24.3 % (ref 35–45)
HGB BLD-MCNC: 7.9 G/DL (ref 12–16)
LYMPHOCYTES # BLD: 1.2 K/UL (ref 0.9–3.6)
LYMPHOCYTES NFR BLD: 12 % (ref 21–52)
MAGNESIUM SERPL-MCNC: 2.1 MG/DL (ref 1.6–2.6)
MCH RBC QN AUTO: 29.2 PG (ref 24–34)
MCHC RBC AUTO-ENTMCNC: 32.5 G/DL (ref 31–37)
MCV RBC AUTO: 89.7 FL (ref 74–97)
MONOCYTES # BLD: 1.3 K/UL (ref 0.05–1.2)
MONOCYTES NFR BLD: 13 % (ref 3–10)
NEUTS SEG # BLD: 7.2 K/UL (ref 1.8–8)
NEUTS SEG NFR BLD: 73 % (ref 40–73)
PHOSPHATE SERPL-MCNC: 4.3 MG/DL (ref 2.5–4.9)
PLATELET # BLD AUTO: 389 K/UL (ref 135–420)
PMV BLD AUTO: 11.3 FL (ref 9.2–11.8)
POTASSIUM SERPL-SCNC: 4.6 MMOL/L (ref 3.5–5.5)
RBC # BLD AUTO: 2.71 M/UL (ref 4.2–5.3)
SARS-COV-2, COV2: NORMAL
SODIUM SERPL-SCNC: 137 MMOL/L (ref 136–145)
TSH SERPL DL<=0.05 MIU/L-ACNC: 3.7 UIU/ML (ref 0.36–3.74)
WBC # BLD AUTO: 9.8 K/UL (ref 4.6–13.2)

## 2021-03-18 PROCEDURE — 83735 ASSAY OF MAGNESIUM: CPT

## 2021-03-18 PROCEDURE — 74011250636 HC RX REV CODE- 250/636: Performed by: PHYSICIAN ASSISTANT

## 2021-03-18 PROCEDURE — 80069 RENAL FUNCTION PANEL: CPT

## 2021-03-18 PROCEDURE — 99223 1ST HOSP IP/OBS HIGH 75: CPT | Performed by: INTERNAL MEDICINE

## 2021-03-18 PROCEDURE — 74011250636 HC RX REV CODE- 250/636: Performed by: EMERGENCY MEDICINE

## 2021-03-18 PROCEDURE — 74011636637 HC RX REV CODE- 636/637: Performed by: NURSE PRACTITIONER

## 2021-03-18 PROCEDURE — 99232 SBSQ HOSP IP/OBS MODERATE 35: CPT | Performed by: HOSPITALIST

## 2021-03-18 PROCEDURE — 85025 COMPLETE CBC W/AUTO DIFF WBC: CPT

## 2021-03-18 PROCEDURE — 83036 HEMOGLOBIN GLYCOSYLATED A1C: CPT

## 2021-03-18 PROCEDURE — 74011000258 HC RX REV CODE- 258: Performed by: EMERGENCY MEDICINE

## 2021-03-18 PROCEDURE — 74011250637 HC RX REV CODE- 250/637: Performed by: PHYSICIAN ASSISTANT

## 2021-03-18 PROCEDURE — 82962 GLUCOSE BLOOD TEST: CPT

## 2021-03-18 PROCEDURE — APPSS30 APP SPLIT SHARED TIME 16-30 MINUTES: Performed by: NURSE PRACTITIONER

## 2021-03-18 PROCEDURE — 74011250636 HC RX REV CODE- 250/636: Performed by: FAMILY MEDICINE

## 2021-03-18 PROCEDURE — U0003 INFECTIOUS AGENT DETECTION BY NUCLEIC ACID (DNA OR RNA); SEVERE ACUTE RESPIRATORY SYNDROME CORONAVIRUS 2 (SARS-COV-2) (CORONAVIRUS DISEASE [COVID-19]), AMPLIFIED PROBE TECHNIQUE, MAKING USE OF HIGH THROUGHPUT TECHNOLOGIES AS DESCRIBED BY CMS-2020-01-R: HCPCS

## 2021-03-18 PROCEDURE — 36415 COLL VENOUS BLD VENIPUNCTURE: CPT

## 2021-03-18 PROCEDURE — 74011250637 HC RX REV CODE- 250/637: Performed by: NURSE PRACTITIONER

## 2021-03-18 PROCEDURE — 74011250637 HC RX REV CODE- 250/637: Performed by: INTERNAL MEDICINE

## 2021-03-18 PROCEDURE — 65660000004 HC RM CVT STEPDOWN

## 2021-03-18 PROCEDURE — 84443 ASSAY THYROID STIM HORMONE: CPT

## 2021-03-18 RX ORDER — MAGNESIUM SULFATE 100 %
4 CRYSTALS MISCELLANEOUS AS NEEDED
Status: DISCONTINUED | OUTPATIENT
Start: 2021-03-18 | End: 2021-03-20 | Stop reason: HOSPADM

## 2021-03-18 RX ORDER — INSULIN LISPRO 100 [IU]/ML
INJECTION, SOLUTION INTRAVENOUS; SUBCUTANEOUS
Status: DISCONTINUED | OUTPATIENT
Start: 2021-03-18 | End: 2021-03-20 | Stop reason: HOSPADM

## 2021-03-18 RX ORDER — DEXTROSE 50 % IN WATER (D50W) INTRAVENOUS SYRINGE
25-50 AS NEEDED
Status: DISCONTINUED | OUTPATIENT
Start: 2021-03-18 | End: 2021-03-20 | Stop reason: HOSPADM

## 2021-03-18 RX ORDER — METOPROLOL TARTRATE 50 MG/1
50 TABLET ORAL EVERY 12 HOURS
Status: DISCONTINUED | OUTPATIENT
Start: 2021-03-18 | End: 2021-03-18

## 2021-03-18 RX ORDER — LOSARTAN POTASSIUM 50 MG/1
50 TABLET ORAL DAILY
Status: DISCONTINUED | OUTPATIENT
Start: 2021-03-19 | End: 2021-03-20

## 2021-03-18 RX ORDER — METOPROLOL TARTRATE 25 MG/1
25 TABLET, FILM COATED ORAL
Status: COMPLETED | OUTPATIENT
Start: 2021-03-18 | End: 2021-03-18

## 2021-03-18 RX ADMIN — ONDANSETRON 4 MG: 2 INJECTION INTRAMUSCULAR; INTRAVENOUS at 20:54

## 2021-03-18 RX ADMIN — LEVETIRACETAM 500 MG: 500 TABLET ORAL at 08:30

## 2021-03-18 RX ADMIN — SODIUM CHLORIDE 10 MG/HR: 900 INJECTION, SOLUTION INTRAVENOUS at 15:33

## 2021-03-18 RX ADMIN — ONDANSETRON 4 MG: 2 INJECTION INTRAMUSCULAR; INTRAVENOUS at 08:29

## 2021-03-18 RX ADMIN — INSULIN LISPRO 8 UNITS: 100 INJECTION, SOLUTION INTRAVENOUS; SUBCUTANEOUS at 16:30

## 2021-03-18 RX ADMIN — HEPARIN SODIUM 5000 UNITS: 5000 INJECTION INTRAVENOUS; SUBCUTANEOUS at 20:00

## 2021-03-18 RX ADMIN — HEPARIN SODIUM 5000 UNITS: 5000 INJECTION INTRAVENOUS; SUBCUTANEOUS at 03:45

## 2021-03-18 RX ADMIN — ONDANSETRON 4 MG: 2 INJECTION INTRAMUSCULAR; INTRAVENOUS at 03:45

## 2021-03-18 RX ADMIN — METOPROLOL TARTRATE 75 MG: 25 TABLET, FILM COATED ORAL at 20:50

## 2021-03-18 RX ADMIN — METOPROLOL TARTRATE 25 MG: 25 TABLET, FILM COATED ORAL at 11:00

## 2021-03-18 RX ADMIN — HEPARIN SODIUM 5000 UNITS: 5000 INJECTION INTRAVENOUS; SUBCUTANEOUS at 11:58

## 2021-03-18 RX ADMIN — SODIUM CHLORIDE 10 MG/HR: 900 INJECTION, SOLUTION INTRAVENOUS at 03:45

## 2021-03-18 RX ADMIN — HYDRALAZINE HYDROCHLORIDE 20 MG: 20 INJECTION INTRAMUSCULAR; INTRAVENOUS at 14:49

## 2021-03-18 RX ADMIN — DILTIAZEM HYDROCHLORIDE 240 MG: 120 CAPSULE, COATED, EXTENDED RELEASE ORAL at 08:29

## 2021-03-18 RX ADMIN — HYDRALAZINE HYDROCHLORIDE 20 MG: 20 INJECTION INTRAMUSCULAR; INTRAVENOUS at 08:30

## 2021-03-18 RX ADMIN — METOPROLOL TARTRATE 25 MG: 25 TABLET, FILM COATED ORAL at 08:29

## 2021-03-18 RX ADMIN — LEVETIRACETAM 500 MG: 500 TABLET ORAL at 20:50

## 2021-03-18 NOTE — ROUTINE PROCESS
Bedside and Verbal shift change report given to Luzmaria Almeida (oncoming nurse) by Duran Curtis (offgoing nurse). Report included the following information SBAR, Kardex, MAR and Recent Results. SITUATION:  
 Code Status: Full Code  Reason for Admission: Tachycardia [R00.0] [facility] day: 1  Problem List:  
   
Hospital Problems  Date Reviewed: 5/29/2019 Codes Class Noted POA Hypertensive emergency ICD-10-CM: I16.1 ICD-9-CM: 401.9  9/25/2020 Yes Tachycardia ICD-10-CM: R00.0 ICD-9-CM: 785.0  9/25/2020 Unknown ESRD (end stage renal disease) ([facility] ICD-10-CM: N18.6 ICD-9-CM: 585.6  8/8/2018 Yes 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 ([facility] ICD-10-CM: E11.22, I12.0, Z99.2, N18.6 ICD-9-CM: 250.40, 403.91, V45.11, 585.6  8/8/2018 Yes  
   
 CVA, old, cognitive deficits ICD-10-CM: I69.319 ICD-9-CM: 438.0  8/8/2018 Yes BACKGROUND:  
 Past Medical History:  
Past Medical History:  
Diagnosis Date  Asthma  Bipolar affective disorder ([facility]  Brain condition  Cataract  Chronic kidney disease   
 hemodialysis M-W-F  Diabetes ([facility]  Hyperlipidemia  Hypertension  Paralytic strabismus, unspecified  Psychiatric disorder  Seizures ([facility] 08/27/2018 Patient taking anticoagulants yes ASSESSMENT:  
 Changes in Assessment Throughout Shift:  
 1552 - Pt experiencing htn. Last /89. PRN hydralizine given. MD aware. Pt is asymptomatic without any signs or symptoms of distress.  Patient has Central Line: no Reasons if yes: n/a  Patient has Khan Cath: no Reasons if yes: n/a  Last Vitals: 
  
Vitals:  
 03/18/21 0345 03/18/21 0807 03/18/21 1204 03/18/21 1544 BP: (!) 168/84 (!) 223/100 (!) 221/97 (!) 191/89 Pulse: 78 92 88 94 Resp: 18 18 18 20 Temp: 98.3 °F (36.8 °C) 97 °F (36.1 °C) 97.6 °F (36.4 °C) 98.9 °F (37.2 °C) SpO2: 100% 96% 97% 98% Weight:      
 
 
 IV and DRAINS (will only show if present) Peripheral IV 03/17/21 Right Other(comment)-Site Assessment: Clean, dry, & intact Peripheral IV 03/17/21 Right External jugular-Site Assessment: Clean, dry, & intact [REMOVED] Peripheral IV 03/17/21 Right Antecubital-Site Assessment: Clean, dry, & intact  WOUND (if present) Wound Type:  none Dressing present Dressing Present : No 
 Wound Concerns/Notes:  none  PAIN Pain Assessment Pain Intensity 1: 0 (03/18/21 1600) Patient Stated Pain Goal: 0 
o Interventions for Pain:  none 
o Intervention effective: n/a 
o Time of last intervention: n/a  
o Reassessment Completed: yes  Last 3 Weights: 
Last 3 Recorded Weights in this Encounter 03/18/21 0148 Weight: 59.7 kg (131 lb 9.6 oz) Weight change:  INTAKE/OUPUT Current Shift: 03/18 0701 - 03/18 1900 In: 100 [P.O.:100] Out: - Last three shifts: 03/16 1901 - 03/18 0700 In: 46 [I.V.:52] Out: -  
 
 LAB RESULTS Recent Labs  
  03/18/21 
0524 03/17/21 
1543 WBC 9.8 9.0 HGB 7.9* 8.6* HCT 24.3* 26.2*  
 380 Recent Labs  
  03/18/21 
0524 03/17/21 
1543  140  
K 4.6 3.4*  
* 132* BUN 34* 26* CREA 6.89* 4.94* CA 8.8 9.3 MG 2.1 2.2 RECOMMENDATIONS AND DISCHARGE PLANNING 1. Pending tests/procedures/ Plan of Care or Other Needs: Reorient pt, monitor htn 2. Discharge plan for patient and Needs/Barriers: to be determined 3. Estimated Discharge Date: TBD Posted on Whiteboard in Patients Room: yes 4. The patient's care plan was reviewed with the oncoming nurse. \"HEALS\" SAFETY CHECK Fall Risk Total Score: 5 Safety Measures: Safety Measures: Bed/Chair alarm on, Bed/Chair-Wheels locked, Bed in low position, Call light within reach, Fall prevention (comment), Gripper socks, Side rails X 3 A safety check occurred in the patient's room between off going nurse and oncoming nurse listed above. The safety check included the below items Area Items H High Alert Medications - Verify all high alert medication drips (heparin, PCA, etc.) E Equipment - Suction is set up for ALL patients (with genesis) - Red plugs utilized for all equipment (IV pumps, etc.) - WOWs wiped down at end of shift. 
- Room stocked with oxygen, suction, and other unit-specific supplies A Alarms - Bed alarm is set for fall risk patients - Ensure chair alarm is in place and activated if patient is up in a chair L Lines - Check IV for any infiltration - Khan bag is empty if patient has a Khan - Tubing and IV bags are labeled Maris Gilliland Safety - Room is clean, patient is clean, and equipment is clean. - Hallways are clear from equipment besides carts. - Fall bracelet on for fall risk patients - Ensure room is clear and free of clutter - Suction is set up for ALL patients (with genesis) - Hallways are clear from equipment besides carts. - Isolation precautions followed, supplies available outside room, sign posted Guera Dumont

## 2021-03-18 NOTE — PROGRESS NOTES
Reason for Admission:  Tachycardia [R00.0]                 RUR Score:    20           Plan for utilizing home health:    no                    Likelihood of Readmission:   Moderate                         Do you (patient/family) have any concerns for transition/discharge?  no    Transition of Care Plan:       Initial assessment completed with Karmen Camacho of [address] [place] [address] and pt's sister Jessica Eddy. Cognitive status of patient: disoriented. Face sheet information confirmed:  yes. The patient's sister Jessica Eddy  participate in her discharge plan and to receive any needed information. This patient is a long term resident of [address] [place] [address]. Patient is not able to navigate steps as needed. Prior to hospitalization, patient was considered to be independent with ADLs/IADLS : no . If not independent,  patient needs assist with : dressing, bathing, food preparation, cooking, toileting and grooming    Patient has a current ACP document on file: no      Healthcare Decision Maker:     Click here to complete [address] including selection of the [address] Relationship (ie \"Primary\")    The Medicaid stretcher transport will be available to transport patient home upon discharge. The patient already has  medical equipment available at the nursing facility as needed     Patient is not currently active with home health. Patient has stayed in a skilled nursing facility or rehab. Was  stay within last 60 days : yes. This patient is on dialysis :yes  Per Karmen Camacho  If yes, hemodialysis patient and receives treatment on Monday/Wednesday/Friday at [address] time is 11am. Pt is transported to/from dialysis by Medicaid transport. Currently, the discharge plan is LTC. The patient states that she can obtain her medications from the pharmacy, and take her medications as directed.     Patient's current insurance is [place] Medicaid, [employer] Medicaid      Care Management Interventions  PCP Verified by CM:  Yes  Palliative Care Criteria Met (RRAT>21 & CHF Dx)?: No  Mode of Transport at Discharge: BLS  Transition of Care Consult (CM Consult): [address]  Current Support Network: [address] [place] [address]  Confirm Follow Up Transport: Other (see comment)(nursing facility)  Discharge Location  Discharge Placement: SERAFIN Ochoa RN  Care Management  Pager: [phone]

## 2021-03-18 NOTE — PROGRESS NOTES
Problem: Pain  Goal: *Control of Pain  3/18/2021 1310 by Veldon Been  Outcome: Progressing Towards Goal  3/18/2021 1308 by Veldon Been  Outcome: Progressing Towards Goal  Goal: *PALLIATIVE CARE:  Alleviation of Pain  3/18/2021 1310 by Veldon Been  Outcome: Progressing Towards Goal  3/18/2021 1308 by Veldon Been  Outcome: Progressing Towards Goal     Problem: Patient Education: Go to Patient Education Activity  Goal: Patient/Family Education  3/18/2021 1310 by Veldon Been  Outcome: Progressing Towards Goal  3/18/2021 1308 by Veldon Been  Outcome: Progressing Towards Goal     Problem: Falls - Risk of  Goal: *Absence of Falls  Description: Document Rubi Shah Fall Risk and appropriate interventions in the flowsheet.   Outcome: Progressing Towards Goal  Note: Fall Risk Interventions:  Mobility Interventions: Bed/chair exit alarm, Communicate number of staff needed for ambulation/transfer, Patient to call before getting OOB, PT Consult for mobility concerns    Mentation Interventions: Adequate sleep, hydration, pain control, Bed/chair exit alarm, Evaluate medications/consider consulting pharmacy, Increase mobility, More frequent rounding, Reorient patient    Medication Interventions: Bed/chair exit alarm, Patient to call before getting OOB, Evaluate medications/consider consulting pharmacy, Teach patient to arise slowly    Elimination Interventions: Bed/chair exit alarm, Call light in reach, Patient to call for help with toileting needs, Toilet paper/wipes in reach, Toileting schedule/hourly rounds    History of Falls Interventions: Bed/chair exit alarm, Consult care management for discharge planning, Evaluate medications/consider consulting pharmacy         Problem: Patient Education: Go to Patient Education Activity  Goal: Patient/Family Education  Outcome: Progressing Towards Goal

## 2021-03-18 NOTE — PROGRESS NOTES
Comprehensive Nutrition Assessment    Type and Reason for Visit: Initial, Positive nutrition screen    Nutrition Recommendations/Plan:  - Continue current nutrition interventions. Monitor po intake of meals and supplements     Nutrition Assessment:  Pt admitted for tachycardia. Has hx of ESRD on HD; treatment resumed since admission. On po diet; unable to assess po intake. Noted was nauseated overnight and this morning, given zofran. Under covid 19 rule out    Malnutrition Assessment:  Malnutrition Status:  Insufficient data      Nutrition History and Allergies: Past medical hx:  ESRD on HD, DM, HLD, HTN, seizures. Weight fluctuation PTA per chart hx. Was 146 lb in 2013, then was 139 lb in 2018 per chart hx. Was ranging from 139 lb down to 125 lb. Gained wt, was 167 lb in 2020. 131 lb on 3/18/21 per chart. Question accuracy of pt wt of 167 lb due to weighing 133 lb on 11/8/19 and then 167 lb on 12/4/19. Food allergies: shellfish     Estimated Daily Nutrient Needs:  Energy (kcal): 2149-8009; Weight Used for Energy Requirements: Other (specify)(SBW 65 kg)  Protein (g): 78-85; Weight Used for Protein Requirements: Other (specify)(SBWx1.2-1.3)  Fluid (ml/day): 750-1500; Method Used for Fluid Requirements: 1 ml/kcal      Nutrition Related Findings:  BM 3/18 formed/hard. No edema.       Wounds:    None       Current Nutrition Therapies:  DIET RENAL Regular  DIET NUTRITIONAL SUPPLEMENTS AM Snack; Nepro    Anthropometric Measures:  · Height:  5' 3\" (160 cm)  · Current Body Wt:  59.7 kg (131 lb 9.8 oz)   · Admission Body Wt:  131 lb 9.8 oz    · Usual Body Wt:  (wt fluctuation PTA per chart hx)     · Ideal Body Wt:  115 lbs:  114.4 %   · Adjusted Body Weight:   ; Weight Adjustment for: No adjustment   · BMI Category:  Normal weight (BMI 22.0-24.9) age over 72       Nutrition Diagnosis:   · Increased nutrient needs related to inadequate protein-energy intake, renal dysfunction as evidenced by dialysis      Nutrition Interventions:   Food and/or Nutrient Delivery: Continue current diet, Modify oral nutrition supplement  Nutrition Education and Counseling: Education not indicated  Coordination of Nutrition Care: Continue to monitor while inpatient    Goals:  PO nutrition intake will meet >75% of patient's estimated nutrition needs within the next 7 days       Nutrition Monitoring and Evaluation:   Behavioral-Environmental Outcomes: None identified  Food/Nutrient Intake Outcomes: Food and nutrient intake, Supplement intake  Physical Signs/Symptoms Outcomes: Biochemical data, Meal time behavior, Nutrition focused physical findings    Discharge Planning:     Too soon to determine     Electronically signed by Eliz Danielson RD on 3/18/2021 at 4:45 PM    Contact: [phone]

## 2021-03-18 NOTE — CONSULTS
[facility] - Consult Note    Consultation request by Grace Colby MD for advice/opinion related to evaluating Tachycardia [R00.0]    Date of  Admission: 3/17/2021  3:35 PM   Primary Care Physician:  Nancy Hagan MD  Patient seen and examined independently. Patient  admitted with SVT which was possibly atrial flutter which began shortly after dialysis. Patient presently in sinus rhythm with a rate of 80 bpm.  Previous echocardiogram done on 9/27/2020 demonstrated an ejection fraction of greater than 70%. There was left ventricular cavity obliteration due to hyperdynamic state. Peak velocity was 2.32 m/s. Would utilize beta-blockers  particularly in the light of the echo findings. Agree with assessment and plan as noted below. Jeff Urena MD  Patient Active Problem List   Diagnosis Code    Acute on chronic renal insufficiency N28.9, N18.9    Hypertension I10    ESRD (end stage renal disease) ([facility] N18.6    Secondary hyperparathyroidism of renal origin ([facility] N25.81    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 ([facility] E11.22, I12.0, Z99.2, N18.6    CVA, old, cognitive deficits I69.319    Bandemia D72.825    New onset seizure ([facility] R56.9    Hypertensive emergency I16.1    Tachycardia R00.0     - Presented to ED with tachycardia and hypertensive urgency following HD. HR was in the 160s-170s. Ekg with SVT. No electrolyte abnormalities. Troponin negative. - Hx of hypertensive urgency  - Hx of end stage renal disease on HD  - Hx of stroke  - Hx of seizure disorder  - Hx of dyslipidemia- on statin  - Hx of type II DM  - Hx of bipolar disease/poor historian  - Echo 9.27.20 with hyperdynamic EF, > 70%, no wall motion abnormality     No primary cardiologist.     Plan:     Blood pressures elevated. Increase beta-blocker for better bp control. Continue clonidine patch. Hydralazine as needed for sbp >190. Sinus rhythm on telemetry. Volume management per nephrology.    Will continue to follow. History of Present Illness: This is a 77 y.o. female admitted for Tachycardia [R00.0]. Patient completed dialysis and medics were called due to elevated heart rate in the 160s-170s. Patient temporarily converted to sinus with vagal maneuver in route however she had several more episodes of tachycardia. Cardiology was consulted. Patient started on po diltiazem and diltiazem gtt. PMHx significant for hypertensive urgency, end stage renal disease on HD, seizure disorder, dyslipidemia, type II DM and bipolar disease. Cardiac risk factors: dyslipidemia, diabetes mellitus, hypertension      Review of Symptoms:  Except as stated above include:  Constitutional:  negative  Respiratory:  negative  Cardiovascular:  negative  Gastrointestinal: negative  Genitourinary:  negative  Musculoskeletal:  Negative  Neurological:  Negative  Dermatological:  Negative  Endocrinological: Negative  Psychological:  Negative    A comprehensive review of systems was negative except for that written in the HPI. Past Medical History:     Past Medical History:   Diagnosis Date    Asthma     Bipolar affective disorder ([facility]     Brain condition     Cataract     Chronic kidney disease     hemodialysis M-W-F    Diabetes ([facility]     Hyperlipidemia     Hypertension     Paralytic strabismus, unspecified     Psychiatric disorder     Seizures ([facility] 08/27/2018         Social History:     Social History     Socioeconomic History    Marital status: SINGLE     Spouse name: Not on file    Number of children: Not on file    Years of education: Not on file    Highest education level: Not on file   Tobacco Use    Smoking status: Never Smoker    Smokeless tobacco: Never Used   Substance and Sexual Activity    Alcohol use: No    Drug use: No    Sexual activity: Never        Family History:   No family history on file. Medications:      Allergies   Allergen Reactions    Amoxicillin Unknown (comments)    Cleocin [Clindamycin Hcl] Unknown (comments)    Codeine Unknown (comments)    Lorcet 10/650 [Hydrocodone-Acetaminophen] Unknown (comments)    Penicillin G Benzathine Unknown (comments)    Shellfish Derived Unknown (comments)        Current Facility-Administered Medications   Medication Dose Route Frequency    influenza vaccine 2020-21 (6 mos+)(PF) (FLUARIX/FLULAVAL/FLUZONE QUAD) injection 0.5 mL  0.5 mL IntraMUSCular PRIOR TO DISCHARGE    cloNIDine (CATAPRES) 0.2 mg/24 hr patch 1 Patch  1 Patch TransDERmal Q7D    metoprolol tartrate (LOPRESSOR) tablet 25 mg  25 mg Oral Q12H    dilTIAZem ER (CARDIZEM CD) capsule 240 mg  240 mg Oral DAILY    dilTIAZem (CARDIZEM) 100 mg in 0.9% sodium chloride (MBP/ADV) 100 mL infusion  10 mg/hr IntraVENous CONTINUOUS    levETIRAcetam (KEPPRA) tablet 250 mg  250 mg Oral Q MON, WED & FRI    levETIRAcetam (KEPPRA) tablet 500 mg  500 mg Oral Q12H    heparin (porcine) injection 5,000 Units  5,000 Units SubCUTAneous Q8H    hydrALAZINE (APRESOLINE) 20 mg/mL injection 20 mg  20 mg IntraVENous Q6H PRN    ondansetron (ZOFRAN) injection 4 mg  4 mg IntraVENous Q4H PRN         Physical Exam:     Visit Vitals  BP (!) 223/100 (BP 1 Location: Left upper arm, BP Patient Position: At rest)   Pulse 92   Temp 97 °F (36.1 °C)   Resp 18   Wt 59.7 kg (131 lb 9.6 oz)   SpO2 96%   BMI 23.31 kg/m²     BP Readings from Last 3 Encounters:   03/18/21 (!) 223/100   12/22/20 (!) 201/102   09/29/20 134/66     Pulse Readings from Last 3 Encounters:   03/18/21 92   12/22/20 88   09/29/20 87     Wt Readings from Last 3 Encounters:   03/18/21 59.7 kg (131 lb 9.6 oz)   09/29/20 51 kg (112 lb 6.4 oz)   09/16/20 75.8 kg (167 lb)       General:  alert, cooperative, no distress, appears stated age  Neck:  nontender, no nuchal rigidity, no carotid bruit, no JVD  Lungs:  clear to auscultation bilaterally, diminished breath sounds R base, L base  Heart:  regular rate and rhythm, S1, S2 normal, no murmur, click, rub or gallop  Abdomen:  abdomen is soft without significant tenderness, masses, organomegaly or guarding  Extremities:  extremities normal, atraumatic, no cyanosis or edema  Skin: Warm and dry.  no hyperpigmentation, vitiligo, or suspicious lesions  Neuro: alert, oriented x2, affect appropriate, no focal neurological deficits, moves all extremities well, no involuntary movements, reflexes at knee and ankle intact  Psych: non focal     Data Review:     Recent Labs     03/18/21  0524 03/17/21  1543   WBC 9.8 9.0   HGB 7.9* 8.6*   HCT 24.3* 26.2*    380     Recent Labs     03/18/21  0524 03/17/21  1543    140   K 4.6 3.4*    102   CO2 26 26   * 132*   BUN 34* 26*   CREA 6.89* 4.94*   CA 8.8 9.3   MG 2.1 2.2   PHOS 4.3 2.8   ALB 3.2* 3.4   ALT  --  15       Results for orders placed or performed during the hospital encounter of 03/17/21   EKG, 12 LEAD, INITIAL   Result Value Ref Range    Ventricular Rate 105 BPM    Atrial Rate 105 BPM    P-R Interval 162 ms    QRS Duration 82 ms    Q-T Interval 358 ms    QTC Calculation (Bezet) 473 ms    Calculated P Axis 71 degrees    Calculated R Axis -4 degrees    Calculated T Axis 64 degrees    Diagnosis       Sinus tachycardia  Otherwise normal ECG    Confirmed by Domonique Roman MD, Jose Manuel Reyna ([id number]) on 3/17/2021 4:00:08 PM         All Cardiac Markers in the last 24 hours:    Lab Results   Component Value Date/Time    TROIQ <0.02 03/17/2021 03:43 PM       Last Lipid:  No results found for: CHOL, CHOLX, CHLST, CHOLV, HDL, HDLP, LDL, LDLC, DLDLP, TGLX, TRIGL, TRIGP, CHHD, CHHDX    Signed By: Sharion Cheadle, NP     March 18, 2021

## 2021-03-18 NOTE — ROUTINE PROCESS
0104 TRANSFER - IN REPORT: 
 
Verbal report received from [facility], RN(name) on Sebastian Mathew  being received from ED(unit) for routine progression of care Report consisted of patients Situation, Background, Assessment and  
Recommendations(SBAR). Information from the following report(s) SBAR, ED Summary, MAR, Recent Results and Cardiac Rhythm NSR was reviewed with the receiving nurse. Opportunity for questions and clarification was provided. Assessment completed upon patients arrival to unit and care assumed. 0148 Pt arrived to unit at this time and was promptly assessed, see flow sheet. 0345 Pt complains of nausea and feeling \"unwell. \" Provided with zofran and immediately stated that she feels better. VSS at this time and in NAD. Will continue to monitor closely. 8540 Verbal shift change report given to Yue Aponte (oncoming nurse) by Edward Chapin RN (offgoing nurse). Report included the following information SBAR, Intake/Output, MAR, Recent Results and Cardiac Rhythm Sinus tach.

## 2021-03-18 NOTE — ROUTINE PROCESS
1552 - Pt experiencing htn. Last /89. PRN hydralizine given. MD aware. Pt is asymptomatic without any signs or symptoms of distress.

## 2021-03-18 NOTE — ACP (ADVANCE CARE PLANNING)
Advance Care Planning     General Advance Care Planning (ACP) Conversation      Date of Conversation: 3/18/21  Conducted with: Patient without decision making capacity      Content/Action Overview:    patient has no ACP on file and unable to make decision at this time        SERAFIN Purdy RN  Care Management  Pager: [phone]

## 2021-03-18 NOTE — PROGRESS NOTES
Progress Note    Patient: Madisyn Espinosa MRN: [id number]  CSN: [id number]    YOB: 1955  Age: 77 y.o. Sex: female    DOA: 3/17/2021 LOS:  LOS: 1 day               Subjective:     Pt laying in bed, NAD. Denies chest pain but states she feels a bit SOB. She is on Cardizem gtt at 10 mg/hr. Significantly elevated BP today- cardiology adjusting meds. Nephrology is consulted. Chief Complaint:   Chief Complaint   Patient presents with    Irregular Heart Beat       Assessment/Plan     Assessment  1. Hypertensive emergency  2. Tachycardia  3. ESRD on HD  4. T2DM  5. Chronic encephalopathy due to underlying strokes  6. Seizure disorder  7. Secondary hyperparathyroidism  8. Recent Covid? (notes indicate sister was just released from isolation due to Covid)  9. Covid PUI        Plan  1. Cardiology following, appreciate assistance. Atrial flutter after HD now in SR. Cardizem gtt @10mg/hr. Lopressor 75 mg BID, Clonidine 0.2 mg patch every week. Hydralazine prn. TSH pending. 2. Nephrology following, appreciate assistance. 3. Novel coronavirus pending, maintain droplet plus isolation  4. Order sliding scale insulin, monitor accuchecks ac/hs. A1C pending  5. Fall, seizure precautions  6. PT, OT eval and treat  7. Monitor vital signs, weight per unit protocol      Diet: Renal  Code status: FULL    Contact: sister Rodney Suarez [phone] // [phone]. Called sister to update on pt's hospitalization and plan of care. No answer at either number. VM left to return call (on both numbers) to 3N nurse' station.     Case discussed with:  [x]Patient  [x]Family  [x]Nursing  [x]Case Management  DVT Prophylaxis:  []Lovenox  [x]Hep SQ  []SCDs  []Coumadin   []On Heparin gtt      GARCIA Joseph  [address] [facility]  pager [phone]      Objective:     Physical Exam:  Visit Vitals  BP (!) 221/97 (BP 1 Location: Right upper arm, BP Patient Position: At rest)   Pulse 88   Temp 97.6 °F (36.4 °C)   Resp 18 Wt 59.7 kg (131 lb 9.6 oz)   SpO2 97%   BMI 23.31 kg/m²        General:         Alert, cooperative, no acute distress    HEENT: NC, Atraumatic. PERRLA, anicteric sclerae. Lungs: CTA Bilaterally. No Wheezing/Rhonchi/Rales. Heart:  Regular  rhythm,  No murmur, No Rubs, No Gallops  Abdomen: Soft, Non distended, Non tender. +Bowel sounds, no HSM  Extremities: No c/c/e  Neurologic:   Alert and oriented X 2, follows simple commands, VERGARA spontaneously      Intake and Output:  Current Shift:  03/18 0701 - 03/18 1900  In: 100 [P.O.:100]  Out: -   Last three shifts:  03/16 1901 - 03/18 0700  In: 52 [I.V.:52]  Out: -     Labs: Results:       Chemistry Recent Labs     03/18/21  0524 03/17/21  1543   * 132*    140   K 4.6 3.4*    102   CO2 26 26   BUN 34* 26*   CREA 6.89* 4.94*   CA 8.8 9.3   AGAP 8 12   BUCR 5* 5*   AP  --  132*   TP  --  8.1   ALB 3.2* 3.4   GLOB  --  4.7*   AGRAT  --  0.7*      CBC w/Diff Recent Labs     03/18/21  0524 03/17/21  1543   WBC 9.8 9.0   RBC 2.71* 2.97*   HGB 7.9* 8.6*   HCT 24.3* 26.2*    380   GRANS 73 75*   LYMPH 12* 22   EOS 2 1      Cardiac Enzymes No results for input(s): CPK, CKND1, SONG in the last 72 hours. No lab exists for component: CKRMB, TROIP   Coagulation No results for input(s): PTP, INR, APTT, INREXT, INREXT in the last 72 hours. Lipid Panel No results found for: CHOL, CHOLPOCT, CHOLX, CHLST, CHOLV, 786691, HDL, HDLP, LDL, LDLC, DLDLP, 378885, VLDLC, VLDL, TGLX, TRIGL, TRIGP, TGLPOCT, CHHD, CHHDX   BNP No results for input(s): BNPP in the last 72 hours.    Liver Enzymes Recent Labs     03/18/21  0524 03/17/21  1543   TP  --  8.1   ALB 3.2* 3.4   AP  --  132*      Thyroid Studies No results found for: T4, T3U, TSH, TSHEXT, TSHEXT       Procedures/imaging: see electronic medical records for all procedures/Xrays and details which were not copied into this note but were reviewed prior to creation of Plan    Medications:   Current Facility-Administered Medications   Medication Dose Route Frequency    influenza vaccine 2020-21 (6 mos+)(PF) (FLUARIX/FLULAVAL/FLUZONE QUAD) injection 0.5 mL  0.5 mL IntraMUSCular PRIOR TO DISCHARGE    metoprolol tartrate (LOPRESSOR) tablet 75 mg  75 mg Oral Q12H    insulin lispro (HUMALOG) injection   SubCUTAneous AC&HS    glucose chewable tablet 16 g  4 Tab Oral PRN    glucagon (GLUCAGEN) injection 1 mg  1 mg IntraMUSCular PRN    dextrose (D50W) injection syrg 12.5-25 g  25-50 mL IntraVENous PRN    cloNIDine (CATAPRES) 0.2 mg/24 hr patch 1 Patch  1 Patch TransDERmal Q7D    dilTIAZem (CARDIZEM) 100 mg in 0.9% sodium chloride (MBP/ADV) 100 mL infusion  10 mg/hr IntraVENous CONTINUOUS    levETIRAcetam (KEPPRA) tablet 250 mg  250 mg Oral Q MON, WED & FRI    levETIRAcetam (KEPPRA) tablet 500 mg  500 mg Oral Q12H    heparin (porcine) injection 5,000 Units  5,000 Units SubCUTAneous Q8H    hydrALAZINE (APRESOLINE) 20 mg/mL injection 20 mg  20 mg IntraVENous Q6H PRN    ondansetron (ZOFRAN) injection 4 mg  4 mg IntraVENous Q4H PRN

## 2021-03-18 NOTE — PROGRESS NOTES
ESRD patient admitted with Hypertensive Urgency & SVT. Currently on Cardizem drip, heart rate is controlled, BP is not, COVID test is negative. No need for dialysis today, will dialyze tomorrow.

## 2021-03-19 PROBLEM — D64.9 ANEMIA: Status: ACTIVE | Noted: 2021-03-19

## 2021-03-19 LAB
ALBUMIN SERPL-MCNC: 3 G/DL (ref 3.4–5)
ALBUMIN/GLOB SERPL: 0.8 {RATIO} (ref 0.8–1.7)
ALP SERPL-CCNC: 116 U/L (ref 45–117)
ALT SERPL-CCNC: 13 U/L (ref 13–56)
ANION GAP SERPL CALC-SCNC: 7 MMOL/L (ref 3–18)
AST SERPL-CCNC: 12 U/L (ref 10–38)
BASOPHILS # BLD: 0 K/UL (ref 0–0.1)
BASOPHILS NFR BLD: 0 % (ref 0–2)
BILIRUB SERPL-MCNC: 0.7 MG/DL (ref 0.2–1)
BUN SERPL-MCNC: 43 MG/DL (ref 7–18)
BUN/CREAT SERPL: 5 (ref 12–20)
CALCIUM SERPL-MCNC: 8.7 MG/DL (ref 8.5–10.1)
CALCIUM SERPL-MCNC: 9 MG/DL (ref 8.5–10.1)
CHLORIDE SERPL-SCNC: 105 MMOL/L (ref 100–111)
CO2 SERPL-SCNC: 28 MMOL/L (ref 21–32)
CREAT SERPL-MCNC: 9.15 MG/DL (ref 0.6–1.3)
DIFFERENTIAL METHOD BLD: ABNORMAL
EOSINOPHIL # BLD: 0.2 K/UL (ref 0–0.4)
EOSINOPHIL NFR BLD: 2 % (ref 0–5)
ERYTHROCYTE [DISTWIDTH] IN BLOOD BY AUTOMATED COUNT: 15.7 % (ref 11.6–14.5)
GLOBULIN SER CALC-MCNC: 3.7 G/DL (ref 2–4)
GLUCOSE BLD STRIP.AUTO-MCNC: 121 MG/DL (ref 70–110)
GLUCOSE BLD STRIP.AUTO-MCNC: 165 MG/DL (ref 70–110)
GLUCOSE BLD STRIP.AUTO-MCNC: 215 MG/DL (ref 70–110)
GLUCOSE BLD STRIP.AUTO-MCNC: 228 MG/DL (ref 70–110)
GLUCOSE SERPL-MCNC: 166 MG/DL (ref 74–99)
HCT VFR BLD AUTO: 22.2 % (ref 35–45)
HEMOCCULT STL QL: NEGATIVE
HGB BLD-MCNC: 7.2 G/DL (ref 12–16)
LYMPHOCYTES # BLD: 0.8 K/UL (ref 0.9–3.6)
LYMPHOCYTES NFR BLD: 8 % (ref 21–52)
MCH RBC QN AUTO: 28.9 PG (ref 24–34)
MCHC RBC AUTO-ENTMCNC: 32.4 G/DL (ref 31–37)
MCV RBC AUTO: 89.2 FL (ref 74–97)
MONOCYTES # BLD: 1.3 K/UL (ref 0.05–1.2)
MONOCYTES NFR BLD: 12 % (ref 3–10)
NEUTS SEG # BLD: 8 K/UL (ref 1.8–8)
NEUTS SEG NFR BLD: 78 % (ref 40–73)
PHOSPHATE SERPL-MCNC: 4.6 MG/DL (ref 2.5–4.9)
PLATELET # BLD AUTO: 365 K/UL (ref 135–420)
PMV BLD AUTO: 11 FL (ref 9.2–11.8)
POTASSIUM SERPL-SCNC: 5.1 MMOL/L (ref 3.5–5.5)
PROT SERPL-MCNC: 6.7 G/DL (ref 6.4–8.2)
PTH-INTACT SERPL-MCNC: 632.5 PG/ML (ref 18.4–88)
RBC # BLD AUTO: 2.49 M/UL (ref 4.2–5.3)
SARS-COV-2, COV2NT: DETECTED
SODIUM SERPL-SCNC: 140 MMOL/L (ref 136–145)
WBC # BLD AUTO: 10.2 K/UL (ref 4.6–13.2)

## 2021-03-19 PROCEDURE — 74011000258 HC RX REV CODE- 258: Performed by: EMERGENCY MEDICINE

## 2021-03-19 PROCEDURE — 90935 HEMODIALYSIS ONE EVALUATION: CPT

## 2021-03-19 PROCEDURE — 99232 SBSQ HOSP IP/OBS MODERATE 35: CPT | Performed by: INTERNAL MEDICINE

## 2021-03-19 PROCEDURE — 97530 THERAPEUTIC ACTIVITIES: CPT

## 2021-03-19 PROCEDURE — 74011636637 HC RX REV CODE- 636/637: Performed by: NURSE PRACTITIONER

## 2021-03-19 PROCEDURE — 97161 PT EVAL LOW COMPLEX 20 MIN: CPT

## 2021-03-19 PROCEDURE — 74011250636 HC RX REV CODE- 250/636: Performed by: PHYSICIAN ASSISTANT

## 2021-03-19 PROCEDURE — 74011250636 HC RX REV CODE- 250/636: Performed by: FAMILY MEDICINE

## 2021-03-19 PROCEDURE — 74011636637 HC RX REV CODE- 636/637: Performed by: HOSPITALIST

## 2021-03-19 PROCEDURE — 65660000004 HC RM CVT STEPDOWN

## 2021-03-19 PROCEDURE — 74011250637 HC RX REV CODE- 250/637: Performed by: PHYSICIAN ASSISTANT

## 2021-03-19 PROCEDURE — 74011250636 HC RX REV CODE- 250/636: Performed by: INTERNAL MEDICINE

## 2021-03-19 PROCEDURE — APPSS30 APP SPLIT SHARED TIME 16-30 MINUTES: Performed by: NURSE PRACTITIONER

## 2021-03-19 PROCEDURE — 36415 COLL VENOUS BLD VENIPUNCTURE: CPT

## 2021-03-19 PROCEDURE — 85025 COMPLETE CBC W/AUTO DIFF WBC: CPT

## 2021-03-19 PROCEDURE — 5A1D70Z PERFORMANCE OF URINARY FILTRATION, INTERMITTENT, LESS THAN 6 HOURS PER DAY: ICD-10-PCS | Performed by: HOSPITALIST

## 2021-03-19 PROCEDURE — 82962 GLUCOSE BLOOD TEST: CPT

## 2021-03-19 PROCEDURE — 74011250637 HC RX REV CODE- 250/637: Performed by: INTERNAL MEDICINE

## 2021-03-19 PROCEDURE — 83970 ASSAY OF PARATHORMONE: CPT

## 2021-03-19 PROCEDURE — 82272 OCCULT BLD FECES 1-3 TESTS: CPT

## 2021-03-19 PROCEDURE — 84100 ASSAY OF PHOSPHORUS: CPT

## 2021-03-19 PROCEDURE — 80053 COMPREHEN METABOLIC PANEL: CPT

## 2021-03-19 PROCEDURE — 2709999900 HC NON-CHARGEABLE SUPPLY

## 2021-03-19 PROCEDURE — 74011250636 HC RX REV CODE- 250/636: Performed by: EMERGENCY MEDICINE

## 2021-03-19 RX ORDER — DILTIAZEM HYDROCHLORIDE 120 MG/1
240 CAPSULE, COATED, EXTENDED RELEASE ORAL DAILY
Status: DISCONTINUED | OUTPATIENT
Start: 2021-03-19 | End: 2021-03-20 | Stop reason: HOSPADM

## 2021-03-19 RX ADMIN — LEVETIRACETAM 500 MG: 500 TABLET ORAL at 08:18

## 2021-03-19 RX ADMIN — INSULIN LISPRO 6 UNITS: 100 INJECTION, SOLUTION INTRAVENOUS; SUBCUTANEOUS at 22:00

## 2021-03-19 RX ADMIN — ONDANSETRON 4 MG: 2 INJECTION INTRAMUSCULAR; INTRAVENOUS at 04:00

## 2021-03-19 RX ADMIN — INSULIN LISPRO 2 UNITS: 100 INJECTION, SOLUTION INTRAVENOUS; SUBCUTANEOUS at 07:30

## 2021-03-19 RX ADMIN — LEVETIRACETAM 250 MG: 500 TABLET ORAL at 20:53

## 2021-03-19 RX ADMIN — LOSARTAN POTASSIUM 50 MG: 50 TABLET, FILM COATED ORAL at 08:19

## 2021-03-19 RX ADMIN — DILTIAZEM HYDROCHLORIDE 240 MG: 120 CAPSULE, COATED, EXTENDED RELEASE ORAL at 13:17

## 2021-03-19 RX ADMIN — LEVETIRACETAM 500 MG: 500 TABLET ORAL at 20:51

## 2021-03-19 RX ADMIN — ONDANSETRON 4 MG: 2 INJECTION INTRAMUSCULAR; INTRAVENOUS at 00:10

## 2021-03-19 RX ADMIN — EPOETIN ALFA-EPBX 10000 UNITS: 10000 INJECTION, SOLUTION INTRAVENOUS; SUBCUTANEOUS at 20:51

## 2021-03-19 RX ADMIN — METOPROLOL TARTRATE 75 MG: 25 TABLET, FILM COATED ORAL at 08:18

## 2021-03-19 RX ADMIN — ONDANSETRON 4 MG: 2 INJECTION INTRAMUSCULAR; INTRAVENOUS at 08:19

## 2021-03-19 RX ADMIN — METOPROLOL TARTRATE 75 MG: 25 TABLET, FILM COATED ORAL at 20:52

## 2021-03-19 RX ADMIN — SODIUM CHLORIDE 10 MG/HR: 900 INJECTION, SOLUTION INTRAVENOUS at 03:53

## 2021-03-19 RX ADMIN — HEPARIN SODIUM 5000 UNITS: 5000 INJECTION INTRAVENOUS; SUBCUTANEOUS at 03:26

## 2021-03-19 RX ADMIN — HEPARIN SODIUM 5000 UNITS: 5000 INJECTION INTRAVENOUS; SUBCUTANEOUS at 20:52

## 2021-03-19 RX ADMIN — HEPARIN SODIUM 5000 UNITS: 5000 INJECTION INTRAVENOUS; SUBCUTANEOUS at 13:17

## 2021-03-19 NOTE — PROGRESS NOTES
Progress Note    Aimee Moreno  77 y.o. Admit Date: 3/17/2021  Active Problems:    ESRD (end stage renal disease) ([facility] (8/8/2018) POA: Yes      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 ([facility] (8/8/2018) POA: Yes      CVA, old, cognitive deficits (8/8/2018) POA: Yes      Hypertensive emergency (9/25/2020) POA: Yes      Tachycardia (9/25/2020) POA: Unknown      Anemia (3/19/2021) POA: Unknown            Subjective:     Patient remained at her baseline mental status, no more SVT or Tachycardia. A comprehensive review of systems was negative except for that written in the History of Present Illness.     Objective:     Visit Vitals  BP (!) 163/70 (BP 1 Location: Right upper arm, BP Patient Position: At rest)   Pulse 76   Temp 98.3 °F (36.8 °C)   Resp 16   Ht 5' 3\" (1.6 m)   Wt 59.7 kg (131 lb 9.6 oz)   LMP  (LMP Unknown)   SpO2 100%   BMI 23.31 kg/m²       No intake or output data in the 24 hours ending 03/19/21 1334    Current Facility-Administered Medications   Medication Dose Route Frequency Provider Last Rate Last Admin    dilTIAZem ER (CARDIZEM CD) capsule 240 mg  240 mg Oral DAILY Cinthya Tan PA   240 mg at 03/19/21 1317    epoetin raphael-epbx (RETACRIT) injection 10,000 Units  10,000 Units SubCUTAneous Q MON, WED & Mu Duran MD        influenza vaccine 2020-21 (6 mos+)(PF) (FLUARIX/FLULAVAL/FLUZONE QUAD) injection 0.5 mL  0.5 mL IntraMUSCular PRIOR TO DISCHARGE Yola Brown MD        metoprolol tartrate (LOPRESSOR) tablet 75 mg  75 mg Oral Q12H Noy Fonseca MD   75 mg at 03/19/21 0818    insulin lispro (HUMALOG) injection   SubCUTAneous AC&HS Joy Anderson NP   Stopped at 03/19/21 1130    glucose chewable tablet 16 g  4 Tab Oral PRN Joy Anderson NP        glucagon (GLUCAGEN) injection 1 mg  1 mg IntraMUSCular PRN Joy Anderson NP        dextrose (D50W) injection syrg 12.5-25 g  25-50 mL IntraVENous PRN Joy Rad, NP        losartan (COZAAR) tablet 50 mg 50 mg Oral DAILY Bisi Duran MD   50 mg at 03/19/21 0819    cloNIDine (CATAPRES) 0.2 mg/24 hr patch 1 Patch  1 Patch TransDERmal Q7D Jazmyn Oakley PA-C   1 Patch at 03/17/21 2059    levETIRAcetam (KEPPRA) tablet 250 mg  250 mg Oral Q MON, WED & FRI Jazmyn Oakley PA-C   250 mg at 03/17/21 2241    levETIRAcetam (KEPPRA) tablet 500 mg  500 mg Oral Q12H Jazmyn Oakley PA-C   500 mg at 03/19/21 0818    heparin (porcine) injection 5,000 Units  5,000 Units SubCUTAneous Q8H Jazmyn Oakley PA-C   5,000 Units at 03/19/21 1317    hydrALAZINE (APRESOLINE) 20 mg/mL injection 20 mg  20 mg IntraVENous Q6H PRN Jazmyn Oakley PA-C   20 mg at 03/18/21 1449    ondansetron (ZOFRAN) injection 4 mg  4 mg IntraVENous Q4H PRN Vivian Watson MD   4 mg at 03/19/21 0087        Physical Exam:     Physical Exam:   General:  Alert, cooperative, no distress, appears stated age. Neck: Supple, symmetrical, trachea midline, no adenopathy, thyroid: no enlargement/tenderness/nodules, no carotid bruit and no JVD. Lungs:   Clear to auscultation bilaterally. Heart:  Regular rate and rhythm, S1, S2 normal, no murmur, click, rub or gallop. Abdomen:   Soft, non-tender. Bowel sounds normal. No masses,  No organomegaly.    Extremities: Extremities normal, atraumatic, no cyanosis or edema, HD catheter is well dressed   Skin: Skin color, texture, turgor normal. No rashes or lesions         Data Review:    CBC w/Diff    Recent Labs     03/19/21  0436 03/18/21  0524 03/17/21  1543   WBC 10.2 9.8 9.0   RBC 2.49* 2.71* 2.97*   HGB 7.2* 7.9* 8.6*   HCT 22.2* 24.3* 26.2*   MCV 89.2 89.7 88.2   MCH 28.9 29.2 29.0   MCHC 32.4 32.5 32.8   RDW 15.7* 15.2* 14.8*    Recent Labs     03/19/21  0436 03/18/21  0524 03/17/21  1543   MONOS 12* 13* 2*   EOS 2 2 1   BASOS 0 0 0   RDW 15.7* 15.2* 14.8*        Comprehensive Metabolic Profile    Recent Labs     03/19/21  0436 03/18/21  0524 03/17/21  1543    137 140   K 5.1 4.6 3.4*    103 102   CO2 28 26 26   BUN 43* 34* 26*   CREA 9.15* 6.89* 4.94*    Recent Labs     03/19/21  0436 03/18/21  0524 03/17/21  1543   CA 8.7  9.0 8.8 9.3   PHOS 4.6 4.3 2.8   ALB 3.0* 3.2* 3.4   TP 6.7  --  8.1   TBILI 0.7  --  0.3                      Impression:       Active Hospital Problems    Diagnosis Date Noted    Anemia 03/19/2021    Tachycardia 09/25/2020    Hypertensive emergency 09/25/2020    CVA, old, cognitive deficits 08/08/2018    ESRD (end stage renal disease) ([facility] 08/08/2018    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 ([facility] 08/08/2018            Plan: Will dialyze her today, BP is OK to give  Retacrit 81086 units sq,monitor H/H.       Angelina Easley MD

## 2021-03-19 NOTE — PROGRESS NOTES
Discharge/Transition Planning    Notified by NP Tosin Ugarte that pt would likely discharge this weekend. Called Rohit with [employer] and she stated will let [facility] know and CM on weekend can call Rohit with definite day and when to expect pt    Called pt sister, Lynn Hinds and updated    Patient unable to understand and/or complete the [employer] from [address] [facility]". Reviewed document with patient's representative Minnie Guerrero at phone number  [phone] telephonically and addressed questions. A copy of the IMM letter left at bedside with patient. Original, with verbal signature noted, placed in patient's chart.   Ryan Ragsdale RN BSN  Outcomes Manager    Pager # [phone]

## 2021-03-19 NOTE — ROUTINE PROCESS
Bedside and Verbal shift change report given to Luzmaria Almeida (oncoming nurse) by Christine Lester (offgoing nurse). Report included the following information SBAR, Kardex, MAR and Recent Results. SITUATION:  
 Code Status: Full Code  Reason for Admission: Tachycardia [R00.0] [facility] day: 2  Problem List:  
   
Hospital Problems  Date Reviewed: 5/29/2019 Codes Class Noted POA Anemia ICD-10-CM: D64.9 ICD-9-CM: 285.9  3/19/2021 Unknown Hypertensive emergency ICD-10-CM: I16.1 ICD-9-CM: 401.9  9/25/2020 Yes Tachycardia ICD-10-CM: R00.0 ICD-9-CM: 785.0  9/25/2020 Unknown ESRD (end stage renal disease) ([facility] ICD-10-CM: N18.6 ICD-9-CM: 585.6  8/8/2018 Yes 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 ([facility] ICD-10-CM: E11.22, I12.0, Z99.2, N18.6 ICD-9-CM: 250.40, 403.91, V45.11, 585.6  8/8/2018 Yes  
   
 CVA, old, cognitive deficits ICD-10-CM: I69.319 ICD-9-CM: 438.0  8/8/2018 Yes BACKGROUND:  
 Past Medical History:  
Past Medical History:  
Diagnosis Date  Asthma  Bipolar affective disorder ([facility]  Brain condition  Cataract  Chronic kidney disease   
 hemodialysis M-W-F  Diabetes ([facility]  Hyperlipidemia  Hypertension  Paralytic strabismus, unspecified  Psychiatric disorder  Seizures ([facility] 08/27/2018 Patient taking anticoagulants yes ASSESSMENT:  
 Changes in Assessment Throughout Shift: No changes noted  Patient has Central Line: no Reasons if yes: n/a  Patient has Khan Cath: no Reasons if yes: n/a  Last Vitals: 
  
Vitals:  
 03/19/21 1645 03/19/21 1700 03/19/21 1715 03/19/21 1730 BP: (!) 177/91 (!) 201/105 (!) 206/86 (!) 190/97 Pulse: 91 (!) 103 100 95 Resp:      
Temp:      
TempSrc:      
SpO2:      
Weight:      
Height:      
 
 
 IV and DRAINS (will only show if present) Peripheral IV 03/17/21 Right Other(comment)-Site Assessment: Clean, dry, & intact Peripheral IV 03/17/21 Right External jugular-Site Assessment: Clean, dry, & intact [REMOVED] Peripheral IV 03/17/21 Right Antecubital-Site Assessment: Clean, dry, & intact  WOUND (if present) Wound Type:  none Dressing present Dressing Present : No 
 Wound Concerns/Notes:  none  PAIN Pain Assessment Pain Intensity 1: 0 (03/19/21 1200) Patient Stated Pain Goal: 0 
o Interventions for Pain:  none 
o Intervention effective: n/a 
o Time of last intervention: n/a  
o Reassessment Completed: yes  Last 3 Weights: 
Last 3 Recorded Weights in this Encounter 03/18/21 0148 Weight: 59.7 kg (131 lb 9.6 oz) Weight change:  INTAKE/OUPUT Current Shift: 03/19 0701 - 03/19 1900 In: 150 [P.O.:150] Out: - Last three shifts: 03/17 1901 - 03/19 0700 In: 152 [P.O.:100; I.V.:52] Out: -  
 
 LAB RESULTS Recent Labs  
  03/19/21 
0436 03/18/21 
0524 03/17/21 
1543 WBC 10.2 9.8 9.0 HGB 7.2* 7.9* 8.6* HCT 22.2* 24.3* 26.2*  
 389 380 Recent Labs  
  03/19/21 
0436 03/18/21 
0524 03/17/21 
1543  137 140  
K 5.1 4.6 3.4*  
* 233* 132* BUN 43* 34* 26* CREA 9.15* 6.89* 4.94* CA 8.7  9.0 8.8 9.3 MG  --  2.1 2.2 RECOMMENDATIONS AND DISCHARGE PLANNING 1. Pending tests/procedures/ Plan of Care or Other Needs: Monitor blood pressure 2. Discharge plan for patient and Needs/Barriers: to be determined 3. Estimated Discharge Date: TBD Posted on Whiteboard in Patients Room: yes 4. The patient's care plan was reviewed with the oncoming nurse. \"HEALS\" SAFETY CHECK Fall Risk Total Score: 5 Safety Measures: Safety Measures: Bed/Chair alarm on, Bed/Chair-Wheels locked, Bed in low position, Call light within reach, Fall prevention (comment), Gripper socks, Side rails X 3 A safety check occurred in the patient's room between off going nurse and oncoming nurse listed above. The safety check included the below items Area Items H High Alert Medications - Verify all high alert medication drips (heparin, PCA, etc.)  
E 
Equipment - Suction is set up for ALL patients (with genesis) 
- Red plugs utilized for all equipment (IV pumps, etc.) 
- WOW’s wiped down at end of shift. 
- Room stocked with oxygen, suction, and other unit-specific supplies  
A 
Alarms - Bed alarm is set for fall risk patients 
- Ensure chair alarm is in place and activated if patient is up in a chair  
L 
Lines - Check IV for any infiltration 
- Khan bag is empty if patient has a Khan  
- Tubing and IV bags are labeled  
S 
Safety 
 - Room is clean, patient is clean, and equipment is clean. 
- Hallways are clear from equipment besides carts.  
- Fall bracelet on for fall risk patients 
- Ensure room is clear and free of clutter 
- Suction is set up for ALL patients (with genesis) 
- Hallways are clear from equipment besides carts.  
- Isolation precautions followed, supplies available outside room, sign posted  
 
Chantal Escobar

## 2021-03-19 NOTE — PROGRESS NOTES
Progress Note    Patient: Clint Ortiz MRN: [id number]  CSN: [id number]    YOB: 1955  Age: 77 y.o. Sex: female    DOA: 3/17/2021 LOS:  LOS: 2 days               Subjective:     Pt seen and examined this morning. More awake today and engaged in exam, conversation. VERGARA and follows commands upon request.    Spoke with sister who states pt is normally awake, alert and talkative, participates in activities at facility. Also states they had issues at facility controlling her BP in the past.  She is a long term resident at [facility] and plan will be to d/c back there. Chief Complaint:   Chief Complaint   Patient presents with    Irregular Heart Beat       Assessment/Plan     Assessment  1. Hypertensive emergency  2. Tachycardia  3. ESRD on HD  4. T2DM  5. Chronic encephalopathy due to underlying strokes  6. Anemia, hgb down to 7.2 today  7. Seizure disorder  8. Secondary hyperparathyroidism  9. Recent Covid at [facility]  10. Covid PUI  11. Nursing home resident- [address] [place] [address] Rd  12. Hx Bipolar        Plan  1. Cardiology following, appreciate assistance. ? Atrial flutter after HD now in SR. Cardizem gtt weaned off, resumed home Cardizem 240 mg daily. Lopressor 75 mg BID, Losartan 50 mg daily, Clonidine 0.2 mg patch every week. Hydralazine prn. TSH normal.  2. Nephrology following, appreciate assistance. HD per nephrology. Epo per nephrology- per note, have to be careful with dose due to hypertension  3. Novel coronavirus pending, maintain droplet plus isolation. Sister states pt did have Covid at facility and was just released out of isolation- states her only symptom was diarrhea. 4. Continue sliding scale insulin, monitor accuchecks ac/hs. A1C 7.2% this admission  5. Nutrition following  6. Fall, seizure precautions  7. PT, OT eval and treat  8. Monitor vital signs, weight per unit protocol  9.  Plan to d/c back to [facility], ?tomorrow if HR/BP remain stable      Diet: Renal  Code status: FULL    Contact: sister Karoline Quiroz [phone] // [phone]. Spoke with sister to update on plan of care during hospitalization. States pt also has bipolar disorder but normally she converses and participates in activities. States pt is ambulatory but because she is partially blind so she does not ambulate far. Informed of cardiology and nephrology following, weaned from Cardizem gtt today and HD per nephrology. All questions answered. Case discussed with:  [x]Patient  [x]Family  [x]Nursing  [x]Case Management  DVT Prophylaxis:  []Lovenox  [x]Hep SQ  []SCDs  []Coumadin   []On Heparin gtt      GARCIA Hughes  [address] [facility]  Hospitalist Group  pager [phone]      Objective:     Physical Exam:  Visit Vitals  BP (!) 163/70 (BP 1 Location: Right upper arm, BP Patient Position: At rest)   Pulse 76   Temp 98.3 °F (36.8 °C)   Resp 16   Ht 5' 3\" (1.6 m)   Wt 59.7 kg (131 lb 9.6 oz)   SpO2 100%   BMI 23.31 kg/m²        General:         Alert, cooperative, no acute distress. HD Cath noted to right chest wall. HEENT: NC, Atraumatic. PERRLA, anicteric sclerae. Lungs: CTA Bilaterally. No Wheezing/Rhonchi/Rales. Heart:  Regular  rhythm,  No murmur, No Rubs, No Gallops  Abdomen: Soft, Non distended, Non tender. +Bowel sounds, no HSM  Extremities: No c/c/e   Neurologic:   Alert and oriented, follows simple commands, VERGARA spontaneously      Intake and Output:  Current Shift:  No intake/output data recorded. Last three shifts:  03/17 1901 - 03/19 0700  In: 152 [P.O.:100;  I.V.:52]  Out: -     Labs: Results:       Chemistry Recent Labs     03/19/21  0436 03/18/21  0524 03/17/21  1543   * 233* 132*    137 140   K 5.1 4.6 3.4*    103 102   CO2 28 26 26   BUN 43* 34* 26*   CREA 9.15* 6.89* 4.94*   CA 8.7  9.0 8.8 9.3   AGAP 7 8 12   BUCR 5* 5* 5*     --  132*   TP 6.7  --  8.1   ALB 3.0* 3.2* 3.4   GLOB 3.7  --  4.7*   AGRAT 0.8  --  0.7*      CBC w/Diff Recent Labs 03/19/21  0436 03/18/21  0524 03/17/21  1543   WBC 10.2 9.8 9.0   RBC 2.49* 2.71* 2.97*   HGB 7.2* 7.9* 8.6*   HCT 22.2* 24.3* 26.2*    389 380   GRANS 78* 73 75*   LYMPH 8* 12* 22   EOS 2 2 1      Cardiac Enzymes No results for input(s): CPK, CKND1, SONG in the last 72 hours. No lab exists for component: CKRMB, TROIP   Coagulation No results for input(s): PTP, INR, APTT, INREXT, INREXT in the last 72 hours. Lipid Panel No results found for: CHOL, CHOLPOCT, CHOLX, CHLST, CHOLV, 688535, HDL, HDLP, LDL, LDLC, DLDLP, 171535, VLDLC, VLDL, TGLX, TRIGL, TRIGP, TGLPOCT, CHHD, CHHDX   BNP No results for input(s): BNPP in the last 72 hours.    Liver Enzymes Recent Labs     03/19/21  0436   TP 6.7   ALB 3.0*         Thyroid Studies Lab Results   Component Value Date/Time    TSH 3.70 03/18/2021 05:24 AM          Procedures/imaging: see electronic medical records for all procedures/Xrays and details which were not copied into this note but were reviewed prior to creation of Plan    Medications:   Current Facility-Administered Medications   Medication Dose Route Frequency    dilTIAZem ER (CARDIZEM CD) capsule 240 mg  240 mg Oral DAILY    influenza vaccine 2020-21 (6 mos+)(PF) (FLUARIX/FLULAVAL/FLUZONE QUAD) injection 0.5 mL  0.5 mL IntraMUSCular PRIOR TO DISCHARGE    metoprolol tartrate (LOPRESSOR) tablet 75 mg  75 mg Oral Q12H    insulin lispro (HUMALOG) injection   SubCUTAneous AC&HS    glucose chewable tablet 16 g  4 Tab Oral PRN    glucagon (GLUCAGEN) injection 1 mg  1 mg IntraMUSCular PRN    dextrose (D50W) injection syrg 12.5-25 g  25-50 mL IntraVENous PRN    losartan (COZAAR) tablet 50 mg  50 mg Oral DAILY    cloNIDine (CATAPRES) 0.2 mg/24 hr patch 1 Patch  1 Patch TransDERmal Q7D    levETIRAcetam (KEPPRA) tablet 250 mg  250 mg Oral Q MON, WED & FRI    levETIRAcetam (KEPPRA) tablet 500 mg  500 mg Oral Q12H    heparin (porcine) injection 5,000 Units  5,000 Units SubCUTAneous Q8H    hydrALAZINE (APRESOLINE) 20 mg/mL injection 20 mg  20 mg IntraVENous Q6H PRN    ondansetron (ZOFRAN) injection 4 mg  4 mg IntraVENous Q4H PRN

## 2021-03-19 NOTE — ROUTINE PROCESS
1910: Bedside and Verbal shift change report given to Aubrie Zaldivar RN (oncoming nurse) by Gertrudis Carranza RN (offgoing nurse). Report included the following information SBAR, Kardex, ED Summary, MAR and Cardiac Rhythm NSR.  
 
0710: Bedside and Verbal shift change report given to Gertrudis Carranza RN (oncoming nurse) by Bisi Becker (offgoing nurse). Report included the following information SBAR, Kardex, MAR and Recent Results. SITUATION:  
 Code Status: Full Code  Reason for Admission: Tachycardia [R00.0] [facility] day: 2  Problem List:  
   
Hospital Problems  Date Reviewed: 5/29/2019 Codes Class Noted POA Anemia ICD-10-CM: D64.9 ICD-9-CM: 285.9  3/19/2021 Unknown Hypertensive emergency ICD-10-CM: I16.1 ICD-9-CM: 401.9  9/25/2020 Yes Tachycardia ICD-10-CM: R00.0 ICD-9-CM: 785.0  9/25/2020 Unknown ESRD (end stage renal disease) ([facility] ICD-10-CM: N18.6 ICD-9-CM: 585.6  8/8/2018 Yes 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 ([facility] ICD-10-CM: E11.22, I12.0, Z99.2, N18.6 ICD-9-CM: 250.40, 403.91, V45.11, 585.6  8/8/2018 Yes  
   
 CVA, old, cognitive deficits ICD-10-CM: I69.319 ICD-9-CM: 438.0  8/8/2018 Yes BACKGROUND:  
 Past Medical History:  
Past Medical History:  
Diagnosis Date  Asthma  Bipolar affective disorder ([facility]  Brain condition  Cataract  Chronic kidney disease   
 hemodialysis M-W-F  Diabetes ([facility]  Hyperlipidemia  Hypertension  Paralytic strabismus, unspecified  Psychiatric disorder  Seizures ([facility] 08/27/2018 Patient taking anticoagulants no ASSESSMENT:  
 Changes in Assessment Throughout Shift: no 
 
 Patient has Central Line: no Reasons if yes:  
 Patient has Khan Cath: no Reasons if yes:   
 
 Last Vitals: 
  
Vitals:  
 03/19/21 1730 03/19/21 1740 03/19/21 1745 03/19/21 1948 BP: (!) 190/97 (!) 198/99 (!) 205/100 (!) 182/88 Pulse: 95 94 93 96 Resp:   18 19 Temp:   98.2 °F (36.8 °C) 99.8 °F (37.7 °C) TempSrc:   Axillary SpO2:      
Weight:      
Height:      
 
 
 IV and DRAINS (will only show if present) Peripheral IV 03/17/21 Right Other(comment)-Site Assessment: Clean, dry, & intact Peripheral IV 03/17/21 Right External jugular-Site Assessment: Clean, dry, & intact [REMOVED] Peripheral IV 03/17/21 Right Antecubital-Site Assessment: Clean, dry, & intact  WOUND (if present) Wound Type:  None Dressing present Dressing Present : No 
 Wound Concerns/Notes:  none  PAIN Pain Assessment Pain Intensity 1: 0 (03/19/21 1948) Patient Stated Pain Goal: 0 
o Interventions for Pain:  none 
o Intervention effective: n/a 
o Time of last intervention: n/a  
o Reassessment Completed: yes  Last 3 Weights: 
Last 3 Recorded Weights in this Encounter 03/18/21 0148 Weight: 59.7 kg (131 lb 9.6 oz) Weight change:  INTAKE/OUPUT Current Shift: No intake/output data recorded. Last three shifts: 03/18 0701 - 03/19 1900 In: 250 [P.O.:250] Out: 2000  LAB RESULTS Recent Labs  
  03/19/21 
0436 03/18/21 
0524 03/17/21 
1543 WBC 10.2 9.8 9.0 HGB 7.2* 7.9* 8.6* HCT 22.2* 24.3* 26.2*  
 389 380 Recent Labs  
  03/19/21 
0436 03/18/21 
0524 03/17/21 
1543  137 140  
K 5.1 4.6 3.4*  
* 233* 132* BUN 43* 34* 26* CREA 9.15* 6.89* 4.94* CA 8.7  9.0 8.8 9.3 MG  --  2.1 2.2 RECOMMENDATIONS AND DISCHARGE PLANNING 1. Pending tests/procedures/ Plan of Care or Other Needs: TBD 2. Discharge plan for patient and Needs/Barriers: TBD 3. Estimated Discharge Date: TBD Posted on Whiteboard in Patients Room: no   
 
4. The patient's care plan was reviewed with the oncoming nurse. \"HEALS\" SAFETY CHECK Fall Risk Total Score: 5 Safety Measures: Safety Measures: Bed/Chair alarm on, Bed/Chair-Wheels locked, Bed in low position, Call light within reach, Gripper socks, Side rails X 3 A safety check occurred in the patient's room between off going nurse and oncoming nurse listed above. The safety check included the below items Area Items H High Alert Medications - Verify all high alert medication drips (heparin, PCA, etc.) E Equipment - Suction is set up for ALL patients (with genesis) - Red plugs utilized for all equipment (IV pumps, etc.) - WOWs wiped down at end of shift. 
- Room stocked with oxygen, suction, and other unit-specific supplies A Alarms - Bed alarm is set for fall risk patients - Ensure chair alarm is in place and activated if patient is up in a chair L Lines - Check IV for any infiltration - Kahn bag is empty if patient has a Khan - Tubing and IV bags are labeled Merlinda Shameka Safety - Room is clean, patient is clean, and equipment is clean. - Hallways are clear from equipment besides carts. - Fall bracelet on for fall risk patients - Ensure room is clear and free of clutter - Suction is set up for ALL patients (with genesis) - Hallways are clear from equipment besides carts. - Isolation precautions followed, supplies available outside room, sign posted [place]

## 2021-03-19 NOTE — PROGRESS NOTES
Problem: Pain  Goal: *Control of Pain  Outcome: Progressing Towards Goal  Goal: *PALLIATIVE CARE:  Alleviation of Pain  Outcome: Progressing Towards Goal     Problem: Patient Education: Go to Patient Education Activity  Goal: Patient/Family Education  Outcome: Progressing Towards Goal     Problem: Falls - Risk of  Goal: *Absence of Falls  Description: Document Laurent Reason Fall Risk and appropriate interventions in the flowsheet. Outcome: Progressing Towards Goal  Note: Fall Risk Interventions:  Mobility Interventions: Bed/chair exit alarm, Communicate number of staff needed for ambulation/transfer, Patient to call before getting OOB, PT Consult for mobility concerns    Mentation Interventions: Adequate sleep, hydration, pain control, Bed/chair exit alarm, Evaluate medications/consider consulting pharmacy, More frequent rounding, Reorient patient    Medication Interventions: Bed/chair exit alarm, Evaluate medications/consider consulting pharmacy    Elimination Interventions: Bed/chair exit alarm, Call light in reach, Patient to call for help with toileting needs, Toileting schedule/hourly rounds    History of Falls Interventions: Bed/chair exit alarm, Consult care management for discharge planning, Evaluate medications/consider consulting pharmacy         Problem: Patient Education: Go to Patient Education Activity  Goal: Patient/Family Education  Outcome: Progressing Towards Goal     Problem: Diabetes Self-Management  Goal: *Disease process and treatment process  Description: Define diabetes and identify own type of diabetes; list 3 options for treating diabetes. Outcome: Progressing Towards Goal  Goal: *Incorporating nutritional management into lifestyle  Description: Describe effect of type, amount and timing of food on blood glucose; list 3 methods for planning meals.   Outcome: Progressing Towards Goal  Goal: *Incorporating physical activity into lifestyle  Description: State effect of exercise on blood glucose levels. Outcome: Progressing Towards Goal  Goal: *Developing strategies to promote health/change behavior  Description: Define the ABC's of diabetes; identify appropriate screenings, schedule and personal plan for screenings. Outcome: Progressing Towards Goal  Goal: *Using medications safely  Description: State effect of diabetes medications on diabetes; name diabetes medication taking, action and side effects. Outcome: Progressing Towards Goal  Goal: *Monitoring blood glucose, interpreting and using results  Description: Identify recommended blood glucose targets  and personal targets. Outcome: Progressing Towards Goal  Goal: *Prevention, detection, treatment of acute complications  Description: List symptoms of hyper- and hypoglycemia; describe how to treat low blood sugar and actions for lowering  high blood glucose level. Outcome: Progressing Towards Goal  Goal: *Prevention, detection and treatment of chronic complications  Description: Define the natural course of diabetes and describe the relationship of blood glucose levels to long term complications of diabetes. Outcome: Progressing Towards Goal  Goal: *Developing strategies to address psychosocial issues  Description: Describe feelings about living with diabetes; identify support needed and support network  Outcome: Progressing Towards Goal  Goal: *Insulin pump training  Outcome: Progressing Towards Goal  Goal: *Sick day guidelines  Outcome: Progressing Towards Goal  Goal: *Patient Specific Goal (EDIT GOAL, INSERT TEXT)  Outcome: Progressing Towards Goal     Problem: Patient Education: Go to Patient Education Activity  Goal: Patient/Family Education  Outcome: Progressing Towards Goal     Problem: Pressure Injury - Risk of  Goal: *Prevention of pressure injury  Description: Document Ab Scale and appropriate interventions in the flowsheet.   Outcome: Progressing Towards Goal  Note: Pressure Injury Interventions:  Sensory Interventions: Assess changes in LOC, Avoid rigorous massage over bony prominences, Check visual cues for pain, Discuss PT/OT consult with provider, Keep linens dry and wrinkle-free, Maintain/enhance activity level, Minimize linen layers, Pressure redistribution bed/mattress (bed type), Turn and reposition approx.  every two hours (pillows and wedges if needed)    Moisture Interventions: Absorbent underpads, Apply protective barrier, creams and emollients, Check for incontinence Q2 hours and as needed, Minimize layers    Activity Interventions: Increase time out of bed, Pressure redistribution bed/mattress(bed type), PT/OT evaluation    Mobility Interventions: Assess need for specialty bed, HOB 30 degrees or less, Pressure redistribution bed/mattress (bed type), PT/OT evaluation    Nutrition Interventions: Document food/fluid/supplement intake, Offer support with meals,snacks and hydration    Friction and Shear Interventions: Apply protective barrier, creams and emollients, HOB 30 degrees or less, Transferring/repositioning devices                Problem: Patient Education: Go to Patient Education Activity  Goal: Patient/Family Education  Outcome: Progressing Towards Goal     Problem: Patient Education: Go to Patient Education Activity  Goal: Patient/Family Education  Outcome: Progressing Towards Goal

## 2021-03-19 NOTE — CONSULTS
[address] [facility]    Name:  Bernard Daniel  MR#:   [id number]  :  1955  ACCOUNT #:  [de-identified]  DATE OF SERVICE:  2021    RENAL CONSULTATION    REQUESTING PHYSICIAN:  Consult was requested by the emergency room physician. REASON FOR CONSULTATION:  Dialysis patient came with a palpitation and SVT. HISTORY OF PRESENT ILLNESS:  This 42-year-old [facility] American female, a nursing home resident, who was sent to the emergency room after finishing her dialysis with 911 as this patient was having very rapid heart rate, 170 to 180 with a very high blood pressure. The patient's blood pressure is not controlled. Most of the time, blood pressure is around 180 to 190, sometimes responds to clonidine, sometimes not and usually she does not take any blood pressure medicine. She is a nursing home resident, has underlying hypertension, type 2 diabetes mellitus, ESRD, possible seizure disorder, and multi-infarct dementia. Recently, she was found to be positive for COVID in the nursing home, asymptomatic. She did not require any hospitalization. She was quarantined for two weeks and was getting dialysis in different dialysis unit. After finishing that quarantine, she is back to her regular dialysis unit. In the emergency room, workup found that the patient has possibly supraventricular tachycardia versus atrial flutter. Cardiology was consulted. The patient's heart rate converted spontaneously to sinus rhythm, but required Cardizem drip to control the pressure and heart rate. Initially, the patient was in the step-down unit, but subsequently moved to Tele floor in [facility] Unit, though the patient is COVID negative. She gets dialysis every Monday, Wednesday, and Friday at [facility]. Sometimes, she does not complete her treatment and leaves early. Also, her nutritional status is very poor, does not eat regularly.   She also has problem with confusion with the time, date, and the place. PAST MEDICAL HISTORY:  Hypertension, type 2 diabetes mellitus, hyperlipidemia, old stroke, bipolar disorder, seizure, ESRD. PAST SURGICAL HISTORY:  Failed AV fistula or AV graft, now catheter-dependent patient. SOCIAL HISTORY:  Single. Never smoked. No drug. No alcohol-related problem. FAMILY HISTORY:  Not in the file. ALLERGIES:  POSSIBLE ALLERGIC TO AMOXICILLIN, CLINDAMYCIN, CODEINE, LORCET, PENICILLIN, AND SHELLFISH. LIST OF MEDICATIONS IN THE NURSING HOME:  Clonidine 0.2 TTS patch #2 every week, Lopressor 25 mg twice daily, Cardizem 240 mg p.o. daily, Keppra 500 mg every 12 hours and 250 mg supplemental dose every Monday, Wednesday, and Friday after dialysis, heparin 5000 units subcutaneously every 12 hours, hydralazine 20 mg intravenous every 6 hours, Zofran 4 mg on need basis. REVIEW OF SYSTEMS:  GENERAL:  Elderly woman, not in distress, but not feeling good. CARDIOVASCULAR SYSTEM:  No chest pain. No shortness of breath. Cannot specify whether she has any palpitation or not. RESPIRATORY SYSTEM:  No cough, no wheezing. EXTREMITIES:  No calf muscle tenderness. GASTROINTESTINAL:  May have nausea without any vomiting. No abdominal pain. GENITOURINARY:  No urgency, no hesitancy. No flank pain. No hematuria. PSYCHIATRIC:  Nonfocal, but intermittent confusion. NEUROLOGIC:  No headache, alert and oriented twice. No focal neurological deficit. Can move extremities. Gait not listed. PHYSICAL EXAMINATION:  VITAL SIGNS:  On admission, temperature 98.7, heart rate 164 to 180, blood pressure 222/132, mean arterial pressure of 164, oxygen saturation 100% at room air. HEENT:  Oral mucosa moist.  Pupil reacting to light. NECK:  Jugular venous pressure not distended. Neck is supple. Has tunneled dialysis catheter in the right IJ.  LUNGS:  Decreased breath sounds without any crackles. HEART:  Tachycardic with S1, S2, and S4. ABDOMEN:  Soft.   No palpable mass.  EXTREMITIES:  Ankle, negative edema. LABORATORY DATA:  Relevant labs on the day of admission, WBC of 9.0 with a hemoglobin of 8.6, hematocrit of 26.2, platelets of 105,303. Neutrophil 75%, lymphocyte 22%. Chemistry, sodium 140, potassium 3.4 that is after dialysis, chloride 102, CO2 of 26, glucose of 132, blood urea nitrogen of 26, creatinine of 4.94, again that is after dialysis. Calcium 9.3, phosphorus 2.8, alkaline phosphatase 132. BNP of 5175. Troponin of less than 0.02. Estimated average glucose is 162 with a hemoglobin A1c of 7.2. Chemistry today, sodium 137, potassium 4.7, chloride 103, CO2 of 26, glucose of 133, creatinine of 6.89 with a BUN of 34. COVID test was negative. CT of the head without contrast was done at the time of admission, which shows small remote left frontal infarct, mild burden of the periventricular white matter low attenuation, chronic microvascular ischemic changes, mild cerebral cortical atrophy. EKG was done, shows supraventricular tachycardia. Today, it was done again, is normal sinus tachycardia. Last echocardiogram was done in 09/2020, shows ejection fraction was 70% visually estimated, normal cavity size. IMPRESSION AND PLAN:  1. The patient presented to the emergency room with tachycardia and hypertensive urgency for hemodialysis. She was found to have supraventricular tachycardia, without adenosine converted to normal sinus rhythm. Blood pressure on the high side requiring Cardizem drip. Also, need to supplement with other medications. I am going to add losartan to control the blood pressure and other medications should be restarted. 2.  Her diabetes needs to be controlled. Appropriate antidiabetic medication should be started. 3.  History of seizure disorder. Continue on Keppra 500 mg twice daily along with the 250 mg supplemental dose after dialysis. 4.  End-stage renal disease. The patient will be maintained on dialysis as scheduled.   5. Low protein or hypoalbuminemia. Protein supplementation would be given along with the nutritional supplementation. 6.  Secondary hyperparathyroidism. We will check intact PTH and give Hectorol as indicated. 7.  Also anemic. She will need Epogen, with the high blood pressure, we have to be careful with the epo dose. Fall precaution should be implemented. Further recommendations will be given based on the hospital course.       Maria C Bradley MD      [id number]  D:  03/18/2021 16:45  T:  03/18/2021 21:49  JOB #:  [id number]

## 2021-03-19 NOTE — PROGRESS NOTES
Not able to assess the patient due to medical condition. No family at bedside.     [address]    Board Certified [facility]  [phone]

## 2021-03-19 NOTE — DIALYSIS
[facility]        ACUTE HEMODIALYSIS FLOW SHEET      HEMODIALYSIS ORDERS: Physician: vickey     Dialyzer: revaclear   Duration: 3.15 hr  BFR: 400   DFR: 800   Dialysate:  Temp 36-37*C  K+   2    Ca+  2.5 Na 138 Bicarb 35   Weight: 59.7 kg   Patient Chart [x]     Unable to Obtain []   Dry weight/UF Goal: 2000 Access CVL  Needle Gauge     Heparin []  Bolus      Units    [] Hourly       Units    [x]None      Catheter locking solution heparin   Pre BP:   170/86    Pulse:     86       Respirations: 18  Temperature:   98.9   Labs: Pre        Post:        [x] N/A   Additional Orders(medications, blood products, hypotension management) [x] N/A     [x] [place] Consent Verified     CATHETER ACCESS: []N/A   [x]Right   []Left   [x]IJ     []Fem   []chest wall   [] First use X-ray verified     [x]Tunnel                [] Non Tunneled   [x]No S/S infection  []Redness  []Drainage []Cultured []Swelling []Pain   [x]Medical Aseptic Prep Utilized   []Dressing Changed  [] Biopatch  Date:       []Clotted   [x]Patent   Flows: [x]Good  []Poor  []Reversed   If access problem,  notified: []Yes    [x]N/A  Date:                       GENERAL ASSESSMENT:      LUNGS:  Rate  SaO2% [] N/A    [x] Clear  [] Coarse  [] Crackles  [] Wheezing        [] Diminished     Location : []RLL   []LLL    []RUL  []STEPHY     Cough: []Productive  []Dry  [x]N/A   Respirations:  [x]Easy  []Labored     Therapy:   [x]RA  []NC  l/min    Mask: []NRB []Venti       O2%                  []Ventilator  []Intubated  [] Trach  [] BiPaP     CARDIAC: [x]Regular      [] Irregular   [] Pericardial Rub  [] JVD        []  Monitored  [] Bedside  [] Remotely monitored [] N/A  Rhythm:      EDEMA: [] None  [x]Generalized  [] Pitting [] 1    [] 2    [] 3    [] 4                 [] Facial  [] Pedal  []  UE  [] LE     SKIN:   [x] Warm  [] Hot     [] Cold   [x] Dry     [] Pale   [] Diaphoretic                  [] Flushed  [] Jaundiced  [] Cyanotic  [] Rash  [] Weeping     LOC:    [x] Alert [x]Oriented:    [x] Person     [x] Place  []Time               [] Confused  [] Lethargic  [] Medicated  [] Non-responsive     GI / ABDOMEN:  [] Flat    [] Distended    [x] Soft    [] Firm   []  Obese                             [] Diarrhea  [x] Bowel Sounds  [] Nausea  [] Vomiting       / URINE ASSESSMENT:[] Voiding   [x] Oliguria  [] Anuria   []  Khan     [] Incontinent    []  Incontinent Brief      []  Bathroom Privileges       PAIN: [x] 0 []1  []2   []3   []4   []5   []6   []7   []8   []9   []10              Scale 0-10  Action/Follow Up:      MOBILITY:  [] Amb    [] Amb/Assist    [x] Bed    [] Wheelchair  [] Stretcher      All Vitals and Treatment Details on Attached [facility]: [facility]          Room # 741/63      [] 1st Time Acute  [] Stat  [x] Routine  [] Urgent     [x] Acute Room  []  Bedside  [] ICU/CCU  [] ER   Isolation Precautions:  Droplet Plus      Special Considerations:         [] Blood Consent Verified [x]N/A     ALLERGIES:   Allergies   Allergen Reactions    Amoxicillin Unknown (comments)    Cleocin [Clindamycin Hcl] Unknown (comments)    Codeine Unknown (comments)    Lorcet 10/650 [Hydrocodone-Acetaminophen] Unknown (comments)    Penicillin G Benzathine Unknown (comments)    Shellfish Derived Unknown (comments)               Code Status:Full Code        Hepatitis Status:                        Lab Results   Component Value Date/Time    Hepatitis B surface Ag <0.10 09/25/2020 05:31 PM    Hepatitis B surface Ab 63.01 09/25/2020 05:31 PM    Hepatitis B core, IgM NEGATIVE  08/11/2018 03:29 AM    Hep B Core Ab, IgM NEGATIVE  08/13/2018 04:34 AM    Hep B Core Ab, total NEGATIVE  08/13/2018 04:34 AM                     Current Labs:   Lab Results   Component Value Date/Time    Sodium 140 03/19/2021 04:36 AM    Potassium 5.1 03/19/2021 04:36 AM    Chloride 105 03/19/2021 04:36 AM    CO2 28 03/19/2021 04:36 AM    Anion gap 7 03/19/2021 04:36 AM    Glucose 166 (H) 03/19/2021 04:36 AM    BUN 43 (H) 03/19/2021 04:36 AM    Creatinine 9.15 (H) 03/19/2021 04:36 AM    BUN/Creatinine ratio 5 (L) 03/19/2021 04:36 AM    GFR est AA 5 (L) 03/19/2021 04:36 AM    GFR est non-AA 4 (L) 03/19/2021 04:36 AM    Calcium 9.0 03/19/2021 04:36 AM    Calcium 8.7 03/19/2021 04:36 AM      Lab Results   Component Value Date/Time    WBC 10.2 03/19/2021 04:36 AM    Hemoglobin, POC 13.3 11/08/2019 07:26 AM    HGB 7.2 (L) 03/19/2021 04:36 AM    Hematocrit, POC 39 11/08/2019 07:26 AM    HCT 22.2 (L) 03/19/2021 04:36 AM    PLATELET 142 42/20/6678 04:36 AM    MCV 89.2 03/19/2021 04:36 AM                                                                                     DIET: DIET RENAL  DIET NUTRITIONAL SUPPLEMENTS       PRIMARY NURSE REPORT: First initial/Last name/Title      Pre Dialysis: Janeth Mares RN     Time: 1400      EDUCATION:    [x] Patient [] Other         Knowledge Basis: []None [x]Minimal [] Substantial   Barriers to learning **AMS[]N/A   [] Access Care     [] S&S of infection     [] Fluid Management     []K+     [x]Procedural    []Albumin     [] Medications     [] Tx Options     [] Transplant     [] Diet     [] Other   Teaching Tools:  [x] Explain  [] Demo  [] Handouts [] Video  Patient response:   [x] Verbalized understanding  [] Teach back  [x] Return demonstration [] Requires follow up   Inappropriate due to            [x] Time Out/Safety Check  [x]Extracorporeal Circuit Tested for integrity       RO/HEMODIALYSIS MACHINE SAFETY CHECKS  Before each treatment:     Machine Number:                   [facility]                                  [x] Unit Machine # 3 with centralized RO                                  [] Portable Machine #1/RO serial # [id number]                                  [] Portable Machine #2/RO serial # [id number]                                  [] Portable Machine #4/RO serial # [id number]                                                     [address] [place] [address] [] Portable Machine #11/RO serial # [id number]                                   [] Portable Machine #12/RO serial # [id number]                                  [] Portable Machine #13/RO serial #  [id number]      Alarm Test:  Pass time 1400               [x] RO/Machine Log Complete      Temp    36*-37*             Dialysate: pH  7.4 Conductivity: Meter   14     HD Machine   14                  TCD: 14  Dialyzer Lot # [id number]          Blood Tubing Lot # [id number]          Saline Lot #  [id number]     CHLORINE TESTING-Before each treatment and every 4 hours    Total Chlorine: [x] less than 0.1 ppm  Time: 1300 4 Hr/2nd Check Time: 1700   (if greater than 0.1 ppm from Primary then every 30 minutes from Secondary)     TREATMENT INITIATION  with Dialysis Precautions:   [x] All Connections Secured                 [x] Saline Line Double Clamped   [x] Venous Parameters Set                  [x] Arterial Parameters Set    [x] Prime Given 250ml                          [x]Air Foam Detector Engaged      Treatment Initiation Note:Pt in stable condition. CVL accessed and treatment initiated without complication. Post Assessment:   Dialyzer Cleared: [] Good [x] Fair  [] Poor  Blood processed:  60.9 L  UF Removed  2000 Ml  POst BP:   205/100       Pulse: 93        Respirations: 18  Temperature: 98.2 Lungs:     [x] Clear      [] Course         [] Crackles    [] Wheezing         [] Diminished   Post Tx Vascular Access:   AVF/AVG: Bleeding stopped   Art  min. Ray. Min   N/A Cardiac:   [x] Regular   [] Irregular   [] Monitor  [] N/A      Rhythm:       Catheter:   Locking solution: Heparin 1:1000   Art. 1.6  Ray. 1.6      Skin:   Pain:    [x] Warm  [x] Dry [] Diaphoretic    [] Flushed    [] Pale [] Cyanotic [x]0  []1  []2   []3  []4   []5   []6   []7   []8   []9   []10     Post Treatment Note:   HD well tolerated. 2L UF removed.  NAD noted during or post treatment       POST TREATMENT PRIMARY NURSE HANDOFF REPORT:     First initial/Last name/Title         Post Dialysis: samira Stephens RN Time:  1800     Abbreviations: AVG-arterial venous graft, AVF-arterial venous fistula, IJ-Internal Jugular, Subcl-Subclavian, Fem-Femoral, Tx-treatment, AP/HR-apical heart rate, DFR-dialysate flow rate, BFR-blood flow rate, AP-arterial pressure, -venous pressure, UF-ultrafiltrate, TMP-transmembrane pressure, Ray-Venous, Art-Arterial, RO-Reverse Osmosis

## 2021-03-19 NOTE — DIABETES MGMT
Diabetes Plan of Care    T2DM with current A1c of 7.2% (3/18/2021)  Home diabetes medications:   Lipro sliding scale insulin    Patient was admitted on 3/17/2021 with report of SVT post dialysis. She is from [facility]. Noted the following assessment:  Hypertensive emergency  Chronic encephalopahty due to underlying strokes  ESRD on hemodialysis  COVID  Recent COVID at [address] [place] [address]    3/19: Patient in [address] [place] [address] unit on droplet isolation. Total dose of insulin 3/18: 8 units    Recommendation(s):    1.) modify sliding scale lispro insulin to very resistant dose per protocol. Done. 2.) add basal lantus insulin 4 units daily     Assessment:    POC BG 3/18: 304  POC BG 3/19 at time of review: 165, 121, 215  Lab FBG 3/19: 166    Most recent blood glucose values: Within target range : No.    Current A1c 7.2% (3/18/2021) which is equivalent to estimated average blood glucose of 160 mg/dL during the past 2-3 months    Adequate glycemic control PTA: Yes    Current hospital diabetes medications:  Correctional lispro insulin ACHS.  Modified to very resistant dose\    TDD previous day 3/18:  Lispro: 18 units    Diet: Renal regular; nutr suppl: nepro AM snack    Goals: Blood glucose will be within target of 70 - 180 mg/dl by: 3/22/2021    Education:  _____ Refer to Diabetes Education Record                       __C___ Education not indicated at this time     Aurelia Resendiz RN [facility]  Pager: [phone]

## 2021-03-19 NOTE — PROGRESS NOTES
[facility] - Progress Note  Admit Date: 3/17/2021    Assessment:     Hospital Problems  Date Reviewed: 5/29/2019          Codes Class Noted POA    Hypertensive emergency ICD-10-CM: I16.1  ICD-9-CM: 401.9  9/25/2020 Yes        Tachycardia ICD-10-CM: R00.0  ICD-9-CM: 785.0  9/25/2020 Unknown        [facility] ICD-10-CM: N18.6  ICD-9-CM: 585.6  8/8/2018 Yes        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 ([facility] ICD-10-CM: E11.22, I12.0, Z99.2, N18.6  ICD-9-CM: 250.40, 403.91, V45.11, 585.6  8/8/2018 Yes        CVA, old, cognitive deficits ICD-10-CM: I69.319  ICD-9-CM: 438.0  8/8/2018 Yes              - SVT new diagnosis. Presented to ED with tachycardia and hypertensive urgency following HD. HR was in the 160s-170s. Ekg with SVT, cannot rule out underlying atrial flutter. No electrolyte abnormalities. Troponin negative. - Hyperdynamic systolic function with EF >70% by echo 9/2020. There is a LV cavitary obliteration due to hyperdynamic state with peak velocity is 2.32 m/s, peak gradient is 22 mmHg. - Hx of hypertensive urgency  - Hx of end stage renal disease on HD  - Hx of stroke  - Hx of seizure disorder  - Hx of dyslipidemia- on statin  - Hx of type II DM  - Hx of bipolar disease/poor historian  - Echo 9.27.20 with hyperdynamic EF, > 70%, no wall motion abnormality     No primary cardiologist.    Plan:     Rhythm remains stable. BP remains elevated. Continue home lopressor (increased) and home cardizem (resumed) and wean off cardizem drip. If recurrent clear cut documented atrial flutter will need to discuss possible [address] but with significant anemia would likely recommend ASA therapy only. Continue volume management per nephrology. No further cardiac work up needed at this time. Staff addendum:  Back on home cardizem and increasing lopressor, rhythm now stable. Poor candidate for aggressive [address] given anemia.   Planning HD later today, if tolerates, she could discharge from cardiac standpoint. I saw, examined, and evaluated the patient. I personally reviewed the patient's labs, tests, vitals, orders, medications, updated history, and other providers assessments. I personally agree with the findings as stated and the plan as documented.   Danielle Lopez MD      Subjective:     No CP, No SOB, tele sinus rhythm    Objective:      Patient Vitals for the past 8 hrs:   Temp Pulse Resp BP SpO2   03/19/21 1026 98.3 °F (36.8 °C) 76 16 (!) 163/70 100 %   03/19/21 0752 99.7 °F (37.6 °C) 82 16 (!) 156/73 97 %   03/19/21 0400 99 °F (37.2 °C) 79 18 (!) 187/86 96 %         Patient Vitals for the past 96 hrs:   Weight   03/18/21 0148 131 lb 9.6 oz (59.7 kg)                    Intake/Output Summary (Last 24 hours) at 3/19/2021 1042  Last data filed at 3/18/2021 1200  Gross per 24 hour   Intake 50 ml   Output    Net 50 ml       Physical Exam:  General:  alert, cooperative, no distress, appears stated age  Neck:  no JVD  Lungs:  clear to auscultation bilaterally  Heart:  regular rate and rhythm  Abdomen:  abdomen is soft without significant tenderness, masses, organomegaly or guarding  Extremities:  extremities normal, atraumatic, no cyanosis or edema    Data Review:     Labs: Results:       Chemistry Recent Labs     03/19/21 0436 03/18/21  0524 03/17/21  1543   * 233* 132*    137 140   K 5.1 4.6 3.4*    103 102   CO2 28 26 26   BUN 43* 34* 26*   CREA 9.15* 6.89* 4.94*   CA 8.7  9.0 8.8 9.3   MG  --  2.1 2.2   PHOS 4.6 4.3 2.8   AGAP 7 8 12   BUCR 5* 5* 5*     --  132*   TP 6.7  --  8.1   ALB 3.0* 3.2* 3.4   GLOB 3.7  --  4.7*   AGRAT 0.8  --  0.7*      CBC w/Diff Recent Labs     03/19/21 0436 03/18/21  0524 03/17/21  1543   WBC 10.2 9.8 9.0   RBC 2.49* 2.71* 2.97*   HGB 7.2* 7.9* 8.6*   HCT 22.2* 24.3* 26.2*    389 380   GRANS 78* 73 75*   LYMPH 8* 12* 22   EOS 2 2 1      Cardiac Enzymes No results found for: CPK, CK, CKMMB, CKMB, RCK3, CKMBT, CKNDX, CKND1, SONG, TROPT, TROIQ, KARINA, TROPT, TNIPOC, BNP, BNPP   Coagulation No results for input(s): PTP, INR, APTT, INREXT in the last 72 hours.     Lipid Panel No results found for: CHOL, CHOLPOCT, CHOLX, CHLST, CHOLV, 592075, HDL, HDLP, LDL, LDLC, DLDLP, 670655, VLDLC, VLDL, TGLX, TRIGL, TRIGP, TGLPOCT, CHHD, CHHDX   BNP No results found for: BNP, BNPP, XBNPT   Liver Enzymes Recent Labs     03/19/21  0436   TP 6.7   ALB 3.0*         Digoxin    Thyroid Studies Lab Results   Component Value Date/Time    TSH 3.70 03/18/2021 05:24 AM          Signed By: SUSI Car     March 19, 2021

## 2021-03-19 NOTE — PROGRESS NOTES
Problem: Mobility Impaired (Adult and Pediatric)  Goal: *Acute Goals and Plan of Care (Insert Text)  Outcome: Resolved/Met     PHYSICAL THERAPY EVALUATION AND DISCHARGE    Patient: Sid Mcnally (26 y.o. female)  Date: 3/19/2021  Primary Diagnosis: Tachycardia [R00.0]        Precautions:  Fall, Seizure(droplet plus )    PLOF: Pt lives at [address] [place] [address], reporting she does not leave the bed, does not use wheelchair and requires assist for all mobility and ADLs. Pt remains in hospital bed. ASSESSMENT :  Based on the objective data described below, the patient presents with baseline functional mobility per patient report. Pt found in R sidelying in bed with large BM in bed. Pt reporting she is not feeling well. Pt assisted to sitting on EOB with Marci. Pt able to sit with supervision without loss of balance but demonstrating forward head and trunk flexion. Pt performing sit to supine with SBA, rolling with Dalton in bed to assist with linen change. Requiring dependence for pericare. Pt left with all needs met and call bell in reach. Pt reporting she does not use BSC and does not transfer from bed at facility. Patient does not require further skilled intervention at this level of care. PLAN :  Recommendations and Planned Interventions:   No formal PT needs identified at this time. Discharge Recommendations: return to LTC   Further Equipment Recommendations for Discharge: wheelchair      SUBJECTIVE:   Patient stated I want to take a nap.     OBJECTIVE DATA SUMMARY:     Past Medical History:   Diagnosis Date    Asthma     Bipolar affective disorder ([facility]     Brain condition     Cataract     Chronic kidney disease     hemodialysis M-W-F    Diabetes ([facility]     Hyperlipidemia     Hypertension     Paralytic strabismus, unspecified     Psychiatric disorder     Seizures ([facility] 08/27/2018     Past Surgical History:   Procedure Laterality Date    ID INSJ TUNNELED CVC W/O SUBQ PORT/ AGE 3 YR/> N/A 8/9/2018     in-pt 474A, Insertion Tunneled Central Venous Catheter performed by Abida Javier MD at [facility] CATH LAB    TN INSJ TUNNELED CVC W/O SUBQ PORT/ AGE 3 YR/> N/A 11/8/2019    INSERTION TUNNELED CENTRAL VENOUS CATHETER performed by Jo Ann Mcmillan MD at [facility] CATH LAB     Barriers to Learning/Limitations: None  Compensate with: N/A  Home Situation:   Home Situation  Home Environment: Skilled nursing facility([facility])  One/Two Story Residence: One story  Living Alone: No  Support Systems: Family member(s), Skilled nursing facility(Sister)  Patient Expects to be Discharged to[de-identified] Skilled nursing facility  Current DME Used/Available at Home: Wheelchair  Critical Behavior:  Neurologic State: Alert  Orientation Level: Oriented to person;Oriented to place; Disoriented to situation;Disoriented to time  Cognition: Follows commands  Safety/Judgement: Fall prevention  Psychosocial  Patient Behaviors: Calm; Cooperative  Purposeful Interaction: Yes  Pt Identified Daily Priority: Clinical issues (comment)  Caritas Process: Nurture loving kindness;Enable iveth/hope;Establish trust;Teaching/learning; Attend basic human needs;Create healing environment;Supportive expression  Caring Interventions: Reassure; Therapeutic modalities  Reassure: Therapeutic listening; Informing; Instilling iveth and hope;Caring rounds  Therapeutic Modalities: Intentional therapeutic touch    Strength:    Strength: Generally decreased, functional    Tone & Sensation:   Tone: Normal    Sensation: Intact    Range Of Motion:  AROM: Within functional limits    Posture:  Posture (WDL): Exceptions to WDL  Posture Assessment:  Forward head;Trunk flexion   Functional Mobility:  Bed Mobility:  Rolling: Modified independent  Supine to Sit: Minimum assistance  Sit to Supine: Modified independent  Scooting: Contact guard assistance    Pain:  Pain level pre-treatment: 0/10   Pain level post-treatment: 0/10      Activity Tolerance:   Appropriate tolerance for current level     Please refer to the flowsheet for vital signs taken during this treatment. After treatment:   []         Patient left in no apparent distress sitting up in chair  [x]         Patient left in no apparent distress in bed  [x]         Call bell left within reach  [x]         Nursing notified  []         Caregiver present  [x]         Bed alarm activated  []         SCDs applied    COMMUNICATION/EDUCATION:   [x]         Role of Physical Therapy in the acute care setting. []         Fall prevention education was provided and the patient/caregiver indicated understanding. []         Patient/family have participated as able in goal setting and plan of care. []         Patient/family agree to work toward stated goals and plan of care. []         Patient understands intent and goals of therapy, but is neutral about his/her participation. []         Patient is unable to participate in goal setting/plan of care: ongoing with therapy staff.  []         Other:     Thank you for this referral.  Олег Moreno, PT   Time Calculation: 23 mins      Eval Complexity: History: MEDIUM  Complexity : 1-2 comorbidities / personal factors will impact the outcome/ POC Exam:MEDIUM Complexity : 3 Standardized tests and measures addressing body structure, function, activity limitation and / or participation in recreation  Presentation: LOW Complexity : Stable, uncomplicated  Clinical Decision Making:Low Complexity low  Overall Complexity:LOW

## 2021-03-20 VITALS
DIASTOLIC BLOOD PRESSURE: 85 MMHG | HEIGHT: 63 IN | BODY MASS INDEX: 23.67 KG/M2 | SYSTOLIC BLOOD PRESSURE: 184 MMHG | RESPIRATION RATE: 18 BRPM | OXYGEN SATURATION: 98 % | HEART RATE: 80 BPM | WEIGHT: 133.6 LBS | TEMPERATURE: 98.5 F

## 2021-03-20 LAB
ALBUMIN SERPL-MCNC: 3.1 G/DL (ref 3.4–5)
ALBUMIN/GLOB SERPL: 0.9 {RATIO} (ref 0.8–1.7)
ALP SERPL-CCNC: 129 U/L (ref 45–117)
ALT SERPL-CCNC: 16 U/L (ref 13–56)
ANION GAP SERPL CALC-SCNC: 9 MMOL/L (ref 3–18)
AST SERPL-CCNC: 14 U/L (ref 10–38)
BASOPHILS # BLD: 0 K/UL (ref 0–0.1)
BASOPHILS NFR BLD: 0 % (ref 0–2)
BILIRUB SERPL-MCNC: 0.3 MG/DL (ref 0.2–1)
BUN SERPL-MCNC: 27 MG/DL (ref 7–18)
BUN/CREAT SERPL: 4 (ref 12–20)
CALCIUM SERPL-MCNC: 8.5 MG/DL (ref 8.5–10.1)
CHLORIDE SERPL-SCNC: 103 MMOL/L (ref 100–111)
CO2 SERPL-SCNC: 26 MMOL/L (ref 21–32)
CREAT SERPL-MCNC: 7.58 MG/DL (ref 0.6–1.3)
DIFFERENTIAL METHOD BLD: ABNORMAL
EOSINOPHIL # BLD: 0.2 K/UL (ref 0–0.4)
EOSINOPHIL NFR BLD: 3 % (ref 0–5)
ERYTHROCYTE [DISTWIDTH] IN BLOOD BY AUTOMATED COUNT: 15.7 % (ref 11.6–14.5)
FERRITIN SERPL-MCNC: 1396 NG/ML (ref 8–388)
FOLATE SERPL-MCNC: 16.5 NG/ML (ref 3.1–17.5)
GLOBULIN SER CALC-MCNC: 3.3 G/DL (ref 2–4)
GLUCOSE BLD STRIP.AUTO-MCNC: 118 MG/DL (ref 70–110)
GLUCOSE BLD STRIP.AUTO-MCNC: 70 MG/DL (ref 70–110)
GLUCOSE SERPL-MCNC: 158 MG/DL (ref 74–99)
HCT VFR BLD AUTO: 25.3 % (ref 35–45)
HGB BLD-MCNC: 8 G/DL (ref 12–16)
IRON SATN MFR SERPL: 27 % (ref 20–50)
IRON SERPL-MCNC: 46 UG/DL (ref 50–175)
LYMPHOCYTES # BLD: 1.2 K/UL (ref 0.9–3.6)
LYMPHOCYTES NFR BLD: 14 % (ref 21–52)
MCH RBC QN AUTO: 28.8 PG (ref 24–34)
MCHC RBC AUTO-ENTMCNC: 31.6 G/DL (ref 31–37)
MCV RBC AUTO: 91 FL (ref 74–97)
MONOCYTES # BLD: 0.7 K/UL (ref 0.05–1.2)
MONOCYTES NFR BLD: 8 % (ref 3–10)
NEUTS SEG # BLD: 6.7 K/UL (ref 1.8–8)
NEUTS SEG NFR BLD: 75 % (ref 40–73)
PLATELET # BLD AUTO: 326 K/UL (ref 135–420)
PMV BLD AUTO: 11 FL (ref 9.2–11.8)
POTASSIUM SERPL-SCNC: 4.8 MMOL/L (ref 3.5–5.5)
PROT SERPL-MCNC: 6.4 G/DL (ref 6.4–8.2)
RBC # BLD AUTO: 2.78 M/UL (ref 4.2–5.3)
SODIUM SERPL-SCNC: 138 MMOL/L (ref 136–145)
TIBC SERPL-MCNC: 173 UG/DL (ref 250–450)
VIT B12 SERPL-MCNC: 1299 PG/ML (ref 211–911)
WBC # BLD AUTO: 8.9 K/UL (ref 4.6–13.2)

## 2021-03-20 PROCEDURE — 83540 ASSAY OF IRON: CPT

## 2021-03-20 PROCEDURE — 74011250636 HC RX REV CODE- 250/636: Performed by: PHYSICIAN ASSISTANT

## 2021-03-20 PROCEDURE — 74011250637 HC RX REV CODE- 250/637: Performed by: PHYSICIAN ASSISTANT

## 2021-03-20 PROCEDURE — 82607 VITAMIN B-12: CPT

## 2021-03-20 PROCEDURE — 82728 ASSAY OF FERRITIN: CPT

## 2021-03-20 PROCEDURE — 85025 COMPLETE CBC W/AUTO DIFF WBC: CPT

## 2021-03-20 PROCEDURE — 74011250636 HC RX REV CODE- 250/636: Performed by: FAMILY MEDICINE

## 2021-03-20 PROCEDURE — 80053 COMPREHEN METABOLIC PANEL: CPT

## 2021-03-20 PROCEDURE — 82962 GLUCOSE BLOOD TEST: CPT

## 2021-03-20 PROCEDURE — 74011250637 HC RX REV CODE- 250/637: Performed by: INTERNAL MEDICINE

## 2021-03-20 PROCEDURE — 36415 COLL VENOUS BLD VENIPUNCTURE: CPT

## 2021-03-20 PROCEDURE — APPSS60 APP SPLIT SHARED TIME 46-60 MINUTES: Performed by: PHYSICIAN ASSISTANT

## 2021-03-20 RX ORDER — LOSARTAN POTASSIUM 50 MG/1
50 TABLET ORAL DAILY
Qty: 30 TAB | Refills: 0 | Status: SHIPPED
Start: 2021-03-20 | End: 2021-04-19

## 2021-03-20 RX ORDER — MINOXIDIL 2.5 MG/1
2.5 TABLET ORAL 2 TIMES DAILY
Status: DISCONTINUED | OUTPATIENT
Start: 2021-03-20 | End: 2021-03-20 | Stop reason: HOSPADM

## 2021-03-20 RX ORDER — MINOXIDIL 2.5 MG/1
2.5 TABLET ORAL 2 TIMES DAILY
Qty: 60 TAB | Refills: 1 | Status: SHIPPED
Start: 2021-03-20 | End: 2021-07-22

## 2021-03-20 RX ORDER — METOPROLOL TARTRATE 25 MG/1
75 TABLET, FILM COATED ORAL EVERY 12 HOURS
Qty: 60 TAB | Refills: 0 | Status: SHIPPED
Start: 2021-03-20 | End: 2021-04-19

## 2021-03-20 RX ORDER — LOSARTAN POTASSIUM 100 MG/1
50 TABLET ORAL DAILY
Qty: 30 TAB | Refills: 0 | Status: SHIPPED
Start: 2021-03-20 | End: 2021-03-20 | Stop reason: SDUPTHER

## 2021-03-20 RX ORDER — LOSARTAN POTASSIUM 50 MG/1
100 TABLET ORAL DAILY
Status: DISCONTINUED | OUTPATIENT
Start: 2021-03-21 | End: 2021-03-20 | Stop reason: HOSPADM

## 2021-03-20 RX ADMIN — HEPARIN SODIUM 5000 UNITS: 5000 INJECTION INTRAVENOUS; SUBCUTANEOUS at 12:28

## 2021-03-20 RX ADMIN — ONDANSETRON 4 MG: 2 INJECTION INTRAMUSCULAR; INTRAVENOUS at 02:19

## 2021-03-20 RX ADMIN — DILTIAZEM HYDROCHLORIDE 240 MG: 120 CAPSULE, COATED, EXTENDED RELEASE ORAL at 08:32

## 2021-03-20 RX ADMIN — LOSARTAN POTASSIUM 50 MG: 50 TABLET, FILM COATED ORAL at 08:32

## 2021-03-20 RX ADMIN — LEVETIRACETAM 500 MG: 500 TABLET ORAL at 08:32

## 2021-03-20 RX ADMIN — HEPARIN SODIUM 5000 UNITS: 5000 INJECTION INTRAVENOUS; SUBCUTANEOUS at 03:56

## 2021-03-20 RX ADMIN — METOPROLOL TARTRATE 75 MG: 25 TABLET, FILM COATED ORAL at 08:32

## 2021-03-20 NOTE — PROGRESS NOTES
New OT orders received and chart reviewed. Based on chart review pt resides in LTC facility and requires assistance for all ADLs and is primarily bed bound. Based on PT note from 3/19/21 pt does not leave the bed and does not use BSC. Per chart review pt is scheduled to return to LTC facility this afternoon. Skilled OT in acute care is not indicated at this time due to pt appears to be at her functional baseline for ADLs and functional mobility. Thank you for this referral. We will DC current OT orders.     Prince Ronnie MS, OTR/L

## 2021-03-20 NOTE — PROGRESS NOTES
Was dialyzed yesterday, BP still high , no more SVT, COVID result noted, recently she was found to have Positive, completed ISOLATION & was dialyzed in different center,. Going back to Nursing home. Continue all current BP medicine,continue OP Dialysis, continue COVID  Precaution measures.

## 2021-03-20 NOTE — DISCHARGE INSTRUCTIONS
Patient Education   Minoxidil (By mouth)   Minoxidil (min-OX-i-dil)  Treats high blood pressure. Brand Name(s):   There may be other brand names for this medicine. When This Medicine Should Not Be Used: You should not use this medicine if you have had an allergic reaction to minoxidil, or if you have pheochromocytoma (tumor of the adrenal gland). How to Use This Medicine:   Tablet  · Your doctor will tell you how much of this medicine to take and how often. Your dose may need to be changed several times in order to find out what works best for you. Do not take more medicine or take it more often than your doctor tells you to. · Carefully follow your doctor's instructions about any special diet. If a dose is missed:   · If you miss a dose or forget to take your medicine, take it as soon as you can. If it is almost time for your next dose, wait until then to take the medicine and skip the missed dose. · Do not use extra medicine to make up for a missed dose. How to Store and Dispose of This Medicine:   · Store the medicine at room temperature in a closed container, away from heat, moisture, and direct light. · Keep all medicine away from children and never share your medicine with anyone. Drugs and Foods to Avoid:   Ask your doctor or pharmacist before using any other medicine, including over-the-counter medicines, vitamins, and herbal products. · Avoid also using minoxidil for hair loss (Rogaine®) while you are taking minoxidil by mouth. Using these medicine together may increase the chances of unwanted side effects. · Make sure your doctor knows if you are also using other blood pressure medicine (such as atenolol, metoprolol, Accupril®, Altace®, Cardizem®, Cardura®, Hytrin®, Lotensin®, Lotrel®, Monopril®, Plendil®, Prinivil®, Tiazac®, Toprol®, Vasotec®, Zestril®).   Warnings While Using This Medicine:   · Make sure your doctor knows if you are pregnant or breastfeeding, or if you have heart disease (especially congestive heart failure or recent heart attack), liver disease, kidney disease (or are on dialysis), or history of a stroke. · If you stop using this medicine, your blood pressure may go up. High blood pressure usually has no symptoms. Even if you feel well, do not stop using the medicine without asking your doctor. · Your doctor will need to check your progress at regular visits while you are using this medicine. Be sure to keep all appointments. Possible Side Effects While Using This Medicine:   Call your doctor right away if you notice any of these side effects:  · Chest pain (may be related to your disease and not a side effect)  · Fast heartbeat  · Fever, chills, sore throat  · Lightheadedness or fainting  · Swelling in the hands, ankles, or feet  · Tightness in chest and trouble breathing  · Unusual bleeding or bruising  · Unusual tiredness or weakness  If you notice these less serious side effects, talk with your doctor:   · Breast swelling or tenderness (in both men and women)  · Change in color or growth of facial or body hair  · Problems with vision  · Weight gain  If you notice other side effects that you think are caused by this medicine, tell your doctor. Call your doctor for medical advice about side effects. You may report side effects to FDA at 7-658-FDA-7784  © 2017 [facility] Information is for End User's use only and may not be sold, redistributed or otherwise used for commercial purposes. The above information is an  only. It is not intended as medical advice for individual conditions or treatments. Talk to your doctor, nurse or pharmacist before following any medical regimen to see if it is safe and effective for you.

## 2021-03-20 NOTE — DISCHARGE SUMMARY
Physician Discharge Summary       Patient: Adelaida Mayo MRN: [id number]  SSN: [id number]    YOB: 1955  Age: 77 y.o. Sex: female    PCP: Artis Steward MD    Allergies: Amoxicillin, Cleocin [clindamycin hcl], Codeine, Lorcet 10/650 [hydrocodone-acetaminophen], Penicillin g benzathine, and Shellfish derived    Admit date: 3/17/2021  Admitting Provider: Kale Mcdaniels MD    Discharge date: 3/20/2021  Discharging Provider: SUIS Alva Ma    * Admission Diagnoses: Tachycardia [R00.0]    * Discharge Diagnoses:    1. Hypertensive urgency   2. SVT , possible atrial flutter prior to presentation. 3. ESRD on HD  4. T2DM  5. Chronic encephalopathy due to underlying strokes  6. Chronic Anemia  7. Seizure disorder  8. Secondary hyperparathyroidism  9. Recent Covid at [address] [place] [address],[address]  10. Nursing home resident- [address] [place] [address] Rd  11. Hx Bipolar    HPI per admitting MD:    Adelaida Mayo is a 77 y.o. female w/ PMH of stroke, psychiatric disorder, ESRD, seizures, HTN, asthma presenting to [facility] on 3/17/21 from dialysis for SVT. Patient normally resides at [facility]. Patient converted to normal sinus on own without adenosine. Patient c/o elevated HR, mild CP. When asked about shortness of breath or cough patient reports Liliya Mart has asthma\" Chart review states she also complained of weakness. Denies abdominal pain, n/v/d, sore throat, fevers, chills. Patient cursing during most of interview. Blood pressure increasing in ED. Sister reports patient just got released from isolation last Wednesday after having [address] Course: Ms. Tobin Cosby is a 78 yo AA woman with type 2 DM, ESRD on HD, chronic encephalopathy from strokes, seizure disorder, recent covid infection who came to [facility] ED with SVT during HD session. She was found to have hypertensive emergency. She was started on a cardizem drip.  She was seen by Cardiology who recommended to stop cardizem drip,  Resume oral cardizem, increase beta blocker to 75 mg BID, continue other home cardiac regimen including losartan and clonidine patch. Per nephrology recommendations, she was started on minoxidil twice daily. Poor candidate for oral anticoagulation due to chronic anemia, per cardiologist. She remained on NSR on telemetry. Nephrology was consulted for HD, her HD session was on 3/10. She was given retacrit for anemia. She was kept on droplet plus precautions due to recent covid at the nursing home and positive covid test in the hospital. PT OT rec return to LTC. Discharge plan was discussed with patient's sister     * Procedures: none   * No surgery found *      Consults: cardiology Dr Joshua Roberson, nephrology Dr Lamin Cuadra     Significant Diagnostic Studies:   Recent Results (from the past 24 hour(s))   OCCULT BLOOD, STOOL    Collection Time: 03/19/21  2:09 PM   Result Value Ref Range    Occult blood, stool Negative NEG     GLUCOSE, POC    Collection Time: 03/19/21  3:50 PM   Result Value Ref Range    Glucose (POC) 215 (H) 70 - 110 mg/dL   GLUCOSE, POC    Collection Time: 03/19/21  8:40 PM   Result Value Ref Range    Glucose (POC) 228 (H) 70 - 110 mg/dL   GLUCOSE, POC    Collection Time: 03/20/21  7:50 AM   Result Value Ref Range    Glucose (POC) 70 70 - 110 mg/dL     CT Results (most recent):  Results from [place] encounter on 03/17/21   CT HEAD WO CONT    Narrative EXAMINATION: CT head without contrast    INDICATION: General weakness, tachycardia, altered mental status    COMPARISON: CT 9/25/2020    TECHNIQUE: CT head without contrast with multiplanar reformations obtained. FINDINGS:    No focal mass effect or shift of midline structures. No abnormal extra-axial  collection identified. Ventricles are not hydrocephalic. Mild cerebral cortical  atrophy. Evidence of small remote left frontal infarct. Mild burden of  periventricular white matter low-attenuation. Imaged paranasal sinuses and mastoids are well-aerated. Intact.  Superficial soft  tissues unremarkable. Impression No clearly acute findings. Small remote left frontal infarct. Mild burden of periventricular white matter  low-attenuation, likely chronic microvascular ischemic change. Mild cerebral  cortical atrophy. XR Results (most recent):  Results from Hospital Encounter encounter on 03/17/21   XR CHEST PORT    Narrative EXAM: AP portable chest.    Indications: sob , weakness    Time stamp: 1612 hours  Comparison: December 2020    Findings:    Lines/Tubes/Devices:  Right-sided dialysis catheter tip at right atrium  LUNGS: No acute findings. Small oblong nodular opacity projecting at the left  upper lateral chest which cannot be further localized and likely extrinsic to  the patient. Not evident on relatively recent preceding chest x-ray or on CT of  September 2020; favored as artifact/benign etiology. MEDIASTINUM: Mildly enlarged cardiac silhouette. BONES/SOFT TISSUES: No acute findings      Impression :    1. No acute cardiopulmonary disease. Discharge Exam:  General:         Alert, cooperative, no acute distress. HD Cath noted to right chest wall. HEENT:           NC, Atraumatic. PERRLA, anicteric sclerae. Lungs:            CTA Bilaterally. No Wheezing/Rhonchi/Rales. Heart:              Regular  rhythm,  No murmur, No Rubs, No Gallops  Abdomen:      Soft, Non distended, Non tender.  +Bowel sounds, no HSM  Extremities:   No c/c/e   Neurologic:      Alert and oriented, follows simple commands, VERGARA spontaneously    * Discharge Condition: improved  * Disposition: [place] ([facility])    Discharge Medications:    New medication:  minoxidiL 2.5 mg tablet. Take 1 Tab by mouth two (2) times a day. #60. Refill: 1 (one). Current Discharge Medication List      CONTINUE these medications which have CHANGED    Details   metoprolol tartrate (LOPRESSOR) 25 mg tablet Take 3 Tabs by mouth every twelve (12) hours for 30 days.   Qty: 60 Tab, Refills: 0 losartan (COZAAR) 50 mg tablet Take 1 Tab by mouth daily for 30 days. Qty: 30 Tab, Refills: 0         CONTINUE these medications which have NOT CHANGED    Details   !! levETIRAcetam (Keppra) 500 mg tablet Take 500 mg by mouth two (2) times a day. !! levETIRAcetam (KEPPRA) 250 mg tablet Take 1 Tab by mouth every Monday, Wednesday, Friday. Qty: 30 Tab, Refills: 1      insulin lispro (HUMALOG) 100 unit/mL injection INITIATE INSULIN CORRECTIVE PROTOCOL: Normal Insulin Sensitivity   For Blood Sugar (mg/dL) of:     Less than 150 =   0 units           150 -199 =   2 units  200 -249 =   4 units  250 -299 =   6 units  300 -349 =   8 units  350 and above = 10 units and Call Physician  If 2 glucose readings are above  Qty: 1 Vial, Refills: 0      cloNIDine (CATAPRES) 0.2 mg/24 hr ptwk 1 Patch by TransDERmal route every seven (7) days. Qty: 4 Patch, Refills: 0      famotidine (Pepcid) 40 mg tablet Take 1 Tab by mouth daily. Qty: 30 Tab, Refills: 0      atorvastatin (Lipitor) 10 mg tablet Take 10 mg by mouth daily. Indications: high amount of triglyceride in the blood      b complex-vitamin c-folic acid 0.8 mg (NEPHRO-DORIAN) 0.8 mg tab tablet Take 1 Tab by mouth daily. sevelamer carbonate (RENVELA) 800 mg tab tab Take 800 mg by mouth three (3) times daily. epoetin raphael (EPOGEN;PROCRIT) 3,000 unit/mL injection 1.73 mL by SubCUTAneous route Every Tuesday, Thursday and Saturday. Indications: ANEMIA DUE TO RENAL FAILURE, Renal Dialysis  Qty: 1 Vial, Refills: 0      aspirin 81 mg tablet Take 81 mg by mouth daily. diltiazem CD (CARDIZEM CD) 240 mg ER capsule Take 240 mg by mouth daily. !! - Potential duplicate medications found. Please discuss with provider. * Follow-up Care/Patient Instructions:   Activity: PT/OT Eval and Treat  Diet: Cardiac Diet  Wound Care: None needed    Follow-up Information     Follow up With Specialties Details Why Nicola De Jesus MD [facility]   [address]  [address] [facility]  [phone]            Signed:  Erin Aguilar [place]  3/20/2021  11:04 AM

## 2021-03-20 NOTE — PROGRESS NOTES
Called report to [place] at [address] [place] [address] Rd re patient returning to facility.

## 2021-03-20 NOTE — PROGRESS NOTES
Transition of Care Plan to LTC    LTC Transition:  Patient is returning as LTC   Patient will transported by [facility] transportation ([phone] with confirmation #[id number]) Will call nurses station with ETA. 30 minutes to 3 hr waiting period. Communication to Patient/Family:  Spoke brandon Romo, sister ([phone]) and they are agreeable to the transition plan. Communication to SNF/Rehab:  Bedside RN, iLana Bergman,  has been notified to update the transition plan to the facility and call report [phone]. Nursing Please include all hard scripts for controlled substances, med rec and dc summary, and AVS in packet. Reviewed and confirmed with Wagner Arrieta and Deborah Jacques with [facility] that can manage the patient care needs for the following:     Audrey Vieira with (X) only those applicable:    Medication:  [x]  Medications will be available at the facility  []  IV Antibiotics   [x]  Controlled Substance - hard copy to be sent with patient     []  Weekly Labs   Documents:  [] Hard RX  [] MAR  [] Kardex  [] AVS  []Transfer Summary  [x]Discharge  Summary   Equipment:  []  CPAP/BiPAP  []  Wound Vacuum  []  Khan or Urinary Device  []  PICC/Central Line  []  Nebulizer  []  Ventilator   Treatment:  [x]Isolation  For COVID  []Surgical Drain Management  []Tracheostomy Care  []Dressing Changes  [x]Dialysis -Monday/Wednesday/Friday at [facility]. [phone]  Chair time is 11am. Pt is transported to/from dialysis by Medicaid transport.  Already set up by facility.    []PEG Care  []Oxygen  []Daily Weights for Heart Failure   Dietary:  []Any diet limitations  []Tube Feedings   []Total Parenteral Management (TPN)      Financial Resources:  Medicaid      [x] Full coverage     Advanced Care Plan:  []Surrogate Decision Maker of Care  []POA  [x]Communicated Code Status \"Full\"   Other   SERAFIN Anaya, RN  Pager # [phone]  Care Manager

## 2021-03-20 NOTE — ROUTINE PROCESS
1900: Verbal shift change report given to RAYO Coronado (oncoming nurse) by RAYO Cornejo (offgoing nurse). Report included the following information SBAR and Cardiac Rhythm NSR.  
 
0700: Bedside and Verbal shift change report given to RAYO Roberto (oncoming nurse) by Ivonne Jaramillo (offgoing nurse). Report included the following information SBAR, Kardex, MAR and Recent Results. 
 
SITUATION:  
• Code Status: Full Code 
• Reason for Admission: Tachycardia [R00.0] 
 
• Hospital day: 3 
• Problem List:  
   
Hospital Problems  Date Reviewed: 5/29/2019  
       Codes Class Noted POA  
 Anemia ICD-10-CM: D64.9 
ICD-9-CM: 285.9  3/19/2021 Unknown  
   
 Hypertensive emergency ICD-10-CM: I16.1 
ICD-9-CM: 401.9  9/25/2020 Yes  
   
 Tachycardia ICD-10-CM: R00.0 
ICD-9-CM: 785.0  9/25/2020 Unknown  
   
 ESRD (end stage renal disease) (HCC) ICD-10-CM: N18.6 
ICD-9-CM: 585.6  8/8/2018 Yes  
   
 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 
ICD-9-CM: 250.40, 403.91, V45.11, 585.6  8/8/2018 Yes  
   
 CVA, old, cognitive deficits ICD-10-CM: I69.319 
ICD-9-CM: 438.0  8/8/2018 Yes  
   
  
 
 
BACKGROUND:  
 Past Medical History:  
Past Medical History:  
Diagnosis Date  
• Asthma   
• Bipolar affective disorder (HCC)   
• Brain condition   
• Cataract   
• Chronic kidney disease   
 hemodialysis M-W-F  
• Diabetes (HCC)   
• Hyperlipidemia   
• Hypertension   
• Paralytic strabismus, unspecified   
• Psychiatric disorder   
• Seizures (HCC) 08/27/2018  
 
  
 Patient taking anticoagulants no  
 
ASSESSMENT:  
• Changes in Assessment Throughout Shift: No 
 
• Patient has Central Line: no Reasons if yes:  
• Patient has Khan Cath: no Reasons if yes:   
 
• Last Vitals: 
  
Vitals:  
 03/19/21 1745 03/19/21 1948 03/19/21 2310 03/20/21 0333  
BP: (!) 205/100 (!) 182/88 (!) 168/84 (!) 181/78  
Pulse: 93 96 79 75  
Resp: 18 19 18 18  
Temp: 98.2 °F (36.8 °C) 99.8 °F (37.7 °C) 99.9 °F (37.7  °C) 97.2 °F (36.2 °C) TempSrc: Axillary SpO2:   99% 99% Weight:      
Height:      
 
 
 IV and DRAINS (will only show if present) Peripheral IV 03/17/21 Right Other(comment)-Site Assessment: Clean, dry, & intact Peripheral IV 03/17/21 Right External jugular-Site Assessment: Clean, dry, & intact [REMOVED] Peripheral IV 03/17/21 Right Antecubital-Site Assessment: Clean, dry, & intact  WOUND (if present) Wound Type:  none Dressing present Dressing Present : No 
 Wound Concerns/Notes:  none  PAIN Pain Assessment Pain Intensity 1: 0 (03/19/21 2310) Patient Stated Pain Goal: 0 
o Interventions for Pain:  none 
o Intervention effective: n/a 
o Time of last intervention: n/a  
o Reassessment Completed: yes  Last 3 Weights: 
Last 3 Recorded Weights in this Encounter 03/18/21 0148 Weight: 59.7 kg (131 lb 9.6 oz) Weight change:  INTAKE/OUPUT Current Shift: No intake/output data recorded. Last three shifts: 03/18 0701 - 03/19 1900 In: 250 [P.O.:250] Out: 2000  LAB RESULTS Recent Labs  
  03/19/21 
0436 03/18/21 
0524 03/17/21 
1543 WBC 10.2 9.8 9.0 HGB 7.2* 7.9* 8.6* HCT 22.2* 24.3* 26.2*  
 389 380 Recent Labs  
  03/19/21 
0436 03/18/21 
0524 03/17/21 
1543  137 140  
K 5.1 4.6 3.4*  
* 233* 132* BUN 43* 34* 26* CREA 9.15* 6.89* 4.94* CA 8.7  9.0 8.8 9.3 MG  --  2.1 2.2 RECOMMENDATIONS AND DISCHARGE PLANNING 1. Pending tests/procedures/ Plan of Care or Other Needs: Monitor cardio 2. Discharge plan for patient and Needs/Barriers: Continue PO cardizem and discharge to [facility] 3. Estimated Discharge Date: TBD Posted on Whiteboard in Patients Room: no   
 
4. The patient's care plan was reviewed with the oncoming nurse. \"HEALS\" SAFETY CHECK Fall Risk Total Score: 5  Safety Measures: Safety Measures: Bed/Chair alarm on, Bed/Chair-Wheels locked, Bed in low position, Call light within reach, Gripper socks, Side rails X 3 A safety check occurred in the patient's room between off going nurse and oncoming nurse listed above. The safety check included the below items Area Items H High Alert Medications - Verify all high alert medication drips (heparin, PCA, etc.) E Equipment - Suction is set up for ALL patients (with genesis) - Red plugs utilized for all equipment (IV pumps, etc.) - WOWs wiped down at end of shift. 
- Room stocked with oxygen, suction, and other unit-specific supplies A Alarms - Bed alarm is set for fall risk patients - Ensure chair alarm is in place and activated if patient is up in a chair L Lines - Check IV for any infiltration - Khan bag is empty if patient has a Khan - Tubing and IV bags are labeled Lurene Reasoner Safety - Room is clean, patient is clean, and equipment is clean. - Hallways are clear from equipment besides carts. - Fall bracelet on for fall risk patients - Ensure room is clear and free of clutter - Suction is set up for ALL patients (with genesis) - Hallways are clear from equipment besides carts. - Isolation precautions followed, supplies available outside room, sign posted [place]

## 2021-04-16 ENCOUNTER — HOSPITAL ENCOUNTER (EMERGENCY)
Age: 66
Discharge: NURSING, REHAB OR LONG-TERM CARE | End: 2021-04-17
Attending: EMERGENCY MEDICINE
Payer: MEDICARE

## 2021-04-16 VITALS
RESPIRATION RATE: 16 BRPM | HEART RATE: 84 BPM | OXYGEN SATURATION: 98 % | BODY MASS INDEX: 23.57 KG/M2 | DIASTOLIC BLOOD PRESSURE: 100 MMHG | WEIGHT: 133 LBS | TEMPERATURE: 97.6 F | SYSTOLIC BLOOD PRESSURE: 200 MMHG | HEIGHT: 63 IN

## 2021-04-16 DIAGNOSIS — I10 ASYMPTOMATIC HYPERTENSION: ICD-10-CM

## 2021-04-16 DIAGNOSIS — I16.0 HYPERTENSIVE URGENCY: Primary | ICD-10-CM

## 2021-04-16 LAB
ALBUMIN SERPL-MCNC: 3.3 G/DL (ref 3.4–5)
ALBUMIN/GLOB SERPL: 1 {RATIO} (ref 0.8–1.7)
ALP SERPL-CCNC: 132 U/L (ref 45–117)
ALT SERPL-CCNC: 15 U/L (ref 13–56)
ANION GAP SERPL CALC-SCNC: 7 MMOL/L (ref 3–18)
AST SERPL-CCNC: 14 U/L (ref 10–38)
ATRIAL RATE: 86 BPM
BASOPHILS # BLD: 0 K/UL (ref 0–0.1)
BASOPHILS NFR BLD: 0 % (ref 0–2)
BILIRUB SERPL-MCNC: 0.4 MG/DL (ref 0.2–1)
BUN SERPL-MCNC: 25 MG/DL (ref 7–18)
BUN/CREAT SERPL: 5 (ref 12–20)
CALCIUM SERPL-MCNC: 8.8 MG/DL (ref 8.5–10.1)
CALCULATED P AXIS, ECG09: 63 DEGREES
CALCULATED R AXIS, ECG10: -8 DEGREES
CALCULATED T AXIS, ECG11: 78 DEGREES
CHLORIDE SERPL-SCNC: 102 MMOL/L (ref 100–111)
CO2 SERPL-SCNC: 30 MMOL/L (ref 21–32)
CREAT SERPL-MCNC: 5.45 MG/DL (ref 0.6–1.3)
DIAGNOSIS, 93000: NORMAL
DIFFERENTIAL METHOD BLD: ABNORMAL
EOSINOPHIL # BLD: 0.1 K/UL (ref 0–0.4)
EOSINOPHIL NFR BLD: 2 % (ref 0–5)
ERYTHROCYTE [DISTWIDTH] IN BLOOD BY AUTOMATED COUNT: 15.7 % (ref 11.6–14.5)
GLOBULIN SER CALC-MCNC: 3.4 G/DL (ref 2–4)
GLUCOSE SERPL-MCNC: 145 MG/DL (ref 74–99)
HCT VFR BLD AUTO: 30.7 % (ref 35–45)
HGB BLD-MCNC: 10.2 G/DL (ref 12–16)
LYMPHOCYTES # BLD: 0.7 K/UL (ref 0.9–3.6)
LYMPHOCYTES NFR BLD: 10 % (ref 21–52)
MAGNESIUM SERPL-MCNC: 2.2 MG/DL (ref 1.6–2.6)
MCH RBC QN AUTO: 30.7 PG (ref 24–34)
MCHC RBC AUTO-ENTMCNC: 33.2 G/DL (ref 31–37)
MCV RBC AUTO: 92.5 FL (ref 74–97)
MONOCYTES # BLD: 0.4 K/UL (ref 0.05–1.2)
MONOCYTES NFR BLD: 7 % (ref 3–10)
NEUTS SEG # BLD: 5.4 K/UL (ref 1.8–8)
NEUTS SEG NFR BLD: 80 % (ref 40–73)
P-R INTERVAL, ECG05: 124 MS
PLATELET # BLD AUTO: 214 K/UL (ref 135–420)
PMV BLD AUTO: 10.6 FL (ref 9.2–11.8)
POTASSIUM SERPL-SCNC: 3.6 MMOL/L (ref 3.5–5.5)
PROT SERPL-MCNC: 6.7 G/DL (ref 6.4–8.2)
Q-T INTERVAL, ECG07: 346 MS
QRS DURATION, ECG06: 82 MS
QTC CALCULATION (BEZET), ECG08: 414 MS
RBC # BLD AUTO: 3.32 M/UL (ref 4.2–5.3)
SODIUM SERPL-SCNC: 139 MMOL/L (ref 136–145)
VENTRICULAR RATE, ECG03: 86 BPM
WBC # BLD AUTO: 6.7 K/UL (ref 4.6–13.2)

## 2021-04-16 PROCEDURE — 85025 COMPLETE CBC W/AUTO DIFF WBC: CPT

## 2021-04-16 PROCEDURE — 80053 COMPREHEN METABOLIC PANEL: CPT

## 2021-04-16 PROCEDURE — 96374 THER/PROPH/DIAG INJ IV PUSH: CPT

## 2021-04-16 PROCEDURE — 74011250637 HC RX REV CODE- 250/637: Performed by: EMERGENCY MEDICINE

## 2021-04-16 PROCEDURE — 93005 ELECTROCARDIOGRAM TRACING: CPT

## 2021-04-16 PROCEDURE — 74011250636 HC RX REV CODE- 250/636: Performed by: STUDENT IN AN ORGANIZED HEALTH CARE EDUCATION/TRAINING PROGRAM

## 2021-04-16 PROCEDURE — 99285 EMERGENCY DEPT VISIT HI MDM: CPT

## 2021-04-16 PROCEDURE — 74011250637 HC RX REV CODE- 250/637: Performed by: STUDENT IN AN ORGANIZED HEALTH CARE EDUCATION/TRAINING PROGRAM

## 2021-04-16 PROCEDURE — 83735 ASSAY OF MAGNESIUM: CPT

## 2021-04-16 RX ORDER — CLONIDINE 0.2 MG/24H
1 PATCH, EXTENDED RELEASE TRANSDERMAL
Status: DISCONTINUED | OUTPATIENT
Start: 2021-04-16 | End: 2021-04-17 | Stop reason: HOSPADM

## 2021-04-16 RX ORDER — HYDRALAZINE HYDROCHLORIDE 25 MG/1
25 TABLET, FILM COATED ORAL
Status: COMPLETED | OUTPATIENT
Start: 2021-04-16 | End: 2021-04-16

## 2021-04-16 RX ORDER — METOPROLOL TARTRATE 25 MG/1
25 TABLET, FILM COATED ORAL
Status: COMPLETED | OUTPATIENT
Start: 2021-04-16 | End: 2021-04-16

## 2021-04-16 RX ORDER — LABETALOL HCL 20 MG/4 ML
20 SYRINGE (ML) INTRAVENOUS ONCE
Status: COMPLETED | OUTPATIENT
Start: 2021-04-16 | End: 2021-04-16

## 2021-04-16 RX ORDER — LOSARTAN POTASSIUM 50 MG/1
50 TABLET ORAL
Status: COMPLETED | OUTPATIENT
Start: 2021-04-16 | End: 2021-04-16

## 2021-04-16 RX ADMIN — HYDRALAZINE HYDROCHLORIDE 25 MG: 25 TABLET, FILM COATED ORAL at 15:59

## 2021-04-16 RX ADMIN — METOPROLOL TARTRATE 25 MG: 25 TABLET, FILM COATED ORAL at 17:22

## 2021-04-16 RX ADMIN — LABETALOL HYDROCHLORIDE 20 MG: 5 INJECTION, SOLUTION INTRAVENOUS at 19:32

## 2021-04-16 RX ADMIN — LOSARTAN POTASSIUM 50 MG: 50 TABLET, FILM COATED ORAL at 17:21

## 2021-04-16 NOTE — ED NOTES
Patient endorsed to me as a signout from Dr. Maria Esther Marquez at 1600 pending reassessment after receiving BP medication. She is here for asymptomatic hypertension. She remains persistently hypertensive after receiving hydralazine. Will order her home doses of Lopressor and losartan and reevaluate. Patient is still hypertensive after receiving her home medications but is completely asymptomatic. She is stable for discharge home. Pt has been reexamined. Patient has no new complaints, changes, or physical findings. Care plan outlined and precautions discussed. Results were reviewed with the patient. All medications were reviewed with the patient; will d/c home with PMD f/u. All of pt's questions and concerns were addressed. Patient was instructed and agrees to follow up with PMD, as well as to return to the ED upon further deterioration. Patient is ready to go home. This note was dictated utilizing voice recognition software which may lead to typographical errors. I apologize in advance if the situation occurs. If questions arise please do not hesitate to contact me or call our department.     Toy Pickering, DO

## 2021-04-16 NOTE — ED TRIAGE NOTES
The patient presents for evaluation of hypertension. Per medic, Marshall Malone was on the dialysis machine. The nurse reported that her blood pressure was elevated and her heart rate was in the 150s. \"   The patient denies chest pain or shortness of breath.

## 2021-04-16 NOTE — ED PROVIDER NOTES
EMERGENCY DEPARTMENT HISTORY AND PHYSICAL EXAM    2:16 PM      Date: 4/16/2021  Patient Name: Fabienne Das    History of Presenting Illness     Chief Complaint   Patient presents with    Hypertension         History Provided By: patient    Additional History (Context): Fabienne Das is a 77 y.o. female presents with onset dialysis, had episode of tachycardia with her heart racing. No alteration of level of consciousness no syncope reported no chest pain or shortness of breath and it resolved, had resolved by the time paramedics got there. No complaint at the time of my evaluation in the hallway of the ER. Vira Libman She was noted to have a hypertensive measurement on arrival to the ER. PCP: Katheryn Mejia MD    Chief Complaint:   Duration:    Timing:    Location:   Quality:   Severity:   Modifying Factors:   Associated Symptoms:       Current Outpatient Medications   Medication Sig Dispense Refill    metoprolol tartrate (LOPRESSOR) 25 mg tablet Take 3 Tabs by mouth every twelve (12) hours for 30 days. 60 Tab 0    losartan (COZAAR) 50 mg tablet Take 1 Tab by mouth daily for 30 days. 30 Tab 0    minoxidiL (LONITEN) 2.5 mg tablet Take 1 Tab by mouth two (2) times a day. 60 Tab 1    cloNIDine (CATAPRES) 0.2 mg/24 hr ptwk 1 Patch by TransDERmal route every seven (7) days. 4 Patch 0    famotidine (Pepcid) 40 mg tablet Take 1 Tab by mouth daily. 30 Tab 0    atorvastatin (Lipitor) 10 mg tablet Take 10 mg by mouth daily. Indications: high amount of triglyceride in the blood      b complex-vitamin c-folic acid 0.8 mg (NEPHRO-DORIAN) 0.8 mg tab tablet Take 1 Tab by mouth daily.  levETIRAcetam (Keppra) 500 mg tablet Take 500 mg by mouth two (2) times a day.  sevelamer carbonate (RENVELA) 800 mg tab tab Take 800 mg by mouth three (3) times daily.  levETIRAcetam (KEPPRA) 250 mg tablet Take 1 Tab by mouth every Monday, Wednesday, Friday.  30 Tab 1    epoetin raphael (EPOGEN;PROCRIT) 3,000 unit/mL injection 1.73 mL by SubCUTAneous route Every Tuesday, Thursday and Saturday. Indications: ANEMIA DUE TO RENAL FAILURE, Renal Dialysis (Patient taking differently: 3,000 Units by SubCUTAneous route every Monday, Wednesday, Friday.) 1 Vial 0    insulin lispro (HUMALOG) 100 unit/mL injection INITIATE INSULIN CORRECTIVE PROTOCOL: Normal Insulin Sensitivity   For Blood Sugar (mg/dL) of:     Less than 150 =   0 units           150 -199 =   2 units  200 -249 =   4 units  250 -299 =   6 units  300 -349 =   8 units  350 and above = 10 units and Call Physician  If 2 glucose readings are above 1 Vial 0    aspirin 81 mg tablet Take 81 mg by mouth daily.  diltiazem CD (CARDIZEM CD) 240 mg ER capsule Take 240 mg by mouth daily. Past History     Past Medical History:  Past Medical History:   Diagnosis Date    Asthma     [facility] ([facility] [address]     Brain condition     Cataract     Chronic kidney disease     hemodialysis M-W-F    Diabetes ([facility] [address]     Hyperlipidemia     Hypertension     Paralytic strabismus, unspecified     Psychiatric disorder     Seizures ([facility] [address] 08/27/2018       Past Surgical History:  Past Surgical History:   Procedure Laterality Date    MN INSJ TUNNELED CVC W/O SUBQ PORT/ AGE 5 YR/> N/A 8/9/2018     in-pt 474A, Insertion Tunneled Central Venous Catheter performed by Altaf Talavera MD at [address] [place] [address]    MN INSJ TUNNELED CVC W/O SUBQ PORT/ AGE 5 YR/> N/A 11/8/2019    INSERTION TUNNELED CENTRAL VENOUS CATHETER performed by Haydee Harrison MD at [facility]       Family History:  History reviewed. No pertinent family history. Social History:  Social History     Tobacco Use    Smoking status: Never Smoker    Smokeless tobacco: Never Used   Substance Use Topics    Alcohol use: No    Drug use: No       Allergies:   Allergies   Allergen Reactions    Amoxicillin Unknown (comments)    Cleocin [Clindamycin Hcl] Unknown (comments)    Codeine Unknown (comments)    Lorcet 10/650 [Hydrocodone-Acetaminophen] Unknown (comments)    Penicillin G Benzathine Unknown (comments)    Shellfish Derived Unknown (comments)         Review of Systems     Review of Systems   Constitutional: Negative for diaphoresis and fever. HENT: Negative for congestion and sore throat. Eyes: Negative for pain and itching. Respiratory: Negative for cough and shortness of breath. Cardiovascular: Positive for palpitations. Negative for chest pain. Gastrointestinal: Negative for abdominal pain and diarrhea. Endocrine: Negative for polydipsia and polyuria. Genitourinary: Negative for dysuria and hematuria. Musculoskeletal: Negative for arthralgias and myalgias. Skin: Negative for rash and wound. Neurological: Negative for seizures and syncope. Hematological: Does not bruise/bleed easily. Psychiatric/Behavioral: Negative for agitation and hallucinations. Physical Exam       Patient Vitals for the past 12 hrs:   Temp Pulse Resp BP SpO2   04/16/21 1408 98 °F (36.7 °C) 92 16 (!) 216/103 100 %       Physical Exam  Vitals signs and nursing note reviewed. Constitutional:       Appearance: She is well-developed. HENT:      Head: Normocephalic and atraumatic. Eyes:      General: No scleral icterus. Conjunctiva/sclera: Conjunctivae normal.   Neck:      Musculoskeletal: Normal range of motion and neck supple. Vascular: No JVD. Cardiovascular:      Rate and Rhythm: Normal rate and regular rhythm. Heart sounds: Normal heart sounds. Comments: 4 intact extremity pulses  Pulmonary:      Effort: Pulmonary effort is normal.      Breath sounds: Normal breath sounds. Abdominal:      Palpations: Abdomen is soft. There is no mass. Tenderness: There is no abdominal tenderness. Musculoskeletal: Normal range of motion. Lymphadenopathy:      Cervical: No cervical adenopathy. Skin:     General: Skin is warm and dry. Neurological:      Mental Status: She is alert. Diagnostic Study Results   Labs -  No results found for this or any previous visit (from the past 12 hour(s)). Radiologic Studies -   No orders to display     No results found. Medications ordered:   Medications - No data to display      Medical Decision Making   Initial Medical Decision Making and DDx:  Evaluate for alarming causes of tachycardia, electrolyte abnormality, EKG abnormality. Elevated blood pressure reading noted, patient in no acute distress with no stroke syndrome. Will recheck a few values. ED Course: Progress Notes, Reevaluation, and Consults:     Twelve-lead EKG 1418, sinus rhythm at 86 no acute process no acute disturbance of the lateral intervals. I am the first provider for this patient. I reviewed the vital signs, available nursing notes, past medical history, past surgical history, family history and social history. Patient Vitals for the past 12 hrs:   Temp Pulse Resp BP SpO2   04/16/21 1408 98 °F (36.7 °C) 92 16 (!) 216/103 100 %     3:48 PM repeat blood pressure still over 200 oral hydralazine ordered. There is no stroke syndrome no CHF no indication for emergent lowering of her blood pressure beyond 200. Vital Signs-Reviewed the patient's vital signs. Pulse Oximetry Analysis, Cardiac Monitor, 12 lead ekg:  Hypoxia on room air  Interpreted by the EP. Records Reviewed: Nursing notes reviewed (Time of Review: 2:16 PM)    Procedures:   Critical Care Time:   Aspirin: (was aspirin given for stroke?)    Diagnosis     Clinical Impression:   1. Hypertensive urgency    2. Asymptomatic hypertension        Disposition:       Follow-up Information    None          Patient's Medications   Start Taking    No medications on file   Continue Taking    ASPIRIN 81 MG TABLET    Take 81 mg by mouth daily. ATORVASTATIN (LIPITOR) 10 MG TABLET    Take 10 mg by mouth daily.  Indications: high amount of triglyceride in the blood    B COMPLEX-VITAMIN C-FOLIC ACID 0.8 MG (NEPHRO-DORIAN) 0.8 MG TAB TABLET    Take 1 Tab by mouth daily. CLONIDINE (CATAPRES) 0.2 MG/24 HR PTWK    1 Patch by TransDERmal route every seven (7) days. DILTIAZEM CD (CARDIZEM CD) 240 MG ER CAPSULE    Take 240 mg by mouth daily. EPOETIN RACHAEL (EPOGEN;PROCRIT) 3,000 UNIT/ML INJECTION    1.73 mL by SubCUTAneous route Every Tuesday, Thursday and Saturday. Indications: ANEMIA DUE TO RENAL FAILURE, Renal Dialysis    FAMOTIDINE (PEPCID) 40 MG TABLET    Take 1 Tab by mouth daily. INSULIN LISPRO (HUMALOG) 100 UNIT/ML INJECTION    INITIATE INSULIN CORRECTIVE PROTOCOL: Normal Insulin Sensitivity   For Blood Sugar (mg/dL) of:     Less than 150 =   0 units           150 -199 =   2 units  200 -249 =   4 units  250 -299 =   6 units  300 -349 =   8 units  350 and above = 10 units and Call Physician  If 2 glucose readings are above    LEVETIRACETAM (KEPPRA) 250 MG TABLET    Take 1 Tab by mouth every Monday, Wednesday, Friday. LEVETIRACETAM (KEPPRA) 500 MG TABLET    Take 500 mg by mouth two (2) times a day. LOSARTAN (COZAAR) 50 MG TABLET    Take 1 Tab by mouth daily for 30 days. METOPROLOL TARTRATE (LOPRESSOR) 25 MG TABLET    Take 3 Tabs by mouth every twelve (12) hours for 30 days. MINOXIDIL (LONITEN) 2.5 MG TABLET    Take 1 Tab by mouth two (2) times a day. SEVELAMER CARBONATE (RENVELA) 800 MG TAB TAB    Take 800 mg by mouth three (3) times daily.    These Medications have changed    No medications on file   Stop Taking    No medications on file     _______________________________    Notes:    Karoline Munoz MD using Dragon dictation      _______________________________

## 2021-04-17 NOTE — ED NOTES
Patient still awaiting medical transportation home. Patient provided with a sandwich and a cup of water.

## 2021-04-17 NOTE — ED NOTES
Report called to [id number] and updated on patient status awaiting medical transport.

## 2021-04-23 ENCOUNTER — TRANSCRIBE ORDER (OUTPATIENT)
Dept: SCHEDULING | Age: 66
End: 2021-04-23

## 2021-04-23 DIAGNOSIS — R60.0 EDEMA OF FACE: ICD-10-CM

## 2021-04-23 DIAGNOSIS — R60.9 EDEMA: Primary | ICD-10-CM

## 2021-04-27 ENCOUNTER — TRANSCRIBE ORDER (OUTPATIENT)
Dept: SCHEDULING | Age: 66
End: 2021-04-27

## 2021-04-27 DIAGNOSIS — I50.30 DIASTOLIC HEART FAILURE (HCC): Primary | ICD-10-CM

## 2021-04-27 DIAGNOSIS — N18.9 CHRONIC KIDNEY DISEASE: Primary | ICD-10-CM

## 2021-04-27 DIAGNOSIS — N18.9: Primary | ICD-10-CM

## 2021-04-27 DIAGNOSIS — N18.9 CHRONIC KIDNEY DISEASE, UNSPECIFIED: Primary | ICD-10-CM

## 2021-04-27 DIAGNOSIS — N18.9 CHRONIC KIDNEY DISEASE (CKD): Primary | ICD-10-CM

## 2021-05-04 ENCOUNTER — HOSPITAL ENCOUNTER (OUTPATIENT)
Dept: CT IMAGING | Age: 66
Discharge: HOME | End: 2021-05-04
Attending: INTERNAL MEDICINE
Payer: MEDICARE

## 2021-05-04 DIAGNOSIS — I50.30 DIASTOLIC HEART FAILURE (HCC): ICD-10-CM

## 2021-05-04 PROCEDURE — 74011000636 HC RX REV CODE- 636: Performed by: INTERNAL MEDICINE

## 2021-05-04 PROCEDURE — 71275 CT ANGIOGRAPHY CHEST: CPT

## 2021-05-04 RX ADMIN — IOPAMIDOL 80 ML: 755 INJECTION, SOLUTION INTRAVENOUS at 12:46

## 2021-06-01 ENCOUNTER — HOSPITAL ENCOUNTER (INPATIENT)
Age: 66
LOS: 3 days | Discharge: NURSING, REHAB OR LONG-TERM CARE | DRG: 291 | End: 2021-06-04
Attending: EMERGENCY MEDICINE | Admitting: INTERNAL MEDICINE
Payer: MEDICARE

## 2021-06-01 ENCOUNTER — APPOINTMENT (OUTPATIENT)
Dept: GENERAL RADIOLOGY | Age: 66
DRG: 291 | End: 2021-06-01
Attending: STUDENT IN AN ORGANIZED HEALTH CARE EDUCATION/TRAINING PROGRAM
Payer: MEDICARE

## 2021-06-01 DIAGNOSIS — R77.8 ELEVATED TROPONIN: ICD-10-CM

## 2021-06-01 DIAGNOSIS — E87.70 HYPERVOLEMIA, UNSPECIFIED HYPERVOLEMIA TYPE: Primary | ICD-10-CM

## 2021-06-01 DIAGNOSIS — R79.89 ELEVATED BRAIN NATRIURETIC PEPTIDE (BNP) LEVEL: ICD-10-CM

## 2021-06-01 DIAGNOSIS — I16.1 HYPERTENSIVE EMERGENCY: ICD-10-CM

## 2021-06-01 DIAGNOSIS — R77.8 ELEVATED TROPONIN LEVEL: ICD-10-CM

## 2021-06-01 LAB
ALBUMIN SERPL-MCNC: 3.4 G/DL (ref 3.4–5)
ALBUMIN/GLOB SERPL: 0.9 {RATIO} (ref 0.8–1.7)
ALP SERPL-CCNC: 138 U/L (ref 45–117)
ALT SERPL-CCNC: 62 U/L (ref 13–56)
ANION GAP SERPL CALC-SCNC: 5 MMOL/L (ref 3–18)
AST SERPL-CCNC: 41 U/L (ref 10–38)
ATRIAL RATE: 114 BPM
BASOPHILS # BLD: 0 K/UL (ref 0–0.1)
BASOPHILS NFR BLD: 0 % (ref 0–2)
BILIRUB DIRECT SERPL-MCNC: 0.1 MG/DL (ref 0–0.2)
BILIRUB SERPL-MCNC: 0.3 MG/DL (ref 0.2–1)
BNP SERPL-MCNC: ABNORMAL PG/ML (ref 0–900)
BUN SERPL-MCNC: 72 MG/DL (ref 7–18)
BUN/CREAT SERPL: 5 (ref 12–20)
CALCIUM SERPL-MCNC: 9.1 MG/DL (ref 8.5–10.1)
CALCULATED P AXIS, ECG09: 61 DEGREES
CALCULATED R AXIS, ECG10: -24 DEGREES
CALCULATED T AXIS, ECG11: 123 DEGREES
CHLORIDE SERPL-SCNC: 109 MMOL/L (ref 100–111)
CO2 SERPL-SCNC: 30 MMOL/L (ref 21–32)
CREAT SERPL-MCNC: 13.4 MG/DL (ref 0.6–1.3)
DIAGNOSIS, 93000: NORMAL
DIFFERENTIAL METHOD BLD: ABNORMAL
EOSINOPHIL # BLD: 0.1 K/UL (ref 0–0.4)
EOSINOPHIL NFR BLD: 1 % (ref 0–5)
ERYTHROCYTE [DISTWIDTH] IN BLOOD BY AUTOMATED COUNT: 16.6 % (ref 11.6–14.5)
GLOBULIN SER CALC-MCNC: 3.7 G/DL (ref 2–4)
GLUCOSE BLD STRIP.AUTO-MCNC: 145 MG/DL (ref 70–110)
GLUCOSE SERPL-MCNC: 245 MG/DL (ref 74–99)
HCT VFR BLD AUTO: 32.5 % (ref 35–45)
HGB BLD-MCNC: 10 G/DL (ref 12–16)
LYMPHOCYTES # BLD: 0.6 K/UL (ref 0.9–3.6)
LYMPHOCYTES NFR BLD: 6 % (ref 21–52)
MCH RBC QN AUTO: 29 PG (ref 24–34)
MCHC RBC AUTO-ENTMCNC: 30.8 G/DL (ref 31–37)
MCV RBC AUTO: 94.2 FL (ref 74–97)
MONOCYTES # BLD: 0.6 K/UL (ref 0.05–1.2)
MONOCYTES NFR BLD: 6 % (ref 3–10)
NEUTS SEG # BLD: 9.3 K/UL (ref 1.8–8)
NEUTS SEG NFR BLD: 87 % (ref 40–73)
P-R INTERVAL, ECG05: 122 MS
PLATELET # BLD AUTO: 264 K/UL (ref 135–420)
PMV BLD AUTO: 11.7 FL (ref 9.2–11.8)
POTASSIUM SERPL-SCNC: 6.2 MMOL/L (ref 3.5–5.5)
PROT SERPL-MCNC: 7.1 G/DL (ref 6.4–8.2)
Q-T INTERVAL, ECG07: 338 MS
QRS DURATION, ECG06: 84 MS
QTC CALCULATION (BEZET), ECG08: 465 MS
RBC # BLD AUTO: 3.45 M/UL (ref 4.2–5.3)
SODIUM SERPL-SCNC: 144 MMOL/L (ref 136–145)
TROPONIN I SERPL-MCNC: 0.56 NG/ML (ref 0–0.04)
VENTRICULAR RATE, ECG03: 114 BPM
WBC # BLD AUTO: 10.7 K/UL (ref 4.6–13.2)

## 2021-06-01 PROCEDURE — 93005 ELECTROCARDIOGRAM TRACING: CPT

## 2021-06-01 PROCEDURE — 82962 GLUCOSE BLOOD TEST: CPT

## 2021-06-01 PROCEDURE — 74011250636 HC RX REV CODE- 250/636: Performed by: EMERGENCY MEDICINE

## 2021-06-01 PROCEDURE — 84484 ASSAY OF TROPONIN QUANT: CPT

## 2021-06-01 PROCEDURE — 74011250637 HC RX REV CODE- 250/637: Performed by: STUDENT IN AN ORGANIZED HEALTH CARE EDUCATION/TRAINING PROGRAM

## 2021-06-01 PROCEDURE — 74011250636 HC RX REV CODE- 250/636: Performed by: PHYSICIAN ASSISTANT

## 2021-06-01 PROCEDURE — 80048 BASIC METABOLIC PNL TOTAL CA: CPT

## 2021-06-01 PROCEDURE — 5A1D70Z PERFORMANCE OF URINARY FILTRATION, INTERMITTENT, LESS THAN 6 HOURS PER DAY: ICD-10-PCS | Performed by: EMERGENCY MEDICINE

## 2021-06-01 PROCEDURE — 96374 THER/PROPH/DIAG INJ IV PUSH: CPT

## 2021-06-01 PROCEDURE — 71045 X-RAY EXAM CHEST 1 VIEW: CPT

## 2021-06-01 PROCEDURE — 90935 HEMODIALYSIS ONE EVALUATION: CPT

## 2021-06-01 PROCEDURE — 65270000029 HC RM PRIVATE

## 2021-06-01 PROCEDURE — 74011250637 HC RX REV CODE- 250/637: Performed by: PHYSICIAN ASSISTANT

## 2021-06-01 PROCEDURE — 99223 1ST HOSP IP/OBS HIGH 75: CPT | Performed by: INTERNAL MEDICINE

## 2021-06-01 PROCEDURE — 80076 HEPATIC FUNCTION PANEL: CPT

## 2021-06-01 PROCEDURE — 85025 COMPLETE CBC W/AUTO DIFF WBC: CPT

## 2021-06-01 PROCEDURE — 74011636637 HC RX REV CODE- 636/637: Performed by: PHYSICIAN ASSISTANT

## 2021-06-01 PROCEDURE — 83880 ASSAY OF NATRIURETIC PEPTIDE: CPT

## 2021-06-01 PROCEDURE — 99285 EMERGENCY DEPT VISIT HI MDM: CPT

## 2021-06-01 RX ORDER — INSULIN LISPRO 100 [IU]/ML
INJECTION, SOLUTION INTRAVENOUS; SUBCUTANEOUS
Status: DISCONTINUED | OUTPATIENT
Start: 2021-06-01 | End: 2021-06-04 | Stop reason: HOSPADM

## 2021-06-01 RX ORDER — ACETAMINOPHEN 650 MG/1
650 SUPPOSITORY RECTAL
Status: DISCONTINUED | OUTPATIENT
Start: 2021-06-01 | End: 2021-06-04 | Stop reason: HOSPADM

## 2021-06-01 RX ORDER — ACETAMINOPHEN 325 MG/1
650 TABLET ORAL
Status: DISCONTINUED | OUTPATIENT
Start: 2021-06-01 | End: 2021-06-04 | Stop reason: HOSPADM

## 2021-06-01 RX ORDER — DILTIAZEM HYDROCHLORIDE 240 MG/1
240 CAPSULE, COATED, EXTENDED RELEASE ORAL DAILY
Status: DISCONTINUED | OUTPATIENT
Start: 2021-06-02 | End: 2021-06-04 | Stop reason: HOSPADM

## 2021-06-01 RX ORDER — ATORVASTATIN CALCIUM 10 MG/1
10 TABLET, FILM COATED ORAL DAILY
Status: DISCONTINUED | OUTPATIENT
Start: 2021-06-02 | End: 2021-06-04 | Stop reason: HOSPADM

## 2021-06-01 RX ORDER — LABETALOL HCL 20 MG/4 ML
10 SYRINGE (ML) INTRAVENOUS ONCE
Status: ACTIVE | OUTPATIENT
Start: 2021-06-01 | End: 2021-06-02

## 2021-06-01 RX ORDER — NITROGLYCERIN 0.4 MG/1
0.4 TABLET SUBLINGUAL
Status: DISCONTINUED | OUTPATIENT
Start: 2021-06-01 | End: 2021-06-01

## 2021-06-01 RX ORDER — MAGNESIUM SULFATE 100 %
16 CRYSTALS MISCELLANEOUS AS NEEDED
Status: DISCONTINUED | OUTPATIENT
Start: 2021-06-01 | End: 2021-06-04 | Stop reason: HOSPADM

## 2021-06-01 RX ORDER — SODIUM CHLORIDE 0.9 % (FLUSH) 0.9 %
5-40 SYRINGE (ML) INJECTION AS NEEDED
Status: DISCONTINUED | OUTPATIENT
Start: 2021-06-01 | End: 2021-06-04 | Stop reason: HOSPADM

## 2021-06-01 RX ORDER — LEVETIRACETAM 250 MG/1
250 TABLET ORAL
Status: DISCONTINUED | OUTPATIENT
Start: 2021-06-02 | End: 2021-06-04 | Stop reason: HOSPADM

## 2021-06-01 RX ORDER — DEXTROSE 50 % IN WATER (D50W) INTRAVENOUS SYRINGE
25-50 AS NEEDED
Status: DISCONTINUED | OUTPATIENT
Start: 2021-06-01 | End: 2021-06-04 | Stop reason: HOSPADM

## 2021-06-01 RX ORDER — GUAIFENESIN 100 MG/5ML
81 LIQUID (ML) ORAL DAILY
Status: DISCONTINUED | OUTPATIENT
Start: 2021-06-02 | End: 2021-06-04 | Stop reason: HOSPADM

## 2021-06-01 RX ORDER — CLONIDINE 0.2 MG/24H
1 PATCH, EXTENDED RELEASE TRANSDERMAL
Status: DISCONTINUED | OUTPATIENT
Start: 2021-06-01 | End: 2021-06-04 | Stop reason: HOSPADM

## 2021-06-01 RX ORDER — LEVETIRACETAM 500 MG/1
500 TABLET ORAL 2 TIMES DAILY
Status: DISCONTINUED | OUTPATIENT
Start: 2021-06-01 | End: 2021-06-04 | Stop reason: HOSPADM

## 2021-06-01 RX ORDER — PROMETHAZINE HYDROCHLORIDE 12.5 MG/1
12.5 TABLET ORAL
Status: DISCONTINUED | OUTPATIENT
Start: 2021-06-01 | End: 2021-06-04 | Stop reason: HOSPADM

## 2021-06-01 RX ORDER — FAMOTIDINE 20 MG/1
20 TABLET, FILM COATED ORAL EVERY 12 HOURS
Status: DISCONTINUED | OUTPATIENT
Start: 2021-06-01 | End: 2021-06-02

## 2021-06-01 RX ORDER — ONDANSETRON 2 MG/ML
4 INJECTION INTRAMUSCULAR; INTRAVENOUS
Status: DISCONTINUED | OUTPATIENT
Start: 2021-06-01 | End: 2021-06-04 | Stop reason: HOSPADM

## 2021-06-01 RX ORDER — SODIUM CHLORIDE 0.9 % (FLUSH) 0.9 %
5-40 SYRINGE (ML) INJECTION EVERY 8 HOURS
Status: DISCONTINUED | OUTPATIENT
Start: 2021-06-01 | End: 2021-06-04 | Stop reason: HOSPADM

## 2021-06-01 RX ORDER — MINOXIDIL 2.5 MG/1
2.5 TABLET ORAL 2 TIMES DAILY
Status: DISCONTINUED | OUTPATIENT
Start: 2021-06-01 | End: 2021-06-04 | Stop reason: HOSPADM

## 2021-06-01 RX ORDER — HEPARIN SODIUM 5000 [USP'U]/ML
5000 INJECTION, SOLUTION INTRAVENOUS; SUBCUTANEOUS EVERY 8 HOURS
Status: DISCONTINUED | OUTPATIENT
Start: 2021-06-01 | End: 2021-06-04 | Stop reason: HOSPADM

## 2021-06-01 RX ORDER — SEVELAMER CARBONATE 800 MG/1
800 TABLET, FILM COATED ORAL 3 TIMES DAILY
Status: DISCONTINUED | OUTPATIENT
Start: 2021-06-01 | End: 2021-06-04 | Stop reason: HOSPADM

## 2021-06-01 RX ORDER — ONDANSETRON 2 MG/ML
4 INJECTION INTRAMUSCULAR; INTRAVENOUS ONCE
Status: COMPLETED | OUTPATIENT
Start: 2021-06-01 | End: 2021-06-01

## 2021-06-01 RX ORDER — POLYETHYLENE GLYCOL 3350 17 G/17G
17 POWDER, FOR SOLUTION ORAL DAILY PRN
Status: DISCONTINUED | OUTPATIENT
Start: 2021-06-01 | End: 2021-06-04 | Stop reason: HOSPADM

## 2021-06-01 RX ADMIN — SEVELAMER CARBONATE 800 MG: 800 TABLET, FILM COATED ORAL at 21:53

## 2021-06-01 RX ADMIN — NITROGLYCERIN 1 INCH: 20 OINTMENT TOPICAL at 14:31

## 2021-06-01 RX ADMIN — LEVETIRACETAM 500 MG: 500 TABLET ORAL at 19:57

## 2021-06-01 RX ADMIN — MINOXIDIL 2.5 MG: 2.5 TABLET ORAL at 21:53

## 2021-06-01 RX ADMIN — HEPARIN SODIUM 5000 UNITS: 5000 INJECTION INTRAVENOUS; SUBCUTANEOUS at 20:02

## 2021-06-01 RX ADMIN — ONDANSETRON 4 MG: 2 INJECTION INTRAMUSCULAR; INTRAVENOUS at 14:31

## 2021-06-01 RX ADMIN — INSULIN LISPRO 2 UNITS: 100 INJECTION, SOLUTION INTRAVENOUS; SUBCUTANEOUS at 22:00

## 2021-06-01 RX ADMIN — FAMOTIDINE 20 MG: 20 TABLET ORAL at 21:53

## 2021-06-01 NOTE — PROGRESS NOTES
Progress Note    Clyde Edwards  77 y.o. Admit Date: 6/1/2021  Active Problems:    ESRD (end stage renal disease) ([facility] [address] (8/8/2018) POA: Yes      Hypertensive emergency (9/25/2020) POA: Yes            Subjective:     Patient seen during [facility], now feels good,received Labetalol & Nitropaste , BP Nicely came down,inFactBest BP  For her in months. A comprehensive review of systems was negative except for that written in the History of Present Illness. Objective:     Visit Vitals  /82   Pulse 80   Temp 98.6 °F (37 °C) (Oral)   Resp 20   LMP  (LMP Unknown)   SpO2 100%       No intake or output data in the 24 hours ending 06/01/21 1604    Current Facility-Administered Medications   Medication Dose Route Frequency Provider Last Rate Last Admin    labetaloL (NORMODYNE;TRANDATE) 20 mg/4 mL (5 mg/mL) injection 10 mg  10 mg IntraVENous ONCE Yoni Bean MD         Current Outpatient Medications   Medication Sig Dispense Refill    minoxidiL (LONITEN) 2.5 mg tablet Take 1 Tab by mouth two (2) times a day. 60 Tab 1    cloNIDine (CATAPRES) 0.2 mg/24 hr ptwk 1 Patch by TransDERmal route every seven (7) days. 4 Patch 0    famotidine (Pepcid) 40 mg tablet Take 1 Tab by mouth daily. 30 Tab 0    atorvastatin (Lipitor) 10 mg tablet Take 10 mg by mouth daily. Indications: high amount of triglyceride in the blood      b complex-vitamin c-folic acid 0.8 mg (NEPHRO-DORIAN) 0.8 mg tab tablet Take 1 Tab by mouth daily.  levETIRAcetam (Keppra) 500 mg tablet Take 500 mg by mouth two (2) times a day.  sevelamer carbonate (RENVELA) 800 mg tab tab Take 800 mg by mouth three (3) times daily.  levETIRAcetam (KEPPRA) 250 mg tablet Take 1 Tab by mouth every Monday, Wednesday, Friday. 30 Tab 1    epoetin raphael (EPOGEN;PROCRIT) 3,000 unit/mL injection 1.73 mL by SubCUTAneous route Every Tuesday, Thursday and Saturday.  Indications: ANEMIA DUE TO RENAL FAILURE, Renal Dialysis (Patient taking differently: 3,000 Units by SubCUTAneous route every Monday, Wednesday, Friday.) 1 Vial 0    insulin lispro (HUMALOG) 100 unit/mL injection INITIATE INSULIN CORRECTIVE PROTOCOL: Normal Insulin Sensitivity   For Blood Sugar (mg/dL) of:     Less than 150 =   0 units           150 -199 =   2 units  200 -249 =   4 units  250 -299 =   6 units  300 -349 =   8 units  350 and above = 10 units and Call Physician  If 2 glucose readings are above 1 Vial 0    aspirin 81 mg tablet Take 81 mg by mouth daily.  diltiazem CD (CARDIZEM CD) 240 mg ER capsule Take 240 mg by mouth daily. Physical Exam:     Physical Exam:   General:  Alert, cooperative, no distress, appears stated age. Eyes:  Conjunctivae/corneas clear. PERRL, EOMs intact. Mouth/Throat: Lips, mucosa, and tongue normal. Teeth and gums normal.   Neck: Supple, symmetrical, trachea midline, no adenopathy, thyroid: no enlargement/tenderness/nodules, no carotid bruit and no JVD. Lungs:   Clear to auscultation bilaterally. Heart:  Regular rate and rhythm, S1, S2 normal, no murmur, click, rub or gallop. Abdomen:   Soft, non-tender. Bowel sounds normal. No masses,  No organomegaly. Extremities: Extremities normal, atraumatic, no cyanosis or edema, HD catheter is functioning well, Blood flow 350 to 400.    Skin: Skin color, texture, turgor normal. No rashes or lesions         Data Review:    CBC w/Diff    Recent Labs     06/01/21  1300   WBC 10.7   RBC 3.45*   HGB 10.0*   HCT 32.5*   MCV 94.2   MCH 29.0   MCHC 30.8*   RDW 16.6*    Recent Labs     06/01/21  1300   MONOS 6   EOS 1   BASOS 0   RDW 16.6*        Comprehensive Metabolic Profile    Recent Labs     06/01/21  1400      K PENDING      CO2 30   BUN 72*   CREA 13.40*    Recent Labs     06/01/21  1400   CA 9.1   ALB 3.4   TP PENDING   TBILI PENDING                        Impression:       Active Hospital Problems    Diagnosis Date Noted    Hypertensive emergency 09/25/2020    [facility]08[facility]            Plan:    Will take off  2 kg,on 2 K,2.5 ca bath, can be discharged back to Nursing home post Dialysis      Franklin Pelletier MD

## 2021-06-01 NOTE — PROGRESS NOTES
Has Very high BNP & high troponin, prefer she is admitted, monitor cardiac enzymes,get cardiology's opinion, k is  high but is getting dialysis.

## 2021-06-01 NOTE — Clinical Note
Status[de-identified] INPATIENT [101]   Type of Bed: Medical [8]   Cardiac Monitoring Required?: Yes   Inpatient Hospitalization Certified Necessary for the Following Reasons: 3.  Patient receiving treatment that can only be provided in an inpatient setting (further clarification in H&P documentation)   Admitting Diagnosis: Hypertensive emergency [[id number]]   Admitting Physician: Rickitt Austin [[id number]]   Attending Physician: Judge Monk   Estimated Length of Stay: 3-4 Midnights   Discharge Plan[de-identified] [address] [facility]

## 2021-06-01 NOTE — DISCHARGE INSTRUCTIONS
You have been seen for volume overload (too much fluid build up in your body) likely due to missing a dialysis session. Please follow up closely with your nephrologist at [facility] [address] Dr. Jesus Matamoros for further evaluation of your chronic kidney disease and dialysis sessions. Your blood pressure was dangerously high in the emergency department. Please follow up with your primary care doctor for management of your blood pressure medications. If unable to obtain medications, we can connect you to our  for assistance. DISCHARGE SUMMARY from Nurse    PATIENT INSTRUCTIONS:    After general anesthesia or intravenous sedation, for 24 hours or while taking prescription Narcotics:  · Limit your activities  · Do not drive and operate hazardous machinery  · Do not make important personal or business decisions  · Do  not drink alcoholic beverages  · If you have not urinated within 8 hours after discharge, please contact your surgeon on call. Report the following to your surgeon:  · Excessive pain, swelling, redness or odor of or around the surgical area  · Temperature over 100.5  · Nausea and vomiting lasting longer than 4 hours or if unable to take medications  · Any signs of decreased circulation or nerve impairment to extremity: change in color, persistent  numbness, tingling, coldness or increase pain  · Any questions    What to do at Home:  Recommended activity: Activity as tolerated    If you experience any of the following symptoms chest pain, shortness of breath, nausea/vomiting, or fever, please follow up with Dr. Emelina Webb. *  Please give a list of your current medications to your Primary Care Provider. *  Please update this list whenever your medications are discontinued, doses are      changed, or new medications (including over-the-counter products) are added. *  Please carry medication information at all times in case of emergency situations.     These are general instructions for a healthy lifestyle:    No smoking/ No tobacco products/ Avoid exposure to second hand smoke  Surgeon General's Warning:  Quitting smoking now greatly reduces serious risk to your health. Obesity, smoking, and sedentary lifestyle greatly increases your risk for illness    A healthy diet, regular physical exercise & weight monitoring are important for maintaining a healthy lifestyle    You may be retaining fluid if you have a history of heart failure or if you experience any of the following symptoms:  Weight gain of 3 pounds or more overnight or 5 pounds in a week, increased swelling in our hands or feet or shortness of breath while lying flat in bed. Please call your doctor as soon as you notice any of these symptoms; do not wait until your next office visit. Patient armband removed and shredded      The discharge information has been reviewed with the patient and caregiver. The patient and caregiver verbalized understanding. Discharge medications reviewed with the patient and caregiver and appropriate educational materials and side effects teaching were provided.   ___________________________________________________________________________________________________________________________________

## 2021-06-01 NOTE — ED PROVIDER NOTES
HPI   78 yo AAF with asthma, CKD on MWF dialysis, DM, malignant essential HTN, bipolar, seizures, multiple CVAs, COVID presents with 1 day of worsening SOB. Patient lives in a facility, responds to questions in short sentences. Endorses spitting up clear sputum. Denies CP, headache, f/c, n/v, abdominal pain, recent illness. Has not received her COVID vaccines. Reportedly missed 1 dialysis session due to transportation issues from [facility] over the holiday. Past Medical History:   Diagnosis Date    Asthma     Bipolar affective disorder ([facility] [address]     Brain condition     Cataract     Chronic kidney disease     hemodialysis M-W-F    Diabetes ([facility] [address]     Hyperlipidemia     Hypertension     Paralytic strabismus, unspecified     Psychiatric disorder     Seizures ([facility] [address] 08/27/2018       Past Surgical History:   Procedure Laterality Date    [place] INSJ TUNNELED CVC W/O SUBQ PORT/ AGE 3 YR/> N/A 8/9/2018     in-pt 474A, Insertion Tunneled Central Venous Catheter performed by Rosanna Hudson MD at [address] [place] [address] LAB    HI INSJ TUNNELED CVC W/O SUBQ PORT/ AGE 3 YR/> N/A 11/8/2019    INSERTION TUNNELED CENTRAL VENOUS CATHETER performed by Maryann Friend MD at [address] [place] [address]         No family history on file.     Social History     Socioeconomic History    Marital status: SINGLE     Spouse name: Not on file    Number of children: Not on file    Years of education: Not on file    Highest education level: Not on file   Occupational History    Not on file   Tobacco Use    Smoking status: Never Smoker    Smokeless tobacco: Never Used   Substance and Sexual Activity    Alcohol use: No    Drug use: No    Sexual activity: Never   Other Topics Concern    Not on file   Social History Narrative    Not on file     Social Determinants of Health     Financial Resource Strain:     Difficulty of Paying Living Expenses:    Food Insecurity:     Worried About [address] in the Last Year:     [address] in the Last Year:    Transportation Needs:     Lack of Transportation (Medical):     Lack of Transportation (Non-Medical):    Physical Activity:     Days of Exercise per Week:     Minutes of Exercise per Session:    Stress:     Feeling of Stress :    Social Connections:     Frequency of Communication with Friends and Family:     Frequency of Social Gatherings with Friends and Family:     Attends Denominational Services: Active Member of Clubs or Organizations:     Attends Club or Organization Meetings:     Marital Status:    Intimate Partner Violence:     Fear of Current or Ex-Partner:     Emotionally Abused:     Physically Abused:     Sexually Abused: ALLERGIES: Amoxicillin, Cleocin [clindamycin hcl], Codeine, Lorcet 10/650 [hydrocodone-acetaminophen], Penicillin g benzathine, and Shellfish derived    Review of Systems   Constitutional: Negative for chills, diaphoresis, fatigue and fever. HENT: Negative for congestion, drooling, rhinorrhea and sore throat. Eyes: Negative for photophobia, pain and visual disturbance. Respiratory: Positive for shortness of breath and wheezing. Negative for cough and chest tightness. Cardiovascular: Negative for chest pain, palpitations and leg swelling. Gastrointestinal: Negative for abdominal pain, diarrhea, nausea and vomiting. Endocrine: Negative for polydipsia, polyphagia and polyuria. Genitourinary: Negative for difficulty urinating, dysuria and flank pain. Musculoskeletal: Negative for arthralgias, myalgias, neck pain and neck stiffness. Skin: Negative for color change and rash. Neurological: Negative for dizziness, syncope, weakness, light-headedness and headaches. Hematological: Does not bruise/bleed easily.        Vitals:    06/01/21 1730 06/01/21 1745 06/01/21 1800 06/01/21 1815   BP: (!) 156/93 (!) 153/89 (!) 186/95 (!) 164/99   Pulse: 91 61 97 99   Resp:   20 20   Temp:   98.3 °F (36.8 °C)    TempSrc:   Oral    SpO2:                Physical Exam  Vitals and nursing note reviewed. Constitutional:       Appearance: She is well-developed and normal weight. She is not ill-appearing. HENT:      Head: Normocephalic and atraumatic. Mouth/Throat:      Mouth: Mucous membranes are moist.      Pharynx: Oropharynx is clear. Comments: Clear sputum production chronically   Eyes:      Extraocular Movements: Extraocular movements intact. Pupils: Pupils are equal, round, and reactive to light. Neck:      Vascular: JVD present. Cardiovascular:      Rate and Rhythm: Regular rhythm. Tachycardia present. Pulses: Normal pulses. Heart sounds: Murmur (systolic flow murmur) heard. Pulmonary:      Effort: Tachypnea present. No accessory muscle usage or respiratory distress. Comments: Mild wheezing throughout all lung fields   Chest:      Chest wall: No tenderness or edema. Abdominal:      Palpations: There is no hepatomegaly. Tenderness: There is no abdominal tenderness. Musculoskeletal:      Cervical back: Normal range of motion and neck supple. Right lower leg: No tenderness. No edema. Left lower leg: No tenderness. No edema. Skin:     Findings: No ecchymosis, erythema or rash. Neurological:      General: No focal deficit present. Mental Status: She is alert and oriented to person, place, and time. Cranial Nerves: No cranial nerve deficit. Motor: No weakness. MDM     76 yo AAF with asthma, CKD on MWF dialysis, DM, malignant essential HTN, bipolar, seizures, multiple CVAs, COVID presents with 1 day of worsening SOB. Denies CP, recent illness, cough, f/c. Considered ACS, hypertensive emergency/urgency, PE; high suspicion for CHF/volume overload 2/2 missing a dialysis session recently. VS notable for initial BP of 250/156, , satting O2 100% on 4L.  AAOx2, PE - patient looks chronically unhealthy, notable for mild JVD b/l, wheezing in the upper lung fields and mild wheezing in lower lung fields, 2/6 systolic flow murmur. No BUE pretibial edema. No calf tenderness, edema, erythema. Original labs hemolyzed, and pt was taken to dialysis, labs redrawn. CBC - Hgb 10.0, no leukocytosis  BMP- K found to be 6.2 in dialysis, gluc 245, Cr 13.40 (dialysis pt, missed session)  LFTs - , AST 41, ALT 62  Trop initial 0.56 (<0.02, 2 mo ago)  BNP >175,000 (baseline 5175)    CXR Borderline prominent cardiac silhouette with suspected mild interstitial infiltrates/edema. Otherwise unremarkable. No PNA, pleural effusions. 9/2020 echo >70% hyperdynamic. Spoke to Dr. King Sanches, who recommended IV Labetolol 10 mg, NTG paste for HTN, performed dialysis. /150s --> NTG/labetolol --> 170s/60s --> now 160s/90s        Given dramatically elevated cardiac biomarkers and obvious malignant chronic HTN, patient would benefit from an inpatient work-up for hypertensive emergency. Pt updated and amenable to plan. Krystal Tim MD/MPH PGY-1  Emergency Medicine  06/01/21 6:36 PM        CRITICAL CARE NOTE:    I have spent 40 minutes of critical care time involved in lab review, consultations with specialist, family decision-making, and documentation. During this entire length of time I was immediately available to the patient. Critical Care: The reason for providing this level of medical care for this critically ill patient was due a critical illness that impaired one or more vital organ systems such that there was a high probability of imminent or life threatening deterioration in the patients condition. This care involved high complexity decision making to assess, manipulate, and support vital system functions, to treat this vital organ system failure and to prevent further life threatening deterioration of the patients condition. Time is exclusive of procedural and teaching time.     Junie Seals DO

## 2021-06-01 NOTE — H&P
Hospitalist Admission History and Physical    NAME:  Sancho Orosco   :   1955   MRN:   [id number]     PCP:  Michelle Naqvi MD  Date/Time:  2021 6:00 PM  Subjective:   CHIEF COMPLAINT:  SOB     HISTORY OF PRESENT ILLNESS:     Ms. Grace Adams is a 76 yo AA woman with past medical hx of ESRD on HD M W F via TDC chest wall,  HTN, type 2 DM, seizure disorder, bipolar, hx of CVA who comes from [address] [place] [address] with reports of SOB. Patient is a very poor historian. She has been SOB for unknown duration of time. per chart review the patient only missed 1 HD session due to transportation issues per NH staff. She does not use o2 at baseline. Ros + for every questions asked, see below in ROS   She endorsed  Chest pain 1 week ago, no cp recently     EMS 90% on room air, and placed on 2L NC. In ed- Dr Tee Panchal was consulted and rec nitro paste and IV labetalol, HD today. ED course:   Vital signs: 99 F, , /156, RR 22, 4L NC spo2 98%   Labs remarkable for: K 6.2 , troponin 0.56 , probnp 175,000  Imaging: chest xray Low lung volumes slightly limits evaluation. Borderline prominent cardiac  silhouette with suspected mild interstitial infiltrates/edema. Nonspecific  gaseous distention of bowel in the upper abdomen noted. EKG: sinus tachycardia .  LAE   Medications received nitrobid ointment, zofran, labetalol 10 mg IV   Procedures performed:      REVIEW OF SYSTEMS:     [] Unable to obtain  ROS due to  []mental status change  []sedated   []intubated   [x]Total of 12 systems reviewed as follows:  Review of Systems  Constitutional: Patient denies:weight loss  HEENT: Patient denies: change in vision, change in hearing, rhinorrhea, sinus congestion, neck pain/stiffness, sore throat, tongue swelling, lip swelling   PULMONARY: Patient denies: Cardiovascular: Patient denies chest pain, palpitations, paroxysmal nocturnal dyspnea, orthopnea, lower extremity edema   GASTROINTESTINAL: Patient denies: constipation, melena, bright red blood per rectum. GENITOURINARY: Patient denies: urinary frequency, urgency hematuria  MUSCULOSKELETAL: No joint or muscle pain, no back pain, no muscle weakness, no recent trauma. DERMATOLOGIC: No rash, no itching, no lesions. ENDOCRINE: No polyuria, polydipsia, no heat or cold intolerance. No recent change in weight. HEMATOLOGICAL: No anemia or easy bruising or bleeding. NEUROLOGIC: No headache, seizures, numbness, tingling or weakness. PSY: No anxiety nor depression   Pertinent Positives include : fever, chills, shortness of breath at rest, dyspnea on exertion, cough, wheezing, abdominal pain, nausea, vomiting, diarrhea, , dysuria      # Past medical history: see above in HPI  # Past surgical history: patient says she had surgery recently, she does not know what kind of surgery nor when   # Family history:   # Medications: I have reviewed medications with patient - she does not know any of her medications. # Social history: patient lives with NH.   Patient denies recreational drugs, alcohol, tobacco.   # Specialists: Dr Venkata Mcghee       Past Medical History:   Diagnosis Date    Asthma     Bipolar affective disorder ([facility] [address]     Brain condition     Cataract     Chronic kidney disease     hemodialysis M-W-F    Diabetes ([facility] [address]     Hyperlipidemia     Hypertension     Paralytic strabismus, unspecified     Psychiatric disorder     Seizures ([facility] [address] 08/27/2018        Past Surgical History:   Procedure Laterality Date    [place] INSJ TUNNELED CVC W/O SUBQ PORT/ AGE 5 YR/> N/A 8/9/2018     in-pt 474A, Insertion Tunneled Central Venous Catheter performed by Elliot Allen MD at [facility] CATH LAB    WI INSJ TUNNELED CVC W/O SUBQ PORT/ AGE 5 YR/> N/A 11/8/2019    INSERTION TUNNELED CENTRAL VENOUS CATHETER performed by Maria Teresa Gillette MD at [facility] CATH LAB       Social History     Tobacco Use    Smoking status: Never Smoker    Smokeless tobacco: Never Used   Substance Use Topics  Alcohol use: No        No family history on file. Allergies   Allergen Reactions    Amoxicillin Unknown (comments)    Cleocin [Clindamycin Hcl] Unknown (comments)    Codeine Unknown (comments)    Lorcet 10 [Hydrocodone-Acetaminophen] Unknown (comments)    Penicillin G Benzathine Unknown (comments)    Shellfish Derived Unknown (comments)        Prior to Admission Medications   Prescriptions Last Dose Informant Patient Reported? Taking?   aspirin 81 mg tablet   Yes No   Sig: Take 81 mg by mouth daily. atorvastatin (Lipitor) 10 mg tablet   Yes No   Sig: Take 10 mg by mouth daily. Indications: high amount of triglyceride in the blood   b complex-vitamin c-folic acid 0.8 mg (NEPHRO-DORIAN) 0.8 mg tab tablet   Yes No   Sig: Take 1 Tab by mouth daily. cloNIDine (CATAPRES) 0.2 mg/24 hr ptwk   No No   Si Patch by TransDERmal route every seven (7) days. diltiazem CD (CARDIZEM CD) 240 mg ER capsule   Yes No   Sig: Take 240 mg by mouth daily. epoetin raphael (EPOGEN;PROCRIT) 3,000 unit/mL injection   No No   Si.73 mL by SubCUTAneous route Every Tuesday, Thursday and Saturday. Indications: ANEMIA DUE TO RENAL FAILURE, Renal Dialysis   Patient taking differently: 3,000 Units by SubCUTAneous route every Monday, Wednesday, Friday. famotidine (Pepcid) 40 mg tablet   No No   Sig: Take 1 Tab by mouth daily. insulin lispro (HUMALOG) 100 unit/mL injection   No No   Sig: INITIATE INSULIN CORRECTIVE PROTOCOL: Normal Insulin Sensitivity   For Blood Sugar (mg/dL) of:     Less than 150 =   0 units           150 -199 =   2 units  200 -249 =   4 units  250 -299 =   6 units  300 -349 =   8 units  350 and above = 10 units and Call Physician  If 2 glucose readings are above   levETIRAcetam (KEPPRA) 250 mg tablet   No No   Sig: Take 1 Tab by mouth every Monday, Wednesday, Friday. levETIRAcetam (Keppra) 500 mg tablet   Yes No   Sig: Take 500 mg by mouth two (2) times a day.    minoxidiL (LONITEN) 2.5 mg tablet No No   Sig: Take 1 Tab by mouth two (2) times a day. sevelamer carbonate (RENVELA) 800 mg tab tab   Yes No   Sig: Take 800 mg by mouth three (3) times daily. Facility-Administered Medications: None       Objective:   VITALS:    Visit Vitals  BP (!) 153/89   Pulse 61   Temp 98.6 °F (37 °C) (Oral)   Resp 20   LMP  (LMP Unknown)   SpO2 100%     Temp (24hrs), Av.9 °F (37.2 °C), Min:98.6 °F (37 °C), Max:99 °F (37.2 °C)      PHYSICAL EXAM:   General:    Elderly aa woman laying in bed, no acute distress, appears stated age. Head:   Normocephalic, without obvious abnormality  Eyes:   Conjunctivae clear, anicteric sclerae, pupils are equal  Nose:  Nares normal, no drainage   Throat:    Lips, mucosa, and tongue normal.    Neck:  Supple, symmetrical, no JVD. Back:    No CVA tenderness. Lungs: On nasal cannula, speaking in complete sentences, clear to auscultation bilaterally- no wheezing, rhonchi or rales  Chest wall:  No tenderness or deformity. No Accessory muscle use. Heart:   Regular rate and rhythm;  no murmur, rub or gallop. Abdomen:   Soft, non-tender. Not distended. Bowel sounds normal. No masses  Extremities: No LE edema. Skin:     No rashes or lesions. Not Jaundiced  Psych:  Poor  insight. Not depressed, anxious or agitated. Neurologic: Alert and oriented x3. No facial asymmetry. No aphasia or slurred speech. Normal strength. Moving all extremities. right chest wall TDC  LUE AVF no thrill.      LAB DATA REVIEWED:    No components found for: [place]  Recent Labs     21  1400 21  1300     --    K 6.2*  --      --    CO2 30  --    BUN 72*  --    CREA 13.40*  --    *  --    CA 9.1  --    ALB 3.4  --    WBC  --  10.7   HGB  --  10.0*   HCT  --  32.5*   PLT  --  264       IMAGING RESULTS:   []  I have personally reviewed the actual   []CXR  []CT scan    CXR: XR Results (most recent):  Results from Hospital Encounter encounter on 21    XR CHEST PORT    Narrative  EXAMINATION: Chest single view    INDICATION: Chest pain, shortness of breath    COMPARISON: CT 5/4/2021    FINDINGS: Single frontal view the chest obtained. Low lung volumes. Dual-lumen  right neck catheter tip at cavoatrial junction level. Borderline prominent  cardiac silhouette. Slightly prominent perihilar interstitium. No confluent  consolidation. No evidence of pneumothorax. No obvious acute osseous findings. Evidence of gaseous distention of bowel in the upper abdomen, likely most  notably the stomach. Impression  Low lung volumes slightly limits evaluation. Borderline prominent cardiac  silhouette with suspected mild interstitial infiltrates/edema. Nonspecific  gaseous distention of bowel in the upper abdomen noted. Assessment/Plan:      Active Problems:    ESRD (end stage renal disease) ([facility] [address] (8/8/2018)      Hypertensive emergency (9/25/2020)         Consults: nephrology, cardiology       1. Hypertensive urgency in the setting of missed HD , HTN   2. Hyperkalemia due to missing HD   3. Hyperglycemia , type 2 DM a1c 7.2 in 3/2021   4. Missed HD session at [facility], ESRD on HD m/w/f via right chest wall TDC   5. Elevated troponin  - acs versus demand ischemia from hypertensive urgency   6. Elevated probnp - from volume overload   7. SOB from missed HD/voume overload     8. Anemia of chronic disease   9. Seizure disorder   10. Secondary hyperparathyroidism   11. Hx of bipolar disorder   12. Hx of covid   13. Hx of CVA    14. 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 [address]     - Nephrology follows- s/p HD today. Resume nephrovite, renvela   - consult cardiology in the am per Dr Josef Boykin recommendations. Trend troponins, repeat ekg in AM , and get echo.  Next troponin at 8pm  . Continue asa and statin   - blood pressures are better now  - resume home meds clonidine patch, cardizem, minoxidil.   - repeat BMP s/p HD tonight  to make sure K level better   - resume keppra for seizure disorder, extra dose on HD days.   - SSI for DM   - aspiration precautions. - fall precautions, pt ot. - Incentive isabela     Case discussed with HD RN. Patient was seen in presence of HD RNs   I called the sister at 640pm and updated her. Code status: full code per patient wishes.    DVT/GI prophylaxis: heparin, ppi   Diet: renal   Disposition: return to NH when cleared by renal and cards  __________________________________________________  Risk of deterioration:  []Low    [x]Moderate  []High    Care Plan discussed with:    [x]Patient   [x]Family    []ED Care Manager  []ED Doc   []Specialist :    Total Time Coordinating Admission:  60    minutes    []Total Critical Care Time:     ___________________________________________________  Admitting Physician: SUSI Tracy

## 2021-06-01 NOTE — DIALYSIS
ACUTE HEMODIALYSIS FLOW SHEET    HEMODIALYSIS ORDERS: Physician: Dr. Medrano Cloud: Mildred   Duration: 3 hr   BFR: 400   DFR: 800   Dialysate:  Temp 36-37*C   K+  2.0    Ca+ 2.5   Na 138   Bicarb 35   Wt Readings from Last 1 Encounters:   04/16/21 60.3 kg (133 lb)    Patient Chart [x]   Unable to Obtain []  Dry weight/UF Goal: 2500 ml    Heparin [x]  Bolus   1000 Units    [] Hourly    Units    []None       Pre BP: 168/126  Pulse: 100  Respirations: 20 Temp: 98.6  [x] Oral  [] Ax  [] Esoph   Labs: []  Pre  []  Post:   [x] N/A   Additional Orders (medications, blood products, hypotension management): [x] Yes   [] No     [x]  [facility] Consent Verified     CATHETER ACCESS:  []N/A   [x]Right   []Left   []IJ   []Fem  [x]Chest wall  []TransHepatic   [] First use X-ray verified     [x]Tunnel    [] Non Tunneled   [x]No S/S infection  []Redness  []Drainage []Cultured []Swelling []Pain   [x]Medical Aseptic Prep Utilized   [x]Dressing Changed  [x] Biopatch  Date: 6/1/21   []Clotted   [x]Patent   Flows: [x]Good  []Poor  []Reversed   If access problem,  notified: []Yes    [x]N/A          GENERAL ASSESSMENT:    LUNGS:  Resp Rate 20   [] Clear  [] Coarse  [] Crackles  [x] Wheezing  [] Diminished               Respirations:  [x]Easy  []Labored  []N/A  Cough:  []Productive  []Dry  [x]N/A             Therapy:  []RA  O2 Type:  [x]NC  Mask: [] NRB  [] Trach Cuff [] BiPaP  Flow: 4 l/min                [] Ventilated  [] Intubated  [] Trach       CARDIAC: [x] Regular      [] Irregular   [] Rhythm:          [] Monitored   [] Bedside   [] Remotely monitored       EDEMA: [x] None   []Generalized  [] Pitting [] 1+   [] 2 +   [] 3+    [] 4+  [] Pedal      SKIN:   [x] Warm  [] Hot     [] Cold   [x] Dry     [] Pale   [] Diaphoretic                  [] Flushed  [] Jaundiced  [] Cyanotic       LOC:    [x] Alert      [x]Oriented:    [x] Person     [x] Place  []Time               [] Confused  [] Lethargic  [] Medicated  [] Non-responsive  [] Non-Verbal     GI / ABDOMEN:                     [] Flat    [] Distended    [x] Soft    [] Firm   []  Obese                   [] Diarrhea   [] FMS [x] Bowel Sounds  [] Nausea  [] Vomiting                   [] NGT  [] OGT    [] PEG  [] Tube Feedings @     / URINE ASSESSMENT:                   [] Voiding    [x] Oliguria  [] Anuria   []  Khan                  [] Incontinent  [x]  Incontinent Brief   []  PureWick     PAIN:  [x] 0 []1  []2   []3   []4   []5   []6   []7   []8   []9   []10                MOBILITY:  [] Bed    [x] Stretcher      All Vitals and Treatment Details on Attached [address]: [facility]          Room # H2/B    [x] Routine         [] 1st Time Acute/Chronic   [] Urgent      [] Stat            [x] Acute Room   []  Bedside    [] ICU/CCU     [] ER   Isolation Precautions:  [x] Dialysis    There are currently no Active Isolations     ALLERGIES:     Allergies   Allergen Reactions    Amoxicillin Unknown (comments)    Cleocin [Clindamycin Hcl] Unknown (comments)    Codeine Unknown (comments)    Lorcet 10/650 [Hydrocodone-Acetaminophen] Unknown (comments)    Penicillin G Benzathine Unknown (comments)    Shellfish Derived Unknown (comments)      Code Status:  Prior     Hepatitis Status      Lab Results   Component Value Date/Time    Hepatitis B surface Ag <0.10 09/25/2020 05:31 PM    Hepatitis B surface Ab 63.01 09/25/2020 05:31 PM    Hepatitis B core, IgM NEGATIVE  08/11/2018 03:29 AM    Hep B Core Ab, IgM NEGATIVE  08/13/2018 04:34 AM    Hep B Core Ab, total NEGATIVE  08/13/2018 04:34 AM        Current Labs:      Lab Results   Component Value Date/Time    WBC 10.7 06/01/2021 01:00 PM    Hemoglobin, POC 13.3 11/08/2019 07:26 AM    HGB 10.0 (L) 06/01/2021 01:00 PM    Hematocrit, POC 39 11/08/2019 07:26 AM    HCT 32.5 (L) 06/01/2021 01:00 PM    PLATELET 534 01/34/4364 01:00 PM    MCV 94.2 06/01/2021 01:00 PM     Lab Results   Component Value Date/Time    Sodium 139 04/16/2021 02:43 PM    Potassium 3.6 04/16/2021 02:43 PM    Chloride 102 04/16/2021 02:43 PM    CO2 30 04/16/2021 02:43 PM    Anion gap 7 04/16/2021 02:43 PM    Glucose 145 (H) 04/16/2021 02:43 PM    BUN 25 (H) 04/16/2021 02:43 PM    Creatinine 5.45 (H) 04/16/2021 02:43 PM    BUN/Creatinine ratio 5 (L) 04/16/2021 02:43 PM    GFR est AA 9 (L) 04/16/2021 02:43 PM    GFR est non-AA 8 (L) 04/16/2021 02:43 PM    Calcium 8.8 04/16/2021 02:43 PM          DIET:  None     PRIMARY NURSE REPORT:   Pre Dialysis: Jose Carlos Alan RN    Time: 9655      EDUCATION:    [x] Patient           Knowledge Basis: []None [x]Minimal [] Substantial [] Unknown  Barriers to learning  [x]None  [] Intubated/Trached/Ventilated  [] Sedated/Paralyzed   [] Access Care     [] S&S of infection  [] Fluid Management  [] K+   [x] Procedural    [] Medications   [] Tx Options   [] Transplant   [] Diet      Teaching Tools:  [x] Explain  [] Demo  [] Handouts [] Video  Patient response: [x] Verbalized understanding   [] Requires follow up        [x] Time Out/Safety Check    [x] Extracorporeal Circuit Tested for integrity       RO/HEMODIALYSIS MACHINE SAFETY CHECKS  Before each treatment:        [address] [place] [address]                                     [x] Unit Machine # 5 with centralized RO                                             Alarm Test:  Pass time 9099            [x] RO/Machine Log Complete    Machine Temp    36-37*C             Dialysate: pH  7.4    Conductivity: Meter 14.0    HD Machine  13.8     TCD: 13.7  Dialyzer Lot # [id number]     Blood Tubing Lot # [id number]     Saline Lot # [id number]     CHLORINE TESTING-Before each treatment and every 4 hours    Total Chlorine: [x] less than 0.1 ppm  Initial Time Check: 1300       4 Hr/2nd Check Time: 1700   (if greater than 0.1 ppm from Primary then every 30 minutes from Secondary)     TREATMENT INITIATION  with Dialysis Precautions:   [x] All Connections Secured              [x] Saline Line Double Clamped   [x] Venous Parameters Set               [x] Arterial Parameters Set    [x] Prime Given 250ml NSS              [x]Air Foam Detector Engaged      Treatment Initiation Note:  8976  Pt arrived to dialysis unit in stretcher awake and alert. O24L per n/c. resp easy reg.  1500  Rt TDC intact, patent and flushes well. Dialysis initiated without difficulty and Dr Lara Fritz notified. During Treatment Notes:  0 Dr Lara Fritz at bedside. Order received to reduce UF goal to 1.5L. Vascular access visible with arterial and venous line connections intact. Pt resting comfortably. 1700  Vascular access visible with arterial and venous line connections intact. Pt resting comfortably. 1800  Vascular access visible with arterial and venous line connections intact. Pt resting comfortably. 1800  Dialysis treatment complete. Medication Dose Volume Route Time [place] Nurse, Title   Heparin 1:1000 1000Units 1 ml HD 1500 Ricashawna Lyles RN     Post Assessment  Dialyzer Cleared:   [x] Good  [] Fair  [] Poor  Blood processed:  66.3 L  UF Removed:  1500 Ml    Post BP: 164/99  Pulse: 99  Respirations: 20   Temp: 98.2  [x] Oral  [] Ax  [] Esophageal   Lungs: [] Clear [] Course  [] Crackles                 [x]  Wheezing   [] Diminished   Post Tx Vascular Access: [x] N/A        Cardiac:  [x] Regular   [] Irregular   Rhythm:  [] Monitored   [x] Not Monitored    CVC Catheter: [] N/A  Locking solution: Heparin 1:1000 U  Arterial port 1.9 ml   Venous port 1.9 ml   Edema:  [x] None  [] Generalized                     Skin:[x] Warm  [x] Dry [] Diaphoretic               [] Flushed  [] Pale [] Cyanotic Pain:  [x]0  []1-2  []3-4  []5-6   []7-8  []9-10         Post Treatment Note:   0371  Pt tolerated dialysis well. Dialysis catheter intact, patent and heplocked as noted above.      POST TREATMENT PRIMARY NURSE HANDOFF REPORT:   Post Dialysis: Gurpreet Velez RN               Time:  1815       Abbreviations: AVG-arterial venous graft, AVF-arterial venous fistula, IJ-Internal Jugular, Subcl-Subclavian, Fem-Femoral, Tx-treatment, AP/HR-apical heart rate, VSS- Vital Signs Stable, CVC- Central Venous Catheter, DFR-dialysate flow rate, BFR-blood flow rate, AP-arterial pressure, -venous pressure, UF-ultrafiltrate, TMP-transmembrane pressure, Ray-Venous, Art-Arterial, RO-Reverse Osmosis

## 2021-06-01 NOTE — PROGRESS NOTES
ESRD patient , Nursing Home resident , missed dialysis yesterday due to transportation Problem presented to ER with SOB,on Oxygen by NC, BP as usual High, reviewed Chest X-ray, lab result is pending,advised to give her Nitro paste & give Labetalol IV, will arrange Dialysis in hospital soon ,post Dialysis if she remained stable then can be Discharged.

## 2021-06-01 NOTE — ED NOTES
Pt arrived via EMS for reported shortness of breath. Pt states she has not to dialysis in 2 weeks because of transportation. Per EMS staff at the facility stated the patient only missed 1 day of dialysis.  Per EMS patient RA sats 90 % and 97% on 2 liters NC

## 2021-06-02 ENCOUNTER — APPOINTMENT (OUTPATIENT)
Dept: NON INVASIVE DIAGNOSTICS | Age: 66
DRG: 291 | End: 2021-06-02
Attending: PHYSICIAN ASSISTANT
Payer: MEDICARE

## 2021-06-02 PROBLEM — R77.8 ELEVATED TROPONIN LEVEL: Status: ACTIVE | Noted: 2021-06-02

## 2021-06-02 PROBLEM — R79.89 ELEVATED BRAIN NATRIURETIC PEPTIDE (BNP) LEVEL: Status: ACTIVE | Noted: 2021-06-02

## 2021-06-02 PROBLEM — E87.5 HYPERKALEMIA: Status: ACTIVE | Noted: 2021-06-02

## 2021-06-02 LAB
ANION GAP SERPL CALC-SCNC: 10 MMOL/L (ref 3–18)
ATRIAL RATE: 89 BPM
BASOPHILS # BLD: 0 K/UL (ref 0–0.1)
BASOPHILS NFR BLD: 1 % (ref 0–2)
BUN SERPL-MCNC: 36 MG/DL (ref 7–18)
BUN/CREAT SERPL: 4 (ref 12–20)
CALCIUM SERPL-MCNC: 9.1 MG/DL (ref 8.5–10.1)
CALCULATED P AXIS, ECG09: 54 DEGREES
CALCULATED R AXIS, ECG10: 13 DEGREES
CALCULATED T AXIS, ECG11: 152 DEGREES
CHLORIDE SERPL-SCNC: 103 MMOL/L (ref 100–111)
CO2 SERPL-SCNC: 27 MMOL/L (ref 21–32)
CREAT SERPL-MCNC: 8.1 MG/DL (ref 0.6–1.3)
DIAGNOSIS, 93000: NORMAL
DIFFERENTIAL METHOD BLD: ABNORMAL
EOSINOPHIL # BLD: 0.2 K/UL (ref 0–0.4)
EOSINOPHIL NFR BLD: 3 % (ref 0–5)
ERYTHROCYTE [DISTWIDTH] IN BLOOD BY AUTOMATED COUNT: 15.9 % (ref 11.6–14.5)
GLUCOSE BLD STRIP.AUTO-MCNC: 129 MG/DL (ref 70–110)
GLUCOSE BLD STRIP.AUTO-MCNC: 142 MG/DL (ref 70–110)
GLUCOSE BLD STRIP.AUTO-MCNC: 242 MG/DL (ref 70–110)
GLUCOSE SERPL-MCNC: 126 MG/DL (ref 74–99)
HCT VFR BLD AUTO: 29.8 % (ref 35–45)
HGB BLD-MCNC: 9.6 G/DL (ref 12–16)
LYMPHOCYTES # BLD: 1.1 K/UL (ref 0.9–3.6)
LYMPHOCYTES NFR BLD: 13 % (ref 21–52)
MCH RBC QN AUTO: 29.5 PG (ref 24–34)
MCHC RBC AUTO-ENTMCNC: 32.2 G/DL (ref 31–37)
MCV RBC AUTO: 91.7 FL (ref 74–97)
MONOCYTES # BLD: 0.8 K/UL (ref 0.05–1.2)
MONOCYTES NFR BLD: 9 % (ref 3–10)
NEUTS SEG # BLD: 6.3 K/UL (ref 1.8–8)
NEUTS SEG NFR BLD: 75 % (ref 40–73)
P-R INTERVAL, ECG05: 112 MS
PLATELET # BLD AUTO: 244 K/UL (ref 135–420)
PMV BLD AUTO: 12.2 FL (ref 9.2–11.8)
POTASSIUM SERPL-SCNC: 4.4 MMOL/L (ref 3.5–5.5)
Q-T INTERVAL, ECG07: 452 MS
QRS DURATION, ECG06: 74 MS
QTC CALCULATION (BEZET), ECG08: 549 MS
RBC # BLD AUTO: 3.25 M/UL (ref 4.2–5.3)
SODIUM SERPL-SCNC: 140 MMOL/L (ref 136–145)
TROPONIN I SERPL-MCNC: 0.42 NG/ML (ref 0–0.04)
VENTRICULAR RATE, ECG03: 89 BPM
WBC # BLD AUTO: 8.4 K/UL (ref 4.6–13.2)

## 2021-06-02 PROCEDURE — 74011250637 HC RX REV CODE- 250/637: Performed by: PHYSICIAN ASSISTANT

## 2021-06-02 PROCEDURE — 99232 SBSQ HOSP IP/OBS MODERATE 35: CPT | Performed by: FAMILY MEDICINE

## 2021-06-02 PROCEDURE — 80048 BASIC METABOLIC PNL TOTAL CA: CPT

## 2021-06-02 PROCEDURE — 74011636637 HC RX REV CODE- 636/637: Performed by: PHYSICIAN ASSISTANT

## 2021-06-02 PROCEDURE — 85025 COMPLETE CBC W/AUTO DIFF WBC: CPT

## 2021-06-02 PROCEDURE — 2709999900 HC NON-CHARGEABLE SUPPLY

## 2021-06-02 PROCEDURE — 93005 ELECTROCARDIOGRAM TRACING: CPT

## 2021-06-02 PROCEDURE — 99222 1ST HOSP IP/OBS MODERATE 55: CPT | Performed by: INTERNAL MEDICINE

## 2021-06-02 PROCEDURE — 97165 OT EVAL LOW COMPLEX 30 MIN: CPT

## 2021-06-02 PROCEDURE — 65270000029 HC RM PRIVATE

## 2021-06-02 PROCEDURE — 74011250636 HC RX REV CODE- 250/636: Performed by: INTERNAL MEDICINE

## 2021-06-02 PROCEDURE — 82962 GLUCOSE BLOOD TEST: CPT

## 2021-06-02 PROCEDURE — 90935 HEMODIALYSIS ONE EVALUATION: CPT

## 2021-06-02 PROCEDURE — 97535 SELF CARE MNGMENT TRAINING: CPT

## 2021-06-02 PROCEDURE — 84484 ASSAY OF TROPONIN QUANT: CPT

## 2021-06-02 PROCEDURE — 74011250636 HC RX REV CODE- 250/636: Performed by: PHYSICIAN ASSISTANT

## 2021-06-02 PROCEDURE — 74011250637 HC RX REV CODE- 250/637: Performed by: NURSE PRACTITIONER

## 2021-06-02 PROCEDURE — 36415 COLL VENOUS BLD VENIPUNCTURE: CPT

## 2021-06-02 RX ORDER — HYDRALAZINE HYDROCHLORIDE 20 MG/ML
10 INJECTION INTRAMUSCULAR; INTRAVENOUS
Status: DISCONTINUED | OUTPATIENT
Start: 2021-06-02 | End: 2021-06-04 | Stop reason: HOSPADM

## 2021-06-02 RX ORDER — METOPROLOL TARTRATE 25 MG/1
25 TABLET, FILM COATED ORAL EVERY 12 HOURS
Status: DISCONTINUED | OUTPATIENT
Start: 2021-06-02 | End: 2021-06-03

## 2021-06-02 RX ORDER — FAMOTIDINE 20 MG/1
20 TABLET, FILM COATED ORAL DAILY
Status: DISCONTINUED | OUTPATIENT
Start: 2021-06-03 | End: 2021-06-04 | Stop reason: HOSPADM

## 2021-06-02 RX ADMIN — MINOXIDIL 2.5 MG: 2.5 TABLET ORAL at 22:10

## 2021-06-02 RX ADMIN — ATORVASTATIN CALCIUM 10 MG: 10 TABLET, FILM COATED ORAL at 08:22

## 2021-06-02 RX ADMIN — MINOXIDIL 2.5 MG: 2.5 TABLET ORAL at 08:22

## 2021-06-02 RX ADMIN — HEPARIN SODIUM 5000 UNITS: 5000 INJECTION INTRAVENOUS; SUBCUTANEOUS at 18:04

## 2021-06-02 RX ADMIN — ASPIRIN 81 MG: 81 TABLET, CHEWABLE ORAL at 08:22

## 2021-06-02 RX ADMIN — METOPROLOL TARTRATE 25 MG: 25 TABLET, FILM COATED ORAL at 18:04

## 2021-06-02 RX ADMIN — SEVELAMER CARBONATE 800 MG: 800 TABLET, FILM COATED ORAL at 22:10

## 2021-06-02 RX ADMIN — SEVELAMER CARBONATE 800 MG: 800 TABLET, FILM COATED ORAL at 08:22

## 2021-06-02 RX ADMIN — INSULIN LISPRO 4 UNITS: 100 INJECTION, SOLUTION INTRAVENOUS; SUBCUTANEOUS at 12:23

## 2021-06-02 RX ADMIN — FAMOTIDINE 20 MG: 20 TABLET ORAL at 08:22

## 2021-06-02 RX ADMIN — LEVETIRACETAM 250 MG: 250 TABLET ORAL at 22:09

## 2021-06-02 RX ADMIN — LEVETIRACETAM 500 MG: 500 TABLET ORAL at 18:04

## 2021-06-02 RX ADMIN — NEPHROCAP 1 CAPSULE: 1 CAP ORAL at 08:22

## 2021-06-02 RX ADMIN — DILTIAZEM HYDROCHLORIDE 240 MG: 240 CAPSULE, COATED, EXTENDED RELEASE ORAL at 08:22

## 2021-06-02 RX ADMIN — HYDRALAZINE HYDROCHLORIDE 10 MG: 20 INJECTION INTRAMUSCULAR; INTRAVENOUS at 04:13

## 2021-06-02 RX ADMIN — SEVELAMER CARBONATE 800 MG: 800 TABLET, FILM COATED ORAL at 18:04

## 2021-06-02 RX ADMIN — LEVETIRACETAM 500 MG: 500 TABLET ORAL at 08:22

## 2021-06-02 RX ADMIN — Medication 10 ML: at 22:11

## 2021-06-02 RX ADMIN — METOPROLOL TARTRATE 25 MG: 25 TABLET, FILM COATED ORAL at 22:10

## 2021-06-02 RX ADMIN — HEPARIN SODIUM 5000 UNITS: 5000 INJECTION INTRAVENOUS; SUBCUTANEOUS at 12:24

## 2021-06-02 RX ADMIN — HEPARIN SODIUM 5000 UNITS: 5000 INJECTION INTRAVENOUS; SUBCUTANEOUS at 04:17

## 2021-06-02 NOTE — PROGRESS NOTES
PT orders received and chart was reviewed. Pt is going off the floor to dialysis. Will continue to follow.  Zakia Irene, PT

## 2021-06-02 NOTE — ROUTINE PROCESS
TRANSFER - IN REPORT: 
 
Verbal report received from Erwin RN (name) on Bettie Francis  being received from ED(unit) for routine progression of care Report consisted of patients Situation, Background, Assessment and  
Recommendations(SBAR). Information from the following report(s) SBAR, Kardex, ED Summary, [facility] and Recent Results was reviewed with the receiving nurse. Opportunity for questions and clarification was provided. Assessment completed upon patients arrival to unit and care assumed.

## 2021-06-02 NOTE — ED NOTES
TRANSFER - OUT REPORT:    Verbal report given to RAYO Tao(name) on Sergei Handler  being transferred to [address] [place] [address](unit) for routine progression of care       Report consisted of patients Situation, Background, Assessment and   Recommendations(SBAR). Information from the following report(s) SBAR, ED Summary and MAR was reviewed with the receiving nurse. Lines:   Peripheral IV 06/01/21 Right Hand (Active)   Site Assessment Clean, dry, & intact 06/01/21 1300   Phlebitis Assessment 0 06/01/21 1300   Infiltration Assessment 0 06/01/21 1300   Dressing Status Clean, dry, & intact 06/01/21 1300   Hub Color/Line Status Blue 06/01/21 1300        Opportunity for questions and clarification was provided.       Patient transported with:   O2 @ 2 liters

## 2021-06-02 NOTE — CONSULTS
Cardiology Consult Note    Consultation request by Evelina Lea MD for advice/opinion related to evaluating CHF/elevated troponin  Date of  Admission: 6/1/2021 11:59 AM   Primary Care Physician:  Edelmira Roland MD    Consulting Cardiologist: Dr. Antonina Webster:     1. Possible NSTEMI-troponin down trending- ekg shows ST & T wave abnormality in anterolateral leads- presented to the ED with c/o SOB and chest pain. Poor historian can no provide detail history   2. Acute on chronic congestive heart failure-presented with SOB. No significant peripheral edema. Very high NT pro BNP- volume status per renal - improved after HD  3. Hypertensive urgency- possible to missing HD- BPremains elevated but improving- she requires multiple medications for BP control   4. SOB - multifactorial with possible NSTEMI and fluid overload due to missing HD  5. Hx of SVT will monitor for any recurrence  6. ESRD stage renal disease on HD  7. Hx of stroke x 2 and poor functional status requires extensive assistance with ADL's   8. Hx of seizure disorder  9. dyslipidemia- on statin  10. Diabetes- Treatment per medical team   11. Bipolar disease/poor historian  12. Hyperkalemia- in the setting missing HD- improved        Echo 9/20  · LV: Estimated LVEF is >70%. Visually measured ejection fraction. Normal cavity size. Moderately increased wall thickness. · Hyperdynamic systolic function. Inconclusive left ventricular diastolic function. · Left ventricular cavity obliteration due to hyperdynamic state. · Peak velocity is 2.32 m/s, peak gradient is 22 mmHg. Echo 9/20         Plan:       Hemodynamically stable. Troponin down trending. Patient denies active chest pain.  No SOB   EKG shows NSR with ST & T wave abnormality in anterolateral leads-suggesting ischemic changes   Discussed with patients' daughter will obtain NST to r/o CAD  Hyperkalemia resolve after HD continue to monitor electrolytes closely,  Continue present medications for BP control. Will adjust as needed based on BP readings   Echo 9/20 shows hyperdynamic EF will add metoprolol and titrate as BP and HR tolerates it. Will review Echo once complete    Further recommendations per Dr. Sterling Chimera    Elevated troponin and EKG abnormalities suggesting possible anterior ischemia. Would recommend to check a stress test to evaluate the extent of ischemia but this patient is essentially bedridden and chair bound. She also seems to have a pleasantly dementia and could not give any significant history. I do not think she is a candidate for any invasive cardiac work-up. Living will issues should be discussed but I will leave that to medical service. Medical treatment to be started for possible CAD but will clarify further with a stress test.    Will follow briefly with you. Thank you for the referral.                Patient Active Problem List   Diagnosis Code    Acute on chronic renal insufficiency N28.9, N18.9    Hypertension I10    ESRD (end stage renal disease) ([facility] N18.6    Secondary hyperparathyroidism of renal origin ([facility] N25.81    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 ([facility] E11.22, I12.0, Z99.2, N18.6    CVA, old, cognitive deficits I69.319    Bandemia D65.56    New onset seizure ([address] R56.9    Hypertensive emergency I16.1    Tachycardia R00.0    Anemia D64.9    Hyperkalemia E87.5    Elevated troponin level R77.8    Elevated brain natriuretic peptide (BNP) level R79.89            History of Present Illness: This is a 77 y.o. female admitted for Hypertensive emergency [I16.1]. Patient with past medical hx of ESRD on HD [facility] chest wall,  HTN, type 2 DM, seizure disorder, bipolar, hx of CVA who comes from [address] [place] [address] with reports of SOB. Patient is a very poor historian. She has been SOB for unknown duration of time. per chart review the patient only missed 1 HD session due to transportation issues per NH staff.   She does not use O2 at baseline. She is a poor historian and unable to provide detail information. She has intermittent confusion and requires assistance with her ADL's. She is mainly chair bound. She endorsed  Chest pain and chest pain prior her arrival to the ED    Cardiac risk factors: sedentary life style, hypertension, stress      ROS    Constitutional: No fever, no chills, no weight loss, no night sweats   HEENT: No epistaxis, no nasal drainage, no difficulty in swallowing, no redness in eyes  Respiratory: dyspnea on exertio  Cardiovascular: chest pain, chest pressure,dyspnea  Gastrointestinal: no abd pain, no vomiting, no diarrhea, no bleeding symptoms  Genitourinary: No urinary symptoms or hematuria  Integument/breast: No ulcers or rashes  Musculoskeletal: no muscle pain, no weakness  Neurological: right side weakness  Behvioral/Psych: No anxiety, no depression     Past Medical History:     Past Medical History:   Diagnosis Date    Asthma     Bipolar affective disorder ([facility]     Brain condition     Cataract     Chronic kidney disease     hemodialysis M-W-F    Diabetes ([facility]     Hyperlipidemia     Hypertension     Paralytic strabismus, unspecified     Psychiatric disorder     Seizures ([facility] 08/27/2018         Social History:     Social History     Socioeconomic History    Marital status: SINGLE     Spouse name: Not on file    Number of children: Not on file    Years of education: Not on file    Highest education level: Not on file   Tobacco Use    Smoking status: Never Smoker    Smokeless tobacco: Never Used   Substance and Sexual Activity    Alcohol use: No    Drug use: No    Sexual activity: Never     Social Determinants of Health     Financial Resource Strain:     Difficulty of Paying Living Expenses:    Food Insecurity:     Worried About Running Out of Food in the Last Year:     Ran Out of Food in the Last Year:    Transportation Needs:     Lack of Transportation (Medical):      Lack of Transportation (Non-Medical):    Physical Activity:     Days of Exercise per Week:     Minutes of Exercise per Session:    Stress:     Feeling of Stress :    Social Connections:     Frequency of Communication with Friends and Family:     Frequency of Social Gatherings with Friends and Family:     Attends [employer] Services:     Active Member of Clubs or Organizations:     Attends Club or Organization Meetings:     Marital Status:         Family History:   No family history on file. Medications:      Allergies   Allergen Reactions    Amoxicillin Unknown (comments)    Cleocin [Clindamycin Hcl] Unknown (comments)    Codeine Unknown (comments)    Lorcet 10/650 [Hydrocodone-Acetaminophen] Unknown (comments)    Penicillin G Benzathine Unknown (comments)    Shellfish Derived Unknown (comments)        Current Facility-Administered Medications   Medication Dose Route Frequency    hydrALAZINE (APRESOLINE) 20 mg/mL injection 10 mg  10 mg IntraVENous Q6H PRN    [START ON 6/3/2021] famotidine (PEPCID) tablet 20 mg  20 mg Oral DAILY    aspirin chewable tablet 81 mg  81 mg Oral DAILY    atorvastatin (LIPITOR) tablet 10 mg  10 mg Oral DAILY    B complex-vitaminC-folic acid (NEPHROCAP) cap  1 Capsule Oral DAILY    cloNIDine (CATAPRES) 0.2 mg/24 hr patch 1 Patch  1 Patch TransDERmal every Tuesday    dilTIAZem ER (CARDIZEM CD) capsule 240 mg  240 mg Oral DAILY    levETIRAcetam (KEPPRA) tablet 250 mg  250 mg Oral Q MON, WED & FRI    levETIRAcetam (KEPPRA) tablet 500 mg  500 mg Oral BID    minoxidiL (LONITEN) tablet 2.5 mg  2.5 mg Oral BID    sevelamer carbonate (RENVELA) tab 800 mg  800 mg Oral TID    sodium chloride (NS) flush 5-40 mL  5-40 mL IntraVENous Q8H    sodium chloride (NS) flush 5-40 mL  5-40 mL IntraVENous PRN    acetaminophen (TYLENOL) tablet 650 mg  650 mg Oral Q6H PRN    Or    acetaminophen (TYLENOL) suppository 650 mg  650 mg Rectal Q6H PRN    polyethylene glycol (MIRALAX) packet 17 g  17 g Oral DAILY PRN    promethazine (PHENERGAN) tablet 12.5 mg  12.5 mg Oral Q6H PRN    Or    ondansetron (ZOFRAN) injection 4 mg  4 mg IntraVENous Q6H PRN    heparin (porcine) injection 5,000 Units  5,000 Units SubCUTAneous Q8H    insulin lispro (HUMALOG) injection   SubCUTAneous AC&HS    glucose chewable tablet 16 g  16 g Oral PRN    glucagon (GLUCAGEN) injection 1 mg  1 mg IntraMUSCular PRN    dextrose (D50W) injection syrg 12.5-25 g  25-50 mL IntraVENous PRN         Physical Exam:     Visit Vitals  BP (!) 155/87 (BP 1 Location: Left arm, BP Patient Position: At rest)   Pulse 99   Temp 98.2 °F (36.8 °C)   Resp 20   Wt 62 kg (136 lb 11 oz)   SpO2 91%   BMI 24.21 kg/m²       TELE: normal sinus rhythm    BP Readings from Last 3 Encounters:   06/02/21 (!) 155/87   04/16/21 (!) 200/100   03/20/21 (!) 184/85     Pulse Readings from Last 3 Encounters:   06/02/21 99   04/16/21 84   03/20/21 80     Wt Readings from Last 3 Encounters:   06/02/21 62 kg (136 lb 11 oz)   04/16/21 60.3 kg (133 lb)   03/20/21 60.6 kg (133 lb 9.6 oz)       General:  alert, cooperative, no distress, appears stated age, mildly obese  Neck:  no masses, no JVD  Lungs:  diminished breath sounds b/l. Heart:  regular rate and rhythm, S1, S2 normal, no murmur, click, rub or gallop  Abdomen:  abdomen is soft without significant tenderness, masses, organomegaly or guarding  Extremities:  extremities normal, atraumatic, no cyanosis or edema  Skin: Warm and dry.  no hyperpigmentation, vitiligo, or suspicious lesions  Neuro: alert, oriented x3, affect appropriate, no focal neurological deficits, moves all extremities well, no involuntary movements, reflexes at knee and ankle intact  Psych: forgetful       Data Review:     Recent Labs     06/02/21  0610 06/01/21  1300   WBC 8.4 10.7   HGB 9.6* 10.0*   HCT 29.8* 32.5*    264     Recent Labs     06/02/21  0610 06/01/21  1400    144   K 4.4 6.2*    109   CO2 27 30   * 245*   BUN 36* 72*   CREA 8.10* 13.40*   CA 9.1 9.1   ALB  --  3.4   ALT  --  62*       Results for orders placed or performed during the hospital encounter of 06/01/21   EKG, 12 LEAD, INITIAL   Result Value Ref Range    Ventricular Rate 114 BPM    Atrial Rate 114 BPM    P-R Interval 122 ms    QRS Duration 84 ms    Q-T Interval 338 ms    QTC Calculation (Bezet) 465 ms    Calculated P Axis 61 degrees    Calculated R Axis -24 degrees    Calculated T Axis 123 degrees    Diagnosis       Sinus tachycardia  Left atrial enlargement  ST & T wave abnormality, consider lateral ischemia  Abnormal ECG  When compared with ECG of 16-APR-2021 14:18,  No significant change was found  Confirmed by Yolanda Ford ([id number]) on 6/1/2021 4:37:06 PM         All Cardiac Markers in the last 24 hours:    Lab Results   Component Value Date/Time    TROIQ 0.42 (H) 06/02/2021 06:10 AM    TROIQ 0.56 (H) 06/01/2021 02:00 PM       Last Lipid:  No results found for: CHOL, CHOLX, CHLST, CHOLV, HDL, HDLP, LDL, LDLC, DLDLP, TGLX, TRIGL, TRIGP, CHHD, CHHDX    Cardiographics:     EKG Results     Procedure 720 Value Units Date/Time    EKG, 12 LEAD, SUBSEQUENT [[id number]] Collected: 06/02/21 0909    Order Status: Completed Updated: 06/02/21 1225     Ventricular Rate 89 BPM      Atrial Rate 89 BPM      P-R Interval 112 ms      QRS Duration 74 ms      Q-T Interval 452 ms      QTC Calculation (Bezet) 549 ms      Calculated P Axis 54 degrees      Calculated R Axis 13 degrees      Calculated T Axis 152 degrees      Diagnosis --     Normal sinus rhythm  ST & T wave abnormality, consider inferior ischemia  ST & T wave abnormality, consider anterolateral ischemia  Prolonged QT  Abnormal ECG  When compared with ECG of 01-JUN-2021 12:13,  T wave inversion now evident in Inferior leads      EKG, 12 LEAD, INITIAL [[id number]] Collected: 06/01/21 1213    Order Status: Completed Updated: 06/01/21 1637     Ventricular Rate 114 BPM      Atrial Rate 114 BPM      P-R Interval 122 ms      QRS Duration 84 ms      Q-T Interval 338 ms      QTC Calculation (Bezet) 465 ms      Calculated P Axis 61 degrees      Calculated R Axis -24 degrees      Calculated T Axis 123 degrees      Diagnosis --     Sinus tachycardia  Left atrial enlargement  ST & T wave abnormality, consider lateral ischemia  Abnormal ECG  When compared with ECG of 16-APR-2021 14:18,  No significant change was found  Confirmed by Varsha Andrew ([id number]) on 6/1/2021 4:37:06 PM          09/25/20    ECHO ADULT COMPLETE 09/27/2020 9/27/2020    Interpretation Summary  · LV: Estimated LVEF is >70%. Visually measured ejection fraction. Normal cavity size. Moderately increased wall thickness. Hyperdynamic systolic function. Inconclusive left ventricular diastolic function. · Left ventricular cavity obliteration due to hyperdynamic state. Peak velocity is 2.32 m/s, peak gradient is 22 mmHg. Signed by: Keke Alfaro DO on 9/27/2020 11:19 AM        11/08/19    INVASIVE VASCULAR PROCEDURE 11/08/2019 11/8/2019    Narrative  Preoperative diagnosis: End-stage renal disease with poorly working dialysis catheter in need of new one    Postoperative diagnosis: End-stage renal disease with poorly working dialysis catheter in need of new one    Procedures performed:  #1  Dialysis catheter exchange over wire using 19 cm palindrome catheter through a right internal jugular vein approach. #2  Moderate conscious sedation of 13 minutes    Cultures: None    Specimens: None    Drains: None    Estimated blood loss: Less than 50 mL    Assistants: None    Implants: Please see above    Complications: None    Anesthesia: Moderate conscious sedation of 13 minutes was performed by an independent provider giving the medications per my discretion and monitoring of vital signs throughout the case. Indications for the procedure: Mindi Thompson is a 75 y.o. female with end-stage renal disease and poorly working dialysis catheter in need a new one.   Patient was given the appropriate risk and benefits of the procedure including but not limited to bleeding, infection, damage to adjacent structures, MI, stroke, death, loss of lower extremity, need for further surgery. Patient was understanding of all the risks and underwent a procedure. Operative findings:  #1  Appropriately placed right IJ permanent dialysis catheter    Procedure:  Patient was correctly identified in the precath area and taken to the Cath Lab in stable condition. Patient had pre-incision timeout prior to any incision. Patient was prepped and draped in the normal sterile fashion according to CDC guidelines aseptic technique. We were able to numb the patient up appropriately from the catheter insertion site all the way to the venous insertion site with 1% lidocaine. We then were able to place an Amplatz superstiff wire down the previous port into the vena cava. A combination of blunt and sharp dissection was used in order to remove the previous catheter and remove the cuff. Once the catheter was removed over wire new palindrome dialysis catheter was placed. X-ray was taken showing the tip of the catheter within the sinoatrial junction with good curvature of the catheter without any kinking. There is no evidence of pneumothorax and the catheter is good for use. We secured the catheter using 2-0 Prolene suture at both butterfly holes and catheter insertion site with Biopatch and Tegaderm being placed. Each port was easily flushed and aspirated and inserted with 1.6 mL of hot heparin. We then were able to cap and wrap the ports with 4 x 4 and tape. Patient tolerated procedure well without any issues and was taken to the recovery area.     Signed by: Lexi Ludwig MD on 11/8/2019  8:15 AM      XR Results (most recent):  Results from Hospital Encounter encounter on 06/01/21    XR CHEST PORT    Narrative  EXAMINATION: Chest single view    INDICATION: Chest pain, shortness of breath    COMPARISON: CT 5/4/2021    FINDINGS: Single frontal view the chest obtained. Low lung volumes. Dual-lumen  right neck catheter tip at cavoatrial junction level. Borderline prominent  cardiac silhouette. Slightly prominent perihilar interstitium. No confluent  consolidation. No evidence of pneumothorax. No obvious acute osseous findings. Evidence of gaseous distention of bowel in the upper abdomen, likely most  notably the stomach. Impression  Low lung volumes slightly limits evaluation. Borderline prominent cardiac  silhouette with suspected mild interstitial infiltrates/edema. Nonspecific  gaseous distention of bowel in the upper abdomen noted. Signed By: JASON Fischer    June 2, 2021      I have personally and independently evaluated and examined the patient. All relevant labs and testing data are reviewed. I have personally reviewed all the diagnostic labs, available EKG imaging x-rays and other diagnostic data and incorporated them into my care. I am referencing APC's note. I participated in medical decision making. Care plan discussed and updated after review.   Lucius Olivares MD

## 2021-06-02 NOTE — PROGRESS NOTES
06/02/21 0347   Vital Signs   Temp 99.5 °F (37.5 °C)   Temp Source Oral   Pulse (Heart Rate) 100   Resp Rate 18   O2 Sat (%) 96 %   Level of Consciousness Alert (0)   BP (!) 226/95  (Dr. Jazmyn Nunez notified)   MAP (Calculated) 139   BP 1 Location Left arm   MEWS Score 3   Pain 1   Pain Scale 1 Numeric (0 - 10)   Pain Intensity 1 0   Mews = 3 d/t elevated BP. Notified Dr. Jazmyn Nunez and received vto hydralazine 10mg IV, q6h prn for sbp>170.

## 2021-06-02 NOTE — PROGRESS NOTES
conducted an initial consultation and Spiritual Assessment for Shari Romeo, who is a 77 y.o.,female. Patient's Primary Language is: [place]. According to the patient's EMR Muslim Affiliation is: Dhruv Carvajal.     The reason the Patient came to the hospital is:   Patient Active Problem List    Diagnosis Date Noted    Hyperkalemia 06/02/2021    Elevated troponin level 06/02/2021    Elevated brain natriuretic peptide (BNP) level 06/02/2021    Anemia 03/19/2021    Hypertensive emergency 09/25/2020    Tachycardia 09/25/2020    Bandemia 08/27/2018    New onset seizure ([facility] 08/27/2018    ESRD (end stage renal disease) ([facility] 08/08/2018    Secondary hyperparathyroidism of renal origin ([facility] 08/08/2018    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 ([facility] 08/08/2018    CVA, old, cognitive deficits 08/08/2018    Acute on chronic renal insufficiency 08/07/2018    Hypertension 08/07/2018        The  provided the following Interventions:   was unable to assess, patient was off floor. Plan:  Chaplains will continue to follow and will provide pastoral care on an as needed/requested basis.  recommends bedside caregivers page  on duty if patient shows signs of acute spiritual or emotional distress.     [address] [facility]   [phone]

## 2021-06-02 NOTE — ROUTINE PROCESS
0720 - Bedside and Verbal shift change report given to [facility] (oncoming nurse) by Jun Barroso RN (offgoing nurse). Report included the following information SBAR, Kardex, Intake/Output, MAR and Recent Results. 1207 - Assessment completed. Pt is in NAD 
 
1217 - Report given to dialysis RN. Stated he would put in transport for the pt. 
 
1330 - Pt transported off the floor to dialysis 1730 - Report received from Dialysis RN. 1 L removed. Dialysis RN to call Echo to determine if the pt will go there or to return to the floor. 1804 - Pt returned to floor. Missed medications given. 1915 - Bedside and Verbal shift change report given to [address] (oncoming nurse) by Rissa Manning RN (offgoing nurse). Report included the following information SBAR, Kardex, Intake/Output, MAR and Recent Results.

## 2021-06-02 NOTE — PROGRESS NOTES
Progress Note    Africa Murillo  77 y.o. Admit Date: 6/1/2021  Active Problems:    ESRD (end stage renal disease) ([facility] [address] (8/8/2018) POA: Yes      Hypertensive emergency (9/25/2020) POA: Yes      Hyperkalemia (6/2/2021) POA: Unknown      Elevated troponin level (6/2/2021) POA: Unknown      Elevated brain natriuretic peptide (BNP) level (6/2/2021) POA: Yes            Subjective:     Patient feels good, no chest pain, no SOB,was dialyzed yesterday ,remained in hospital for elevated Troponin, has very high BNP level. A comprehensive review of systems was negative except for that written in the History of Present Illness.     Objective:     Visit Vitals  BP (!) 155/87 (BP 1 Location: Left arm, BP Patient Position: At rest)   Pulse 99   Temp 98.2 °F (36.8 °C)   Resp 20   Wt 62 kg (136 lb 11 oz)   LMP  (LMP Unknown)   SpO2 91%   BMI 24.21 kg/m²         Intake/Output Summary (Last 24 hours) at 6/2/2021 1126  Last data filed at 6/1/2021 1800  Gross per 24 hour   Intake    Output 1500 ml   Net -1500 ml       Current Facility-Administered Medications   Medication Dose Route Frequency Provider Last Rate Last Admin    hydrALAZINE (APRESOLINE) 20 mg/mL injection 10 mg  10 mg IntraVENous Q6H PRN Trinity Gamboa MD   10 mg at 06/02/21 0413    [START ON 6/3/2021] famotidine (PEPCID) tablet 20 mg  20 mg Oral DAILY Shayne No        aspirin chewable tablet 81 mg  81 mg Oral DAILY Zulay MALONE PA   81 mg at 06/02/21 5399    atorvastatin (LIPITOR) tablet 10 mg  10 mg Oral DAILY Zulay MALONE PA   10 mg at 06/02/21 3893    B complex-vitaminC-folic acid (NEPHROCAP) cap  1 Capsule Oral DAILY Zulay MALONE PA   1 Capsule at 06/02/21 0959    cloNIDine (CATAPRES) 0.2 mg/24 hr patch 1 Patch  1 Patch TransDERmal every Tuesday Shayne No   1 Patch at 06/01/21 2156    dilTIAZem ER (CARDIZEM CD) capsule 240 mg  240 mg Oral DAILY Shayne No   240 mg at 06/02/21 7150    levETIRAcetam (KEPPRA) tablet 250 mg  250 mg Oral Q MON, WED & [place] Mare [place]ites [place]        levETIRAcetam (KEPPRA) tablet 500 mg  500 mg Oral BID Sumner Gitelman T, [place]   500 mg at 06/02/21 3496    minoxidiL (LONITEN) tablet 2.5 mg  2.5 mg Oral BID Sumner Gitelman T, PA   2.5 mg at 06/02/21 3374    sevelamer carbonate (RENVELA) tab 800 mg  800 mg Oral TID Sumner Gitelman T, PA   800 mg at 06/02/21 2632    sodium chloride (NS) flush 5-40 mL  5-40 mL IntraVENous Q8H Mare Pope [place]        sodium chloride (NS) flush 5-40 mL  5-40 mL IntraVENous PRN Sumner Gitelman T, [place]        acetaminophen (TYLENOL) tablet 650 mg  650 mg Oral Q6H PRN Sumner Gitelman T, PA        Or    acetaminophen (TYLENOL) suppository 650 mg  650 mg Rectal Q6H PRN Mare Pope PA        polyethylene glycol (MIRALAX) packet 17 g  17 g Oral DAILY PRN Sumner Gitelman T, PA        promethazine (PHENERGAN) tablet 12.5 mg  12.5 mg Oral Q6H PRN Mare Pope PA        Or    ondansetron [facility]) injection 4 mg  4 mg IntraVENous Q6H PRN Sumner Gitelman T, [place]        heparin (porcine) injection 5,000 Units  5,000 Units SubCUTAneous Q8H Sumner Gitelman T, [place]   5,000 Units at 06/02/21 0417    insulin lispro (HUMALOG) injection   SubCUTAneous AC&HS Sumner Gitelman T, PA   2 Units at 06/01/21 2200    glucose chewable tablet 16 g  16 g Oral PRN Sumner Gitelman T, PA        glucagon (GLUCAGEN) injection 1 mg  1 mg IntraMUSCular PRN Mare Pope PA        dextrose (D50W) injection syrg 12.5-25 g  25-50 mL IntraVENous PRN Sumner Gitelman T, [place]            Physical Exam:     Physical Exam:   General:  Alert, cooperative, no distress, appears stated age. Mouth/Throat: Lips, mucosa, and tongue normal. Teeth and gums normal.   Neck: Supple, symmetrical, trachea midline, no adenopathy, thyroid: no enlargement/tenderness/nodules, no carotid bruit and no JVD. Lungs:   Clear to auscultation bilaterally. Heart:  Regular rate and rhythm, S1, S2 normal, no murmur, click, rub or gallop. Abdomen:   Soft, non-tender. Bowel sounds normal. No masses,  No organomegaly. Extremities: Extremities normal, atraumatic, no cyanosis or edema, HD catheter is well dressed   Skin: Skin color, texture, turgor normal. No rashes or lesions         Data Review:    CBC w/Diff    Recent Labs     06/02/21 0610 06/01/21  1300   WBC 8.4 10.7   RBC 3.25* 3.45*   HGB 9.6* 10.0*   HCT 29.8* 32.5*   MCV 91.7 94.2   MCH 29.5 29.0   MCHC 32.2 30.8*   RDW 15.9* 16.6*    Recent Labs     06/02/21 0610 06/01/21  1300   MONOS 9 6   EOS 3 1   BASOS 1 0   RDW 15.9* 16.6*        Comprehensive Metabolic Profile    Recent Labs     06/02/21  0610 06/01/21  1400    144   K 4.4 6.2*    109   CO2 27 30   BUN 36* 72*   CREA 8.10* 13.40*    Recent Labs     06/02/21 0610 06/01/21  1400   CA 9.1 9.1   ALB  --  3.4   TP  --  7.1   TBILI  --  0.3                        Impression:       Active Hospital Problems    Diagnosis Date Noted    Hyperkalemia 06/02/2021    Elevated troponin level 06/02/2021    Elevated brain natriuretic peptide (BNP) level 06/02/2021    Hypertensive emergency 09/25/2020    ESRD (end stage renal disease) ([facility] [address] 08/08/2018        BP is better for her ,K is  normalized    Plan: Will Dialyze her today as it is her regular dialysis day. Troponin still  Is high but coming down.    If no further cardiac  Work up is done  thenshe can be Transferred back to [address] [place] [address] after Shauna De La Paz MD

## 2021-06-02 NOTE — PROGRESS NOTES
Problem: Chronic Renal Failure  Goal: *Fluid and electrolytes stabilized  Outcome: Progressing Towards Goal     Problem: Falls - Risk of  Goal: *Absence of Falls  Description: Document Shree Hernandez Fall Risk and appropriate interventions in the flowsheet. Outcome: Progressing Towards Goal  Note: Fall Risk Interventions:                                Problem: Patient Education: Go to Patient Education Activity  Goal: Patient/Family Education  Outcome: Progressing Towards Goal     Problem: Hypertension  Goal: *Blood pressure within specified parameters  Outcome: Progressing Towards Goal  Goal: *Fluid volume balance  Outcome: Progressing Towards Goal  Goal: *Labs within defined limits  Outcome: Progressing Towards Goal     Problem: Patient Education: Go to Patient Education Activity  Goal: Patient/Family Education  Outcome: Progressing Towards Goal     Problem: Falls - Risk of  Goal: *Absence of Falls  Description: Document Oscar Fall Risk and appropriate interventions in the flowsheet.   Outcome: Progressing Towards Goal  Note: Fall Risk Interventions:                                Problem: Patient Education: Go to Patient Education Activity  Goal: Patient/Family Education  Outcome: Progressing Towards Goal     Problem: Hypertension  Goal: *Blood pressure within specified parameters  Outcome: Progressing Towards Goal     Problem: Hypertension  Goal: *Fluid volume balance  Outcome: Progressing Towards Goal     Problem: Hypertension  Goal: *Labs within defined limits  Outcome: Progressing Towards Goal     Problem: Patient Education: Go to Patient Education Activity  Goal: Patient/Family Education  Outcome: Progressing Towards Goal

## 2021-06-02 NOTE — PROGRESS NOTES
[facility]  Progress Note    Patient: Vasile Steen Age: 77 y.o. : 1955 MR#: [id number] SSN: [id number]  Date: 2021     Subjective/24-hour events:     No acute issues overnight. Assessment:   Hypertensive urgency  Hyperkalemia  DM2 with hyperglycemia, A1c 7.2%  Elevated troponin, suspect demand ischemia due to volume overload  Shortness of breath from missed hemodialysis session  Anemia of chronic disease  Seizure disorder  Resident of long-term care facility  Secondary hyperparathyroidism  History of COVID-19 infection  History of CVA  History of bipolar disorder    Plan:   HD per nephrology for volume/electrolyte management. BPs improving - monitor. Cardiology evaluation due to troponin elevation. Await recs. Disposition soon if continues to improve and if no further cardiac w/u required. Return to nursing facility at discharge. Objective:   VS:   Visit Vitals  BP (!) 162/72 (BP 1 Location: Left arm, BP Patient Position: At rest)   Pulse 94   Temp 99.5 °F (37.5 °C)   Resp 18   Wt 62 kg (136 lb 11 oz)   LMP  (LMP Unknown)   SpO2 96%   BMI 24.21 kg/m²      Tmax/24hrs: Temp (24hrs), Av.9 °F (37.2 °C), Min:98.3 °F (36.8 °C), Max:99.5 °F (37.5 °C)      Intake/Output Summary (Last 24 hours) at 2021 0918  Last data filed at 2021 1800  Gross per 24 hour   Intake    Output 1500 ml   Net -1500 ml       General:  In NAD. Nontoxic-appearing. Cardiovascular:  RRR. Pulmonary:  Lungs clear bilaterally, no wheezes. GI:  Abdomen soft, NTTP. Extremities:  Warm, no edema or ischemia.     Labs:    Recent Results (from the past 24 hour(s))   EKG, 12 LEAD, INITIAL    Collection Time: 21 12:13 PM   Result Value Ref Range    Ventricular Rate 114 BPM    Atrial Rate 114 BPM    P-R Interval 122 ms    QRS Duration 84 ms    Q-T Interval 338 ms    QTC Calculation (Bezet) 465 ms    Calculated P Axis 61 degrees    Calculated R Axis -24 degrees    Calculated T Axis 123 degrees    Diagnosis       Sinus tachycardia  Left atrial enlargement  ST & T wave abnormality, consider lateral ischemia  Abnormal ECG  When compared with ECG of 16-APR-2021 14:18,  No significant change was found  Confirmed by Jacinta Jaime ([id number]) on 6/1/2021 4:37:06 PM     CBC WITH AUTOMATED DIFF    Collection Time: 06/01/21  1:00 PM   Result Value Ref Range    WBC 10.7 4.6 - 13.2 K/uL    RBC 3.45 (L) 4.20 - 5.30 M/uL    HGB 10.0 (L) 12.0 - 16.0 g/dL    HCT 32.5 (L) 35.0 - 45.0 %    MCV 94.2 74.0 - 97.0 FL    MCH 29.0 24.0 - 34.0 PG    MCHC 30.8 (L) 31.0 - 37.0 g/dL    RDW 16.6 (H) 11.6 - 14.5 %    PLATELET 595 333 - 752 K/uL    MPV 11.7 9.2 - 11.8 FL    NEUTROPHILS 87 (H) 40 - 73 %    LYMPHOCYTES 6 (L) 21 - 52 %    MONOCYTES 6 3 - 10 %    EOSINOPHILS 1 0 - 5 %    BASOPHILS 0 0 - 2 %    ABS. NEUTROPHILS 9.3 (H) 1.8 - 8.0 K/UL    ABS. LYMPHOCYTES 0.6 (L) 0.9 - 3.6 K/UL    ABS. MONOCYTES 0.6 0.05 - 1.2 K/UL    ABS. EOSINOPHILS 0.1 0.0 - 0.4 K/UL    ABS. BASOPHILS 0.0 0.0 - 0.1 K/UL    DF AUTOMATED     METABOLIC PANEL, BASIC    Collection Time: 06/01/21  2:00 PM   Result Value Ref Range    Sodium 144 136 - 145 mmol/L    Potassium 6.2 (HH) 3.5 - 5.5 mmol/L    Chloride 109 100 - 111 mmol/L    CO2 30 21 - 32 mmol/L    Anion gap 5 3.0 - 18 mmol/L    Glucose 245 (H) 74 - 99 mg/dL    BUN 72 (H) 7.0 - 18 MG/DL    Creatinine 13.40 (H) 0.6 - 1.3 MG/DL    BUN/Creatinine ratio 5 (L) 12 - 20      GFR est AA 3 (L) >60 ml/min/1.73m2    GFR est non-AA 3 (L) >60 ml/min/1.73m2    Calcium 9.1 8.5 - 10.1 MG/DL   HEPATIC FUNCTION PANEL    Collection Time: 06/01/21  2:00 PM   Result Value Ref Range    Protein, total 7.1 6.4 - 8.2 g/dL    Albumin 3.4 3.4 - 5.0 g/dL    Globulin 3.7 2.0 - 4.0 g/dL    A-G Ratio 0.9 0.8 - 1.7      Bilirubin, total 0.3 0.2 - 1.0 MG/DL    Bilirubin, direct 0.1 0.0 - 0.2 MG/DL    Alk.  phosphatase 138 (H) 45 - 117 U/L    AST (SGOT) 41 (H) 10 - 38 U/L    ALT (SGPT) 62 (H) 13 - 56 U/L   NT-PRO BNP Collection Time: 06/01/21  2:00 PM   Result Value Ref Range    NT pro-BNP >175,000 (H) 0 - 900 PG/ML   TROPONIN I    Collection Time: 06/01/21  2:00 PM   Result Value Ref Range    Troponin-I, QT 0.56 (H) 0.0 - 0.045 NG/ML   GLUCOSE, POC    Collection Time: 06/01/21 11:02 PM   Result Value Ref Range    Glucose (POC) 145 (H) 70 - 110 mg/dL   TROPONIN I    Collection Time: 06/02/21  6:10 AM   Result Value Ref Range    Troponin-I, QT 0.42 (H) 0.0 - 9.594 NG/ML   METABOLIC PANEL, BASIC    Collection Time: 06/02/21  6:10 AM   Result Value Ref Range    Sodium 140 136 - 145 mmol/L    Potassium 4.4 3.5 - 5.5 mmol/L    Chloride 103 100 - 111 mmol/L    CO2 27 21 - 32 mmol/L    Anion gap 10 3.0 - 18 mmol/L    Glucose 126 (H) 74 - 99 mg/dL    BUN 36 (H) 7.0 - 18 MG/DL    Creatinine 8.10 (H) 0.6 - 1.3 MG/DL    BUN/Creatinine ratio 4 (L) 12 - 20      GFR est AA 6 (L) >60 ml/min/1.73m2    GFR est non-AA 5 (L) >60 ml/min/1.73m2    Calcium 9.1 8.5 - 10.1 MG/DL   CBC WITH AUTOMATED DIFF    Collection Time: 06/02/21  6:10 AM   Result Value Ref Range    WBC 8.4 4.6 - 13.2 K/uL    RBC 3.25 (L) 4.20 - 5.30 M/uL    HGB 9.6 (L) 12.0 - 16.0 g/dL    HCT 29.8 (L) 35.0 - 45.0 %    MCV 91.7 74.0 - 97.0 FL    MCH 29.5 24.0 - 34.0 PG    MCHC 32.2 31.0 - 37.0 g/dL    RDW 15.9 (H) 11.6 - 14.5 %    PLATELET 565 080 - 044 K/uL    MPV 12.2 (H) 9.2 - 11.8 FL    NEUTROPHILS 75 (H) 40 - 73 %    LYMPHOCYTES 13 (L) 21 - 52 %    MONOCYTES 9 3 - 10 %    EOSINOPHILS 3 0 - 5 %    BASOPHILS 1 0 - 2 %    ABS. NEUTROPHILS 6.3 1.8 - 8.0 K/UL    ABS. LYMPHOCYTES 1.1 0.9 - 3.6 K/UL    ABS. MONOCYTES 0.8 0.05 - 1.2 K/UL    ABS. EOSINOPHILS 0.2 0.0 - 0.4 K/UL    ABS.  BASOPHILS 0.0 0.0 - 0.1 K/UL    DF AUTOMATED     GLUCOSE, POC    Collection Time: 06/02/21  8:33 AM   Result Value Ref Range    Glucose (POC) 142 (H) 70 - 110 mg/dL   EKG, 12 LEAD, SUBSEQUENT    Collection Time: 06/02/21  9:09 AM   Result Value Ref Range    Ventricular Rate 89 BPM    Atrial Rate 89 BPM P-R Interval 112 ms    QRS Duration 74 ms    Q-T Interval 452 ms    QTC Calculation (Bezet) 549 ms    Calculated P Axis 54 degrees    Calculated R Axis 13 degrees    Calculated T Axis 152 degrees    Diagnosis       Normal sinus rhythm  ST & T wave abnormality, consider inferior ischemia  ST & T wave abnormality, consider anterolateral ischemia  Prolonged QT  Abnormal ECG  When compared with ECG of 01-JUN-2021 12:13,  T wave inversion now evident in Inferior leads         Signed By: Deshawn Lambert MD     June 2, 2021

## 2021-06-02 NOTE — PROGRESS NOTES
Problem: Chronic Renal Failure  Goal: *Fluid and electrolytes stabilized  Outcome: Progressing Towards Goal     Problem: Falls - Risk of  Goal: *Absence of Falls  Description: Document Maricruz Sebastian Fall Risk and appropriate interventions in the flowsheet. Outcome: Progressing Towards Goal  Note: Fall Risk Interventions:       Mentation Interventions: Adequate sleep, hydration, pain control, Bed/chair exit alarm, Evaluate medications/consider consulting pharmacy    Medication Interventions: Bed/chair exit alarm, Evaluate medications/consider consulting pharmacy    Elimination Interventions: Bed/chair exit alarm, Call light in reach, Patient to call for help with toileting needs, Toileting schedule/hourly rounds    History of Falls Interventions: Bed/chair exit alarm, Consult care management for discharge planning, Evaluate medications/consider consulting pharmacy         Problem: Patient Education: Go to Patient Education Activity  Goal: Patient/Family Education  Outcome: Progressing Towards Goal     Problem: Hypertension  Goal: *Blood pressure within specified parameters  Outcome: Progressing Towards Goal  Goal: *Fluid volume balance  Outcome: Progressing Towards Goal  Goal: *Labs within defined limits  Outcome: Progressing Towards Goal     Problem: Patient Education: Go to Patient Education Activity  Goal: Patient/Family Education  Outcome: Progressing Towards Goal     Problem: Patient Education: Go to Patient Education Activity  Goal: Patient/Family Education  Outcome: Progressing Towards Goal     Problem: Chronic Renal Failure  Goal: *Fluid and electrolytes stabilized  6/2/2021 1619 by Katina Otto  Outcome: Progressing Towards Goal  6/2/2021 1618 by Katina Otto  Outcome: Progressing Towards Goal     Problem: Falls - Risk of  Goal: *Absence of Falls  Description: Document Oscar Fall Risk and appropriate interventions in the flowsheet.   6/2/2021 1619 by Katina Otto  Outcome: Progressing Towards Goal  Note: Fall Risk Interventions:       Mentation Interventions: Adequate sleep, hydration, pain control, Bed/chair exit alarm, Evaluate medications/consider consulting pharmacy    Medication Interventions: Bed/chair exit alarm, Evaluate medications/consider consulting pharmacy    Elimination Interventions: Bed/chair exit alarm, Call light in reach, Patient to call for help with toileting needs, Toileting schedule/hourly rounds    History of Falls Interventions: Bed/chair exit alarm, Consult care management for discharge planning, Evaluate medications/consider consulting pharmacy      6/2/2021 1618 by Pa Owen  Outcome: Progressing Towards Goal  Note: Fall Risk Interventions:       Mentation Interventions: Adequate sleep, hydration, pain control, Bed/chair exit alarm, Evaluate medications/consider consulting pharmacy    Medication Interventions: Bed/chair exit alarm, Evaluate medications/consider consulting pharmacy    Elimination Interventions: Bed/chair exit alarm, Call light in reach, Patient to call for help with toileting needs, Toileting schedule/hourly rounds    History of Falls Interventions: Bed/chair exit alarm, Consult care management for discharge planning, Evaluate medications/consider consulting pharmacy         Problem: Patient Education: Go to Patient Education Activity  Goal: Patient/Family Education  6/2/2021 1619 by Pa Owen  Outcome: Progressing Towards Goal  6/2/2021 1618 by Pa Owen  Outcome: Progressing Towards Goal     Problem: Hypertension  Goal: *Blood pressure within specified parameters  6/2/2021 1619 by Pa Owen  Outcome: Progressing Towards Goal  6/2/2021 1618 by aP Owen  Outcome: Progressing Towards Goal  Goal: *Fluid volume balance  6/2/2021 1619 by Pa Owen  Outcome: Progressing Towards Goal  6/2/2021 1618 by Pa Owen  Outcome: Progressing Towards Goal  Goal: *Labs within defined limits  6/2/2021 1619 by Pa Owen  Outcome: Progressing Towards Goal  6/2/2021 1618 by Pa Kulkarni Chantal  Outcome: Progressing Towards Goal     Problem: Patient Education: Go to Patient Education Activity  Goal: Patient/Family Education  6/2/2021 1619 by Zeb Horton  Outcome: Progressing Towards Goal  6/2/2021 1618 by Zeb Horton  Outcome: Progressing Towards Goal     Problem: Patient Education: Go to Patient Education Activity  Goal: Patient/Family Education  6/2/2021 1619 by Zeb Horton  Outcome: Progressing Towards Goal  6/2/2021 1618 by Zeb Horton  Outcome: Progressing Towards Goal     Problem: Pain  Goal: *Control of Pain  Outcome: Progressing Towards Goal  Goal: *PALLIATIVE CARE:  Alleviation of Pain  Outcome: Progressing Towards Goal     Problem: Patient Education: Go to Patient Education Activity  Goal: Patient/Family Education  Outcome: Progressing Towards Goal     Problem: Pain  Goal: *Control of Pain  Outcome: Progressing Towards Goal  Goal: *PALLIATIVE CARE:  Alleviation of Pain  Outcome: Progressing Towards Goal     Problem: Patient Education: Go to Patient Education Activity  Goal: Patient/Family Education  Outcome: Progressing Towards Goal

## 2021-06-02 NOTE — ROUTINE PROCESS
Bedside and Verbal shift change report given to Reece Olvera RN (oncoming nurse) by Michele Mercado RN (offgoing nurse). Report included the following information SBAR, Kardex, MAR and Recent Results.

## 2021-06-02 NOTE — PROGRESS NOTES
OCCUPATIONAL THERAPY EVALUATION/DISCHARGE    Patient: Annel Cui (63 y.o. female)  Date: 6/2/2021  Primary Diagnosis: Hypertensive emergency [I16.1]        Precautions:   Aspiration, Fall  PLOF: Pt is a resident in [address] [place] [address]. Per chart review pt primarily bed bound. Per pt she is getting up to the w/c with help \"occasionally\", and usually able to feed herself and receives  assistance for all other ADLs. ASSESSMENT AND RECOMMENDATIONS:  Based on the objective data described below, the patient requires assistance for her self-care routine. Pt reports \"occasionally\" performing functional txfr to the w/c or toilet with assistance from LTC facility staff, and mostly stays in bed. Pt was able to maneuver to EOB with SBA, performed simple grooming task with set-up and no LOB. Pt requesting to eat following handwashing due to going for dialysis soon. Pt was able to self-feed following set-up due to vision deficits, and required Min A for UB/LB dressing, which appears to be her functional baseline. Skilled occupational therapy is not indicated at this time. Discharge Recommendations: Return to LTC facility  Further Equipment Recommendations for Discharge: N/A      SUBJECTIVE:   Patient stated I ask them sometimes to get me up.     OBJECTIVE DATA SUMMARY:     Past Medical History:   Diagnosis Date    Asthma     Bipolar affective disorder ([facility] [address]     Brain condition     Cataract     Chronic kidney disease     hemodialysis M-W-F    Diabetes ([facility] [address]     Hyperlipidemia     Hypertension     Paralytic strabismus, unspecified     Psychiatric disorder     Seizures ([facility] [address] 08/27/2018     Past Surgical History:   Procedure Laterality Date    CO INSJ TUNNELED CVC W/O SUBQ PORT/ AGE 3 YR/> N/A 8/9/2018     in-pt 474A, Insertion Tunneled Central Venous Catheter performed by Eugune Hamman, MD at [address] [place] [address]    CO INSJ TUNNELED CVC W/O SUBQ PORT/ AGE 3 YR/> N/A 11/8/2019    INSERTION TUNNELED CENTRAL VENOUS CATHETER performed by Makenzie Maldonado MD at [facility]     Barriers to Learning/Limitations: None  Compensate with: visual, verbal, tactile, kinesthetic cues/model    Home Situation:   Home Situation  Home Environment: Long term care  One/Two Story Residence: One story  Living Alone: No  Support Systems: Family member(s)  Patient Expects to be Discharged to[de-identified] Skilled nursing facility  Current DME Used/Available at Home: Wheelchair  [x]     Right hand dominant   []     Left hand dominant    Cognitive/Behavioral Status:  Neurologic State: Alert  Orientation Level: Oriented to person  Cognition: Follows commands; Impulsive  Safety/Judgement: Awareness of environment    Skin: visible skin intact  Edema: none noted    Vision/Perceptual:       Acuity: Impaired near vision; Impaired far vision     Coordination: BUE  Coordination: Generally decreased, functional  Fine Motor Skills-Upper: Left Intact; Right Intact    Gross Motor Skills-Upper: Left Impaired;Right Impaired  Balance:  Sitting: Impaired  Sitting - Static: Good (unsupported)  Sitting - Dynamic: Fair (occasional)    Strength: BUE  Strength: Generally decreased, functional   Tone & Sensation: BUE  Tone: Normal  Sensation: Impaired   Range of Motion: BUE  AROM: Generally decreased, functional  PROM: Generally decreased, functional   Functional Mobility and Transfers for ADLs:  Bed Mobility:  Rolling: Contact guard assistance  Supine to Sit: Stand-by assistance; Additional time  Sit to Supine: Stand-by assistance  Scooting: Contact guard assistance    ADL Assessment:  Feeding: Setup  Oral Facial Hygiene/Grooming: Contact guard assistance  Bathing:  Moderate assistance  Upper Body Dressing: Minimum assistance  Lower Body Dressing: Minimum assistance  Toileting: Maximum assistance   ADL Intervention:     Cognitive Retraining  Safety/Judgement: Awareness of environment  Pain:  Pain level pre-treatment: 0/10   Pain level post-treatment: 0/10     Activity Tolerance: Fair  Please refer to the flowsheet for vital signs taken during this treatment. After treatment:   []  Patient left in no apparent distress sitting up in chair  [x]  Patient left in no apparent distress in bed  [x]  Call bell left within reach  [x]  Nursing notified  []  Caregiver present  [x]  Bed alarm activated    COMMUNICATION/EDUCATION:   [x]      Role of Occupational Therapy in the acute care setting  [x]      Home safety education was provided and the patient/caregiver indicated understanding. [x]      Patient/family have participated as able and agree with findings and recommendations. []      Patient is unable to participate in plan of care at this time. Thank you for this referral.  Abril Lancaster OTR/L  Time Calculation: 25 mins      Eval Complexity: History: LOW Complexity : Brief history review ; Examination: LOW Complexity : 1-3 performance deficits relating to physical, cognitive , or psychosocial skils that result in activity limitations and / or participation restrictions ;    Decision Making:LOW Complexity : No comorbidities that affect functional and no verbal or physical assistance needed to complete eval tasks

## 2021-06-02 NOTE — DIALYSIS
ACUTE HEMODIALYSIS TREATMENT    HEMODIALYSIS ORDERS: Physician: Dr. Masoud Biswas     Dialyzer: Revaclear   Duration: 3 hr   BFR: 400   DFR: 600   Dialysate:  Temp 36-37*C   K+  2    Ca+ 2.5   Na 138   Bicarb 35   Wt Readings from Last 1 Encounters:   06/02/21 62 kg (136 lb 11 oz)    Patient Chart [x]   Unable to Obtain []  Dry weight/UF Goal: 1000 ml    Heparin []  Bolus    Units    [] Hourly    Units    [x]None       Pre BP:   144/77   Pulse:  85   Respirations: 18   Temp:  97.5  [] Oral [] Axillary [] Esophageal   Labs: []  Pre  []  Post:   [x] N/A   Additional Orders(medications, blood products, hypotension management): [] Yes   [] No     [x]  [facility] Consent Verified     CATHETER ACCESS:  []N/A   [x]Right   []Left   []IJ   []Fem  [x]Chest wall  []TransHepatic   [] First use X-ray verified     [x]Tunnel    [] Non Tunneled   [x]No S/S infection  []Redness  []Drainage []Cultured []Swelling []Pain   []Medical Aseptic Prep Utilized   []Dressing Changed  [x] Biopatch  Date:    []Clotted   [x]Patent   Flows: [x]Good  []Poor  []Reversed   If access problem,  notified: []Yes    [x]N/A          GENERAL ASSESSMENT:    LUNGS:  Resp Rate    [] Clear  [x] Coarse  [] Crackles  [] Wheezing  [] Diminished         Respirations:  [x]Easy  []Labored  []N/A  Cough:  []Productive  []Dry  [x]N/A      Therapy:  []RA  O2 Type:  [x]NC  Mask: []  NRB    [] BiPaP  Flow:  l/min 2                  [] Ventilated  [] Intubated  [] Trach     CARDIAC: [x] Regular      [] Irregular   [] Rhythm:          [] Monitored   [] Bedside   [] Remotely monitored     EDEMA: [] None   [x]Generalized  [] Pitting [] 1+   [] 2 +   [] 3+    [] 4+  [] Pedal    SKIN:   [x] Warm  [] Hot     [] Cold   [x] Dry     [] Pale   [] Diaphoretic                  [] Flushed  [] Jaundiced  [] Cyanotic     LOC:    [] Alert      []Oriented:    [] Person     [] Place  []Time               [] Confused  [] Lethargic  [] Medicated  [] Non-responsive  [] Non Verbal   GI / ABDOMEN:                     [] Flat    [] Distended    [] Soft    [] Firm   []  Obese                   [] Diarrhea   [] FMS [] Bowel Sounds  [] Nausea  [] Vomiting                   [] NGT  [] OGT    [] PEG  [] Tube Feedings @     / URINE ASSESSMENT:                   [] Voiding  []  Khan  [] Oliguria  [] Anuria                     [] Incontinent  []  Incontinent Brief   []  PureWick     PAIN:  [x] 0 []1  []2   []3   []4   []5   []6   []7   []8   []9   []10                MOBILITY:  [x] Bed    [] Stretcher      All Vitals and Treatment Details on Attached [address]: [facility]          Room # 463/01    [x] Routine         [] 1st Time Acute/Chronic   [] Urgent      [] Stat            [] Acute Room   []  Bedside    [] ICU/CCU     [] ER   Isolation Precautions:  [x] Dialysis    There are currently no Active Isolations     ALLERGIES:     Allergies   Allergen Reactions    Amoxicillin Unknown (comments)    Cleocin [Clindamycin Hcl] Unknown (comments)    Codeine Unknown (comments)    Lorcet 10/650 [Hydrocodone-Acetaminophen] Unknown (comments)    Penicillin G Benzathine Unknown (comments)    Shellfish Derived Unknown (comments)      Code Status:  Full Code     Hepatitis Status      Lab Results   Component Value Date/Time    Hepatitis B surface Ag <0.10 09/25/2020 05:31 PM    Hepatitis B surface Ab 63.01 09/25/2020 05:31 PM    Hepatitis B core, IgM NEGATIVE  08/11/2018 03:29 AM    Hep B Core Ab, IgM NEGATIVE  08/13/2018 04:34 AM    Hep B Core Ab, total NEGATIVE  08/13/2018 04:34 AM        Current Labs:      Lab Results   Component Value Date/Time    WBC 8.4 06/02/2021 06:10 AM    Hemoglobin, POC 13.3 11/08/2019 07:26 AM    HGB 9.6 (L) 06/02/2021 06:10 AM    Hematocrit, POC 39 11/08/2019 07:26 AM    HCT 29.8 (L) 06/02/2021 06:10 AM    PLATELET 168 81/63/6739 06:10 AM    MCV 91.7 06/02/2021 06:10 AM     Lab Results   Component Value Date/Time    Sodium 140 06/02/2021 06:10 AM    Potassium 4.4 06/02/2021 06:10 AM    Chloride 103 06/02/2021 06:10 AM    CO2 27 06/02/2021 06:10 AM    Anion gap 10 06/02/2021 06:10 AM    Glucose 126 (H) 06/02/2021 06:10 AM    BUN 36 (H) 06/02/2021 06:10 AM    Creatinine 8.10 (H) 06/02/2021 06:10 AM    BUN/Creatinine ratio 4 (L) 06/02/2021 06:10 AM    GFR est AA 6 (L) 06/02/2021 06:10 AM    GFR est non-AA 5 (L) 06/02/2021 06:10 AM    Calcium 9.1 06/02/2021 06:10 AM          DIET:  DIET RENAL     PRIMARY NURSE REPORT:   Pre Dialysis: Doree Hodgkins RN    Time: 0615      EDUCATION:    [x] Patient           Knowledge Basis: [x]None []Minimal [] Substantial [] Unknown  Barriers to learning  []N/A  [] Intubated/Trached/Ventilated  [] Sedated/Paralyzed   [] Access Care     [] S&S of infection  [] Fluid Management  [] K+   [x] Procedural    [] Medications   [] Tx Options   [] Transplant   [] Diet      Teaching Tools:  [x] Explain  [] Demo  [] Handouts [] Video  Patient response: [] Verbalized understanding   [x] Requires follow up        [x] Time Out/Safety Check    [x] Extracorporeal Circuit Tested for integrity       RO/HEMODIALYSIS MACHINE SAFETY CHECKS  Before each treatment:        [address] [place] [address]                                     [x] Unit Machine # 5 with centralized RO                                    [] Portable Machine #1/RO serial # [id number]                                  [] Portable Machine #2/RO serial # [id number]                                  [] Portable Machine #4/RO serial # [id number]                                  [] Portable Machine #10/RO serial #  [id number]                                                                                                         Alarm Test:  Pass time 4137            [x] RO/Machine Log Complete    Machine Temp    36-37*C             Dialysate: pH 7.4    Conductivity: Meter 13.8   HD Machine  13.8     TCD: 13.8  Dialyzer Lot # [id number]     Blood Tubing Lot # [id number]     Saline Lot # [id number]     CHLORINE TESTING-Before each treatment and every 4 hours    Total Chlorine: [x] less than 0.1 ppm  Initial Time Check: 1330       4 Hr/2nd Check Time:    (if greater than 0.1 ppm from Primary then every 30 minutes from Secondary)     TREATMENT INITIATION  with Dialysis Precautions:   [x] All Connections Secured              [x] Saline Line Double Clamped   [x] Venous Parameters Set               [x] Arterial Parameters Set    [x] Prime Given 250ml NSS              [x]Air Foam Detector Engaged      Treatment Initiation Note:  Patient arrived the unit in stable condition, VSS, R chest TDC accesses patient no s/s of infection. HD initiated without difficulty. Dr. Arnie Feng wants 1000 ml of fluid removed     During Treatment Notes:  1251  Vascular access visible with arterial and venous line connections intact. Pt resting comfortably. 1430  Vascular access visible with arterial and venous line connections intact. Pt resting comfortably. 1500  Vascular access visible with arterial and venous line connections intact. Pt resting comfortably. 1530  Vascular access visible with arterial and venous line connections intact. Pt resting comfortably. 1545  Vascular access visible with arterial and venous line connections intact. Pt resting comfortably. 1600  Vascular access visible with arterial and venous line connections intact. Pt resting comfortably. 1630  Vascular access visible with arterial and venous line connections intact. Pt resting comfortably. 1645  Vascular access visible with arterial and venous line connections intact. Pt resting comfortably. 1700  Vascular access visible with arterial and venous line connections intact. Pt resting comfortably. 1713  Dialysis treatment complete.        Medication Dose Volume Route Time [place] Nurse, Title      RAYO Chavis RN HD Wilma Serene RN     Post Assessment  Dialyzer Cleared:   [] Good  [x] Fair  [] Poor  Blood processed:  69.2 L  UF Removed:  1000 Ml    Post /73   Pulse 85 Resp  18  Temp 97.8 Lungs: [] Clear [] Course  [] Crackles                 []  Wheezing   [] Diminished   Post Tx Vascular Access: [] N/A  AVF/AVG: Bleeding stopped with  Arterial Pressure for  min   Venous Pressure for  min      Cardiac :[] Regular   [] Irregular   Rhythm:  [] Monitored   [] Not Monitored    CVC Catheter: [] N/A  Locking solution: Heparin 1:1000 U  Arterial port 1.6 ml   Venous port 1.6 ml   Edema:  [] None  [] Generalized                     Skin:[x] Warm  [x] Dry [] Diaphoretic               [] Flushed  [] Pale [] Cyanotic Pain:  [x]0  []1 []2  []3 []4  []5  []6  []7 []8    []9  []10     Post Treatment Note:   Patient completed and tolerated 3 hrs of HD, 1000 ml of fluid removed per Dr. Josiah Reaves. Pt. Returned to her room in stable condition.      POST TREATMENT PRIMARY NURSE HANDOFF REPORT:   Post Dialysis: Skyla Santizo RN               Time:  1800       Abbreviations: AVG-arterial venous graft, AVF-arterial venous fistula, IJ-Internal Jugular, Subcl-Subclavian, Fem-Femoral, Tx-treatment, AP/HR-apical heart rate, VSS- Vital Signs Stable, CVC- Central Venous Catheter, DFR-dialysate flow rate, BFR-blood flow rate, AP-arterial pressure, -venous pressure, UF-ultrafiltrate, TMP-transmembrane pressure, Ray-Venous, Art-Arterial, RO-Reverse Osmosis

## 2021-06-03 VITALS
BODY MASS INDEX: 24.21 KG/M2 | OXYGEN SATURATION: 98 % | WEIGHT: 136.69 LBS | RESPIRATION RATE: 20 BRPM | HEART RATE: 71 BPM | TEMPERATURE: 97.7 F | DIASTOLIC BLOOD PRESSURE: 80 MMHG | SYSTOLIC BLOOD PRESSURE: 165 MMHG

## 2021-06-03 LAB
GLUCOSE BLD STRIP.AUTO-MCNC: 106 MG/DL (ref 70–110)
GLUCOSE BLD STRIP.AUTO-MCNC: 146 MG/DL (ref 70–110)
GLUCOSE BLD STRIP.AUTO-MCNC: 153 MG/DL (ref 70–110)
GLUCOSE BLD STRIP.AUTO-MCNC: 272 MG/DL (ref 70–110)

## 2021-06-03 PROCEDURE — 99232 SBSQ HOSP IP/OBS MODERATE 35: CPT | Performed by: INTERNAL MEDICINE

## 2021-06-03 PROCEDURE — 74011636637 HC RX REV CODE- 636/637: Performed by: PHYSICIAN ASSISTANT

## 2021-06-03 PROCEDURE — 82962 GLUCOSE BLOOD TEST: CPT

## 2021-06-03 PROCEDURE — 90935 HEMODIALYSIS ONE EVALUATION: CPT

## 2021-06-03 PROCEDURE — 99239 HOSP IP/OBS DSCHRG MGMT >30: CPT | Performed by: EMERGENCY MEDICINE

## 2021-06-03 PROCEDURE — 74011250636 HC RX REV CODE- 250/636: Performed by: INTERNAL MEDICINE

## 2021-06-03 PROCEDURE — 74011250637 HC RX REV CODE- 250/637: Performed by: NURSE PRACTITIONER

## 2021-06-03 PROCEDURE — 65270000029 HC RM PRIVATE

## 2021-06-03 PROCEDURE — 74011250637 HC RX REV CODE- 250/637: Performed by: PHYSICIAN ASSISTANT

## 2021-06-03 PROCEDURE — 74011250636 HC RX REV CODE- 250/636: Performed by: PHYSICIAN ASSISTANT

## 2021-06-03 RX ORDER — FAMOTIDINE 20 MG/1
20 TABLET, FILM COATED ORAL DAILY
Qty: 30 TABLET | Refills: 0 | Status: SHIPPED
Start: 2021-06-03 | End: 2021-11-10

## 2021-06-03 RX ORDER — METOPROLOL TARTRATE 25 MG/1
25 TABLET, FILM COATED ORAL ONCE
Status: COMPLETED | OUTPATIENT
Start: 2021-06-03 | End: 2021-06-03

## 2021-06-03 RX ORDER — POLYETHYLENE GLYCOL 3350 17 G/17G
17 POWDER, FOR SOLUTION ORAL
Qty: 15 PACKET | Refills: 0 | Status: SHIPPED
Start: 2021-06-03 | End: 2021-11-10

## 2021-06-03 RX ORDER — METOPROLOL TARTRATE 50 MG/1
50 TABLET ORAL EVERY 12 HOURS
Status: DISCONTINUED | OUTPATIENT
Start: 2021-06-03 | End: 2021-06-04 | Stop reason: HOSPADM

## 2021-06-03 RX ORDER — HEPARIN SODIUM 1000 [USP'U]/ML
5000 INJECTION, SOLUTION INTRAVENOUS; SUBCUTANEOUS
Status: DISCONTINUED | OUTPATIENT
Start: 2021-06-03 | End: 2021-06-04 | Stop reason: HOSPADM

## 2021-06-03 RX ORDER — METOPROLOL TARTRATE 50 MG/1
50 TABLET ORAL EVERY 12 HOURS
Qty: 60 TABLET | Refills: 0 | Status: SHIPPED
Start: 2021-06-03 | End: 2021-06-14

## 2021-06-03 RX ADMIN — MINOXIDIL 2.5 MG: 2.5 TABLET ORAL at 20:05

## 2021-06-03 RX ADMIN — Medication 10 ML: at 22:25

## 2021-06-03 RX ADMIN — HEPARIN SODIUM 5000 UNITS: 5000 INJECTION INTRAVENOUS; SUBCUTANEOUS at 12:19

## 2021-06-03 RX ADMIN — LEVETIRACETAM 500 MG: 500 TABLET ORAL at 12:19

## 2021-06-03 RX ADMIN — NEPHROCAP 1 CAPSULE: 1 CAP ORAL at 12:19

## 2021-06-03 RX ADMIN — ASPIRIN 81 MG: 81 TABLET, CHEWABLE ORAL at 12:20

## 2021-06-03 RX ADMIN — MINOXIDIL 2.5 MG: 2.5 TABLET ORAL at 12:20

## 2021-06-03 RX ADMIN — METOPROLOL TARTRATE 25 MG: 25 TABLET, FILM COATED ORAL at 14:42

## 2021-06-03 RX ADMIN — SEVELAMER CARBONATE 800 MG: 800 TABLET, FILM COATED ORAL at 22:24

## 2021-06-03 RX ADMIN — METOPROLOL TARTRATE 50 MG: 50 TABLET, FILM COATED ORAL at 20:23

## 2021-06-03 RX ADMIN — ATORVASTATIN CALCIUM 10 MG: 10 TABLET, FILM COATED ORAL at 12:20

## 2021-06-03 RX ADMIN — INSULIN LISPRO 6 UNITS: 100 INJECTION, SOLUTION INTRAVENOUS; SUBCUTANEOUS at 16:30

## 2021-06-03 RX ADMIN — HEPARIN SODIUM 5000 UNITS: 1000 INJECTION INTRAVENOUS; SUBCUTANEOUS at 10:45

## 2021-06-03 RX ADMIN — HEPARIN SODIUM 5000 UNITS: 5000 INJECTION INTRAVENOUS; SUBCUTANEOUS at 22:24

## 2021-06-03 RX ADMIN — Medication 10 ML: at 06:22

## 2021-06-03 RX ADMIN — DILTIAZEM HYDROCHLORIDE 240 MG: 240 CAPSULE, COATED, EXTENDED RELEASE ORAL at 12:19

## 2021-06-03 RX ADMIN — LEVETIRACETAM 500 MG: 500 TABLET ORAL at 17:53

## 2021-06-03 RX ADMIN — SEVELAMER CARBONATE 800 MG: 800 TABLET, FILM COATED ORAL at 16:53

## 2021-06-03 RX ADMIN — HEPARIN SODIUM 5000 UNITS: 5000 INJECTION INTRAVENOUS; SUBCUTANEOUS at 04:03

## 2021-06-03 RX ADMIN — METOPROLOL TARTRATE 25 MG: 25 TABLET, FILM COATED ORAL at 12:19

## 2021-06-03 RX ADMIN — FAMOTIDINE 20 MG: 20 TABLET ORAL at 12:19

## 2021-06-03 NOTE — PROGRESS NOTES
SALAZAR uploaded patient in [facility] with clinicals and sent booking request to [address]             Mala Perkins, RN  Case Management [phone]

## 2021-06-03 NOTE — PROGRESS NOTES
CM asked [facility] Specialist and [facility] Specialist for assistance with Medicaid Stretcher transport to take patient back to [address] [place] [address] for LTC. CM called and spoke to patient's sister Ouida Duane [phone], and updated her with discharge plan today back to LTC at [address] [place] [address]. Patient's sister is agreeable to the discharge plan today.          Brayan Bishop, RN  Case Management [phone]

## 2021-06-03 NOTE — PROGRESS NOTES
Discharge order noted for today. Patient has been accepted to [address] [place] [address] nursing [facility]. Confirmed with Kitty Lucero that bed is available today. Spoke with patient's sister Mandi Schulte and she is agreeable to the transition plan today. Transport to facility has been arranged through [employer] between 1-3 hours time. Patient's discharge summary has been forwarded to skilled nursing facility via [facility]. Bedside RN, Abbi Helms, has been updated to the transition plan. Discharge information has been updated on the AVS.  Please call report to [phone].               Cortez Bui RN  Case Management [phone]

## 2021-06-03 NOTE — PROGRESS NOTES
SALAZAR called and spoke to patient's sister Heather Hammonds [phone], and she is agreeable to patient returning to [address] [place] [address] for [address]. SALAZAR called and spoke to [facility] at [address] [place] [address], and she can accept patient back for [address]. [facility] said Dialysis transport is already setup for patient at the facility.            Derek Monsalve, RN  Case Management [phone]

## 2021-06-03 NOTE — PROGRESS NOTES
Progress Note    Shari Romeo  77 y.o. Admit Date: 6/1/2021  Active Problems:    ESRD (end stage renal disease) ([facility] [address] (8/8/2018) POA: Yes      Hypertensive emergency (9/25/2020) POA: Yes      Hyperkalemia (6/2/2021) POA: Unknown      Elevated troponin level (6/2/2021) POA: Unknown      Elevated brain natriuretic peptide (BNP) level (6/2/2021) POA: Yes            Subjective:     Patient feels good, no SOB, No chest pain,was dialyzed yesterday, no need for any Dialysis today . Cardiology has cleared her for DC    A comprehensive review of systems was negative except for that written in the History of Present Illness.     Objective:     Visit Vitals  BP (!) 189/93   Pulse 88   Temp 98.8 °F (37.1 °C)   Resp 20   Wt 62 kg (136 lb 11 oz)   LMP  (LMP Unknown)   SpO2 99%   BMI 24.21 kg/m²         Intake/Output Summary (Last 24 hours) at 6/3/2021 1510  Last data filed at 6/3/2021 1051  Gross per 24 hour   Intake    Output 1000 ml   Net -1000 ml       Current Facility-Administered Medications   Medication Dose Route Frequency Provider Last Rate Last Admin    heparin (porcine) 1,000 unit/mL injection 5,000 Units  5,000 Units IntraCATHeter DIALYSIS PRN Mery Connolly MD   5,000 Units at 06/03/21 1045    metoprolol tartrate (LOPRESSOR) tablet 50 mg  50 mg Oral Q12H Rocío Dukes, NP        hydrALAZINE (APRESOLINE) 20 mg/mL injection 10 mg  10 mg IntraVENous Q6H PRN Marla Whipple MD   10 mg at 06/02/21 0413    famotidine (PEPCID) tablet 20 mg  20 mg Oral DAILY Diane MALONE PA   20 mg at 06/03/21 1219    aspirin chewable tablet 81 mg  81 mg Oral DAILY Diane MALONE PA   81 mg at 06/03/21 1220    atorvastatin (LIPITOR) tablet 10 mg  10 mg Oral DAILY Mare Colunga PA   10 mg at 06/03/21 1220    B complex-vitaminC-folic acid (NEPHROCAP) cap  1 Capsule Oral DAILY SUSI Lang   1 Capsule at 06/03/21 1219    cloNIDine (CATAPRES) 0.2 mg/24 hr patch 1 Patch  1 Patch TransDERmal every Tuesday Natasha Rust, SUSI Kauffman   1 Patch at 06/01/21 2154    dilTIAZem ER (CARDIZEM CD) capsule 240 mg  240 mg Oral DAILY Laith [place]cynthia MALONE [place]   240 mg at 06/03/21 1219    levETIRAcetam (KEPPRA) tablet 250 mg  250 mg Oral Q MON, WED & Bettina Mare Garcia PA   250 mg at 06/02/21 2209    levETIRAcetam (KEPPRA) tablet 500 mg  500 mg Oral BID SUSI Nguyen   500 mg at 06/03/21 1219    minoxidiL (LONITEN) tablet 2.5 mg  2.5 mg Oral BID Laith [place]SUSI Haro   2.5 mg at 06/03/21 1220    sevelamer carbonate (RENVELA) tab 800 mg  800 mg Oral TID SUSI Nguyen   800 mg at 06/02/21 2210    sodium chloride (NS) flush 5-40 mL  5-40 mL IntraVENous Q8H Mare Day [place]   10 mL at 06/03/21 0622    sodium chloride (NS) flush 5-40 mL  5-40 mL IntraVENous PRN [facility] Laina MALONEma        acetaminophen (TYLENOL) tablet 650 mg  650 mg Oral Q6H PRN SUSI Nguyen        Or    acetaminophen (TYLENOL) suppository 650 mg  650 mg Rectal Q6H PRN Mare Day PA        polyethylene glycol (MIRALAX) packet 17 g  17 g Oral DAILY PRN Laith [place]SUSI Haro        promethazine (PHENERGAN) tablet 12.5 mg  12.5 mg Oral Q6H PRN [facility] USSI MALONE        Or    ondansetron [facility]) injection 4 mg  4 mg IntraVENous Q6H PRN [facility] FA[facility]AL [facility]        heparin (porcine) injection 5,000 Units  5,000 Units SubCUTAneous Q8H [facility] FAISAL [place]   5,000 Units at 06/03/21 1219    insulin lispro (HUMALOG) injection   SubCUTAneous AC&HS [facility] [place]   4 Units at 06/02/21 1223    glucose chewable tablet 16 g  16 g Oral PRN Laith [facility] SUSI MALONE        glucagon (GLUCAGEN) injection 1 mg  1 mg IntraMUSCular PRN Mare Day PA        dextrose (D50W) injection syrg 12.5-25 g  25-50 mL IntraVENous PRN Laith MALONE [place]            Physical Exam:     Physical Exam:   General:  Alert, cooperative, no distress, appears stated age.    Neck: Supple, symmetrical, trachea midline, no adenopathy, thyroid: no enlargement/tenderness/nodules, no carotid bruit and no JVD. Lungs:   Clear to auscultation bilaterally. Heart:  Regular rate and rhythm, S1, S2 normal, no murmur, click, rub or gallop. Abdomen:   Soft, non-tender. Bowel sounds normal. No masses,  No organomegaly. Extremities: Extremities normal, atraumatic, no cyanosis or edema, HD catheter us well dressed   Skin: Skin color, texture, turgor normal. No rashes or lesions         Data Review:    CBC w/Diff    Recent Labs     06/02/21 0610 06/01/21  1300   WBC 8.4 10.7   RBC 3.25* 3.45*   HGB 9.6* 10.0*   HCT 29.8* 32.5*   MCV 91.7 94.2   MCH 29.5 29.0   MCHC 32.2 30.8*   RDW 15.9* 16.6*    Recent Labs     06/02/21 0610 06/01/21  1300   MONOS 9 6   EOS 3 1   BASOS 1 0   RDW 15.9* 16.6*        Comprehensive Metabolic Profile    Recent Labs     06/02/21 0610 06/01/21  1400    144   K 4.4 6.2*    109   CO2 27 30   BUN 36* 72*   CREA 8.10* 13.40*    Recent Labs     06/02/21  0610 06/01/21  1400   CA 9.1 9.1   ALB  --  3.4   TP  --  7.1   TBILI  --  0.3                        Impression:       Active Hospital Problems    Diagnosis Date Noted    Hyperkalemia 06/02/2021    Elevated troponin level 06/02/2021    Elevated brain natriuretic peptide (BNP) level 06/02/2021    Hypertensive emergency 09/25/2020    ESRD (end stage renal disease) ([facility] [address] 08/08/2018            Plan:     Needs to take BP medicine regularly, OK to DC to her Nursing Home,Continue Dialysis as OP as scheduled.       yMke Wilson MD

## 2021-06-03 NOTE — PROGRESS NOTES
PT order received and chart reviewed. Per pt report at bedside and notes, pt is dependent at baseline at her LTC facility and needs help with all ADLs and does not get out of the bed. At this time, pt is not a candidate for acute PT, will sign off.  Thank you for this referral. Linda Cassidy, PT, DPT

## 2021-06-03 NOTE — PROGRESS NOTES
Transition of Care Plan to SNF/Rehab    SNF/Rehab Transition:  Patient has been accepted to [address] [place] [address] and meets criteria for admission. [facility] and expected to leave between 1-3 hours. Communication to Patient/Family:  Spoke with patient's sister Anali Key (identified care giver) and she is agreeable to the transition plan. Communication to SNF/Rehab:  Bedside RN,  Madi Bocanegra and Tee Breen has been notified to update the transition plan to the facility and call report (phone number [phone]). Discharge information has been updated on the AVS.     Discharge Summary available to SNF via [address], and placed in transportation envelope to go with patient to SNF. Nursing Please include all hard scripts for controlled substances, med rec and dc summary, and AVS in packet. Reviewed and confirmed with facility, [address] [place] [address], can manage the patient care needs for the following:     Ayala Meraz with (X) only those applicable:    Medication:  [x]  Medications will be available at the facility  []  IV Antibiotics   []  Controlled Substance - hard copy to be sent with patient   []  Weekly Labs   Documents:  [x] Hard RX  [x] MAR  [] Kardex  [] AVS  []Transfer Summary  [x]Discharge Summary   Equipment:  []  CPAP/BiPAP  []  Wound Vacuum  []  Khan or Urinary Device  []  PICC/Central Line  []  Nebulizer  []  Ventilator   Treatment:  []Isolation (for MRSA, VRE, etc.)  []Surgical Drain Management  []Tracheostomy Care  []Dressing Changes  [x]Dialysis with transportation and chair time, Mon/Wed/Fri [employer], Chair time 11am, [facility] transport (setup by SNF).   []PEG Care  []Oxygen  []Daily Weights for Heart Failure   Dietary:  []Any diet limitations  []Tube Feedings   []Total Parenteral Management (TPN)   Eligible for Medicaid Long Term Services and Supports  Yes:  [] Eligible for medical assistance or will become eligible within 180 days and UAI completed. [] Provider/Patient and/or support system has requested screening. [x] UAI copy already at [facility]. [] UAI unavailable at discharge will send once processed to SNF provider. [] UAI unavailable at discharged mailed to patient  No:   [] Private pay and is not financially eligible for Medicaid within the next 180 days. [] Reside out-of-state.   [] A residents of a state owned/operated facility that is licensed  by [address] [facility] or [facility]  [] Enrollment in [facility] hospice services  [] [address] [place] [address] [facility]  [] Patient /Family declines to have screening completed or provide financial information for screening     Financial Resources:  Medicaid    [] Initiated and application pending   [x] Full coverage     Advanced Care Plan:  []Surrogate Decision Maker of Care  []POA  [x]Communicated Code Status Full (DDNR\", \"Full\")    Other

## 2021-06-03 NOTE — PROGRESS NOTES
Patient seen and examined independently. Patient denies chest pain or shortness of breath. He appears comfortable lying nearly flat in bed. No further cardiac evaluation planned at this time. Will sign off and be available. Luis Dupont MD         Cardiology progress note. 1. Elevated troponin- possible in the setting  ESRD and uncontrolled hypertesion- presented to the ED with c/o SOB and chest pain after missing HD- resolved  2. Acute on chronic congestive heart failure-presented with SOB. No significant peripheral edema. Very high NT pro BNP- volume status per renal - improved after HD  3. Hypertensive urgency- possible to missing HD- BP remains elevated but improving- she requires multiple medications for BP control   4. SOB - multifactorial-improved   5. Hx of SVT will monitor for any recurrence  6. ESRD stage renal disease on HD  7. Hx of stroke x 2 and poor functional status requires extensive assistance with ADL's   8. Hx of seizure disorder  9. Dyslipidemia- on statin  10. Diabetes- Treatment per medical team   11. Bipolar disease/poor historian  12. Hyperkalemia- in the setting missing HD- resolved         Echo 9/20  · LV: Estimated LVEF is >70%. Visually measured ejection fraction. Normal cavity size. Moderately increased wall thickness. · Hyperdynamic systolic function. Inconclusive left ventricular diastolic function. · Left ventricular cavity obliteration due to hyperdynamic state. · Peak velocity is 2.32 m/s, peak gradient is 22 mmHg. Echo 9/20            Plan:         Patient denies any chest pain or chest tightness. Denies any shortness of breath. Hemodynamically stable. Troponin down trending. Not a candidate for ischemic w/u as patient is essentially bedridden and chair bound. BP remains elevated. Continue present medications for BP control.  Continue to adjust medications as needed for BP control   Echo 9/20 shows hyperdynamic EF increased metoprolol and titrate as BP and HR tolerates it. Will review Echo once complete. Further recommendations per Dr. Snyder Parents      ASSESSMENT:  Hospital Problems  Date Reviewed: 6/2/2021        Codes Class Noted POA    Hyperkalemia ICD-10-CM: E87.5  ICD-9-CM: 276.7  6/2/2021 Unknown        Elevated troponin level ICD-10-CM: R77.8  ICD-9-CM: 790.6  6/2/2021 Unknown        Elevated brain natriuretic peptide (BNP) level ICD-10-CM: R79.89  ICD-9-CM: 790.99  6/2/2021 Yes        Hypertensive emergency ICD-10-CM: I16.1  ICD-9-CM: 401.9  9/25/2020 Yes        ESRD (end stage renal disease) ([facility] ICD-10-CM: N18.6  ICD-9-CM: 585.6  8/8/2018 Yes                SUBJECTIVE:  No CP  No SOB  Feels better wants to gp home   OBJECTIVE:    VS:   Visit Vitals  BP (!) 189/93   Pulse 88   Temp 98.8 °F (37.1 °C)   Resp 20   Wt 62 kg (136 lb 11 oz)   SpO2 99%   BMI 24.21 kg/m²         Intake/Output Summary (Last 24 hours) at 6/3/2021 1124  Last data filed at 6/3/2021 1051  Gross per 24 hour   Intake    Output 1000 ml   Net -1000 ml     TELE: normal sinus rhythm    General: alert, well developed, pleasant and in no apparent distress  HENT: Normocephalic, atraumatic. Normal external eye.   Neck :  no bruit, no JVD  Cardiac:  regular rate and rhythm, S1, S2 normal, no murmur, click, rub or gallop  Lungs: clear to auscultation bilaterally  Abdomen: Soft, nontender, no masses  Extremities:  No c/c/e, peripheral pulses present      Labs: Results:       Chemistry Recent Labs     06/02/21  0610 06/01/21  1400   * 245*    144   K 4.4 6.2*    109   CO2 27 30   BUN 36* 72*   CREA 8.10* 13.40*   CA 9.1 9.1   AGAP 10 5   BUCR 4* 5*   AP  --  138*   TP  --  7.1   ALB  --  3.4   GLOB  --  3.7   AGRAT  --  0.9      CBC w/Diff Recent Labs     06/02/21  0610 06/01/21  1300   WBC 8.4 10.7   RBC 3.25* 3.45*   HGB 9.6* 10.0*   HCT 29.8* 32.5*    264   GRANS 75* 87*   LYMPH 13* 6*   EOS 3 1      Cardiac Enzymes No results for input(s): CPK, CKND1, SONG in the last 72 hours.    No lab exists for component: CKRMB, TROIP   Coagulation No results for input(s): PTP, INR, APTT, INREXT in the last 72 hours. Lipid Panel No results found for: CHOL, CHOLPOCT, CHOLX, CHLST, CHOLV, 404390, HDL, HDLP, LDL, LDLC, DLDLP, 818723, VLDLC, VLDL, TGLX, TRIGL, TRIGP, TGLPOCT, CHHD, CHHDX   BNP No results for input(s): BNPP in the last 72 hours.    Liver Enzymes Recent Labs     06/01/21  1400   TP 7.1   ALB 3.4   *      Thyroid Studies Lab Results   Component Value Date/Time    TSH 3.70 03/18/2021 05:24 AM              [address] NP-C [place]:[phone]

## 2021-06-03 NOTE — DISCHARGE SUMMARY
[address] [place] [address] Dr  [address], [facility], [address]     DISCHARGE SUMMARY    Name: Flaca Rojas MRN: [id number]   Age / Sex: 77 y.o. / female CSN: [id number]   YOB: 1955 Length of Stay: 2 days   Admit Date: 6/1/2021 Discharge Date:        PRIMARY CARE PHYSICIAN: Gary Vargas MD      DISCHARGE DIAGNOSES:    Hypertensive urgency  Hyperkalemia  DM2 with hyperglycemia, A1c 7.2%  Elevated troponin, suspect demand ischemia due to volume overload  Shortness of breath from missed hemodialysis session  Anemia of chronic disease  Seizure disorder  Resident of [facility]  Secondary hyperparathyroidism  History of COVID-19 infection  History of CVA  History of bipolar disorder    CONSULTS CALLED: Cardiology, nephrology      PROCEDURES DONE: None      COURSE IN THE HOSPITAL: This is a 78-year-old female with a past medical history of end-stage renal disease, hypertension, seizure disorder, diabetes, CVA who presented to the ED with shortness of breath. Patient had missed her hemodialysis. Patient was noted to have a hypertensive urgency and was admitted. Nephrology was consulted. Patient underwent hemodialysis. Patient's blood pressure regimen was titrated. Patient had elevated troponins. Cardiology was consulted. Patient was monitored on telemetry. PT OT were consulted. Patient is at her baseline state. .  Patient denies any chest pain or shortness of breath today. Cardiology has cleared the patient for discharge. I have also called and discussed with nephrologist and he has also cleared the patient for discharge. I have discussed with the  and patient will be transition back to long-term care later today. I also called patient's Jovanni Rivera at phone #[phone] and updated her regarding patient's care and discussed discharge plans with her.       MEDICATIONS ON DISCHARGE:    Current Discharge Medication List      START taking these medications    Details metoprolol tartrate (LOPRESSOR) 50 mg tablet Take 1 Tablet by mouth every twelve (12) hours. Qty: 60 Tablet, Refills: 0  Start date: 6/3/2021      polyethylene glycol (MIRALAX) 17 gram packet Take 1 Packet by mouth daily as needed for Constipation. Qty: 15 Packet, Refills: 0  Start date: 6/3/2021         CONTINUE these medications which have CHANGED    Details   famotidine (Pepcid) 20 mg tablet Take 1 Tablet by mouth daily. Qty: 30 Tablet, Refills: 0  Start date: 6/3/2021         CONTINUE these medications which have NOT CHANGED    Details   minoxidiL (LONITEN) 2.5 mg tablet Take 1 Tab by mouth two (2) times a day. Qty: 60 Tab, Refills: 1      cloNIDine (CATAPRES) 0.2 mg/24 hr ptwk 1 Patch by TransDERmal route every seven (7) days. Qty: 4 Patch, Refills: 0      atorvastatin (Lipitor) 10 mg tablet Take 10 mg by mouth daily. Indications: high amount of triglyceride in the blood      b complex-vitamin c-folic acid 0.8 mg (NEPHRO-DORIAN) 0.8 mg tab tablet Take 1 Tab by mouth daily. !! levETIRAcetam (Keppra) 500 mg tablet Take 500 mg by mouth two (2) times a day. sevelamer carbonate (RENVELA) 800 mg tab tab Take 800 mg by mouth three (3) times daily. !! levETIRAcetam (KEPPRA) 250 mg tablet Take 1 Tab by mouth every Monday, Wednesday, Friday. Qty: 30 Tab, Refills: 1      insulin lispro (HUMALOG) 100 unit/mL injection INITIATE INSULIN CORRECTIVE PROTOCOL: Normal Insulin Sensitivity   For Blood Sugar (mg/dL) of:     Less than 150 =   0 units           150 -199 =   2 units  200 -249 =   4 units  250 -299 =   6 units  300 -349 =   8 units  350 and above = 10 units and Call Physician  If 2 glucose readings are above  Qty: 1 Vial, Refills: 0      aspirin 81 mg tablet Take 81 mg by mouth daily. diltiazem CD (CARDIZEM CD) 240 mg ER capsule Take 240 mg by mouth daily. !! - Potential duplicate medications found. Please discuss with provider.       STOP taking these medications       epoetin raphael (EPOGEN;PROCRIT) 3,000 unit/mL injection Comments:   Reason for Stopping:                   DISCHARGE PHYSICAL EXAMINATION:  General:  Awake, follows simple commands  Cardiovascular:  S1S2+, RRR  Pulmonary:  CTA b/l  GI:  Soft, BS+, NT, ND  Extremities:  trace edema      CONDITION ON DISCHARGE: Stable. DISPOSITION: Long-term care      FOLLOW-UP RECOMMENDATIONS:   Follow-up Information     Follow up With Specialties Details Why Contact Info    Jamarcus Hernandez MD Geriatric Medicine Go in 1 week As needed [address]  [address] [address]  [phone]            OTHER INSTRUCTIONS:    Diet-cardiac diabetic renal diet low potassium and low phosphorus. No concentrated sweets  Activity - as tolerated  FALL PRECAUTIONS  ASPIRATION PRECAUTIONS  DECUBITUS PRECAUTIONS  Incentive Spirometry Q30 minutes when awake  SCDs bilateral lower extremities, while in bed. use as directed  PT, OT Evaluate and Treat  Check CBC, CMP, Mg in 3 days, results to supervising physician at facility immediately. Notify supervising physician at facility immediately if patient has no bowel movement for 2 consecutive days  F/u with PCP in 1 week  Follow-up with cardiologist in 2 weeks  Continue hemodialysis as per nephrology      TIME SPENT ON DISCHARGE ACTIVITIES: More than 35 minutes. Dragon medical dictation software was used for portions of this report. Unintended errors may occur.       Signed:  Ana Linares MD      6/3/2021

## 2021-06-03 NOTE — PROGRESS NOTES
Reason for Admission:  Hypertensive emergency [I16.1]                 RUR Score:    26%            Plan for utilizing home health:    No, patient is a [address] patient at [facility]. Likelihood of Readmission:   Moderate                         Do you (patient/family) have any concerns for transition/discharge?  no, not at this time. Transition of Care Plan:       Initial assessment completed with relative(s), patient's sister Dani Yang. Cognitive status of patient: Unable to assess, patient is off the unit. Face sheet information confirmed:  yes. The patient's sister Dani Yang [phone] agrees to participate in her discharge plan and to receive any needed information. This patient resides at [address] [place] [address] for [address]. Patient is not able to navigate steps as needed. Prior to hospitalization, patient was considered to be independent with ADLs/IADLS : no . If not independent,  patient needs assist with : dressing, bathing, toileting and grooming    Patient has a current ACP document on file: no.      Healthcare Decision Maker:   Primary Decision Maker: Vannessa Ortizyl -  - [phone]    Click here to complete [employer] including selection of the Healthcare Decision Maker Relationship (ie \"Primary\")    Medical Transport will need to be available to transport patient home upon discharge. The patient already has all needed medical equipment available at [facility]. Patient is not currently active with home health. Patient has stayed in a skilled nursing facility or rehab. Was  stay within last 60 days : yes. This patient is on dialysis :yes. If yes, hemodialysis patient and receives treatment on Monday/Wednesday/Friday at [address]. Chair time is 11am.   Pt is transported to/from dialysis by [facility].     List of available Long Term Care agencies were provided and reviewed with the patient prior to discharge. Aubrey of choice verbal consent given: yes, for [address] Currently, the discharge plan is LTC back to [address] The patient states that she can obtain her medications from the pharmacy, and take her medications as directed. Patient's current insurance is [employer] Medicare and [address] Management Interventions  PCP Verified by CM:  Yes  Mode of Transport at Discharge: BLS  Transition of Care Consult (CM Consult): Long Term Care  Discharge Durable Medical Equipment: No  Physical Therapy Consult: Yes  Occupational Therapy Consult: Yes  Speech Therapy Consult: No  Current Support Network: Nursing Facility  Confirm Follow Up Transport: Other (see comment) (Medical Transport)  The Plan for Transition of Care is Related to the Following Treatment Goals : [address]  The Patient and/or Patient Representative was Provided with a Choice of Provider and Agrees with the Discharge Plan?: Yes  Name of the Patient Representative Who was Provided with a Choice of Provider and Agrees with the Discharge Plan: Artis Alexandre (Patient's sister)  Aubrey of Choice List was Provided with Basic Dialogue that Supports the Patient's Individualized Plan of Care/Goals, Treatment Preferences and Shares the Quality Data Associated with the Providers?: Yes  Discharge Location  Discharge Placement: [address] Kristen Allen RN  Case Management [phone]

## 2021-06-03 NOTE — PROGRESS NOTES
Requested Case Management specialist to assist with transportation to [address] [place] [address]. Address is [address] [place] [address], [address]   and phone number is [phone]    Patient will require BLS transport. Pt requires Stretcher If stretcher, reason: Hx of CVA, Seizure Disorder, Bipolar Disorder, Impaired Mobility  Patient is currently requiring oxygen No   Height: NA   Weight: 136 lbs  Pt is on isolation: No    Is the pt ready now? yes  Requested time: Next Available  PCS Faxed: no  Insurance verified on face sheet: yes  Auth needed for transport: yes  CM completed PCS/ Envelope and placed on chart.          Joanna Day RN  Case Management [phone]

## 2021-06-03 NOTE — PROGRESS NOTES
Per Johnny Ding (SALAZAR) called [address] [facility] at [phone] scheduled stretcher transport to [facility] ([address] [facility] [place] [place], [address]) with Monika and received confirmation number [phone].  will call nurse's station when in route to facility. Informed Johnny Ding of transportation conversation and arrangements.

## 2021-06-03 NOTE — DIALYSIS
CASPER        ACUTE HEMODIALYSIS FLOW SHEET      HEMODIALYSIS ORDERS: Physician: Lara Fritz     Dialyzer: revaclear   Duration: 3 hr  BFR: 400   DFR: 800   Dialysate:  Temp 36-37*C  K+   2    Ca+  2.5 Na 138 Bicarb 35   Weight:  62 kg    Patient Chart [x]     Unable to Obtain []   Dry weight/UF Goal: 1500   Access: right chest TDC    Heparin [x]  Bolus      1000 Units        Catheter locking solution: heparin    Pre BP:   161/81    Pulse:     77       Respirations: 18  Temperature:   98.2   Labs: Pre        Post:         [x] N/A   Additional Orders(medications, blood products, hypotension management):       [x] N/A     [x] [place] Consent Verified     CATHETER ACCESS: []N/A   [x]Right chest  []Left   []IJ     []Fem   []transhepatic   [] First use X-ray verified     [x]Permanent                [] Temporary   [x]No S/S infection  []Redness  []Drainage []Cultured []Swelling []Pain   [x]Medical Aseptic Prep Utilized   []Dressing Changed  [x] Biopatch  Date: 6/2/21      []Clotted   [x]Patent   Flows: [x]Good  []Poor  []Reversed   If access problem,  notified: []Yes    [x]N/A       GRAFT/FISTULA ACCESS:  [x]N/A                            GENERAL ASSESSMENT:      LUNGS:  Rate 18 SaO2%        [] N/A    [x] Clear  [] Coarse  [] Crackles  [] Wheezing        [] Diminished     Location : []RLL   []LLL    []RUL  []STEPHY     Cough: []Productive  []Dry  [x]N/A   Respirations:  [x]Easy  []Labored     Therapy:   [x]RA  []NC  l/min    Mask: []NRB []Venti       O2%                  []Ventilator  []Intubated  [] Trach  [] BiPaP     CARDIAC: [x]Regular      [] Irregular   [] Pericardial Rub  [] JVD        []  Monitored  [] Bedside  [] Remotely monitored [x] N/A       EDEMA: [] None   [x]Generalized  [] Pitting [] 1    [] 2    [] 3    [] 4                 [] Facial  [] Pedal  []  UE  [] LE     SKIN:   [x] Warm   [] Hot     [] Cold   [x] Dry     [] Pale   [] Diaphoretic                  [] Flushed  [] Jaundiced  [] Cyanotic  [] Rash  [] [place]     LOC:    [x] Alert      [x]Oriented:    [x] Person     [] Place  []Time               [] Confused  [] Lethargic  [] Medicated  [] Non-responsive     GI / ABDOMEN:  [] Flat    [] Distended    [x] Soft    [] Firm   []  Obese                             [] Diarrhea  [x] Bowel Sounds  [] Nausea  [] Vomiting       / URINE ASSESSMENT:[] Voiding   [x] Oliguria  [] Anuria   []  Khan     [] Incontinent    []  Incontinent Brief      []  Bathroom Privileges       PAIN: [x] 0 []1  []2   []3   []4   []5   []6   []7   []8   []9   []10              Scale 0-10  Action/Follow Up:      MOBILITY:  [] Amb    [] Amb/Assist    [x] Bed    [] Wheelchair  [] Stretcher      All Vitals and Treatment Details on Attached [address]: [facility]          Room # 463/01      [] 1st Time Acute  [] Stat  [x] Routine  [] Urgent     [x] Acute Room  []  Bedside  [] ICU/CCU  [] ER   Isolation Precautions:   There are currently no Active Isolations      Special Considerations:         [] Blood Consent Verified [x]N/A      ALLERGIES:   Allergies   Allergen Reactions    Amoxicillin Unknown (comments)    Cleocin [Clindamycin Hcl] Unknown (comments)    Codeine Unknown (comments)    Lorcet 10/650 [Hydrocodone-Acetaminophen] Unknown (comments)    Penicillin G Benzathine Unknown (comments)    Shellfish Derived Unknown (comments)               Code Status:Full Code        Hepatitis Status:    2nd RN check: [facility]                    Lab Results   Component Value Date/Time    Hepatitis B surface Ag <0.10 09/25/2020 05:31 PM    Hepatitis B surface Ab 63.01 09/25/2020 05:31 PM    Hepatitis B core, IgM NEGATIVE  08/11/2018 03:29 AM    Hep B Core Ab, IgM NEGATIVE  08/13/2018 04:34 AM    Hep B Core Ab, total NEGATIVE  08/13/2018 04:34 AM                     Current Labs:   Lab Results   Component Value Date/Time    Sodium 140 06/02/2021 06:10 AM    Potassium 4.4 06/02/2021 06:10 AM    Chloride 103 06/02/2021 06:10 AM    CO2 27 06/02/2021 06:10 AM Anion gap 10 06/02/2021 06:10 AM    Glucose 126 (H) 06/02/2021 06:10 AM    BUN 36 (H) 06/02/2021 06:10 AM    Creatinine 8.10 (H) 06/02/2021 06:10 AM    BUN/Creatinine ratio 4 (L) 06/02/2021 06:10 AM    GFR est AA 6 (L) 06/02/2021 06:10 AM    GFR est non-AA 5 (L) 06/02/2021 06:10 AM    Calcium 9.1 06/02/2021 06:10 AM      Lab Results   Component Value Date/Time    WBC 8.4 06/02/2021 06:10 AM    Hemoglobin, POC 13.3 11/08/2019 07:26 AM    HGB 9.6 (L) 06/02/2021 06:10 AM    Hematocrit, POC 39 11/08/2019 07:26 AM    HCT 29.8 (L) 06/02/2021 06:10 AM    PLATELET 909 25/27/7910 06:10 AM    MCV 91.7 06/02/2021 06:10 AM                                                                                     DIET:   DIET RENAL       PRIMARY NURSE REPORT: First initial/Last name/Title      Pre Dialysis: Rosemary Abbott RN     Time: 5963      EDUCATION:    [x] Patient [] Other         Knowledge Basis: []None [x]Minimal [] Substantial   Barriers to learning: pt is confused at [facility]   [] Access Care     [] S&S of infection     [] Fluid Management     []K+     [x]Procedural    []Albumin     [] Medications     [] Tx Options     [] Transplant     [] Diet     [] Other   Teaching Tools:  [x] Explain  [x] Demo  [] Handouts [] Video  Patient response:  [x] Verbalized understanding  [] Teach back  [] Return demonstration [] Requires follow up   Inappropriate due to:            [x]Time Out/Safety Check  [x]Extracorporeal Circuit Tested for integrity       RO/HEMODIALYSIS MACHINE SAFETY CHECKS  Before each treatment:     Machine Number:                   [facility]                                   [x] Unit Machine # 7 with centralized RO                                      Alarm Test:  Pass time 8087               [x] RO/Machine Log Complete      Temp   36             Dialysate: pH  7.4 Conductivity: Meter   13.8     HD Machine   13.7                  TCD: 13.8  Dialyzer Lot # [id number]            Blood Tubing Lot # [id number] Saline Lot #  [id number]     CHLORINE TESTING-Before each treatment and every 4 hours    Total Chlorine: [x] less than 0.1 ppm  Time: 0900   (if greater than 0.1 ppm from Primary then every 30 minutes from Secondary)     TREATMENT INITIATION  with Dialysis Precautions:   [x] All Connections Secured                 [x] Saline Line Double Clamped   [x] Venous Parameters Set                  [x] Arterial Parameters Set    [x] Prime Given 250ml                          [x]Air Foam Detector Engaged      Treatment Initiation Note:   Pt arrived to HD unit via bed. A&O to self only; in NAD on RA. Right chest TDC assessed with no s/s complications. HD initiated without difficulty. During Treatment Notes:  1000 Pt resting with eyes closed. Face and access in view with connections secure. 1030 Pt resting with eyes closed. Face and access in view with connections secure. [phone] Dr. Alberto Sanchez was called to update on pt status. Received order to terminate treatment at this time, and put her on the list for tomorrow so that she will be on her regular schedule. Blood rinsed back. Medication Dose Volume Route Time [facility] name Title   none     JOCELYN Herring RN                   Post Assessment:   Dialyzer Cleared: [] Good [x] Fair  [] Poor  Blood processed:  18.2 L  UF Removed  543 mL  POst BP:   184/97       Pulse: 83        Respirations: 18  Temperature: 98.4 Lungs:     [x] Clear      [] Course         [] Crackles    [] Wheezing         [] Diminished   Post Tx Vascular Access:     N/A Cardiac:   [x] Regular   [] Irregular   [] Monitor  [x] N/A       Catheter:   Locking solution: Heparin 1:1000   Art. 1.6  Ray. 1.6      Skin:   Pain:   [x] Warm  [x] Dry [] Diaphoretic    [] Flushed    [] Pale [] Cyanotic [x]0  []1  []2   []3  []4   []5   []6   []7   []8   []9   []10     Post Treatment Note:  Pt tolerated treatment well. Net 43 mL UF removed.      POST TREATMENT PRIMARY NURSE HANDOFF REPORT:     First initial/Last name/Title         Post Dialysis: Chava Ivan RN     Time:  12     Abbreviations: AVG-arterial venous graft, AVF-arterial venous fistula, IJ-Internal Jugular, Subcl-Subclavian, Fem-Femoral, Tx-treatment, AP/HR-apical heart rate, DFR-dialysate flow rate, BFR-blood flow rate, AP-arterial pressure, -venous pressure, UF-ultrafiltrate, TMP-transmembrane pressure, Ray-Venous, Art-Arterial, RO-Reverse Osmosis

## 2021-06-03 NOTE — CONSULTS
[address] [facility]    Name:  Treva Galeazzi  MR#:   [id number]  :  1955  ACCOUNT #:  [de-identified]  DATE OF SERVICE:  2021    RENAL CONSULTATION    Consult was requested by Dr. Kristine Cazares. REASON FOR CONSULTATION:  Dialysis patient, missed dialysis and has a high blood pressure. HISTORY OF PRESENT ILLNESS:  This is a 68-year-old -American female, a nursing home resident, with history of hypertension, type 2 diabetes mellitus, multiple stroke, noncompliance, bipolar disorder, multiple CVAs as well as COVID, presented to the emergency room from the nursing home. The patient usually gets dialysis every Monday, Wednesday, Friday. Usually blood pressure is extremely high despite advising her to take blood pressure medicine, which she ignored and does not take. Not sure that she gets any medicine properly from the nursing home or not. Anyway, the patient missed dialysis on Monday, holiday, because of the transportation problem and the patient became short of breath and the nursing home sent the patient to the emergency room to be evaluated. I saw the patient in the emergency room. During that time, she was at her baseline status without any acute distress, but looked mild short of breath and lab was done and the potassium was on the high side and as the patient had shortness of breath, we added dialysis on an urgent basis. Subsequently, the patient was found to be hyperkalemic and after dialysis, she feels much better. The patient denies any chest pain, any palpitation, or any urinary complaint. Before dialysis, in the emergency room, her blood pressure was extremely high and for that reason, I advised emergency physician to give a dose of labetalol IV and nitro paste. That helped and the blood pressure responded very nicely. She is a catheter dependent patient, not a candidate for any AV fistula or AV graft.     PAST MEDICAL HISTORY:  As I mentioned, hypertension, type 2 diabetes mellitus, as well as history of ESRD , underlying psychiatric disorder, seizure, hyperlipidemia. PAST SURGICAL HISTORY:  Failed AV fistula, tunneled dialysis catheter. FAMILY HISTORY:  Not on the file. SOCIAL HISTORY:  Single. No smoking. No drug. ALLERGIES:  LISTED TO AMOXICILLIN, CLINDAMYCIN, CODEINE, LORCET, PENICILLIN, AND SHELLFISH. The patient cannot give good history. REVIEW OF SYSTEMS:  CONSTITUTIONAL:  Negative for chills, diaphoresis, fatigue, or fever. RESPIRATORY:  Positive for mild shortness of breath and wheezing without any cough or any hemoptysis. No chest tightness. CARDIOVASCULAR:  Negative for chest pain, palpitation, or leg swelling. GASTROINTESTINAL:  No nausea. No vomiting. No abdominal pain. No diarrhea. GENITOURINARY:  Does not make urine, but denies any urgency, hesitancy, flank pain, or hematuria. MUSCULOSKELETAL:  Negative for arthralgia, myalgia, neck pain, and neck stiffness. NEUROLOGICAL:  No seizures. No dizziness. No passing out spell and no lightheadedness. LIST OF MEDICATIONS BEFORE ADMISSION:  Aspirin 81 mg daily, Lipitor 10 mg daily, Nephrocaps once a day, Clonidine 0.2 mg every week, Cardizem  mg 1 tablet daily, also Procrit depending on hemoglobin level, Pepcid 40 mg daily, insulin on a sliding scale, Keppra 250 mg supplemental dose on Monday, Wednesday, and Friday. Beside that, Keppra 500 mg daily, minoxidil 2.5 mg 1 tablet two times daily, Renvela tablets 800 mg 2 tablets three times daily. PHYSICAL EXAMINATION:  VITAL SIGNS:  Temperature on admission 98.5, heart rate 84 to 100 per minute, blood pressure 250/156 initially. Subsequently came down to 153/89. Oxygen saturation 91% to 96% on 2 liters of oxygen nasal cannula. Weight is 62 kg. HEENT:  Oral mucosa moist.  Jugular venous pressure not distended. LUNGS:  Decreased breath sounds with fine basilar rales. HEART:  S1, S2.  ABDOMEN:  Soft.   No palpable mass.  EXTREMITIES:  Ankle, negative edema. LABORATORY DATA:  Relevant labs that were done on the day of admission, WBC 10.7 with a hemoglobin of 10, hematocrit of 32.5, platelets of 720,693, neutrophil 87%, lymphocytes 50%. Chemistry on admission, sodium 134, potassium 6.2, chloride 109, CO2 of 30, glucose of 245, blood urea nitrogen of 72, creatinine of 13.4, calcium 9.1. Total protein of 7.1 with albumin of 3.4. Troponin, first set was 0.56. Second one dropped to 0.42. ProBNP is more than 175,000. A chest x-ray that was done on the day of admission shows low lung volumes, slightly limits the evaluation, borderline prominent cardiac silhouette with suspected mild interstitial infiltrate, edema, nonspecific gaseous distention of bowel in the upper abdomen noted. EKG on admission, sinus tachycardia, left atrial enlargement, ST-T wave changes, consider lateral ischemia. Last 2D echo that was done in 09/2020 as ejection fraction more than 70%, which was the estimated ejection fraction. IMPRESSION AND PLAN:  1. End-stage renal disease. 2.  Uncontrolled hypertension. 3.  Hyperkalemia. 4.  Possible ischemic heart disease. PLAN:  The patient was dialyzed on urgent basis yesterday for volume overload as well as hyperkalemia, tolerated well. Subsequently, the patient's cardiac enzymes were monitored, which were suspicious for ischemic changes. Cardiology should be consulted. Given her risk factor, she may have underlying coronary artery disease, but given her compliance to take medicines and her mental status, not sure how much aggressive care should be done on her. I will defer the decision about cardiac workup to Cardiology. Once Cardiology clears, the patient will be transferred back to the nursing home and continue dialysis as an outpatient.         MD RENETTA [email or web address]  D:  06/02/2021 19:45  T:  06/03/2021 0:40  JOB #:  [id number]

## 2021-06-03 NOTE — PROGRESS NOTES
Pt not seen for skilled PT due to:    []  Nausea/vomiting  []  Eating  []  Pain  []  Pt lethargic  [x]  Off Unit (dialysis [phone])  Other:     Will f/u later as schedule allows. Thank you.   José Luis Duran, PT, DPT

## 2021-06-03 NOTE — PROGRESS NOTES
CM called Kim at [address] [place] [address], received voicemail, mailbox is full. SALAZAR called Inge Quintero with [facility] [address], to let her know patient is discharged, and Inge Quintero said she could take patient back to [address] [place] [address] today.           Cipriano Vang, RN  Case Management [phone]

## 2021-06-11 ENCOUNTER — HOSPITAL ENCOUNTER (INPATIENT)
Age: 66
LOS: 3 days | Discharge: NURSING, REHAB OR LONG-TERM CARE | DRG: 280 | End: 2021-06-14
Attending: EMERGENCY MEDICINE | Admitting: FAMILY MEDICINE
Payer: MEDICARE

## 2021-06-11 ENCOUNTER — APPOINTMENT (OUTPATIENT)
Dept: GENERAL RADIOLOGY | Age: 66
DRG: 280 | End: 2021-06-11
Attending: PHYSICIAN ASSISTANT
Payer: MEDICARE

## 2021-06-11 DIAGNOSIS — R07.9 ACUTE CHEST PAIN: Primary | ICD-10-CM

## 2021-06-11 LAB
ALBUMIN SERPL-MCNC: 3.3 G/DL (ref 3.4–5)
ALBUMIN/GLOB SERPL: 0.9 {RATIO} (ref 0.8–1.7)
ALP SERPL-CCNC: 116 U/L (ref 45–117)
ALT SERPL-CCNC: 29 U/L (ref 13–56)
ANION GAP SERPL CALC-SCNC: 5 MMOL/L (ref 3–18)
APTT PPP: 27.1 SEC (ref 23–36.4)
AST SERPL-CCNC: 24 U/L (ref 10–38)
ATRIAL RATE: 106 BPM
BASOPHILS # BLD: 0 K/UL (ref 0–0.1)
BASOPHILS NFR BLD: 0 % (ref 0–2)
BILIRUB SERPL-MCNC: 0.3 MG/DL (ref 0.2–1)
BUN SERPL-MCNC: 30 MG/DL (ref 7–18)
BUN/CREAT SERPL: 4 (ref 12–20)
CALCIUM SERPL-MCNC: 9.5 MG/DL (ref 8.5–10.1)
CALCULATED P AXIS, ECG09: 65 DEGREES
CALCULATED R AXIS, ECG10: -36 DEGREES
CALCULATED T AXIS, ECG11: 137 DEGREES
CHLORIDE SERPL-SCNC: 103 MMOL/L (ref 100–111)
CK MB CFR SERPL CALC: 3.3 % (ref 0–4)
CK MB SERPL-MCNC: 3.6 NG/ML (ref 5–25)
CK SERPL-CCNC: 110 U/L (ref 26–192)
CO2 SERPL-SCNC: 30 MMOL/L (ref 21–32)
CREAT SERPL-MCNC: 7.6 MG/DL (ref 0.6–1.3)
DIAGNOSIS, 93000: NORMAL
DIFFERENTIAL METHOD BLD: ABNORMAL
EOSINOPHIL # BLD: 0.2 K/UL (ref 0–0.4)
EOSINOPHIL NFR BLD: 3 % (ref 0–5)
ERYTHROCYTE [DISTWIDTH] IN BLOOD BY AUTOMATED COUNT: 14.7 % (ref 11.6–14.5)
GLOBULIN SER CALC-MCNC: 3.8 G/DL (ref 2–4)
GLUCOSE SERPL-MCNC: 180 MG/DL (ref 74–99)
HCT VFR BLD AUTO: 31.3 % (ref 35–45)
HGB BLD-MCNC: 10.5 G/DL (ref 12–16)
LYMPHOCYTES # BLD: 0.7 K/UL (ref 0.9–3.6)
LYMPHOCYTES NFR BLD: 9 % (ref 21–52)
MAGNESIUM SERPL-MCNC: 2.4 MG/DL (ref 1.6–2.6)
MCH RBC QN AUTO: 30.2 PG (ref 24–34)
MCHC RBC AUTO-ENTMCNC: 33.5 G/DL (ref 31–37)
MCV RBC AUTO: 89.9 FL (ref 74–97)
MONOCYTES # BLD: 0.6 K/UL (ref 0.05–1.2)
MONOCYTES NFR BLD: 7 % (ref 3–10)
NEUTS SEG # BLD: 6.6 K/UL (ref 1.8–8)
NEUTS SEG NFR BLD: 81 % (ref 40–73)
P-R INTERVAL, ECG05: 116 MS
PLATELET # BLD AUTO: 306 K/UL (ref 135–420)
PMV BLD AUTO: 12.3 FL (ref 9.2–11.8)
POTASSIUM SERPL-SCNC: 3.9 MMOL/L (ref 3.5–5.5)
PROT SERPL-MCNC: 7.1 G/DL (ref 6.4–8.2)
Q-T INTERVAL, ECG07: 328 MS
QRS DURATION, ECG06: 90 MS
QTC CALCULATION (BEZET), ECG08: 435 MS
RBC # BLD AUTO: 3.48 M/UL (ref 4.2–5.3)
SODIUM SERPL-SCNC: 138 MMOL/L (ref 136–145)
TROPONIN I SERPL-MCNC: 0.12 NG/ML (ref 0–0.04)
TROPONIN I SERPL-MCNC: 0.17 NG/ML (ref 0–0.04)
VENTRICULAR RATE, ECG03: 106 BPM
WBC # BLD AUTO: 8.2 K/UL (ref 4.6–13.2)

## 2021-06-11 PROCEDURE — 65270000029 HC RM PRIVATE

## 2021-06-11 PROCEDURE — 80053 COMPREHEN METABOLIC PANEL: CPT

## 2021-06-11 PROCEDURE — 84484 ASSAY OF TROPONIN QUANT: CPT

## 2021-06-11 PROCEDURE — 74011250637 HC RX REV CODE- 250/637: Performed by: STUDENT IN AN ORGANIZED HEALTH CARE EDUCATION/TRAINING PROGRAM

## 2021-06-11 PROCEDURE — 93005 ELECTROCARDIOGRAM TRACING: CPT

## 2021-06-11 PROCEDURE — 71045 X-RAY EXAM CHEST 1 VIEW: CPT

## 2021-06-11 PROCEDURE — 96374 THER/PROPH/DIAG INJ IV PUSH: CPT

## 2021-06-11 PROCEDURE — 74011250636 HC RX REV CODE- 250/636: Performed by: STUDENT IN AN ORGANIZED HEALTH CARE EDUCATION/TRAINING PROGRAM

## 2021-06-11 PROCEDURE — 82553 CREATINE MB FRACTION: CPT

## 2021-06-11 PROCEDURE — 74011250636 HC RX REV CODE- 250/636: Performed by: INTERNAL MEDICINE

## 2021-06-11 PROCEDURE — 99285 EMERGENCY DEPT VISIT HI MDM: CPT

## 2021-06-11 PROCEDURE — 83735 ASSAY OF MAGNESIUM: CPT

## 2021-06-11 PROCEDURE — 90935 HEMODIALYSIS ONE EVALUATION: CPT

## 2021-06-11 PROCEDURE — 85730 THROMBOPLASTIN TIME PARTIAL: CPT

## 2021-06-11 PROCEDURE — 85025 COMPLETE CBC W/AUTO DIFF WBC: CPT

## 2021-06-11 PROCEDURE — 5A1D70Z PERFORMANCE OF URINARY FILTRATION, INTERMITTENT, LESS THAN 6 HOURS PER DAY: ICD-10-PCS | Performed by: EMERGENCY MEDICINE

## 2021-06-11 RX ORDER — MORPHINE SULFATE 2 MG/ML
2 INJECTION, SOLUTION INTRAMUSCULAR; INTRAVENOUS
Status: COMPLETED | OUTPATIENT
Start: 2021-06-11 | End: 2021-06-11

## 2021-06-11 RX ORDER — NITROGLYCERIN 0.4 MG/1
0.4 TABLET SUBLINGUAL
Status: COMPLETED | OUTPATIENT
Start: 2021-06-11 | End: 2021-06-11

## 2021-06-11 RX ORDER — METOPROLOL TARTRATE 50 MG/1
50 TABLET ORAL EVERY 12 HOURS
Status: DISCONTINUED | OUTPATIENT
Start: 2021-06-11 | End: 2021-06-13

## 2021-06-11 RX ORDER — ONDANSETRON 2 MG/ML
4 INJECTION INTRAMUSCULAR; INTRAVENOUS
Status: DISCONTINUED | OUTPATIENT
Start: 2021-06-11 | End: 2021-06-12 | Stop reason: SDUPTHER

## 2021-06-11 RX ORDER — HEPARIN SODIUM 10000 [USP'U]/100ML
12-25 INJECTION, SOLUTION INTRAVENOUS
Status: DISCONTINUED | OUTPATIENT
Start: 2021-06-11 | End: 2021-06-13

## 2021-06-11 RX ORDER — CLONIDINE HYDROCHLORIDE 0.1 MG/1
0.1 TABLET ORAL
Status: COMPLETED | OUTPATIENT
Start: 2021-06-11 | End: 2021-06-11

## 2021-06-11 RX ORDER — HEPARIN SODIUM 1000 [USP'U]/ML
60 INJECTION, SOLUTION INTRAVENOUS; SUBCUTANEOUS ONCE
Status: COMPLETED | OUTPATIENT
Start: 2021-06-11 | End: 2021-06-11

## 2021-06-11 RX ORDER — LABETALOL HCL 20 MG/4 ML
20 SYRINGE (ML) INTRAVENOUS ONCE
Status: DISCONTINUED | OUTPATIENT
Start: 2021-06-11 | End: 2021-06-11

## 2021-06-11 RX ADMIN — HEPARIN SODIUM 12 UNITS/KG/HR: 10000 INJECTION, SOLUTION INTRAVENOUS at 22:15

## 2021-06-11 RX ADMIN — METOPROLOL TARTRATE 50 MG: 50 TABLET, FILM COATED ORAL at 22:14

## 2021-06-11 RX ADMIN — NITROGLYCERIN 0.4 MG: 0.4 TABLET, ORALLY DISINTEGRATING SUBLINGUAL at 20:55

## 2021-06-11 RX ADMIN — ONDANSETRON 4 MG: 2 INJECTION INTRAMUSCULAR; INTRAVENOUS at 15:55

## 2021-06-11 RX ADMIN — CLONIDINE HYDROCHLORIDE 0.1 MG: 0.1 TABLET ORAL at 19:25

## 2021-06-11 RX ADMIN — MORPHINE SULFATE 2 MG: 2 INJECTION, SOLUTION INTRAMUSCULAR; INTRAVENOUS at 20:55

## 2021-06-11 RX ADMIN — HEPARIN SODIUM 3810 UNITS: 1000 INJECTION INTRAVENOUS; SUBCUTANEOUS at 22:14

## 2021-06-11 NOTE — Clinical Note
Status[de-identified] INPATIENT [101]   Type of Bed: Medical [8]   Inpatient Hospitalization Certified Necessary for the Following Reasons: 3.  Patient receiving treatment that can only be provided in an inpatient setting (further clarification in H&P documentation)   Admitting Diagnosis: Hypertensive emergency [[id number]]   Admitting Physician: Ernie Ayers [[id number]]   Attending Physician: Ernie Ayers [[id number]]   Estimated Length of Stay: 3-4 Midnights   Discharge Plan[de-identified] Home with Office Follow-up

## 2021-06-11 NOTE — ED PROVIDER NOTES
EMERGENCY DEPARTMENT HISTORY AND PHYSICAL EXAM    12:52 PM      Date: 6/11/2021  Patient Name: Celio Fuller    History of Presenting Illness     Chief Complaint   Patient presents with    Shortness of Breath         History Provided By: Patient and EMS  Location/Duration/Severity/Modifying factors   77 yoF with PMH CVA, ESRD on dialysis MWF, brought in by EMS from dialysis for chest pain and shortness of breath. Patient was 1 hour into dialysis when she developed severe chest pain in the center of her chest with associated shortness of breath. She has had a cough for a few days, denies any leg swelling or fevers. She has intermittent headaches with no change to them recently. She is unable to provide much medical history and answers some ROS questions with \"I don't remember, I've had several strokes. \"          PCP: Alena Harding MD    Current Facility-Administered Medications   Medication Dose Route Frequency Provider Last Rate Last Admin    ondansetron (ZOFRAN) injection 4 mg  4 mg IntraVENous Q6H PRN Dion Elam MD   4 mg at 06/11/21 1555    heparin (porcine) 1,000 unit/mL injection 3,810 Units  60 Units/kg IntraVENous ONCE Dasha Mckenna MD        heparin 25,000 units in D5W 250 ml infusion  12-25 Units/kg/hr IntraVENous TITRATE Dasha Mckenna MD        metoprolol tartrate (LOPRESSOR) tablet 50 mg  50 mg Oral Q12H Dasha Mckenna MD         Current Outpatient Medications   Medication Sig Dispense Refill    famotidine (Pepcid) 20 mg tablet Take 1 Tablet by mouth daily. 30 Tablet 0    metoprolol tartrate (LOPRESSOR) 50 mg tablet Take 1 Tablet by mouth every twelve (12) hours. 60 Tablet 0    polyethylene glycol (MIRALAX) 17 gram packet Take 1 Packet by mouth daily as needed for Constipation. 15 Packet 0    minoxidiL (LONITEN) 2.5 mg tablet Take 1 Tab by mouth two (2) times a day. 60 Tab 1    cloNIDine (CATAPRES) 0.2 mg/24 hr ptwk 1 Patch by TransDERmal route every seven (7) days.  4 Patch 0  atorvastatin (Lipitor) 10 mg tablet Take 10 mg by mouth daily. Indications: high amount of triglyceride in the blood      b complex-vitamin c-folic acid 0.8 mg (NEPHRO-DORIAN) 0.8 mg tab tablet Take 1 Tab by mouth daily.  levETIRAcetam (Keppra) 500 mg tablet Take 500 mg by mouth two (2) times a day.  sevelamer carbonate (RENVELA) 800 mg tab tab Take 800 mg by mouth three (3) times daily.  levETIRAcetam (KEPPRA) 250 mg tablet Take 1 Tab by mouth every Monday, Wednesday, Friday. 30 Tab 1    insulin lispro (HUMALOG) 100 unit/mL injection INITIATE INSULIN CORRECTIVE PROTOCOL: Normal Insulin Sensitivity   For Blood Sugar (mg/dL) of:     Less than 150 =   0 units           150 -199 =   2 units  200 -249 =   4 units  250 -299 =   6 units  300 -349 =   8 units  350 and above = 10 units and Call Physician  If 2 glucose readings are above 1 Vial 0    aspirin 81 mg tablet Take 81 mg by mouth daily.  diltiazem CD (CARDIZEM CD) 240 mg ER capsule Take 240 mg by mouth daily. Past History     Past Medical History:  Past Medical History:   Diagnosis Date    Asthma     Bipolar affective disorder ([facility]     Brain condition     Cataract     Chronic kidney disease     hemodialysis M-W-F    Diabetes ([facility]     Hyperlipidemia     Hypertension     Paralytic strabismus, unspecified     Psychiatric disorder     Seizures ([facility] 08/27/2018       Past Surgical History:  Past Surgical History:   Procedure Laterality Date    RI INSJ TUNNELED CVC W/O SUBQ PORT/ AGE 5 YR/> N/A 8/9/2018     in-pt 474A, Insertion Tunneled Central Venous Catheter performed by Amie Bauer MD at [address] [place] [address]    RI INSJ TUNNELED CVC W/O SUBQ PORT/ AGE 5 YR/> N/A 11/8/2019    INSERTION TUNNELED CENTRAL VENOUS CATHETER performed by Nathaniel Oconnor MD at [address] [place] [address]       Family History:  No family history on file.     Social History:  Social History     Tobacco Use    Smoking status: Never Smoker  Smokeless tobacco: Never Used   Substance Use Topics    Alcohol use: No    Drug use: No       Allergies: Allergies   Allergen Reactions    Amoxicillin Unknown (comments)    Cleocin [Clindamycin Hcl] Unknown (comments)    Codeine Unknown (comments)    Lorcet 10/650 [Hydrocodone-Acetaminophen] Unknown (comments)    Penicillin G Benzathine Unknown (comments)    Shellfish Derived Unknown (comments)         Review of Systems       Review of Systems   Constitutional: Negative for fatigue and fever. HENT: Negative for sore throat. Eyes: Negative for visual disturbance. Respiratory: Positive for cough, chest tightness and shortness of breath. Cardiovascular: Positive for chest pain. Negative for palpitations and leg swelling. Gastrointestinal: Negative for abdominal pain, diarrhea and nausea. Musculoskeletal: Negative for gait problem. Skin: Negative for rash. Neurological: Positive for headaches (baseline). Physical Exam     Visit Vitals  BP (!) 188/77 (BP 1 Location: Left upper arm, BP Patient Position: At rest)   Pulse 85   Temp 98.2 °F (36.8 °C)   Resp 14   Wt 63.5 kg (140 lb)   LMP  (LMP Unknown)   SpO2 95%   BMI 24.80 kg/m²         Physical Exam  Vitals and nursing note reviewed. Constitutional:       Appearance: She is ill-appearing. Eyes:      Pupils: Pupils are equal, round, and reactive to light. Comments: R eye deviated laterally. Cardiovascular:      Rate and Rhythm: Regular rhythm. Tachycardia present. Pulses:           Radial pulses are 2+ on the right side and 2+ on the left side. Posterior tibial pulses are 2+ on the right side and 2+ on the left side. Heart sounds: Normal heart sounds. Pulmonary:      Effort: Tachypnea present. Breath sounds: Normal breath sounds. Abdominal:      Palpations: Abdomen is soft. Tenderness: There is no abdominal tenderness. Musculoskeletal:      Right lower leg: No edema.       Left lower leg: No edema.   Skin:     General: Skin is warm and dry. Neurological:      Mental Status: She is alert and oriented to person, place, and time. Psychiatric:         Mood and Affect: Mood normal.           Diagnostic Study Results     Labs -  Recent Results (from the past 12 hour(s))   EKG, 12 LEAD, INITIAL    Collection Time: 06/11/21 12:49 PM   Result Value Ref Range    Ventricular Rate 106 BPM    Atrial Rate 106 BPM    P-R Interval 116 ms    QRS Duration 90 ms    Q-T Interval 328 ms    QTC Calculation (Bezet) 435 ms    Calculated P Axis 65 degrees    Calculated R Axis -36 degrees    Calculated T Axis 137 degrees    Diagnosis       Sinus tachycardia  Left axis deviation  T wave abnormality, consider lateral ischemia  Abnormal ECG  When compared with ECG of 02-JUN-2021 09:09,  T wave inversion less evident in Anterior leads  Confirmed by Jayla Marshall M.D., [address] [place] [address] ([id number]) on 6/11/2021 9:04:36 PM     CBC WITH AUTOMATED DIFF    Collection Time: 06/11/21  1:35 PM   Result Value Ref Range    WBC 8.2 4.6 - 13.2 K/uL    RBC 3.48 (L) 4.20 - 5.30 M/uL    HGB 10.5 (L) 12.0 - 16.0 g/dL    HCT 31.3 (L) 35.0 - 45.0 %    MCV 89.9 74.0 - 97.0 FL    MCH 30.2 24.0 - 34.0 PG    MCHC 33.5 31.0 - 37.0 g/dL    RDW 14.7 (H) 11.6 - 14.5 %    PLATELET 546 461 - 084 K/uL    MPV 12.3 (H) 9.2 - 11.8 FL    NEUTROPHILS 81 (H) 40 - 73 %    LYMPHOCYTES 9 (L) 21 - 52 %    MONOCYTES 7 3 - 10 %    EOSINOPHILS 3 0 - 5 %    BASOPHILS 0 0 - 2 %    ABS. NEUTROPHILS 6.6 1.8 - 8.0 K/UL    ABS. LYMPHOCYTES 0.7 (L) 0.9 - 3.6 K/UL    ABS. MONOCYTES 0.6 0.05 - 1.2 K/UL    ABS. EOSINOPHILS 0.2 0.0 - 0.4 K/UL    ABS.  BASOPHILS 0.0 0.0 - 0.1 K/UL    DF AUTOMATED     METABOLIC PANEL, COMPREHENSIVE    Collection Time: 06/11/21  1:35 PM   Result Value Ref Range    Sodium 138 136 - 145 mmol/L    Potassium 3.9 3.5 - 5.5 mmol/L    Chloride 103 100 - 111 mmol/L    CO2 30 21 - 32 mmol/L    Anion gap 5 3.0 - 18 mmol/L    Glucose 180 (H) 74 - 99 mg/dL    BUN 30 (H) 7.0 - 18 MG/DL Creatinine 7.60 (H) 0.6 - 1.3 MG/DL    BUN/Creatinine ratio 4 (L) 12 - 20      GFR est AA 6 (L) >60 ml/min/1.73m2    GFR est non-AA 5 (L) >60 ml/min/1.73m2    Calcium 9.5 8.5 - 10.1 MG/DL    Bilirubin, total 0.3 0.2 - 1.0 MG/DL    ALT (SGPT) 29 13 - 56 U/L    AST (SGOT) 24 10 - 38 U/L    Alk.  phosphatase 116 45 - 117 U/L    Protein, total 7.1 6.4 - 8.2 g/dL    Albumin 3.3 (L) 3.4 - 5.0 g/dL    Globulin 3.8 2.0 - 4.0 g/dL    A-G Ratio 0.9 0.8 - 1.7     CARDIAC PANEL,(CK, CKMB & TROPONIN)    Collection Time: 06/11/21  1:35 PM   Result Value Ref Range    CK - MB 3.6 (H) <3.6 ng/ml    CK-MB Index 3.3 0.0 - 4.0 %     26 - 192 U/L    Troponin-I, QT 0.12 (H) 0.0 - 0.045 NG/ML   MAGNESIUM    Collection Time: 06/11/21  1:35 PM   Result Value Ref Range    Magnesium 2.4 1.6 - 2.6 mg/dL   EKG, 12 LEAD, SUBSEQUENT    Collection Time: 06/11/21  6:18 PM   Result Value Ref Range    Ventricular Rate 98 BPM    Atrial Rate 98 BPM    P-R Interval 122 ms    QRS Duration 84 ms    Q-T Interval 368 ms    QTC Calculation (Bezet) 469 ms    Calculated P Axis 70 degrees    Calculated R Axis -6 degrees    Calculated T Axis 129 degrees    Diagnosis       Poor data quality, interpretation may be adversely affected  Normal sinus rhythm  T wave abnormality, consider lateral ischemia  Abnormal ECG  When compared with ECG of 11-JUN-2021 12:49,  No significant change was found     TROPONIN I    Collection Time: 06/11/21  7:06 PM   Result Value Ref Range    Troponin-I, QT 0.17 (H) 0.0 - 0.045 NG/ML   EKG, 12 LEAD, SUBSEQUENT    Collection Time: 06/11/21  7:49 PM   Result Value Ref Range    Ventricular Rate 97 BPM    Atrial Rate 97 BPM    P-R Interval 118 ms    QRS Duration 84 ms    Q-T Interval 378 ms    QTC Calculation (Bezet) 480 ms    Calculated P Axis 63 degrees    Calculated R Axis 0 degrees    Calculated T Axis 132 degrees    Diagnosis       Normal sinus rhythm  Possible Left atrial enlargement  T wave abnormality, consider lateral ischemia  Prolonged QT  Abnormal ECG  When compared with ECG of 11-JUN-2021 18:19,  premature atrial complexes are no longer present         Radiologic Studies -   XR CHEST PORT   Final Result   1. Improved interstitial edema/infiltrate compared to prior study. 2. Cardiomegaly. 3. Redemonstration of nonspecific gaseous distention of the stomach. Thank you for enabling us to participate in the care of this patient. Medical Decision Making   I am the first provider for this patient. I reviewed the vital signs, available nursing notes, past medical history, past surgical history, family history and social history. Vital Signs-Reviewed the patient's vital signs. EKG: sinus tachycardia, left axis deviation, t wave inversion less prominent than prior. Records Reviewed: Nursing Notes, Old Medical Records, Previous electrocardiograms and Previous Radiology Studies (Time of Review: 12:52 PM)    ED Course: Progress Notes, Reevaluation, and Consults:  Nephrology  Hospitalist       Provider Notes (Medical Decision Making):   MDM    77 yoF with PMH CVA, ESRD on dialysis MWF brought in for chest pain and tachycardia from dialysis. Vitals significant for hypertension and tachycardia. Physical exam significant for bilateral breath sounds and good cardiac activity. ACS vs HTN urgency vs demand ischemia/pain from dialysis vs electrolyte abnormalities. Labs:  Stable anemia without leukocytosis  Expected renal dysfunction with normal electrolytes  Troponin elevated to 0.12, decreased from prior at 0.42 taken 9 days ago. Chest xray interpretation: improved edema from prior    1252  Patient BP improved without labetalol, so this was held. Chest pain gone with BP and HR improvement. Patient is stable to finish dialysis treatment. Nephrology contacted, and patient will be taken to dialysis for 2 hours once a chair is available.     1519  Patient taken to dialysis    1730  Patient returned from dialysis stating she \"does not feel too damn good. \" Repeating troponin and ECG. Hypertensive to 200s again. Repeat showed continued elevation of BP. Small 0.1 mg Clonidine ordered, as patient blood pressure nromalized on its own previously. Repeat ECG showed normal sinus rhythm, relatively unchanged from first ECG. No dynamic changes noted. 1954  Patient repeat, post-dialysis troponin elevated to 0.17, over prior of 0.12. Patient continues to complain of worsening chest pain. BP continues to be elevated despite clonidine administration. ECG with normal sinus rhythm, narrow QRS, grossly unchanged from prior. Will treat patient as possible ACS. Heparin bolus/drip ordered, sublingual nitro, 2 mg morphine. 2012  Paged inpatient hospitalist for admission to continue to trend troponins with serial ECGs. 2046  Discussed patient presentation, history, and ED course with hospitalist, Dr. Leeann Duggan. He requested that we address her hypertension prior to her coming to the floor. Re-evaluated patient after getting nitro and morphine. Patient is now sleeping soundly. Patient is on home lopressor 50 mg BID. Dose of home medication ordered for blood pressure control. 2200  Patient BP improved with pain control. Patient reporting resolution of pain. Lopressor administration pending, and expect further improvement of BP. Diagnosis     Clinical Impression:   1. Acute chest pain        Disposition: admit to floor    Follow-up Information    None          Patient's Medications   Start Taking    No medications on file   Continue Taking    ASPIRIN 81 MG TABLET    Take 81 mg by mouth daily. ATORVASTATIN (LIPITOR) 10 MG TABLET    Take 10 mg by mouth daily. Indications: high amount of triglyceride in the blood    B COMPLEX-VITAMIN C-FOLIC ACID 0.8 MG (NEPHRO-DORIAN) 0.8 MG TAB TABLET    Take 1 Tab by mouth daily. CLONIDINE (CATAPRES) 0.2 MG/24 HR PTWK    1 Patch by TransDERmal route every seven (7) days. DILTIAZEM CD (CARDIZEM CD) 240 MG ER CAPSULE    Take 240 mg by mouth daily. FAMOTIDINE (PEPCID) 20 MG TABLET    Take 1 Tablet by mouth daily. INSULIN LISPRO (HUMALOG) 100 UNIT/ML INJECTION    INITIATE INSULIN CORRECTIVE PROTOCOL: Normal Insulin Sensitivity   For Blood Sugar (mg/dL) of:     Less than 150 =   0 units           150 -199 =   2 units  200 -249 =   4 units  250 -299 =   6 units  300 -349 =   8 units  350 and above = 10 units and Call Physician  If 2 glucose readings are above    LEVETIRACETAM (KEPPRA) 250 MG TABLET    Take 1 Tab by mouth every Monday, Wednesday, Friday. LEVETIRACETAM (KEPPRA) 500 MG TABLET    Take 500 mg by mouth two (2) times a day. METOPROLOL TARTRATE (LOPRESSOR) 50 MG TABLET    Take 1 Tablet by mouth every twelve (12) hours. MINOXIDIL (LONITEN) 2.5 MG TABLET    Take 1 Tab by mouth two (2) times a day. POLYETHYLENE GLYCOL (MIRALAX) 17 GRAM PACKET    Take 1 Packet by mouth daily as needed for Constipation. SEVELAMER CARBONATE (RENVELA) 800 MG TAB TAB    Take 800 mg by mouth three (3) times daily. These Medications have changed    No medications on file   Stop Taking    No medications on file     Disclaimer: Sections of this note are dictated using utilizing voice recognition software. Minor typographical errors may be present. If questions arise, please do not hesitate to contact me or call our department.

## 2021-06-11 NOTE — PROGRESS NOTES
Reviewed records, discussed with OP Dialysis Unit & Nadia Ji of ER. Could not get dialysis at [facility] because of Tachycardia & High BP, now sinus rhythm, mild Tachy, BP also relatively  better ,willarrange HD for 2 hours  & afterwards can go back to her nursing home. Reviewed Lab report  & chest X-ray. Dilalysis orders given to HD nurse.

## 2021-06-11 NOTE — DIALYSIS
ACUTE HEMODIALYSIS FLOW SHEET    HEMODIALYSIS ORDERS: Physician: Dr. Chiquis Pickett: aclear   Duration:  2 hr  BFR: 400   DFR: 800   Dialysate:  Temp 36   K+   3    Ca+  2.5   Na 138   Bicarb 35   Wt Readings from Last 1 Encounters:   06/11/21 63.5 kg (140 lb)   [x] Patient Chart [] Unable to Obtain      Dry weight/UF Goal: 2000 ml  Access:  Right IJ tunneled CVC     Heparin []  Bolus    Units    [] Hourly    Units    [] None      Catheter locking solution:  1:1000U Heparin   Pre BP:  194/136    Pulse:  90   Respirations: 20    Temperature:  98.8 ax    Tx: NSS   ml/Bolus   [x] N/A   Labs: []  Pre  []  Post   [x] N/A   Additional Orders(medications, blood products, hypotension management): [x] Yes   [] No     [x]  [place] Consent Verified     CATHETER ACCESS:  []N/A   [x]Right   []Left   [x]IJ     []Fem   []Chest wall   [] First use X-ray verified     [x]Tunneled    [] Non Tunneled   [x]No S/S infection  []Redness  []Drainage []Cultured []Swelling []Pain   [x]Medical Aseptic Prep Utilized   [x]Dressing Changed  [x] Biopatch  Date: 6/11/21   []Clotted   [x]Patent   Flows: [x]Good  []Poor  []Reversed   If access problem,  notified: []Yes    [x]N/A          GENERAL ASSESSMENT:    LUNGS:   SaO2  %   [x] Clear  [] Coarse  [] Crackles  [] Wheezing                                                [] Diminished     Location : []RLL   []LLL    [x]RUL  [x]STEPHY   Cough: []Productive  []Dry  [x]N/A   Respirations:  [x]Easy  []Labored   Therapy:  [x]RA  []NC L/min    Mask: []NRB  [] Venti    O2%                  []Ventilator  []Intubated  [] Trach  [] BiPaP   CARDIAC: [x]Regular      [] Irregular   [] Pericardial Rub  [] JVD          []  Monitored  [] Bedside  [] Remotely monitored     EDEMA: [x] None  []Generalized  [] Pitting [] 1    [] 2    [] 3    [] 4                 [] Facial  [] Pedal  []  UE  [] LE   SKIN:   [x] Warm  [] Hot  [] Cold   [] Cool   [] Dry    [] Pale   [] Diaphoretic                  [] Flushed  [] Jaundiced  [] Cyanotic  [] Rash  [] Weeping   LOC:    [x] Alert           [x] Awake    [x]Oriented:    [x] Person     [] Place  []Time               [] Confused  [] Lethargic [] Obtunded  [] Sedated   [] Agitated                        [] Restless    []  Non-responsive     GI / ABDOMEN:                     [] Flat    [] Distended    [x] Soft    [] Firm   []  Obese                   [] Diarrhea  [x] Bowel Sounds  [] Nausea  [] Vomiting                   [] Fecal Management System   / URINE ASSESSMENT:                   [] Voiding   [x] Oliguria  [] Anuria   []  Khan                  [] Incontinent  []  Incontinent Brief      PAIN:  [x] 0 []1  []2   []3   []4   []5   []6   []7   []8   []9   []10            Scale 0-10  Action/Follow Up:    MOBILITY:  [x] Bed    [] Stretcher      All Vitals and Treatment Details on Attached [address]: [facility]          Room # H1/A   [] 1st Time Acute      [] Stat       [x] Routine      [] Urgent     [x] Acute Room  []  Bedside  [] ICU/CCU  [] ER   Isolation Precautions:  [x] Dialysis    There are currently no Active Isolations       ALLERGIES:     Allergies   Allergen Reactions    Amoxicillin Unknown (comments)    Cleocin [Clindamycin Hcl] Unknown (comments)    Codeine Unknown (comments)    Lorcet 10/650 [Hydrocodone-Acetaminophen] Unknown (comments)    Penicillin G Benzathine Unknown (comments)    Shellfish Derived Unknown (comments)      Code Status:  Prior     Hepatitis Status     Lab Results   Component Value Date/Time    Hepatitis B surface Ag <0.10 09/25/2020 05:31 PM    Hepatitis B surface Ab 63.01 09/25/2020 05:31 PM    Hepatitis B core, IgM NEGATIVE  08/11/2018 03:29 AM    Hep B Core Ab, IgM NEGATIVE  08/13/2018 04:34 AM    Hep B Core Ab, total NEGATIVE  08/13/2018 04:34 AM        Current Labs:      Lab Results   Component Value Date/Time    WBC 8.2 06/11/2021 01:35 PM    Hemoglobin, POC 13.3 11/08/2019 07:26 AM    HGB 10.5 (L) 06/11/2021 01:35 PM Hematocrit, POC 39 11/08/2019 07:26 AM    HCT 31.3 (L) 06/11/2021 01:35 PM    PLATELET 329 76/11/9530 01:35 PM    MCV 89.9 06/11/2021 01:35 PM     Lab Results   Component Value Date/Time    Sodium 138 06/11/2021 01:35 PM    Potassium 3.9 06/11/2021 01:35 PM    Chloride 103 06/11/2021 01:35 PM    CO2 30 06/11/2021 01:35 PM    Anion gap 5 06/11/2021 01:35 PM    Glucose 180 (H) 06/11/2021 01:35 PM    BUN 30 (H) 06/11/2021 01:35 PM    Creatinine 7.60 (H) 06/11/2021 01:35 PM    BUN/Creatinine ratio 4 (L) 06/11/2021 01:35 PM    GFR est AA 6 (L) 06/11/2021 01:35 PM    GFR est non-AA 5 (L) 06/11/2021 01:35 PM    Calcium 9.5 06/11/2021 01:35 PM          DIET:  None      PRIMARY NURSE REPORT:   Pre Dialysis:  Fausto Pagan     Time: 14:45        EDUCATION:    [x] Patient [] Other           Knowledge Basis: []None [x]Minimal [] Substantial [] Unable to assess at this time.    Barriers to learning  [x]N/A   [] Access Care     [] S&S of infection  [] Fluid Management  [] K+   [x] Procedural    []Albumin   [] Medications   [] Tx Options   [] Transplant   [] Diet   [] Other   Teaching Tools:  [x] Explain  [] Demo  [] Handouts [] Video  Patient response: [x] Verbalized understanding  [] Teach back  [] Return demonstration   [] Requires follow up      [x]Time Out/Safety Check  [x] Extracorporeal Circuit Tested for integrity       RO/HEMODIALYSIS MACHINE SAFETY CHECKS  Before each treatment:     Machine Number:                   [facility]                                     [x] Unit Machine # 5 with centralized RO                                                                            Alarm Test:  Pass time 14:44            [x] RO/Machine Log Complete    Machine Temp    36.0             Dialysate: pH  7.4    Conductivity: Meter 13.6     HD Machine  13.9      TCD: 13.7  Dialyzer Lot # [id number]     Blood Tubing Lot # [id number]    Saline Lot # [id number]     CHLORINE TESTING-Before each treatment and every 4 hours    Total Chlorine: [x] less than 0.1 ppm  Initial Time Check: 13:00       4 Hr/2nd Check Time:    (if greater than 0.1 ppm from Primary then every 30 minutes from Secondary)     TREATMENT INITIATION  with Dialysis Precautions:   [x] All Connections Secured              [x] Saline Line Double Clamped   [x] Venous Parameters Set               [x] Arterial Parameters Set    [x] Prime Given 250ml NSS              [x]Air Foam Detector Engaged      Treatment Initiation Note:  15:20  Pt arrived to HD without any complaints. Pt awake, alert, oriented to self, follows commands, no distress noted, time out done with pt, verbal consent received for HD by pt. During Treatment Notes:  15:30  Right IJ tunneled CVC assessed no abnormalities noted, line patent with good flow. CVC accessed without any difficulty, pt tolerated well. Vascular access visible with arterial and venous line connections intact. 15:45Vascular access visible with arterial and venous line connections intact. 15:50  Pt awake, VSS, pt restless, c/o \"not feeling good. \"  Asked pt to described how she's feeling, pt stated she feels like throwing up. Call Dr. Mejia Owen, order received for Zofran 4mg IVP every 6 hr PRN. Vascular access visible with arterial and venous line connections intact. 15:55  Administered Zofran 4mg IVP for nausea. 16:00  Vascular access visible with arterial and venous line connections intact. 16:15  VSS, pt c/o chest pain, placed pt on 3L nc O2 and on 3 Lead EKG monitor, HR is SR in the 80's, no ST elevation noted. Vascular access visible with arterial and venous line connections intact. 16:28  Pt continues to be restless, c/o not feeling well, asked pt if her chest still hurts, pt stated no, but that she feels like throwing up. Called Dr. Mejia Owen to update him on pt's status, order received to stop HD and return pt back to the ER.        Medication Dose Volume Route Time [place] Nurse, Title   Zofran 4mg 2ml IVP HD 15:55 [employer] [address] RN     Post Assessment  Dialyzer Cleared:[] Good [x] Fair  [] Poor  Blood processed:  23.2 L  Net UF Removed:  800 Ml  Post /113  Pulse  86 Resp  18   Temp 98.6 oral    Post Tx Vascular Access:   CVC Catheter:   Locking solution: Heparin 1:1000U  Arterial port 1.6 ml   Venous port 1.6 ml         Skin:[x] Warm  [x] Dry [] Diaphoretic             []Cool     [] Flushed  [] Pale            [] Cyanotic   Pain:  [x]0  []1 []2  []3 []4  []5  []6 []7 []8  []9  []10     Post Treatment Note:   16:40  HD completed at this time, total UF removed 1300ml, net removed 800ml, pt tolerated well. Dressing clean, dry and intact. POST TREATMENT PRIMARY NURSE HANDOFF REPORT:   Post Dialysis:  TATE Luque                Time:  16:44       Abbreviations: AVG-arterial venous graft, AVF-arterial venous fistula, IJ-Internal Jugular, Subcl-Subclavian, Fem-Femoral, Tx-treatment, AP/HR-apical heart rate, DFR-dialysate flow rate, BFR-blood flow rate, AP-arterial pressure, -venous pressure, UF-ultrafiltrate, TMP-transmembrane pressure, Ray-Venous, Art-Arterial, RO-Reverse Osmosis

## 2021-06-11 NOTE — ED NOTES
Client returned to gill bed. NAD. Reports  Having chest discomfort. Only able to spend 1hr(800ml) on the dialysis machine.

## 2021-06-11 NOTE — ED NOTES
Verbal shift change report given Yumiko (oncoming nurse) by Neftali Perry (offgoing nurse). Report included the following information SBAR, ED Summary and MAR.

## 2021-06-11 NOTE — ED TRIAGE NOTES
Client from diaylsis. Had one hr of treatment but taken off due to SOB and chest pain. BP reported by facilty of sbp >200. Ems bp 280/140. NAD. Client able to speak in well formed sentences. AXOX4.

## 2021-06-12 ENCOUNTER — APPOINTMENT (OUTPATIENT)
Dept: NON INVASIVE DIAGNOSTICS | Age: 66
DRG: 280 | End: 2021-06-12
Attending: INTERNAL MEDICINE
Payer: MEDICARE

## 2021-06-12 PROBLEM — I16.0 HYPERTENSIVE URGENCY: Status: ACTIVE | Noted: 2021-06-12

## 2021-06-12 PROBLEM — R94.31 PROLONGED Q-T INTERVAL ON ECG: Status: ACTIVE | Noted: 2021-06-12

## 2021-06-12 LAB
ALBUMIN SERPL-MCNC: 3.2 G/DL (ref 3.4–5)
ALBUMIN/GLOB SERPL: 1 {RATIO} (ref 0.8–1.7)
ALP SERPL-CCNC: 96 U/L (ref 45–117)
ALT SERPL-CCNC: 22 U/L (ref 13–56)
ANION GAP SERPL CALC-SCNC: 9 MMOL/L (ref 3–18)
APTT PPP: 114.4 SEC (ref 23–36.4)
APTT PPP: 62.2 SEC (ref 23–36.4)
APTT PPP: >180 SEC (ref 23–36.4)
AST SERPL-CCNC: 20 U/L (ref 10–38)
ATRIAL RATE: 93 BPM
ATRIAL RATE: 97 BPM
ATRIAL RATE: 99 BPM
BASOPHILS # BLD: 0 K/UL (ref 0–0.1)
BASOPHILS NFR BLD: 0 % (ref 0–2)
BILIRUB SERPL-MCNC: 0.3 MG/DL (ref 0.2–1)
BUN SERPL-MCNC: 29 MG/DL (ref 7–18)
BUN/CREAT SERPL: 4 (ref 12–20)
CALCIUM SERPL-MCNC: 8.7 MG/DL (ref 8.5–10.1)
CALCULATED P AXIS, ECG09: 63 DEGREES
CALCULATED P AXIS, ECG09: 74 DEGREES
CALCULATED P AXIS, ECG09: 80 DEGREES
CALCULATED R AXIS, ECG10: -11 DEGREES
CALCULATED R AXIS, ECG10: -8 DEGREES
CALCULATED R AXIS, ECG10: 0 DEGREES
CALCULATED T AXIS, ECG11: 118 DEGREES
CALCULATED T AXIS, ECG11: 132 DEGREES
CALCULATED T AXIS, ECG11: 136 DEGREES
CHLORIDE SERPL-SCNC: 98 MMOL/L (ref 100–111)
CO2 SERPL-SCNC: 28 MMOL/L (ref 21–32)
CREAT SERPL-MCNC: 7.84 MG/DL (ref 0.6–1.3)
DIAGNOSIS, 93000: NORMAL
DIFFERENTIAL METHOD BLD: ABNORMAL
ECHO AO ASC DIAM: 2.93 CM
ECHO AO ROOT DIAM: 2.81 CM
ECHO LA AREA 4C: 19.42 CM2
ECHO LA MAJOR AXIS: 3.05 CM
ECHO LA MINOR AXIS: 1.84 CM
ECHO LA VOL 2C: 71.68 ML (ref 22–52)
ECHO LA VOL 4C: 55.34 ML (ref 22–52)
ECHO LA VOL BP: 67.21 ML (ref 22–52)
ECHO LA VOL/BSA BIPLANE: 40.49 ML/M2 (ref 16–28)
ECHO LA VOLUME INDEX A2C: 43.18 ML/M2 (ref 16–28)
ECHO LA VOLUME INDEX A4C: 33.34 ML/M2 (ref 16–28)
ECHO LV INTERNAL DIMENSION DIASTOLIC: 3.71 CM (ref 3.9–5.3)
ECHO LV INTERNAL DIMENSION SYSTOLIC: 2.29 CM
ECHO LV IVSD: 1.31 CM (ref 0.6–0.9)
ECHO LV MASS 2D: 169.1 G (ref 67–162)
ECHO LV MASS INDEX 2D: 101.9 G/M2 (ref 43–95)
ECHO LV POSTERIOR WALL DIASTOLIC: 1.31 CM (ref 0.6–0.9)
ECHO MV A VELOCITY: 105.02 CM/S
ECHO MV E DECELERATION TIME (DT): 166.93 MS
ECHO MV E VELOCITY: 66.15 CM/S
ECHO MV E/A RATIO: 0.63
EOSINOPHIL # BLD: 0.2 K/UL (ref 0–0.4)
EOSINOPHIL NFR BLD: 2 % (ref 0–5)
ERYTHROCYTE [DISTWIDTH] IN BLOOD BY AUTOMATED COUNT: 14.8 % (ref 11.6–14.5)
FOLATE SERPL-MCNC: 12.7 NG/ML (ref 3.1–17.5)
GLOBULIN SER CALC-MCNC: 3.3 G/DL (ref 2–4)
GLUCOSE BLD STRIP.AUTO-MCNC: 196 MG/DL (ref 70–110)
GLUCOSE SERPL-MCNC: 343 MG/DL (ref 74–99)
HCT VFR BLD AUTO: 25.1 % (ref 35–45)
HGB BLD-MCNC: 8.4 G/DL (ref 12–16)
LYMPHOCYTES # BLD: 1.1 K/UL (ref 0.9–3.6)
LYMPHOCYTES NFR BLD: 15 % (ref 21–52)
MAGNESIUM SERPL-MCNC: 2.2 MG/DL (ref 1.6–2.6)
MCH RBC QN AUTO: 29.9 PG (ref 24–34)
MCHC RBC AUTO-ENTMCNC: 33.5 G/DL (ref 31–37)
MCV RBC AUTO: 89.3 FL (ref 74–97)
MONOCYTES # BLD: 0.7 K/UL (ref 0.05–1.2)
MONOCYTES NFR BLD: 10 % (ref 3–10)
NEUTS SEG # BLD: 5.3 K/UL (ref 1.8–8)
NEUTS SEG NFR BLD: 72 % (ref 40–73)
P-R INTERVAL, ECG05: 118 MS
P-R INTERVAL, ECG05: 120 MS
P-R INTERVAL, ECG05: 120 MS
PLATELET # BLD AUTO: 272 K/UL (ref 135–420)
PMV BLD AUTO: 12 FL (ref 9.2–11.8)
POTASSIUM SERPL-SCNC: 3.6 MMOL/L (ref 3.5–5.5)
PROT SERPL-MCNC: 6.5 G/DL (ref 6.4–8.2)
Q-T INTERVAL, ECG07: 378 MS
Q-T INTERVAL, ECG07: 386 MS
Q-T INTERVAL, ECG07: 392 MS
QRS DURATION, ECG06: 78 MS
QRS DURATION, ECG06: 80 MS
QRS DURATION, ECG06: 84 MS
QTC CALCULATION (BEZET), ECG08: 480 MS
QTC CALCULATION (BEZET), ECG08: 487 MS
QTC CALCULATION (BEZET), ECG08: 495 MS
RBC # BLD AUTO: 2.81 M/UL (ref 4.2–5.3)
SODIUM SERPL-SCNC: 135 MMOL/L (ref 136–145)
TROPONIN I SERPL-MCNC: 0.13 NG/ML (ref 0–0.04)
TROPONIN I SERPL-MCNC: 0.18 NG/ML (ref 0–0.04)
TSH SERPL DL<=0.05 MIU/L-ACNC: 2.59 UIU/ML (ref 0.36–3.74)
VENTRICULAR RATE, ECG03: 93 BPM
VENTRICULAR RATE, ECG03: 97 BPM
VENTRICULAR RATE, ECG03: 99 BPM
VIT B12 SERPL-MCNC: 1305 PG/ML (ref 211–911)
WBC # BLD AUTO: 7.3 K/UL (ref 4.6–13.2)

## 2021-06-12 PROCEDURE — 74011250636 HC RX REV CODE- 250/636: Performed by: INTERNAL MEDICINE

## 2021-06-12 PROCEDURE — 93005 ELECTROCARDIOGRAM TRACING: CPT

## 2021-06-12 PROCEDURE — 65270000029 HC RM PRIVATE

## 2021-06-12 PROCEDURE — 74011250637 HC RX REV CODE- 250/637: Performed by: INTERNAL MEDICINE

## 2021-06-12 PROCEDURE — 85730 THROMBOPLASTIN TIME PARTIAL: CPT

## 2021-06-12 PROCEDURE — 85025 COMPLETE CBC W/AUTO DIFF WBC: CPT

## 2021-06-12 PROCEDURE — 99231 SBSQ HOSP IP/OBS SF/LOW 25: CPT | Performed by: INTERNAL MEDICINE

## 2021-06-12 PROCEDURE — 74011250636 HC RX REV CODE- 250/636: Performed by: STUDENT IN AN ORGANIZED HEALTH CARE EDUCATION/TRAINING PROGRAM

## 2021-06-12 PROCEDURE — 2709999900 HC NON-CHARGEABLE SUPPLY

## 2021-06-12 PROCEDURE — 77030038269 HC DRN EXT URIN PURWCK BARD -A

## 2021-06-12 PROCEDURE — 82962 GLUCOSE BLOOD TEST: CPT

## 2021-06-12 PROCEDURE — 93306 TTE W/DOPPLER COMPLETE: CPT | Performed by: INTERNAL MEDICINE

## 2021-06-12 PROCEDURE — 02H633Z INSERTION OF INFUSION DEVICE INTO RIGHT ATRIUM, PERCUTANEOUS APPROACH: ICD-10-PCS | Performed by: INTERNAL MEDICINE

## 2021-06-12 PROCEDURE — 93306 TTE W/DOPPLER COMPLETE: CPT

## 2021-06-12 PROCEDURE — 84443 ASSAY THYROID STIM HORMONE: CPT

## 2021-06-12 PROCEDURE — 82607 VITAMIN B-12: CPT

## 2021-06-12 PROCEDURE — 80053 COMPREHEN METABOLIC PANEL: CPT

## 2021-06-12 PROCEDURE — 99223 1ST HOSP IP/OBS HIGH 75: CPT | Performed by: INTERNAL MEDICINE

## 2021-06-12 PROCEDURE — 83735 ASSAY OF MAGNESIUM: CPT

## 2021-06-12 RX ORDER — LEVETIRACETAM 500 MG/1
500 TABLET ORAL 2 TIMES DAILY
Status: DISCONTINUED | OUTPATIENT
Start: 2021-06-12 | End: 2021-06-14 | Stop reason: HOSPADM

## 2021-06-12 RX ORDER — CLONIDINE 0.2 MG/24H
1 PATCH, EXTENDED RELEASE TRANSDERMAL
Status: DISCONTINUED | OUTPATIENT
Start: 2021-06-12 | End: 2021-06-12

## 2021-06-12 RX ORDER — SODIUM CHLORIDE 0.9 % (FLUSH) 0.9 %
5-40 SYRINGE (ML) INJECTION AS NEEDED
Status: DISCONTINUED | OUTPATIENT
Start: 2021-06-12 | End: 2021-06-14 | Stop reason: HOSPADM

## 2021-06-12 RX ORDER — INSULIN LISPRO 100 [IU]/ML
INJECTION, SOLUTION INTRAVENOUS; SUBCUTANEOUS
Status: DISCONTINUED | OUTPATIENT
Start: 2021-06-12 | End: 2021-06-14 | Stop reason: HOSPADM

## 2021-06-12 RX ORDER — DILTIAZEM HYDROCHLORIDE 240 MG/1
240 CAPSULE, COATED, EXTENDED RELEASE ORAL DAILY
Status: DISCONTINUED | OUTPATIENT
Start: 2021-06-13 | End: 2021-06-12

## 2021-06-12 RX ORDER — GUAIFENESIN 100 MG/5ML
81 LIQUID (ML) ORAL DAILY
Status: DISCONTINUED | OUTPATIENT
Start: 2021-06-13 | End: 2021-06-14 | Stop reason: HOSPADM

## 2021-06-12 RX ORDER — ONDANSETRON 2 MG/ML
4 INJECTION INTRAMUSCULAR; INTRAVENOUS
Status: DISCONTINUED | OUTPATIENT
Start: 2021-06-12 | End: 2021-06-14 | Stop reason: HOSPADM

## 2021-06-12 RX ORDER — FAMOTIDINE 20 MG/1
20 TABLET, FILM COATED ORAL DAILY
Status: DISCONTINUED | OUTPATIENT
Start: 2021-06-13 | End: 2021-06-14 | Stop reason: HOSPADM

## 2021-06-12 RX ORDER — SODIUM CHLORIDE 0.9 % (FLUSH) 0.9 %
5-40 SYRINGE (ML) INJECTION EVERY 8 HOURS
Status: DISCONTINUED | OUTPATIENT
Start: 2021-06-12 | End: 2021-06-14 | Stop reason: HOSPADM

## 2021-06-12 RX ORDER — LEVETIRACETAM 250 MG/1
250 TABLET ORAL
Status: DISCONTINUED | OUTPATIENT
Start: 2021-06-14 | End: 2021-06-14 | Stop reason: HOSPADM

## 2021-06-12 RX ORDER — CLONIDINE 0.3 MG/24H
1 PATCH, EXTENDED RELEASE TRANSDERMAL
Status: DISCONTINUED | OUTPATIENT
Start: 2021-06-12 | End: 2021-06-14 | Stop reason: HOSPADM

## 2021-06-12 RX ORDER — ONDANSETRON 4 MG/1
4 TABLET, ORALLY DISINTEGRATING ORAL
Status: DISCONTINUED | OUTPATIENT
Start: 2021-06-12 | End: 2021-06-14 | Stop reason: HOSPADM

## 2021-06-12 RX ORDER — ATORVASTATIN CALCIUM 10 MG/1
10 TABLET, FILM COATED ORAL DAILY
Status: DISCONTINUED | OUTPATIENT
Start: 2021-06-13 | End: 2021-06-14 | Stop reason: HOSPADM

## 2021-06-12 RX ORDER — HYDRALAZINE HYDROCHLORIDE 20 MG/ML
10 INJECTION INTRAMUSCULAR; INTRAVENOUS
Status: DISCONTINUED | OUTPATIENT
Start: 2021-06-12 | End: 2021-06-14 | Stop reason: HOSPADM

## 2021-06-12 RX ORDER — LABETALOL HCL 20 MG/4 ML
10 SYRINGE (ML) INTRAVENOUS
Status: DISCONTINUED | OUTPATIENT
Start: 2021-06-12 | End: 2021-06-14 | Stop reason: HOSPADM

## 2021-06-12 RX ORDER — DILTIAZEM HYDROCHLORIDE 240 MG/1
240 CAPSULE, COATED, EXTENDED RELEASE ORAL DAILY
Status: DISCONTINUED | OUTPATIENT
Start: 2021-06-12 | End: 2021-06-14 | Stop reason: HOSPADM

## 2021-06-12 RX ORDER — POLYETHYLENE GLYCOL 3350 17 G/17G
17 POWDER, FOR SOLUTION ORAL DAILY PRN
Status: DISCONTINUED | OUTPATIENT
Start: 2021-06-12 | End: 2021-06-14 | Stop reason: HOSPADM

## 2021-06-12 RX ORDER — MINOXIDIL 2.5 MG/1
2.5 TABLET ORAL 2 TIMES DAILY
Status: DISCONTINUED | OUTPATIENT
Start: 2021-06-12 | End: 2021-06-14 | Stop reason: HOSPADM

## 2021-06-12 RX ORDER — HEPARIN SODIUM 1000 [USP'U]/ML
INJECTION, SOLUTION INTRAVENOUS; SUBCUTANEOUS
Status: COMPLETED
Start: 2021-06-12 | End: 2021-06-13

## 2021-06-12 RX ADMIN — LABETALOL 20 MG/4 ML (5 MG/ML) INTRAVENOUS SYRINGE 10 MG: at 16:49

## 2021-06-12 RX ADMIN — METOPROLOL TARTRATE 50 MG: 50 TABLET, FILM COATED ORAL at 09:24

## 2021-06-12 RX ADMIN — HEPARIN SODIUM 6 UNITS/KG/HR: 10000 INJECTION, SOLUTION INTRAVENOUS at 06:25

## 2021-06-12 RX ADMIN — MINOXIDIL 2.5 MG: 2.5 TABLET ORAL at 17:24

## 2021-06-12 RX ADMIN — HEPARIN SODIUM 7 UNITS/KG/HR: 10000 INJECTION, SOLUTION INTRAVENOUS at 22:56

## 2021-06-12 RX ADMIN — HEPARIN SODIUM 7 UNITS/KG/HR: 10000 INJECTION, SOLUTION INTRAVENOUS at 21:02

## 2021-06-12 RX ADMIN — METOPROLOL TARTRATE 50 MG: 50 TABLET, FILM COATED ORAL at 22:38

## 2021-06-12 RX ADMIN — LABETALOL 20 MG/4 ML (5 MG/ML) INTRAVENOUS SYRINGE 10 MG: at 07:50

## 2021-06-12 RX ADMIN — LABETALOL 20 MG/4 ML (5 MG/ML) INTRAVENOUS SYRINGE 10 MG: at 15:57

## 2021-06-12 RX ADMIN — Medication 10 ML: at 14:00

## 2021-06-12 RX ADMIN — LABETALOL 20 MG/4 ML (5 MG/ML) INTRAVENOUS SYRINGE 10 MG: at 16:21

## 2021-06-12 RX ADMIN — DILTIAZEM HYDROCHLORIDE 240 MG: 240 CAPSULE, COATED, EXTENDED RELEASE ORAL at 17:23

## 2021-06-12 RX ADMIN — LABETALOL 20 MG/4 ML (5 MG/ML) INTRAVENOUS SYRINGE 10 MG: at 18:35

## 2021-06-12 RX ADMIN — LEVETIRACETAM 500 MG: 500 TABLET ORAL at 18:34

## 2021-06-12 RX ADMIN — LABETALOL 20 MG/4 ML (5 MG/ML) INTRAVENOUS SYRINGE 10 MG: at 12:15

## 2021-06-12 RX ADMIN — Medication 10 ML: at 07:54

## 2021-06-12 NOTE — ED NOTES
Spoke with nursing supervisor, client spb elevated and need for po and transdermal medication indicated prior to going to floor.

## 2021-06-12 NOTE — PROGRESS NOTES
Bedside and Verbal shift change report given to Tu Avitia RN (oncoming nurse) by Marty Wolfe RN (offgoing nurse). Report included the following information SBAR, Kardex, Intake/Output, MAR and Recent Results. Still waiting for patient from the ED.

## 2021-06-12 NOTE — PROGRESS NOTES
Patient seen as follow-up from an admission earlier today. She complains of chest pain. Admitted for complaints of chest pain, and associated shortness of breath. Noted to have elevated blood pressures. Troponin with mild elevation and is trending down. Cardiology consulted, input noted, appreciate assistance. She remains with elevated blood pressures, my plan is to resume her outpatient regimen of medications. Plan to confirm her medications and doses with [facility] which is where she comes from (despite multiple calls, have been put on hold and line goes to a call that never picks up, unable to confirm at this time). Plan to continue to closely monitor. Plan to consult her nephrologist for continued inpatient dialysis.

## 2021-06-12 NOTE — PROGRESS NOTES
Reviewed vitals--Maps at 114 within the 25% threshhold. Symptomatic chest pain in setting of uncontrolled hypertension indicative of hypertensive emergency    Will review again in 1hr    Alfredo Pritchardist  [address] [facility]  [phone]    6/12 0122  /84 with MAPS of 126--unstable blood Pressure and MAP remain unsuitable for medical floor admission as of now. [place] [employer]  Denisa Bundy  [address] [facility]  [phone]

## 2021-06-12 NOTE — CONSULTS
Cardiology Consult Note    Consultation request by Manjinder Mahajan MD for advice/opinion related to evaluating elevated troponin    Date of  Admission: 6/11/2021 12:39 PM   Primary Care Physician:  Mara Miller MD    Consulting Cardiologist: Dr. 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 Uncontrolled hypertension  3. End-stage renal disease on hemodialysis  4. Chronic diastolic heart failure  5. History of diabetes  6. History of stroke       Plan:     Patient is a 69-year-old female seen for chest pain while on dialysis. In the emergency room found to have uncontrolled hypertension. Elevated troponin likely from demand ischemia. Do not suspect ACS. Currently chest pain-free. EKG-no ischemic ST-T changes. Recommend to resume home blood pressure meds. Will readjust meds based on clinical response. No acute cardiac intervention required at this time. History of Present Illness: This is a 77 y.o. female admitted for Chest pain [R07.9]  Hypertensive emergency [I16.1]. Patient complains of: Chest pain  77year-old presenting for chest pain and shortness of breath.   Past medical history of cerebrovascular accident end-stage renal disease Patient was on dialysis Monday Wednesday and Friday chronic kidney disease. On day of admission patient was 1 hour into dialysis when she developed severe chest pain associated with shortness of breath. Patient has been having intermittent headaches unchanging and throbbing. In emergency room patient presented initially with elevated blood pressure with systolics in the 031G. Patient remained in the emergency room for the better part of the evening due to uncontrolled maps in the 140s but stabilized over the past several hours maps just over 110    Cardiac risk factors: dyslipidemia, diabetes mellitus, hypertension      Review of Symptoms:  Except as stated above include:  Constitutional:  negative  Respiratory:  negative  Cardiovascular:  negative  Gastrointestinal: negative  Genitourinary:  negative  Musculoskeletal:  Negative  Neurological:  Negative  Dermatological:  Negative  Endocrinological: Negative  Psychological:  Negative    A comprehensive review of systems was negative except for that written in the HPI.      Past Medical History:     Past Medical History:   Diagnosis Date    Asthma     Bipolar affective disorder ([facility] [address]     Brain condition     Cataract     Chronic kidney disease     hemodialysis M-W-F    Diabetes ([facility] [address]     Hyperlipidemia     Hypertension     Paralytic strabismus, unspecified     Psychiatric disorder     Seizures ([facility] [address] 08/27/2018         Social History:     Social History     Socioeconomic History    Marital status: SINGLE     Spouse name: Not on file    Number of children: Not on file    Years of education: Not on file    Highest education level: Not on file   Tobacco Use    Smoking status: Never Smoker    Smokeless tobacco: Never Used   Substance and Sexual Activity    Alcohol use: No    Drug use: No    Sexual activity: Never     Social Determinants of Health     Financial Resource Strain:     Difficulty of Paying Living Expenses:    Food Insecurity:     Worried About Running Out of Food in the Last Year:     Ran Out of Food in the Last Year:    Transportation Needs:     Lack of Transportation (Medical):  Lack of Transportation (Non-Medical):    Physical Activity:     Days of Exercise per Week:     Minutes of Exercise per Session:    Stress:     Feeling of Stress :    Social Connections:     Frequency of Communication with Friends and Family:     Frequency of Social Gatherings with Friends and Family:     Attends Gnosticist Services:     Active Member of Clubs or Organizations:     Attends Club or Organization Meetings:     Marital Status:         Family History:   No family history on file. Medications: Allergies   Allergen Reactions    Amoxicillin Unknown (comments)    Cleocin [Clindamycin Hcl] Unknown (comments)    Codeine Unknown (comments)    Lorcet 10/650 [Hydrocodone-Acetaminophen] Unknown (comments)    Penicillin G Benzathine Unknown (comments)    Shellfish Derived Unknown (comments)        Current Facility-Administered Medications   Medication Dose Route Frequency    sodium chloride (NS) flush 5-40 mL  5-40 mL IntraVENous Q8H    sodium chloride (NS) flush 5-40 mL  5-40 mL IntraVENous PRN    polyethylene glycol (MIRALAX) packet 17 g  17 g Oral DAILY PRN    ondansetron (ZOFRAN ODT) tablet 4 mg  4 mg Oral Q8H PRN    Or    ondansetron (ZOFRAN) injection 4 mg  4 mg IntraVENous Q6H PRN    labetaloL (NORMODYNE;TRANDATE) 20 mg/4 mL (5 mg/mL) injection 10 mg  10 mg IntraVENous Q5MIN PRN    heparin 25,000 units in D5W 250 ml infusion  12-25 Units/kg/hr IntraVENous TITRATE    metoprolol tartrate (LOPRESSOR) tablet 50 mg  50 mg Oral Q12H     Current Outpatient Medications   Medication Sig    famotidine (Pepcid) 20 mg tablet Take 1 Tablet by mouth daily.  metoprolol tartrate (LOPRESSOR) 50 mg tablet Take 1 Tablet by mouth every twelve (12) hours.  polyethylene glycol (MIRALAX) 17 gram packet Take 1 Packet by mouth daily as needed for Constipation.     minoxidiL (LONITEN) 2.5 mg tablet Take 1 Tab by mouth two (2) times a day.  cloNIDine (CATAPRES) 0.2 mg/24 hr ptwk 1 Patch by TransDERmal route every seven (7) days.  atorvastatin (Lipitor) 10 mg tablet Take 10 mg by mouth daily. Indications: high amount of triglyceride in the blood    b complex-vitamin c-folic acid 0.8 mg (NEPHRO-DORIAN) 0.8 mg tab tablet Take 1 Tab by mouth daily.  levETIRAcetam (Keppra) 500 mg tablet Take 500 mg by mouth two (2) times a day.  sevelamer carbonate (RENVELA) 800 mg tab tab Take 800 mg by mouth three (3) times daily.  levETIRAcetam (KEPPRA) 250 mg tablet Take 1 Tab by mouth every Monday, Wednesday, Friday.  insulin lispro (HUMALOG) 100 unit/mL injection INITIATE INSULIN CORRECTIVE PROTOCOL: Normal Insulin Sensitivity   For Blood Sugar (mg/dL) of:     Less than 150 =   0 units           150 -199 =   2 units  200 -249 =   4 units  250 -299 =   6 units  300 -349 =   8 units  350 and above = 10 units and Call Physician  If 2 glucose readings are above    aspirin 81 mg tablet Take 81 mg by mouth daily.  diltiazem CD (CARDIZEM CD) 240 mg ER capsule Take 240 mg by mouth daily.            Physical Exam:     Visit Vitals  BP (!) 197/66   Pulse 88   Temp 98 °F (36.7 °C)   Resp 18   Ht 5' 3\" (1.6 m)   Wt 63.5 kg (140 lb)   SpO2 97%   BMI 24.80 kg/m²       TELE: Sinus rhythm with nonspecific T wave changes in anterolateral leads    BP Readings from Last 3 Encounters:   06/12/21 (!) 197/66   06/03/21 (!) (P) 150/83   04/16/21 (!) 200/100     Pulse Readings from Last 3 Encounters:   06/12/21 88   06/03/21 71   04/16/21 84     Wt Readings from Last 3 Encounters:   06/12/21 63.5 kg (140 lb)   06/02/21 62 kg (136 lb 11 oz)   04/16/21 60.3 kg (133 lb)       General:  alert, cooperative, no distress, appears stated age  Neck:  nontender  Lungs:  clear to auscultation bilaterally  Heart:  regular rate and rhythm, systolic murmur: holosystolic 2/6, blowing at apex  Abdomen:  abdomen is soft without significant tenderness, masses, organomegaly or guarding  Extremities:  extremities normal, atraumatic, no cyanosis or edema  Skin: Warm and dry.  no hyperpigmentation, vitiligo, or suspicious lesions  Neuro: alert, oriented x3, affect appropriate, no focal neurological deficits  Psych: non focal     Data Review:     Recent Labs     06/12/21  0415 06/11/21  1335   WBC 7.3 8.2   HGB 8.4* 10.5*   HCT 25.1* 31.3*    306     Recent Labs     06/12/21  0915 06/11/21  1335   * 138   K 3.6 3.9   CL 98* 103   CO2 28 30   * 180*   BUN 29* 30*   CREA 7.84* 7.60*   CA 8.7 9.5   MG 2.2 2.4   ALB 3.2* 3.3*   ALT 22 29       Results for orders placed or performed during the hospital encounter of 06/11/21   EKG, 12 LEAD, INITIAL   Result Value Ref Range    Ventricular Rate 93 BPM    Atrial Rate 93 BPM    P-R Interval 120 ms    QRS Duration 78 ms    Q-T Interval 392 ms    QTC Calculation (Bezet) 487 ms    Calculated P Axis 80 degrees    Calculated R Axis -11 degrees    Calculated T Axis 136 degrees    Diagnosis       Normal sinus rhythm  T wave abnormality, consider anterolateral ischemia  Abnormal ECG  When compared with ECG of 11-JUN-2021 19:49,  No significant change was found         All Cardiac Markers in the last 24 hours:    Lab Results   Component Value Date/Time     06/11/2021 01:35 PM    CKMB 3.6 (H) 06/11/2021 01:35 PM    CKND1 3.3 06/11/2021 01:35 PM    TROIQ 0.13 (H) 06/12/2021 09:15 AM    TROIQ 0.18 (H) 06/11/2021 11:47 PM    TROIQ 0.17 (H) 06/11/2021 07:06 PM    TROIQ 0.12 (H) 06/11/2021 01:35 PM       Last Lipid:  No results found for: CHOL, CHOLX, CHLST, CHOLV, HDL, HDLP, LDL, LDLC, DLDLP, TGLX, TRIGL, TRIGP, CHHD, CHHDX    Cardiographics:     EKG Results     Procedure 720 Value Units Date/Time    EKG, 12 LEAD, INITIAL [[id number]] Collected: 06/12/21 0909    Order Status: Completed Updated: 06/12/21 0911     Ventricular Rate 93 BPM      Atrial Rate 93 BPM      P-R Interval 120 ms      QRS Duration 78 ms      Q-T Interval 392 ms      QTC Calculation (Bezet) 487 ms      Calculated P Axis 80 degrees      Calculated R Axis -11 degrees      Calculated T Axis 136 degrees      Diagnosis --     Normal sinus rhythm  T wave abnormality, consider anterolateral ischemia  Abnormal ECG  When compared with ECG of 11-JUN-2021 19:49,  No significant change was found      EKG, 12 LEAD, REPEAT [[id number]] Collected: 06/11/21 1818    Order Status: Completed Updated: 06/12/21 7168     Ventricular Rate 99 BPM      Atrial Rate 99 BPM      P-R Interval 120 ms      QRS Duration 80 ms      Q-T Interval 386 ms      QTC Calculation (Bezet) 495 ms      Calculated P Axis 74 degrees      Calculated R Axis -8 degrees      Calculated T Axis 118 degrees      Diagnosis --     Sinus rhythm with premature atrial complexes  T wave abnormality, consider lateral ischemia  Abnormal ECG  When compared with ECG of 11-JUN-2021 12:49,  premature atrial complexes are now present  QT has lengthened      EKG, 12 LEAD, INITIAL [[id number]] Collected: 06/11/21 1249    Order Status: Completed Updated: 06/11/21 2104     Ventricular Rate 106 BPM      Atrial Rate 106 BPM      P-R Interval 116 ms      QRS Duration 90 ms      Q-T Interval 328 ms      QTC Calculation (Bezet) 435 ms      Calculated P Axis 65 degrees      Calculated R Axis -36 degrees      Calculated T Axis 137 degrees      Diagnosis --     Sinus tachycardia  Left axis deviation  T wave abnormality, consider lateral ischemia  Abnormal ECG  When compared with ECG of 02-JUN-2021 09:09,  T wave inversion less evident in Anterior leads  Confirmed by Leah Ramachandran M.D., [address] [place] [address] ([id number] on 6/11/2021 9:04:36 PM      EKG, 12 LEAD, REPEAT [[id number]] Collected: 06/11/21 1949    Order Status: Completed Updated: 06/11/21 2026     Ventricular Rate 97 BPM      Atrial Rate 97 BPM      P-R Interval 118 ms      QRS Duration 84 ms      Q-T Interval 378 ms      QTC Calculation (Bezet) 480 ms      Calculated P Axis 63 degrees      Calculated R Axis 0 degrees      Calculated T Axis 132 degrees      Diagnosis --     Normal sinus rhythm  Possible Left atrial enlargement  T wave abnormality, consider lateral ischemia  Prolonged QT  Abnormal ECG  When compared with ECG of 11-JUN-2021 18:19,  premature atrial complexes are no longer present          06/11/21    ECHO ADULT COMPLETE 06/12/2021 6/12/2021    Interpretation Summary  · LV: Estimated LVEF is 60 - 65%. Visually measured ejection fraction. Normal cavity size and systolic function (ejection fraction normal). Mild concentric hypertrophy. Wall motion: normal. Mild (grade 1) left ventricular diastolic dysfunction. · Normal right ventricular size and function. · Pericardium: Moderate loculated pericardial effusion adjacent to right ventricle and right atrium with no evidence of hemodynamic compromise. · PV: Mild pulmonic valve regurgitation is present. · LA: Mildly dilated left atrium. Left Atrium volume index is 39 mL/m2. · PA: Pulmonary arterial systolic pressure is 28 mmHg. Signed by: Mirna Taylor MD on 6/12/2021 11:15 AM        11/08/19    INVASIVE VASCULAR PROCEDURE 11/08/2019 11/8/2019    Narrative  Preoperative diagnosis: End-stage renal disease with poorly working dialysis catheter in need of new one    Postoperative diagnosis: End-stage renal disease with poorly working dialysis catheter in need of new one    Procedures performed:  #1  Dialysis catheter exchange over wire using 19 cm palindrome catheter through a right internal jugular vein approach.   #2  Moderate conscious sedation of 13 minutes    Cultures: None    Specimens: None    Drains: None    Estimated blood loss: Less than 50 mL    Assistants: None    Implants: Please see above    Complications: None    Anesthesia: Moderate conscious sedation of 13 minutes was performed by an independent provider giving the medications per my discretion and monitoring of vital signs throughout the case.    Indications for the procedure: Vishal Dominguez is a 75 y.o. female with end-stage renal disease and poorly working dialysis catheter in need a new one. Patient was given the appropriate risk and benefits of the procedure including but not limited to bleeding, infection, damage to adjacent structures, MI, stroke, death, loss of lower extremity, need for further surgery. Patient was understanding of all the risks and underwent a procedure. Operative findings:  #1  Appropriately placed right IJ permanent dialysis catheter    Procedure:  Patient was correctly identified in the precath area and taken to the Cath Lab in stable condition. Patient had pre-incision timeout prior to any incision. Patient was prepped and draped in the normal sterile fashion according to CDC guidelines aseptic technique. We were able to numb the patient up appropriately from the catheter insertion site all the way to the venous insertion site with 1% lidocaine. We then were able to place an Amplatz superstiff wire down the previous port into the vena cava. A combination of blunt and sharp dissection was used in order to remove the previous catheter and remove the cuff. Once the catheter was removed over wire new palindrome dialysis catheter was placed. X-ray was taken showing the tip of the catheter within the sinoatrial junction with good curvature of the catheter without any kinking. There is no evidence of pneumothorax and the catheter is good for use. We secured the catheter using 2-0 Prolene suture at both butterfly holes and catheter insertion site with Biopatch and Tegaderm being placed. Each port was easily flushed and aspirated and inserted with 1.6 mL of hot heparin. We then were able to cap and wrap the ports with 4 x 4 and tape. Patient tolerated procedure well without any issues and was taken to the recovery area.     Signed by: Lexi Ludwig MD on 11/8/2019  8:15 AM      XR Results (most recent):  Results from Hospital Encounter encounter on 06/11/21    XR CHEST PORT    Narrative  EXAM: CHEST PORTABLE    CLINICAL HISTORY/INDICATION: dyspnea    COMPARISON: 6/1/2021. TECHNIQUE: Portable AP view was obtained. FINDINGS:    LINES/DEVICES: Stable position of dual lumen right IJ central venous catheter,  with tip terminating at the right atrium. HEART/MEDIASTINUM: The cardiomediastinal silhouette is mildly enlarged. LUNGS: Improved interstitial prominence compared to prior study. No pleural  effusion or pneumothorax. SOFT TISSUES: Gaseous distention of the stomach, similar to prior study. BONES: Unremarkable for age. Impression  1. Improved interstitial edema/infiltrate compared to prior study. 2. Cardiomegaly. 3. Redemonstration of nonspecific gaseous distention of the stomach. Thank you for enabling us to participate in the care of this patient.         Signed By: Pamela Livingston MD     June 12, 2021

## 2021-06-12 NOTE — ED NOTES
Spoke with Dr Justina Encinas about pt blood pressure rising, Dr Justina Encinas states he will put orders in.

## 2021-06-12 NOTE — H&P
GENERAL GENERIC H&P/CONSULT    CC Chest Pain    Subjective:  77year-old presenting for chest pain and shortness of breath. Past medical history of cerebrovascular accident end-stage renal disease Patient was on dialysis Monday Wednesday and Friday chronic kidney disease. On day of admission patient was 1 hour into dialysis when she developed severe chest pain associated with shortness of breath. Patient has been having intermittent headaches unchanging and throbbing. In emergency room patient presented initially with elevated blood pressure with systolics in the 097O. Patient remained in the emergency room for the better part of the evening due to uncontrolled maps in the 140s but stabilized over the past several hours maps just over 110     Past Medical History:   Diagnosis Date    Asthma     Bipolar affective disorder ([facility]     Brain condition     Cataract     Chronic kidney disease     hemodialysis M-W-F    Diabetes ([facility]     Hyperlipidemia     Hypertension     Paralytic strabismus, unspecified     Psychiatric disorder     Seizures ([facility] 08/27/2018      Past Surgical History:   Procedure Laterality Date    AK INSJ TUNNELED CVC W/O SUBQ PORT/ AGE 5 YR/> N/A 8/9/2018     in-pt 474A, Insertion Tunneled Central Venous Catheter performed by Nino Ponce MD at [address] [place] [address]    AK INSJ TUNNELED CVC W/O SUBQ PORT/ AGE 5 YR/> N/A 11/8/2019    INSERTION TUNNELED CENTRAL VENOUS CATHETER performed by Makenzie Maldonado MD at [facility] CATH [facility]      Prior to Admission medications    Medication Sig Start Date End Date Taking? Authorizing Provider   famotidine (Pepcid) 20 mg tablet Take 1 Tablet by mouth daily. 6/3/21   Rao Davila MD   metoprolol tartrate (LOPRESSOR) 50 mg tablet Take 1 Tablet by mouth every twelve (12) hours. 6/3/21   Rao Davila MD   polyethylene glycol (MIRALAX) 17 gram packet Take 1 Packet by mouth daily as needed for Constipation.  6/3/21   Rao Davila MD minoxidiL (LONITEN) 2.5 mg tablet Take 1 Tab by mouth two (2) times a day. 3/20/21   Verna Jacobo MD   cloNIDine (CATAPRES) 0.2 mg/24 hr ptwk 1 Patch by TransDERmal route every seven (7) days. 9/29/20   Lili New MD   atorvastatin (Lipitor) 10 mg tablet Take 10 mg by mouth daily. Indications: high amount of triglyceride in the blood    Provider, Historical   b complex-vitamin c-folic acid 0.8 mg (NEPHRO-DORIAN) 0.8 mg tab tablet Take 1 Tab by mouth daily. Provider, Historical   levETIRAcetam (Keppra) 500 mg tablet Take 500 mg by mouth two (2) times a day. Provider, Historical   sevelamer carbonate (RENVELA) 800 mg tab tab Take 800 mg by mouth three (3) times daily. Provider, Historical   levETIRAcetam (KEPPRA) 250 mg tablet Take 1 Tab by mouth every Monday, Wednesday, Friday. 8/31/18   Donte Bragg MD   insulin lispro (HUMALOG) 100 unit/mL injection INITIATE INSULIN CORRECTIVE PROTOCOL: Normal Insulin Sensitivity   For Blood Sugar (mg/dL) of:     Less than 150 =   0 units           150 -199 =   2 units  200 -249 =   4 units  250 -299 =   6 units  300 -349 =   8 units  350 and above = 10 units and Call Physician  If 2 glucose readings are above 8/14/18   Jie Kirk MD   aspirin 81 mg tablet Take 81 mg by mouth daily. Other, MD Elina   diltiazem CD (CARDIZEM CD) 240 mg ER capsule Take 240 mg by mouth daily. Other, MD Elina     Allergies   Allergen Reactions    Amoxicillin Unknown (comments)    Cleocin [Clindamycin Hcl] Unknown (comments)    Codeine Unknown (comments)    Lorcet 10/650 [Hydrocodone-Acetaminophen] Unknown (comments)    Penicillin G Benzathine Unknown (comments)    Shellfish Derived Unknown (comments)      Social History     Tobacco Use    Smoking status: Never Smoker    Smokeless tobacco: Never Used   Substance Use Topics    Alcohol use: No      No family history on file.    Review of Systems   Constitutional: Positive for activity change. HENT: Negative for congestion. Eyes: Negative for discharge. Respiratory: Positive for cough. Negative for apnea. Cardiovascular: Positive for chest pain. Gastrointestinal: Negative for abdominal distention. Endocrine: Negative for cold intolerance. Genitourinary: Negative for difficulty urinating. Musculoskeletal: Negative for arthralgias. Skin: Negative for color change. Allergic/Immunologic: Negative for environmental allergies. Neurological: Positive for headaches. Hematological: Negative for adenopathy. Psychiatric/Behavioral: Negative for agitation. Objective:    No intake/output data recorded. 06/10 0701 - 06/11 1900  In: -   Out: 800   Patient Vitals for the past 8 hrs:   BP Pulse Resp SpO2   06/12/21 0530 (!) 203/90 65 16 98 %   06/12/21 0515 (!) 196/93 68 12 97 %   06/12/21 0500 (!) 196/90 76 19 97 %   06/12/21 0445 (!) 197/95      06/12/21 0345 (!) 190/77 71 20 97 %   06/12/21 0330 (!) 184/74 64 15 96 %   06/12/21 0315 (!) 178/86 70 16 97 %   06/12/21 0300 (!) 172/74 68 17 97 %   06/12/21 0245 (!) 168/72 67 13 97 %   06/12/21 0230 (!) 163/75 70 17 97 %   06/12/21 0215 (!) 174/82 71 23 97 %   06/12/21 0200 (!) 172/82 73 17 96 %   06/12/21 0145 (!) 178/76 76 15 96 %   06/12/21 0130 (!) 199/77 76 19 97 %   06/12/21 0115 (!) 192/80 78 15 97 %   06/11/21 2245 (!) 209/84 84 14 97 %   06/11/21 2214 (!) 234/94 95       Physical Exam  HENT:      Head: Normocephalic. Nose: Nose normal.      Mouth/Throat:      Mouth: Mucous membranes are moist.   Eyes:      Pupils: Pupils are equal, round, and reactive to light. Cardiovascular:      Rate and Rhythm: Normal rate. Pulses: Normal pulses. Pulmonary:      Effort: Pulmonary effort is normal.   Abdominal:      General: There is no distension. Genitourinary:     Comments: deferred  Musculoskeletal:         General: Normal range of motion. Cervical back: Normal range of motion.    Skin:     General: Skin is warm.      Capillary Refill: Capillary refill takes less than 2 seconds. Neurological:      General: No focal deficit present. Mental Status: She is alert. Psychiatric:         Mood and Affect: Mood normal.          Labs:    Recent Results (from the past 24 hour(s))   EKG, 12 LEAD, INITIAL    Collection Time: 06/11/21 12:49 PM   Result Value Ref Range    Ventricular Rate 106 BPM    Atrial Rate 106 BPM    P-R Interval 116 ms    QRS Duration 90 ms    Q-T Interval 328 ms    QTC Calculation (Bezet) 435 ms    Calculated P Axis 65 degrees    Calculated R Axis -36 degrees    Calculated T Axis 137 degrees    Diagnosis       Sinus tachycardia  Left axis deviation  T wave abnormality, consider lateral ischemia  Abnormal ECG  When compared with ECG of 02-JUN-2021 09:09,  T wave inversion less evident in Anterior leads  Confirmed by Maria Fernanda Tsang M.D., [address] [place] [address] ([id number]) on 6/11/2021 9:04:36 PM     CBC WITH AUTOMATED DIFF    Collection Time: 06/11/21  1:35 PM   Result Value Ref Range    WBC 8.2 4.6 - 13.2 K/uL    RBC 3.48 (L) 4.20 - 5.30 M/uL    HGB 10.5 (L) 12.0 - 16.0 g/dL    HCT 31.3 (L) 35.0 - 45.0 %    MCV 89.9 74.0 - 97.0 FL    MCH 30.2 24.0 - 34.0 PG    MCHC 33.5 31.0 - 37.0 g/dL    RDW 14.7 (H) 11.6 - 14.5 %    PLATELET 592 367 - 943 K/uL    MPV 12.3 (H) 9.2 - 11.8 FL    NEUTROPHILS 81 (H) 40 - 73 %    LYMPHOCYTES 9 (L) 21 - 52 %    MONOCYTES 7 3 - 10 %    EOSINOPHILS 3 0 - 5 %    BASOPHILS 0 0 - 2 %    ABS. NEUTROPHILS 6.6 1.8 - 8.0 K/UL    ABS. LYMPHOCYTES 0.7 (L) 0.9 - 3.6 K/UL    ABS. MONOCYTES 0.6 0.05 - 1.2 K/UL    ABS. EOSINOPHILS 0.2 0.0 - 0.4 K/UL    ABS.  BASOPHILS 0.0 0.0 - 0.1 K/UL    DF AUTOMATED     METABOLIC PANEL, COMPREHENSIVE    Collection Time: 06/11/21  1:35 PM   Result Value Ref Range    Sodium 138 136 - 145 mmol/L    Potassium 3.9 3.5 - 5.5 mmol/L    Chloride 103 100 - 111 mmol/L    CO2 30 21 - 32 mmol/L    Anion gap 5 3.0 - 18 mmol/L    Glucose 180 (H) 74 - 99 mg/dL    BUN 30 (H) 7.0 - 18 MG/DL Creatinine 7.60 (H) 0.6 - 1.3 MG/DL    BUN/Creatinine ratio 4 (L) 12 - 20      GFR est AA 6 (L) >60 ml/min/1.73m2    GFR est non-AA 5 (L) >60 ml/min/1.73m2    Calcium 9.5 8.5 - 10.1 MG/DL    Bilirubin, total 0.3 0.2 - 1.0 MG/DL    ALT (SGPT) 29 13 - 56 U/L    AST (SGOT) 24 10 - 38 U/L    Alk.  phosphatase 116 45 - 117 U/L    Protein, total 7.1 6.4 - 8.2 g/dL    Albumin 3.3 (L) 3.4 - 5.0 g/dL    Globulin 3.8 2.0 - 4.0 g/dL    A-G Ratio 0.9 0.8 - 1.7     CARDIAC PANEL,(CK, CKMB & TROPONIN)    Collection Time: 06/11/21  1:35 PM   Result Value Ref Range    CK - MB 3.6 (H) <3.6 ng/ml    CK-MB Index 3.3 0.0 - 4.0 %     26 - 192 U/L    Troponin-I, QT 0.12 (H) 0.0 - 0.045 NG/ML   MAGNESIUM    Collection Time: 06/11/21  1:35 PM   Result Value Ref Range    Magnesium 2.4 1.6 - 2.6 mg/dL   EKG, 12 LEAD, SUBSEQUENT    Collection Time: 06/11/21  6:18 PM   Result Value Ref Range    Ventricular Rate 98 BPM    Atrial Rate 98 BPM    P-R Interval 122 ms    QRS Duration 84 ms    Q-T Interval 368 ms    QTC Calculation (Bezet) 469 ms    Calculated P Axis 70 degrees    Calculated R Axis -6 degrees    Calculated T Axis 129 degrees    Diagnosis       Poor data quality, interpretation may be adversely affected  Normal sinus rhythm  T wave abnormality, consider lateral ischemia  Abnormal ECG  When compared with ECG of 11-JUN-2021 12:49,  No significant change was found     TROPONIN I    Collection Time: 06/11/21  7:06 PM   Result Value Ref Range    Troponin-I, QT 0.17 (H) 0.0 - 0.045 NG/ML   EKG, 12 LEAD, SUBSEQUENT    Collection Time: 06/11/21  7:49 PM   Result Value Ref Range    Ventricular Rate 97 BPM    Atrial Rate 97 BPM    P-R Interval 118 ms    QRS Duration 84 ms    Q-T Interval 378 ms    QTC Calculation (Bezet) 480 ms    Calculated P Axis 63 degrees    Calculated R Axis 0 degrees    Calculated T Axis 132 degrees    Diagnosis       Normal sinus rhythm  Possible Left atrial enlargement  T wave abnormality, consider lateral ischemia  Prolonged QT  Abnormal ECG  When compared with ECG of 11-JUN-2021 18:19,  premature atrial complexes are no longer present     PTT    Collection Time: 06/11/21  9:59 PM   Result Value Ref Range    aPTT 27.1 23.0 - 36.4 SEC   TROPONIN I    Collection Time: 06/11/21 11:47 PM   Result Value Ref Range    Troponin-I, QT 0.18 (H) 0.0 - 0.045 NG/ML   CBC WITH AUTOMATED DIFF    Collection Time: 06/12/21  4:15 AM   Result Value Ref Range    WBC 7.3 4.6 - 13.2 K/uL    RBC 2.81 (L) 4.20 - 5.30 M/uL    HGB 8.4 (L) 12.0 - 16.0 g/dL    HCT 25.1 (L) 35.0 - 45.0 %    MCV 89.3 74.0 - 97.0 FL    MCH 29.9 24.0 - 34.0 PG    MCHC 33.5 31.0 - 37.0 g/dL    RDW 14.8 (H) 11.6 - 14.5 %    PLATELET 043 930 - 418 K/uL    MPV 12.0 (H) 9.2 - 11.8 FL    NEUTROPHILS 72 40 - 73 %    LYMPHOCYTES 15 (L) 21 - 52 %    MONOCYTES 10 3 - 10 %    EOSINOPHILS 2 0 - 5 %    BASOPHILS 0 0 - 2 %    ABS. NEUTROPHILS 5.3 1.8 - 8.0 K/UL    ABS. LYMPHOCYTES 1.1 0.9 - 3.6 K/UL    ABS. MONOCYTES 0.7 0.05 - 1.2 K/UL    ABS. EOSINOPHILS 0.2 0.0 - 0.4 K/UL    ABS. BASOPHILS 0.0 0.0 - 0.1 K/UL    DF AUTOMATED     PTT    Collection Time: 06/12/21  4:18 AM   Result Value Ref Range    aPTT >180.0 (HH) 23.0 - 36.4 SEC       ECG:   Normal sinus rhythm   Possible Left atrial enlargement   T wave abnormality, consider lateral ischemia   Prolonged QT   Abnormal ECG     Chest X-Ray:  IMPRESSION  1. Improved interstitial edema/infiltrate compared to prior study.     2. Cardiomegaly.     3. Redemonstration of nonspecific gaseous distention of the stomach.     Assessment:  Active Problems:    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 ([facility] (8/8/2018)      CVA, old, cognitive deficits (8/8/2018)      Hypertensive emergency (9/25/2020)      Anemia (3/19/2021)      Elevated troponin level (6/2/2021)      Chest pain (6/11/2021)      Prolonged Q-T interval on ECG (6/12/2021)        Plan:  Labetalol for systolics>180 hold for NH<96 max 30 doses q24hr period  Recheck troponins  Continue Heparin Drip  Recontinue home BP medications add aspirin  Dialysis scheduled MWF  Continue Keppra for seizures    GLOBAL:  Admit to: telemetry  Cardiac Diet  DVT PPX:sqh q12  Full Code  PT/OT  Tylenol/NSAID for pain  Optional Inpatient Sleep Regimen  Anticipate DC    Signed:  Molly Villarreal MD 6/12/2021

## 2021-06-12 NOTE — PROGRESS NOTES
Attempted to see pt at 10:15 AM, cleared per RN. Upon entrance into room, pt's most recent BP measured >881 systolic. Remeasured BP, which read 202/83. Pt in NAD t/o interaction, stating they felt \"much better today\" than yesterday. Hold PT at this time until more stable vitals maintained. RN aware. Debe Blood.  Amee Lockhart, FAREEDT

## 2021-06-13 LAB
APTT PPP: 28.6 SEC (ref 23–36.4)
EST. AVERAGE GLUCOSE BLD GHB EST-MCNC: 151 MG/DL
GLUCOSE BLD STRIP.AUTO-MCNC: 149 MG/DL (ref 70–110)
GLUCOSE BLD STRIP.AUTO-MCNC: 187 MG/DL (ref 70–110)
GLUCOSE BLD STRIP.AUTO-MCNC: 189 MG/DL (ref 70–110)
GLUCOSE BLD STRIP.AUTO-MCNC: 192 MG/DL (ref 70–110)
GLUCOSE BLD STRIP.AUTO-MCNC: 210 MG/DL (ref 70–110)
HBA1C MFR BLD: 6.9 % (ref 4.2–5.6)

## 2021-06-13 PROCEDURE — 97165 OT EVAL LOW COMPLEX 30 MIN: CPT

## 2021-06-13 PROCEDURE — 99232 SBSQ HOSP IP/OBS MODERATE 35: CPT | Performed by: INTERNAL MEDICINE

## 2021-06-13 PROCEDURE — 36415 COLL VENOUS BLD VENIPUNCTURE: CPT

## 2021-06-13 PROCEDURE — 74011250636 HC RX REV CODE- 250/636: Performed by: INTERNAL MEDICINE

## 2021-06-13 PROCEDURE — 74011250637 HC RX REV CODE- 250/637: Performed by: INTERNAL MEDICINE

## 2021-06-13 PROCEDURE — 74011636637 HC RX REV CODE- 636/637: Performed by: INTERNAL MEDICINE

## 2021-06-13 PROCEDURE — 65270000029 HC RM PRIVATE

## 2021-06-13 PROCEDURE — 74011250636 HC RX REV CODE- 250/636: Performed by: FAMILY MEDICINE

## 2021-06-13 PROCEDURE — 85730 THROMBOPLASTIN TIME PARTIAL: CPT

## 2021-06-13 PROCEDURE — 74011250636 HC RX REV CODE- 250/636

## 2021-06-13 PROCEDURE — 74011250637 HC RX REV CODE- 250/637: Performed by: FAMILY MEDICINE

## 2021-06-13 PROCEDURE — 83036 HEMOGLOBIN GLYCOSYLATED A1C: CPT

## 2021-06-13 PROCEDURE — 82962 GLUCOSE BLOOD TEST: CPT

## 2021-06-13 RX ORDER — HEPARIN SODIUM 5000 [USP'U]/ML
5000 INJECTION, SOLUTION INTRAVENOUS; SUBCUTANEOUS EVERY 8 HOURS
Status: DISCONTINUED | OUTPATIENT
Start: 2021-06-13 | End: 2021-06-14 | Stop reason: HOSPADM

## 2021-06-13 RX ORDER — CARVEDILOL 25 MG/1
25 TABLET ORAL 2 TIMES DAILY WITH MEALS
Status: DISCONTINUED | OUTPATIENT
Start: 2021-06-13 | End: 2021-06-14 | Stop reason: HOSPADM

## 2021-06-13 RX ORDER — LOSARTAN POTASSIUM 50 MG/1
100 TABLET ORAL DAILY
Status: DISCONTINUED | OUTPATIENT
Start: 2021-06-14 | End: 2021-06-14 | Stop reason: HOSPADM

## 2021-06-13 RX ORDER — HEPARIN SODIUM 1000 [USP'U]/ML
3000 INJECTION, SOLUTION INTRAVENOUS; SUBCUTANEOUS ONCE
Status: COMPLETED | OUTPATIENT
Start: 2021-06-13 | End: 2021-06-13

## 2021-06-13 RX ADMIN — Medication 10 ML: at 23:18

## 2021-06-13 RX ADMIN — LEVETIRACETAM 500 MG: 500 TABLET ORAL at 08:20

## 2021-06-13 RX ADMIN — LABETALOL 20 MG/4 ML (5 MG/ML) INTRAVENOUS SYRINGE 10 MG: at 04:34

## 2021-06-13 RX ADMIN — ASPIRIN 81 MG: 81 TABLET, CHEWABLE ORAL at 08:20

## 2021-06-13 RX ADMIN — MINOXIDIL 2.5 MG: 2.5 TABLET ORAL at 08:20

## 2021-06-13 RX ADMIN — MINOXIDIL 2.5 MG: 2.5 TABLET ORAL at 18:41

## 2021-06-13 RX ADMIN — HEPARIN SODIUM 3000 UNITS: 1000 INJECTION, SOLUTION INTRAVENOUS; SUBCUTANEOUS at 04:40

## 2021-06-13 RX ADMIN — ATORVASTATIN CALCIUM 10 MG: 10 TABLET, FILM COATED ORAL at 08:20

## 2021-06-13 RX ADMIN — HYDRALAZINE HYDROCHLORIDE 10 MG: 20 INJECTION INTRAMUSCULAR; INTRAVENOUS at 00:45

## 2021-06-13 RX ADMIN — FAMOTIDINE 20 MG: 20 TABLET ORAL at 08:20

## 2021-06-13 RX ADMIN — HEPARIN SODIUM 5000 UNITS: 5000 INJECTION INTRAVENOUS; SUBCUTANEOUS at 18:41

## 2021-06-13 RX ADMIN — INSULIN LISPRO 4 UNITS: 100 INJECTION, SOLUTION INTRAVENOUS; SUBCUTANEOUS at 18:41

## 2021-06-13 RX ADMIN — CARVEDILOL 25 MG: 25 TABLET, FILM COATED ORAL at 18:41

## 2021-06-13 RX ADMIN — Medication 10 ML: at 17:31

## 2021-06-13 RX ADMIN — Medication 10 ML: at 05:21

## 2021-06-13 RX ADMIN — INSULIN LISPRO 2 UNITS: 100 INJECTION, SOLUTION INTRAVENOUS; SUBCUTANEOUS at 23:15

## 2021-06-13 RX ADMIN — INSULIN LISPRO 2 UNITS: 100 INJECTION, SOLUTION INTRAVENOUS; SUBCUTANEOUS at 00:39

## 2021-06-13 RX ADMIN — LEVETIRACETAM 500 MG: 500 TABLET ORAL at 18:41

## 2021-06-13 RX ADMIN — ONDANSETRON 4 MG: 2 INJECTION INTRAMUSCULAR; INTRAVENOUS at 10:36

## 2021-06-13 RX ADMIN — INSULIN LISPRO 2 UNITS: 100 INJECTION, SOLUTION INTRAVENOUS; SUBCUTANEOUS at 12:52

## 2021-06-13 RX ADMIN — METOPROLOL TARTRATE 50 MG: 50 TABLET, FILM COATED ORAL at 08:20

## 2021-06-13 RX ADMIN — HEPARIN SODIUM 3000 UNITS: 1000 INJECTION INTRAVENOUS; SUBCUTANEOUS at 04:40

## 2021-06-13 RX ADMIN — HYDRALAZINE HYDROCHLORIDE 10 MG: 20 INJECTION INTRAMUSCULAR; INTRAVENOUS at 08:22

## 2021-06-13 RX ADMIN — Medication 10 ML: at 00:47

## 2021-06-13 RX ADMIN — DILTIAZEM HYDROCHLORIDE 240 MG: 240 CAPSULE, COATED, EXTENDED RELEASE ORAL at 08:20

## 2021-06-13 NOTE — ROUTINE PROCESS
TRANSFER - IN REPORT: 
 
Verbal report received from [address] [place] [address], RN(name) on Elijah Casiano  being received from ED(unit) for routine progression of care Report consisted of patients Situation, Background, Assessment and  
Recommendations(SBAR). Information from the following report(s) SBAR, Kardex, ED Summary, MAR, Recent Results and Cardiac Rhythm NSR was reviewed with the receiving nurse. Opportunity for questions and clarification was provided. Assessment completed upon patients arrival to unit and care assumed.

## 2021-06-13 NOTE — PROGRESS NOTES
Per Dr. Rosendo Snider, pt will be ready for discharge today or tomorrow. Pt is from [phone] [place] [address] and she stated pt can return when d/c'd  Notified Dr. Rosendo Snider. Clinicals sent to [facility] in cclink  Per previous notes in chart, ACP sets up pt's dialysis transport. Called Kim of [facility] but she did not . transportation to [address] [place] [address] Rd  Patient will require BLS transport. Pt requires Stretcher If stretcher, reason: history of stroke, cognitive deficit  Patient is currently requiring oxygen No No  Height:5'3\"   Weight: 140 Ib  Pt is on isolation: No    Is the pt ready now? no  Requested time: Next Available  PCS Faxed: no  Insurance verified on face sheet: yes  Auth needed for transport: yes  CM completed PCS/ Envelope and placed on chart. Called  transportation and spoke with Michael Kent. Trip number is [phone]. Requested for Jacob stated she will contact [address]. [facility] and they will  pt at 6pm if ready for d/c  Sent text message to Dr. Rosendo Snider. Per Dr. Rosendo Snider, pt is not ready for discharge.     Called Pat of [address] and placed transport on \"Will Call\"  Notified Tracey Lundy of [facility], BSN RN  Care Management  Pager: [phone]

## 2021-06-13 NOTE — PROGRESS NOTES
Bedside shift change report given to Aly Arambula RN (oncoming nurse) by April Gray RN (offgoing nurse). Report included the following information SBAR, Kardex, Intake/Output, MAR and Recent Results.

## 2021-06-13 NOTE — PROGRESS NOTES
Progress Note    Elsa Casiano  77 y.o. Admit Date: 6/11/2021  Active Problems:    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 ([facility] [address] (8/8/2018) POA: Yes      CVA, old, cognitive deficits (8/8/2018) POA: Yes      Hypertensive emergency (9/25/2020) POA: Yes      Anemia (3/19/2021) POA: Yes      Elevated troponin level (6/2/2021) POA: Yes      Chest pain (6/11/2021) POA: Unknown      Prolonged Q-T interval on ECG (6/12/2021) POA: Unknown      Hypertensive urgency (6/12/2021) POA: Unknown            Subjective:     Patient  Feels ok, at her baseline mental status, no SOB,had nausea, received Meds. .      A comprehensive review of systems was negative except for that written in the History of Present Illness.     Objective:     Visit Vitals  BP (!) 175/78 (BP 1 Location: Right upper arm, BP Patient Position: At rest;Lying)   Pulse 73   Temp 97.2 °F (36.2 °C)   Resp 20   Ht 5' 3\" (1.6 m)   Wt 63.5 kg (140 lb)   LMP  (LMP Unknown)   SpO2 96%   Breastfeeding No   BMI 24.80 kg/m²         Intake/Output Summary (Last 24 hours) at 6/13/2021 1242  Last data filed at 6/13/2021 7673  Gross per 24 hour   Intake 168.92 ml   Output    Net 168.92 ml       Current Facility-Administered Medications   Medication Dose Route Frequency Provider Last Rate Last Admin    [START ON 6/14/2021] losartan (COZAAR) tablet 100 mg  100 mg Oral DAILY Sera Nieves MD        carvediloL (COREG) tablet 25 mg  25 mg Oral BID WITH MEALS Sera Nieves MD        aspirin chewable tablet 81 mg  81 mg Oral DAILY Benito Beaver MD   81 mg at 06/13/21 0820    atorvastatin (LIPITOR) tablet 10 mg  10 mg Oral DAILY Benito Beaver MD   10 mg at 06/13/21 0820    famotidine (PEPCID) tablet 20 mg  20 mg Oral DAILY Benito Beaver MD   20 mg at 06/13/21 0820    insulin lispro (HUMALOG) injection   SubCUTAneous AC&HS Benito Beaver MD   2 Units at 06/13/21 0039    [START ON 6/14/2021] levETIRAcetam (KEPPRA) tablet 250 mg  250 mg Oral Q MON, WED & Erica Mcgovern MD  levETIRAcetam (KEPPRA) tablet 500 mg  500 mg Oral BID Florencia Mehta MD   500 mg at 06/13/21 0820    sodium chloride (NS) flush 5-40 mL  5-40 mL IntraVENous Q8H Florencia Mehta MD   10 mL at 06/13/21 0521    sodium chloride (NS) flush 5-40 mL  5-40 mL IntraVENous PRN Florencia Mehta MD        polyethylene glycol [facility]) packet 17 g  17 g Oral DAILY PRN Florencia Mehta MD        ondansetron (ZOFRAN ODT) tablet 4 mg  4 mg Oral Q8H PRN Florencia Mehta MD        Or    [facility]ron [facility] injection 4 mg  4 mg IntraVENous Q6H PRN Florencia Mehta MD   4 mg at 06/13/21 1036    labetaloL (NORMODYNE;TRANDATE) 20 mg/4 mL (5 mg/mL) injection 10 mg  10 mg IntraVENous Q5MIN PRN Florencia Mehta MD   10 mg at 06/13/21 0434    dilTIAZem ER (CARDIZEM CD) capsule 240 mg  240 mg Oral DAILY Luis Navarrete MD   240 mg at 06/13/21 0820    minoxidiL (LONITEN) tablet 2.5 mg  2.5 mg Oral BID Luis Navarrete MD   2.5 mg at 06/13/21 0820    cloNIDine (CATAPRES) 0.3 mg/24 hr patch 1 Patch  1 Patch TransDERmal Q7D Amber Han MD   1 Patch at 06/13/21 0036    hydrALAZINE (APRESOLINE) 20 mg/mL injection 10 mg  10 mg IntraVENous Q6H PRN Amber Han MD   10 mg at 06/13/21 4534        Physical Exam:     Physical Exam:   General:  Alert, cooperative, no distress, appears stated age. Neck: Supple, symmetrical, trachea midline, no adenopathy, thyroid: no enlargement/tenderness/nodules, no carotid bruit and no JVD. Lungs:   Clear to auscultation bilaterally. Heart:  Regular rate and rhythm, S1, S2 normal, no murmur, click, rub or gallop. Abdomen:   Soft, non-tender. Bowel sounds normal. No masses,  No organomegaly.    Extremities: Extremities normal, atraumatic, no cyanosis or edema, HD catheter is well dressed   Skin: Skin color, texture, turgor normal. No rashes or lesions         Data Review:    CBC w/Diff    Recent Labs     06/12/21  0415 06/11/21  1335   WBC 7.3 8.2   RBC 2.81* 3.48*   HGB 8.4* 10.5*   HCT 25.1* 31.3*   MCV 89.3 89.9   MCH 29.9 30.2   MCHC 33.5 33.5   RDW 14.8* 14.7*    Recent Labs     06/12/21  0415 06/11/21  1335   MONOS 10 7   EOS 2 3   BASOS 0 0   RDW 14.8* 14.7*        Comprehensive Metabolic Profile    Recent Labs     06/12/21  0915 06/11/21  1335   * 138   K 3.6 3.9   CL 98* 103   CO2 28 30   BUN 29* 30*   CREA 7.84* 7.60*    Recent Labs     06/12/21  0915 06/11/21  1335   CA 8.7 9.5   ALB 3.2* 3.3*   TP 6.5 7.1   TBILI 0.3 0.3                }        Impression:       Active Hospital Problems    Diagnosis Date Noted    Prolonged Q-T interval on ECG 06/12/2021    Hypertensive urgency 06/12/2021    Chest pain 06/11/2021    Elevated troponin level 06/02/2021    Anemia 03/19/2021    Hypertensive emergency 09/25/2020    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 ([facility] [address] 08/08/2018    CVA, old, cognitive deficits 08/08/2018            Plan:     Adjust medicine, changed Metoprolol to Coreg, Added Losartan, Continue Diltiazem, Catapres patch ,also on low dose Minoxidil. If she gets  & takes medicine her BP will be controlled. Problem is that with her Dementia most of the time she does not take medicine. Dialysis Tomorrow & then Transfer back to Nursing home.       Drew Esquivel MD

## 2021-06-13 NOTE — PROGRESS NOTES
Cardiology Progress Note    Admit Date: 6/11/2021  Attending Cardiologist: Dr. 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 Uncontrolled hypertension  3. End-stage renal disease on hemodialysis  4. Chronic diastolic heart failure  5. History of diabetes  6. History of stroke    Plan:     BP remains elevated. Coreg dose increased by renal.  Add hydralazine 50 mg tid if needed  Monitor for hypotension as patient on multiple vasodilators. Subjective:     No new complaints.     Objective:      Patient Vitals for the past 8 hrs:   Temp Pulse Resp BP SpO2   06/13/21 1941 97.4 °F (36.3 °C) 78 20 139/77 98 %   06/13/21 1815 98.3 °F (36.8 °C) 79 20 (!) 178/100 97 %   06/13/21 1323    (!) 161/76          Patient Vitals for the past 96 hrs:   Weight   06/12/21 0738 63.5 kg (140 lb)   06/11/21 1252 63.5 kg (140 lb)       TELE: normal sinus rhythm               Current Facility-Administered Medications   Medication Dose Route Frequency Last Admin    [START ON 6/14/2021] losartan (COZAAR) tablet 100 mg  100 mg Oral DAILY      carvediloL (COREG) tablet 25 mg  25 mg Oral BID WITH MEALS 25 mg at 06/13/21 1841    heparin (porcine) injection 5,000 Units  5,000 Units SubCUTAneous Q8H 5,000 Units at 06/13/21 1841    aspirin chewable tablet 81 mg  81 mg Oral DAILY 81 mg at 06/13/21 0820    atorvastatin (LIPITOR) tablet 10 mg  10 mg Oral DAILY 10 mg at 06/13/21 0820    famotidine (PEPCID) tablet 20 mg  20 mg Oral DAILY 20 mg at 06/13/21 0820    insulin lispro (HUMALOG) injection   SubCUTAneous AC&HS 4 Units at 06/13/21 1841    [START ON 6/14/2021] levETIRAcetam (KEPPRA) tablet 250 mg  250 mg Oral Q MON, WED & FRI      levETIRAcetam (KEPPRA) tablet 500 mg  500 mg Oral  mg at 06/13/21 1841    sodium chloride (NS) flush 5-40 mL  5-40 mL IntraVENous Q8H 10 mL at 06/13/21 1731    sodium chloride (NS) flush 5-40 mL  5-40 mL IntraVENous PRN      polyethylene glycol (MIRALAX) packet 17 g  17 g Oral DAILY PRN      ondansetron (ZOFRAN ODT) tablet 4 mg  4 mg Oral Q8H PRN      Or    ondansetron (ZOFRAN) injection 4 mg  4 mg IntraVENous Q6H PRN 4 mg at 06/13/21 1036    labetaloL (NORMODYNE;TRANDATE) 20 mg/4 mL (5 mg/mL) injection 10 mg  10 mg IntraVENous Q5MIN PRN 10 mg at 06/13/21 0434    dilTIAZem ER (CARDIZEM CD) capsule 240 mg  240 mg Oral DAILY 240 mg at 06/13/21 0820    minoxidiL (LONITEN) tablet 2.5 mg  2.5 mg Oral BID 2.5 mg at 06/13/21 1841    cloNIDine (CATAPRES) 0.3 mg/24 hr patch 1 Patch  1 Patch TransDERmal Q7D 1 Patch at 06/13/21 0036    hydrALAZINE (APRESOLINE) 20 mg/mL injection 10 mg  10 mg IntraVENous Q6H PRN 10 mg at 06/13/21 7556         Intake/Output Summary (Last 24 hours) at 6/13/2021 1952  Last data filed at 6/13/2021 9551  Gross per 24 hour   Intake 168.92 ml   Output    Net 168.92 ml       Physical Exam:  General:  alert, cooperative, no distress, appears stated age  Neck:  no JVD  Lungs:  clear to auscultation bilaterally  Heart:  regular rate and rhythm, S1, S2 normal, no murmur, click, rub or gallop  Abdomen:  abdomen is soft without significant tenderness, masses, organomegaly or guarding  Extremities:  no edema    Visit Vitals  /77   Pulse 78   Temp 97.4 °F (36.3 °C)   Resp 20   Ht 5' 3\" (1.6 m)   Wt 63.5 kg (140 lb)   SpO2 98%   Breastfeeding No   BMI 24.80 kg/m²       Data Review:     Labs: Results:       Chemistry Recent Labs     06/12/21  0915 06/11/21  1335   * 180*   * 138   K 3.6 3.9   CL 98* 103   CO2 28 30   BUN 29* 30*   CREA 7.84* 7.60*   CA 8.7 9.5   MG 2.2 2.4   AGAP 9 5   BUCR 4* 4*   AP 96 116   TP 6.5 7.1   ALB 3.2* 3.3*   GLOB 3.3 3.8   AGRAT 1.0 0.9      CBC w/Diff Recent Labs     06/12/21  0415 06/11/21  1335   WBC 7.3 8.2   RBC 2.81* 3.48*   HGB 8.4* 10.5*   HCT 25.1* 31.3*    306   GRANS 72 81*   LYMPH 15* 9*   EOS 2 3      Cardiac Enzymes No results found for: CPK, CK, CKMMB, CKMB, RCK3, CKMBT, CKNDX, CKND1, SONG, TROPT, TROIQ, KARINA, TROPT, TNIPOC, BNP, BNPP   Coagulation Recent Labs     06/13/21  0140 06/12/21  1912   APTT 28.6 114.4*       Lipid Panel No results found for: CHOL, CHOLPOCT, CHOLX, CHLST, CHOLV, 617163, HDL, HDLP, LDL, LDLC, DLDLP, 637734, VLDLC, VLDL, TGLX, TRIGL, TRIGP, TGLPOCT, CHHD, CHHDX   BNP No results found for: BNP, BNPP, XBNPT   Liver Enzymes Recent Labs     06/12/21 0915   TP 6.5   ALB 3.2*   AP 96      Thyroid Studies Lab Results   Component Value Date/Time    TSH 2.59 06/12/2021 09:15 AM          Signed By: Samantha Leon MD     June 13, 2021

## 2021-06-13 NOTE — PROGRESS NOTES
Occupational Therapy Goals  Initiated 6/13/2021 within 7 day(s). 1.  Patient will perform grooming with modified independent while sitting on the edge of the bed  2. Patient will demonstrate 4/5 UB strength in preparation for her efficient completion of hr self care routine  3. Patient will maneuver in bed with SBA in preparation for her participation in hr self care routine  4. Patient will perform UB bathing progressing to sitting on the edge of the bed as able     OCCUPATIONAL THERAPY EVALUATION    Patient: Umer Beaver (41 y.o. female)  Date: 6/13/2021  Primary Diagnosis: Chest pain [R07.9]  Hypertensive emergency [I16.1]  Hypertensive urgency [I16.0]        Precautions: fall    PLOF: she lives with her daughter and she requires assistance for her ADL's and her IADL's; she is unable to consistently report how she mobilizes at home (ie: walker, wheelchair. ... )    ASSESSMENT :  Based on the objective data described below, the patient presents with decreased UB strength, decreased balance and decreased functional mobility which limits her independence with her self care and her safety at this time. Patient will benefit from skilled intervention to address the above impairments.   Patient's rehabilitation potential is considered to be Fair  Factors which may influence rehabilitation potential include:   []             None noted  []             Mental ability/status  [x]             Medical condition  []             Home/family situation and support systems  []             Safety awareness  []             Pain tolerance/management  []             Other:      PLAN :  Recommendations and Planned Interventions:   [x]               Self Care Training                  [x]      Therapeutic Activities  []               Functional Mobility Training   []      Cognitive Retraining  [x]               Therapeutic Exercises           []      Endurance Activities  []               Balance Training                    [] Neuromuscular Re-Education  []               Visual/Perceptual Training     [x]      Home Safety Training  [x]               Patient Education                   [x]      Family Training/Education  []               Other (comment):    Frequency/Duration: Patient will be followed by occupational therapy 1-2 times per day/4-7 days per week to address goals. Discharge Recommendations: [address]  Further Equipment Recommendations for Discharge: N/A     SUBJECTIVE:   Patient stated I take care of myself at home.  and then \"My daughter does everything for me\"    OBJECTIVE DATA SUMMARY:     Past Medical History:   Diagnosis Date    Asthma     Bipolar affective disorder ([facility]     Brain condition     Cataract     Chronic kidney disease     hemodialysis M-W-F    Diabetes ([facility]     Hyperlipidemia     Hypertension     Paralytic strabismus, unspecified     Psychiatric disorder     Seizures ([facility] 08/27/2018     Past Surgical History:   Procedure Laterality Date    NE INSJ TUNNELED CVC W/O SUBQ PORT/ AGE 3 YR/> N/A 8/9/2018     in-pt 474A, Insertion Tunneled Central Venous Catheter performed by Gale Cueva MD at [address] [place] [address] LAB    NE INSJ TUNNELED CVC W/O SUBQ PORT/ AGE 3 YR/> N/A 11/8/2019    INSERTION TUNNELED CENTRAL VENOUS CATHETER performed by Carey Palmer MD at [address] [place] [address]     Barriers to Learning/Limitations: yes;  cognitive  Compensate with: visual, verbal, tactile, kinesthetic cues/model    Home Situation:   Home Situation  Home Environment: [address] [place] [address] Name: [address] [place] [address],[address] of [place]  [place] [place] Residence: One story  Living Alone: No  Support Systems: Family member(s), Long term acute care  Patient Expects to be Discharged to[de-identified] Skilled nursing facility  Current DME Used/Available at Home: [place], Wheelchair  [x]  Right hand dominant   []  Left hand dominant    Cognitive/Behavioral Status:  Neurologic State: Alert;Confused  Orientation Level: Disoriented to time;Disoriented to situation;Oriented to person;Disoriented to place  Cognition: Follows commands; Impaired decision making;Poor safety awareness     Skin: no skin integrity issues noted during OT evaluation  Edema: no UE edema noted    Vision/Perceptual:      Tracking is [facility]     Coordination: BUE   Slowed BUE's (secondary to weakness; RUE weaker than left)    Balance:   CG with occasional min assist sitting on the edge of the bed    Strength: BUE  RUE: 3+/5  LUE: 4-/5   Tone & Sensation: BUE  RUE: with minimally to slightly decreased sensation; tone is WFL  LUE: WFL   Range of Motion: BUE  AROM WFL BUE's    (she has a tendency to not use her R hand 2' decreased sensation and overall weakness)  Functional Mobility and Transfers for ADLs:  Bed Mobility:   Min to mod assist maneuvering from supine to EOB   ADL Assessment:    Self feeding: modified independent (simulated using her L hand)  Grooming: min assist (simulated)  UB bathing/dressing: min assist (simulated)  LB bathing/dressing: dependent (unable to simulate)    Pain:  Pain level pre-treatment: 0/10   Pain level post-treatment: 0/10     Activity Tolerance:   No SOB noted; she is reporting generalized fatigue and she is tolerating sitting on the edge of the bed for 3 minutes prior to requesting to lay back down  Please refer to the flowsheet for vital signs taken during this treatment. After treatment:   [] Patient left in no apparent distress sitting up in chair  [x] Patient left in no apparent distress in bed  [x] Call bell left within reach  [x] Nursing notified  [] Caregiver present  [] Bed alarm activated    COMMUNICATION/EDUCATION:   [x] Role of Occupational Therapy in the acute care setting  [] Home safety education was provided and the patient/caregiver indicated understanding. [] Patient/family have participated as able in goal setting and plan of care.   [] Patient/family agree to work toward stated goals and plan of care. [] Patient understands intent and goals of therapy, but is neutral about his/her participation. [] Patient is unable to participate in goal setting and plan of care. Thank you for this referral.  Lissett Burgess OTR/L  Time Calculation: 17 mins    Eval Complexity: History: LOW Complexity : Brief history review ; Examination: LOW Complexity : 1-3 performance deficits relating to physical, cognitive , or psychosocial skils that result in activity limitations and / or participation restrictions ;    Decision Making:LOW Complexity : No comorbidities that affect functional and no verbal or physical assistance needed to complete eval tasks

## 2021-06-13 NOTE — ROUTINE PROCESS
Bedside and Verbal shift change report given to RAYO Valentine (oncoming nurse) by Tracey Tsang RN (offgoing nurse). Report included the following information SBAR, Kardex, MAR and Recent Results. SITUATION: 
Code Status: Full Code Reason for Admission: Chest pain [R07.9] Hypertensive emergency [I16.1] Hypertensive urgency [I16.0] Hospital day: 2 Problem List:  
   
Hospital Problems  Date Reviewed: 6/2/2021 Codes Class Noted POA Prolonged Q-T interval on ECG ICD-10-CM: R94.31 
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  
   
 CVA, old, cognitive deficits ICD-10-CM: I69.319 ICD-9-CM: 438.0  8/8/2018 Yes BACKGROUND: 
 Past Medical History:  
Past Medical History:  
Diagnosis Date  Asthma  Bipolar affective disorder ([facility]  Brain condition  Cataract  Chronic kidney disease   
 hemodialysis M-W-F  Diabetes ([facility]  Hyperlipidemia  Hypertension  Paralytic strabismus, unspecified  Psychiatric disorder  Seizures ([facility] 08/27/2018 Patient taking anticoagulants yes Patient has a defibrillator: no  
 History of shots YES for example, flu, pneumonia, tetanus Isolation History NO for example, MRSA, CDiff ASSESSMENT: 
Changes in Assessment Throughout Shift: NONE Significant Changes in 24 hours (for example, RR/code, fall) Patient has Central Line: yes Reasons if yes: Hemodialysis Patient has Khan Cath: no 
Mobility Issues PT 
IV Patency OR Checklist 
Pending Tests Last Vitals: 
Vitals w/ MEWS Score (last day) Date/Time MEWS Score Pulse Resp Temp BP Level of Consciousness SpO2  
 06/13/21 0819  3  64  20  97.5 °F (36.4 °C)  (!) 225/96  Alert (0)  99 % 06/13/21 0521          (!) 172/82      
 06/13/21 0423  1  68  20  98.2 °F (36.8 °C)  (!) 187/93  Alert (0)  99 % 06/13/21 0132          (!) 154/75      
 06/13/21 0032  1  78  20  97.2 °F (36.2 °C)  (!) 191/97  Alert (0)  99 % 06/12/21 2300  1  72  18  98 °F (36.7 °C)  (!) 182/91  Alert (0)  100 % 06/12/21 2238    72      (!) 179/73      
 06/12/21 21:53:28              98 % 06/12/21 2130    64  12    (!) 205/74    96 % 06/12/21 2115    71  19    (!) 216/86    99 % 06/12/21 2100    71  13    (!) 211/90      
 06/12/21 1915    78  17    (!) 225/99    100 % 06/12/21 1900    77  17    (!) 173/123    100 % 06/12/21 1845    74  16    (!) 222/90    100 % 06/12/21 1830    79  21    (!) 228/97    100 % 06/12/21 1815    78  19    (!) 226/173    100 % 06/12/21 1800    78  18    (!) 230/182    100 % 06/12/21 1745    78  19    (!) 227/117    100 % 06/12/21 1730    78  21    (!) 225/102      
 06/12/21 1715    78  19    (!) 206/103      
 06/12/21 1645    75  25    (!) 187/97      
 06/12/21 1557          (!) 190/89      
 06/12/21 1500    73  14    (!) 183/89    99 % 06/12/21 1445    70  11    (!) 167/71      
 06/12/21 1430    72  12    (!) 157/70      
 06/12/21 1345    84  21    (!) 184/72    95 % 06/12/21 1330    75  12    (!) 131/91    97 % 06/12/21 1315    94  29    (!) 185/160    (!) 60 %  
 06/12/21 1300    79  12    (!) 214/72      
 06/12/21 1245    76  11    (!) 206/83      
 06/12/21 1230    81  20    (!) 219/89    100 % 06/12/21 1215    84  16    (!) 187/84      
 06/12/21 1200    88  20    (!) 190/138    98 %  06/12/21 1145    79  13    (!) 206/82    99 % 06/12/21 1130    88  19    (!) 218/82    100 % 06/12/21 1000    88  18    (!) 197/66    97 % 06/12/21 0921  2  91  22  98 °F (36.7 °C)  (!) 188/79  Alert (0)  98 % 06/12/21 0800    79  15    (!) 173/76    97 % 06/12/21 0745    72  15    (!) 201/86    98 % 06/12/21 0738          (!) 207/89      
 06/12/21 0730    80  20    (!) 203/75    98 % 06/12/21 0715    65  15    (!) 196/86    96 % 06/12/21 0700    65  14    (!) 183/76    96 % 06/12/21 0645    65  15    (!) 216/90    98 % 06/12/21 0630    67  23    (!) 228/95    97 % 06/12/21 0615    76  17    (!) 197/87    98 % 06/12/21 0600    73  15    (!) 207/89    98 % 06/12/21 0545    64  17    (!) 191/81    95 % 06/12/21 0530    65  16    (!) 203/90    98 % 06/12/21 0515    68  12    (!) 196/93    97 % 06/12/21 0500    76  19    (!) 196/90    97 % 06/12/21 0445          (!) 197/95      
 06/12/21 0345    71  20    (!) 190/77    97 % 06/12/21 0330    64  15    (!) 184/74    96 % 06/12/21 0315    70  16    (!) 178/86    97 % 06/12/21 0300    68  17    (!) 172/74    97 % 06/12/21 0245    67  13    (!) 168/72    97 % 06/12/21 0230    70  17    (!) 163/75    97 % 06/12/21 0215    71  23    (!) 174/82    97 % 06/12/21 0200    73  17    (!) 172/82    96 % 06/12/21 0145    76  15    (!) 178/76    96 % 06/12/21 0130    76  19    (!) 199/77    97 % 06/12/21 0115    78  15    (!) 192/80    97 % PAIN Pain Assessment Pain Intensity 1: 0 (06/13/21 0745) Patient Stated Pain Goal: 0 Intervention effective: N/A Time of last intervention: N/A Reassessment Completed: yes Other actions taken for pain: Distraction Last 3 Weights: 
Last 3 Recorded Weights in this Encounter 06/11/21 1252 06/12/21 0453 Weight: 63.5 kg (140 lb) 63.5 kg (140 lb) Weight change: 0 kg (0 lb) INTAKE/OUPUT Current Shift: No intake/output data recorded. Last three shifts: 06/11 1901 - 06/13 0700 In: 168.9 [I.V.:168.9] Out: -  
 
RECOMMENDATIONS AND DISCHARGE PLANNING Patient needs and requests: Assistance with ADL's 
 
Pending tests/procedures: labs Discharge plan for patient: [facility] Discharge planning Needs or Barriers: None Estimated Discharge Date: 6/16/2021 Posted on Whiteboard in [facility] Room: yes \"HEALS\" SAFETY CHECK A safety check occurred in the patient's room between off going nurse and oncoming nurse listed above. The safety check included the below items: 
 
H High Alert Medications Verify all high alert medication drips (heparin, PCA, etc.) E Equipment Suction is set up for ALL patients (with genesis) Red plugs utilized for all equipment (IV pumps, etc.) WOWs wiped down at end of shift. Room stocked with oxygen, suction, and other unit-specific supplies A Alarms Bed alarm is set for fall risk patients Ensure chair alarm is in place and activated if patient is up in a chair L Lines Check IV for any infiltration Khan bag is empty if patient has a Khan Tubing and IV bags are labeled Connie Copping Safety Room is clean, patient is clean, and equipment is clean. Hallways are clear from equipment besides carts. Fall bracelet on for fall risk patients Ensure room is clear and free of clutter Suction is set up for ALL patients (with genesis) Hallways are clear from equipment besides carts. Isolation precautions followed, supplies available outside room, sign posted Eliseo Grant RN

## 2021-06-13 NOTE — PROGRESS NOTES
[facility]ist [facility]  Progress Note    Patient: Shavonne Morales Age: 77 y.o. : 1955 MR#: [id number] SSN: [id number]  Date/Time: 2021    Subjective:     Pt states she feels fine. She has no complaints. Assessment/Plan:   77year old female w/ h/o CVA, ESRD, HTN presented on  with reports that after 1 hour into dialysis she developed severe chest pain associated with shortness of breath, noted to have very elevated bp's in ED. -Hypertensive emergency: meds adjusted by nephrologist, improved  -Elevated trop, likely due to above, seen by cardiology service. No further w/u. -ESRD: to have HD tomorrow, nephrologist following  -Chronic diastolic HF: compensated  -H/o CVA: on ASA, lipitor  -DM2: blood sugars acceptable on corrective insulin  -Seizure d/o: on keppra    Dispo: back to NH after HD tomorrow. Cont to trend bp's on new regimen of meds.     Additional Notes:      Case discussed with:  [x]Patient  []Family  [x]Nursing  []Case Management  DVT Prophylaxis:  []Lovenox  []Hep SQ  []SCDs  []Coumadin   []On Heparin gtt    Objective:   VS:   Visit Vitals  BP (!) 161/76   Pulse 73   Temp 97.2 °F (36.2 °C)   Resp 20   Ht 5' 3\" (1.6 m)   Wt 63.5 kg (140 lb)   LMP  (LMP Unknown)   SpO2 96%   Breastfeeding No   BMI 24.80 kg/m²      Tmax/24hrs: Temp (24hrs), Av.6 °F (36.4 °C), Min:97.2 °F (36.2 °C), Max:98.2 °F (36.8 °C)    Input/Output:     Intake/Output Summary (Last 24 hours) at 2021 1406  Last data filed at 2021 2803  Gross per 24 hour   Intake 168.92 ml   Output    Net 168.92 ml       General:  Alert, awake, in nad  Cardiovascular:  Rrr, no murmurs  Pulmonary:  CTAB  GI:  abd soft, nt, nd  Extremities:  No edema  Additional:      Labs:    Recent Results (from the past 24 hour(s))   GLUCOSE, POC    Collection Time: 21  6:38 PM   Result Value Ref Range    Glucose (POC) 196 (H) 70 - 110 mg/dL   PTT    Collection Time: 21  7:12 PM   Result Value Ref Range    aPTT 114.4 (H) 23.0 - 36.4 SEC   GLUCOSE, POC    Collection Time: 06/13/21 12:29 AM   Result Value Ref Range    Glucose (POC) 192 (H) 70 - 110 mg/dL   PTT    Collection Time: 06/13/21  1:40 AM   Result Value Ref Range    aPTT 28.6 23.0 - 36.4 SEC   GLUCOSE, POC    Collection Time: 06/13/21  8:10 AM   Result Value Ref Range    Glucose (POC) 149 (H) 70 - 110 mg/dL   GLUCOSE, POC    Collection Time: 06/13/21 11:40 AM   Result Value Ref Range    Glucose (POC) 187 (H) 70 - 110 mg/dL     Additional Data Reviewed:      Signed By: Aissatou Banks MD     June 13, 2021 2:06 PM

## 2021-06-13 NOTE — PROGRESS NOTES
Problem: Unstable angina/NSTEMI: Day of Admission/Day 1  Goal: Off Pathway (Use only if patient is Off Pathway)  Outcome: Progressing Towards Goal  Goal: Activity/Safety  Outcome: Progressing Towards Goal  Goal: Consults, if ordered  Outcome: Progressing Towards Goal  Goal: Diagnostic Test/Procedures  Outcome: Progressing Towards Goal  Goal: Nutrition/Diet  Outcome: Progressing Towards Goal  Goal: Discharge Planning  Outcome: Progressing Towards Goal  Goal: Medications  Outcome: Progressing Towards Goal  Goal: Respiratory  Outcome: Progressing Towards Goal  Goal: Treatments/Interventions/Procedures  Outcome: Progressing Towards Goal  Goal: Psychosocial  Outcome: Progressing Towards Goal  Goal: *Hemodynamically stable  Outcome: Progressing Towards Goal  Goal: *Optimal pain control at patient's stated goal  Outcome: Progressing Towards Goal  Goal: *Lungs clear or at baseline  Outcome: Progressing Towards Goal     Problem: Hypertension  Goal: *Blood pressure within specified parameters  Outcome: Progressing Towards Goal  Goal: *Fluid volume balance  Outcome: Progressing Towards Goal  Goal: *Labs within defined limits  Outcome: Progressing Towards Goal     Problem: Patient Education: Go to Patient Education Activity  Goal: Patient/Family Education  Outcome: Progressing Towards Goal     Problem: Diabetes Self-Management  Goal: *Disease process and treatment process  Description: Define diabetes and identify own type of diabetes; list 3 options for treating diabetes. Outcome: Progressing Towards Goal  Goal: *Incorporating nutritional management into lifestyle  Description: Describe effect of type, amount and timing of food on blood glucose; list 3 methods for planning meals. Outcome: Progressing Towards Goal  Goal: *Incorporating physical activity into lifestyle  Description: State effect of exercise on blood glucose levels.   Outcome: Progressing Towards Goal  Goal: *Developing strategies to promote health/change behavior  Description: Define the ABC's of diabetes; identify appropriate screenings, schedule and personal plan for screenings. Outcome: Progressing Towards Goal  Goal: *Using medications safely  Description: State effect of diabetes medications on diabetes; name diabetes medication taking, action and side effects. Outcome: Progressing Towards Goal  Goal: *Monitoring blood glucose, interpreting and using results  Description: Identify recommended blood glucose targets  and personal targets. Outcome: Progressing Towards Goal  Goal: *Prevention, detection, treatment of acute complications  Description: List symptoms of hyper- and hypoglycemia; describe how to treat low blood sugar and actions for lowering  high blood glucose level. Outcome: Progressing Towards Goal  Goal: *Prevention, detection and treatment of chronic complications  Description: Define the natural course of diabetes and describe the relationship of blood glucose levels to long term complications of diabetes. Outcome: Progressing Towards Goal  Goal: *Developing strategies to address psychosocial issues  Description: Describe feelings about living with diabetes; identify support needed and support network  Outcome: Progressing Towards Goal  Goal: *Sick day guidelines  Outcome: Progressing Towards Goal  Goal: *Patient Specific Goal (EDIT GOAL, INSERT TEXT)  Outcome: Progressing Towards Goal     Problem: Patient Education: Go to Patient Education Activity  Goal: Patient/Family Education  Outcome: Progressing Towards Goal     Problem: Falls - Risk of  Goal: *Absence of Falls  Description: Document Oscar Fall Risk and appropriate interventions in the flowsheet.   Outcome: Progressing Towards Goal  Note: Fall Risk Interventions:  Mobility Interventions: Bed/chair exit alarm, Patient to call before getting OOB, PT Consult for mobility concerns, OT consult for ADLs    Mentation Interventions: Bed/chair exit alarm    Medication Interventions: Bed/chair exit alarm, Patient to call before getting OOB    Elimination Interventions: Bed/chair exit alarm, Call light in reach    History of Falls Interventions: Bed/chair exit alarm, Door open when patient unattended, Room close to nurse's station         Problem: Patient Education: Go to Patient Education Activity  Goal: Patient/Family Education  Outcome: Progressing Towards Goal

## 2021-06-14 VITALS
OXYGEN SATURATION: 97 % | BODY MASS INDEX: 23.34 KG/M2 | HEIGHT: 63 IN | DIASTOLIC BLOOD PRESSURE: 84 MMHG | RESPIRATION RATE: 18 BRPM | HEART RATE: 87 BPM | WEIGHT: 131.7 LBS | SYSTOLIC BLOOD PRESSURE: 169 MMHG | TEMPERATURE: 97.2 F

## 2021-06-14 LAB
ANION GAP SERPL CALC-SCNC: 6 MMOL/L (ref 3–18)
BASOPHILS # BLD: 0 K/UL (ref 0–0.1)
BASOPHILS NFR BLD: 0 % (ref 0–2)
BUN SERPL-MCNC: 49 MG/DL (ref 7–18)
BUN/CREAT SERPL: 4 (ref 12–20)
CALCIUM SERPL-MCNC: 9.2 MG/DL (ref 8.5–10.1)
CALCIUM SERPL-MCNC: 9.4 MG/DL (ref 8.5–10.1)
CHLORIDE SERPL-SCNC: 103 MMOL/L (ref 100–111)
CO2 SERPL-SCNC: 27 MMOL/L (ref 21–32)
CREAT SERPL-MCNC: 11.2 MG/DL (ref 0.6–1.3)
DIFFERENTIAL METHOD BLD: ABNORMAL
EOSINOPHIL # BLD: 0.3 K/UL (ref 0–0.4)
EOSINOPHIL NFR BLD: 4 % (ref 0–5)
ERYTHROCYTE [DISTWIDTH] IN BLOOD BY AUTOMATED COUNT: 15.3 % (ref 11.6–14.5)
GLUCOSE BLD STRIP.AUTO-MCNC: 146 MG/DL (ref 70–110)
GLUCOSE BLD STRIP.AUTO-MCNC: 177 MG/DL (ref 70–110)
GLUCOSE BLD STRIP.AUTO-MCNC: 196 MG/DL (ref 70–110)
GLUCOSE SERPL-MCNC: 157 MG/DL (ref 74–99)
HCT VFR BLD AUTO: 27.5 % (ref 35–45)
HGB BLD-MCNC: 8.9 G/DL (ref 12–16)
LYMPHOCYTES # BLD: 0.8 K/UL (ref 0.9–3.6)
LYMPHOCYTES NFR BLD: 10 % (ref 21–52)
MCH RBC QN AUTO: 29.4 PG (ref 24–34)
MCHC RBC AUTO-ENTMCNC: 32.4 G/DL (ref 31–37)
MCV RBC AUTO: 90.8 FL (ref 74–97)
MONOCYTES # BLD: 0.8 K/UL (ref 0.05–1.2)
MONOCYTES NFR BLD: 11 % (ref 3–10)
NEUTS SEG # BLD: 5.5 K/UL (ref 1.8–8)
NEUTS SEG NFR BLD: 75 % (ref 40–73)
PHOSPHATE SERPL-MCNC: 5.9 MG/DL (ref 2.5–4.9)
PLATELET # BLD AUTO: 278 K/UL (ref 135–420)
PMV BLD AUTO: 12.3 FL (ref 9.2–11.8)
POTASSIUM SERPL-SCNC: 4.5 MMOL/L (ref 3.5–5.5)
PTH-INTACT SERPL-MCNC: 580.4 PG/ML (ref 18.4–88)
RBC # BLD AUTO: 3.03 M/UL (ref 4.2–5.3)
SODIUM SERPL-SCNC: 136 MMOL/L (ref 136–145)
WBC # BLD AUTO: 7.4 K/UL (ref 4.6–13.2)

## 2021-06-14 PROCEDURE — 84100 ASSAY OF PHOSPHORUS: CPT

## 2021-06-14 PROCEDURE — 83970 ASSAY OF PARATHORMONE: CPT

## 2021-06-14 PROCEDURE — 99232 SBSQ HOSP IP/OBS MODERATE 35: CPT | Performed by: INTERNAL MEDICINE

## 2021-06-14 PROCEDURE — 85025 COMPLETE CBC W/AUTO DIFF WBC: CPT

## 2021-06-14 PROCEDURE — 82962 GLUCOSE BLOOD TEST: CPT

## 2021-06-14 PROCEDURE — 80048 BASIC METABOLIC PNL TOTAL CA: CPT

## 2021-06-14 PROCEDURE — 2709999900 HC NON-CHARGEABLE SUPPLY

## 2021-06-14 PROCEDURE — 99239 HOSP IP/OBS DSCHRG MGMT >30: CPT | Performed by: FAMILY MEDICINE

## 2021-06-14 PROCEDURE — 90935 HEMODIALYSIS ONE EVALUATION: CPT

## 2021-06-14 PROCEDURE — 36415 COLL VENOUS BLD VENIPUNCTURE: CPT

## 2021-06-14 PROCEDURE — 74011250637 HC RX REV CODE- 250/637: Performed by: INTERNAL MEDICINE

## 2021-06-14 PROCEDURE — 74011250636 HC RX REV CODE- 250/636: Performed by: INTERNAL MEDICINE

## 2021-06-14 PROCEDURE — 77030038269 HC DRN EXT URIN PURWCK BARD -A

## 2021-06-14 RX ORDER — CARVEDILOL 25 MG/1
25 TABLET ORAL 2 TIMES DAILY WITH MEALS
Qty: 60 TABLET | Refills: 0 | Status: SHIPPED
Start: 2021-06-14

## 2021-06-14 RX ORDER — DOXERCALCIFEROL 4 UG/2ML
1 INJECTION INTRAVENOUS
Status: DISCONTINUED | OUTPATIENT
Start: 2021-06-14 | End: 2021-06-14 | Stop reason: HOSPADM

## 2021-06-14 RX ORDER — LOSARTAN POTASSIUM 100 MG/1
100 TABLET ORAL DAILY
Qty: 30 TABLET | Refills: 0 | Status: SHIPPED
Start: 2021-06-15

## 2021-06-14 RX ADMIN — Medication 10 ML: at 05:46

## 2021-06-14 RX ADMIN — ASPIRIN 81 MG: 81 TABLET, CHEWABLE ORAL at 09:40

## 2021-06-14 RX ADMIN — HEPARIN SODIUM 5000 UNITS: 5000 INJECTION INTRAVENOUS; SUBCUTANEOUS at 00:36

## 2021-06-14 RX ADMIN — HEPARIN SODIUM 5000 UNITS: 5000 INJECTION INTRAVENOUS; SUBCUTANEOUS at 09:40

## 2021-06-14 RX ADMIN — ATORVASTATIN CALCIUM 10 MG: 10 TABLET, FILM COATED ORAL at 09:40

## 2021-06-14 RX ADMIN — LEVETIRACETAM 500 MG: 500 TABLET ORAL at 09:40

## 2021-06-14 RX ADMIN — FAMOTIDINE 20 MG: 20 TABLET ORAL at 09:41

## 2021-06-14 RX ADMIN — DILTIAZEM HYDROCHLORIDE 240 MG: 240 CAPSULE, COATED, EXTENDED RELEASE ORAL at 09:41

## 2021-06-14 NOTE — PROGRESS NOTES
Problem: Hypertension  Goal: *Blood pressure within specified parameters  Outcome: Resolved/Met  Goal: *Fluid volume balance  Outcome: Resolved/Met  Goal: *Labs within defined limits  Outcome: Resolved/Met     Problem: Patient Education: Go to Patient Education Activity  Goal: Patient/Family Education  Outcome: Resolved/Met     Problem: Diabetes Self-Management  Goal: *Disease process and treatment process  Description: Define diabetes and identify own type of diabetes; list 3 options for treating diabetes. Outcome: Resolved/Met  Goal: *Incorporating nutritional management into lifestyle  Description: Describe effect of type, amount and timing of food on blood glucose; list 3 methods for planning meals. Outcome: Resolved/Met  Goal: *Incorporating physical activity into lifestyle  Description: State effect of exercise on blood glucose levels. Outcome: Resolved/Met  Goal: *Developing strategies to promote health/change behavior  Description: Define the ABC's of diabetes; identify appropriate screenings, schedule and personal plan for screenings. Outcome: Resolved/Met  Goal: *Using medications safely  Description: State effect of diabetes medications on diabetes; name diabetes medication taking, action and side effects. Outcome: Resolved/Met  Goal: *Monitoring blood glucose, interpreting and using results  Description: Identify recommended blood glucose targets  and personal targets. Outcome: Resolved/Met  Goal: *Prevention, detection, treatment of acute complications  Description: List symptoms of hyper- and hypoglycemia; describe how to treat low blood sugar and actions for lowering  high blood glucose level. Outcome: Resolved/Met  Goal: *Prevention, detection and treatment of chronic complications  Description: Define the natural course of diabetes and describe the relationship of blood glucose levels to long term complications of diabetes.   Outcome: Resolved/Met  Goal: *Developing strategies to address psychosocial issues  Description: Describe feelings about living with diabetes; identify support needed and support network  Outcome: Resolved/Met  Goal: *Sick day guidelines  Outcome: Resolved/Met  Goal: *Patient Specific Goal (EDIT GOAL, INSERT TEXT)  Outcome: Resolved/Met     Problem: Patient Education: Go to Patient Education Activity  Goal: Patient/Family Education  Outcome: Resolved/Met     Problem: Falls - Risk of  Goal: *Absence of Falls  Description: Document Oscar Fall Risk and appropriate interventions in the flowsheet.   Outcome: Resolved/Met  Note: Fall Risk Interventions:  Mobility Interventions: Bed/chair exit alarm    Mentation Interventions: Adequate sleep, hydration, pain control, Bed/chair exit alarm, Door open when patient unattended, Reorient patient, More frequent rounding, Update white board    Medication Interventions: Bed/chair exit alarm, Evaluate medications/consider consulting pharmacy, Patient to call before getting OOB, Teach patient to arise slowly    Elimination Interventions: Bed/chair exit alarm, Call light in reach, Patient to call for help with toileting needs    History of Falls Interventions: Bed/chair exit alarm         Problem: Patient Education: Go to Patient Education Activity  Goal: Patient/Family Education  Outcome: Resolved/Met     Problem: Patient Education: Go to Patient Education Activity  Goal: Patient/Family Education  Outcome: Resolved/Met     Problem: Unstable angina/NSTEMI: Day 2  Goal: Off Pathway (Use only if patient is Off Pathway)  Outcome: Resolved/Met  Goal: Activity/Safety  Outcome: Resolved/Met  Goal: Consults, if ordered  Outcome: Resolved/Met  Goal: Diagnostic Test/Procedures  Outcome: Resolved/Met  Goal: Nutrition/Diet  Outcome: Resolved/Met  Goal: Discharge Planning  Outcome: Resolved/Met  Goal: Medications  Outcome: Resolved/Met  Goal: Respiratory  Outcome: Resolved/Met  Goal: Treatments/Interventions/Procedures  Outcome: Resolved/Met  Goal: Psychosocial  Outcome: Resolved/Met  Goal: *Hemodynamically stable  Outcome: Resolved/Met  Goal: *Optimal pain control at patient's stated goal  Outcome: Resolved/Met  Goal: *Lungs clear or at baseline  Outcome: Resolved/Met     Problem: Pressure Injury - Risk of  Goal: *Prevention of pressure injury  Description: Document Ab Scale and appropriate interventions in the flowsheet.   Outcome: Resolved/Met     Problem: Patient Education: Go to Patient Education Activity  Goal: Patient/Family Education  Outcome: Resolved/Met

## 2021-06-14 NOTE — PROGRESS NOTES
Problem: Hypertension  Goal: *Blood pressure within specified parameters  Outcome: Progressing Towards Goal  Goal: *Fluid volume balance  Outcome: Progressing Towards Goal  Goal: *Labs within defined limits  Outcome: Progressing Towards Goal     Problem: Patient Education: Go to Patient Education Activity  Goal: Patient/Family Education  Outcome: Progressing Towards Goal     Problem: Diabetes Self-Management  Goal: *Disease process and treatment process  Description: Define diabetes and identify own type of diabetes; list 3 options for treating diabetes. Outcome: Progressing Towards Goal  Goal: *Incorporating nutritional management into lifestyle  Description: Describe effect of type, amount and timing of food on blood glucose; list 3 methods for planning meals. Outcome: Progressing Towards Goal  Goal: *Incorporating physical activity into lifestyle  Description: State effect of exercise on blood glucose levels. Outcome: Progressing Towards Goal  Goal: *Developing strategies to promote health/change behavior  Description: Define the ABC's of diabetes; identify appropriate screenings, schedule and personal plan for screenings. Outcome: Progressing Towards Goal  Goal: *Using medications safely  Description: State effect of diabetes medications on diabetes; name diabetes medication taking, action and side effects. Outcome: Progressing Towards Goal  Goal: *Monitoring blood glucose, interpreting and using results  Description: Identify recommended blood glucose targets  and personal targets. Outcome: Progressing Towards Goal  Goal: *Prevention, detection, treatment of acute complications  Description: List symptoms of hyper- and hypoglycemia; describe how to treat low blood sugar and actions for lowering  high blood glucose level.   Outcome: Progressing Towards Goal  Goal: *Prevention, detection and treatment of chronic complications  Description: Define the natural course of diabetes and describe the relationship of blood glucose levels to long term complications of diabetes. Outcome: Progressing Towards Goal  Goal: *Developing strategies to address psychosocial issues  Description: Describe feelings about living with diabetes; identify support needed and support network  Outcome: Progressing Towards Goal     Problem: Patient Education: Go to Patient Education Activity  Goal: Patient/Family Education  Outcome: Progressing Towards Goal     Problem: Falls - Risk of  Goal: *Absence of Falls  Description: Document Onalee Gum Fall Risk and appropriate interventions in the flowsheet.   Outcome: Progressing Towards Goal  Note: Fall Risk Interventions:  Mobility Interventions: Bed/chair exit alarm, Patient to call before getting OOB, Utilize walker, cane, or other assistive device    Mentation Interventions: Adequate sleep, hydration, pain control, Bed/chair exit alarm, Door open when patient unattended, More frequent rounding, Reorient patient, Room close to nurse's station, Toileting rounds, Update white board    Medication Interventions: Bed/chair exit alarm, Patient to call before getting OOB, Teach patient to arise slowly    Elimination Interventions: Bed/chair exit alarm, Call light in reach, Elevated toilet seat, Patient to call for help with toileting needs, Stay With Me (per policy), Toilet paper/wipes in reach, Toileting schedule/hourly rounds, Urinal in reach    History of Falls Interventions: Bed/chair exit alarm, Door open when patient unattended, Room close to nurse's station         Problem: Patient Education: Go to Patient Education Activity  Goal: Patient/Family Education  Outcome: Progressing Towards Goal

## 2021-06-14 NOTE — PROGRESS NOTES
Bedside and Verbal shift change report given to Castro Quiros RN (oncoming nurse) by RN (offgoing nurse). Report included the following information SBAR, Kardex, MAR, Recent Results and Cardiac Rhythm NSR.     1000: Pt off unit for dialysis     1325: Report received from dialysis     1340: Pt arrived back on unit, connected back to tele monitor, VSS    TRANSFER - OUT REPORT:    Verbal report given to Tomas Strickland RN (name) on Reyes Fanning  being transferred to [address] [place] [address],[address] (unit) for routine progression of care       Report consisted of patients Situation, Background, Assessment and   Recommendations(SBAR). Information from the following report(s) SBAR, Kardex and MAR was reviewed with the receiving nurse. Lines:       Opportunity for questions and clarification was provided.       Patient transported with:  Lauro coreas

## 2021-06-14 NOTE — PROGRESS NOTES
Problem: Unstable angina/NSTEMI: Day of Admission/Day 1  Goal: Off Pathway (Use only if patient is Off Pathway)  Outcome: Progressing Towards Goal  Goal: Activity/Safety  Outcome: Progressing Towards Goal  Goal: Consults, if ordered  Outcome: Progressing Towards Goal  Goal: Diagnostic Test/Procedures  Outcome: Progressing Towards Goal  Goal: Nutrition/Diet  Outcome: Progressing Towards Goal  Goal: Discharge Planning  Outcome: Progressing Towards Goal  Goal: Medications  Outcome: Progressing Towards Goal  Goal: Respiratory  Outcome: Progressing Towards Goal  Goal: Treatments/Interventions/Procedures  Outcome: Progressing Towards Goal  Goal: Psychosocial  Outcome: Progressing Towards Goal  Goal: *Hemodynamically stable  Outcome: Progressing Towards Goal  Goal: *Optimal pain control at patient's stated goal  Outcome: Progressing Towards Goal  Goal: *Lungs clear or at baseline  Outcome: Progressing Towards Goal     Problem: Hypertension  Goal: *Blood pressure within specified parameters  Outcome: Progressing Towards Goal  Goal: *Fluid volume balance  Outcome: Progressing Towards Goal  Goal: *Labs within defined limits  Outcome: Progressing Towards Goal     Problem: Patient Education: Go to Patient Education Activity  Goal: Patient/Family Education  Outcome: Progressing Towards Goal     Problem: Diabetes Self-Management  Goal: *Disease process and treatment process  Description: Define diabetes and identify own type of diabetes; list 3 options for treating diabetes. Outcome: Progressing Towards Goal  Goal: *Incorporating nutritional management into lifestyle  Description: Describe effect of type, amount and timing of food on blood glucose; list 3 methods for planning meals. Outcome: Progressing Towards Goal  Goal: *Incorporating physical activity into lifestyle  Description: State effect of exercise on blood glucose levels.   Outcome: Progressing Towards Goal  Goal: *Developing strategies to promote health/change behavior  Description: Define the ABC's of diabetes; identify appropriate screenings, schedule and personal plan for screenings. Outcome: Progressing Towards Goal  Goal: *Using medications safely  Description: State effect of diabetes medications on diabetes; name diabetes medication taking, action and side effects. Outcome: Progressing Towards Goal  Goal: *Monitoring blood glucose, interpreting and using results  Description: Identify recommended blood glucose targets  and personal targets. Outcome: Progressing Towards Goal  Goal: *Prevention, detection, treatment of acute complications  Description: List symptoms of hyper- and hypoglycemia; describe how to treat low blood sugar and actions for lowering  high blood glucose level. Outcome: Progressing Towards Goal  Goal: *Prevention, detection and treatment of chronic complications  Description: Define the natural course of diabetes and describe the relationship of blood glucose levels to long term complications of diabetes. Outcome: Progressing Towards Goal  Goal: *Developing strategies to address psychosocial issues  Description: Describe feelings about living with diabetes; identify support needed and support network  Outcome: Progressing Towards Goal  Goal: *Sick day guidelines  Outcome: Progressing Towards Goal  Goal: *Patient Specific Goal (EDIT GOAL, INSERT TEXT)  Outcome: Progressing Towards Goal     Problem: Patient Education: Go to Patient Education Activity  Goal: Patient/Family Education  Outcome: Progressing Towards Goal     Problem: Falls - Risk of  Goal: *Absence of Falls  Description: Document Oscar Fall Risk and appropriate interventions in the flowsheet.   Outcome: Progressing Towards Goal  Note: Fall Risk Interventions:  Mobility Interventions: Bed/chair exit alarm, Patient to call before getting OOB, Utilize walker, cane, or other assistive device    Mentation Interventions: Adequate sleep, hydration, pain control, Bed/chair exit alarm, Door open when patient unattended, More frequent rounding, Reorient patient, Room close to nurse's station, Toileting rounds, Update white board    Medication Interventions: Bed/chair exit alarm, Patient to call before getting OOB, Teach patient to arise slowly    Elimination Interventions: Bed/chair exit alarm, Call light in reach, Elevated toilet seat, Patient to call for help with toileting needs, Stay With Me (per policy), Toilet paper/wipes in reach, Toileting schedule/hourly rounds, Urinal in reach    History of Falls Interventions: Bed/chair exit alarm, Door open when patient unattended, Room close to nurse's station         Problem: Patient Education: Go to Patient Education Activity  Goal: Patient/Family Education  Outcome: Progressing Towards Goal     Problem: Patient Education: Go to Patient Education Activity  Goal: Patient/Family Education  Outcome: Progressing Towards Goal

## 2021-06-14 NOTE — PROGRESS NOTES
PT orders received and chart reviewed. Patient admitted from [address] [place] [address] per chart; plans to return at discharge. Discharge orders in chart. Currently off the floor at dialysis. Will follow up as able.

## 2021-06-14 NOTE — PROGRESS NOTES
Progress Note    Yang Melchor  77 y.o. Admit Date: 6/11/2021  Active Problems:    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 ([facility] [address] (8/8/2018) POA: Yes      CVA, old, cognitive deficits (8/8/2018) POA: Yes      Hypertensive emergency (9/25/2020) POA: Yes      Anemia (3/19/2021) POA: Yes      Elevated troponin level (6/2/2021) POA: Yes      Chest pain (6/11/2021) POA: Unknown      Prolonged Q-T interval on ECG (6/12/2021) POA: Unknown      Hypertensive urgency (6/12/2021) POA: Unknown            Subjective:   Seen during Dialysis. Patient says feeling much linda, no more . nausea,vomiting,taking Medicine       A comprehensive review of systems was negative except for that written in the History of Present Illness.     Objective:     Visit Vitals  BP (!) 167/89   Pulse 95   Temp 98.5 °F (36.9 °C) (Oral)   Resp 18   Ht 5' 3\" (1.6 m)   Wt 59.7 kg (131 lb 11.2 oz)   LMP  (LMP Unknown)   SpO2 95%   Breastfeeding No   BMI 23.33 kg/m²       No intake or output data in the 24 hours ending 06/14/21 1239    Current Facility-Administered Medications   Medication Dose Route Frequency Provider Last Rate Last Admin    epoetin raphael-epbx (RETACRIT) injection 10,000 Units  10,000 Units SubCUTAneous Q MON, WED & Ghassan Nelson MD        doxercalciferoL (HECTOROL) 4 mcg/2 mL injection 1 mcg  1 mcg IntraVENous DIALYSIS MON, WED & Ghassan Nelson MD        losartan (COZAAR) tablet 100 mg  100 mg Oral DAILY Tennille Butler MD        carvediloL (COREG) tablet 25 mg  25 mg Oral BID WITH MEALS Tennille Butler MD   25 mg at 06/13/21 1841    heparin (porcine) injection 5,000 Units  5,000 Units SubCUTAneous Q8H Deirdre Chaves MD   5,000 Units at 06/14/21 0940    aspirin chewable tablet 81 mg  81 mg Oral DAILY Andreea Younger MD   81 mg at 06/14/21 0940    atorvastatin (LIPITOR) tablet 10 mg  10 mg Oral DAILY Andreea Younger MD   10 mg at 06/14/21 0940    famotidine (PEPCID) tablet 20 mg  20 mg Oral DAILY Andreea Younger MD   20 mg at 06/14/21 0941    insulin lispro (HUMALOG) injection   SubCUTAneous AC&HS Jasmeet Joseph MD   2 Units at 06/13/21 2315    levETIRAcetam (KEPPRA) tablet 250 mg  250 mg Oral Q MON, WED & Leonila Art MD        levETIRAcetam (KEPPRA) tablet 500 mg  500 mg Oral BID Jasmeet Joseph MD   500 mg at 06/14/21 0940    sodium chloride (NS) flush 5-40 mL  5-40 mL IntraVENous Q8H Jasmeet Joseph MD   10 mL at 06/14/21 0546    sodium chloride (NS) flush 5-40 mL  5-40 mL IntraVENous PRN Jasmeet Joseph MD        polyethylene glycol [facility]) packet 17 g  17 g Oral DAILY PRN Jasmeet Joseph MD        ondansetron (ZOFRAN ODT) tablet 4 mg  4 mg Oral Q8H PRN Jasmeet Joseph MD        Or    ondansetron [facility]) injection 4 mg  4 mg IntraVENous Q6H PRN Jasmeet Joseph MD   4 mg at 06/13/21 1036    labetaloL (NORMODYNE;TRANDATE) 20 mg/4 mL (5 mg/mL) injection 10 mg  10 mg IntraVENous Q5MIN PRN Jasmeet Joseph MD   10 mg at 06/13/21 0434    dilTIAZem ER (CARDIZEM CD) capsule 240 mg  240 mg Oral DAILY Jolly Bedolla MD   240 mg at 06/14/21 0941    minoxidiL (LONITEN) tablet 2.5 mg  2.5 mg Oral BID Jolly Bedolla MD   2.5 mg at 06/13/21 1841    cloNIDine (CATAPRES) 0.3 mg/24 hr patch 1 Patch  1 Patch TransDERmal Q7D Brice Thomas MD   1 Patch at 06/13/21 0036    hydrALAZINE (APRESOLINE) 20 mg/mL injection 10 mg  10 mg IntraVENous Q6H PRN Brice Thomas MD   10 mg at 06/13/21 7007        Physical Exam:     Physical Exam:   General:  Alert, cooperative, no distress, appears stated age. Neck: Supple, symmetrical, trachea midline, no adenopathy, thyroid: no enlargement/tenderness/nodules, no carotid bruit and no JVD. Lungs:   Clear to auscultation bilaterally. Heart:  Regular rate and rhythm, S1, S2 normal, no murmur, click, rub or gallop. Abdomen:   Soft, non-tender. Bowel sounds normal. No masses,  No organomegaly.    Extremities: Extremities normal, atraumatic, no cyanosis or edema, HD catheter is functioning well with Blood flow of 400   Skin: Skin color, texture, turgor normal. No rashes or lesions         Data Review:    CBC w/Diff    Recent Labs     06/14/21 0230 06/12/21 0415 06/11/21  1335   WBC 7.4 7.3 8.2   RBC 3.03* 2.81* 3.48*   HGB 8.9* 8.4* 10.5*   HCT 27.5* 25.1* 31.3*   MCV 90.8 89.3 89.9   MCH 29.4 29.9 30.2   MCHC 32.4 33.5 33.5   RDW 15.3* 14.8* 14.7*    Recent Labs     06/14/21  0230 06/12/21  0415 06/11/21  1335   MONOS 11* 10 7   EOS 4 2 3   BASOS 0 0 0   RDW 15.3* 14.8* 14.7*        Comprehensive Metabolic Profile    Recent Labs     06/14/21 0230 06/12/21  0915 06/11/21  1335    135* 138   K 4.5 3.6 3.9    98* 103   CO2 27 28 30   BUN 49* 29* 30*   CREA 11.20* 7.84* 7.60*    Recent Labs     06/14/21 0230 06/12/21  0915 06/11/21  1335   CA 9.2  9.4 8.7 9.5   PHOS 5.9*  --   --    ALB  --  3.2* 3.3*   TP  --  6.5 7.1   TBILI  --  0.3 0.3           TECHNIQUE: Portable AP view was obtained.     FINDINGS:      LINES/DEVICES: Stable position of dual lumen right IJ central venous catheter,  with tip terminating at the right atrium.     HEART/MEDIASTINUM: The cardiomediastinal silhouette is mildly enlarged.     LUNGS: Improved interstitial prominence compared to prior study. No pleural  effusion or pneumothorax.     SOFT TISSUES: Gaseous distention of the stomach, similar to prior study.     BONES: Unremarkable for age.     IMPRESSION  1. Improved interstitial edema/infiltrate compared to prior study.     2. Cardiomegaly.     3. Redemonstration of nonspecific gaseous distention of the stomach.     Thank you for enabling us to participate in the care of this patient.                 Impression:       Active Hospital Problems    Diagnosis Date Noted    Prolonged Q-T interval on ECG 06/12/2021    Hypertensive urgency 06/12/2021    Chest pain 06/11/2021    Elevated troponin level 06/02/2021    Anemia 03/19/2021    Hypertensive emergency 09/25/2020    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 ([facility] [address] 08/08/2018    CVA, old, cognitive deficits 08/08/2018            Plan:     On dialysis with 2 K, 2.5 kg, UF 1.5 kg, BP reasonable better for her, needs Retacrit, will give Hectorol . Patient is stable enough to be Discharged back to Nursing home after Dialysis tody. Continue Dialysis as Out patient.       Michael Yuan MD

## 2021-06-14 NOTE — PROGRESS NOTES
Transition of Care Plan to SNF/Rehab    SNF/Rehab Transition:  Patient has been accepted to [facility] and meets criteria for admission. [phone], spoke with Mateo Backer, requested 3:00 pm stretcher transportation , patient is going to [address] [place] [address], dispatch will call the nurses station with ETA. CALL REPORT [phone]  Communication to Patient/Family:  Met with patient and  (identified care giver) and they are agreeable to the transition plan. Communication to SNF/Rehab:  Bedside RN, , has been notified to update the transition plan to the facility and call report  Discharge information has been updated on the AVS.           Nursing Please include all hard scripts for controlled substances, med rec and dc summary, and AVS in packet. Reviewed and confirmed with facility, [facility], can manage the patient care needs for the following:     Claretha Samples with (X) only those applicable:    Medication:  [x]  Medications will be available at the facility  []  IV Antibiotics   []  Controlled Substance - hard copy to be sent with patient   []  Weekly Labs   Documents:  [] Hard RX  [x] MAR  [x] Kardex  [x] AVS  [x]Transfer Summary  [x]Discharge   Equipment:  []  CPAP/BiPAP  []  Wound Vacuum  []  Khan or Urinary Device  []  PICC/Central Line  []  Nebulizer  []  Ventilator   Treatment:  []Isolation (for MRSA, VRE, etc.)  []Surgical Drain Management  []Tracheostomy Care  []Dressing Changes  [x]Dialysis with transportation and chair time previous dialysis continue  []PEG Care  []Oxygen  []Daily Weights for Heart Failure   Dietary:  []Any diet limitations  []Tube Feedings   []Total Parenteral Management (TPN)   Eligible for Medicaid Long Term Services and Supports  Yes: UAI already at [facility]  [] Eligible for medical assistance or will become eligible within 180 days and UAI completed. [] Provider/Patient and/or support system has requested screening.   [] UAI copy provided to patient or responsible party,   [] UAI unavailable at discharge will send once processed to SNF provider. [] UAI unavailable at discharged mailed to patient  No:   [] Private pay and is not financially eligible for Medicaid within the next 180 days. [] Reside out-of-state. [] A residents of a state owned/operated facility that is licensed  by [facility] or [facility]  [] Enrollment in [facility] hospice services  [] [address] [place] [address]  [] Patient /Family declines to have screening completed or provide financial information for screening     Financial Resources:  Medicaid    [] Initiated and application pending   [x] Full coverage     Advanced Care Plan:  []Surrogate Decision Maker of Care  []POA  [x]Communicated Code Status FULL (DDNR\", \"Full\")    Other    Medicare pt has received, reviewed, and signed 2nd IM letter informing them of their right to appeal the discharge. Signed copy has been placed on pt bedside chart.

## 2021-06-14 NOTE — PROGRESS NOTES
Cardiology Progress Note    Admit Date: 6/11/2021  Attending Cardiologist: Dr. Ashley Arceo:     Hospital Problems  Date Reviewed: 6/2/2021        Codes Class Noted POA    Prolonged Q-T interval on ECG ICD-10-CM: R94.31  ICD-9-CM: 794.31  6/12/2021 Unknown        Hypertensive urgency ICD-10-CM: I16.0  ICD-9-CM: 401.9  6/12/2021 Unknown        Chest pain ICD-10-CM: R07.9  ICD-9-CM: 786.50  6/11/2021 Unknown        Elevated troponin level ICD-10-CM: R77.8  ICD-9-CM: 790.6  6/2/2021 Yes        Anemia ICD-10-CM: D64.9  ICD-9-CM: 285.9  3/19/2021 Yes        Hypertensive emergency ICD-10-CM: I16.1  ICD-9-CM: 401.9  9/25/2020 Yes        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 ([facility] [address] ICD-10-CM: E11.22, I12.0, Z99.2, N18.6  ICD-9-CM: 250.40, 403.91, V45.11, 585.6  8/8/2018 Yes        CVA, old, cognitive deficits ICD-10-CM: I69.319  ICD-9-CM: 438.0  8/8/2018 Yes              -Presented on 6/11 with CP, SOB and tachycardia during HD. CP resolved with improvement of BP and HR.   - Indeterminate troponin 0.17>0.18>0.13, not consistent with primary ACS, likely demand in the setting of hypertensive urgency/ESRD.   - Hypertensive urgency,  mmHg on presentation   -Chronic HFpEF, ECHO this admission, EF 60-91%, grade 1 diastolic dysfunction  - End-stage renal disease on hemodialysis  - DM2, HgbA1C 6.9 (6/13/21)   - History of stroke  -Bipolar d/o, poor historian     ECHO (06/12/21)   · LV: Estimated LVEF is 60 - 65%. Visually measured ejection fraction. Normal cavity size and systolic function (ejection fraction normal). Mild concentric hypertrophy. Wall motion: normal. Mild (grade 1) left ventricular diastolic dysfunction. · Normal right ventricular size and function. · Pericardium: Moderate loculated pericardial effusion adjacent to right ventricle and right atrium with no evidence of hemodynamic compromise. · PV: Mild pulmonic valve regurgitation is present. · LA: Mildly dilated left atrium.  Left Atrium volume index is 39 mL/m2. · PA: Pulmonary arterial systolic pressure is 28 mmHg. No Primary Cardiologist     Plan:     -BP remains suboptimally controlled on multiple antihypertensives. Acceptable to add low dose hydralazine with careful monitoring of BP while patient is on multiple vasodilators/antihypertensives.   -Fluid mgmt per nephrology recommendations, scheduled for HD today.   -No further recommendations at this time. Start low dose hydralazine if needed  Will f/u peripherally  Call us as needed    Subjective:     No specific c/o at this time. Denies CP.      Objective:      Patient Vitals for the past 8 hrs:   Temp Pulse Resp BP SpO2   06/14/21 0729 98.5 °F (36.9 °C) 82 20 (!) 172/90 95 %   06/14/21 0328 98 °F (36.7 °C) 70 20 (!) 146/77 97 %         Patient Vitals for the past 96 hrs:   Weight   06/14/21 0543 59.7 kg (131 lb 11.2 oz)   06/12/21 0738 63.5 kg (140 lb)   06/11/21 1252 63.5 kg (140 lb)       TELE: normal sinus rhythm, off tele in HD                Current Facility-Administered Medications   Medication Dose Route Frequency Last Admin    losartan (COZAAR) tablet 100 mg  100 mg Oral DAILY      carvediloL (COREG) tablet 25 mg  25 mg Oral BID WITH MEALS 25 mg at 06/13/21 1841    heparin (porcine) injection 5,000 Units  5,000 Units SubCUTAneous Q8H 5,000 Units at 06/14/21 0940    aspirin chewable tablet 81 mg  81 mg Oral DAILY 81 mg at 06/14/21 0940    atorvastatin (LIPITOR) tablet 10 mg  10 mg Oral DAILY 10 mg at 06/14/21 0940    famotidine (PEPCID) tablet 20 mg  20 mg Oral DAILY 20 mg at 06/14/21 0941    insulin lispro (HUMALOG) injection   SubCUTAneous AC&HS 2 Units at 06/13/21 2315    levETIRAcetam (KEPPRA) tablet 250 mg  250 mg Oral Q MON, WED & FRI      levETIRAcetam (KEPPRA) tablet 500 mg  500 mg Oral  mg at 06/14/21 0940    sodium chloride (NS) flush 5-40 mL  5-40 mL IntraVENous Q8H 10 mL at 06/14/21 0546    sodium chloride (NS) flush 5-40 mL  5-40 mL IntraVENous PRN      polyethylene glycol (MIRALAX) packet 17 g  17 g Oral DAILY PRN      ondansetron (ZOFRAN ODT) tablet 4 mg  4 mg Oral Q8H PRN      Or    ondansetron (ZOFRAN) injection 4 mg  4 mg IntraVENous Q6H PRN 4 mg at 06/13/21 1036    labetaloL (NORMODYNE;TRANDATE) 20 mg/4 mL (5 mg/mL) injection 10 mg  10 mg IntraVENous Q5MIN PRN 10 mg at 06/13/21 0434    dilTIAZem ER (CARDIZEM CD) capsule 240 mg  240 mg Oral DAILY 240 mg at 06/14/21 0941    minoxidiL (LONITEN) tablet 2.5 mg  2.5 mg Oral BID 2.5 mg at 06/13/21 1841    cloNIDine (CATAPRES) 0.3 mg/24 hr patch 1 Patch  1 Patch TransDERmal Q7D 1 Patch at 06/13/21 0036    hydrALAZINE (APRESOLINE) 20 mg/mL injection 10 mg  10 mg IntraVENous Q6H PRN 10 mg at 06/13/21 4978       No intake or output data in the 24 hours ending 06/14/21 0954    Physical Exam:  General:  alert, cooperative, no distress, appears stated age  Neck:  no JVD  Lungs:  clear to auscultation bilaterally  Heart:  regular rate and rhythm, S1, S2 normal, no murmur, click, rub or gallop  Abdomen:  abdomen is soft without significant tenderness, masses, organomegaly or guarding  Extremities:  no edema    Visit Vitals  BP (!) 172/90 (BP 1 Location: Left upper arm, BP Patient Position: Lying left side)   Pulse 82   Temp 98.5 °F (36.9 °C)   Resp 20   Ht 5' 3\" (1.6 m)   Wt 59.7 kg (131 lb 11.2 oz)   SpO2 95%   Breastfeeding No   BMI 23.33 kg/m²       Data Review:     Labs: Results:       Chemistry Recent Labs     06/14/21  0230 06/12/21  0915 06/11/21  1335   * 343* 180*    135* 138   K 4.5 3.6 3.9    98* 103   CO2 27 28 30   BUN 49* 29* 30*   CREA 11.20* 7.84* 7.60*   CA 9.2  9.4 8.7 9.5   MG  --  2.2 2.4   PHOS 5.9*  --   --    AGAP 6 9 5   BUCR 4* 4* 4*   AP  --  96 116   TP  --  6.5 7.1   ALB  --  3.2* 3.3*   GLOB  --  3.3 3.8   AGRAT  --  1.0 0.9      CBC w/Diff Recent Labs     06/14/21  0230 06/12/21  0415 06/11/21  1335   WBC 7.4 7.3 8.2   RBC 3.03* 2.81* 3.48*   HGB 8. 9* 8.4* 10.5*   HCT 27.5* 25.1* 31.3*    272 306   GRANS 75* 72 81*   LYMPH 10* 15* 9*   EOS 4 2 3      Cardiac Enzymes No results found for: CPK, CK, CKMMB, CKMB, RCK3, CKMBT, CKNDX, CKND1, SONG, TROPT, TROIQ, KARINA, TROPT, TNIPOC, BNP, BNPP   Coagulation Recent Labs     06/13/21  0140 06/12/21  1912   APTT 28.6 114.4*       Lipid Panel No results found for: CHOL, CHOLPOCT, CHOLX, CHLST, CHOLV, 856514, HDL, HDLP, LDL, LDLC, DLDLP, 044445, VLDLC, VLDL, TGLX, TRIGL, TRIGP, TGLPOCT, CHHD, CHHDX   BNP No results found for: BNP, BNPP, XBNPT   Liver Enzymes Recent Labs     06/12/21  0915   TP 6.5   ALB 3.2*   AP 96      Thyroid Studies Lab Results   Component Value Date/Time    TSH 2.59 06/12/2021 09:15 AM          Signed By: Julieth White PA-C supervised    June 14, 2021       I have independently evaluated and examined the patient. All relevant labs and testing data's are reviewed. Care plan discussed and updated after review.     Obdulia Newman MD

## 2021-06-14 NOTE — DISCHARGE SUMMARY
Discharge Summary    Patient: Sandi Oro MRN: [id number]  CSN: [id number]    YOB: 1955  Age: 77 y.o. Sex: female    DOA: 6/11/2021 LOS:  LOS: 3 days   Discharge Date: 6/14/2021     Admission Diagnoses:   Hypertensive emergency    Discharge Diagnoses:    Hypertensive emergency  Troponin elevation, suspect demand ischemia secondary to above  ESRD, HD dependent  DM2 with hyperglycemia  Chronic diastolic CHF, currently compensated  Cerebrovascular disease with history of CVA  Seizure disorder      Discharge Condition: Stable    PHYSICAL EXAM  Visit Vitals  BP (!) 186/87   Pulse 93   Temp 98.5 °F (36.9 °C) (Oral)   Resp 18   Ht 5' 3\" (1.6 m)   Wt 59.7 kg (131 lb 11.2 oz)   SpO2 95%   Breastfeeding No   BMI 23.33 kg/m²       General: In NAD. Nontoxic-appearing. HEENT: NCAT. Sclerae anicteric, EOMI. OP clear. Lungs:  Clear, no wheezes. No accessory muscle use. Heart:  RRR. Abdomen: Soft, NT/ND. Extremities: Warm, no edema or ischemia. Psych:   Mood normal.  Neurologic:  Awake and alert, moves extremities spontaneously. Motor grossly nonfocal.    Hospital Course:   See admission H&P for full details of HPI. Patient was admitted to the hospital after presenting to the emergency department for evaluation of chest pain and shortness of breath. On arrival to the emergency department, patient was noted to have systolic blood pressures in the 200s. Despite aggressive treatment, blood pressures remained elevated and patient remained in ED for several hours. Continued adjustments have been made to antihypertensive regimen with reasonable response. Blood pressures are continuing to run a bit high at times but are significantly better overall relative to presentation. Patient has received hemodialysis without any difficulty while hospitalized. Cardiology was asked to evaluate patient this admission given troponin elevation on admission.   It is suspected that troponins are likely due to to severely elevated blood pressures and not any acute coronary etiology. No invasive work-up/testing has been recommended as a result. Patient has been evaluated by PT/OT and recommendation is for SNF placement at discharge. Hemodialysis has been completed on the morning of discharge per patient's usual schedule. She can be discharged to the accepting skilled nursing facility today for subacute rehabilitation. Consults:   Nephrology  Cardiology    Significant Diagnostic Studies/Procedures:  2D echo: Interpretation Summary    Result status: Final result   · LV: Estimated LVEF is 60 - 65%. Visually measured ejection fraction. Normal cavity size and systolic function (ejection fraction normal). Mild concentric hypertrophy. Wall motion: normal. Mild (grade 1) left ventricular diastolic dysfunction. · Normal right ventricular size and function. · Pericardium: Moderate loculated pericardial effusion adjacent to right ventricle and right atrium with no evidence of hemodynamic compromise. · PV: Mild pulmonic valve regurgitation is present. · LA: Mildly dilated left atrium. Left Atrium volume index is 39 mL/m2. · PA: Pulmonary arterial systolic pressure is 28 mmHg. Comparison Study Information    Prior Study    There is a prior study available for comparison. Prior study date: 9/27/2020. As compared to the previous study, there are no significant changes. CXR:  IMPRESSION  1. Improved interstitial edema/infiltrate compared to prior study.     2. Cardiomegaly.     3. Redemonstration of nonspecific gaseous distention of the stomach.     Thank you for enabling us to participate in the care of this patient. Discharge Medications:     Current Discharge Medication List      START taking these medications    Details   losartan (COZAAR) 100 mg tablet Take 1 Tablet by mouth daily.   Qty: 30 Tablet, Refills: 0      carvediloL (COREG) 25 mg tablet Take 1 Tablet by mouth two (2) times daily (with meals). Qty: 60 Tablet, Refills: 0         CONTINUE these medications which have NOT CHANGED    Details   famotidine (Pepcid) 20 mg tablet Take 1 Tablet by mouth daily. Qty: 30 Tablet, Refills: 0      polyethylene glycol (MIRALAX) 17 gram packet Take 1 Packet by mouth daily as needed for Constipation. Qty: 15 Packet, Refills: 0      minoxidiL (LONITEN) 2.5 mg tablet Take 1 Tab by mouth two (2) times a day. Qty: 60 Tab, Refills: 1      cloNIDine (CATAPRES) 0.2 mg/24 hr ptwk 1 Patch by TransDERmal route every seven (7) days. Qty: 4 Patch, Refills: 0      atorvastatin (Lipitor) 10 mg tablet Take 10 mg by mouth daily. Indications: high amount of triglyceride in the blood      b complex-vitamin c-folic acid 0.8 mg (NEPHRO-DORIAN) 0.8 mg tab tablet Take 1 Tab by mouth daily. !! levETIRAcetam (Keppra) 500 mg tablet Take 500 mg by mouth two (2) times a day. sevelamer carbonate (RENVELA) 800 mg tab tab Take 800 mg by mouth three (3) times daily. !! levETIRAcetam (KEPPRA) 250 mg tablet Take 1 Tab by mouth every Monday, Wednesday, Friday. Qty: 30 Tab, Refills: 1      insulin lispro (HUMALOG) 100 unit/mL injection INITIATE INSULIN CORRECTIVE PROTOCOL: Normal Insulin Sensitivity   For Blood Sugar (mg/dL) of:     Less than 150 =   0 units           150 -199 =   2 units  200 -249 =   4 units  250 -299 =   6 units  300 -349 =   8 units  350 and above = 10 units and Call Physician  If 2 glucose readings are above  Qty: 1 Vial, Refills: 0      aspirin 81 mg tablet Take 81 mg by mouth daily. diltiazem CD (CARDIZEM CD) 240 mg ER capsule Take 240 mg by mouth daily. !! - Potential duplicate medications found. Please discuss with provider. STOP taking these medications       metoprolol tartrate (LOPRESSOR) 50 mg tablet Comments:   Reason for Stopping:               Activity: As tolerated. Diet: Cardiac Diet and Renal Diet    Disposition: SNF.     Minutes spent on discharge: >30 minutes spent coordinating this discharge. Milan Roblero MD  [address] [place] [address]    Disclaimer: Sections of this note are dictated using utilizing voice recognition software. Minor typographical errors may be present. If questions arise, please do not hesitate to contact me or call our department.

## 2021-06-14 NOTE — DIALYSIS
ACUTE HEMODIALYSIS FLOW SHEET    HEMODIALYSIS ORDERS: Physician: Dr. Meño Harmon     Dialyzer: Revaclear   Duration: 3 hr  BFR: 400   DFR: 80   Dialysate:  Temp 36   K+   2    Ca+  2.5   Na 138   Bicarb 35   Wt Readings from Last 1 Encounters:   06/14/21 59.7 kg (131 lb 11.2 oz)    Patient Chart [x]   Unable to Obtain []  Dry weight/UF Goal: 1500 ml   Heparin [x]  Bolus 1000   Units    [] Hourly    Units    []None       Pre BP:   153/86    Pulse:  89   Respirations: 18    Temperature:  98.5  [x] Oral [] Axillary  Tx: NSS  ml/Bolus   [x] N/A   Labs: []  Pre  []  Post:   [x] N/A   Additional Orders(medications, blood products, hypotension management): [] Yes   [x] No     [x]  Casper Consent Verified     CATHETER ACCESS:  []N/A   [x]Right   []Left   [x]IJ     []Fem   []Chest wall   [] First use X-ray verified     [x]Tunnel    [] Non Tunneled   [x]No S/S infection  []Redness  []Drainage []Cultured []Swelling []Pain   [x]Medical Aseptic Prep Utilized   [x]Dressing Changed  [] Biopatch  Date: 6//11/21   []Clotted   [x]Patent   Flows: [x]Good  []Poor  []Reversed   If access problem,  notified: []Yes    [x]N/A        GENERAL ASSESSMENT:    LUNGS:   SaO2%     [x] Clear  [] Coarse  [] Crackles  [] Wheezing                                                [] Diminished     Location : []RLL   []LLL    []RUL  []STEPHY   Cough: []Productive  []Dry  [x]N/A   Respirations:  [x]Easy  []Labored   Therapy:  [x]RA  []NCl/min    Mask: []NRB  [] Venti    O2%                  []Ventilator  []Intubated  [] Daniel Found  [] BiPaP   CARDIAC: [x]Regular      [] Irregular   [] Pericardial Rub  [] JVD          []  Monitored  [] Bedside  [] Remotely monitored     EDEMA: [x] None  []Generalized  [] Pitting [] 1    [] 2    [] 3    [] 4                 [] Facial  [] Pedal  []  UE  [] LE   SKIN:   [x] Warm  [] Hot     [] Cold   [x] Dry     [] Pale   [] Diaphoretic                  [] Flushed  [] Jaundiced  [] Cyanotic  [] Rash  [] Weeping   LOC: [x] Alert      [x]Oriented:    [x] Person     [x] Place  [x]Time               [] Confused  [] Lethargic  [] Medicated  [] Non-responsive  [] Non-Verbal   GI / ABDOMEN:                     [] Flat    [] Distended    [x] Soft    [] Firm   []  Obese                   [] Diarrhea  [x] Bowel Sounds  [] Nausea  [] Vomiting     / URINE ASSESSMENT:                   [] Voiding   [] Oliguria  [x] Anuria   []  Khan                  [] Incontinent  []  Incontinent Brief []  Fecal Management System     PAIN:  [x] 0 []1  []2   []3   []4   []5   []6   []7   []8   []9   []10            Scale 0-10  Action/Follow Up:    MOBILITY:  [x] Bed    [] Stretcher      All Vitals and Treatment Details on Attached [address]: [facility]          Room # 452/01   [] 1st Time Acute      [] Stat       [x] Routine      [] Urgent     [x] Acute Room  []  Bedside  [] ICU/CCU  [] ER   Isolation Precautions:  [x] Dialysis    There are currently no Active Isolations       ALLERGIES:     Allergies   Allergen Reactions    Amoxicillin Unknown (comments)    Cleocin [Clindamycin Hcl] Unknown (comments)    Codeine Unknown (comments)    Lorcet 10/650 [Hydrocodone-Acetaminophen] Unknown (comments)    Penicillin G Benzathine Unknown (comments)    Shellfish Derived Unknown (comments)      Code Status:  Full Code     Hepatitis Status     Lab Results   Component Value Date/Time    Hepatitis B surface Ag <0.10 09/25/2020 05:31 PM    Hepatitis B surface Ab 63.01 09/25/2020 05:31 PM    Hepatitis B core, IgM NEGATIVE  08/11/2018 03:29 AM    Hep B Core Ab, IgM NEGATIVE  08/13/2018 04:34 AM    Hep B Core Ab, total NEGATIVE  08/13/2018 04:34 AM        Current Labs:      Lab Results   Component Value Date/Time    WBC 7.4 06/14/2021 02:30 AM    Hemoglobin, POC 13.3 11/08/2019 07:26 AM    HGB 8.9 (L) 06/14/2021 02:30 AM    Hematocrit, POC 39 11/08/2019 07:26 AM    HCT 27.5 (L) 06/14/2021 02:30 AM    PLATELET 633 02/85/4498 02:30 AM    MCV 90.8 06/14/2021 02:30 AM     Lab Results   Component Value Date/Time    Sodium 136 06/14/2021 02:30 AM    Potassium 4.5 06/14/2021 02:30 AM    Chloride 103 06/14/2021 02:30 AM    CO2 27 06/14/2021 02:30 AM    Anion gap 6 06/14/2021 02:30 AM    Glucose 157 (H) 06/14/2021 02:30 AM    BUN 49 (H) 06/14/2021 02:30 AM    Creatinine 11.20 (H) 06/14/2021 02:30 AM    BUN/Creatinine ratio 4 (L) 06/14/2021 02:30 AM    GFR est AA 4 (L) 06/14/2021 02:30 AM    GFR est non-AA 3 (L) 06/14/2021 02:30 AM    Calcium 9.4 06/14/2021 02:30 AM    Calcium 9.2 06/14/2021 02:30 AM          DIET:  DIET ADULT      PRIMARY NURSE REPORT:   Pre Dialysis: TATE Garcia RN     Time: 7814        EDUCATION:    [x] Patient [] Other           Knowledge Basis: []None [x]Minimal [] Substantial   Barriers to learning  [x]N/A  []Intubated/Ventilated  []Sedated   [] Access Care     [] S&S of infection  [] Fluid Management  [] K+   [x] Procedural    []Albumin   [] Medications   [] Tx Options   [] Transplant   [] Diet   [] Other   Teaching Tools:  [x] Explain  [] Demo  [] Handouts [] Video  Patient response: [x] Verbalized understanding  [] Teach back  [] Return demonstration   [] Requires follow up      [x]Time Out/Safety Check  [x] Extracorporeal Circuit Tested for integrity       RO/HEMODIALYSIS MACHINE SAFETY CHECKS  Before each treatment:     Machine Number:                   [facility]                                    [x] Unit Machine # 5 with centralized RO                                                                                               Alarm Test:  Pass time 0915            [x] RO/Machine Log Complete    Machine Temp    36.0             Dialysate: pH  7.4    Conductivity: Meter 13.8     HD Machine  13.9      TCD: 13.8  Dialyzer Lot # [id number]     Blood Tubing Lot # [id number]   Saline Lot # [id number]     CHLORINE TESTING-Before each treatment and every 4 hours    Total Chlorine: [x] less than 0.1 ppm  Initial Time Check: 0900       4 Hr/2nd Check Time: 1300   (if greater than 0.1 ppm from Primary then every 30 minutes from Secondary)     TREATMENT INITIATION  with Dialysis Precautions:   [x] All Connections Secured              [x] Saline Line Double Clamped   [x] Venous Parameters Set               [x] Arterial Parameters Set    [x] Prime Given 250ml NSS              [x]Air Foam Detector Engaged      Treatment Initiation Note:  5988; Pt arrived to HD without any complaints. Pt A &O X 3, follows commands, no distress noted on RA. RIJ CVC assessed no abnormalities noted, line patent with good flow. CVC accessed without any difficulty. 1013;  Time out performed per policy, HD commenced, pt tolerated well. During Treatment Notes:  4597; pt alert and well, VSS. Vascular access visible with arterial and venous line connections intact. Dr Mitra Bailey in attendance, advised to target UF of 1500 ml, otherwise maintain current treatment oreders  1100; pt alert and well, VSS. Vascular access visible with arterial and venous line connections intact. 1130; HD in good progress, pt alert and well. VSS. Vascular access visible with arterial and venous line connections intact. 1200; Pt alert and well HD in good progress. Vascular access visible with arterial and venous line connections intact. 1230; pt alert and well, VSS. Vascular access visible with arterial and venous line connections intact. 1300;pt alert and well, VSS. Vascular access visible with arterial and venous line connections intact. 1315; Dialysis treatment completed. Medication Dose Volume Route Time [facility] Nurse, Title   None     THERESA Orozco RN     Post Assessment  Dialyzer Cleared:[] Good [x] Fair  [] Poor  Blood processed:  67.1 L  UF Removed:  1500 Ml  Post /85  Pulse  89 Resp  18   Temp 98.2  [x] Oral [] Axillary    Post Tx Vascular Access: [] N/A  AVF/AVG: Bleeding stopped with  Arterial Pressure for 1.6 min   Venous Pressure for 1.6 min            Skin:[x] Warm  [x] Dry [] Diaphoretic               [] Flushed  [] Pale [] Cyanotic   Pain:  [x]0  []1 []2  []3 []4  []5  []6 []7 []8  []9  []10       Post Treatment Note:   1325;VSS. Dialysis catheter intact, patent and heplocked as noted above. Dressing clean, dry and intact. POST TREATMENT PRIMARY NURSE HANDOFF REPORT:   Post Dialysis: [facility], RN                Time:  1330         Abbreviations: AVG-arterial venous graft, AVF-arterial venous fistula, IJ-Internal Jugular, Subcl-Subclavian, Fem-Femoral, Tx-treatment, AP/HR-apical heart rate, DFR-dialysate flow rate, BFR-blood flow rate, AP-arterial pressure, -venous pressure, UF-ultrafiltrate, TMP-transmembrane pressure, Ray-Venous, Art-Arterial, RO-Reverse Osmosis

## 2021-06-14 NOTE — ROUTINE PROCESS
Bedside and Verbal shift change report given to Elham Mae RN (oncoming nurse) by Drerick Garcia RN (offgoing nurse). Report included the following information SBAR, Kardex, MAR and Recent Results. SITUATION: 
Code Status: Full Code Reason for Admission: Chest pain [R07.9] Hypertensive emergency [I16.1] Hypertensive urgency [I16.0] Hospital day: 3 Problem List:  
   
Hospital Problems  Date Reviewed: 6/2/2021 Codes Class Noted POA Prolonged Q-T interval on ECG ICD-10-CM: R94.31 
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  
   
 CVA, old, cognitive deficits ICD-10-CM: I69.319 ICD-9-CM: 438.0  8/8/2018 Yes BACKGROUND: 
 Past Medical History:  
Past Medical History:  
Diagnosis Date  Asthma  Bipolar affective disorder ([facility]  Brain condition  Cataract  Chronic kidney disease   
 hemodialysis M-W-F  Diabetes ([facility]  Hyperlipidemia  Hypertension  Paralytic strabismus, unspecified  Psychiatric disorder  Seizures ([facility] 08/27/2018 Patient taking anticoagulants yes Patient has a defibrillator: no  
 History of shots YES for example, flu, pneumonia, tetanus Isolation History NO for example, MRSA, CDiff ASSESSMENT: 
Changes in Assessment Throughout Shift: NONE Significant Changes in 24 hours (for example, RR/code, fall) Patient has Central Line: yes Reasons if yes: Hemodialysis Patient has Khan Cath: no 
Mobility Issues PT IV Patency OR Checklist 
Pending Tests Last Vitals: 
Vitals w/ MEWS Score (last day) Date/Time MEWS Score Pulse Resp Temp BP Level of Consciousness SpO2  
 06/14/21 0729  1  82  20  98.5 °F (36.9 °C)  (!) 172/90  Alert (0)  95 % 06/14/21 0328  1  70  20  98 °F (36.7 °C)  (!) 146/77  Alert (0)  97 % 06/13/21 2330  1  67  20  97.5 °F (36.4 °C)  128/81  Alert (0)  96 % 06/13/21 1941  1  78  20  97.4 °F (36.3 °C)  139/77  Alert (0)  98 % 06/13/21 1815  1  79  20  98.3 °F (36.8 °C)  (!) 178/100  Alert (0)  97 % 06/13/21 1323          (!) 161/76      
 06/13/21 1138  1  73  20  97.2 °F (36.2 °C)  (!) 175/78  Alert (0)  96 % 06/13/21 0933    75      138/73      
 06/13/21 0819  3  64  20  97.5 °F (36.4 °C)  (!) 225/96  Alert (0)  99 % 06/13/21 0521          (!) 172/82      
 06/13/21 0423  1  68  20  98.2 °F (36.8 °C)  (!) 187/93  Alert (0)  99 % 06/13/21 0132          (!) 154/75      
 06/13/21 0032  1  78  20  97.2 °F (36.2 °C)  (!) 191/97  Alert (0)  99 % PAIN Pain Assessment Pain Intensity 1: 0 (06/14/21 0444) Patient Stated Pain Goal: 0 Intervention effective: N/A Time of last intervention: N/A Reassessment Completed: yes Other actions taken for pain: Distraction Last 3 Weights: 
Last 3 Recorded Weights in this Encounter 06/11/21 1252 06/12/21 5451 06/14/21 0543 Weight: 63.5 kg (140 lb) 63.5 kg (140 lb) 59.7 kg (131 lb 11.2 oz) Weight change: -3.765 kg (-8 lb 4.8 oz) INTAKE/OUPUT Current Shift: No intake/output data recorded. Last three shifts: 06/12 1901 - 06/14 0700 In: 168.9 [I.V.:168.9] Out: -  
 
RECOMMENDATIONS AND DISCHARGE PLANNING Patient needs and requests: Assistance with ADL's 
 
Pending tests/procedures: labs Discharge plan for patient: SNF/[facility] Discharge planning Needs or Barriers: None Estimated Discharge Date: 6/16/2021 Posted on Whiteboard in [facility] Room: yes \"HEALS\" SAFETY CHECK A safety check occurred in the patient's room between off going nurse and oncoming nurse listed above. The safety check included the below items: 
 
H High Alert Medications Verify all high alert medication drips (heparin, PCA, etc.) E Equipment Suction is set up for ALL patients (with genesis) Red plugs utilized for all equipment (IV pumps, etc.) WOWs wiped down at end of shift. Room stocked with oxygen, suction, and other unit-specific supplies A Alarms Bed alarm is set for fall risk patients Ensure chair alarm is in place and activated if patient is up in a chair L Lines Check IV for any infiltration Khan bag is empty if patient has a Khan Tubing and IV bags are labeled Megha Leo Safety Room is clean, patient is clean, and equipment is clean. Hallways are clear from equipment besides carts. Fall bracelet on for fall risk patients Ensure room is clear and free of clutter Suction is set up for ALL patients (with genesis) Hallways are clear from equipment besides carts. Isolation precautions followed, supplies available outside room, sign posted Abilio Joseph RN

## 2021-06-14 NOTE — PROGRESS NOTES
Transportation to [address] [place] [address]  Patient will require BLS transport.    Pt requires Stretcher If stretcher, reason: history of stroke, cognitive deficit  Patient is currently requiring oxygen No No  Height:5'3\"   Weight: 140 Ib  Pt is on isolation: No    Is the pt ready now? no  Requested time: Next Available  PCS Faxed: no  Insurance verified on face sheet: yes  Auth needed for transport: yes  CM completed PCS/ Envelope and placed on chart.

## 2021-06-14 NOTE — PROGRESS NOTES
Call made to [facility], [phone], spoke with Skyla Lundy, requested 3:00 pm stretcher transportation , patient is going to [address] [place] [address], dispatch will call the nurses station with ASHLEY.   Trip # [id number]

## 2021-06-28 NOTE — PROGRESS NOTES
Physician Progress Note      PATIENT:               Madison Escobedo  CSN #:                  [id number]  :                       1955  ADMIT DATE:       2021 12:39 PM  James Rodriguez DATE:        2021 6:41 PM  RESPONDING  PROVIDER #:        Heather Cedeno MD          QUERY TEXT:    Patient was admitted with HTN (/107). Progress notes stated, \"Elevated troponin likely from demand ischemia. Do not suspect ACS. \" Discharge summary noted, \"Troponin elevation, suspect demand ischemia. \" Troponin was noted to increase from 0.12 to 0.18 and was 0.13 last check. Patient was noted to have chest pain. After study, was this patient suspected to have: The medical record reflects the following:  Risk Factors: HTN, ESRD  Clinical Indicators:  HTN /107, Dx hypertensive emergency  Troponin . 12, 0.18,  0.13  Card:  CP resolved with improvement of BP and HR  Indeterminate troponin 0.17>0.18>0.13, not consistent with primary ACS, likely demand in the setting of hypertensive urgency/ESRD  DS:  Hypertensive emergency, Troponin elevation,?suspect demand ischemia secondary to above  It is suspected that troponins are likely due to severely elevated blood pressures and not any acute coronary etiology  Treatment: aggressive antihypertensive regimen, troponin monitored  Thank you. Aurora Sotomayor RN BSN CCDS    Type 2 MI: Myocardial infarction secondary to ischemia due to an increased oxygen demand or decreased supply?(rather than a blockage)  Options provided:  -- Type 2 MI due to demand from HTN  -- Demand Ischemia with MI from HTN  -- Demand Ischemia only, no MI  -- Other - I will add my own diagnosis  -- Disagree - Not applicable / Not valid  -- Disagree - Clinically unable to determine / Unknown  -- Refer to Clinical Documentation Reviewer    PROVIDER RESPONSE TEXT:    This patient has a Type 2 MI due to demand from HTN. Query created by:  Kirsten Rey on 2021 8:50 AM      Electronically signed by:  Reddy Willett Abbey Aguirre MD 6/27/2021 9:01 PM

## 2021-07-19 ENCOUNTER — APPOINTMENT (OUTPATIENT)
Dept: GENERAL RADIOLOGY | Age: 66
DRG: 291 | End: 2021-07-19
Attending: STUDENT IN AN ORGANIZED HEALTH CARE EDUCATION/TRAINING PROGRAM
Payer: MEDICARE

## 2021-07-19 ENCOUNTER — HOSPITAL ENCOUNTER (INPATIENT)
Age: 66
LOS: 3 days | Discharge: NURSING, REHAB OR LONG-TERM CARE | DRG: 291 | End: 2021-07-22
Attending: EMERGENCY MEDICINE | Admitting: STUDENT IN AN ORGANIZED HEALTH CARE EDUCATION/TRAINING PROGRAM
Payer: MEDICARE

## 2021-07-19 DIAGNOSIS — D72.825 BANDEMIA: ICD-10-CM

## 2021-07-19 DIAGNOSIS — N28.9 ACUTE ON CHRONIC RENAL INSUFFICIENCY: ICD-10-CM

## 2021-07-19 DIAGNOSIS — N18.9 ACUTE ON CHRONIC RENAL INSUFFICIENCY: ICD-10-CM

## 2021-07-19 DIAGNOSIS — R07.9 ACUTE CHEST PAIN: Primary | ICD-10-CM

## 2021-07-19 DIAGNOSIS — I47.1 PAROXYSMAL SVT (SUPRAVENTRICULAR TACHYCARDIA) (HCC): ICD-10-CM

## 2021-07-19 DIAGNOSIS — I12.0 TYPE 2 DM WITH HYPERTENSION AND ESRD ON DIALYSIS (HCC): ICD-10-CM

## 2021-07-19 DIAGNOSIS — N18.6 TYPE 2 DM WITH HYPERTENSION AND ESRD ON DIALYSIS (HCC): ICD-10-CM

## 2021-07-19 DIAGNOSIS — R77.8 ELEVATED TROPONIN: ICD-10-CM

## 2021-07-19 DIAGNOSIS — N18.6 ESRD (END STAGE RENAL DISEASE) (HCC): ICD-10-CM

## 2021-07-19 DIAGNOSIS — R77.8 ELEVATED TROPONIN LEVEL: ICD-10-CM

## 2021-07-19 DIAGNOSIS — E11.22 TYPE 2 DM WITH HYPERTENSION AND ESRD ON DIALYSIS (HCC): ICD-10-CM

## 2021-07-19 DIAGNOSIS — I16.1 HYPERTENSIVE EMERGENCY: ICD-10-CM

## 2021-07-19 DIAGNOSIS — I69.319 CVA, OLD, COGNITIVE DEFICITS: ICD-10-CM

## 2021-07-19 DIAGNOSIS — Z99.2 TYPE 2 DM WITH HYPERTENSION AND ESRD ON DIALYSIS (HCC): ICD-10-CM

## 2021-07-19 LAB
ALBUMIN SERPL-MCNC: 3.3 G/DL (ref 3.4–5)
ALBUMIN/GLOB SERPL: 1 {RATIO} (ref 0.8–1.7)
ALP SERPL-CCNC: 105 U/L (ref 45–117)
ALT SERPL-CCNC: 20 U/L (ref 13–56)
ANION GAP SERPL CALC-SCNC: 6 MMOL/L (ref 3–18)
APTT PPP: 25.2 SEC (ref 23–36.4)
AST SERPL-CCNC: 18 U/L (ref 10–38)
ATRIAL RATE: 146 BPM
ATRIAL RATE: 98 BPM
BASOPHILS # BLD: 0 K/UL (ref 0–0.1)
BASOPHILS # BLD: 0 K/UL (ref 0–0.1)
BASOPHILS NFR BLD: 0 % (ref 0–2)
BASOPHILS NFR BLD: 1 % (ref 0–2)
BILIRUB SERPL-MCNC: 0.3 MG/DL (ref 0.2–1)
BNP SERPL-MCNC: ABNORMAL PG/ML (ref 0–900)
BUN SERPL-MCNC: 29 MG/DL (ref 7–18)
BUN/CREAT SERPL: 4 (ref 12–20)
CALCIUM SERPL-MCNC: 8.9 MG/DL (ref 8.5–10.1)
CALCULATED P AXIS, ECG09: 66 DEGREES
CALCULATED R AXIS, ECG10: 24 DEGREES
CALCULATED R AXIS, ECG10: 35 DEGREES
CALCULATED T AXIS, ECG11: 141 DEGREES
CALCULATED T AXIS, ECG11: 150 DEGREES
CHLORIDE SERPL-SCNC: 105 MMOL/L (ref 100–111)
CO2 SERPL-SCNC: 28 MMOL/L (ref 21–32)
CREAT SERPL-MCNC: 6.48 MG/DL (ref 0.6–1.3)
DIAGNOSIS, 93000: NORMAL
DIAGNOSIS, 93000: NORMAL
DIFFERENTIAL METHOD BLD: ABNORMAL
DIFFERENTIAL METHOD BLD: ABNORMAL
EOSINOPHIL # BLD: 0.2 K/UL (ref 0–0.4)
EOSINOPHIL # BLD: 0.3 K/UL (ref 0–0.4)
EOSINOPHIL NFR BLD: 5 % (ref 0–5)
EOSINOPHIL NFR BLD: 6 % (ref 0–5)
ERYTHROCYTE [DISTWIDTH] IN BLOOD BY AUTOMATED COUNT: 14.6 % (ref 11.6–14.5)
ERYTHROCYTE [DISTWIDTH] IN BLOOD BY AUTOMATED COUNT: 14.7 % (ref 11.6–14.5)
GLOBULIN SER CALC-MCNC: 3.3 G/DL (ref 2–4)
GLUCOSE BLD STRIP.AUTO-MCNC: 133 MG/DL (ref 70–110)
GLUCOSE SERPL-MCNC: 155 MG/DL (ref 74–99)
HCT VFR BLD AUTO: 30 % (ref 35–45)
HCT VFR BLD AUTO: 30.7 % (ref 35–45)
HGB BLD-MCNC: 10.1 G/DL (ref 12–16)
HGB BLD-MCNC: 9.7 G/DL (ref 12–16)
LYMPHOCYTES # BLD: 0.7 K/UL (ref 0.9–3.6)
LYMPHOCYTES # BLD: 0.9 K/UL (ref 0.9–3.6)
LYMPHOCYTES NFR BLD: 17 % (ref 21–52)
LYMPHOCYTES NFR BLD: 19 % (ref 21–52)
MAGNESIUM SERPL-MCNC: 2.2 MG/DL (ref 1.6–2.6)
MCH RBC QN AUTO: 29.9 PG (ref 24–34)
MCH RBC QN AUTO: 30.3 PG (ref 24–34)
MCHC RBC AUTO-ENTMCNC: 32.3 G/DL (ref 31–37)
MCHC RBC AUTO-ENTMCNC: 32.9 G/DL (ref 31–37)
MCV RBC AUTO: 92.2 FL (ref 74–97)
MCV RBC AUTO: 92.6 FL (ref 74–97)
MONOCYTES # BLD: 0.4 K/UL (ref 0.05–1.2)
MONOCYTES # BLD: 0.7 K/UL (ref 0.05–1.2)
MONOCYTES NFR BLD: 15 % (ref 3–10)
MONOCYTES NFR BLD: 8 % (ref 3–10)
NEUTS SEG # BLD: 2.7 K/UL (ref 1.8–8)
NEUTS SEG # BLD: 3 K/UL (ref 1.8–8)
NEUTS SEG NFR BLD: 59 % (ref 40–73)
NEUTS SEG NFR BLD: 69 % (ref 40–73)
P-R INTERVAL, ECG05: 114 MS
P-R INTERVAL, ECG05: 134 MS
PHOSPHATE SERPL-MCNC: 3 MG/DL (ref 2.5–4.9)
PLATELET # BLD AUTO: 220 K/UL (ref 135–420)
PLATELET # BLD AUTO: 246 K/UL (ref 135–420)
PMV BLD AUTO: 10.6 FL (ref 9.2–11.8)
PMV BLD AUTO: 11.5 FL (ref 9.2–11.8)
POTASSIUM SERPL-SCNC: 4 MMOL/L (ref 3.5–5.5)
PROT SERPL-MCNC: 6.6 G/DL (ref 6.4–8.2)
Q-T INTERVAL, ECG07: 262 MS
Q-T INTERVAL, ECG07: 312 MS
QRS DURATION, ECG06: 70 MS
QRS DURATION, ECG06: 76 MS
QTC CALCULATION (BEZET), ECG08: 398 MS
QTC CALCULATION (BEZET), ECG08: 408 MS
RBC # BLD AUTO: 3.24 M/UL (ref 4.2–5.3)
RBC # BLD AUTO: 3.33 M/UL (ref 4.2–5.3)
SODIUM SERPL-SCNC: 139 MMOL/L (ref 136–145)
TROPONIN I SERPL-MCNC: 0.04 NG/ML (ref 0–0.04)
TROPONIN I SERPL-MCNC: 0.18 NG/ML (ref 0–0.04)
VENTRICULAR RATE, ECG03: 146 BPM
VENTRICULAR RATE, ECG03: 98 BPM
WBC # BLD AUTO: 4.4 K/UL (ref 4.6–13.2)
WBC # BLD AUTO: 4.6 K/UL (ref 4.6–13.2)

## 2021-07-19 PROCEDURE — 80053 COMPREHEN METABOLIC PANEL: CPT

## 2021-07-19 PROCEDURE — 65270000029 HC RM PRIVATE

## 2021-07-19 PROCEDURE — 85025 COMPLETE CBC W/AUTO DIFF WBC: CPT

## 2021-07-19 PROCEDURE — 74011250637 HC RX REV CODE- 250/637: Performed by: STUDENT IN AN ORGANIZED HEALTH CARE EDUCATION/TRAINING PROGRAM

## 2021-07-19 PROCEDURE — 85730 THROMBOPLASTIN TIME PARTIAL: CPT

## 2021-07-19 PROCEDURE — 71045 X-RAY EXAM CHEST 1 VIEW: CPT

## 2021-07-19 PROCEDURE — 83735 ASSAY OF MAGNESIUM: CPT

## 2021-07-19 PROCEDURE — 83880 ASSAY OF NATRIURETIC PEPTIDE: CPT

## 2021-07-19 PROCEDURE — 99223 1ST HOSP IP/OBS HIGH 75: CPT | Performed by: HOSPITALIST

## 2021-07-19 PROCEDURE — 84484 ASSAY OF TROPONIN QUANT: CPT

## 2021-07-19 PROCEDURE — 84100 ASSAY OF PHOSPHORUS: CPT

## 2021-07-19 PROCEDURE — 93005 ELECTROCARDIOGRAM TRACING: CPT

## 2021-07-19 PROCEDURE — 2709999900 HC NON-CHARGEABLE SUPPLY

## 2021-07-19 PROCEDURE — 82962 GLUCOSE BLOOD TEST: CPT

## 2021-07-19 PROCEDURE — 74011250636 HC RX REV CODE- 250/636: Performed by: STUDENT IN AN ORGANIZED HEALTH CARE EDUCATION/TRAINING PROGRAM

## 2021-07-19 PROCEDURE — 99285 EMERGENCY DEPT VISIT HI MDM: CPT

## 2021-07-19 RX ORDER — POLYETHYLENE GLYCOL 3350 17 G/17G
17 POWDER, FOR SOLUTION ORAL DAILY PRN
Status: DISCONTINUED | OUTPATIENT
Start: 2021-07-19 | End: 2021-07-19

## 2021-07-19 RX ORDER — GUAIFENESIN 100 MG/5ML
324 LIQUID (ML) ORAL DAILY
Status: DISCONTINUED | OUTPATIENT
Start: 2021-07-20 | End: 2021-07-19

## 2021-07-19 RX ORDER — ONDANSETRON 2 MG/ML
4 INJECTION INTRAMUSCULAR; INTRAVENOUS
Status: DISCONTINUED | OUTPATIENT
Start: 2021-07-19 | End: 2021-07-22 | Stop reason: HOSPADM

## 2021-07-19 RX ORDER — GUAIFENESIN 100 MG/5ML
324 LIQUID (ML) ORAL DAILY
Status: DISCONTINUED | OUTPATIENT
Start: 2021-07-19 | End: 2021-07-19

## 2021-07-19 RX ORDER — ACETAMINOPHEN 650 MG/1
650 SUPPOSITORY RECTAL
Status: DISCONTINUED | OUTPATIENT
Start: 2021-07-19 | End: 2021-07-22 | Stop reason: HOSPADM

## 2021-07-19 RX ORDER — LEVETIRACETAM 500 MG/1
500 TABLET ORAL 2 TIMES DAILY
Status: DISCONTINUED | OUTPATIENT
Start: 2021-07-20 | End: 2021-07-22 | Stop reason: HOSPADM

## 2021-07-19 RX ORDER — CLONIDINE 0.2 MG/24H
1 PATCH, EXTENDED RELEASE TRANSDERMAL
Status: DISCONTINUED | OUTPATIENT
Start: 2021-07-20 | End: 2021-07-20

## 2021-07-19 RX ORDER — SEVELAMER CARBONATE 800 MG/1
800 TABLET, FILM COATED ORAL 3 TIMES DAILY
Status: DISCONTINUED | OUTPATIENT
Start: 2021-07-19 | End: 2021-07-22 | Stop reason: HOSPADM

## 2021-07-19 RX ORDER — ENOXAPARIN SODIUM 100 MG/ML
40 INJECTION SUBCUTANEOUS DAILY
Status: DISCONTINUED | OUTPATIENT
Start: 2021-07-20 | End: 2021-07-19

## 2021-07-19 RX ORDER — HEPARIN SODIUM 1000 [USP'U]/ML
60 INJECTION, SOLUTION INTRAVENOUS; SUBCUTANEOUS ONCE
Status: DISCONTINUED | OUTPATIENT
Start: 2021-07-19 | End: 2021-07-19

## 2021-07-19 RX ORDER — ACETAMINOPHEN 325 MG/1
650 TABLET ORAL
Status: DISCONTINUED | OUTPATIENT
Start: 2021-07-19 | End: 2021-07-22 | Stop reason: HOSPADM

## 2021-07-19 RX ORDER — GUAIFENESIN 100 MG/5ML
81 LIQUID (ML) ORAL DAILY
Status: CANCELLED | OUTPATIENT
Start: 2021-07-20

## 2021-07-19 RX ORDER — FAMOTIDINE 20 MG/1
20 TABLET, FILM COATED ORAL DAILY
Status: DISCONTINUED | OUTPATIENT
Start: 2021-07-20 | End: 2021-07-22 | Stop reason: HOSPADM

## 2021-07-19 RX ORDER — CLONIDINE 0.2 MG/24H
1 PATCH, EXTENDED RELEASE TRANSDERMAL
Status: DISCONTINUED | OUTPATIENT
Start: 2021-07-26 | End: 2021-07-19

## 2021-07-19 RX ORDER — ONDANSETRON 4 MG/1
4 TABLET, ORALLY DISINTEGRATING ORAL
Status: DISCONTINUED | OUTPATIENT
Start: 2021-07-19 | End: 2021-07-22 | Stop reason: HOSPADM

## 2021-07-19 RX ORDER — METOPROLOL TARTRATE 25 MG/1
25 TABLET, FILM COATED ORAL ONCE
Status: COMPLETED | OUTPATIENT
Start: 2021-07-19 | End: 2021-07-19

## 2021-07-19 RX ORDER — ATORVASTATIN CALCIUM 10 MG/1
10 TABLET, FILM COATED ORAL DAILY
Status: DISCONTINUED | OUTPATIENT
Start: 2021-07-20 | End: 2021-07-22 | Stop reason: HOSPADM

## 2021-07-19 RX ORDER — SODIUM CHLORIDE 0.9 % (FLUSH) 0.9 %
5-40 SYRINGE (ML) INJECTION AS NEEDED
Status: DISCONTINUED | OUTPATIENT
Start: 2021-07-19 | End: 2021-07-22 | Stop reason: HOSPADM

## 2021-07-19 RX ORDER — MINOXIDIL 2.5 MG/1
2.5 TABLET ORAL 2 TIMES DAILY
Status: DISCONTINUED | OUTPATIENT
Start: 2021-07-20 | End: 2021-07-20

## 2021-07-19 RX ORDER — LOSARTAN POTASSIUM 50 MG/1
100 TABLET ORAL DAILY
Status: DISCONTINUED | OUTPATIENT
Start: 2021-07-20 | End: 2021-07-22 | Stop reason: HOSPADM

## 2021-07-19 RX ORDER — DIPHENHYDRAMINE HCL 25 MG
25 CAPSULE ORAL
COMMUNITY
End: 2021-11-10

## 2021-07-19 RX ORDER — INSULIN LISPRO 100 [IU]/ML
INJECTION, SOLUTION INTRAVENOUS; SUBCUTANEOUS
Status: DISCONTINUED | OUTPATIENT
Start: 2021-07-19 | End: 2021-07-22 | Stop reason: HOSPADM

## 2021-07-19 RX ORDER — HEPARIN SODIUM 5000 [USP'U]/ML
5000 INJECTION, SOLUTION INTRAVENOUS; SUBCUTANEOUS EVERY 12 HOURS
Status: DISCONTINUED | OUTPATIENT
Start: 2021-07-19 | End: 2021-07-22 | Stop reason: HOSPADM

## 2021-07-19 RX ORDER — POLYETHYLENE GLYCOL 3350 17 G/17G
17 POWDER, FOR SOLUTION ORAL
Status: DISCONTINUED | OUTPATIENT
Start: 2021-07-19 | End: 2021-07-22 | Stop reason: HOSPADM

## 2021-07-19 RX ORDER — HEPARIN SODIUM 5000 [USP'U]/ML
5000 INJECTION, SOLUTION INTRAVENOUS; SUBCUTANEOUS EVERY 8 HOURS
Status: DISCONTINUED | OUTPATIENT
Start: 2021-07-19 | End: 2021-07-19

## 2021-07-19 RX ORDER — HEPARIN SODIUM 10000 [USP'U]/100ML
12-25 INJECTION, SOLUTION INTRAVENOUS
Status: DISCONTINUED | OUTPATIENT
Start: 2021-07-19 | End: 2021-07-19

## 2021-07-19 RX ORDER — LEVETIRACETAM 250 MG/1
250 TABLET ORAL
Status: DISCONTINUED | OUTPATIENT
Start: 2021-07-21 | End: 2021-07-22 | Stop reason: HOSPADM

## 2021-07-19 RX ORDER — CARVEDILOL 25 MG/1
25 TABLET ORAL 2 TIMES DAILY WITH MEALS
Status: DISCONTINUED | OUTPATIENT
Start: 2021-07-20 | End: 2021-07-22 | Stop reason: HOSPADM

## 2021-07-19 RX ORDER — SODIUM CHLORIDE 0.9 % (FLUSH) 0.9 %
5-40 SYRINGE (ML) INJECTION EVERY 8 HOURS
Status: DISCONTINUED | OUTPATIENT
Start: 2021-07-19 | End: 2021-07-22 | Stop reason: HOSPADM

## 2021-07-19 RX ADMIN — Medication 10 ML: at 22:55

## 2021-07-19 RX ADMIN — METOPROLOL TARTRATE 25 MG: 25 TABLET, FILM COATED ORAL at 19:46

## 2021-07-19 RX ADMIN — HEPARIN SODIUM 5000 UNITS: 5000 INJECTION INTRAVENOUS; SUBCUTANEOUS at 22:54

## 2021-07-19 RX ADMIN — ASPIRIN 324 MG: 81 TABLET, CHEWABLE ORAL at 19:47

## 2021-07-19 RX ADMIN — SEVELAMER CARBONATE 800 MG: 800 TABLET, FILM COATED ORAL at 22:54

## 2021-07-19 NOTE — ED PROVIDER NOTES
EMERGENCY DEPARTMENT HISTORY AND PHYSICAL EXAM    3:05 PM      Date: 7/19/2021  Patient Name: Magdalena Hart    History of Presenting Illness     Chief Complaint   Patient presents with    Irregular Heart Beat         History Provided By: Patient and EMS  Location/Duration/Severity/Modifying factors   The history is provided by the patient, the EMS personnel and medical records. Chest Pain   This is a recurrent problem. The current episode started less than 1 hour ago. The problem has been resolved. The problem occurs rarely. Associated with: hemodialysis. The pain is present in the substernal region. The pain is mild. The quality of the pain is described as pressure-like and tightness. The pain does not radiate. Associated symptoms include palpitations. Pertinent negatives include no abdominal pain, no cough, no diaphoresis, no fever, no nausea and no shortness of breath. She has tried nothing for the symptoms. Risk factors include smoking/tobacco exposure, cardiac disease, dyslipidemia, diabetes mellitus, a sendentary lifestyle and hypertension. Procedural history includes echocardiogram.      PCP: Poncho Brooks MD    Current Facility-Administered Medications   Medication Dose Route Frequency Provider Last Rate Last Admin    aspirin chewable tablet 324 mg  324 mg Oral DAILY Ally Rayo MD   324 mg at 07/19/21 1947     Current Outpatient Medications   Medication Sig Dispense Refill    losartan (COZAAR) 100 mg tablet Take 1 Tablet by mouth daily. 30 Tablet 0    carvediloL (COREG) 25 mg tablet Take 1 Tablet by mouth two (2) times daily (with meals). 60 Tablet 0    famotidine (Pepcid) 20 mg tablet Take 1 Tablet by mouth daily. 30 Tablet 0    polyethylene glycol (MIRALAX) 17 gram packet Take 1 Packet by mouth daily as needed for Constipation. 15 Packet 0    minoxidiL (LONITEN) 2.5 mg tablet Take 1 Tab by mouth two (2) times a day.  60 Tab 1    cloNIDine (CATAPRES) 0.2 mg/24 hr ptwk 1 Patch by TransDERmal route every seven (7) days. 4 Patch 0    atorvastatin (Lipitor) 10 mg tablet Take 10 mg by mouth daily. Indications: high amount of triglyceride in the blood      b complex-vitamin c-folic acid 0.8 mg (NEPHRO-DORIAN) 0.8 mg tab tablet Take 1 Tab by mouth daily.  levETIRAcetam (Keppra) 500 mg tablet Take 500 mg by mouth two (2) times a day.  sevelamer carbonate (RENVELA) 800 mg tab tab Take 800 mg by mouth three (3) times daily.  levETIRAcetam (KEPPRA) 250 mg tablet Take 1 Tab by mouth every Monday, Wednesday, Friday. 30 Tab 1    insulin lispro (HUMALOG) 100 unit/mL injection INITIATE INSULIN CORRECTIVE PROTOCOL: Normal Insulin Sensitivity   For Blood Sugar (mg/dL) of:     Less than 150 =   0 units           150 -199 =   2 units  200 -249 =   4 units  250 -299 =   6 units  300 -349 =   8 units  350 and above = 10 units and Call Physician  If 2 glucose readings are above 1 Vial 0    aspirin 81 mg tablet Take 81 mg by mouth daily. Past History     Past Medical History:  Past Medical History:   Diagnosis Date    Asthma     B[facility] affective disorder ([facility] [address]     Brain condition     Cataract     Chronic kidney disease     hemodialysis M-W-F    Diabetes ([facility] [address] [facility]     Hyperlipidemia     Hypertension     Paralytic strabismus, unspecified     Psychiatric disorder     Seizures ([facility] [address] 08/27/2018       Past Surgical History:  Past Surgical History:   Procedure Laterality Date    CA INSJ TUNNELED CVC W/O SUBQ PORT/ AGE 3 YR/> N/A 8/9/2018     in-pt 474A, Insertion Tunneled Central Venous Catheter performed by Halima Escobar MD at [address] [place] [address] LAB    CA INSJ TUNNELED CVC W/O SUBQ PORT/ AGE 5 YR/> N/A 11/8/2019    INSERTION TUNNELED CENTRAL VENOUS CATHETER performed by Shalom Joseph MD at [address] [place] [address]       Family History:  No family history on file.     Social History:  Social History     Tobacco Use    Smoking status: Never Smoker    Smokeless tobacco: Never Used Substance Use Topics    Alcohol use: No    Drug use: No       Allergies: Allergies   Allergen Reactions    Amoxicillin Unknown (comments)    Cleocin [Clindamycin Hcl] Unknown (comments)    Codeine Unknown (comments)    Lorcet 10/650 [Hydrocodone-Acetaminophen] Unknown (comments)    Penicillin G Benzathine Unknown (comments)    Shellfish Derived Unknown (comments)         Review of Systems       Review of Systems   Constitutional: Negative for chills, diaphoresis and fever. HENT: Negative for sore throat, trouble swallowing and voice change. Eyes: Negative for photophobia and visual disturbance. Respiratory: Positive for chest tightness. Negative for cough and shortness of breath. Cardiovascular: Positive for chest pain and palpitations. Gastrointestinal: Negative for abdominal pain, blood in stool, diarrhea and nausea. Genitourinary: Negative for dysuria and frequency. Musculoskeletal: Negative for joint swelling and myalgias. Skin: Negative for color change and rash. Neurological: Negative for syncope and light-headedness. Hematological: Negative for adenopathy. Does not bruise/bleed easily. Psychiatric/Behavioral: Negative for confusion and decreased concentration. Physical Exam     Visit Vitals  BP (!) 192/112   Pulse 81   Temp 98.1 °F (36.7 °C)   Resp 19   LMP  (LMP Unknown)   SpO2 100%         Physical Exam  Vitals and nursing note reviewed. Constitutional:       General: She is not in acute distress. Appearance: She is not ill-appearing. HENT:      Head: Normocephalic and atraumatic. Mouth/Throat:      Mouth: Mucous membranes are moist.      Pharynx: Oropharynx is clear. No oropharyngeal exudate or posterior oropharyngeal erythema. Eyes:      General: No scleral icterus. Extraocular Movements: Extraocular movements intact. Conjunctiva/sclera: Conjunctivae normal.   Cardiovascular:      Rate and Rhythm: Regular rhythm. Tachycardia present. Pulses: Normal pulses. Heart sounds: Normal heart sounds. No murmur heard. No friction rub. No gallop. Pulmonary:      Effort: Pulmonary effort is normal. No respiratory distress. Breath sounds: Normal breath sounds. No wheezing. Abdominal:      General: Abdomen is flat. There is no distension. Palpations: Abdomen is soft. Tenderness: There is no abdominal tenderness. There is no guarding. Musculoskeletal:         General: No swelling or tenderness. Cervical back: No rigidity. No muscular tenderness. Right lower leg: No edema. Left lower leg: No edema. Skin:     General: Skin is warm and dry. Coloration: Skin is not jaundiced. Neurological:      General: No focal deficit present. Mental Status: She is alert and oriented to person, place, and time. Cranial Nerves: No cranial nerve deficit. Sensory: No sensory deficit. Motor: No weakness.    Psychiatric:         Mood and Affect: Mood normal.         Behavior: Behavior normal.           Diagnostic Study Results     Labs -  Recent Results (from the past 12 hour(s))   EKG, 12 LEAD, INITIAL    Collection Time: 07/19/21  3:15 PM   Result Value Ref Range    Ventricular Rate 98 BPM    Atrial Rate 98 BPM    P-R Interval 134 ms    QRS Duration 76 ms    Q-T Interval 312 ms    QTC Calculation (Bezet) 398 ms    Calculated P Axis 66 degrees    Calculated R Axis 35 degrees    Calculated T Axis 150 degrees    Diagnosis       Normal sinus rhythm  Nonspecific ST and T wave abnormality  Abnormal ECG  When compared with ECG of 12-JUN-2021 09:09,  T wave inversion no longer evident in Anterior leads  Confirmed by Rosa Duffy MD, Mabel Mariscal ([id number]) on 7/19/2021 4:48:55 PM     EKG, 12 LEAD, INITIAL    Collection Time: 07/19/21  3:51 PM   Result Value Ref Range    Ventricular Rate 146 BPM    Atrial Rate 146 BPM    P-R Interval 114 ms    QRS Duration 70 ms    Q-T Interval 262 ms    QTC Calculation (Bezet) 408 ms    Calculated R Axis 24 degrees    Calculated T Axis 141 degrees    Diagnosis       Sinus tachycardia with fusion complexes  Nonspecific T wave abnormality  Abnormal ECG  When compared with ECG of 19-JUL-2021 15:15,  fusion complexes are now present  Vent. rate has increased BY  48 BPM  Confirmed by Kasey Alarcon MD, Isra Jimenez ([id number]) on 7/19/2021 4:51:25 PM     CBC WITH AUTOMATED DIFF    Collection Time: 07/19/21  4:00 PM   Result Value Ref Range    WBC 4.4 (L) 4.6 - 13.2 K/uL    RBC 3.33 (L) 4.20 - 5.30 M/uL    HGB 10.1 (L) 12.0 - 16.0 g/dL    HCT 30.7 (L) 35.0 - 45.0 %    MCV 92.2 74.0 - 97.0 FL    MCH 30.3 24.0 - 34.0 PG    MCHC 32.9 31.0 - 37.0 g/dL    RDW 14.7 (H) 11.6 - 14.5 %    PLATELET 492 611 - 183 K/uL    MPV 11.5 9.2 - 11.8 FL    NEUTROPHILS 69 40 - 73 %    LYMPHOCYTES 17 (L) 21 - 52 %    MONOCYTES 8 3 - 10 %    EOSINOPHILS 5 0 - 5 %    BASOPHILS 1 0 - 2 %    ABS. NEUTROPHILS 3.0 1.8 - 8.0 K/UL    ABS. LYMPHOCYTES 0.7 (L) 0.9 - 3.6 K/UL    ABS. MONOCYTES 0.4 0.05 - 1.2 K/UL    ABS. EOSINOPHILS 0.2 0.0 - 0.4 K/UL    ABS. BASOPHILS 0.0 0.0 - 0.1 K/UL    DF AUTOMATED     METABOLIC PANEL, COMPREHENSIVE    Collection Time: 07/19/21  4:00 PM   Result Value Ref Range    Sodium 139 136 - 145 mmol/L    Potassium 4.0 3.5 - 5.5 mmol/L    Chloride 105 100 - 111 mmol/L    CO2 28 21 - 32 mmol/L    Anion gap 6 3.0 - 18 mmol/L    Glucose 155 (H) 74 - 99 mg/dL    BUN 29 (H) 7.0 - 18 MG/DL    Creatinine 6.48 (H) 0.6 - 1.3 MG/DL    BUN/Creatinine ratio 4 (L) 12 - 20      GFR est AA 8 (L) >60 ml/min/1.73m2    GFR est non-AA 6 (L) >60 ml/min/1.73m2    Calcium 8.9 8.5 - 10.1 MG/DL    Bilirubin, total 0.3 0.2 - 1.0 MG/DL    ALT (SGPT) 20 13 - 56 U/L    AST (SGOT) 18 10 - 38 U/L    Alk.  phosphatase 105 45 - 117 U/L    Protein, total 6.6 6.4 - 8.2 g/dL    Albumin 3.3 (L) 3.4 - 5.0 g/dL    Globulin 3.3 2.0 - 4.0 g/dL    A-G Ratio 1.0 0.8 - 1.7     TROPONIN I    Collection Time: 07/19/21  4:00 PM   Result Value Ref Range    Troponin-I, QT 0.04 0.0 - 0.045 NG/ML   MAGNESIUM    Collection Time: 07/19/21  4:00 PM   Result Value Ref Range    Magnesium 2.2 1.6 - 2.6 mg/dL   PHOSPHORUS    Collection Time: 07/19/21  4:00 PM   Result Value Ref Range    Phosphorus 3.0 2.5 - 4.9 MG/DL   TROPONIN I    Collection Time: 07/19/21  6:31 PM   Result Value Ref Range    Troponin-I, QT 0.18 (H) 0.0 - 0.045 NG/ML       Radiologic Studies -   XR CHEST PORT   Final Result   1. No acute infiltrate or effusion. 2.  Unchanged hemodialysis catheter. Medical Decision Making   I am the first provider for this patient. I reviewed the vital signs, available nursing notes, past medical history, past surgical history, family history and social history. Vital Signs-Reviewed the patient's vital signs. EKG: Narrow complex tachycardia at a rate of 146 with a shortened MT interval and normal axis consistent most likely with SVT without evidence of ischemic changes. Records Reviewed: Nursing Notes, Old Medical Records, Previous Radiology Studies and Previous Laboratory Studies (Time of Review: 3:05 PM)    ED Course: Progress Notes, Reevaluation, and Consults:    ED Course as of Jul 19 2059   Mon Jul 19, 2021   138 70-year-old female history of COPD, type 2 diabetes, poorly controlled hypertension, end-stage renal disease HD dependent brought in for episode of tachycardia with chest discomfort during dialysis today. Patient is a somewhat poor historian and unsure of how long she is a dialysis but said that it been several hours. Per report sudden noted increased heart rate with associated chest discomfort. EMS was activated which noted on scene heart rate of 149 showing a sinus tachycardia on the monitor without evidence of STEMI.   Patient reports this has been to her several times in the past most recent chart review on 6/11/2021 shows similar incident with cardiology consultation and favored to represent demand ischemia secondary hemodialysis but minimal concern for ACS. My exam patient alert and oriented no acute distress. Noted to be hypertensive in the 200s otherwise hemodynamically stable and heart rate improved to the 90s. Initial EKG demonstrating sinus rhythm at a rate of 98 with normal axis and nonspecific ST-T wave changes but no evidence of ischemia or infarction. Will obtain laboratory evaluation to exclude major electrolyte abnormality as well as troponin and chest x-ray and reassess favored to likely represent repeat episode of demand ischemia. [CM]   1706 Chest x-ray interpreted by me demonstrates cardiomegaly not significantly changed from chest x-ray on 6/11/2021. No evidence of obvious infiltrate, pneumothorax, or rib fractures. TDC in place with positioning unchanged from prior. [CM]   2043 Patient noted to be intermittently having recurrent regular narrow complex tachycardia. Repeat EKG was obtained which demonstrated likely PSVT, however, patient asymptomatic during these episodes and after only several seconds return back to a sinus rhythm. Was intermittently noted to return to this rhythm several more times but again remained asymptomatic during these episodes. [CM]   2058 Repeat troponin noted to be slightly elevated at 0.18 given this patient still intermittently having episodes of tachycardia likely would benefit for inpatient mission for further cardiac or stratification. Patient given 25 mg p.o. Lopressor. Consulted, hospitalist, Dr. Bridger Leon. As well as cardiology was consulted Dr. Iva Trevino added to treatment team.  Consultants agreed with plan for inpatient mission to [facility] 5 telemetry. [CM]      ED Course User Index  [CM] Ghada Lobato MD       Provider Notes (Medical Decision Making): MDM    Procedures    Critical Care Time: N/A      Diagnosis     Clinical Impression:   1. Acute chest pain    2. Elevated troponin    3.  Paroxysmal SVT (supraventricular tachycardia) (HCC)        Disposition: Admitted    Follow-up Information    None          Patient's Medications   Start Taking    No medications on file   Continue Taking    ASPIRIN 81 MG TABLET    Take 81 mg by mouth daily. ATORVASTATIN (LIPITOR) 10 MG TABLET    Take 10 mg by mouth daily. Indications: high amount of triglyceride in the blood    B COMPLEX-VITAMIN C-FOLIC ACID 0.8 MG (NEPHRO-DORIAN) 0.8 MG TAB TABLET    Take 1 Tab by mouth daily. CARVEDILOL (COREG) 25 MG TABLET    Take 1 Tablet by mouth two (2) times daily (with meals). CLONIDINE (CATAPRES) 0.2 MG/24 HR PTWK    1 Patch by TransDERmal route every seven (7) days. FAMOTIDINE (PEPCID) 20 MG TABLET    Take 1 Tablet by mouth daily. INSULIN LISPRO (HUMALOG) 100 UNIT/ML INJECTION    INITIATE INSULIN CORRECTIVE PROTOCOL: Normal Insulin Sensitivity   For Blood Sugar (mg/dL) of:     Less than 150 =   0 units           150 -199 =   2 units  200 -249 =   4 units  250 -299 =   6 units  300 -349 =   8 units  350 and above = 10 units and Call Physician  If 2 glucose readings are above    LEVETIRACETAM (KEPPRA) 250 MG TABLET    Take 1 Tab by mouth every Monday, Wednesday, Friday. LEVETIRACETAM (KEPPRA) 500 MG TABLET    Take 500 mg by mouth two (2) times a day. LOSARTAN (COZAAR) 100 MG TABLET    Take 1 Tablet by mouth daily. MINOXIDIL (LONITEN) 2.5 MG TABLET    Take 1 Tab by mouth two (2) times a day. POLYETHYLENE GLYCOL (MIRALAX) 17 GRAM PACKET    Take 1 Packet by mouth daily as needed for Constipation. SEVELAMER CARBONATE (RENVELA) 800 MG TAB TAB    Take 800 mg by mouth three (3) times daily. These Medications have changed    No medications on file   Stop Taking    DILTIAZEM CD (CARDIZEM CD) 240 MG ER CAPSULE    Take 240 mg by mouth daily. Disclaimer: Sections of this note are dictated using utilizing voice recognition software. Minor typographical errors may be present. If questions arise, please do not hesitate to contact me or call our department.

## 2021-07-19 NOTE — Clinical Note
Status[de-identified] INPATIENT [101]   Type of Bed: Medical [8]   Cardiac Monitoring Required?: Yes   Inpatient Hospitalization Certified Necessary for the Following Reasons: 3.  Patient receiving treatment that can only be provided in an inpatient setting (further clarification in H&P documentation)   Admitting Diagnosis: Chest pain [505829]   Admitting Physician: Tiffany Warren [[id number]]   Attending Physician: Tiffany Warren [[id number]]   Estimated Length of Stay: 3-4 Midnights   Discharge Plan[de-identified] Extended Care Facility (e.g. Adult Home, Nursing Home, etc.)

## 2021-07-19 NOTE — ED NOTES
Pt in bed in the beginning complaining of not feeling well. Stated she wasn't feeling well and that she felt \"sick to my stomach. \" Provided pt with a vomit bag. Pt then became weepy. Pt's heart rate continues to be irregular. Pt's heart rate has gone from 86 to 92 to 134 and down to 124. Pt is now in bed talking and laughing. Administered medication. Pt has no further request or complaints at this time. Will continue to monitor pt.

## 2021-07-20 ENCOUNTER — APPOINTMENT (OUTPATIENT)
Dept: NON INVASIVE DIAGNOSTICS | Age: 66
DRG: 291 | End: 2021-07-20
Attending: STUDENT IN AN ORGANIZED HEALTH CARE EDUCATION/TRAINING PROGRAM
Payer: MEDICARE

## 2021-07-20 PROBLEM — F20.9 SCHIZOPHRENIA (HCC): Status: ACTIVE | Noted: 2021-07-20

## 2021-07-20 LAB
ANION GAP SERPL CALC-SCNC: 8 MMOL/L (ref 3–18)
APTT PPP: 22.6 SEC (ref 23–36.4)
APTT PPP: 23.9 SEC (ref 23–36.4)
APTT PPP: 28.5 SEC (ref 23–36.4)
ATRIAL RATE: 81 BPM
BASOPHILS # BLD: 0 K/UL (ref 0–0.1)
BASOPHILS NFR BLD: 0 % (ref 0–2)
BUN SERPL-MCNC: 32 MG/DL (ref 7–18)
BUN/CREAT SERPL: 4 (ref 12–20)
CALCIUM SERPL-MCNC: 9.1 MG/DL (ref 8.5–10.1)
CALCULATED P AXIS, ECG09: 64 DEGREES
CALCULATED R AXIS, ECG10: 53 DEGREES
CALCULATED T AXIS, ECG11: 111 DEGREES
CHLORIDE SERPL-SCNC: 105 MMOL/L (ref 100–111)
CO2 SERPL-SCNC: 27 MMOL/L (ref 21–32)
CREAT SERPL-MCNC: 7.24 MG/DL (ref 0.6–1.3)
DIAGNOSIS, 93000: NORMAL
DIFFERENTIAL METHOD BLD: ABNORMAL
ECHO LV GLOBAL LONGITUDINAL STRAIN (GLS): -11.2 PERCENT
ECHO LV INTERNAL DIMENSION DIASTOLIC: 3.39 CM (ref 3.9–5.3)
ECHO LV INTERNAL DIMENSION SYSTOLIC: 1.8 CM
ECHO LV IVSD: 1.96 CM (ref 0.6–0.9)
ECHO LV MASS 2D: 259.7 G (ref 67–162)
ECHO LV MASS INDEX 2D: 160.3 G/M2 (ref 43–95)
ECHO LV POSTERIOR WALL DIASTOLIC: 1.7 CM (ref 0.6–0.9)
EOSINOPHIL # BLD: 0.2 K/UL (ref 0–0.4)
EOSINOPHIL NFR BLD: 4 % (ref 0–5)
ERYTHROCYTE [DISTWIDTH] IN BLOOD BY AUTOMATED COUNT: 14.6 % (ref 11.6–14.5)
GLOBAL LONGITUDINAL STRAIN 2 CHAMBER: -12.1 PERCENT
GLOBAL LONGITUDINAL STRAIN 4 CHAMBER: -11.9 PERCENT
GLOBAL LONGITUDINAL STRAIN LONG AXIS: -9.7 PERCENT
GLUCOSE BLD STRIP.AUTO-MCNC: 138 MG/DL (ref 70–110)
GLUCOSE BLD STRIP.AUTO-MCNC: 229 MG/DL (ref 70–110)
GLUCOSE BLD STRIP.AUTO-MCNC: 329 MG/DL (ref 70–110)
GLUCOSE SERPL-MCNC: 143 MG/DL (ref 74–99)
HCT VFR BLD AUTO: 30.9 % (ref 35–45)
HGB BLD-MCNC: 10.2 G/DL (ref 12–16)
LYMPHOCYTES # BLD: 0.8 K/UL (ref 0.9–3.6)
LYMPHOCYTES NFR BLD: 15 % (ref 21–52)
MCH RBC QN AUTO: 30.2 PG (ref 24–34)
MCHC RBC AUTO-ENTMCNC: 33 G/DL (ref 31–37)
MCV RBC AUTO: 91.4 FL (ref 74–97)
MONOCYTES # BLD: 0.6 K/UL (ref 0.05–1.2)
MONOCYTES NFR BLD: 13 % (ref 3–10)
NEUTS SEG # BLD: 3.3 K/UL (ref 1.8–8)
NEUTS SEG NFR BLD: 67 % (ref 40–73)
P-R INTERVAL, ECG05: 120 MS
PLATELET # BLD AUTO: 258 K/UL (ref 135–420)
PMV BLD AUTO: 11.8 FL (ref 9.2–11.8)
POTASSIUM SERPL-SCNC: 4.3 MMOL/L (ref 3.5–5.5)
Q-T INTERVAL, ECG07: 398 MS
QRS DURATION, ECG06: 80 MS
QTC CALCULATION (BEZET), ECG08: 462 MS
RBC # BLD AUTO: 3.38 M/UL (ref 4.2–5.3)
SODIUM SERPL-SCNC: 140 MMOL/L (ref 136–145)
TROPONIN I SERPL-MCNC: 0.37 NG/ML (ref 0–0.04)
TROPONIN I SERPL-MCNC: 0.42 NG/ML (ref 0–0.04)
TROPONIN I SERPL-MCNC: 0.5 NG/ML (ref 0–0.04)
VENTRICULAR RATE, ECG03: 81 BPM
WBC # BLD AUTO: 5 K/UL (ref 4.6–13.2)

## 2021-07-20 PROCEDURE — 2709999900 HC NON-CHARGEABLE SUPPLY

## 2021-07-20 PROCEDURE — 74011250636 HC RX REV CODE- 250/636: Performed by: STUDENT IN AN ORGANIZED HEALTH CARE EDUCATION/TRAINING PROGRAM

## 2021-07-20 PROCEDURE — 82962 GLUCOSE BLOOD TEST: CPT

## 2021-07-20 PROCEDURE — 99223 1ST HOSP IP/OBS HIGH 75: CPT | Performed by: INTERNAL MEDICINE

## 2021-07-20 PROCEDURE — 65270000029 HC RM PRIVATE

## 2021-07-20 PROCEDURE — 99233 SBSQ HOSP IP/OBS HIGH 50: CPT | Performed by: FAMILY MEDICINE

## 2021-07-20 PROCEDURE — 36415 COLL VENOUS BLD VENIPUNCTURE: CPT

## 2021-07-20 PROCEDURE — 93321 DOPPLER ECHO F-UP/LMTD STD: CPT

## 2021-07-20 PROCEDURE — 93005 ELECTROCARDIOGRAM TRACING: CPT

## 2021-07-20 PROCEDURE — 97161 PT EVAL LOW COMPLEX 20 MIN: CPT

## 2021-07-20 PROCEDURE — 80048 BASIC METABOLIC PNL TOTAL CA: CPT

## 2021-07-20 PROCEDURE — 97166 OT EVAL MOD COMPLEX 45 MIN: CPT

## 2021-07-20 PROCEDURE — 74011250636 HC RX REV CODE- 250/636: Performed by: HOSPITALIST

## 2021-07-20 PROCEDURE — 97535 SELF CARE MNGMENT TRAINING: CPT

## 2021-07-20 PROCEDURE — 74011250637 HC RX REV CODE- 250/637: Performed by: INTERNAL MEDICINE

## 2021-07-20 PROCEDURE — 84484 ASSAY OF TROPONIN QUANT: CPT

## 2021-07-20 PROCEDURE — 77030040392 HC DRSG OPTIFOAM MDII -A

## 2021-07-20 PROCEDURE — 85730 THROMBOPLASTIN TIME PARTIAL: CPT

## 2021-07-20 PROCEDURE — 85025 COMPLETE CBC W/AUTO DIFF WBC: CPT

## 2021-07-20 PROCEDURE — 74011250637 HC RX REV CODE- 250/637: Performed by: HOSPITALIST

## 2021-07-20 PROCEDURE — 74011636637 HC RX REV CODE- 636/637: Performed by: STUDENT IN AN ORGANIZED HEALTH CARE EDUCATION/TRAINING PROGRAM

## 2021-07-20 PROCEDURE — 74011250637 HC RX REV CODE- 250/637: Performed by: STUDENT IN AN ORGANIZED HEALTH CARE EDUCATION/TRAINING PROGRAM

## 2021-07-20 RX ORDER — HYDRALAZINE HYDROCHLORIDE 20 MG/ML
10 INJECTION INTRAMUSCULAR; INTRAVENOUS
Status: DISCONTINUED | OUTPATIENT
Start: 2021-07-20 | End: 2021-07-20 | Stop reason: SDUPTHER

## 2021-07-20 RX ORDER — CLONIDINE 0.3 MG/24H
1 PATCH, EXTENDED RELEASE TRANSDERMAL
Status: DISCONTINUED | OUTPATIENT
Start: 2021-07-20 | End: 2021-07-22 | Stop reason: HOSPADM

## 2021-07-20 RX ORDER — MINOXIDIL 2.5 MG/1
5 TABLET ORAL 2 TIMES DAILY
Status: DISCONTINUED | OUTPATIENT
Start: 2021-07-20 | End: 2021-07-22 | Stop reason: HOSPADM

## 2021-07-20 RX ORDER — CLONIDINE HYDROCHLORIDE 0.1 MG/1
0.1 TABLET ORAL
Status: DISCONTINUED | OUTPATIENT
Start: 2021-07-20 | End: 2021-07-22 | Stop reason: HOSPADM

## 2021-07-20 RX ORDER — AMLODIPINE BESYLATE 10 MG/1
10 TABLET ORAL DAILY
Status: DISCONTINUED | OUTPATIENT
Start: 2021-07-20 | End: 2021-07-20

## 2021-07-20 RX ORDER — AMLODIPINE BESYLATE 10 MG/1
10 TABLET ORAL DAILY
Status: DISCONTINUED | OUTPATIENT
Start: 2021-07-20 | End: 2021-07-22 | Stop reason: HOSPADM

## 2021-07-20 RX ORDER — HYDRALAZINE HYDROCHLORIDE 25 MG/1
25 TABLET, FILM COATED ORAL 3 TIMES DAILY
Status: DISCONTINUED | OUTPATIENT
Start: 2021-07-20 | End: 2021-07-20

## 2021-07-20 RX ORDER — HYDRALAZINE HYDROCHLORIDE 20 MG/ML
20 INJECTION INTRAMUSCULAR; INTRAVENOUS
Status: DISCONTINUED | OUTPATIENT
Start: 2021-07-20 | End: 2021-07-20

## 2021-07-20 RX ADMIN — ATORVASTATIN CALCIUM 10 MG: 10 TABLET, FILM COATED ORAL at 09:10

## 2021-07-20 RX ADMIN — SEVELAMER CARBONATE 800 MG: 800 TABLET, FILM COATED ORAL at 16:27

## 2021-07-20 RX ADMIN — HEPARIN SODIUM 5000 UNITS: 5000 INJECTION INTRAVENOUS; SUBCUTANEOUS at 21:34

## 2021-07-20 RX ADMIN — CLONIDINE HYDROCHLORIDE 0.1 MG: 0.1 TABLET ORAL at 13:44

## 2021-07-20 RX ADMIN — Medication 10 ML: at 21:35

## 2021-07-20 RX ADMIN — INSULIN LISPRO 8 UNITS: 100 INJECTION, SOLUTION INTRAVENOUS; SUBCUTANEOUS at 21:34

## 2021-07-20 RX ADMIN — Medication 10 ML: at 07:00

## 2021-07-20 RX ADMIN — SEVELAMER CARBONATE 800 MG: 800 TABLET, FILM COATED ORAL at 08:14

## 2021-07-20 RX ADMIN — LEVETIRACETAM 500 MG: 500 TABLET ORAL at 09:10

## 2021-07-20 RX ADMIN — MINOXIDIL 5 MG: 2.5 TABLET ORAL at 17:17

## 2021-07-20 RX ADMIN — ACETAMINOPHEN 650 MG: 325 TABLET ORAL at 08:14

## 2021-07-20 RX ADMIN — CARVEDILOL 25 MG: 25 TABLET, FILM COATED ORAL at 16:27

## 2021-07-20 RX ADMIN — HYDRALAZINE HYDROCHLORIDE 10 MG: 20 INJECTION INTRAMUSCULAR; INTRAVENOUS at 06:59

## 2021-07-20 RX ADMIN — Medication 10 ML: at 14:27

## 2021-07-20 RX ADMIN — CARVEDILOL 25 MG: 25 TABLET, FILM COATED ORAL at 08:14

## 2021-07-20 RX ADMIN — SEVELAMER CARBONATE 800 MG: 800 TABLET, FILM COATED ORAL at 21:34

## 2021-07-20 RX ADMIN — LOSARTAN POTASSIUM 100 MG: 50 TABLET, FILM COATED ORAL at 09:10

## 2021-07-20 RX ADMIN — AMLODIPINE BESYLATE 10 MG: 10 TABLET ORAL at 13:46

## 2021-07-20 RX ADMIN — MINOXIDIL 2.5 MG: 2.5 TABLET ORAL at 09:10

## 2021-07-20 RX ADMIN — LEVETIRACETAM 500 MG: 500 TABLET ORAL at 17:17

## 2021-07-20 RX ADMIN — INSULIN LISPRO 4 UNITS: 100 INJECTION, SOLUTION INTRAVENOUS; SUBCUTANEOUS at 16:31

## 2021-07-20 RX ADMIN — NEPHROCAP 1 CAPSULE: 1 CAP ORAL at 09:10

## 2021-07-20 RX ADMIN — HYDRALAZINE HYDROCHLORIDE 10 MG: 20 INJECTION INTRAMUSCULAR; INTRAVENOUS at 00:27

## 2021-07-20 RX ADMIN — HEPARIN SODIUM 5000 UNITS: 5000 INJECTION INTRAVENOUS; SUBCUTANEOUS at 08:14

## 2021-07-20 RX ADMIN — FAMOTIDINE 20 MG: 20 TABLET ORAL at 09:10

## 2021-07-20 NOTE — ED NOTES
Provided an update to Dom Issa LPN at [facility],[address].  Advised that pt will be admitted to the hospital.

## 2021-07-20 NOTE — PROGRESS NOTES
Problem: Pressure Injury - Risk of  Goal: *Prevention of pressure injury  Description: Document Ab Scale and appropriate interventions in the flowsheet. Outcome: Progressing Towards Goal  Note: Pressure Injury Interventions:  Sensory Interventions: Assess changes in LOC, Avoid rigorous massage over bony prominences, Check visual cues for pain, Discuss PT/OT consult with provider, Keep linens dry and wrinkle-free    Moisture Interventions: Apply protective barrier, creams and emollients, Assess need for specialty bed, Internal/External urinary devices    Activity Interventions: Assess need for specialty bed, Increase time out of bed, Pressure redistribution bed/mattress(bed type), PT/OT evaluation    Mobility Interventions: Assess need for specialty bed, Float heels, HOB 30 degrees or less, PT/OT evaluation    Nutrition Interventions: Document food/fluid/supplement intake, Offer support with meals,snacks and hydration    Friction and Shear Interventions: Apply protective barrier, creams and emollients, Foam dressings/transparent film/skin sealants, HOB 30 degrees or less                Problem: Patient Education: Go to Patient Education Activity  Goal: Patient/Family Education  Outcome: Progressing Towards Goal     Problem: Falls - Risk of  Goal: *Absence of Falls  Description: Document Oscar Fall Risk and appropriate interventions in the flowsheet.   Outcome: Progressing Towards Goal  Note: Fall Risk Interventions:  Mobility Interventions: Bed/chair exit alarm, Communicate number of staff needed for ambulation/transfer, OT consult for ADLs, Patient to call before getting OOB, PT Consult for mobility concerns    Mentation Interventions: Adequate sleep, hydration, pain control, Bed/chair exit alarm, Door open when patient unattended, Familiar objects from home    Medication Interventions: Assess postural VS orthostatic hypotension, Evaluate medications/consider consulting pharmacy, Patient to call before getting OOB, Teach patient to arise slowly         History of Falls Interventions: Consult care management for discharge planning, Door open when patient unattended, Investigate reason for fall, Room close to nurse's station         Problem: Patient Education: Go to Patient Education Activity  Goal: Patient/Family Education  Outcome: Progressing Towards Goal

## 2021-07-20 NOTE — PROGRESS NOTES
Progress Note    Allisona Foot  77 y.o. Admit Date: 7/19/2021  Principal Problem:    Chest pain (6/11/2021) POA: Unknown    Active Problems:    ESRD (end stage renal disease) ([facility] (8/8/2018) POA: Yes      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 ([facility] (8/8/2018) POA: Yes      CVA, old, cognitive deficits (8/8/2018) POA: Yes      Anemia (3/19/2021) POA: Yes      Hypertensive urgency (6/12/2021) POA: Yes      Schizophrenia ([facility] (7/20/2021) POA: Unknown            Subjective:     Patient remained at her usual state, no Specific complain, no more tachycardia, completed Echocardiogram.       A comprehensive review of systems was negative except for that written in the History of Present Illness.     Objective:     Visit Vitals  BP (!) 181/83 (BP 1 Location: Left upper arm, BP Patient Position: At rest)   Pulse 79   Temp 97.8 °F (36.6 °C)   Resp 18   Ht 5' 3\" (1.6 m)   Wt 59.4 kg (131 lb)   LMP  (LMP Unknown)   SpO2 97%   Breastfeeding No   BMI 23.21 kg/m²         Intake/Output Summary (Last 24 hours) at 7/20/2021 1259  Last data filed at 7/20/2021 0800  Gross per 24 hour   Intake 0 ml   Output    Net 0 ml       Current Facility-Administered Medications   Medication Dose Route Frequency Provider Last Rate Last Admin    perflutren lipid microspheres (DEFINITY) contrast injection 2 mL  2 mL IntraVENous CARD ONCE Lopez Soto, DO        minoxidiL (LONITEN) tablet 5 mg  5 mg Oral BID Theo Meyer MD        sodium chloride (NS) flush 5-40 mL  5-40 mL IntraVENous Q8H Deisy Cloud, DO   10 mL at 07/20/21 0700    sodium chloride (NS) flush 5-40 mL  5-40 mL IntraVENous PRN Deisy Cloud, DO        acetaminophen (TYLENOL) tablet 650 mg  650 mg Oral Q6H PRN Deisy Cloud, DO   650 mg at 07/20/21 5767    Or    acetaminophen (TYLENOL) suppository 650 mg  650 mg Rectal Q6H PRN Cloud, Deisy N, DO        ondansetron (ZOFRAN ODT) tablet 4 mg  4 mg Oral Q8H PRN Deisy Cloud, DO        Or    ondansetron (ZOFRAN) injection 4 mg  4 mg IntraVENous Q6H PRN Deisy Cloud N, DO        atorvastatin (LIPITOR) tablet 10 mg  10 mg Oral DAILY Deisy Cloud, DO   10 mg at 07/20/21 0910    carvediloL (COREG) tablet 25 mg  25 mg Oral BID WITH MEALS Deisy Cloud, DO   25 mg at 07/20/21 7553    famotidine (PEPCID) tablet 20 mg  20 mg Oral DAILY Deisy Cloud, DO   20 mg at 07/20/21 0910    insulin lispro (HUMALOG) injection   SubCUTAneous AC&HS Deisy Mitchell, DO        [START ON 7/21/2021] levETIRAcetam (KEPPRA) tablet 250 mg  250 mg Oral Q MON, WED & FRI Deisy Cloud, DO        levETIRAcetam (KEPPRA) tablet 500 mg  500 mg Oral BID Deisy Cloud, DO   500 mg at 07/20/21 0910    losartan (COZAAR) tablet 100 mg  100 mg Oral DAILY Deisy Cloud, DO   100 mg at 07/20/21 0910    polyethylene glycol (MIRALAX) packet 17 g  17 g Oral DAILY PRN Deisy Mitchell, DO        sevelamer carbonate (RENVELA) tab 800 mg  800 mg Oral TID Deisy Mitchell, DO   800 mg at 07/20/21 9066    cloNIDine (CATAPRES) 0.2 mg/24 hr patch 1 Patch  1 Patch TransDERmal Q7D Awilda Guy MD   1 Patch at 07/20/21 0917    B complex-vitaminC-folic acid (NEPHROCAP) cap  1 Capsule Oral DAILY Deisy Cloud, DO   1 Capsule at 07/20/21 0910    heparin (porcine) injection 5,000 Units  5,000 Units SubCUTAneous Q12H Codey FRAGA DO   5,000 Units at 07/20/21 0753        Physical Exam:     Physical Exam:   General:  Alert, cooperative, no distress, appears stated age. Neck: Supple, symmetrical, trachea midline, no adenopathy, thyroid: no enlargement/tenderness/nodules, no carotid bruit and no JVD. Lungs:   Clear to auscultation bilaterally. Heart:  Regular rate and rhythm, S1, S2 normal, no murmur, click, rub or gallop. Abdomen:   Soft, non-tender. Bowel sounds normal. No masses,  No organomegaly.    Extremities: Extremities normal, atraumatic, no cyanosis or edema, HD catheter is well dressed   Skin: Skin color, texture, turgor normal. No rashes or lesions         Data Review:    CBC w/Diff    Recent Labs     07/20/21 0247 07/19/21 2144 07/19/21 2144 07/19/21 1600 07/19/21  1600   WBC 5.0  --  4.6  --  4.4*   RBC 3.38*  --  3.24*  --  3.33*   HGB 10.2*  --  9.7*  --  10.1*   HCT 30.9*  --  30.0*  --  30.7*   MCV 91.4   < > 92.6   < > 92.2   MCH 30.2   < > 29.9   < > 30.3   MCHC 33.0   < > 32.3   < > 32.9   RDW 14.6*   < > 14.6*   < > 14.7*    < > = values in this interval not displayed. Recent Labs     07/20/21 0247 07/19/21 2144 07/19/21 2144 07/19/21  1600 07/19/21  1600   MONOS 13*  --  15*  --  8   EOS 4  --  6*  --  5   BASOS 0   < > 0   < > 1   RDW 14.6*   < > 14.6*   < > 14.7*    < > = values in this interval not displayed. Comprehensive Metabolic Profile    Recent Labs     07/20/21 0247 07/19/21  1600    139   K 4.3 4.0    105   CO2 27 28   BUN 32* 29*   CREA 7.24* 6.48*    Recent Labs     07/20/21 0247 07/19/21  1600   CA 9.1 8.9   PHOS  --  3.0   ALB  --  3.3*   TP  --  6.6   TBILI  --  0.3                }        Impression:       Active Hospital Problems    Diagnosis Date Noted    Schizophrenia ([facility] 07/20/2021    Hypertensive urgency 06/12/2021    Chest pain 06/11/2021    Anemia 03/19/2021    CVA, old, cognitive deficits 08/08/2018    ESRD (end stage renal disease) ([facility] 08/08/2018    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 ([facility] 08/08/2018            Plan:     Adjust BP medicine, increased Catapress patch TTS 0.3 mg, added Norvasc, continue Losartan,Coreg. Increased Minoxidil 5 mg PO BID,watch for any Pericardial Effusion, Dc Hydralazine as it cause Reflex Tachycardia , moreover not safe to use in combination of Minoxidil, Dialysis tomorrow.       Giuliana Rossi MD

## 2021-07-20 NOTE — PROGRESS NOTES
Again in ER with chest tightness, SVT & uncontrolled Hypertension, dialyzed today. BP is always high either she does not take medicine or Nursing home does not give medicine,.  Will follow while she is in the hospital.

## 2021-07-20 NOTE — ROUTINE PROCESS
Bedside shift change report given to Shy Mcintyre RN (oncoming nurse) by Machelle Nieves RN (offgoing nurse). Report included the following information SBAR, Kardex, Intake/Output and MAR.

## 2021-07-20 NOTE — H&P
History & Physical    Patient: Lemuel Villalobos MRN: [id number]  CSN: [id number]    YOB: 1955  Age: 77 y.o. Sex: female      DOA: 7/19/2021    Chief Complaint:   Chief Complaint   Patient presents with    Irregular Heart Beat          HPI:     Lemuel Villalobos is a 77 y.o.  female with PMH of ESRD on HD (M,W, F), insulin dependent diabetes, Grade 1 HFpEF, cerebrovascular incident. Patient is a resident at [address] [place] [address]; uses wheelchair for most ambulation. History gained from patient and upon review of ED notes. Patient poor historian. Patient completed dialysis; subsequently experienced substernal chest pain. Per chart review, similar instances have occurred. In ED - experienced SVT with rate of 146 bpm; resolved with metoprolol 25 mg. Chest pain also resolved. Patient does attest to shortness of breath at baseline which is currently not worsened. Able to speak in full sentences without dyspnea. No nausea, vomiting, diarrhea, dysuria. Regular rate, rhythm on exam and monitor during interview. Past Medical History:   Diagnosis Date    Asthma     Bipolar affective disorder ([facility]     Brain condition     Cataract     Chronic kidney disease     hemodialysis M-W-F    Diabetes ([facility]     Hyperlipidemia     Hypertension     Paralytic strabismus, unspecified     Psychiatric disorder     Seizures ([facility] 08/27/2018       Past Surgical History:   Procedure Laterality Date    [place] INSJ TUNNELED CVC W/O SUBQ PORT/ AGE 5 YR/> N/A 8/9/2018     in-pt 474A, Insertion Tunneled Central Venous Catheter performed by Jolly Lobato MD at [address] [place] [address] LAB    [place] INSJ TUNNELED CVC W/O SUBQ PORT/ AGE 5 YR/> N/A 11/8/2019    INSERTION TUNNELED CENTRAL VENOUS CATHETER performed by Angelina Rodas MD at [facility] LAB       No family history on file.     Social History     Socioeconomic History    Marital status: SINGLE     Spouse name: Not on file    Number of children: Not on file    Years of education: Not on file    Highest education level: Not on file   Tobacco Use    Smoking status: Never Smoker    Smokeless tobacco: Never Used   Substance and Sexual Activity    Alcohol use: No    Drug use: No    Sexual activity: Never     Social Determinants of Health     Financial Resource Strain:     Difficulty of Paying Living Expenses:    Food Insecurity:     Worried About Running Out of Food in the Last Year:     Ran Out of Food in the Last Year:    Transportation Needs:     Lack of Transportation (Medical):  Lack of Transportation (Non-Medical):    Physical Activity:     Days of Exercise per Week:     Minutes of Exercise per Session:    Stress:     Feeling of Stress :    Social Connections:     Frequency of Communication with Friends and Family:     Frequency of Social Gatherings with Friends and Family:     Attends Confucianism Services:     Active Member of Clubs or Organizations:     Attends Club or Organization Meetings:     Marital Status:        Prior to Admission medications    Medication Sig Start Date End Date Taking? Authorizing Provider   diphenhydrAMINE (BenadryL) 25 mg capsule Take 25 mg by mouth every eight (8) hours as needed for Allergies. Yes Provider, Historical   losartan (COZAAR) 100 mg tablet Take 1 Tablet by mouth daily. 6/15/21  Yes Ignacio Luis MD   carvediloL (COREG) 25 mg tablet Take 1 Tablet by mouth two (2) times daily (with meals). 6/14/21  Yes Ignacio Luis MD   famotidine (Pepcid) 20 mg tablet Take 1 Tablet by mouth daily. 6/3/21  Yes Favio Bruce MD   minoxidiL (LONITEN) 2.5 mg tablet Take 1 Tab by mouth two (2) times a day. 3/20/21  Yes Zelda Skelton MD   cloNIDine (CATAPRES) 0.2 mg/24 hr ptwk 1 Patch by TransDERmal route every seven (7) days. 9/29/20  Yes Annmarie Darling MD   atorvastatin (Lipitor) 10 mg tablet Take 10 mg by mouth daily.  Indications: high amount of triglyceride in the blood Yes Provider, Historical   b complex-vitamin c-folic acid 0.8 mg (NEPHRO-DORIAN) 0.8 mg tab tablet Take 1 Tab by mouth daily. Yes Provider, Historical   sevelamer carbonate (RENVELA) 800 mg tab tab Take 800 mg by mouth three (3) times daily. Yes Provider, Historical   insulin lispro (HUMALOG) 100 unit/mL injection INITIATE INSULIN CORRECTIVE PROTOCOL: Normal Insulin Sensitivity   For Blood Sugar (mg/dL) of:     Less than 150 =   0 units           150 -199 =   2 units  200 -249 =   4 units  250 -299 =   6 units  300 -349 =   8 units  350 and above = 10 units and Call Physician  If 2 glucose readings are above 8/14/18  Yes Razia Kirk MD   aspirin 81 mg tablet Take 81 mg by mouth daily. Yes Kuldeep, MD Elina   polyethylene glycol (MIRALAX) 17 gram packet Take 1 Packet by mouth daily as needed for Constipation. 6/3/21   Esvin Chambers MD   levETIRAcetam (Keppra) 500 mg tablet Take 500 mg by mouth two (2) times a day. Provider, Historical   levETIRAcetam (KEPPRA) 250 mg tablet Take 1 Tab by mouth every Monday, Wednesday, Friday. 8/31/18   Saqib Ojeda MD       Allergies   Allergen Reactions    Amoxicillin Unknown (comments)    Cleocin [Clindamycin Hcl] Unknown (comments)    Codeine Unknown (comments)    Lorcet 10/650 [Hydrocodone-Acetaminophen] Unknown (comments)    Penicillin G Benzathine Unknown (comments)    Shellfish Derived Unknown (comments)         Review of Systems  GENERAL: Patient alert, awake and oriented times 3, able to communicate full sentences and not in distress. no weight loss, no falls. HEENT: No change in vision  NECK: No pain or stiffness. PULMONARY: +shortness of breath, no cough. Cardiovascular: no pnd or orthopnea, CP now resolved. GASTROINTESTINAL: No abdominal pain, no nausea, no vomiting or diarrhea, no melena or bright red blood per rectum. GENITOURINARY: No urinary frequency, no urgency, no hesitancy or dysuria.    MUSCULOSKELETAL: No joint or muscle pain, no back pain, no recent trauma. DERMATOLOGIC: No rash, no itching, no lesions. ENDOCRINE: No polyuria, no polydipsia, no heat or cold intolerance. No recent change in weight. HEMATOLOGICAL: No anemia or easy bruising or bleeding. NEUROLOGIC: No headache, no seizures, no numbness, no tingling or weakness. Physical Exam:     Physical Exam:  Visit Vitals  BP (!) 192/112   Pulse 81   Temp 98.1 °F (36.7 °C)   Resp 19   LMP  (LMP Unknown)   SpO2 100%      O2 Device: None (Room air)    Temp (24hrs), Av.3 °F (36.8 °C), Min:98.1 °F (36.7 °C), Max:98.5 °F (36.9 °C)    No intake/output data recorded. No intake/output data recorded. General:  Alert, cooperative, no distress, appears stated age. Head: Normocephalic, without obvious abnormality, atraumatic. Eyes:  Conjunctivae/corneas clear. PERRL, EOMs intact. Nose: Nares normal. No drainage or sinus tenderness. Neck: Supple, symmetrical, trachea midline, no carotid bruit and no JVD. Lungs:   Clear to auscultation bilaterally. Heart:  Regular rate and rhythm, S1, S2 normal.     Abdomen: Soft, non-tender. Extremities: Extremities normal, atraumatic, no cyanosis or edema. Pulses: 2+ and symmetric all extremities. Skin:  No rashes or lesions   Neurologic: AAOx3, No focal motor or sensory deficit. Labs Reviewed:    Lab results reviewed. For significant abnormal values and values requiring intervention, see assessment and plan. EKG     Procedures/imaging: see electronic medical records for all procedures/Xrays and details which were not copied into this note but were reviewed prior to creation of Plan      Assessment/Plan     Active Problems:      1) Chest Pain with tropinemia - ddx to include demand ischemia secondary to fluid overload, ACS (doubt), unstable angina (doubt), HFpEF exacerbation   -Admit to tele.   - Cardiology (Dr. Melissa Yang) consulted by ED.   -Echo ordered; last echo  with EF 60-65%; mild left ventricular diastolic dysfunction, loculated pericardial effusion.   - Trend troponin q6 hours. Troponin rising 0.4>0. 18.   -Continue aspirin, antihypertensives, statin  -Pro-NT BNP   -Repeat EKG in morning   -Hold off on heparin drip     2) hypertensive emergency - BPs ranging 152-207/.   -continue home antihypertensives. 3) ESRD on HD (M, W, F)  -Nephrology (Dr. Jenni Banks) following     4) Seizure disorder   -Continue Keppra     5) Anemia with ESRD  -trend CBC, labs     6) Insulin dependent diabetes  -continue SSI     DVT/GI Prophylaxis: Hep SQ     Discussed with patient at bedside about hospital admission and my plan care,  understood and agree with my plan care. Chela Soriano,   7/19/2021 9:44 PM    Disclaimer: Sections of this note are dictated using utilizing voice recognition software. Minor typographical errors may be present. If questions arise, please do not hesitate to contact me or call our department.

## 2021-07-20 NOTE — PROGRESS NOTES
Pt's clinicals sent to [address] in [place] [address], BSN RN  Care Management  Pager: [phone]

## 2021-07-20 NOTE — ED NOTES
Report included the following information SBAR, Kardex, MAR and Recent Results. SITUATION:    Code Status: Full Code   Reason for Admission: Chest pain [R07.9]    [facility] day: 0   Problem List:       Hospital Problems  Date Reviewed: 6/2/2021        Codes Class Noted POA    Chest pain ICD-10-CM: R07.9  ICD-9-CM: 786.50  6/11/2021 Unknown              BACKGROUND:    Past Medical History:   Past Medical History:   Diagnosis Date    Asthma     [facility]ipolar affective disorder ([facility]     Brain condition     Cataract     Chronic kidney disease     hemodialysis M-W-F    Diabetes ([facility]     Hyperlipidemia     Hypertension     Paralytic strabismus, unspecified     Psychiatric disorder     Seizures ([facility] 08/27/2018         Patient taking anticoagulants no     ASSESSMENT:    Changes in Assessment Throughout Shift: stable     Patient has Central Line: no Reasons if yes:    Patient has Khan Cath: no Reasons if yes:       Last Vitals:     Vitals:    07/19/21 1900 07/19/21 1928 07/19/21 2000 07/19/21 2235   BP: (!) 182/86 (!) 192/112 (!) 190/107 (!) 170/133   Pulse: 83 81 94 82   Resp: 16 19 26 24   Temp:  98.1 °F (36.7 °C)  98.3 °F (36.8 °C)   SpO2: 100% 100% 100% 100%        IV and DRAINS (will only show if present)   Peripheral IV 07/19/21 Right Antecubital-Site Assessment: Clean, dry, & intact     WOUND (if present)   Wound Type:  none   Dressing present     Wound Concerns/Notes:  none     PAIN    Pain Assessment    Pain Intensity 1: 4 (07/19/21 1504)                 o Interventions for Pain:  none  o Intervention effective: no and unknown  o Time of last intervention: unknown   o Reassessment Completed: yes      Last 3 Weights: There were no vitals filed for this visit. Weight change:      INTAKE/OUPUT    Current Shift: No intake/output data recorded. Last three shifts: No intake/output data recorded.      LAB RESULTS     Recent Labs     07/19/21  2144 07/19/21  1600   WBC 4.6 4.4*   HGB 9.7* 10.1*   HCT 30.0* 30.7*    246        Recent Labs     07/19/21  1600      K 4.0   *   BUN 29*   CREA 6.48*   CA 8.9   MG 2.2       RECOMMENDATIONS AND DISCHARGE PLANNING     1. Pending tests/procedures/ Plan of Care or Other Needs: unknown     2. Discharge plan for patient and Needs/Barriers: unknown    3. Estimated Discharge Date: unknown Posted on Whiteboard in Patients Room: no and unknown      4. The patient's care plan was reviewed with the oncoming nurse. \"HEALS\" SAFETY CHECK      Fall Risk         Safety Measures:      A safety check occurred in the patient's room between off going nurse and oncoming nurse listed above. The safety check included the below items  Area Items   H  High Alert Medications - Verify all high alert medication drips (heparin, PCA, etc.)   E  Equipment - Suction is set up for ALL patients (with genesis)  - Red plugs utilized for all equipment (IV pumps, etc.)  - WOWs wiped down at end of shift.  - Room stocked with oxygen, suction, and other unit-specific supplies   A  Alarms - Bed alarm is set for fall risk patients  - Ensure chair alarm is in place and activated if patient is up in a chair   L  Lines - Check IV for any infiltration  - Khan bag is empty if patient has a Khan   - Tubing and IV bags are labeled   S  Safety   - Room is clean, patient is clean, and equipment is clean. - Hallways are clear from equipment besides carts. - Fall bracelet on for fall risk patients  - Ensure room is clear and free of clutter  - Suction is set up for ALL patients (with genesis)  - Hallways are clear from equipment besides carts.    - Isolation precautions followed, supplies available outside room, sign posted     [employer]

## 2021-07-20 NOTE — PROGRESS NOTES
Received patient to room 453 in no stated/noted distress. Skin assessed, and Vital signs checked with a noted B/P of 216/108 and a MEWS of 3. The hospitalist ( Dr Tiff Dowell) was notified and a PRN order for hydralazine received. Will administer and continue to monitor.

## 2021-07-20 NOTE — PROGRESS NOTES
Progress Note         Patient: Manas Beasley MRN: [id number]  CSN: [id number]    YOB: 1955  Age: 77 y.o. Sex: female    DOA: 7/19/2021 LOS:  LOS: 1 day                    Subjective: Manas Beasley is a 77 y.o. female with a PMHx of ESRD on HD, chronic diastolic CHF, stroke, type II DM, HTN, HLD, seizure disorder and bipolar disorder who is admitted for chest pain and elevated troponin. Seen in room today  Sitting up in bed, NAD  Reports chest pain has resolved  Denies any shortness of breath, abdominal pain, nausea  Denies any other complaints      Objective:     Physical Exam:  Visit Vitals  BP (!) 164/69 (BP 1 Location: Left upper arm, BP Patient Position: Lying left side)   Pulse 87   Temp 98.4 °F (36.9 °C)   Resp 18   Ht 5' 3\" (1.6 m)   Wt 59.4 kg (131 lb)   SpO2 99%   Breastfeeding No   BMI 23.21 kg/m²        General:         Alert, cooperative, no acute distress    HEENT: NC, Atraumatic. Anicteric sclerae. Lungs: CTA Bilaterally. No Wheezing/Rhonchi/Rales. Heart:  Regular  rhythm,  No murmur, No Rubs, No Gallops  Abdomen: Soft, Non distended, Non tender. +Bowel sounds, no HSM  Extremities: No c/c/e  Psych:   Not anxious or agitated. Neurologic:  Alert and oriented X 3. No acute neurological deficits. Intake and Output:  Current Shift:  07/20 0701 - 07/20 1900  In: 240 [P.O.:240]  Out: -   Last three shifts:  No intake/output data recorded.     Labs: Results:       Chemistry Recent Labs     07/20/21 0247 07/19/21  1600   * 155*    139   K 4.3 4.0    105   CO2 27 28   BUN 32* 29*   CREA 7.24* 6.48*   CA 9.1 8.9   AGAP 8 6   BUCR 4* 4*   AP  --  105   TP  --  6.6   ALB  --  3.3*   GLOB  --  3.3   AGRAT  --  1.0      CBC w/Diff Recent Labs     07/20/21  0247 07/19/21  2144 07/19/21  1600   WBC 5.0 4.6 4.4*   RBC 3.38* 3.24* 3.33*   HGB 10.2* 9.7* 10.1*   HCT 30.9* 30.0* 30.7*    220 246   GRANS 67 59 69   LYMPH 15* 19* 17*   EOS 4 6* 5      Cardiac Enzymes No results for input(s): CPK, CKND1, SONG in the last 72 hours. No lab exists for component: CKRMB, TROIP   Coagulation Recent Labs     07/20/21  1315 07/20/21  0710   APTT 22.6* 23.9       Lipid Panel No results found for: CHOL, CHOLPOCT, CHOLX, CHLST, CHOLV, 738065, HDL, HDLP, LDL, LDLC, DLDLP, 074555, VLDLC, VLDL, TGLX, TRIGL, TRIGP, TGLPOCT, CHHD, CHHDX   BNP No results for input(s): BNPP in the last 72 hours. Liver Enzymes Recent Labs     07/19/21  1600   TP 6.6   ALB 3.3*         Thyroid Studies Lab Results   Component Value Date/Time    TSH 2.59 06/12/2021 09:15 AM                Assessment and Plan:     Steffen Gusman is a 77 y.o. female with a PMHx of ESRD on HD, chronic diastolic CHF, stroke, type II DM, HTN, HLD, seizure disorder and bipolar disorder who is admitted for chest pain and elevated troponin. 1. Chest painatypical, not consistent with ACS  2. Hypertensive urgency  3. Elevated troponinlikely secondary to demand ischemia  4. Paroxysmal SVT  5. Chronic diastolic CHF  6. Small circumferential pericardial effusion  7. ESRD on HD  8. Anemia of chronic disease  9. Type II DM  10. HTN  11. HLD  12. Hx stroke  13. Seizure disorder  14.  Bipolar disorder    Cardiology and nephrology consulted  Echo report reviewedEF 65-70% with small circumferential pericardial effusion  No further cardiac work-up per cardiology, can consider outpatient event monitor  HD per nephrology  Blood pressure management per nephrologyincreasing clonidine and minoxidil, add amlodipine, continue losartan, Coreg  Continue statin, Keppra  Continue SSI with Accu-Cheks  Follow-up CBC, BMP, phosphorus  Follow-up MRSA culture  Incentive spirometry  PT, OT      Case discussed with:  [x]Patient  []Family  [x]Nursing  [x]Case Management  DVT prophylaxis: SQH  Diet: Renal, cardiac  Contact: Qing Ward (sister)      [phone]  Code Status: Full  Disposition: Continue current care, can likely see her back to SNF in 1-2 days      HAntonio Contreras DO  7/20/2021       Dragon medical dictation software was used for portions of this report. Unintended errors may occur.

## 2021-07-20 NOTE — ED NOTES
Changed pt's brief and applied a new brief. Changed pt out of personal clothes and placed on hospital gown. Pt now in bed resting quietly.

## 2021-07-20 NOTE — PROGRESS NOTES
0800:  Report received, care assumed. Complete assessment performed. Awake, alert, oriented to person and place. Follow command x4. C/O generalized aches and pains. NSR. BP remains elevated, see MAR for scheduled medications given. NPO except meds. Tylenol 650mg PO for pain. HOB elevated. Bed exit alarm 'on'. Room next to nurses station. Full fall and aspiration precautions in effect. 1030: To echocardiogram dept for testing as ordered. 1100:  Dr Benjamine Lesch on rounds, updated to pt status and meds given. MD will change meds and have hemodialysis tomorrow. Await orders. 1145:  Received call from cardiology provider pt may eat from cardiology standpoint. NPO order is discontinued by provider. This RN notified Dr Surekha Lezama. 1330:  New med orders noted for BP control from Dr Benjamine Lesch. See mar for meds given for elevated BP. Tolerated lunch tray well with diet being resumed. 1600:  Appropriate return demonstration of Incentive Spirometer for 1,000ml. Will encourage q1hr while awake use. Nasal swab for MRSA testing performed and sent to lab as ordered. 1900:  Bedside and Verbal shift change report given to Nalini Galvez (oncoming nurse) by Devin Carmichael RN(offgoing nurse). Report included the following information SBAR, Kardex, Intake/Output, MAR, Accordion, Recent Results, Cardiac Rhythm NSR. and Alarm Parameters .

## 2021-07-20 NOTE — PROGRESS NOTES
Problem: Self Care Deficits Care Plan (Adult)  Goal: *Acute Goals and Plan of Care (Insert Text)  Outcome: Resolved/Met  OCCUPATIONAL THERAPY EVALUATION/DISCHARGE    Patient: Paige Hanks (50 y.o. female)  Date: 7/20/2021  Primary Diagnosis: Chest pain [R07.9]        Precautions:   Seizure, Fall  PLOF: per medical chart, pt is a LTC resident of [address] [place] [address], assist with ADLs. Patient unable to recall if she assisted with ADLs and/or functional transfers    ASSESSMENT AND RECOMMENDATIONS:  Nursing/RN cleared for pt to participate in OT evaluation and tx session. Patient presents lying supine in bed, A & O x to self only, re-orientated to place, date and situation. Patient perseverative throughout e.g. birthday tomorrow, need to toilet, not feeling well. Bed mobility: supv supine <-> sit edge of bed. Patient unable to recall if she used a bedside commode at [facility] and reports while seated on edge of bed that she soiled herself and did not feel well, pt noted with clean adult brief-nursing notified. Patient presents at baseline as PLOF with ADLs and functional mobility,  skilled OT services not indicated at this time while in acute hospital, call bell within reach, bed alarm intact and across from nursing station. Skilled occupational therapy is not indicated at this time. Discharge Recommendations: Skilled Nursing Facility  Further Equipment Recommendations for Discharge:none     SUBJECTIVE:   Patient stated Viv Peto is my birthday.     OBJECTIVE DATA SUMMARY:     Past Medical History:   Diagnosis Date    Asthma     Bipolar affective disorder ([facility]     Brain condition     Cataract     Chronic kidney disease     hemodialysis M-W-F    Diabetes ([facility]     Hyperlipidemia     Hypertension     Paralytic strabismus, unspecified     Psychiatric disorder     Seizures ([facility] 08/27/2018     Past Surgical History:   Procedure Laterality Date    [place] INSJ TUNNELED CVC W/O SUBQ PORT/ AGE 3 YR/> N/A 8/9/2018 in-pt 474A, Insertion Tunneled Central Venous Catheter performed by Nya Pace MD at [facility] CATH LAB    [place] INSJ TUNNELED CVC W/O SUBQ PORT/ AGE 3 YR/> N/A 11/8/2019    INSERTION TUNNELED CENTRAL VENOUS CATHETER performed by Franny Burgess MD at [facility] CATH LAB     Barriers to Learning/Limitations: yes;  cognitive and altered mental status (i.e.Sedation, Confusion)  Compensate with: visual, verbal, tactile, kinesthetic cues/model    Home Situation:   Home Situation  Home Environment: [address] [place] [address] Name:  ([address] [place] [address])  # Steps to Enter: 0  One/Two Story Residence: One story  Living Alone: No  Support Systems: Skilled nursing facility  Patient Expects to be Discharged to[de-identified] Skilled nursing facility  Current DME Used/Available at Home: [address]  []     Right hand dominant   []     Left hand dominant    Cognitive/Behavioral Status:  Neurologic State: Alert;Confused  Orientation Level: Oriented to person;Disoriented to place; Disoriented to situation;Disoriented to time  Cognition: Follows commands  Safety/Judgement: Fall prevention;Decreased awareness of need for safety;Decreased awareness of environment;Decreased insight into deficits    Skin: appears intact    Edema: none noted    Vision/Perceptual:         Coordination: BUE  Coordination: Within functional limits  Fine Motor Skills-Upper: Left Intact; Right Intact    Gross Motor Skills-Upper: Left Intact; Right Intact    Balance:  Sitting: Intact; Without support    Strength: BUE  appears intact    Strength: Generally decreased, functional     Tone & Sensation: BUE  Tone: Normal  Sensation: Intact     Range of Motion: BUE  AROM: Within functional limits     Functional Mobility and Transfers for ADLs:  Bed Mobility:  Rolling: Modified independent; Additional time  Supine to Sit: Supervision  Sit to Supine: Supervision  Scooting: Stand-by assistance    ADL Assessment:  Feeding: Supervision;Setup    Oral Facial Hygiene/Grooming: Maximum assistance    Bathing: Total assistance    Upper Body Dressing: Maximum assistance    Lower Body Dressing: Maximum assistance    Toileting: Total assistance    ADL Intervention:  Feeding  Feeding Assistance: Set-up; Supervision  Drink to Mouth: Supervision;Set-up    Cognitive Retraining  Safety/Judgement: Fall prevention;Decreased awareness of need for safety;Decreased awareness of environment;Decreased insight into deficits      Pain:  Pain level pre-treatment: 0/10   Pain level post-treatment: 0/10     Activity Tolerance:   poor  Please refer to the flowsheet for vital signs taken during this treatment. After treatment:   []  Patient left in no apparent distress sitting up in chair  [x]  Patient left in no apparent distress in bed  [x]  Call bell left within reach  [x]  Nursing notified  []  Caregiver present  [x]  Bed alarm activated    COMMUNICATION/EDUCATION:   [x]      Role of Occupational Therapy in the acute care setting  [x]      Home safety education was provided and the patient/caregiver indicated understanding. [x]      Patient/family have participated as able and agree with findings and recommendations. []      Patient is unable to participate in plan of care at this time. Thank you for this referral.  Miguel Loza  Time Calculation: 9 mins      Eval Complexity: History: MEDIUM Complexity : Expanded review of history including physical, cognitive and psychosocial  history ; Examination: MEDIUM Complexity : 3-5 performance deficits relating to physical, cognitive , or psychosocial skils that result in activity limitations and / or participation restrictions; Decision Making:MEDIUM Complexity : Patient may present with comorbidities that affect occupational performnce.  Miniml to moderate modification of tasks or assistance (eg, physical or verbal ) with assesment(s) is necessary to enable patient to complete evaluation

## 2021-07-20 NOTE — CONSULTS
Cardiology Consult Note    Consultation request by Shannon Meredith MD for advice/opinion related to evaluating CP    Date of  Admission: 7/19/2021  3:03 PM   Primary Care Physician:  Kandice Ayala MD    Consulting Cardiologist: Dr. Moreno Last:     -Chest pain. Atypical. Patient poor historian but points to pain at HD site right chest wall. Troponin indeterminate but rising to 0.5. ECG with nonspecific ST wave abnormalities.  -Paroxysmal SVT. Multiple brief asymptomatic self limited episodes noted in ER. No events since transfer to telemetry.  -Hypertensive urgency. SBP >200 on arrival.  -ESRD on HD MWF. -Chronic HFpEF. Noted elevated BNP. Volume managed with HD. EF 60-65% 6/2021.  -H/o moderate loculated pericardial effusion adjacent to right ventricle and right atrium with no evidence of hemodynamic compromise by echo 6/2021.  -Chronic anemia. Stable. -Dyslipidemia. On statin. -Diabetes mellitus. HgbA1c 6.9.  -H/o CVA. -Seizure disorder.  -Bipolar disorder.  -Poor functional status, wheelchair bound, [facility] resident. No primary cardiologist. Followed by cardiology on previous hospital admission. Echo 6/12/21  · LV: Estimated LVEF is 60 - 65%. Visually measured ejection fraction. Normal cavity size and systolic function (ejection fraction normal). Mild concentric hypertrophy. Wall motion: normal. Mild (grade 1) left ventricular diastolic dysfunction. · Normal right ventricular size and function. · Pericardium: Moderate loculated pericardial effusion adjacent to right ventricle and right atrium with no evidence of hemodynamic compromise. · PV: Mild pulmonic valve regurgitation is present. · LA: Mildly dilated left atrium. Left Atrium volume index is 39 mL/m2. · PA: Pulmonary arterial systolic pressure is 28 mmHg. Plan: Will review follow up troponin and ECG. Will review follow up echocardiogram.  Would continue BB and follow on telemetry.   If above testing unremarkable, would not pursue further ischemic evaluation at this time. Would continue to maximize BP control. Home coreg, clonidine, cozaar, minoxidil resumed, consider hydralazine/imdur if further BP control is needed. Would continue volume management per HD. Continued on statin. Recommend ASA. Staff addendum:  Atypical chest pain not consistent with ACS. Echo with normal EF, LVH, mild pericardial effusion. Volume management per HD. I would not pursue stress test.  Self limiting SVT improved, continue coreg for now. Can consider outpatient event monitor if no recurrence. I saw, examined, and evaluated the patient. I personally reviewed the patient's labs, tests, vitals, orders, medications, updated history, and other providers assessments. I personally agree with the findings as stated and the plan as documented. Jonathan Carty MD       History of Present Illness: This is a 77 y.o. female admitted for Chest pain [R07.9]. Patient complains of: CP. Patient is a poor historian. Patient complains of chest pain at HD site right chest wall. Per chart review patient presented by EMS with rapid heart rate following HD. Patient had sinus tachycardia with intermittent SVT per ER report. Patients episode were self limited. Patient was treated with BB. Patient with rhythm stable overnight. Patient tells me she \"does not know\" to most of my questions. Patient has had previous admissions for hypertensive urgency and elevated troponin in the past. Appears patient does not routinely follow with cardiology. Cardiac risk factors: dyslipidemia, diabetes mellitus, hypertension      Review of Symptoms:  Except as stated above include:  Constitutional:  negative  Respiratory:  negative  Cardiovascular:  Reports chest pain.   Gastrointestinal: negative  Genitourinary:  negative  Musculoskeletal:  Negative  Neurological:  Negative  Dermatological:  Negative  Endocrinological: Negative  Psychological: Negative         Past Medical History:     Past Medical History:   Diagnosis Date    Asthma     [facility] affective disorder ([facility]     Brain condition     Cataract     Chronic kidney disease     hemodialysis M-W-F    Diabetes ([facility]     Hyperlipidemia     Hypertension     Paralytic strabismus, unspecified     Psychiatric disorder     Seizures ([facility] 08/27/2018         Social History:     Social History     Socioeconomic History    Marital status: SINGLE     Spouse name: Not on file    Number of children: Not on file    Years of education: Not on file    Highest education level: Not on file   Tobacco Use    Smoking status: Never Smoker    Smokeless tobacco: Never Used   Substance and Sexual Activity    Alcohol use: No    Drug use: No    Sexual activity: Never     Social Determinants of Health     Financial Resource Strain:     Difficulty of Paying Living Expenses:    Food Insecurity:     Worried About Running Out of Food in the Last Year:     Ran Out of Food in the Last Year:    Transportation Needs:     Lack of Transportation (Medical):  Lack of Transportation (Non-Medical):    Physical Activity:     Days of Exercise per Week:     Minutes of Exercise per Session:    Stress:     Feeling of Stress :    Social Connections:     Frequency of Communication with Friends and Family:     Frequency of Social Gatherings with Friends and Family:     Attends [employer] Services:     Active Member of Clubs or Organizations:     Attends Club or Organization Meetings:     Marital Status:         Family History:   No family history on file. Medications:      Allergies   Allergen Reactions    Amoxicillin Unknown (comments)    Cleocin [Clindamycin Hcl] Unknown (comments)    Codeine Unknown (comments)    Lorcet 10/650 [Hydrocodone-Acetaminophen] Unknown (comments)    Penicillin G Benzathine Unknown (comments)    Shellfish Derived Unknown (comments)        Current Facility-Administered Medications   Medication Dose Route Frequency    hydrALAZINE (APRESOLINE) 20 mg/mL injection 10 mg  10 mg IntraVENous Q6H PRN    sodium chloride (NS) flush 5-40 mL  5-40 mL IntraVENous Q8H    sodium chloride (NS) flush 5-40 mL  5-40 mL IntraVENous PRN    acetaminophen (TYLENOL) tablet 650 mg  650 mg Oral Q6H PRN    Or    acetaminophen (TYLENOL) suppository 650 mg  650 mg Rectal Q6H PRN    ondansetron (ZOFRAN ODT) tablet 4 mg  4 mg Oral Q8H PRN    Or    ondansetron (ZOFRAN) injection 4 mg  4 mg IntraVENous Q6H PRN    atorvastatin (LIPITOR) tablet 10 mg  10 mg Oral DAILY    carvediloL (COREG) tablet 25 mg  25 mg Oral BID WITH MEALS    famotidine (PEPCID) tablet 20 mg  20 mg Oral DAILY    insulin lispro (HUMALOG) injection   SubCUTAneous AC&HS    [START ON 7/21/2021] levETIRAcetam (KEPPRA) tablet 250 mg  250 mg Oral Q MON, WED & FRI    levETIRAcetam (KEPPRA) tablet 500 mg  500 mg Oral BID    losartan (COZAAR) tablet 100 mg  100 mg Oral DAILY    minoxidiL (LONITEN) tablet 2.5 mg  2.5 mg Oral BID    polyethylene glycol (MIRALAX) packet 17 g  17 g Oral DAILY PRN    sevelamer carbonate (RENVELA) tab 800 mg  800 mg Oral TID    cloNIDine (CATAPRES) 0.2 mg/24 hr patch 1 Patch  1 Patch TransDERmal Q7D    B complex-vitaminC-folic acid (NEPHROCAP) cap  1 Capsule Oral DAILY    heparin (porcine) injection 5,000 Units  5,000 Units SubCUTAneous Q12H         Physical Exam:     Visit Vitals  BP (!) 184/80   Pulse 87   Temp 98.3 °F (36.8 °C)   Resp 18   SpO2 96%   Breastfeeding No       TELE: normal sinus rhythm    BP Readings from Last 3 Encounters:   07/20/21 (!) 184/80   06/14/21 (!) 169/84   06/03/21 (!) (P) 150/83     Pulse Readings from Last 3 Encounters:   07/20/21 87   06/14/21 87   06/03/21 71     Wt Readings from Last 3 Encounters:   06/14/21 131 lb 11.2 oz (59.7 kg)   06/02/21 136 lb 11 oz (62 kg)   04/16/21 133 lb (60.3 kg)       General:  alert, cooperative, no distress, appears stated age  Neck:  no JVD  Lungs:  clear to auscultation bilaterally  Heart:  regular rate and rhythm  Abdomen:  abdomen is soft   Extremities:  extremities normal, atraumatic, no cyanosis trace edema  Skin: Warm and dry. no hyperpigmentation, vitiligo, or suspicious lesions  Neuro: alert, oriented x3, affect appropriate  Psych: non focal     Data Review:     Recent Labs     07/20/21  0247 07/19/21  2144 07/19/21  1600   WBC 5.0 4.6 4.4*   HGB 10.2* 9.7* 10.1*   HCT 30.9* 30.0* 30.7*    220 246     Recent Labs     07/20/21  0247 07/19/21  1600    139   K 4.3 4.0    105   CO2 27 28   * 155*   BUN 32* 29*   CREA 7.24* 6.48*   CA 9.1 8.9   MG  --  2.2   PHOS  --  3.0   ALB  --  3.3*   ALT  --  20       Results for orders placed or performed during the hospital encounter of 07/19/21   EKG, 12 LEAD, INITIAL   Result Value Ref Range    Ventricular Rate 146 BPM    Atrial Rate 146 BPM    P-R Interval 114 ms    QRS Duration 70 ms    Q-T Interval 262 ms    QTC Calculation (Bezet) 408 ms    Calculated R Axis 24 degrees    Calculated T Axis 141 degrees    Diagnosis       Sinus tachycardia with fusion complexes  Nonspecific T wave abnormality  Abnormal ECG  When compared with ECG of 19-JUL-2021 15:15,  fusion complexes are now present  Vent.  rate has increased BY  48 BPM  Confirmed by Donovan Torrez MD, Leonidas Krabbe ([id number]) on 7/19/2021 4:51:25 PM         All Cardiac Markers in the last 24 hours:    Lab Results   Component Value Date/Time    TROIQ 0.50 (H) 07/20/2021 02:47 AM    TROIQ 0.18 (H) 07/19/2021 06:31 PM    TROIQ 0.04 07/19/2021 04:00 PM       Last Lipid:  No results found for: CHOL, CHOLX, CHLST, CHOLV, HDL, HDLP, LDL, LDLC, DLDLP, TGLX, TRIGL, TRIGP, CHHD, CHHDX    Cardiographics:     EKG Results     Procedure 720 Value Units Date/Time    EKG, 12 LEAD, SUBSEQUENT [[id number]]     Order Status: Sent     EKG, 12 LEAD, INITIAL [[id number]] Collected: 07/19/21 5151    Order Status: Completed Updated: 07/19/21 1051     Ventricular Rate 146 BPM      Atrial Rate 146 BPM      P-R Interval 114 ms      QRS Duration 70 ms      Q-T Interval 262 ms      QTC Calculation (Bezet) 408 ms      Calculated R Axis 24 degrees      Calculated T Axis 141 degrees      Diagnosis --     Sinus tachycardia with fusion complexes  Nonspecific T wave abnormality  Abnormal ECG  When compared with ECG of 19-JUL-2021 15:15,  fusion complexes are now present  Vent. rate has increased BY  48 BPM  Confirmed by Lawernce Hodgkin, MD, Mahendra Bales ([id number]) on 7/19/2021 4:51:25 PM      EKG, 12 LEAD, INITIAL [[id number]] Collected: 07/19/21 1515    Order Status: Completed Updated: 07/19/21 1649     Ventricular Rate 98 BPM      Atrial Rate 98 BPM      P-R Interval 134 ms      QRS Duration 76 ms      Q-T Interval 312 ms      QTC Calculation (Bezet) 398 ms      Calculated P Axis 66 degrees      Calculated R Axis 35 degrees      Calculated T Axis 150 degrees      Diagnosis --     Normal sinus rhythm  Nonspecific ST and T wave abnormality  Abnormal ECG  When compared with ECG of 12-JUN-2021 09:09,  T wave inversion no longer evident in Anterior leads  Confirmed by Lawernce Hodgkin, MD, Mahendra Bales ([id number]) on 7/19/2021 4:48:55 PM      EKG, 12 LEAD, REPEAT [[id number]]     Order Status: Sent         06/11/21    ECHO ADULT COMPLETE 06/12/2021 6/12/2021    Interpretation Summary  · LV: Estimated LVEF is 60 - 65%. Visually measured ejection fraction. Normal cavity size and systolic function (ejection fraction normal). Mild concentric hypertrophy. Wall motion: normal. Mild (grade 1) left ventricular diastolic dysfunction. · Normal right ventricular size and function. · Pericardium: Moderate loculated pericardial effusion adjacent to right ventricle and right atrium with no evidence of hemodynamic compromise. · PV: Mild pulmonic valve regurgitation is present. · LA: Mildly dilated left atrium. Left Atrium volume index is 39 mL/m2. · PA: Pulmonary arterial systolic pressure is 28 mmHg.     Signed by: Marilyn Don, Mohsen Botello MD on 6/12/2021 11:15 AM        11/08/19    INVASIVE VASCULAR PROCEDURE 11/08/2019 11/8/2019    Narrative  Preoperative diagnosis: End-stage renal disease with poorly working dialysis catheter in need of new one    Postoperative diagnosis: End-stage renal disease with poorly working dialysis catheter in need of new one    Procedures performed:  #1  Dialysis catheter exchange over wire using 19 cm palindrome catheter through a right internal jugular vein approach. #2  Moderate conscious sedation of 13 minutes    Cultures: None    Specimens: None    Drains: None    Estimated blood loss: Less than 50 mL    Assistants: None    Implants: Please see above    Complications: None    Anesthesia: Moderate conscious sedation of 13 minutes was performed by an independent provider giving the medications per my discretion and monitoring of vital signs throughout the case. Indications for the procedure: Tessa Fish is a 75 y.o. female with end-stage renal disease and poorly working dialysis catheter in need a new one. Patient was given the appropriate risk and benefits of the procedure including but not limited to bleeding, infection, damage to adjacent structures, MI, stroke, death, loss of lower extremity, need for further surgery. Patient was understanding of all the risks and underwent a procedure. Operative findings:  #1  Appropriately placed right IJ permanent dialysis catheter    Procedure:  Patient was correctly identified in the precath area and taken to the Cath Lab in stable condition. Patient had pre-incision timeout prior to any incision. Patient was prepped and draped in the normal sterile fashion according to CDC guidelines aseptic technique. We were able to numb the patient up appropriately from the catheter insertion site all the way to the venous insertion site with 1% lidocaine. We then were able to place an Amplatz superstiff wire down the previous port into the vena cava.   A combination of blunt and sharp dissection was used in order to remove the previous catheter and remove the cuff. Once the catheter was removed over wire new palindrome dialysis catheter was placed. X-ray was taken showing the tip of the catheter within the sinoatrial junction with good curvature of the catheter without any kinking. There is no evidence of pneumothorax and the catheter is good for use. We secured the catheter using 2-0 Prolene suture at both butterfly holes and catheter insertion site with Biopatch and Tegaderm being placed. Each port was easily flushed and aspirated and inserted with 1.6 mL of hot heparin. We then were able to cap and wrap the ports with 4 x 4 and tape. Patient tolerated procedure well without any issues and was taken to the recovery area. Signed by: Nanette Felder MD on 11/8/2019  8:15 AM      XR Results (most recent):  Results from Hospital Encounter encounter on 07/19/21    XR CHEST PORT    Narrative  EXAM: Chest X-Ray    INDICATION:  Tachycardia. TECHNIQUE: AP view of the chest    COMPARISON: 6/11/2021, 6/1/2021, 3/17/2021    FINDINGS:  Tubes and Lines: Right-sided dialysis catheter is unchanged. Pleura: No pneumothorax appreciated. No effusions appreciated. Lungs:  No infiltrates appreciated. Cardiac/Mediastinum/Aorta: Cardiac silhouette is enlarged. This is unchanged. Pulmonary Vascularity: The pulmonary vasculature is unremarkable. Chest Wall: No acute finding    Osseous Structures: Degenerative changes of the spine and shoulders are noted. Upper Abdomen: No acute findings appreciated. Impression  1. No acute infiltrate or effusion. 2.  Unchanged hemodialysis catheter.         Signed By: SUSI Cabrera     July 20, 2021

## 2021-07-20 NOTE — PROGRESS NOTES
PHYSICAL THERAPY EVALUATION AND DISCHARGE    Patient: Magdalena Hart (89 y.o. female)  Date: 7/20/2021  Primary Diagnosis: Chest pain [R07.9]        Precautions:   Seizure, Fall, Skin  PLOF: Patient is LTC resident at [facility]. She stays in the bed and does not usually get to wheelchair. She needs assistance with mobility and ADL's. ASSESSMENT :  Based on the objective data described below, the patient is at her baseline level of mobility. She is pleasantly confused only oriented to self and follows simple commands. Pt is able to move LE's against gravity with good ROM. She rolls r/l in bed with mod I and additional  time using bed rail. Pt comes to sit at EOB with min A and presents with good balance. Patient does not require further skilled intervention at this level of care and recommend return to LTC. PLAN :  Recommendations and Planned Interventions:   No formal PT needs identified at this time. Discharge Recommendations: return to LTC  Further Equipment Recommendations for Discharge: n/a      SUBJECTIVE:   Patient stated \" [birth year] tomorrow .     OBJECTIVE DATA SUMMARY:     Past Medical History:   Diagnosis Date    Asthma     Bipolar affective disorder ([facility]     Brain condition     Cataract     Chronic kidney disease     hemodialysis M-W-F    Diabetes ([facility]     Hyperlipidemia     Hypertension     Paralytic strabismus, unspecified     Psychiatric disorder     Seizures ([facility] 08/27/2018     Past Surgical History:   Procedure Laterality Date    GA INSJ TUNNELED CVC W/O SUBQ PORT/ AGE 3 YR/> N/A 8/9/2018     in-pt 474A, Insertion Tunneled Central Venous Catheter performed by Florentino Rodrigez MD at [address] [place] [address]    GA INSJ TUNNELED CVC W/O SUBQ PORT/ AGE 3 YR/> N/A 11/8/2019    INSERTION TUNNELED CENTRAL VENOUS CATHETER performed by Ernestine Sahni MD at [facility] CATH LAB     Barriers to Learning/Limitations: yes;  confused  Compensate with: Visual Cues and Verbal Cues  Home Situation:   Home Situation  Home Environment: [address] [facility] Name: 0  # Steps to Enter: 0  One/Two Story Residence: One story  Living Alone: No  Support Systems: Skilled nursing facility  Patient Expects to be Discharged to[de-identified] Skilled nursing facility  Current DME Used/Available at Home: [facility] bars, Hospital bed, Lift chair, Walker, Wheelchair  Critical Behavior:  Neurologic State: Alert  Orientation Level: Oriented to person;Disoriented to place; Disoriented to situation;Disoriented to time  Cognition: Follows commands  Safety/Judgement: Fall prevention;Decreased awareness of environment  Psychosocial  Patient Behaviors: Calm; Cooperative;Confused  Purposeful Interaction: Yes  Pt Identified Daily Priority: Clinical issues (comment)  Caritas Process: Establish trust;Nurture spiritual self; Attend basic human needs  Caring Interventions: Reassure  Reassure: Therapeutic listening; Informing  Skin Condition/Temp: Dry;Warm        Skin Integumentary  Skin Color: Appropriate for ethnicity  Skin Condition/Temp: Dry;Warm     Strength BLE:    Strength: Generally decreased, functional       Tone & Sensation BLE:   Tone: Normal  Sensation: Intact        Range Of Motion BLE:  AROM: Within functional limits       Functional Mobility:  Bed Mobility:  Rolling: Modified independent; Additional time  Supine to Sit: Minimum assistance  Sit to Supine: Stand-by assistance  Scooting: Stand-by assistance    Balance:   Sitting: Intact; With support         Pain:  Pain level pre-treatment: 0/10   Pain level post-treatment: 0/10  Pain Intervention(s): Medication (see MAR); Rest, Ice, Repositioning   Response to intervention: Nurse notified, See doc flow    Activity Tolerance:   Fair +  Please refer to the flowsheet for vital signs taken during this treatment.   After treatment:   []         Patient left in no apparent distress sitting up in chair  [x]         Patient left in no apparent distress in bed  [x] Call bell left within reach  [x]         Nursing notified  []         Caregiver present  [x]         Bed alarm activated  []         SCDs applied    COMMUNICATION/EDUCATION:   [x]         Role of Physical Therapy in the acute care setting. []         Fall prevention education was provided and the patient/caregiver indicated understanding. []         Patient/family have participated as able in goal setting and plan of care. []         Patient/family agree to work toward stated goals and plan of care. []         Patient understands intent and goals of therapy, but is neutral about his/her participation. []         Patient is unable to participate in goal setting/plan of care: ongoing with therapy staff.  []         Other:     Thank you for this referral.  Jennifer Albarran, PT   Time Calculation: 10 mins      Eval Complexity: History: MEDIUM  Complexity : 1-2 comorbidities / personal factors will impact the outcome/ POC Exam:LOW Complexity : 1-2 Standardized tests and measures addressing body structure, function, activity limitation and / or participation in recreation  Presentation: LOW Complexity : Stable, uncomplicated  Clinical Decision Making:Low Complexity    Overall Complexity:LOW

## 2021-07-20 NOTE — PROGRESS NOTES
Comprehensive Nutrition Assessment    Type and Reason for Visit: Initial, Positive nutrition screen    Nutrition Recommendations/Plan:   - Add supplement: Nepro BID  - Continue all other nutrition interventions. Encourage/ monitor po intake of meals and supplements      Nutrition Assessment:  Pt off unit at time of visit. Noted to be a poor historian. Is wheelchair bound. Has ESRD on HD; Nephrology following, plan to start HD tomorrow. Wt fluctuation PTA per chart hx. Admitted from nursing facility. Malnutrition Assessment:  Malnutrition Status:  Insufficient data      Nutrition History and Allergies: Past medical hx:   Bipolar affective disorder/ psychiatric disorder, ESRD on HD, DM, HLD, HTN, seizures. Weight fluctuation PTA per chart hx. Wt trends:  146 lb in January 2013,   145 lb in August 2018,  135 lb in October 2018,  133 lb in November 2019,  167 lb in December 2019 to September 2020, 133 lb in March 2021,   131 lb in July 2021. Wt loss of 36 lb, 21.6% x 10 month PTA; question accuracy of wt trends. Question if related to fluid status; noted pt with edema. Food allergies:  Shellfish     Estimated Daily Nutrient Needs:  Energy (kcal): 1861-3525; Weight Used for Energy Requirements: Other (specify) (SBW: 60 kg)  Protein (g): 72-90; Weight Used for Protein Requirements: Other (specify) (SBWx1.2-1.5)  Fluid (ml/day): 750-1500; Method Used for Fluid Requirements: Standard renal      Nutrition Related Findings:  BM unknown.    +edema. Meds: nephrocap, renvela. Wounds:    Pressure injury, Stage I       Current Nutrition Therapies:  ADULT DIET Regular; 5 carb choices (75 gm/meal); Low Fat/Low Chol/High Fiber/2 gm Na; Low Sodium (2 gm); Low Potassium (Less than 3000 mg/day);  Low Phosphorus (Less than 1000 mg)    Anthropometric Measures:  · Height:  5' 3\" (160 cm)  · Current Body Wt:  59.4 kg (130 lb 15.3 oz)   · Admission Body Wt:  130 lb 15.3 oz    · Usual Body Wt:   (133 lb in March 2021 per chart hx)     · Ideal Body Wt:  115 lbs:  113.9 %   · Adjusted Body Weight:   ; Weight Adjustment for: No adjustment     · BMI Category:  Normal weight (BMI 22.0-24.9) age over 72       Nutrition Diagnosis:   · Increased nutrient needs related to increased demand for energy/nutrients, renal dysfunction as evidenced by dialysis      Nutrition Interventions:   Food and/or Nutrient Delivery: Continue current diet, Vitamin supplement, Start oral nutrition supplement  Nutrition Education and Counseling: Education not indicated  Coordination of Nutrition Care: Continue to monitor while inpatient    Goals:  PO nutrition intake will meet >75% of patient's estimated nutrition needs within the next 7 days       Nutrition Monitoring and Evaluation:   Behavioral-Environmental Outcomes: None identified  Food/Nutrient Intake Outcomes: Food and nutrient intake, Supplement intake, Vitamin/mineral intake  Physical Signs/Symptoms Outcomes: Biochemical data, Meal time behavior, Nutrition focused physical findings    Discharge Planning:     Too soon to determine     Electronically signed by Carmen Davila RD on 7/20/2021 at 1:05 PM    Contact: [phone]

## 2021-07-21 LAB
ANION GAP SERPL CALC-SCNC: 6 MMOL/L (ref 3–18)
BASOPHILS # BLD: 0 K/UL (ref 0–0.1)
BASOPHILS NFR BLD: 1 % (ref 0–2)
BUN SERPL-MCNC: 48 MG/DL (ref 7–18)
BUN/CREAT SERPL: 5 (ref 12–20)
CALCIUM SERPL-MCNC: 8.9 MG/DL (ref 8.5–10.1)
CHLORIDE SERPL-SCNC: 105 MMOL/L (ref 100–111)
CO2 SERPL-SCNC: 29 MMOL/L (ref 21–32)
CREAT SERPL-MCNC: 9.31 MG/DL (ref 0.6–1.3)
DIFFERENTIAL METHOD BLD: ABNORMAL
EOSINOPHIL # BLD: 0.2 K/UL (ref 0–0.4)
EOSINOPHIL NFR BLD: 4 % (ref 0–5)
ERYTHROCYTE [DISTWIDTH] IN BLOOD BY AUTOMATED COUNT: 14.6 % (ref 11.6–14.5)
GLUCOSE BLD STRIP.AUTO-MCNC: 121 MG/DL (ref 70–110)
GLUCOSE BLD STRIP.AUTO-MCNC: 151 MG/DL (ref 70–110)
GLUCOSE BLD STRIP.AUTO-MCNC: 178 MG/DL (ref 70–110)
GLUCOSE SERPL-MCNC: 103 MG/DL (ref 74–99)
HCT VFR BLD AUTO: 27.2 % (ref 35–45)
HGB BLD-MCNC: 8.8 G/DL (ref 12–16)
LYMPHOCYTES # BLD: 0.8 K/UL (ref 0.9–3.6)
LYMPHOCYTES NFR BLD: 18 % (ref 21–52)
MCH RBC QN AUTO: 29.8 PG (ref 24–34)
MCHC RBC AUTO-ENTMCNC: 32.4 G/DL (ref 31–37)
MCV RBC AUTO: 92.2 FL (ref 74–97)
MONOCYTES # BLD: 0.6 K/UL (ref 0.05–1.2)
MONOCYTES NFR BLD: 14 % (ref 3–10)
NEUTS SEG # BLD: 2.8 K/UL (ref 1.8–8)
NEUTS SEG NFR BLD: 63 % (ref 40–73)
PHOSPHATE SERPL-MCNC: 4.5 MG/DL (ref 2.5–4.9)
PLATELET # BLD AUTO: 215 K/UL (ref 135–420)
PMV BLD AUTO: 10.9 FL (ref 9.2–11.8)
POTASSIUM SERPL-SCNC: 4.2 MMOL/L (ref 3.5–5.5)
RBC # BLD AUTO: 2.95 M/UL (ref 4.2–5.3)
SODIUM SERPL-SCNC: 140 MMOL/L (ref 136–145)
WBC # BLD AUTO: 4.4 K/UL (ref 4.6–13.2)

## 2021-07-21 PROCEDURE — 84100 ASSAY OF PHOSPHORUS: CPT

## 2021-07-21 PROCEDURE — 80048 BASIC METABOLIC PNL TOTAL CA: CPT

## 2021-07-21 PROCEDURE — 5A1D70Z PERFORMANCE OF URINARY FILTRATION, INTERMITTENT, LESS THAN 6 HOURS PER DAY: ICD-10-PCS | Performed by: INTERNAL MEDICINE

## 2021-07-21 PROCEDURE — 36415 COLL VENOUS BLD VENIPUNCTURE: CPT

## 2021-07-21 PROCEDURE — 99232 SBSQ HOSP IP/OBS MODERATE 35: CPT | Performed by: INTERNAL MEDICINE

## 2021-07-21 PROCEDURE — 90935 HEMODIALYSIS ONE EVALUATION: CPT

## 2021-07-21 PROCEDURE — 74011250637 HC RX REV CODE- 250/637: Performed by: INTERNAL MEDICINE

## 2021-07-21 PROCEDURE — 74011636637 HC RX REV CODE- 636/637: Performed by: INTERNAL MEDICINE

## 2021-07-21 PROCEDURE — 65270000029 HC RM PRIVATE

## 2021-07-21 PROCEDURE — 85025 COMPLETE CBC W/AUTO DIFF WBC: CPT

## 2021-07-21 PROCEDURE — 74011250636 HC RX REV CODE- 250/636: Performed by: STUDENT IN AN ORGANIZED HEALTH CARE EDUCATION/TRAINING PROGRAM

## 2021-07-21 PROCEDURE — 74011250637 HC RX REV CODE- 250/637: Performed by: STUDENT IN AN ORGANIZED HEALTH CARE EDUCATION/TRAINING PROGRAM

## 2021-07-21 PROCEDURE — 82962 GLUCOSE BLOOD TEST: CPT

## 2021-07-21 PROCEDURE — 99233 SBSQ HOSP IP/OBS HIGH 50: CPT | Performed by: INTERNAL MEDICINE

## 2021-07-21 PROCEDURE — 77030040392 HC DRSG OPTIFOAM MDII -A

## 2021-07-21 RX ORDER — ISOSORBIDE MONONITRATE 30 MG/1
30 TABLET, EXTENDED RELEASE ORAL DAILY
Status: DISCONTINUED | OUTPATIENT
Start: 2021-07-21 | End: 2021-07-22 | Stop reason: HOSPADM

## 2021-07-21 RX ADMIN — MINOXIDIL 5 MG: 2.5 TABLET ORAL at 17:23

## 2021-07-21 RX ADMIN — SEVELAMER CARBONATE 800 MG: 800 TABLET, FILM COATED ORAL at 17:23

## 2021-07-21 RX ADMIN — AMLODIPINE BESYLATE 10 MG: 10 TABLET ORAL at 13:13

## 2021-07-21 RX ADMIN — LEVETIRACETAM 500 MG: 500 TABLET ORAL at 17:24

## 2021-07-21 RX ADMIN — FAMOTIDINE 20 MG: 20 TABLET ORAL at 09:01

## 2021-07-21 RX ADMIN — NEPHROCAP 1 CAPSULE: 1 CAP ORAL at 09:01

## 2021-07-21 RX ADMIN — Medication 10 ML: at 21:45

## 2021-07-21 RX ADMIN — CARVEDILOL 25 MG: 25 TABLET, FILM COATED ORAL at 17:36

## 2021-07-21 RX ADMIN — MINOXIDIL 5 MG: 2.5 TABLET ORAL at 13:15

## 2021-07-21 RX ADMIN — Medication 10 ML: at 05:24

## 2021-07-21 RX ADMIN — INSULIN LISPRO 2 UNITS: 100 INJECTION, SOLUTION INTRAVENOUS; SUBCUTANEOUS at 17:23

## 2021-07-21 RX ADMIN — SEVELAMER CARBONATE 800 MG: 800 TABLET, FILM COATED ORAL at 09:01

## 2021-07-21 RX ADMIN — ISOSORBIDE MONONITRATE 30 MG: 30 TABLET, EXTENDED RELEASE ORAL at 13:14

## 2021-07-21 RX ADMIN — INSULIN LISPRO 2 UNITS: 100 INJECTION, SOLUTION INTRAVENOUS; SUBCUTANEOUS at 21:41

## 2021-07-21 RX ADMIN — LEVETIRACETAM 500 MG: 500 TABLET ORAL at 09:01

## 2021-07-21 RX ADMIN — ATORVASTATIN CALCIUM 10 MG: 10 TABLET, FILM COATED ORAL at 09:01

## 2021-07-21 RX ADMIN — SEVELAMER CARBONATE 800 MG: 800 TABLET, FILM COATED ORAL at 22:00

## 2021-07-21 RX ADMIN — LEVETIRACETAM 250 MG: 250 TABLET ORAL at 21:42

## 2021-07-21 RX ADMIN — LOSARTAN POTASSIUM 100 MG: 50 TABLET, FILM COATED ORAL at 13:14

## 2021-07-21 RX ADMIN — HEPARIN SODIUM 5000 UNITS: 5000 INJECTION INTRAVENOUS; SUBCUTANEOUS at 21:42

## 2021-07-21 RX ADMIN — CARVEDILOL 25 MG: 25 TABLET, FILM COATED ORAL at 13:14

## 2021-07-21 RX ADMIN — Medication 5 ML: at 17:25

## 2021-07-21 NOTE — PROGRESS NOTES
Per Barbie Kaplan (SALAZAR) called [facility] transportation at [phone] scheduled stretcher transport for tomorrow (Thursday, July 22, 2021) at 11:00 am to [facility] ([facility], [address]) with Susan Dubon and received confirmation number [id number]. Susan Dubon stated patient has a standing order to go to [facility] on Monday, Wednesday and Friday with a 10:30 am transport time from [facility] to [facility] in [place]. Susan Dubon stated dialysis transport were not cancelled, transport will  patient on Friday, July 23rd. Informed Barbie Kaplan of transportation conversation and arrangements.

## 2021-07-21 NOTE — ROUTINE PROCESS
Bedside shift change report given to [address] [place] [address], RNs  (oncoming nurse) by Amanda Toro RN (offgoing nurse). Report included the following information SBAR, Kardex, Intake/Output and MAR.

## 2021-07-21 NOTE — DIALYSIS
ACUTE HEMODIALYSIS FLOW SHEET    HEMODIALYSIS ORDERS: Physician: Dr. Alhaji Lockhart: Mildred   Duration:  3 hr  BFR: 400   DFR: 800   Dialysate:  Temp 36   K+   2    Ca+  2.5   Na 138   Bicarb 35   Wt Readings from Last 1 Encounters:   07/20/21 59.4 kg (131 lb)   [x] Patient Chart     [] Unable to Obtain     Dry weight/UF Goal: 1500 ml               Access:  Right IJ tunneled CVC     Heparin []  Bolus    Units    [] Hourly    Units    [x] None      Catheter locking solution:  1:1000U Heparin   Pre BP:  188/104    Pulse:  84   Respirations: 18    Temperature:  98.8 oral    Tx: NSS   ml/Bolus   [x] N/A   Labs: []  Pre  []  Post   [x] N/A   Additional Orders(medications, blood products, hypotension management): [] Yes   [x] No     [x]  Romyita Consent Verified     CATHETER ACCESS:  []N/A   [x]Right   []Left   []IJ     []Fem   []Chest wall   [] First use X-ray verified     [x]Tunneled    [] Non Tunneled   [x]No S/S infection  []Redness  []Drainage []Cultured []Swelling []Pain   [x]Medical Aseptic Prep Utilized   [x]Dressing Changed  [x] Biopatch  Date: 7/21/21   []Clotted   [x]Patent   Flows: [x]Good  []Poor  []Reversed   If access problem,  notified: []Yes    [x]N/A          GENERAL ASSESSMENT:    LUNGS:   SaO2  %   [x] Clear  [] Coarse  [] Crackles  [] Wheezing                                                [x] Diminished     Location : []RLL   []LLL    [x]RUL  [x]STEPHY   Cough: []Productive  []Dry  []N/A   Respirations:  []Easy  []Labored   Therapy:  [x]RA  []NC L/min    Mask: []NRB  [] Venti    O2%                  []Ventilator  []Intubated  [] Trach  [] BiPaP   CARDIAC: [x]Regular      [] Irregular   [] Pericardial Rub  [] JVD          []  Monitored  [] Bedside  [] Remotely monitored     EDEMA: [] None  []Generalized  [] Pitting [] 1    [] 2    [x] 3    [] 4                 [] Facial  [] Pedal  [x]  UE  [] LE   SKIN:   [x] Warm  [] Hot  [] Cold   [] Cool   [x] Dry    [] Pale   [] Diaphoretic [] Flushed  [] Jaundiced  [] Cyanotic  [] Rash  [] Weeping   LOC:    [x] Alert     [x]Oriented:    [x] Person     [x] Place  []Time               [] Confused  [] Lethargic [] Obtunded  [] Sedated   [] Agitated                        [] Restless    []  Non-responsive     GI / ABDOMEN:                     [] Flat    [] Distended    [x] Soft    [] Firm   []  Obese                   [] Diarrhea  [x] Bowel Sounds  [] Nausea  [] Vomiting                   [] Fecal Management System   / URINE ASSESSMENT:                   [] Voiding   [x] Oliguria  [] Anuria   []  Khan                  [] Incontinent  []  Incontinent Brief      PAIN:  [x] 0 []1  []2   []3   []4   []5   []6   []7   []8   []9   []10            Scale 0-10  Action/Follow Up:    MOBILITY:  [x] Bed    [] Stretcher      All Vitals and Treatment Details on Attached [address]: [facility]          Room # 453/01   [] 1st Time Acute      [] Stat       [x] Routine      [] Urgent     [x] Acute Room  []  Bedside  [] ICU/CCU  [] ER   Isolation Precautions:  [x] Dialysis    There are currently no Active Isolations       ALLERGIES:     Allergies   Allergen Reactions    Amoxicillin Unknown (comments)    Cleocin [Clindamycin Hcl] Unknown (comments)    Codeine Unknown (comments)    Lorcet 10/650 [Hydrocodone-Acetaminophen] Unknown (comments)    Penicillin G Benzathine Unknown (comments)    Shellfish Derived Unknown (comments)      Code Status:  Full Code     Hepatitis Status     Lab Results   Component Value Date/Time    Hepatitis B surface Ag <0.10 09/25/2020 05:31 PM    Hepatitis B surface Ab 63.01 09/25/2020 05:31 PM    Hepatitis B core, IgM NEGATIVE  08/11/2018 03:29 AM    Hep B Core Ab, IgM NEGATIVE  08/13/2018 04:34 AM    Hep B Core Ab, total NEGATIVE  08/13/2018 04:34 AM        Current Labs:      Lab Results   Component Value Date/Time    WBC 4.4 (L) 07/21/2021 02:19 AM    Hemoglobin, POC 13.3 11/08/2019 07:26 AM    HGB 8.8 (L) 07/21/2021 02:19 AM    Hematocrit, POC 39 11/08/2019 07:26 AM    HCT 27.2 (L) 07/21/2021 02:19 AM    PLATELET 306 90/59/2906 02:19 AM    MCV 92.2 07/21/2021 02:19 AM     Lab Results   Component Value Date/Time    Sodium 140 07/21/2021 02:19 AM    Potassium 4.2 07/21/2021 02:19 AM    Chloride 105 07/21/2021 02:19 AM    CO2 29 07/21/2021 02:19 AM    Anion gap 6 07/21/2021 02:19 AM    Glucose 103 (H) 07/21/2021 02:19 AM    BUN 48 (H) 07/21/2021 02:19 AM    Creatinine 9.31 (H) 07/21/2021 02:19 AM    BUN/Creatinine ratio 5 (L) 07/21/2021 02:19 AM    GFR est AA 5 (L) 07/21/2021 02:19 AM    GFR est non-AA 4 (L) 07/21/2021 02:19 AM    Calcium 8.9 07/21/2021 02:19 AM          DIET:  DIET ADULT  DIET ADULT ORAL NUTRITION SUPPLEMENT      PRIMARY NURSE REPORT:   Pre Dialysis:  TATE Garcia      Time: 08:44        EDUCATION:    [x] Patient [] Other           Knowledge Basis: []None [x]Minimal [] Substantial [] Unable to assess at this time.    Barriers to learning  [x]N/A   [] Access Care     [] S&S of infection  [] Fluid Management  [] K+   [x] Procedural    []Albumin   [] Medications   [] Tx Options   [] Transplant   [] Diet   [] Other   Teaching Tools:  [x] Explain  [] Demo  [] Handouts [] Video  Patient response: [x] Verbalized understanding  [] Teach back  [] Return demonstration   [] Requires follow up      [x]Time Out/Safety Check  [x] Extracorporeal Circuit Tested for integrity       RO/HEMODIALYSIS MACHINE SAFETY CHECKS  Before each treatment:     Machine Number:                   [facility]                                     [x] Unit Machine # 3 with centralized RO                                                                            Alarm Test:  Pass time 07:57            [x] RO/Machine Log Complete    Machine Temp    36.0             Dialysate: pH  7.4    Conductivity: Meter 13.6     HD Machine  13.8      TCD: 13.7  Dialyzer Lot # [id number]     Blood Tubing Lot # [id number]    Saline Lot # [id number]     CHLORINE TESTING-Before each treatment and every 4 hours    Total Chlorine: [x] less than 0.1 ppm  Initial Time Check: 09:00       4 Hr/2nd Check Time:    (if greater than 0.1 ppm from Primary then every 30 minutes from Secondary)     TREATMENT INITIATION  with Dialysis Precautions:   [x] All Connections Secured              [x] Saline Line Double Clamped   [x] Venous Parameters Set               [x] Arterial Parameters Set    [x] Prime Given 250ml NSS              [x]Air Foam Detector Engaged      Treatment Initiation Note:  09:13  Pt arrived to HD without any complaints. Pt A &O X 2, follows commands, no distress noted, time out done with pt. During Treatment Notes:  09:37  Right IJ tunneled CVC assessed no abnormalities noted, line patent with good flow. CVC accessed without any difficulty, pt tolerated well. Vascular access visible with arterial and venous line connections intact. 09:45  Vascular access visible with arterial and venous line connections intact. 10:00  Pt awake, VSS. Vascular access visible with arterial and venous line connections intact. 10:15  Vascular access visible with arterial and venous line connections intact. 10:30  VSS, vascular access visible with arterial and venous line connections intact. 10:45  Vascular access visible with arterial and venous line connections intact. 11:00  Vascular access visible with arterial and venous line connections intact. 11:15  Vascular access visible with arterial and venous line connections intact. 11:30  VSS, pt tolerating HD well. Vascular access visible with arterial and venous line connections intact. 12:00  Drsg to Right IJ tunneled CVC changed, biopatch placed, pt tolerated well. Vascular access visible with arterial and venous line connections intact. 12:15  VSS, pt tolerating HD well. Vascular access visible with arterial and venous line connections intact.   12:30  Vascular access visible with arterial and venous line connections intact. Medication Dose Volume Route Time [place] Nurse, Title   None          Post Assessment  Dialyzer Cleared:[] Good [x] Fair  [] Poor  Blood processed:  66.2 L  Net UF Removed:  1500 Ml  Post /108  Pulse  90 Resp  18   Temp 98.2 oral    Post Tx Vascular Access:   CVC Catheter:   Locking solution: Heparin 1:1000U  Arterial port 1.6 ml   Venous port 1.6 ml         Skin:[x] Warm  [x] Dry [] Diaphoretic             []Cool     [] Flushed  [] Pale            [] Cyanotic   Pain:  [x]0  []1 []2  []3 []4  []5  []6 []7 []8  []9  []10     Post Treatment Note:   12:50  HD completed at this time, total UF removed 2000ml, net removed 1500ml, pt tolerated well. Dressing clean, dry and intact. POST TREATMENT PRIMARY NURSE HANDOFF REPORT:   Post Dialysis:  TATE Garcia                Time:  12:51       Abbreviations: AVG-arterial venous graft, AVF-arterial venous fistula, IJ-Internal Jugular, Subcl-Subclavian, Fem-Femoral, Tx-treatment, AP/HR-apical heart rate, DFR-dialysate flow rate, BFR-blood flow rate, AP-arterial pressure, -venous pressure, UF-ultrafiltrate, TMP-transmembrane pressure, Ray-Venous, Art-Arterial, RO-Reverse Osmosis

## 2021-07-21 NOTE — PROGRESS NOTES
Progress Note    Art Boucher  74 y.o. 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 .  BP was better on the floor, now again high during Dialysis      A comprehensive review of systems was negative except for that written in the History of Present Illness.     Objective:     Visit Vitals  BP (!) 205/104   Pulse 82   Temp 98.8 °F (37.1 °C) (Oral)   Resp 18   Ht 5' 3\" (1.6 m)   Wt 59.4 kg (131 lb)   LMP  (LMP Unknown)   SpO2 98%   Breastfeeding No   BMI 23.21 kg/m²         Intake/Output Summary (Last 24 hours) at 7/21/2021 1127  Last data filed at 7/20/2021 1811  Gross per 24 hour   Intake 480 ml   Output    Net 480 ml       Current Facility-Administered Medications   Medication Dose Route Frequency Provider Last Rate Last Admin    minoxidiL (LONITEN) tablet 5 mg  5 mg Oral BID Giovanny Segura MD   5 mg at 07/20/21 1717    cloNIDine (CATAPRES) 0.3 mg/24 hr patch 1 Patch  1 Patch TransDERmal Q7D Giovanny Segura MD   1 Patch at 07/20/21 1629    cloNIDine HCL (CATAPRES) tablet 0.1 mg  0.1 mg Oral Q6H PRN Giovanny Segura MD   0.1 mg at 07/20/21 1344    amLODIPine (NORVASC) tablet 10 mg  10 mg Oral DAILY Giovanny Segura MD   10 mg at 07/20/21 1346    sodium chloride (NS) flush 5-40 mL  5-40 mL IntraVENous Q8H Deisy Cloud N, DO   10 mL at 07/21/21 0524    sodium chloride (NS) flush 5-40 mL  5-40 mL IntraVENous PRN Rosario Barcenas N, DO  acetaminophen (TYLENOL) tablet 650 mg  650 mg Oral Q6H PRN Treasure Press N, DO   650 mg at 07/20/21 9699    Or    acetaminophen (TYLENOL) suppository 650 mg  650 mg Rectal Q6H PRN Deisy Cloud N, DO        ondansetron (ZOFRAN ODT) tablet 4 mg  4 mg Oral Q8H PRN Deisy Cloud N, DO        Or    ondansetron (ZOFRAN) injection 4 mg  4 mg IntraVENous Q6H PRN Deisy Cloud, DO        atorvastatin (LIPITOR) tablet 10 mg  10 mg Oral DAILY Deisy Cloud, DO   10 mg at 07/21/21 0901    carvediloL (COREG) tablet 25 mg  25 mg Oral BID WITH MEALS Deisy Cloud, DO   25 mg at 07/20/21 1627    famotidine (PEPCID) tablet 20 mg  20 mg Oral DAILY Deisy Cloud, DO   20 mg at 07/21/21 0901    insulin lispro (HUMALOG) injection   SubCUTAneous AC&HS Treasure Press N, DO   8 Units at 07/20/21 2134    levETIRAcetam (KEPPRA) tablet 250 mg  250 mg Oral Q MON, WED & FRI Deisy Cloud, DO        levETIRAcetam (KEPPRA) tablet 500 mg  500 mg Oral BID Deisy Cloud, DO   500 mg at 07/21/21 0901    losartan (COZAAR) tablet 100 mg  100 mg Oral DAILY Deisy Cloud, DO   100 mg at 07/20/21 0910    polyethylene glycol (MIRALAX) packet 17 g  17 g Oral DAILY PRN Deisy Wilcox, DO        sevelamer carbonate (RENVELA) tab 800 mg  800 mg Oral TID Deisy Wilcox, DO   800 mg at 07/21/21 0901    B complex-vitaminC-folic acid (NEPHROCAP) cap  1 Capsule Oral DAILY Deisy Cloud, DO   1 Capsule at 07/21/21 0901    heparin (porcine) injection 5,000 Units  5,000 Units SubCUTAneous Q12H Bhumi Press N, DO   5,000 Units at 07/20/21 2134        Physical Exam:     Physical Exam:   General:  Alert, cooperative, no distress, appears stated age. Neck: Supple, symmetrical, trachea midline, no adenopathy, thyroid: no enlargement/tenderness/nodules, no carotid bruit and no JVD. Lungs:   Clear to auscultation bilaterally. Heart:  Regular rate and rhythm, S1, S2 normal, no murmur, click, rub or gallop. Abdomen:   Soft, non-tender. Bowel sounds normal. No masses,  No organomegaly. Extremities: Extremities normal, atraumatic, no cyanosis or edema, HD catheter is functioning well   Skin: Skin color, texture, turgor normal. No rashes or lesions         Data Review:    CBC w/Diff    Recent Labs     07/21/21 0219 07/20/21 0247 07/20/21 0247 07/19/21 2144 07/19/21 2144   WBC 4.4*  --  5.0  --  4.6   RBC 2.95*  --  3.38*  --  3.24*   HGB 8.8*  --  10.2*  --  9.7*   HCT 27.2*  --  30.9*  --  30.0*   MCV 92.2   < > 91.4   < > 92.6   MCH 29.8   < > 30.2   < > 29.9   MCHC 32.4   < > 33.0   < > 32.3   RDW 14.6*   < > 14.6*   < > 14.6*    < > = values in this interval not displayed. Recent Labs     07/21/21 0219 07/20/21 0247 07/20/21 0247 07/19/21 2144 07/19/21  2144   MONOS 14*  --  13*  --  15*   EOS 4  --  4  --  6*   BASOS 1   < > 0   < > 0   RDW 14.6*   < > 14.6*   < > 14.6*    < > = values in this interval not displayed. Comprehensive Metabolic Profile    Recent Labs     07/21/21 0219 07/20/21 0247 07/19/21  1600    140 139   K 4.2 4.3 4.0    105 105   CO2 29 27 28   BUN 48* 32* 29*   CREA 9.31* 7.24* 6.48*    Recent Labs     07/21/21 0219 07/20/21 0247 07/19/21  1600   CA 8.9 9.1 8.9   PHOS 4.5  --  3.0   ALB  --   --  3.3*   TP  --   --  6.6   TBILI  --   --  0.3                        Impression:       Active Hospital Problems    Diagnosis Date Noted    Schizophrenia ([facility] 07/20/2021    Hypertensive urgency 06/12/2021    Chest pain 06/11/2021    Anemia 03/19/2021    CVA, old, cognitive deficits 08/08/2018    ESRD (end stage renal disease) ([facility] 08/08/2018    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 UF 1.5 kg      Plan:     Continue HD as schedules, continue all current BP medicine. Will add Imdur 30 mg PO Daily.       Elías Andujar MD

## 2021-07-21 NOTE — PROGRESS NOTES
Informed Reyna Cortez of [address] [place] [address] that pt is possible d/c tomorrow.           MERARY LennonN RN  Care Management  Pager: [phone]

## 2021-07-21 NOTE — PROGRESS NOTES
Cardiology Progress Note    Admit Date: 7/19/2021  Attending Cardiologist: Dr. Miller Clarity:     Hospital Problems  Date Reviewed: 6/2/2021        Codes Class Noted POA    Schizophrenia ([facility] ICD-10-CM: F20.9  ICD-9-CM: 295.90  7/20/2021 Unknown        Hypertensive urgency ICD-10-CM: I16.0  ICD-9-CM: 401.9  6/12/2021 Yes        * (Principal) Chest pain ICD-10-CM: R07.9  ICD-9-CM: 786.50  6/11/2021 Unknown        Anemia ICD-10-CM: D64.9  ICD-9-CM: 285.9  3/19/2021 Yes        ESRD (end stage renal disease) ([facility] ICD-10-CM: N18.6  ICD-9-CM: 585.6  8/8/2018 Yes        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 ([facility] ICD-10-CM: E11.22, I12.0, Z99.2, N18.6  ICD-9-CM: 250.40, 403.91, V45.11, 585.6  8/8/2018 Yes        CVA, old, cognitive deficits ICD-10-CM: I69.319  ICD-9-CM: 438.0  8/8/2018 Yes              -Chest pain. Atypical. Patient poor historian but points to pain at HD site right chest wall. Troponin indeterminate peak at 0.5. ECG with ST wave abnormalities simialr to previous tracings.  -Paroxysmal SVT. Multiple brief asymptomatic self limited episodes noted in ER. No events since transfer to telemetry.  -Hypertensive urgency. SBP >200 on arrival.  -ESRD on HD MWF. -Chronic HFpEF. Noted elevated BNP. Volume managed with HD. Echo this admission with EF 65-70% with normal wall motion. -H/o moderate loculated pericardial effusion adjacent to right ventricle and right atrium with no evidence of hemodynamic compromise by echo 6/2021. Echo this admission with small circumferential pericardial effusion.  -Chronic anemia. Stable. -Dyslipidemia. On statin. -Diabetes mellitus. HgbA1c 6.9.  -H/o CVA. -Seizure disorder.  -Bipolar disorder.  -Poor functional status, wheelchair bound, [address] [place] [address] resident.     No primary cardiologist. Followed by cardiology on previous hospital admission. Plan:     Patient reports she is feeling much better today. Chest pain has resolved.   Breathing is stable. No significant tele events. Echo with EF 65-70% with normal wall motion with small circumferential pericardial effusion. Troponin indeterminate and not consistent with ACS. ECG with anterior T wave abnormalities, but similar to tracings from previous admission. BP elevated but improving with resumption of home antihypertensive regimen. Can add hydralazine/imdur if further BP control is needed. There is concern patient does not receive all of her medications on daily basis at [facility], will defer to primary team.  Would continue volume management per nephrology. Would continue BB and statin. Consider ASA if no concerns of bleeding. Will not pursue further cardiac work up at this time. Subjective:     Patient feeling much better this AM. CP resolved. Breathing stable. No significant tele events.     Objective:      Patient Vitals for the past 8 hrs:   Temp Pulse Resp BP SpO2   07/21/21 0347 98.6 °F (37 °C) 78 18 (!) 147/72 96 %   07/21/21 0024 98.8 °F (37.1 °C) 83 18 138/67 97 %         Patient Vitals for the past 96 hrs:   Weight   07/20/21 1033 131 lb (59.4 kg)       TELE: normal sinus rhythm               Current Facility-Administered Medications   Medication Dose Route Frequency Last Admin    minoxidiL (LONITEN) tablet 5 mg  5 mg Oral BID 5 mg at 07/20/21 1717    cloNIDine (CATAPRES) 0.3 mg/24 hr patch 1 Patch  1 Patch TransDERmal Q7D 1 Patch at 07/20/21 1629    cloNIDine HCL (CATAPRES) tablet 0.1 mg  0.1 mg Oral Q6H PRN 0.1 mg at 07/20/21 1344    amLODIPine (NORVASC) tablet 10 mg  10 mg Oral DAILY 10 mg at 07/20/21 1346    sodium chloride (NS) flush 5-40 mL  5-40 mL IntraVENous Q8H 10 mL at 07/21/21 0524    sodium chloride (NS) flush 5-40 mL  5-40 mL IntraVENous PRN      acetaminophen (TYLENOL) tablet 650 mg  650 mg Oral Q6H  mg at 07/20/21 0814    Or    acetaminophen (TYLENOL) suppository 650 mg  650 mg Rectal Q6H PRN      ondansetron (ZOFRAN ODT) tablet 4 mg  4 mg Oral Q8H PRN      Or    ondansetron (ZOFRAN) injection 4 mg  4 mg IntraVENous Q6H PRN      atorvastatin (LIPITOR) tablet 10 mg  10 mg Oral DAILY 10 mg at 07/20/21 0910    carvediloL (COREG) tablet 25 mg  25 mg Oral BID WITH MEALS 25 mg at 07/20/21 1627    famotidine (PEPCID) tablet 20 mg  20 mg Oral DAILY 20 mg at 07/20/21 0910    insulin lispro (HUMALOG) injection   SubCUTAneous AC&HS 8 Units at 07/20/21 2134    levETIRAcetam (KEPPRA) tablet 250 mg  250 mg Oral Q MON, WED & FRI      levETIRAcetam (KEPPRA) tablet 500 mg  500 mg Oral  mg at 07/20/21 1717    losartan (COZAAR) tablet 100 mg  100 mg Oral DAILY 100 mg at 07/20/21 0910    polyethylene glycol (MIRALAX) packet 17 g  17 g Oral DAILY PRN      sevelamer carbonate (RENVELA) tab 800 mg  800 mg Oral  mg at 07/20/21 2134    B complex-vitaminC-folic acid (NEPHROCAP) cap  1 Capsule Oral DAILY 1 Capsule at 07/20/21 0910    heparin (porcine) injection 5,000 Units  5,000 Units SubCUTAneous Q12H 5,000 Units at 07/20/21 2134         Intake/Output Summary (Last 24 hours) at 7/21/2021 0740  Last data filed at 7/20/2021 1811  Gross per 24 hour   Intake 480 ml   Output    Net 480 ml       Physical Exam:  General:  alert, cooperative, no distress, appears stated age  Neck:  no JVD  Lungs:  clear to auscultation bilaterally  Heart:  regular rate and rhythm  Abdomen:  abdomen is soft without significant tenderness, masses, organomegaly or guarding  Extremities:  extremities normal, atraumatic, no cyanosis or edema    Visit Vitals  BP (!) 147/72   Pulse 78   Temp 98.6 °F (37 °C)   Resp 18   Ht 5' 3\" (1.6 m)   Wt 131 lb (59.4 kg)   SpO2 96%   Breastfeeding No   BMI 23.21 kg/m²       Data Review:     Labs: Results:       Chemistry Recent Labs     07/21/21  0219 07/20/21  0247 07/19/21  1600   * 143* 155*    140 139   K 4.2 4.3 4.0    105 105   CO2 29 27 28   BUN 48* 32* 29*   CREA 9.31* 7.24* 6.48*   CA 8.9 9.1 8.9   MG  --   --  2.2   PHOS 4.5 --  3.0   AGAP 6 8 6   BUCR 5* 4* 4*   AP  --   --  105   TP  --   --  6.6   ALB  --   --  3.3*   GLOB  --   --  3.3   AGRAT  --   --  1.0      CBC w/Diff Recent Labs     07/21/21  0219 07/20/21  0247 07/19/21  2144   WBC 4.4* 5.0 4.6   RBC 2.95* 3.38* 3.24*   HGB 8.8* 10.2* 9.7*   HCT 27.2* 30.9* 30.0*    258 220   GRANS 63 67 59   LYMPH 18* 15* 19*   EOS 4 4 6*      Cardiac Enzymes Lab Results   Component Value Date/Time    TROIQ 0.37 (H) 07/20/2021 01:15 PM      Coagulation Recent Labs     07/20/21  1315 07/20/21  0710   APTT 22.6* 23.9       Lipid Panel No results found for: CHOL, CHOLPOCT, CHOLX, CHLST, CHOLV, 567855, HDL, HDLP, LDL, LDLC, DLDLP, 301568, VLDLC, VLDL, TGLX, TRIGL, TRIGP, TGLPOCT, CHHD, CHHDX   BNP No results found for: BNP, BNPP, XBNPT   Liver Enzymes Recent Labs     07/19/21  1600   TP 6.6   ALB 3.3*         Thyroid Studies Lab Results   Component Value Date/Time    TSH 2.59 06/12/2021 09:15 AM          Signed By: SUSI Elias     July 21, 2021

## 2021-07-21 NOTE — PROGRESS NOTES
Progress Note         Patient: Zaire Gale MRN: [id number]  CSN: [id number]    YOB: 1955  Age: 74 y.o. Sex: female    DOA: 7/19/2021 LOS:  LOS: 2 days                    Subjective:   Seen in HD this am  Reports chest pain has resolved  Denies any shortness of breath, abdominal pain, nausea  Denies any other complaints      Objective:     Physical Exam:  Visit Vitals  BP (!) 234/101   Pulse 90   Temp 98.2 °F (36.8 °C)   Resp 20   Ht 5' 3\" (1.6 m)   Wt 59.4 kg (131 lb)   SpO2 100%   Breastfeeding No   BMI 23.21 kg/m²        General:         Alert, cooperative, no acute distress    HEENT: NC, Atraumatic. Anicteric sclerae. Lungs: CTA Bilaterally. No Wheezing/Rhonchi/Rales. Heart:  Regular  rhythm,  No murmur, No Rubs, No Gallops  Abdomen: Soft, Non distended, Non tender. +Bowel sounds, no HSM  Extremities: No c/c/e  Psych:   Not anxious or agitated. Neurologic:  Alert and oriented X 3. No acute neurological deficits. Intake and Output:  Current Shift:  07/21 0701 - 07/21 1900  In: -   Out: 1500   Last three shifts:  07/19 1901 - 07/21 0700  In: 480 [P.O.:480]  Out: -     Labs: Results:       Chemistry Recent Labs     07/21/21 0219 07/20/21 0247 07/19/21  1600   * 143* 155*    140 139   K 4.2 4.3 4.0    105 105   CO2 29 27 28   BUN 48* 32* 29*   CREA 9.31* 7.24* 6.48*   CA 8.9 9.1 8.9   AGAP 6 8 6   BUCR 5* 4* 4*   AP  --   --  105   TP  --   --  6.6   ALB  --   --  3.3*   GLOB  --   --  3.3   AGRAT  --   --  1.0      CBC w/Diff Recent Labs     07/21/21 0219 07/20/21 0247 07/19/21  2144   WBC 4.4* 5.0 4.6   RBC 2.95* 3.38* 3.24*   HGB 8.8* 10.2* 9.7*   HCT 27.2* 30.9* 30.0*    258 220   GRANS 63 67 59   LYMPH 18* 15* 19*   EOS 4 4 6*      Cardiac Enzymes No results for input(s): CPK, CKND1, SONG in the last 72 hours.     No lab exists for component: CKRMB, TROIP   Coagulation Recent Labs     07/20/21  1315 07/20/21  0710   APTT 22.6* 23.9       Lipid Panel No results found for: CHOL, CHOLPOCT, CHOLX, CHLST, CHOLV, 421245, HDL, HDLP, LDL, LDLC, DLDLP, 019113, VLDLC, VLDL, TGLX, TRIGL, TRIGP, TGLPOCT, CHHD, CHHDX   BNP No results for input(s): BNPP in the last 72 hours. Liver Enzymes Recent Labs     07/19/21  1600   TP 6.6   ALB 3.3*         Thyroid Studies Lab Results   Component Value Date/Time    TSH 2.59 06/12/2021 09:15 AM                Assessment and Plan:     Asya Huttno is a 77 y.o. female with a PMHx of ESRD on HD, chronic diastolic CHF, stroke, type II DM, HTN, HLD, seizure disorder and bipolar disorder who is admitted for chest pain and elevated troponin. 1. Chest painatypical, not consistent with ACS  2. Hypertensive urgency  3. Elevated troponinlikely secondary to demand ischemia  4. Paroxysmal SVT  5. Chronic diastolic CHF  6. Small circumferential pericardial effusion  7. ESRD on HD  8. Anemia of chronic disease  9. Type II DM  10. HTN  11. HLD  12. Hx stroke  13. Seizure disorder  14. Bipolar disorder    Discussed with Dr. Constance Rossi this am on rounds  Discussed with cardiology- no additional cardiac w/u   - posible OP event monitor  HD per nephrology  Blood pressure management per nephrologyincreasing clonidine and minoxidil, add amlodipine, continue losartan, Coreg  Continue statin, Keppra  Continue SSI with Accu-Cheks  Follow-up CBC, BMP, phosphorus  Follow-up MRSA culture  Incentive spirometry  PT, OT      Case discussed with:  [x]Patient  []Family  [x]Nursing  [x]Case Management  DVT prophylaxis: SQH  Diet: Renal, cardiac  Contact: Radha Paez (sister)      [phone]  Code Status: Full  Disposition:   [facility] tomorrow    Sindi Blackburn DO  7/21/2021       Dragon medical dictation software was used for portions of this report. Unintended errors may occur.

## 2021-07-21 NOTE — PROGRESS NOTES
Physician Progress Note      Luis Roberts  CSN #:                  [id number]  :                       1955  ADMIT DATE:       2021 3:03 PM  [address] DATE:  RESPONDING  PROVIDER #:        Kriss HSIEH DO          QUERY TEXT:    Dear Hospitalist  Pt admitted with Chest Pain . Pt noted to have elevated Trop and Pro BNP . If possible, please document in progress notes and discharge summary if you are evaluating and/or treating any of the following: The medical record reflects the following:  Risk Factors: EKG  Severe concentric hypertrophy ,High blood pressure, tachycardia  Clinical Indicators: Card MDPN Chronic HFpEF. Noted elevated BNP  pro-BNP: 16,946 (H) Volume managed with HD  1600 Troponin-I, Qt.: 0.04 2021 18:31Troponin-I, Qt.: 0.18 (H) ,2021 02:47 Troponin-I, Qt.: 0.50 (H) ,ECG with nonspecific ST wave abnormalities . ECHO   LV: Estimated LVEF is 65 - 70%. Visually measured ejection fraction. Normal systolic function (ejection fraction normal). Small left ventricle. Severe concentric hypertrophy. Wall motion: normal. Global longitudinal strain is -11.2% and Pericardium: Small circumferential pericardial effusion      Treatment: emergent HD, ECG,ECHO Labs monitored carefully  IV Heparin gtt now subQ heparin    Fourth Universal Definition of Myocardial Infarction:   To have a myocardial infarction requires both an elevated troponin blood test along with at least one of the following:  - Symptoms of acute myocardial ischemia (Types 1 - 5 MI)  - Clinical evidence of ischemia, as evidenced in an electrocardiogram (EKG) showing new ischemic changes (Type 1, Type 2, Type 3, or Type 4a MI)  - Development of pathological Q waves (Types 1 - 5 MI)  - Imaging evidence of new loss of viable myocardium or new regional wall motion abnormality in a pattern consistent with an ischemic etiology (Types 1 - 5 MI)  Thank you  Lewis Webb RN    Options provided:  -- Chest pain due to CAD with unstable angina  -- Chest pain due to NSTEMI  -- Chest pain due to pleural effusion  -- Chest pain due to costochondritis  -- Chest pain due to pleurisy  -- Chest pain due to pericardial effusion  -- Chest pain due to Type 2 MI  -- Chest pain due to Acute on chronic diastolic CHF  -- Other - I will add my own diagnosis  -- Disagree - Not applicable / Not valid  -- Disagree - Clinically unable to determine / Unknown  -- Refer to Clinical Documentation Reviewer    PROVIDER RESPONSE TEXT:    This patient has chest pain due to Acute on chronic diastolic CHF . Query created by:  Martina Mathews on 7/20/2021 4:47 PM      Electronically signed by:  Addy Giron DO 7/21/2021 1:29 PM

## 2021-07-21 NOTE — PROGRESS NOTES
Problem: Pressure Injury - Risk of  Goal: *Prevention of pressure injury  Description: Document Ab Scale and appropriate interventions in the flowsheet. Outcome: Progressing Towards Goal  Note: Pressure Injury Interventions:  Sensory Interventions: Keep linens dry and wrinkle-free, Maintain/enhance activity level, Minimize linen layers, Monitor skin under medical devices, Pressure redistribution bed/mattress (bed type)    Moisture Interventions: Absorbent underpads    Activity Interventions: Increase time out of bed, Pressure redistribution bed/mattress(bed type), PT/OT evaluation    Mobility Interventions: Pressure redistribution bed/mattress (bed type), PT/OT evaluation    Nutrition Interventions: Document food/fluid/supplement intake, Offer support with meals,snacks and hydration    Friction and Shear Interventions: Minimize layers                Problem: Patient Education: Go to Patient Education Activity  Goal: Patient/Family Education  Outcome: Progressing Towards Goal     Problem: Falls - Risk of  Goal: *Absence of Falls  Description: Document Oscar Fall Risk and appropriate interventions in the flowsheet.   Outcome: Progressing Towards Goal  Note: Fall Risk Interventions:  Mobility Interventions: Bed/chair exit alarm    Mentation Interventions: Bed/chair exit alarm    Medication Interventions: Bed/chair exit alarm    Elimination Interventions: Bed/chair exit alarm    History of Falls Interventions: Bed/chair exit alarm         Problem: Patient Education: Go to Patient Education Activity  Goal: Patient/Family Education  Outcome: Progressing Towards Goal     Problem: Nutrition Deficit  Goal: *Optimize nutritional status  Outcome: Progressing Towards Goal     Problem: Nutrition Deficit  Goal: *Optimize nutritional status  Outcome: Progressing Towards Goal

## 2021-07-21 NOTE — PROGRESS NOTES
Problem: Pressure Injury - Risk of  Goal: *Prevention of pressure injury  Description: Document Ab Scale and appropriate interventions in the flowsheet. Outcome: Progressing Towards Goal  Note: Pressure Injury Interventions:  Sensory Interventions: Assess changes in LOC    Moisture Interventions: Absorbent underpads    Activity Interventions: Increase time out of bed, PT/OT evaluation    Mobility Interventions: HOB 30 degrees or less, Pressure redistribution bed/mattress (bed type)    Nutrition Interventions: Document food/fluid/supplement intake    Friction and Shear Interventions: HOB 30 degrees or less                Problem: Patient Education: Go to Patient Education Activity  Goal: Patient/Family Education  Outcome: Progressing Towards Goal     Problem: Patient Education: Go to Patient Education Activity  Goal: Patient/Family Education  Outcome: Progressing Towards Goal     Problem: Falls - Risk of  Goal: *Absence of Falls  Description: Document Oscar Fall Risk and appropriate interventions in the flowsheet.   Outcome: Progressing Towards Goal  Note: Fall Risk Interventions:  Mobility Interventions: Assess mobility with egress test, Patient to call before getting OOB    Mentation Interventions: Adequate sleep, hydration, pain control, Increase mobility    Medication Interventions: Bed/chair exit alarm, Patient to call before getting OOB    Elimination Interventions: Bed/chair exit alarm, Call light in reach    History of Falls Interventions: Bed/chair exit alarm, Consult care management for discharge planning         Problem: Nutrition Deficit  Goal: *Optimize nutritional status  Outcome: Progressing Towards Goal     Problem: Patient Education: Go to Patient Education Activity  Goal: Patient/Family Education  Outcome: Progressing Towards Goal     Problem: Patient Education: Go to Patient Education Activity  Goal: Patient/Family Education  Outcome: Progressing Towards Goal

## 2021-07-21 NOTE — CONSULTS
[address] [facility]    Name:  Kelley Garza  MR#:   [id number]  :  1955  ACCOUNT #:  [de-identified]  DATE OF SERVICE:  2021    RENAL CONSULTATION    Consult was requested by the emergency room physician. REASON FOR CONSULTATION:  High blood pressure, tachycardia in a dialysis patient. HISTORY OF PRESENT ILLNESS:  This 24-year-old Count includes the [facility] female, a nursing home patient, who comes to dialysis every Monday, Wednesday, and Friday. Most of the time during dialysis, her blood pressure is usually extremely high and the patient requires to be given clonidine during dialysis. The patient has bipolar disorder including schizophrenia also, not sure whether she takes medications or not regularly or doses of medicine given properly in the nursing home. Off and on, she complains of chest pain, also comes to the hospital with tachycardia and brief supraventricular tachycardia. Yesterday, after dialysis, blood pressure was quite high and was complaining of chest tightness and chest pain and tachycardia, and the patient was sent to the emergency room for further evaluation. Subsequently, the patient's tachycardia resolved spontaneously, but the blood pressure was also quite high. She has history of hypertension, type 2 diabetes mellitus, ESRD, old stroke, as well as bipolar disorder and dementia. Most of the time, her answer is no, does not know what problem is going on, whether she takes meds or not, she answers only \"I don't know. \"    REVIEW OF SYSTEMS:  CONSTITUTIONAL:  Negative. RESPIRATORY:  No shortness of breath. No wheezing. CARDIOVASCULAR:  Reported chest pain subjectively. No reproducible chest pain. No palpitation. No leg edema. GASTROINTESTINAL:  No nausea, no vomiting, but she did have vomiting in the emergency room last night. GENITOURINARY:  No new symptoms. MUSCULOSKELETAL:  Negative. No calf muscle tenderness.   PSYCHOLOGICAL:  Has underlying bipolar disorder but currently not depressed, sometimes smiles, sometimes cries. PAST MEDICAL HISTORY:  Includes hypertension, ESRD, type 2 diabetes mellitus, paralytic strabismus, seizure, hyperlipidemia, cataract, and asthma possibly. SOCIAL HISTORY:  Single. Never smoked. No drug. FAMILY HISTORY:  Not clear. ALLERGIES:  ALLERGIC TO AMOXICILLIN, CLEOCIN, CODEINE, LORCET, PENICILLIN, SHELLFISH. [address] [place] [address] Drive:  Hydralazine 10 mg every 6 hours p.r.n. for blood pressure control, Tylenol 650 mg every 6 hours p.r.n. for pain, Zofran 4 mg every 6 hours p.r.n. for nausea and vomiting, Lipitor 10 mg p.o. daily, Coreg 25 mg twice daily, Pepcid 20 mg p.o. daily, Humalog insulin on the sliding scale before every meal and at bedtime, Keppra 500 mg one tablet twice daily as well as 250 mg subsequent to each dialysis treatment, losartan 100 mg tablet daily, MiraLax 17 g p.o. daily, Renvela 800 mg one tablet three times daily, Catapres patch-TTS-2 every week, Nephrocaps once, heparin 5000 subcutaneously every 12 hours in hospital.    PHYSICAL EXAMINATION:  VITAL SIGNS:  Temperature on admission was 98.5, heart rate 147 which came down to regular sinus rhythm to 79 to 87 per minute. Initial blood pressure was 207/106. Last blood pressure was 164/69. Mean arterial pressure currently is 101. On admission, mean arterial pressure was on 140. Oxygen saturation is 100% on room air. HEENT:  Oral mucosa is moist.  Jugular venous pressure is not distended. Carotid:  No audible bruit. LUNGS:  Clear to auscultation. HEART:  S1, S2, with S4 also. ABDOMEN:  Soft. No palpable mass. EXTREMITIES:  Ankle, negative edema. Has a dialysis catheter which is well dressed. LABORATORY DATA:  Relevant labs on admission:  WBC 4.6 with hemoglobin of 9.7, hematocrit 30.0, platelets of 593,463.   Chemistry on admission and is same after dialysis, sodium 139, potassium 4.0, chloride 105, CO2 of 28, glucose of 155, blood urea nitrogen 29, creatinine of 6.48, calcium 8.9 with phosphorus of 3.2. Total protein of 6.6 with albumin of 3.3 and BNP of 16,946. Troponin 0.4, 0.18, 0.50, 0.4, and the last one is 0.36. Last PTH in this hospital was 580.4 in 06/2021. Chest x-ray, portable, was done yesterday and no acute infiltrate or effusion, unchanged hemodialysis catheter. A 2D echocardiogram was done today which shows left ventricular estimated ejection fraction around 65-70%, visually measured, and normal systolic function, ejection fraction normal, severe concentric hypertrophy, wall motion normal, global left ventricular strain of -11.2%, small circumferential pericardial effusion. IMPRESSION AND PLAN:  1. End-stage renal disease. 2.  Hypertensive urgency versus emergency. 3.  Tachycardia, questionable supraventricular tachycardia initially. 4.  Noncompliance. 5.  Underlying psychiatric problem. Again, not sure if the patient gets regular blood pressure medications on regular basis or not or she refused to take medicine. We will increase the Catapres patch to TTS-3 once a week. Continue the beta-blocker Coreg twice daily. Continue the losartan. She is not on any calcium channel blocker. For this reason, I added Norvasc 10 mg p.o. daily. She is on minoxidil 2.5 mg twice daily, I increased to 5 mg p.o. twice daily to the point that she does not get significant pericardial effusion. Also, we would like to use hydralazine and minoxidil together for the posterior tachycardia, not safe to do this 2 vaso dilator medications together. If the blood pressure is still not controlled, then possibly we will have to stop the minoxidil given the small pericardial effusion and may add Imdur along with hydralazine. The patient needs to take the medications, we will observe her blood pressure closely in the hospital, and hopefully, in the nursing home, they can continue to give the blood pressure medicine regularly.   She will be dialyzed on a regular basis while she is in the hospital.  Further recommendations will be given based on the hospital course.       MD RENETTA [id number]  D:  07/20/2021 18:36  T:  07/20/2021 22:59  JOB #:  [id number]

## 2021-07-21 NOTE — PROGRESS NOTES
Reason for Admission:   Chest pain [R07.9]               RUR Score:     34%             Resources/supports as identified by patient/family:       Top Challenges facing patient (as identified by patient/family and CM): None, at this time. Finances/Medication cost?     Patient has Medicare and Medicaid. Transportation      [employer] [facility]  Support system or lack thereof? Patient has family support. Living arrangements? LTC at [address] [place] [address]   Self-care/ADLs/Cognition? Long Term Care patient       Current Advanced Directive/Advance Care Plan:   no    Healthcare Decision Maker:   Primary Decision Maker: Flora Ortiz -  - [phone]    Click here to complete [address] including selection of the Healthcare Decision Maker Relationship (ie \"Primary\")                          Plan for utilizing home health:    no, patient is a LTC patient at [address] [place] [address]. Likelihood of readmission:   HIGH    Transition of Care Plan:                    Initial assessment completed with relative(s), patient's sister Sonny Lewis. Cognitive status of patient: only aware of  person. Face sheet information confirmed:  yes. The patient's sister Sonny Lewis [phone] agrees to participate in her discharge plan and to receive any needed information. This patient resides as a [address] patient at [address] [place] [address]. Patient is not able to navigate steps as needed. Prior to hospitalization, patient was considered to be independent with ADLs/IADLS : no . If not independent,  patient needs assist with : dressing, bathing, toileting and grooming. Patient has a current ACP document on file: no.    Medical transport will need to be available to transport patient back to LTC upon discharge. The patient already has all needed medical equipment available in the home.      Patient is not currently active with home health. Patient has stayed in a skilled nursing facility or rehab. Was  stay within last 60 days : yes. This patient is on dialysis :yes. If yes, hemodialysis patient and receives treatment on Monday/Wednesday/Friday at [address]. Chair time is 11 am.   Pt is transported to/from dialysis by [employer]. List of available LTC agencies were provided and reviewed with the patient prior to discharge. Aniwa of choice verbal consent given: yes, for [address] Currently, the discharge plan is LTC. The patient states that she can obtain her medications from the pharmacy, and take her medications as directed. Patient's current insurance is VA Medicare and [facility] Medicaid. Care Management Interventions  PCP Verified by CM:  Yes  Mode of Transport at Discharge: S  Transition of Care Consult (CM Consult): Long Term Care  Discharge Durable Medical Equipment: No  Physical Therapy Consult: No  Occupational Therapy Consult: No  Speech Therapy Consult: No  Current Support Network: [address] [place] [address] ([address])  Confirm Follow Up Transport: Other (see comment) (Medicaid Transport)  The Plan for Transition of Care is Related to the Following Treatment Goals : [address]  The Patient and/or Patient Representative was Provided with a Choice of Provider and Agrees with the Discharge Plan?: Yes  Name of the Patient Representative Who was Provided with a Choice of Provider and Agrees with the Discharge Plan: Rashmi Bui (Patient's sister)  Aniwa of Choice List was Provided with Basic Dialogue that Supports the Patient's Individualized Plan of Care/Goals, Treatment Preferences and Shares the Quality Data Associated with the Providers?: Yes  Discharge Location  Discharge Placement: [facility] Kristen Allen RN  Case Management [phone]

## 2021-07-21 NOTE — PROGRESS NOTES
Requested Case Management specialist to assist with transportation to [address] [place] [address] for tomorrow 7/22/21  Patient will require BLS transport. Pt requires Stretcher If stretcher, reason: stroke, brain conditions, paralytic strabismus, seizures  Patient is currently requiring oxygen No No  Height:5'3   Weight: 131 Ib  Pt is on isolation: No  Isolation is for:   Is the pt ready now? no  Requested time: 7/22/21  PCS Faxed: no  Insurance verified on face sheet: yes  Auth needed for transport: yes  CM completed PCS/ Envelope and placed on chart.      Pt is also dialysis MWF at [facility] at 11am Medicaid transport        MERARY IrwinN RN  Care Management  Pager: [phone]

## 2021-07-21 NOTE — PROGRESS NOTES
SALAZAR spoke with patient's sister Rashmi Bui [phone], and she is agreeable for patient to return to [address] at [address] [place] [address], when patient is medically clear for discharge. SALAZAR called and spoke with Isadora Araujo at [address] [place] [address], they can accept patient back for [address] for when patient is medically clear for discharge.            Deepak Meneses, RN  Case Management [phone]

## 2021-07-21 NOTE — ROUTINE PROCESS
Bedside shift change report given to Nalini Galvez (oncoming nurse) by Mario Bonds (offgoing nurse). Report included the following information SBAR, Kardex, MAR, Procedure Verification and Quality Measures.

## 2021-07-22 VITALS
HEART RATE: 83 BPM | WEIGHT: 131 LBS | BODY MASS INDEX: 23.21 KG/M2 | OXYGEN SATURATION: 95 % | HEIGHT: 63 IN | SYSTOLIC BLOOD PRESSURE: 128 MMHG | RESPIRATION RATE: 20 BRPM | TEMPERATURE: 98.2 F | DIASTOLIC BLOOD PRESSURE: 64 MMHG

## 2021-07-22 LAB
ANION GAP SERPL CALC-SCNC: 6 MMOL/L (ref 3–18)
BUN SERPL-MCNC: 29 MG/DL (ref 7–18)
BUN/CREAT SERPL: 4 (ref 12–20)
CALCIUM SERPL-MCNC: 8.6 MG/DL (ref 8.5–10.1)
CHLORIDE SERPL-SCNC: 101 MMOL/L (ref 100–111)
CO2 SERPL-SCNC: 30 MMOL/L (ref 21–32)
CREAT SERPL-MCNC: 6.47 MG/DL (ref 0.6–1.3)
GLUCOSE BLD STRIP.AUTO-MCNC: 212 MG/DL (ref 70–110)
GLUCOSE SERPL-MCNC: 266 MG/DL (ref 74–99)
POTASSIUM SERPL-SCNC: 3.8 MMOL/L (ref 3.5–5.5)
SODIUM SERPL-SCNC: 137 MMOL/L (ref 136–145)

## 2021-07-22 PROCEDURE — 74011250636 HC RX REV CODE- 250/636: Performed by: STUDENT IN AN ORGANIZED HEALTH CARE EDUCATION/TRAINING PROGRAM

## 2021-07-22 PROCEDURE — 99239 HOSP IP/OBS DSCHRG MGMT >30: CPT | Performed by: INTERNAL MEDICINE

## 2021-07-22 PROCEDURE — 36415 COLL VENOUS BLD VENIPUNCTURE: CPT

## 2021-07-22 PROCEDURE — 82962 GLUCOSE BLOOD TEST: CPT

## 2021-07-22 PROCEDURE — 80048 BASIC METABOLIC PNL TOTAL CA: CPT

## 2021-07-22 PROCEDURE — 74011250637 HC RX REV CODE- 250/637: Performed by: INTERNAL MEDICINE

## 2021-07-22 PROCEDURE — 74011636637 HC RX REV CODE- 636/637: Performed by: INTERNAL MEDICINE

## 2021-07-22 PROCEDURE — 74011250637 HC RX REV CODE- 250/637: Performed by: STUDENT IN AN ORGANIZED HEALTH CARE EDUCATION/TRAINING PROGRAM

## 2021-07-22 RX ORDER — CLONIDINE 0.3 MG/24H
1 PATCH, EXTENDED RELEASE TRANSDERMAL
Qty: 4 PATCH | Refills: 0 | Status: SHIPPED
Start: 2021-07-27

## 2021-07-22 RX ORDER — ISOSORBIDE MONONITRATE 30 MG/1
30 TABLET, EXTENDED RELEASE ORAL DAILY
Qty: 30 TABLET | Refills: 0 | Status: ON HOLD
Start: 2021-07-23 | End: 2021-11-10 | Stop reason: SDUPTHER

## 2021-07-22 RX ORDER — MINOXIDIL 2.5 MG/1
5 TABLET ORAL 2 TIMES DAILY
Qty: 60 TABLET | Refills: 0 | Status: SHIPPED
Start: 2021-07-22 | End: 2021-10-25

## 2021-07-22 RX ORDER — AMLODIPINE BESYLATE 10 MG/1
10 TABLET ORAL DAILY
Qty: 30 TABLET | Refills: 0 | Status: SHIPPED
Start: 2021-07-23

## 2021-07-22 RX ADMIN — Medication 10 ML: at 06:09

## 2021-07-22 RX ADMIN — NEPHROCAP 1 CAPSULE: 1 CAP ORAL at 08:28

## 2021-07-22 RX ADMIN — AMLODIPINE BESYLATE 10 MG: 10 TABLET ORAL at 08:28

## 2021-07-22 RX ADMIN — FAMOTIDINE 20 MG: 20 TABLET ORAL at 08:28

## 2021-07-22 RX ADMIN — SEVELAMER CARBONATE 800 MG: 800 TABLET, FILM COATED ORAL at 08:31

## 2021-07-22 RX ADMIN — CARVEDILOL 25 MG: 25 TABLET, FILM COATED ORAL at 08:28

## 2021-07-22 RX ADMIN — INSULIN LISPRO 4 UNITS: 100 INJECTION, SOLUTION INTRAVENOUS; SUBCUTANEOUS at 08:28

## 2021-07-22 RX ADMIN — ISOSORBIDE MONONITRATE 30 MG: 30 TABLET, EXTENDED RELEASE ORAL at 08:28

## 2021-07-22 RX ADMIN — LEVETIRACETAM 500 MG: 500 TABLET ORAL at 08:28

## 2021-07-22 RX ADMIN — HEPARIN SODIUM 5000 UNITS: 5000 INJECTION INTRAVENOUS; SUBCUTANEOUS at 08:30

## 2021-07-22 RX ADMIN — LOSARTAN POTASSIUM 100 MG: 50 TABLET, FILM COATED ORAL at 08:28

## 2021-07-22 RX ADMIN — ATORVASTATIN CALCIUM 10 MG: 10 TABLET, FILM COATED ORAL at 08:28

## 2021-07-22 RX ADMIN — MINOXIDIL 5 MG: 2.5 TABLET ORAL at 08:28

## 2021-07-22 NOTE — PROGRESS NOTES
Problem: Pressure Injury - Risk of  Goal: *Prevention of pressure injury  Description: Document Ab Scale and appropriate interventions in the flowsheet. Outcome: Progressing Towards Goal  Note: Pressure Injury Interventions:  Sensory Interventions: Keep linens dry and wrinkle-free, Maintain/enhance activity level, Minimize linen layers, Monitor skin under medical devices, Pressure redistribution bed/mattress (bed type)    Moisture Interventions: Absorbent underpads    Activity Interventions: Increase time out of bed, Pressure redistribution bed/mattress(bed type), PT/OT evaluation    Mobility Interventions: Pressure redistribution bed/mattress (bed type), PT/OT evaluation    Nutrition Interventions: Document food/fluid/supplement intake, Offer support with meals,snacks and hydration    Friction and Shear Interventions: Minimize layers                Problem: Patient Education: Go to Patient Education Activity  Goal: Patient/Family Education  Outcome: Progressing Towards Goal     Problem: Falls - Risk of  Goal: *Absence of Falls  Description: Document Oscar Fall Risk and appropriate interventions in the flowsheet.   Outcome: Progressing Towards Goal  Note: Fall Risk Interventions:  Mobility Interventions: Bed/chair exit alarm, Patient to call before getting OOB    Mentation Interventions: Bed/chair exit alarm, Increase mobility, Toileting rounds    Medication Interventions: Bed/chair exit alarm, Patient to call before getting OOB    Elimination Interventions: Call light in reach, Patient to call for help with toileting needs    History of Falls Interventions: Bed/chair exit alarm         Problem: Patient Education: Go to Patient Education Activity  Goal: Patient/Family Education  Outcome: Progressing Towards Goal     Problem: Patient Education: Go to Patient Education Activity  Goal: Patient/Family Education  Outcome: Progressing Towards Goal     Problem: Nutrition Deficit  Goal: *Optimize nutritional status  Outcome: Progressing Towards Goal     Problem: Patient Education: Go to Patient Education Activity  Goal: Patient/Family Education  Outcome: Progressing Towards Goal

## 2021-07-22 NOTE — ROUTINE PROCESS
Bedside shift change report given to Juan C Baker  (oncoming nurse) by Olive Peoples RN (offgoing nurse). Report included the following information SBAR, Kardex, Intake/Output and MAR.

## 2021-07-22 NOTE — PROGRESS NOTES
conducted an initial consultation and Spiritual Assessment for Lemuel Villalobos, who is a 74 y.o.,female. Patient's Primary Language is: [place]. According to the patient's EMR Anabaptism Affiliation is: Dhruv Carvajal.     The reason the Patient came to the hospital is:   Patient Active Problem List    Diagnosis Date Noted    Sch[facility]ia ([facility] 07/20/2021    Prolonged Q-T interval on ECG 06/12/2021    Hypertensive urgency 06/12/2021    Chest pain 06/11/2021    Hyperkalemia 06/02/2021    Elevated troponin level 06/02/2021    Elevated brain natriuretic peptide (BNP) level 06/02/2021    Anemia 03/19/2021    Hypertensive emergency 09/25/2020    Tachycardia 09/25/2020    Bandemia 08/27/2018    New onset seizure ([facility] 08/27/2018    ESRD (end stage renal disease) ([facility] 08/08/2018    Secondary hyperparathyroidism of renal origin ([facility] 08/08/2018    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 Explored issues of iveth, belief, spirituality and Anabaptist/ritual needs while hospitalized. Listened empathically. Provided chaplaincy education. Provided information about Spiritual Care Services. Offered assurance of continued prayers on patient's behalf. Chart reviewed. The following outcomes where achieved:  Patient shared limited information about both their medical narrative and spiritual journey/beliefs.  confirmed Patient's Anabaptism Affiliation. Patient processed feeling about current hospitalization. Patient expressed gratitude for 's visit. Assessment:  Patient does not have any Anabaptist/cultural needs that will affect patient's preferences in health care.   There are no spiritual or Anabaptist issues which require intervention at this time. Plan:  Chaplains will continue to follow and will provide pastoral care on an as needed/requested basis.  recommends bedside caregivers page  on duty if patient shows signs of acute spiritual or emotional distress.     [address]   [phone]

## 2021-07-22 NOTE — PROGRESS NOTES
Problem: Pressure Injury - Risk of  Goal: *Prevention of pressure injury  Description: Document Ab Scale and appropriate interventions in the flowsheet. Outcome: Resolved/Met  Note: Pressure Injury Interventions:  Sensory Interventions: Assess changes in LOC, Keep linens dry and wrinkle-free    Moisture Interventions: Absorbent underpads    Activity Interventions: Pressure redistribution bed/mattress(bed type)    Mobility Interventions: Pressure redistribution bed/mattress (bed type)    Nutrition Interventions: Document food/fluid/supplement intake    Friction and Shear Interventions: Minimize layers                Problem: Patient Education: Go to Patient Education Activity  Goal: Patient/Family Education  Outcome: Resolved/Met     Problem: Falls - Risk of  Goal: *Absence of Falls  Description: Document Oscar Fall Risk and appropriate interventions in the flowsheet.   Outcome: Resolved/Met  Note: Fall Risk Interventions:  Mobility Interventions: Bed/chair exit alarm    Mentation Interventions: Adequate sleep, hydration, pain control, Bed/chair exit alarm, Door open when patient unattended    Medication Interventions: Bed/chair exit alarm    Elimination Interventions: Bed/chair exit alarm, Call light in reach    History of Falls Interventions: Bed/chair exit alarm, Consult care management for discharge planning, Door open when patient unattended         Problem: Patient Education: Go to Patient Education Activity  Goal: Patient/Family Education  Outcome: Resolved/Met     Problem: Patient Education: Go to Patient Education Activity  Goal: Patient/Family Education  Outcome: Resolved/Met     Problem: Nutrition Deficit  Goal: *Optimize nutritional status  Outcome: Resolved/Met     Problem: Patient Education: Go to Patient Education Activity  Goal: Patient/Family Education  Outcome: Resolved/Met

## 2021-07-22 NOTE — DISCHARGE SUMMARY
Discharge Summary    Patient: Mariaelena Gutierrez MRN: [id number]  CSN: [id number]    YOB: 1955  Age: 74 y.o.   Sex: female    DOA: 7/19/2021 LOS:  LOS: 3 days   Discharge Date:      Admission Diagnoses: Chest pain [R07.9]    Discharge Diagnoses:    Problem List as of 7/22/2021 Date Reviewed: 6/2/2021        Codes Class Noted - Resolved    Schizophrenia ([facility] ICD-10-CM: F20.9  ICD-9-CM: 295.90  7/20/2021 - Present        Prolonged Q-T interval on ECG ICD-10-CM: R94.31  ICD-9-CM: 794.31  6/12/2021 - Present        Hypertensive urgency ICD-10-CM: I16.0  ICD-9-CM: 401.9  6/12/2021 - Present        * (Principal) Chest pain ICD-10-CM: R07.9  ICD-9-CM: 786.50  6/11/2021 - Present        Hyperkalemia ICD-10-CM: E87.5  ICD-9-CM: 276.7  6/2/2021 - Present        Elevated troponin level ICD-10-CM: R77.8  ICD-9-CM: 790.6  6/2/2021 - Present        Elevated brain natriuretic peptide (BNP) level ICD-10-CM: R79.89  ICD-9-CM: 790.99  6/2/2021 - Present        Anemia ICD-10-CM: D64.9  ICD-9-CM: 285.9  3/19/2021 - Present        Hypertensive emergency ICD-10-CM: I16.1  ICD-9-CM: 401.9  9/25/2020 - Present        Tachycardia ICD-10-CM: R00.0  ICD-9-CM: 785.0  9/25/2020 - Present        Bandemia ICD-10-CM: Q14.670  ICD-9-CM: 288.66  8/27/2018 - Present        New onset seizure ([facility] ICD-10-CM: R56.9  ICD-9-CM: 780.39  8/27/2018 - Present        ESRD (end stage renal disease) ([facility] ICD-10-CM: N18.6  ICD-9-CM: 585.6  8/8/2018 - Present        Secondary hyperparathyroidism of renal origin [facility]) ICD-10-CM: N25.81  ICD-9-CM: 588.81  8/8/2018 - Present        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 ([facility] ICD-10-CM: E11.22, I12.0, Z99.2, N18.6  ICD-9-CM: 250.40, 403.91, V45.11, 585.6  8/8/2018 - Present        CVA, old, cognitive deficits ICD-10-CM: I69.319  ICD-9-CM: 438.0  8/8/2018 - Present        Acute on chronic renal insufficiency ICD-10-CM: N28.9, N18.9  ICD-9-CM: 593.9, 585.9  8/7/2018 - Present        Hypertension ICD-10-CM: I10  ICD-9-CM: 401.9  8/7/2018 - Present        RESOLVED: Chest pain ICD-10-CM: R07.9  ICD-9-CM: 786.50  9/25/2020 - 9/27/2020              Discharge Condition: Stable    PHYSICAL EXAM  Visit Vitals  /64 (BP 1 Location: Right lower arm, BP Patient Position: At rest)   Pulse 83   Temp 98.2 °F (36.8 °C)   Resp 20   Ht 5' 3\" (1.6 m)   Wt 59.4 kg (131 lb)   SpO2 95%   Breastfeeding No   BMI 23.21 kg/m²       General:         Alert, cooperative, no acute distress    HEENT:           NC, Atraumatic. Anicteric sclerae. Lungs:            CTA Bilaterally. No Wheezing/Rhonchi/Rales. Heart:              Regular  rhythm,  No murmur, No Rubs, No Gallops  Abdomen:      Soft, Non distended, Non tender.  +Bowel sounds, no HSM  Extremities:   No c/c/e  Psych:              Not anxious or agitated. Neurologic:     Alert and oriented X 3. No acute neurological deficits. Hospital Course: As per HPI:   Art Boucher is a 77 y.o.  female with PMH of ESRD on HD (M,W, F), insulin dependent diabetes, Grade 1 HFpEF, cerebrovascular incident. Patient is a resident at [address] [place] [address]; uses wheelchair for most ambulation.      History gained from patient and upon review of ED notes. Patient poor historian. Patient completed dialysis; subsequently experienced substernal chest pain. Per chart review, similar instances have occurred. In ED - experienced SVT with rate of 146 bpm; resolved with metoprolol 25 mg. Chest pain also resolved. Patient does attest to shortness of breath at baseline which is currently not worsened. Able to speak in full sentences without dyspnea  Hospital Problems:   Art Boucher is a 77 y.o. female with a PMHx of ESRD on HD, chronic diastolic CHF, stroke, type II DM, HTN, HLD, seizure disorder and bipolar disorder who is admitted for chest pain and elevated troponin.      1.  Chest painatypical, not consistent with ACS;continue clonidine and minoxidil, amlodipine, losartan, Coreg, statin  2. Hypertensive urgency  3. Elevated troponinlikely secondary to demand ischemia  4. Paroxysmal SVT:   5. Chronic diastolic CHF  6. Small circumferential pericardial effusion  7. ESRD on HD  8. Anemia of chronic disease  9. Type II DM: resume home regimen  10. HTN  11. HLD  12. Hx stroke  13. Seizure disorder: continue Keppra  14. Bipolar disorder    Consults:   Cardiology:   Nephrology:     Significant Diagnostic Studies:   Echocardiogram:     XR CHEST PORT    Result Date: 7/19/2021  1. No acute infiltrate or effusion. 2.  Unchanged hemodialysis catheter. No results found for this visit on 07/19/21 (from the past 12 hour(s)). BMP:   Lab Results   Component Value Date/Time     07/22/2021 02:25 AM    K 3.8 07/22/2021 02:25 AM     07/22/2021 02:25 AM    CO2 30 07/22/2021 02:25 AM    AGAP 6 07/22/2021 02:25 AM     (H) 07/22/2021 02:25 AM    BUN 29 (H) 07/22/2021 02:25 AM    CREA 6.47 (H) 07/22/2021 02:25 AM    GFRAA 8 (L) 07/22/2021 02:25 AM    GFRNA 6 (L) 07/22/2021 02:25 AM          Results     Procedure Component Value Units Date/Time    CULTURE, MRSA [[id number]]     Order Status: Sent Specimen: Nasal from Nose             Discharge Medications:     Current Discharge Medication List      START taking these medications    Details   cloNIDine (CATAPRES) 0.3 mg/24 hr 1 Patch by TransDERmal route every seven (7) days. Qty: 4 Patch, Refills: 0      isosorbide mononitrate ER (IMDUR) 30 mg tablet Take 1 Tablet by mouth daily. Qty: 30 Tablet, Refills: 0      amLODIPine (NORVASC) 10 mg tablet Take 1 Tablet by mouth daily. Qty: 30 Tablet, Refills: 0         CONTINUE these medications which have CHANGED    Details   minoxidiL (LONITEN) 2.5 mg tablet Take 2 Tablets by mouth two (2) times a day. Qty: 60 Tablet, Refills: 0         CONTINUE these medications which have NOT CHANGED    Details   losartan (COZAAR) 100 mg tablet Take 1 Tablet by mouth daily.   Qty: 30 Tablet, Refills: 0      carvediloL (COREG) 25 mg tablet Take 1 Tablet by mouth two (2) times daily (with meals). Qty: 60 Tablet, Refills: 0      famotidine (Pepcid) 20 mg tablet Take 1 Tablet by mouth daily. Qty: 30 Tablet, Refills: 0      atorvastatin (Lipitor) 10 mg tablet Take 10 mg by mouth daily. Indications: high amount of triglyceride in the blood      b complex-vitamin c-folic acid 0.8 mg (NEPHRO-DORIAN) 0.8 mg tab tablet Take 1 Tab by mouth daily. sevelamer carbonate (RENVELA) 800 mg tab tab Take 800 mg by mouth three (3) times daily. insulin lispro (HUMALOG) 100 unit/mL injection INITIATE INSULIN CORRECTIVE PROTOCOL: Normal Insulin Sensitivity   For Blood Sugar (mg/dL) of:     Less than 150 =   0 units           150 -199 =   2 units  200 -249 =   4 units  250 -299 =   6 units  300 -349 =   8 units  350 and above = 10 units and Call Physician  If 2 glucose readings are above  Qty: 1 Vial, Refills: 0      aspirin 81 mg tablet Take 81 mg by mouth daily. diphenhydrAMINE (BenadryL) 25 mg capsule Take 25 mg by mouth every eight (8) hours as needed for Allergies. polyethylene glycol (MIRALAX) 17 gram packet Take 1 Packet by mouth daily as needed for Constipation. Qty: 15 Packet, Refills: 0      !! levETIRAcetam (Keppra) 500 mg tablet Take 500 mg by mouth two (2) times a day. !! levETIRAcetam (KEPPRA) 250 mg tablet Take 1 Tab by mouth every Monday, Wednesday, Friday. Qty: 30 Tab, Refills: 1       !! - Potential duplicate medications found. Please discuss with provider.       STOP taking these medications       cloNIDine (CATAPRES) 0.2 mg/24 hr ptwk Comments:   Reason for Stopping:               Activity: as tolerated    Diet: renal    Wound Care: none    Follow-up: with PCP, Evelyn Hines MD in 7-10days   - Labs:    - Imaging:    - Specialists: Cardiology in 3-4 weeks, Nephrology with HD   - Other: Posible event monitor per cardiology    Dispo: [facility]    Minutes spent on discharge: >30 minutes spent coordinating this discharge (review instructions/follow-up, prescriptions, preparing report for sign off)

## 2021-07-22 NOTE — PROGRESS NOTES
Transition of Care Plan to [address] [place] [address]    SNF/Rehab Transition:  Patient has been accepted to [address] [place] [address] and meets criteria for admission. Patient will transported by [facility] and expected to leave at 11 am    Communication to Patient/Family:  Met with patient and spoke with her sister Qing Ward (identified care giver) and they are agreeable to the transition plan. Communication to SNF/Rehab:  Bedside RN, Kaity Banerjee has been notified to update the transition plan to the facility and call report (phone number [phone]  Discharge information has been updated on the AVS.       Nursing Please include all hard scripts for controlled substances, med rec and dc summary, and AVS in packet. Reviewed and confirmed with Marlon Delgado of [facility] and can manage the patient care needs for the following:     Shila Roberts with (X) only those applicable:    Medication:  [x]  Medications will be available at the facility  []  IV Antibiotics   []  Controlled Substance - hard copy to be sent with patient   []  Weekly Labs   Documents:  [] Hard RX  [] MAR  [] Kardex  [] AVS  []Transfer Summary  []Discharge   Equipment:  []  CPAP/BiPAP  []  Wound Vacuum  []  Khan or Urinary Device  []  PICC/Central Line  []  Nebulizer  []  Ventilator   Treatment:  []Isolation (for MRSA, VRE, etc.)  []Surgical Drain Management  []Tracheostomy Care  []Dressing Changes  [x]Dialysis with transportation and chair time [facility] MWF at 11am with Medicaid transport  []PEG Care  []Oxygen  []Daily Weights for Heart Failure   Dietary:  []Any diet limitations  []Tube Feedings   []Total Parenteral Management (TPN)   Eligible for Medicaid Long Term Services and Supports  Yes:  [] Eligible for medical assistance or will become eligible within 180 days and UAI completed. [] Provider/Patient and/or support system has requested screening.   [] UAI copy provided to patient or responsible party,   [] UAI unavailable at discharge will send once processed to SNF provider. [] UAI unavailable at discharged mailed to patient  No:   [] Private pay and is not financially eligible for Medicaid within the next 180 days. [] Reside out-of-state.   [] A residents of a state owned/operated facility that is licensed  by [address] [place] [employer] [facility] or [facility]  [] Enrollment in [facility] hospice services  [] [address] [place] [address]  [] Patient /Family declines to have screening completed or provide financial information for screening     Financial Resources:  Medicaid    [] Initiated and application pending   [] Full coverage     Advanced Care Plan:  []Surrogate Decision Maker of Care  []POA  [x]Communicated Code Status Full (DDNR\", \"Full\")    Other     SERAFIN Way RN  Care Management  Pager: [phone]

## 2021-07-22 NOTE — DISCHARGE INSTRUCTIONS
DISCHARGE SUMMARY from Nurse    PATIENT INSTRUCTIONS:    After general anesthesia or intravenous sedation, for 24 hours or while taking prescription Narcotics:  · Limit your activities  · Do not drive and operate hazardous machinery  · Do not make important personal or business decisions  · Do  not drink alcoholic beverages  · If you have not urinated within 8 hours after discharge, please contact your surgeon on call. Report the following to your surgeon:  · Excessive pain, swelling, redness or odor of or around the surgical area  · Temperature over 100.5  · Nausea and vomiting lasting longer than 4 hours or if unable to take medications  · Any signs of decreased circulation or nerve impairment to extremity: change in color, persistent  numbness, tingling, coldness or increase pain  · Any questions    What to do at Home:  Recommended activity: Activity as tolerated    If you experience any of the following symptoms chest pain, shortness of breath, nausea/vomiting, fever, or pain unrelieved by medication, please follow up with Dr. Sherin Lazo. *  Please give a list of your current medications to your Primary Care Provider. *  Please update this list whenever your medications are discontinued, doses are      changed, or new medications (including over-the-counter products) are added. *  Please carry medication information at all times in case of emergency situations. These are general instructions for a healthy lifestyle:    No smoking/ No tobacco products/ Avoid exposure to second hand smoke  Surgeon General's Warning:  Quitting smoking now greatly reduces serious risk to your health.     Obesity, smoking, and sedentary lifestyle greatly increases your risk for illness    A healthy diet, regular physical exercise & weight monitoring are important for maintaining a healthy lifestyle    You may be retaining fluid if you have a history of heart failure or if you experience any of the following symptoms: Weight gain of 3 pounds or more overnight or 5 pounds in a week, increased swelling in our hands or feet or shortness of breath while lying flat in bed. Please call your doctor as soon as you notice any of these symptoms; do not wait until your next office visit. Patient armband removed and shredded  MyChart Activation    Thank you for requesting access to [employer]. Please follow the instructions below to securely access and download your online medical record. [employer] allows you to send messages to your doctor, view your test results, renew your prescriptions, schedule appointments, and more. How Do I Sign Up? 1. In your internet browser, go to [email or web address]  2. Click on the First Time User? Click Here link in the Sign In box. You will be redirect to the New Member Sign Up page. 3. Enter your [employer] Access Code exactly as it appears below. You will not need to use this code after youve completed the sign-up process. If you do not sign up before the expiration date, you must request a new code. [employer] Access Code: [email or web address]  Expires: 2021  3:03 PM (This is the date your [employer] access code will )    4. Enter the last four digits of your Social Security Number (xxxx) and Date of Birth (mm/dd/yyyy) as indicated and click Submit. You will be taken to the next sign-up page. 5. Create a [employer] ID. This will be your [employer] login ID and cannot be changed, so think of one that is secure and easy to remember. 6. Create a [employer] password. You can change your password at any time. 7. Enter your Password Reset Question and Answer. This can be used at a later time if you forget your password. 8. Enter your e-mail address. You will receive e-mail notification when new information is available in [address] 9. Click Sign Up. You can now view and download portions of your medical record.   10. Click the Download Summary menu link to download a portable copy of your medical information. Additional Information    If you have questions, please visit the Frequently Asked Questions section of the [employer] website at [email or web address] Remember, [employer] is NOT to be used for urgent needs. For medical emergencies, dial 911. The discharge information has been reviewed with the patient and caregiver. The patient and caregiver verbalized understanding. Discharge medications reviewed with the patient and caregiver and appropriate educational materials and side effects teaching were provided.   ___________________________________________________________________________________________________________________________________

## 2021-07-22 NOTE — PROGRESS NOTES
Pt's transport set for pt to [address] [place] [address] at 11 am.  Sent text message to Dr. Holley Wong  Left a mesage for [facility] of [address] [place] [address] [facility] but she did not  and no voice mail.   Informed pt's sister MERARY VerdinN RN  Care Management  Pager: [phone]

## 2021-08-12 ENCOUNTER — TRANSCRIBE ORDER (OUTPATIENT)
Dept: SCHEDULING | Age: 66
End: 2021-08-12

## 2021-08-12 DIAGNOSIS — R22.0 FACIAL SWELLING: Primary | ICD-10-CM

## 2021-08-24 ENCOUNTER — HOSPITAL ENCOUNTER (OUTPATIENT)
Dept: CT IMAGING | Age: 66
Discharge: HOME | End: 2021-08-24
Attending: NURSE PRACTITIONER
Payer: MEDICARE

## 2021-08-24 DIAGNOSIS — R22.0 FACIAL SWELLING: ICD-10-CM

## 2021-08-24 PROCEDURE — 70486 CT MAXILLOFACIAL W/O DYE: CPT

## 2021-10-13 ENCOUNTER — HOSPITAL ENCOUNTER (EMERGENCY)
Age: 66
Discharge: HOME | End: 2021-10-13
Attending: EMERGENCY MEDICINE
Payer: MEDICARE

## 2021-10-13 ENCOUNTER — APPOINTMENT (OUTPATIENT)
Dept: GENERAL RADIOLOGY | Age: 66
End: 2021-10-13
Attending: EMERGENCY MEDICINE
Payer: MEDICARE

## 2021-10-13 VITALS
TEMPERATURE: 99.8 F | SYSTOLIC BLOOD PRESSURE: 184 MMHG | DIASTOLIC BLOOD PRESSURE: 85 MMHG | HEART RATE: 97 BPM | RESPIRATION RATE: 16 BRPM | OXYGEN SATURATION: 98 %

## 2021-10-13 DIAGNOSIS — R07.89 ATYPICAL CHEST PAIN: Primary | ICD-10-CM

## 2021-10-13 DIAGNOSIS — I10 HYPERTENSION, UNSPECIFIED TYPE: ICD-10-CM

## 2021-10-13 DIAGNOSIS — N18.6 ESRD ON HEMODIALYSIS (HCC): ICD-10-CM

## 2021-10-13 DIAGNOSIS — Z99.2 ESRD ON HEMODIALYSIS (HCC): ICD-10-CM

## 2021-10-13 LAB
ALBUMIN SERPL-MCNC: 4 G/DL (ref 3.4–5)
ALBUMIN/GLOB SERPL: 1.2 {RATIO} (ref 0.8–1.7)
ALP SERPL-CCNC: 82 U/L (ref 45–117)
ALT SERPL-CCNC: 25 U/L (ref 13–56)
ANION GAP SERPL CALC-SCNC: 7 MMOL/L (ref 3–18)
AST SERPL-CCNC: 28 U/L (ref 10–38)
BASOPHILS # BLD: 0 K/UL (ref 0–0.1)
BASOPHILS NFR BLD: 0 % (ref 0–2)
BILIRUB SERPL-MCNC: 0.3 MG/DL (ref 0.2–1)
BUN SERPL-MCNC: 30 MG/DL (ref 7–18)
BUN/CREAT SERPL: 4 (ref 12–20)
CALCIUM SERPL-MCNC: 10.2 MG/DL (ref 8.5–10.1)
CHLORIDE SERPL-SCNC: 105 MMOL/L (ref 100–111)
CO2 SERPL-SCNC: 28 MMOL/L (ref 21–32)
CREAT SERPL-MCNC: 7.14 MG/DL (ref 0.6–1.3)
DIFFERENTIAL METHOD BLD: ABNORMAL
EOSINOPHIL # BLD: 0.2 K/UL (ref 0–0.4)
EOSINOPHIL NFR BLD: 3 % (ref 0–5)
ERYTHROCYTE [DISTWIDTH] IN BLOOD BY AUTOMATED COUNT: 15.9 % (ref 11.6–14.5)
GLOBULIN SER CALC-MCNC: 3.3 G/DL (ref 2–4)
GLUCOSE SERPL-MCNC: 157 MG/DL (ref 74–99)
HCT VFR BLD AUTO: 30.1 % (ref 35–45)
HGB BLD-MCNC: 9.6 G/DL (ref 12–16)
LYMPHOCYTES # BLD: 0.9 K/UL (ref 0.9–3.6)
LYMPHOCYTES NFR BLD: 12 % (ref 21–52)
MCH RBC QN AUTO: 29.1 PG (ref 24–34)
MCHC RBC AUTO-ENTMCNC: 31.9 G/DL (ref 31–37)
MCV RBC AUTO: 91.2 FL (ref 78–100)
MONOCYTES # BLD: 0.6 K/UL (ref 0.05–1.2)
MONOCYTES NFR BLD: 8 % (ref 3–10)
NEUTS SEG # BLD: 5.8 K/UL (ref 1.8–8)
NEUTS SEG NFR BLD: 76 % (ref 40–73)
PLATELET # BLD AUTO: 252 K/UL (ref 135–420)
PMV BLD AUTO: 10.9 FL (ref 9.2–11.8)
POTASSIUM SERPL-SCNC: 4.1 MMOL/L (ref 3.5–5.5)
PROT SERPL-MCNC: 7.3 G/DL (ref 6.4–8.2)
RBC # BLD AUTO: 3.3 M/UL (ref 4.2–5.3)
SODIUM SERPL-SCNC: 140 MMOL/L (ref 136–145)
TROPONIN I SERPL-MCNC: <0.02 NG/ML (ref 0–0.04)
WBC # BLD AUTO: 7.6 K/UL (ref 4.6–13.2)

## 2021-10-13 PROCEDURE — 80053 COMPREHEN METABOLIC PANEL: CPT

## 2021-10-13 PROCEDURE — 93005 ELECTROCARDIOGRAM TRACING: CPT

## 2021-10-13 PROCEDURE — 99283 EMERGENCY DEPT VISIT LOW MDM: CPT

## 2021-10-13 PROCEDURE — 71046 X-RAY EXAM CHEST 2 VIEWS: CPT

## 2021-10-13 PROCEDURE — 84484 ASSAY OF TROPONIN QUANT: CPT

## 2021-10-13 PROCEDURE — 85025 COMPLETE CBC W/AUTO DIFF WBC: CPT

## 2021-10-13 PROCEDURE — 74011250637 HC RX REV CODE- 250/637: Performed by: EMERGENCY MEDICINE

## 2021-10-13 RX ORDER — NITROGLYCERIN 0.4 MG/1
0.4 TABLET SUBLINGUAL AS NEEDED
Status: DISCONTINUED | OUTPATIENT
Start: 2021-10-13 | End: 2021-10-13 | Stop reason: HOSPADM

## 2021-10-13 RX ORDER — GUAIFENESIN 100 MG/5ML
324 LIQUID (ML) ORAL
Status: COMPLETED | OUTPATIENT
Start: 2021-10-13 | End: 2021-10-13

## 2021-10-13 RX ORDER — CLONIDINE HYDROCHLORIDE 0.1 MG/1
0.2 TABLET ORAL
Status: COMPLETED | OUTPATIENT
Start: 2021-10-13 | End: 2021-10-13

## 2021-10-13 RX ADMIN — ASPIRIN 81 MG CHEWABLE TABLET 324 MG: 81 TABLET CHEWABLE at 14:41

## 2021-10-13 RX ADMIN — CLONIDINE HYDROCHLORIDE 0.2 MG: 0.1 TABLET ORAL at 14:41

## 2021-10-13 NOTE — ED PROVIDER NOTES
EMERGENCY DEPARTMENT HISTORY AND PHYSICAL EXAM    Date: 10/13/2021  Patient Name: Ki Bravo    History of Presenting Illness     Chief Complaint   Patient presents with    Chest Pain         History Provided By: Patient    Additional History (Context): Ki Bravo is a 74 y.o. female with hypertension and ESRD on HD who presents with constant chest pain this morning on the left side of her chest.  Is a aching pressure that is also sharp occasionally. Missed her dialysis session today; does not remember who her nephrologist is. Denies shortness of breath over her baseline. Denies nausea diaphoresis. She cannot remember if she took her blood pressure medications today or not. She is a resident of [facility]. PCP: Genny Post MD    Current Facility-Administered Medications   Medication Dose Route Frequency Provider Last Rate Last Admin    nitroglycerin (NITROSTAT) tablet 0.4 mg  0.4 mg SubLINGual GOSIAN Meme Martin PA         Current Outpatient Medications   Medication Sig Dispense Refill    cloNIDine (CATAPRES) 0.3 mg/24 hr 1 Patch by TransDERmal route every seven (7) days. 4 Patch 0    minoxidiL (LONITEN) 2.5 mg tablet Take 2 Tablets by mouth two (2) times a day. 60 Tablet 0    isosorbide mononitrate ER (IMDUR) 30 mg tablet Take 1 Tablet by mouth daily. 30 Tablet 0    amLODIPine (NORVASC) 10 mg tablet Take 1 Tablet by mouth daily. 30 Tablet 0    diphenhydrAMINE (BenadryL) 25 mg capsule Take 25 mg by mouth every eight (8) hours as needed for Allergies.  losartan (COZAAR) 100 mg tablet Take 1 Tablet by mouth daily. 30 Tablet 0    carvediloL (COREG) 25 mg tablet Take 1 Tablet by mouth two (2) times daily (with meals). 60 Tablet 0    famotidine (Pepcid) 20 mg tablet Take 1 Tablet by mouth daily. 30 Tablet 0    polyethylene glycol (MIRALAX) 17 gram packet Take 1 Packet by mouth daily as needed for Constipation.  15 Packet 0    atorvastatin (Lipitor) 10 mg tablet Take 10 mg by mouth daily. Indications: high amount of triglyceride in the blood      b complex-vitamin c-folic acid 0.8 mg (NEPHRO-DORIAN) 0.8 mg tab tablet Take 1 Tab by mouth daily.  levETIRAcetam (Keppra) 500 mg tablet Take 500 mg by mouth two (2) times a day.  sevelamer carbonate (RENVELA) 800 mg tab tab Take 800 mg by mouth three (3) times daily.  levETIRAcetam (KEPPRA) 250 mg tablet Take 1 Tab by mouth every Monday, Wednesday, Friday. 30 Tab 1    insulin lispro (HUMALOG) 100 unit/mL injection INITIATE INSULIN CORRECTIVE PROTOCOL: Normal Insulin Sensitivity   For Blood Sugar (mg/dL) of:     Less than 150 =   0 units           150 -199 =   2 units  200 -249 =   4 units  250 -299 =   6 units  300 -349 =   8 units  350 and above = 10 units and Call Physician  If 2 glucose readings are above 1 Vial 0    aspirin 81 mg tablet Take 81 mg by mouth daily. Past History     Past Medical History:  Past Medical History:   Diagnosis Date    Asthma     B[facility] affective disorder ([facility] [address]     Brain condition     Cataract     Chronic kidney disease     hemodialysis M-W-F    Diabetes ([facility] [address]     Hyperlipidemia     Hypertension     Paralytic strabismus, unspecified     Psychiatric disorder     Seizures ([facility] [address] 08/27/2018       Past Surgical History:  Past Surgical History:   Procedure Laterality Date    MS INSJ TUNNELED CVC W/O SUBQ PORT/ AGE 5 YR/> N/A 8/9/2018     in-pt 474A, Insertion Tunneled Central Venous Catheter performed by Tammy Hinton MD at [address] [place] [address] LAB    MS INSJ TUNNELED CVC W/O SUBQ PORT/ AGE 5 YR/> N/A 11/8/2019    INSERTION TUNNELED CENTRAL VENOUS CATHETER performed by Zahraa Marino MD at [address] [place] [address] LAB       Family History:  No family history on file. Social History:  Social History     Tobacco Use    Smoking status: Never Smoker    Smokeless tobacco: Never Used   Substance Use Topics    Alcohol use: No    Drug use: No       Allergies:   Allergies   Allergen Reactions    Amoxicillin Unknown (comments)    Cleocin [Clindamycin Hcl] Unknown (comments)    Codeine Unknown (comments)    Lorcet 10/650 [Hydrocodone-Acetaminophen] Unknown (comments)    Penicillin G Benzathine Unknown (comments)    Shellfish Derived Unknown (comments)         Review of Systems   Review of Systems   Constitutional: Negative for diaphoresis and fever. HENT: Negative. Eyes: Negative for visual disturbance. Respiratory: Positive for shortness of breath. Cardiovascular: Positive for chest pain. Endocrine: Negative. Musculoskeletal: Negative. Skin: Negative. Allergic/Immunologic: Negative. Neurological: Negative for headaches. Hematological: Negative. Psychiatric/Behavioral: Negative. All Other Systems Negative  Physical Exam     Vitals:    10/13/21 1353 10/13/21 1604   BP: (!) 224/133 (!) 184/85   Pulse: 97    Resp: 16    Temp: 99.8 °F (37.7 °C)    SpO2: 98%      Physical Exam  Vitals and nursing note reviewed. Constitutional:       Appearance: She is well-developed. HENT:      Head: Normocephalic and atraumatic. Right Ear: External ear normal.      Left Ear: External ear normal.      Nose: Nose normal.   Eyes:      Conjunctiva/sclera: Conjunctivae normal.      Pupils: Pupils are equal, round, and reactive to light. Neck:      Vascular: No JVD. Trachea: No tracheal deviation. Cardiovascular:      Rate and Rhythm: Normal rate and regular rhythm. Heart sounds: Normal heart sounds. No murmur heard. No friction rub. No gallop. Pulmonary:      Effort: Pulmonary effort is normal. No respiratory distress. Breath sounds: Normal breath sounds. No wheezing or rales. Abdominal:      General: Bowel sounds are normal. There is no distension. Palpations: Abdomen is soft. There is no mass. Tenderness: There is no abdominal tenderness. There is no guarding or rebound. Musculoskeletal:         General: No tenderness.  Normal range of motion. Cervical back: Normal range of motion and neck supple. Skin:     General: Skin is warm and dry. Findings: No rash. Neurological:      Mental Status: She is alert and oriented to person, place, and time. Cranial Nerves: No cranial nerve deficit. Deep Tendon Reflexes: Reflexes are normal and symmetric. Psychiatric:         Behavior: Behavior normal.          Diagnostic Study Results     Labs -     Recent Results (from the past 12 hour(s))   EKG, 12 LEAD, INITIAL    Collection Time: 10/13/21  1:44 PM   Result Value Ref Range    Ventricular Rate 92 BPM    Atrial Rate 92 BPM    P-R Interval 128 ms    QRS Duration 80 ms    Q-T Interval 348 ms    QTC Calculation (Bezet) 430 ms    Calculated P Axis 75 degrees    Calculated R Axis -5 degrees    Calculated T Axis 90 degrees    Diagnosis       Normal sinus rhythm  Nonspecific ST and T wave abnormality  Abnormal ECG  When compared with ECG of 20-JUL-2021 10:11,  Questionable change in QRS axis  T wave inversion no longer evident in Anterior leads     METABOLIC PANEL, COMPREHENSIVE    Collection Time: 10/13/21  2:50 PM   Result Value Ref Range    Sodium 140 136 - 145 mmol/L    Potassium 4.1 3.5 - 5.5 mmol/L    Chloride 105 100 - 111 mmol/L    CO2 28 21 - 32 mmol/L    Anion gap 7 3.0 - 18 mmol/L    Glucose 157 (H) 74 - 99 mg/dL    BUN 30 (H) 7.0 - 18 MG/DL    Creatinine 7.14 (H) 0.6 - 1.3 MG/DL    BUN/Creatinine ratio 4 (L) 12 - 20      GFR est AA 7 (L) >60 ml/min/1.73m2    GFR est non-AA 6 (L) >60 ml/min/1.73m2    Calcium 10.2 (H) 8.5 - 10.1 MG/DL    Bilirubin, total 0.3 0.2 - 1.0 MG/DL    ALT (SGPT) 25 13 - 56 U/L    AST (SGOT) 28 10 - 38 U/L    Alk.  phosphatase 82 45 - 117 U/L    Protein, total 7.3 6.4 - 8.2 g/dL    Albumin 4.0 3.4 - 5.0 g/dL    Globulin 3.3 2.0 - 4.0 g/dL    A-G Ratio 1.2 0.8 - 1.7     TROPONIN I    Collection Time: 10/13/21  2:50 PM   Result Value Ref Range    Troponin-I, QT <0.02 0.0 - 0.045 NG/ML   CBC WITH AUTOMATED DIFF Collection Time: 10/13/21  3:15 PM   Result Value Ref Range    WBC 7.6 4.6 - 13.2 K/uL    RBC 3.30 (L) 4.20 - 5.30 M/uL    HGB 9.6 (L) 12.0 - 16.0 g/dL    HCT 30.1 (L) 35.0 - 45.0 %    MCV 91.2 78.0 - 100.0 FL    MCH 29.1 24.0 - 34.0 PG    MCHC 31.9 31.0 - 37.0 g/dL    RDW 15.9 (H) 11.6 - 14.5 %    PLATELET 131 853 - 122 K/uL    MPV 10.9 9.2 - 11.8 FL    NEUTROPHILS 76 (H) 40 - 73 %    LYMPHOCYTES 12 (L) 21 - 52 %    MONOCYTES 8 3 - 10 %    EOSINOPHILS 3 0 - 5 %    BASOPHILS 0 0 - 2 %    ABS. NEUTROPHILS 5.8 1.8 - 8.0 K/UL    ABS. LYMPHOCYTES 0.9 0.9 - 3.6 K/UL    ABS. MONOCYTES 0.6 0.05 - 1.2 K/UL    ABS. EOSINOPHILS 0.2 0.0 - 0.4 K/UL    ABS. BASOPHILS 0.0 0.0 - 0.1 K/UL    DF AUTOMATED         Radiologic Studies -   XR CHEST PA LAT    (Results Pending)     CT Results  (Last 48 hours)    None        CXR Results  (Last 48 hours)    None            Medical Decision Making   I am the first provider for this patient. I reviewed the vital signs, available nursing notes, past medical history, past surgical history, family history and social history. Vital Signs-Reviewed the patient's vital signs. Records Reviewed: Nursing Notes and Old Medical Records    Procedures:  Left external jugular venous IV placement    Date/Time: 10/13/2021 3:19 PM  Performed by: SUSI Kline  Authorized by: SUSI Kline     Consent:     Consent obtained:  Verbal    Risks discussed:  Pain    Alternatives discussed:  Delayed treatment  Indications:     Indications:  No peripheral IV access  Pre-procedure details:     Skin preparation:  Antiseptic wash  Anesthesia (see MAR for exact dosages): Anesthesia method:  None  Post-procedure details:     Patient tolerance of procedure: Tolerated well, no immediate complications        Provider Notes (Medical Decision Making): Patient's prior cardiology notes indicate that likely her elevated troponins are not due to cardiac origin but demand ischemia from her renal disease. Today her troponin is negligible. Her potassium is normal.  She is not fluid overload or complaining of new shortness of breath. Patient's EKG shows no acute changes she was given her dose of remote medication of clonidine and sublingual nitro and she no longer has chest pain and her blood pressure has improved we will plan for discharge. MED RECONCILIATION:  Current Facility-Administered Medications   Medication Dose Route Frequency    nitroglycerin (NITROSTAT) tablet 0.4 mg  0.4 mg SubLINGual PRN     Current Outpatient Medications   Medication Sig    cloNIDine (CATAPRES) 0.3 mg/24 hr 1 Patch by TransDERmal route every seven (7) days.  minoxidiL (LONITEN) 2.5 mg tablet Take 2 Tablets by mouth two (2) times a day.  isosorbide mononitrate ER (IMDUR) 30 mg tablet Take 1 Tablet by mouth daily.  amLODIPine (NORVASC) 10 mg tablet Take 1 Tablet by mouth daily.  diphenhydrAMINE (BenadryL) 25 mg capsule Take 25 mg by mouth every eight (8) hours as needed for Allergies.  losartan (COZAAR) 100 mg tablet Take 1 Tablet by mouth daily.  carvediloL (COREG) 25 mg tablet Take 1 Tablet by mouth two (2) times daily (with meals).  famotidine (Pepcid) 20 mg tablet Take 1 Tablet by mouth daily.  polyethylene glycol (MIRALAX) 17 gram packet Take 1 Packet by mouth daily as needed for Constipation.  atorvastatin (Lipitor) 10 mg tablet Take 10 mg by mouth daily. Indications: high amount of triglyceride in the blood    b complex-vitamin c-folic acid 0.8 mg (NEPHRO-DORIAN) 0.8 mg tab tablet Take 1 Tab by mouth daily.  levETIRAcetam (Keppra) 500 mg tablet Take 500 mg by mouth two (2) times a day.  sevelamer carbonate (RENVELA) 800 mg tab tab Take 800 mg by mouth three (3) times daily.  levETIRAcetam (KEPPRA) 250 mg tablet Take 1 Tab by mouth every Monday, Wednesday, Friday.     insulin lispro (HUMALOG) 100 unit/mL injection INITIATE INSULIN CORRECTIVE PROTOCOL: Normal Insulin Sensitivity   For Blood Sugar (mg/dL) of:     Less than 150 =   0 units           150 -199 =   2 units  200 -249 =   4 units  250 -299 =   6 units  300 -349 =   8 units  350 and above = 10 units and Call Physician  If 2 glucose readings are above    aspirin 81 mg tablet Take 81 mg by mouth daily. Disposition:  home    DISCHARGE NOTE:   4:06 PM    Pt has been reexamined. Patient has no new complaints, changes, or physical findings. Care plan outlined and precautions discussed. Results of labs, CXR were reviewed with the patient. All medications were reviewed with the patient. All of pt's questions and concerns were addressed. Patient was instructed and agrees to follow up with PCP, as well as to return to the ED upon further deterioration. Patient is ready to go home. Follow-up Information     Follow up With Specialties Details Why Contact Info    Agus Andrews MD Geriatric Medicine Schedule an appointment as soon as possible for a visit in 1 day  [address]  [address] DEPT Emergency Medicine  If symptoms worsen return immediately [address]  [phone]          Current Discharge Medication List          Diagnosis     Clinical Impression:   1. Atypical chest pain    2. Hypertension, unspecified type    3.  ESRD on hemodialysis ([facility] [address]

## 2021-10-13 NOTE — ED NOTES
Pt discharged by the provider. Pt transported to residence via [facility]. Pt in no distress upon discharge.

## 2021-10-14 LAB
ATRIAL RATE: 92 BPM
CALCULATED P AXIS, ECG09: 75 DEGREES
CALCULATED R AXIS, ECG10: -5 DEGREES
CALCULATED T AXIS, ECG11: 90 DEGREES
DIAGNOSIS, 93000: NORMAL
P-R INTERVAL, ECG05: 128 MS
Q-T INTERVAL, ECG07: 348 MS
QRS DURATION, ECG06: 80 MS
QTC CALCULATION (BEZET), ECG08: 430 MS
VENTRICULAR RATE, ECG03: 92 BPM

## 2021-10-20 ENCOUNTER — HOSPITAL ENCOUNTER (INPATIENT)
Age: 66
LOS: 5 days | Discharge: NURSING, REHAB OR LONG-TERM CARE | DRG: 304 | End: 2021-10-25
Attending: STUDENT IN AN ORGANIZED HEALTH CARE EDUCATION/TRAINING PROGRAM | Admitting: STUDENT IN AN ORGANIZED HEALTH CARE EDUCATION/TRAINING PROGRAM
Payer: MEDICARE

## 2021-10-20 ENCOUNTER — APPOINTMENT (OUTPATIENT)
Dept: VASCULAR SURGERY | Age: 66
DRG: 304 | End: 2021-10-20
Attending: STUDENT IN AN ORGANIZED HEALTH CARE EDUCATION/TRAINING PROGRAM
Payer: MEDICARE

## 2021-10-20 ENCOUNTER — APPOINTMENT (OUTPATIENT)
Dept: CT IMAGING | Age: 66
DRG: 304 | End: 2021-10-20
Attending: STUDENT IN AN ORGANIZED HEALTH CARE EDUCATION/TRAINING PROGRAM
Payer: MEDICARE

## 2021-10-20 ENCOUNTER — APPOINTMENT (OUTPATIENT)
Dept: GENERAL RADIOLOGY | Age: 66
DRG: 304 | End: 2021-10-20
Attending: STUDENT IN AN ORGANIZED HEALTH CARE EDUCATION/TRAINING PROGRAM
Payer: MEDICARE

## 2021-10-20 DIAGNOSIS — I31.39 PERICARDIAL EFFUSION: ICD-10-CM

## 2021-10-20 DIAGNOSIS — N18.6 ESRD (END STAGE RENAL DISEASE) (HCC): ICD-10-CM

## 2021-10-20 DIAGNOSIS — R07.1 CHEST PAIN ON BREATHING: ICD-10-CM

## 2021-10-20 PROBLEM — K81.9 CHOLECYSTITIS: Status: ACTIVE | Noted: 2021-10-20

## 2021-10-20 LAB
ANION GAP SERPL CALC-SCNC: 7 MMOL/L (ref 3–18)
BASOPHILS # BLD: 0 K/UL (ref 0–0.1)
BASOPHILS NFR BLD: 0 % (ref 0–2)
BNP SERPL-MCNC: ABNORMAL PG/ML (ref 0–900)
BUN SERPL-MCNC: 30 MG/DL (ref 7–18)
BUN/CREAT SERPL: 4 (ref 12–20)
CALCIUM SERPL-MCNC: 9.7 MG/DL (ref 8.5–10.1)
CHLORIDE SERPL-SCNC: 101 MMOL/L (ref 100–111)
CO2 SERPL-SCNC: 32 MMOL/L (ref 21–32)
CREAT SERPL-MCNC: 7.72 MG/DL (ref 0.6–1.3)
D DIMER PPP FEU-MCNC: 2.95 UG/ML(FEU)
DIFFERENTIAL METHOD BLD: ABNORMAL
EOSINOPHIL # BLD: 0.2 K/UL (ref 0–0.4)
EOSINOPHIL NFR BLD: 3 % (ref 0–5)
ERYTHROCYTE [DISTWIDTH] IN BLOOD BY AUTOMATED COUNT: 16.1 % (ref 11.6–14.5)
EST. AVERAGE GLUCOSE BLD GHB EST-MCNC: 140 MG/DL
GLUCOSE SERPL-MCNC: 174 MG/DL (ref 74–99)
HBA1C MFR BLD: 6.5 % (ref 4.2–5.6)
HCT VFR BLD AUTO: 29 % (ref 35–45)
HGB BLD-MCNC: 9.5 G/DL (ref 12–16)
INR PPP: 1 (ref 0.8–1.2)
LYMPHOCYTES # BLD: 0.5 K/UL (ref 0.9–3.6)
LYMPHOCYTES NFR BLD: 7 % (ref 21–52)
MAGNESIUM SERPL-MCNC: 2.5 MG/DL (ref 1.6–2.6)
MCH RBC QN AUTO: 29.7 PG (ref 24–34)
MCHC RBC AUTO-ENTMCNC: 32.8 G/DL (ref 31–37)
MCV RBC AUTO: 90.6 FL (ref 78–100)
MONOCYTES # BLD: 0.6 K/UL (ref 0.05–1.2)
MONOCYTES NFR BLD: 8 % (ref 3–10)
NEUTS SEG # BLD: 6.1 K/UL (ref 1.8–8)
NEUTS SEG NFR BLD: 82 % (ref 40–73)
PLATELET # BLD AUTO: 223 K/UL (ref 135–420)
PMV BLD AUTO: 10.7 FL (ref 9.2–11.8)
POTASSIUM SERPL-SCNC: 3.4 MMOL/L (ref 3.5–5.5)
PROTHROMBIN TIME: 13.3 SEC (ref 11.5–15.2)
RBC # BLD AUTO: 3.2 M/UL (ref 4.2–5.3)
SODIUM SERPL-SCNC: 140 MMOL/L (ref 136–145)
TROPONIN I SERPL-MCNC: 0.03 NG/ML (ref 0–0.04)
TROPONIN I SERPL-MCNC: 0.04 NG/ML (ref 0–0.04)
WBC # BLD AUTO: 7.5 K/UL (ref 4.6–13.2)

## 2021-10-20 PROCEDURE — 93971 EXTREMITY STUDY: CPT

## 2021-10-20 PROCEDURE — 99285 EMERGENCY DEPT VISIT HI MDM: CPT

## 2021-10-20 PROCEDURE — 93005 ELECTROCARDIOGRAM TRACING: CPT

## 2021-10-20 PROCEDURE — 99223 1ST HOSP IP/OBS HIGH 75: CPT | Performed by: STUDENT IN AN ORGANIZED HEALTH CARE EDUCATION/TRAINING PROGRAM

## 2021-10-20 PROCEDURE — 85610 PROTHROMBIN TIME: CPT

## 2021-10-20 PROCEDURE — 85379 FIBRIN DEGRADATION QUANT: CPT

## 2021-10-20 PROCEDURE — 74011000636 HC RX REV CODE- 636: Performed by: STUDENT IN AN ORGANIZED HEALTH CARE EDUCATION/TRAINING PROGRAM

## 2021-10-20 PROCEDURE — 83036 HEMOGLOBIN GLYCOSYLATED A1C: CPT

## 2021-10-20 PROCEDURE — 85025 COMPLETE CBC W/AUTO DIFF WBC: CPT

## 2021-10-20 PROCEDURE — 83735 ASSAY OF MAGNESIUM: CPT

## 2021-10-20 PROCEDURE — 71045 X-RAY EXAM CHEST 1 VIEW: CPT

## 2021-10-20 PROCEDURE — 80048 BASIC METABOLIC PNL TOTAL CA: CPT

## 2021-10-20 PROCEDURE — 71275 CT ANGIOGRAPHY CHEST: CPT

## 2021-10-20 PROCEDURE — 84484 ASSAY OF TROPONIN QUANT: CPT

## 2021-10-20 PROCEDURE — 74011250637 HC RX REV CODE- 250/637: Performed by: STUDENT IN AN ORGANIZED HEALTH CARE EDUCATION/TRAINING PROGRAM

## 2021-10-20 PROCEDURE — 83880 ASSAY OF NATRIURETIC PEPTIDE: CPT

## 2021-10-20 PROCEDURE — 65270000029 HC RM PRIVATE

## 2021-10-20 RX ORDER — HEPARIN SODIUM 5000 [USP'U]/ML
5000 INJECTION, SOLUTION INTRAVENOUS; SUBCUTANEOUS EVERY 8 HOURS
Status: DISCONTINUED | OUTPATIENT
Start: 2021-10-20 | End: 2021-10-25 | Stop reason: HOSPADM

## 2021-10-20 RX ORDER — MINOXIDIL 2.5 MG/1
5 TABLET ORAL 2 TIMES DAILY
Status: DISCONTINUED | OUTPATIENT
Start: 2021-10-21 | End: 2021-10-20

## 2021-10-20 RX ORDER — CARVEDILOL 25 MG/1
25 TABLET ORAL
Status: COMPLETED | OUTPATIENT
Start: 2021-10-20 | End: 2021-10-20

## 2021-10-20 RX ORDER — LOSARTAN POTASSIUM 50 MG/1
100 TABLET ORAL DAILY
Status: DISCONTINUED | OUTPATIENT
Start: 2021-10-21 | End: 2021-10-25 | Stop reason: HOSPADM

## 2021-10-20 RX ORDER — DIPHENHYDRAMINE HCL 25 MG
25 CAPSULE ORAL
Status: DISCONTINUED | OUTPATIENT
Start: 2021-10-20 | End: 2021-10-25 | Stop reason: HOSPADM

## 2021-10-20 RX ORDER — LEVETIRACETAM 500 MG/1
500 TABLET ORAL 2 TIMES DAILY
Status: DISCONTINUED | OUTPATIENT
Start: 2021-10-21 | End: 2021-10-25 | Stop reason: HOSPADM

## 2021-10-20 RX ORDER — ISOSORBIDE MONONITRATE 60 MG/1
30 TABLET, EXTENDED RELEASE ORAL DAILY
Status: DISCONTINUED | OUTPATIENT
Start: 2021-10-21 | End: 2021-10-25 | Stop reason: HOSPADM

## 2021-10-20 RX ORDER — INSULIN LISPRO 100 [IU]/ML
INJECTION, SOLUTION INTRAVENOUS; SUBCUTANEOUS
Status: DISCONTINUED | OUTPATIENT
Start: 2021-10-21 | End: 2021-10-25 | Stop reason: HOSPADM

## 2021-10-20 RX ORDER — HALOPERIDOL 2 MG/1
1 TABLET ORAL
Status: DISCONTINUED | OUTPATIENT
Start: 2021-10-20 | End: 2021-10-23

## 2021-10-20 RX ORDER — LEVETIRACETAM 250 MG/1
250 TABLET ORAL
Status: DISCONTINUED | OUTPATIENT
Start: 2021-10-22 | End: 2021-10-25 | Stop reason: HOSPADM

## 2021-10-20 RX ORDER — SODIUM CHLORIDE 0.9 % (FLUSH) 0.9 %
5-40 SYRINGE (ML) INJECTION AS NEEDED
Status: DISCONTINUED | OUTPATIENT
Start: 2021-10-20 | End: 2021-10-25 | Stop reason: HOSPADM

## 2021-10-20 RX ORDER — GUAIFENESIN 100 MG/5ML
81 LIQUID (ML) ORAL DAILY
Status: DISCONTINUED | OUTPATIENT
Start: 2021-10-21 | End: 2021-10-25 | Stop reason: HOSPADM

## 2021-10-20 RX ORDER — HYDRALAZINE HYDROCHLORIDE 25 MG/1
25 TABLET, FILM COATED ORAL 3 TIMES DAILY
Status: DISCONTINUED | OUTPATIENT
Start: 2021-10-21 | End: 2021-10-21

## 2021-10-20 RX ORDER — AMLODIPINE BESYLATE 10 MG/1
10 TABLET ORAL DAILY
Status: DISCONTINUED | OUTPATIENT
Start: 2021-10-21 | End: 2021-10-21

## 2021-10-20 RX ORDER — DEXTROSE 50 % IN WATER (D50W) INTRAVENOUS SYRINGE
25-50 AS NEEDED
Status: DISCONTINUED | OUTPATIENT
Start: 2021-10-20 | End: 2021-10-25 | Stop reason: HOSPADM

## 2021-10-20 RX ORDER — FAMOTIDINE 20 MG/1
20 TABLET, FILM COATED ORAL DAILY
Status: DISCONTINUED | OUTPATIENT
Start: 2021-10-21 | End: 2021-10-25 | Stop reason: HOSPADM

## 2021-10-20 RX ORDER — CLONIDINE 0.3 MG/24H
1 PATCH, EXTENDED RELEASE TRANSDERMAL
Status: DISCONTINUED | OUTPATIENT
Start: 2021-10-20 | End: 2021-10-21

## 2021-10-20 RX ORDER — CLONIDINE HYDROCHLORIDE 0.1 MG/1
0.2 TABLET ORAL
Status: COMPLETED | OUTPATIENT
Start: 2021-10-20 | End: 2021-10-20

## 2021-10-20 RX ORDER — MAGNESIUM SULFATE 100 %
4 CRYSTALS MISCELLANEOUS AS NEEDED
Status: DISCONTINUED | OUTPATIENT
Start: 2021-10-20 | End: 2021-10-25 | Stop reason: HOSPADM

## 2021-10-20 RX ORDER — SEVELAMER CARBONATE 800 MG/1
800 TABLET, FILM COATED ORAL 3 TIMES DAILY
Status: DISCONTINUED | OUTPATIENT
Start: 2021-10-20 | End: 2021-10-25 | Stop reason: HOSPADM

## 2021-10-20 RX ORDER — CARVEDILOL 25 MG/1
25 TABLET ORAL 2 TIMES DAILY WITH MEALS
Status: DISCONTINUED | OUTPATIENT
Start: 2021-10-21 | End: 2021-10-25 | Stop reason: HOSPADM

## 2021-10-20 RX ORDER — SODIUM CHLORIDE 0.9 % (FLUSH) 0.9 %
5-40 SYRINGE (ML) INJECTION EVERY 8 HOURS
Status: DISCONTINUED | OUTPATIENT
Start: 2021-10-20 | End: 2021-10-25 | Stop reason: HOSPADM

## 2021-10-20 RX ORDER — ATORVASTATIN CALCIUM 10 MG/1
10 TABLET, FILM COATED ORAL DAILY
Status: DISCONTINUED | OUTPATIENT
Start: 2021-10-21 | End: 2021-10-25 | Stop reason: HOSPADM

## 2021-10-20 RX ADMIN — IOPAMIDOL 95 ML: 755 INJECTION, SOLUTION INTRAVENOUS at 19:20

## 2021-10-20 RX ADMIN — CLONIDINE HYDROCHLORIDE 0.2 MG: 0.1 TABLET ORAL at 20:22

## 2021-10-20 RX ADMIN — CARVEDILOL 25 MG: 25 TABLET, FILM COATED ORAL at 20:21

## 2021-10-20 NOTE — ED TRIAGE NOTES
Pt arrived via EMS for reported chest pain. Per EMS patient comes from dialysis with a complaint of chest pains. Pt received 2 hours of treatment before she had to be removed because of her chest pain. Pt EMS /134 and per EMS patient states she did not have her BP medications today.

## 2021-10-20 NOTE — ED PROVIDER NOTES
HPI   Patient is a 40-year-old female who presents for an approximate 2-hour history of chest pain. Patient states that when she started dialysis yesterday developed a severe 10-10 midsternal chest pain has been constant since it started. States it feels severe. The same pain that she had last week when she came in for chest pain. States the pain does not radiate anywhere. She denies any recent fever, sweats or chills. She not been coughing. Denies any trauma or injury. States that for dialysis she is not having any pain or discomfort. She has not taken antibiotics over the aspirin given by EMS which she states did not help. She has noticed swelling to her bilateral lower extremities worse on the left compared to the right. She denies any history of blood clots hemoptysis, she is immobile at baseline and does not usually walk. Past Medical History:   Diagnosis Date    Asthma     Bipolar affective disorder ([facility] [address]     Brain condition     Cataract     Chronic kidney disease     hemodialysis M-W-F    Diabetes ([facility] [address]     Hyperlipidemia     Hypertension     Paralytic strabismus, unspecified     Psychiatric disorder     Seizures ([facility] [address] 08/27/2018       Past Surgical History:   Procedure Laterality Date    MS INSJ TUNNELED CVC W/O SUBQ PORT/ AGE 5 YR/> N/A 8/9/2018     in-pt 474A, Insertion Tunneled Central Venous Catheter performed by Aliya Huerta MD at [address] [place] [address]    MS INSJ TUNNELED CVC W/O SUBQ PORT/ AGE 5 YR/> N/A 11/8/2019    INSERTION TUNNELED CENTRAL VENOUS CATHETER performed by Vincent Stevenson MD at [facility] LAB         No family history on file.     Social History     Socioeconomic History    Marital status: SINGLE     Spouse name: Not on file    Number of children: Not on file    Years of education: Not on file    Highest education level: Not on file   Occupational History    Not on file   Tobacco Use    Smoking status: Never Smoker    Smokeless tobacco: Never Used   Substance and Sexual Activity    Alcohol use: No    Drug use: No    Sexual activity: Never   Other Topics Concern    Not on file   Social History Narrative    Not on file     Social Determinants of Health     Financial Resource Strain:     Difficulty of Paying Living Expenses:    Food Insecurity:     Worried About Running Out of Food in the Last Year:     [address] in the Last Year:    Transportation Needs:     Lack of Transportation (Medical):  Lack of Transportation (Non-Medical):    Physical Activity:     Days of Exercise per Week:     Minutes of Exercise per Session:    Stress:     Feeling of Stress :    Social Connections:     Frequency of Communication with Friends and Family:     Frequency of Social Gatherings with Friends and Family:     Attends [employer] Services:     Active Member of Clubs or Organizations:     Attends Club or Organization Meetings:     Marital Status:    Intimate Partner Violence:     Fear of Current or Ex-Partner:     Emotionally Abused:     Physically Abused:     Sexually Abused:           ALLERGIES: Amoxicillin, Cleocin [clindamycin hcl], Codeine, Lorcet 10/650 [hydrocodone-acetaminophen], Penicillin g benzathine, and Shellfish derived    Review of Systems  Constitutional: No fever  HENT: No ear pain  Eyes: No change in vision  Respiratory: No SOB  Cardio: Positive for chest pain  GI: No blood in stool  : No hematuria  MSK: No back pain  Skin: No rashes  Neuro: No headache    Vitals:    10/20/21 1313 10/20/21 1318 10/20/21 1339   BP: (!) 197/80 (!) 197/80    Pulse: 86 88    Resp: 21 20    Temp:  98.1 °F (36.7 °C)    SpO2: 98% 98%    Weight:   59.4 kg (131 lb)   Height:   5' 3\" (1.6 m)            Physical Exam   General: No acute distress  Head: Normocephalic, atraumatic  Psych: Cooperative and alert  Eyes: No scleral icterus, normal conjunctiva  ENT: Moist oral mucosa  Neck: Supple  CV: Regular rate and rhythm, 2+ pitting edema, worse on the left compared to the right, palpable radial pulses dialysis access port in right upper chest  Pulm: Clear breath sounds bilaterally without any wheezing or rhonchi, normal respiratory rate  GI: Normal bowel sounds, soft, non-tender  MSK: Moves all four extremities  Skin: No rashes  Neuro: Alert and conversive    MDM     , Patient is a 49-year-old female who presents with chest pain. Patient is well-known to our emergency department and comes in frequently for chest pain. However she has multiple risk factors. I would also like to rule out PE in this patient as she does have worsening swelling in her left lower extremity compared to her right we will send off a dimer. In addition to her typical cardiac work-up. She will have any concerning signs of infection at this point. She denies any pain radiating to her back. Less likely dissection. Patient's blood pressure will be treated with her home medications. EKG shows a normal sinus rhythm at a rate of 87 bpm.  Axis difficult to determine as there is decreased amplitude, most likely left axis deviation. R wave progression is appropriate. No specific ST elevations noted. Possible T wave inversions noted in 1, aVL and V6 which are previously seen. Amplitude is small throughout. Overall shows no signs of ACS or arrhythmia at this point. CBC shows a normocytic anemia around the patient's baseline, otherwise unremarkable, INR is 1, BMP is within normal limits exception of elevated BUN and creatinine which is consistent with her known ESRD, no acidosis or hyperkalemia noted. Her troponin is detectable at 0.03 and therefore will be repeated. BNP is elevated at 11,000. Patient's D-dimer is elevated at 2.95 because this we will add on a CTA. Repeat troponin is 0.04, stable from previous but still detectable. CTA shows no evidence of pulmonary embolism, mild interstitial edema, small bilateral pleural effusion and a moderate pericardial effusion. Her pericardial effusion is most likely due to her dialysis but review says is never been moderate in size before which is larger than normal.  At this point patient's blood pressure is still notably elevated, she has detectable troponins and multiple risk factors leading to elevated heart score. This along with her moderate pleural effusion I do feel that she would benefit from observation and dialysis. We did contact her nephrologist Dr. Drake Nicholas who states that he will dialyze her in the morning. Therefore patient will be admitted to the hospitalist service. Patient be given a dose of nitro to help with her blood pressure. Patient is admitted to the hospitalist service. Patient is in agreement with the plan to be admitted at this time.   All orders moving forward replacement the admitting team.    Procedures

## 2021-10-21 ENCOUNTER — APPOINTMENT (OUTPATIENT)
Dept: NON INVASIVE DIAGNOSTICS | Age: 66
DRG: 304 | End: 2021-10-21
Attending: INTERNAL MEDICINE
Payer: MEDICARE

## 2021-10-21 PROBLEM — Z99.2 ESRD ON HEMODIALYSIS (HCC): Status: ACTIVE | Noted: 2018-08-08

## 2021-10-21 PROBLEM — I30.9 ACUTE PERICARDIAL EFFUSION: Status: ACTIVE | Noted: 2021-10-21

## 2021-10-21 LAB
ANION GAP SERPL CALC-SCNC: 9 MMOL/L (ref 3–18)
ATRIAL RATE: 87 BPM
BASOPHILS # BLD: 0 K/UL (ref 0–0.1)
BASOPHILS NFR BLD: 0 % (ref 0–2)
BUN SERPL-MCNC: 32 MG/DL (ref 7–18)
BUN/CREAT SERPL: 4 (ref 12–20)
CALCIUM SERPL-MCNC: 9 MG/DL (ref 8.5–10.1)
CALCULATED P AXIS, ECG09: 86 DEGREES
CALCULATED R AXIS, ECG10: 30 DEGREES
CALCULATED T AXIS, ECG11: 94 DEGREES
CHLORIDE SERPL-SCNC: 101 MMOL/L (ref 100–111)
CK MB CFR SERPL CALC: 0.4 % (ref 0–4)
CK MB SERPL-MCNC: 1.4 NG/ML (ref 5–25)
CK SERPL-CCNC: 328 U/L (ref 26–192)
CO2 SERPL-SCNC: 28 MMOL/L (ref 21–32)
CREAT SERPL-MCNC: 9.08 MG/DL (ref 0.6–1.3)
DIAGNOSIS, 93000: NORMAL
DIFFERENTIAL METHOD BLD: ABNORMAL
ECHO LV INTERNAL DIMENSION DIASTOLIC: 4.86 CM (ref 3.9–5.3)
ECHO LV INTERNAL DIMENSION SYSTOLIC: 2.4 CM
EOSINOPHIL # BLD: 0.2 K/UL (ref 0–0.4)
EOSINOPHIL NFR BLD: 3 % (ref 0–5)
ERYTHROCYTE [DISTWIDTH] IN BLOOD BY AUTOMATED COUNT: 16.1 % (ref 11.6–14.5)
GLUCOSE BLD STRIP.AUTO-MCNC: 102 MG/DL (ref 70–110)
GLUCOSE BLD STRIP.AUTO-MCNC: 176 MG/DL (ref 70–110)
GLUCOSE SERPL-MCNC: 137 MG/DL (ref 74–99)
HCT VFR BLD AUTO: 25.1 % (ref 35–45)
HGB BLD-MCNC: 8 G/DL (ref 12–16)
LYMPHOCYTES # BLD: 0.6 K/UL (ref 0.9–3.6)
LYMPHOCYTES NFR BLD: 10 % (ref 21–52)
MCH RBC QN AUTO: 29 PG (ref 24–34)
MCHC RBC AUTO-ENTMCNC: 31.9 G/DL (ref 31–37)
MCV RBC AUTO: 90.9 FL (ref 78–100)
MONOCYTES # BLD: 0.9 K/UL (ref 0.05–1.2)
MONOCYTES NFR BLD: 14 % (ref 3–10)
NEUTS SEG # BLD: 4.6 K/UL (ref 1.8–8)
NEUTS SEG NFR BLD: 73 % (ref 40–73)
P-R INTERVAL, ECG05: 126 MS
PHOSPHATE SERPL-MCNC: 4 MG/DL (ref 2.5–4.9)
PLATELET # BLD AUTO: 188 K/UL (ref 135–420)
PMV BLD AUTO: 11.6 FL (ref 9.2–11.8)
POTASSIUM SERPL-SCNC: 4 MMOL/L (ref 3.5–5.5)
PROCALCITONIN SERPL-MCNC: 0.7 NG/ML
Q-T INTERVAL, ECG07: 388 MS
QRS DURATION, ECG06: 82 MS
QTC CALCULATION (BEZET), ECG08: 466 MS
RBC # BLD AUTO: 2.76 M/UL (ref 4.2–5.3)
SODIUM SERPL-SCNC: 138 MMOL/L (ref 136–145)
TROPONIN I SERPL-MCNC: 0.04 NG/ML (ref 0–0.04)
VENTRICULAR RATE, ECG03: 87 BPM
WBC # BLD AUTO: 6.4 K/UL (ref 4.6–13.2)

## 2021-10-21 PROCEDURE — 74011250637 HC RX REV CODE- 250/637: Performed by: STUDENT IN AN ORGANIZED HEALTH CARE EDUCATION/TRAINING PROGRAM

## 2021-10-21 PROCEDURE — 80048 BASIC METABOLIC PNL TOTAL CA: CPT

## 2021-10-21 PROCEDURE — 93308 TTE F-UP OR LMTD: CPT | Performed by: INTERNAL MEDICINE

## 2021-10-21 PROCEDURE — 93308 TTE F-UP OR LMTD: CPT

## 2021-10-21 PROCEDURE — 74011250637 HC RX REV CODE- 250/637: Performed by: INTERNAL MEDICINE

## 2021-10-21 PROCEDURE — 85025 COMPLETE CBC W/AUTO DIFF WBC: CPT

## 2021-10-21 PROCEDURE — 99233 SBSQ HOSP IP/OBS HIGH 50: CPT | Performed by: INTERNAL MEDICINE

## 2021-10-21 PROCEDURE — 84145 PROCALCITONIN (PCT): CPT

## 2021-10-21 PROCEDURE — 74011636637 HC RX REV CODE- 636/637: Performed by: STUDENT IN AN ORGANIZED HEALTH CARE EDUCATION/TRAINING PROGRAM

## 2021-10-21 PROCEDURE — 90935 HEMODIALYSIS ONE EVALUATION: CPT

## 2021-10-21 PROCEDURE — 84100 ASSAY OF PHOSPHORUS: CPT

## 2021-10-21 PROCEDURE — 36415 COLL VENOUS BLD VENIPUNCTURE: CPT

## 2021-10-21 PROCEDURE — 82962 GLUCOSE BLOOD TEST: CPT

## 2021-10-21 PROCEDURE — 87040 BLOOD CULTURE FOR BACTERIA: CPT

## 2021-10-21 PROCEDURE — 5A1D70Z PERFORMANCE OF URINARY FILTRATION, INTERMITTENT, LESS THAN 6 HOURS PER DAY: ICD-10-PCS | Performed by: INTERNAL MEDICINE

## 2021-10-21 PROCEDURE — 65270000029 HC RM PRIVATE

## 2021-10-21 PROCEDURE — 82553 CREATINE MB FRACTION: CPT

## 2021-10-21 PROCEDURE — 74011250636 HC RX REV CODE- 250/636: Performed by: INTERNAL MEDICINE

## 2021-10-21 PROCEDURE — 99223 1ST HOSP IP/OBS HIGH 75: CPT | Performed by: INTERNAL MEDICINE

## 2021-10-21 RX ORDER — HEPARIN SODIUM 1000 [USP'U]/ML
5000 INJECTION, SOLUTION INTRAVENOUS; SUBCUTANEOUS
Status: DISCONTINUED | OUTPATIENT
Start: 2021-10-21 | End: 2021-10-25 | Stop reason: HOSPADM

## 2021-10-21 RX ORDER — ONDANSETRON 2 MG/ML
4 INJECTION INTRAMUSCULAR; INTRAVENOUS ONCE
Status: COMPLETED | OUTPATIENT
Start: 2021-10-21 | End: 2021-10-21

## 2021-10-21 RX ORDER — AMLODIPINE BESYLATE 10 MG/1
10 TABLET ORAL
Status: DISCONTINUED | OUTPATIENT
Start: 2021-10-21 | End: 2021-10-25 | Stop reason: HOSPADM

## 2021-10-21 RX ORDER — CLONIDINE 0.3 MG/24H
1 PATCH, EXTENDED RELEASE TRANSDERMAL
Status: DISCONTINUED | OUTPATIENT
Start: 2021-10-21 | End: 2021-10-25 | Stop reason: HOSPADM

## 2021-10-21 RX ORDER — ONDANSETRON 2 MG/ML
INJECTION INTRAMUSCULAR; INTRAVENOUS
Status: COMPLETED
Start: 2021-10-21 | End: 2021-10-22

## 2021-10-21 RX ORDER — HYDRALAZINE HYDROCHLORIDE 50 MG/1
50 TABLET, FILM COATED ORAL 3 TIMES DAILY
Status: DISCONTINUED | OUTPATIENT
Start: 2021-10-21 | End: 2021-10-25 | Stop reason: HOSPADM

## 2021-10-21 RX ADMIN — AMLODIPINE BESYLATE 10 MG: 10 TABLET ORAL at 18:19

## 2021-10-21 RX ADMIN — ONDANSETRON 4 MG: 2 INJECTION INTRAMUSCULAR; INTRAVENOUS at 10:30

## 2021-10-21 RX ADMIN — SEVELAMER CARBONATE 800 MG: 800 TABLET, FILM COATED ORAL at 23:21

## 2021-10-21 RX ADMIN — CARVEDILOL 25 MG: 25 TABLET, FILM COATED ORAL at 17:31

## 2021-10-21 RX ADMIN — HALOPERIDOL 1 MG: 2 TABLET ORAL at 00:08

## 2021-10-21 RX ADMIN — LEVETIRACETAM 500 MG: 500 TABLET, FILM COATED ORAL at 17:30

## 2021-10-21 RX ADMIN — SEVELAMER CARBONATE 800 MG: 800 TABLET, FILM COATED ORAL at 02:30

## 2021-10-21 RX ADMIN — INSULIN LISPRO 2 UNITS: 100 INJECTION, SOLUTION INTRAVENOUS; SUBCUTANEOUS at 22:55

## 2021-10-21 RX ADMIN — HYDRALAZINE HYDROCHLORIDE 25 MG: 25 TABLET, FILM COATED ORAL at 15:50

## 2021-10-21 RX ADMIN — Medication 10 ML: at 05:12

## 2021-10-21 RX ADMIN — SEVELAMER CARBONATE 800 MG: 800 TABLET, FILM COATED ORAL at 15:50

## 2021-10-21 RX ADMIN — HALOPERIDOL 1 MG: 2 TABLET ORAL at 23:21

## 2021-10-21 RX ADMIN — HYDRALAZINE HYDROCHLORIDE 50 MG: 50 TABLET, FILM COATED ORAL at 23:21

## 2021-10-21 NOTE — H&P
History and Physical    Patient: Joann Griffith               Sex: female          DOA: 10/20/2021       YOB: 1955      Age:  74 y.o.        LOS:  LOS: 0 days        HPI:     Joann Griffith is a 74 y.o. female with ESRD on HD MWF, bipolar affective disorder, type 2 diabetes mellitus, and hypertension who is now admitted for hypertensive emergency. People presented with a 2-hour history of chest pain that was located in midsternal area. It was described as severe 10 out of 10 and nonradiating. Initial blood pressure was 197/80 that trended upward to 202/81. Troponin was weakly positive and up trended. CTA chest showed no evidence of blood clot. Therefore, it was determined patient was suffering from hypertensive emergency. Patient was given clonidine 0.2 mg IV x1 and carvedilol 25 mg p.o. Blood pressure has been coming down significantly. Nephrology was consulted and stated they will dialyze patient in the morning due to IV contrast use and moderate sized pericardial effusion. Patient resting comfortably and sleeping when I entered the room to talk to her. Per nursing, the clonidine has made her sleepy. Initial significant labs:  White blood cell count 7.5, hemoglobin 9.5 (historical average 9-10), neutrophil: Lymphocyte ratio 82:7, D-dimer 2.95, INR 1, potassium 3.4, glucose 174, creatinine 7.72, BUN 30, troponin 0.03 -> 0.04    CTA chest -no evidence for pulmonary emboli, mild interstitial edema, small bilateral pleural effusions right greater than left, moderate pericardial effusion    Duplex lower extremity venous left prelim result -no evidence of acute DVT    Past Medical History:   Diagnosis Date    Asthma     Bipolar affective disorder ([address]     Brain condition     Cataract     Chronic kidney disease     hemodialysis M-W-F    Diabetes ([address]     Hyperlipidemia     Hypertension     Paralytic strabismus, unspecified     Psychiatric disorder     Seizures ([address] 08/27/2018     Past Surgical History:   Procedure Laterality Date    TN INSJ TUNNELED CVC W/O SUBQ PORT/ AGE 5 YR/> N/A 8/9/2018     in-pt 474A, Insertion Tunneled Central Venous Catheter performed by Ambrocio Soares MD at [address] [place] [address] LAB    TN INSJ TUNNELED CVC W/O SUBQ PORT/ AGE 5 YR/> N/A 11/8/2019    INSERTION TUNNELED CENTRAL VENOUS CATHETER performed by Fabio Reed MD at [facility] LAB      No family history on file. Social History     Tobacco Use    Smoking status: Never Smoker    Smokeless tobacco: Never Used   Substance Use Topics    Alcohol use: No      Prior to Admission medications    Medication Sig Start Date End Date Taking? Authorizing Provider   cloNIDine (CATAPRES) 0.3 mg/24 hr 1 Patch by TransDERmal route every seven (7) days. 7/27/21   Justina Garcia MD   minoxidiL (LONITEN) 2.5 mg tablet Take 2 Tablets by mouth two (2) times a day. 7/22/21   Justina Garcia MD   isosorbide mononitrate ER (IMDUR) 30 mg tablet Take 1 Tablet by mouth daily. 7/23/21   Justina Garcia MD   amLODIPine (NORVASC) 10 mg tablet Take 1 Tablet by mouth daily. 7/23/21   Justina Garcia MD   diphenhydrAMINE (BenadryL) 25 mg capsule Take 25 mg by mouth every eight (8) hours as needed for Allergies. Provider, Historical   losartan (COZAAR) 100 mg tablet Take 1 Tablet by mouth daily. 6/15/21   Clinton Suazo MD   carvediloL (COREG) 25 mg tablet Take 1 Tablet by mouth two (2) times daily (with meals). 6/14/21   Clinton Suazo MD   famotidine (Pepcid) 20 mg tablet Take 1 Tablet by mouth daily. 6/3/21   Mark Angel MD   polyethylene glycol (MIRALAX) 17 gram packet Take 1 Packet by mouth daily as needed for Constipation. 6/3/21   Mark Angel MD   atorvastatin (Lipitor) 10 mg tablet Take 10 mg by mouth daily. Indications: high amount of triglyceride in the blood    Provider, Historical   b complex-vitamin c-folic acid 0.8 mg (NEPHRO-DORIAN) 0.8 mg tab tablet Take 1 Tab by mouth daily. Provider, Historical   levETIRAcetam (Keppra) 500 mg tablet Take 500 mg by mouth two (2) times a day. Provider, Historical   sevelamer carbonate (RENVELA) 800 mg tab tab Take 800 mg by mouth three (3) times daily. Provider, Historical   levETIRAcetam (KEPPRA) 250 mg tablet Take 1 Tab by mouth every Monday, Wednesday, Friday. 8/31/18   Dheeraj Montoya MD   insulin lispro (HUMALOG) 100 unit/mL injection INITIATE INSULIN CORRECTIVE PROTOCOL: Normal Insulin Sensitivity   For Blood Sugar (mg/dL) of:     Less than 150 =   0 units           150 -199 =   2 units  200 -249 =   4 units  250 -299 =   6 units  300 -349 =   8 units  350 and above = 10 units and Call Physician  If 2 glucose readings are above 8/14/18   Sarika Kirk MD   aspirin 81 mg tablet Take 81 mg by mouth daily. Other, MD Elina        Allergies   Allergen Reactions    Amoxicillin Unknown (comments)    Cleocin [Clindamycin Hcl] Unknown (comments)    Codeine Unknown (comments)    Lorcet 10/650 [Hydrocodone-Acetaminophen] Unknown (comments)    Penicillin G Benzathine Unknown (comments)    Shellfish Derived Unknown (comments)       Review of Systems:    Negative Unless BOLDED    Constitutional: Fever, chills,diaphoresis. HENT: Negative for congestion, rhinorrhea, sore throat and trouble swallowing. Eyes: Negative for visual disturbance. Respiratory: Cough,shortness of breath, wheezing. Cardiovascular: Chest pain, palpitations. Gastrointestinal: Abdominal pain, blood in stool, constipation, diarrhea, nausea and vomiting. Endocrine: Polyuria. Genitourinary: Difficulty urinating and dysuria. Musculoskeletal: Arthralgias, myalgias, and neck stiffness. Skin: Pallor, rash. Neurological: Dizziness, weakness, numbness and headaches. Hematological: Bruise/bleed easily   Psychiatric/Behavioral: Confusion, dysphoric mood, hallucinations  All other systems reviewed and are negative.     Physical Exam:      Vitals: 10/20/21 1318 10/20/21 1339 10/20/21 1450 10/20/21 1945   BP: (!) 197/80  (!) 202/81 (!) 188/89   Pulse: 88  100 94   Resp:    Temp: 98.1 °F (36.7 °C)      SpO2: 98%  100% 99%   Weight:  59.4 kg (131 lb)     Height:  5' 3\" (1.6 m)        Temp (24hrs), Av.1 °F (36.7 °C), Min:98.1 °F (36.7 °C), Max:98.1 °F (36.7 °C)      General:   awake alert and oriented after waking up, rattly voice   Skin:   no rashes or skin lesions noted on limited exam   HEENT:  Normocephalic, atraumatic, grossly PERRL, mostly EOMI, no scleral icterus or pallor; no conjunctival hemmohage; nasal cannula in place       Lungs:   non-labored, bilaterally clear to auscultation over anterior lateral fields- no crackles wheezes rales or rhonchi   Heart:  RRR, s1 and s2; no murmurs rubs or gallops, no edema   Abdomen: soft, non-distended, active bowel sounds, Non-tender to palpation   Genitourinary:  deferred   Extremities:   Grossly full ROM of all large joints to the upper and lower extremities while moving in bed; muscle mass appropriate for age   Neurologic:  No gross focal sensory abnormalities; grossly 5/5 muscle strength to upper and lower extremities. Speech appropirate. Cranial nerves grossly intact   Psychiatric:   appropriate and interactive.        Labs Reviewed:    Recent Results (from the past 24 hour(s))   EKG, 12 LEAD, INITIAL    Collection Time: 10/20/21  1:24 PM   Result Value Ref Range    Ventricular Rate 87 BPM    Atrial Rate 87 BPM    P-R Interval 126 ms    QRS Duration 82 ms    Q-T Interval 388 ms    QTC Calculation (Bezet) 466 ms    Calculated P Axis 86 degrees    Calculated R Axis 30 degrees    Calculated T Axis 94 degrees    Diagnosis       Normal sinus rhythm with sinus arrhythmia  Possible Left atrial enlargement  Indeterminate axis  Pulmonary disease pattern  Nonspecific T wave abnormality  Abnormal ECG  When compared with ECG of 13-OCT-2021 13:44,  No significant change was found     CBC WITH AUTOMATED DIFF Collection Time: 10/20/21  2:20 PM   Result Value Ref Range    WBC 7.5 4.6 - 13.2 K/uL    RBC 3.20 (L) 4.20 - 5.30 M/uL    HGB 9.5 (L) 12.0 - 16.0 g/dL    HCT 29.0 (L) 35.0 - 45.0 %    MCV 90.6 78.0 - 100.0 FL    MCH 29.7 24.0 - 34.0 PG    MCHC 32.8 31.0 - 37.0 g/dL    RDW 16.1 (H) 11.6 - 14.5 %    PLATELET 453 864 - 331 K/uL    MPV 10.7 9.2 - 11.8 FL    NEUTROPHILS 82 (H) 40 - 73 %    LYMPHOCYTES 7 (L) 21 - 52 %    MONOCYTES 8 3 - 10 %    EOSINOPHILS 3 0 - 5 %    BASOPHILS 0 0 - 2 %    ABS. NEUTROPHILS 6.1 1.8 - 8.0 K/UL    ABS. LYMPHOCYTES 0.5 (L) 0.9 - 3.6 K/UL    ABS. MONOCYTES 0.6 0.05 - 1.2 K/UL    ABS. EOSINOPHILS 0.2 0.0 - 0.4 K/UL    ABS.  BASOPHILS 0.0 0.0 - 0.1 K/UL    DF AUTOMATED     PROTHROMBIN TIME + INR    Collection Time: 10/20/21  2:20 PM   Result Value Ref Range    Prothrombin time 13.3 11.5 - 15.2 sec    INR 1.0 0.8 - 1.2     D DIMER    Collection Time: 10/20/21  2:20 PM   Result Value Ref Range    D DIMER 2.95 (H) <0.46 ug/ml(FEU)   METABOLIC PANEL, BASIC    Collection Time: 10/20/21  2:20 PM   Result Value Ref Range    Sodium 140 136 - 145 mmol/L    Potassium 3.4 (L) 3.5 - 5.5 mmol/L    Chloride 101 100 - 111 mmol/L    CO2 32 21 - 32 mmol/L    Anion gap 7 3.0 - 18 mmol/L    Glucose 174 (H) 74 - 99 mg/dL    BUN 30 (H) 7.0 - 18 MG/DL    Creatinine 7.72 (H) 0.6 - 1.3 MG/DL    BUN/Creatinine ratio 4 (L) 12 - 20      GFR est AA 6 (L) >60 ml/min/1.73m2    GFR est non-AA 5 (L) >60 ml/min/1.73m2    Calcium 9.7 8.5 - 10.1 MG/DL   NT-PRO BNP    Collection Time: 10/20/21  2:20 PM   Result Value Ref Range    NT pro-BNP 11,342 (H) 0 - 900 PG/ML   TROPONIN I    Collection Time: 10/20/21  2:20 PM   Result Value Ref Range    Troponin-I, QT 0.03 0.0 - 0.045 NG/ML   MAGNESIUM    Collection Time: 10/20/21  2:20 PM   Result Value Ref Range    Magnesium 2.5 1.6 - 2.6 mg/dL   TROPONIN I    Collection Time: 10/20/21  5:44 PM   Result Value Ref Range    Troponin-I, QT 0.04 0.0 - 0.045 NG/ML      Imaging:  CT Results (Last 48 hours)               10/20/21 1920  CTA CHEST W OR W WO CONT Final result    Impression:  No evidence for pulmonary emboli. Mild interstitial edema. Small bilateral pleural effusions, right greater than left. Moderate pericardial effusion. Narrative:  CTA CHEST PULMONARY EMBOLISM PROTOCOL             INDICATION: Chest pain with positive d-dimer. Question pulmonary embolism. TECHNIQUE: Thin collimation axial images obtained through the level of the   pulmonary arteries with additional imaging through the chest following the   uneventful administration of nonionic intravenous contrast.  Images   reconstructed into three dimensional coronal and sagittal projections for   complete evaluation of the tortuous and overlapping pulmonary vascular   structures and to reduce patient radiation dose. All CT scans at this facility are performed using dose optimization technique as   appropriate to a performed exam, to include automated exposure control,   adjustment of the mA and/or kV according to patient size (including appropriate   matching first site-specific examinations), or use of iterative reconstruction   technique. COMPARISON: None. FINDINGS:       No filling defects are appreciated within the main, left, right, lobar or   visualized segmental pulmonary arteries to suggest embolism. Thyroid: Unremarkable in its visualized aspects. Pericardium/ Heart: There is a moderate pericardial effusion. Aorta/ Vessels: No aneurysm or dissection. Lymph Nodes: Unremarkable. .       Lungs: There is septal thickening. There is bibasilar atelectasis. There is no   dense consolidation. Small bilateral pleural effusions, right greater than left. Upper Abdomen: Atrophic kidneys. Multiple left renal cysts.        Bones/soft tissues: No acute finding                  CXR Results  (Last 48 hours)               10/20/21 1346  XR CHEST PORT Final result    Impression: Enlarged cardiac silhouette with vascular congestion and mild pulmonary edema. Probably small left pleural effusion. Narrative:  EXAM:  XR CHEST PORT       INDICATION:   Chest pain       COMPARISON: 10/13/2021 and priors. FINDINGS:   Similar moderate enlargement of the cardiac silhouette. Pulmonary vascular   congestion. Diffuse interstitial opacities. No confluent consolidation. No   pneumothorax. Possibly small left pleural effusion. Intact osseous structures. Right jugular dialysis catheter. Assessment/Plan     -Hypertensive emergencyresolved with combination of clonidine and metoprolol given by the ER, patient has a large list of home medications, is likely she has missed some doses, unsure how much fluid was taking her given during today's earlier dialysis session  -Chest pain secondary to hypertensive emergencyresolved  -Elevated troponinlikely secondary to demand ischemia  -End-stage renal disease on hemodialysis Monday Wednesday Friday  -Moderate pericardial effusion -appears to be chronic, however has enlarged  -Type 2 diabetes mellitus  -Hypertension  -Seizure disorder  -Bipolar disorder  -History of stroke    PLAN:    Restarting all of patient's prescribed home medications, holding clonidine patch at this time  Nephrology consulted and is to dialyze patient tomorrow morning  Continue troponin trend    Activity: As tolerated with assistance  Diet: Renal  Antibiotics: None  DVT prophylaxis: Heparin 5000 units 3 times daily  CODE status: Full    Disposition: Likely discharge tomorrow after dialysis with close outpatient follow-up    Signed By: Anette Stacy MD   [facility]    October 20, 2021      Dragon voice recognition software was used for parts of this note. Unintended errors may have occurred.

## 2021-10-21 NOTE — PROGRESS NOTES
Progress Note    Nav Garcia  74 y.o. Admit Date: 10/20/2021  Active Problems:    ESRD on hemodialysis ([facility] (8/8/2018) POA: Unknown      Secondary hyperparathyroidism of renal origin ([facility] (8/8/2018) POA: Yes      Anemia (3/19/2021) POA: Yes      Cholecystitis (10/20/2021) POA: Unknown      Acute pericardial effusion (10/21/2021) POA: Unknown            Subjective:     Patient feels the same, very non specific complain,Dialyzed early today & taken off 2 kg,Not using any Heparin to avoid any Hemorrhagic  Pericardial effusion. Seen in ER post dialysis, her head is hanging outside the bed through [employer],. Withe the assistance of Nursing staff Repositioned her head. A comprehensive review of systems was negative except for that written in the History of Present Illness.     Objective:     Visit Vitals  BP (!) 201/106   Pulse 96   Temp 98 °F (36.7 °C) (Oral)   Resp 21   Ht 5' 3\" (1.6 m)   Wt 59.4 kg (131 lb)   LMP  (LMP Unknown)   SpO2 (!) 83%   BMI 23.21 kg/m²         Intake/Output Summary (Last 24 hours) at 10/21/2021 1745  Last data filed at 10/21/2021 1245  Gross per 24 hour   Intake    Output 2000 ml   Net -2000 ml       Current Facility-Administered Medications   Medication Dose Route Frequency Provider Last Rate Last Admin    heparin (porcine) 1,000 unit/mL injection 5,000 Units  5,000 Units IntraCATHeter DIALYSIS PRN Brandy Shea MD        ondansetron (ZOFRAN) 4 mg/2 mL injection             amLODIPine (NORVASC) tablet 10 mg  10 mg Oral QHS Thien Martinez MD        hydrALAZINE (APRESOLINE) tablet 50 mg  50 mg Oral TID Thien Martinez MD        cloNIDine (CATAPRES) 0.3 mg/24 hr patch 1 Patch  1 Patch TransDERmal Q7D Thien Martinez MD        sodium chloride (NS) flush 5-40 mL  5-40 mL IntraVENous Q8H Tom Melgoza MD   10 mL at 10/21/21 0154    sodium chloride (NS) flush 5-40 mL  5-40 mL IntraVENous PRN Jessica King MD       [facility] by provider] heparin (porcine) injection 5,000 Units  5,000 Units SubCUTAneous Q8H Tom Melgoza MD        aspirin chewable tablet 81 mg  81 mg Oral DAILY Tom Melgoza MD        atorvastatin (LIPITOR) tablet 10 mg  10 mg Oral DAILY Tom Melgoza MD        carvediloL (COREG) tablet 25 mg  25 mg Oral BID WITH MEALS Tom Melgoza MD   25 mg at 10/21/21 1731    diphenhydrAMINE (BENADRYL) capsule 25 mg  25 mg Oral Q8H PRN Tmo Melgoza MD        famotidine (PEPCID) tablet 20 mg  20 mg Oral DAILY Tom Melgoza MD        isosorbide mononitrate ER (IMDUR) tablet 30 mg  30 mg Oral DAILY Basilia Melgoza MD        [START ON 10/22/2021] levETIRAcetam (KEPPRA) tablet 250 mg  250 mg Oral Q MON, WED & FRI Tom Melgoza MD        levETIRAcetam (KEPPRA) tablet 500 mg  500 mg Oral BID Tom Melgoza MD   500 mg at 10/21/21 1730    losartan (COZAAR) tablet 100 mg  100 mg Oral DAILY Tom Melgoza MD        sevelamer carbonate (RENVELA) tab 800 mg  800 mg Oral TID Tom Melgoza MD   800 mg at 10/21/21 1550    insulin lispro (HUMALOG) injection   SubCUTAneous AC&HS Tom Melgoza MD        glucose chewable tablet 16 g  4 Tablet Oral PRN Tom Melgoza MD        glucagon [facility]) injection 1 mg  1 mg IntraMUSCular PRN Tom Melgoza MD        dextrose (D50W) injection syrg 12.5-25 g  25-50 mL IntraVENous PRN Tom Melgoza MD  B complex-vitaminC-folic acid (NEPHROCAP) cap  1 Capsule Oral DAILY Tom Melgoza MD        haloperidoL (HALDOL) tablet 1 mg  1 mg Oral QHS Ismael Millan MD   1 mg at 10/21/21 0008     Current Outpatient Medications   Medication Sig Dispense Refill    cloNIDine (CATAPRES) 0.3 mg/24 hr 1 Patch by TransDERmal route every seven (7) days. 4 Patch 0    minoxidiL (LONITEN) 2.5 mg tablet Take 2 Tablets by mouth two (2) times a day. 60 Tablet 0    isosorbide mononitrate ER (IMDUR) 30 mg tablet Take 1 Tablet by mouth daily. 30 Tablet 0    amLODIPine (NORVASC) 10 mg tablet Take 1 Tablet by mouth daily.  30 Tablet 0    diphenhydrAMINE (BenadryL) 25 mg capsule Take 25 mg by mouth every eight (8) hours as needed for Allergies.  losartan (COZAAR) 100 mg tablet Take 1 Tablet by mouth daily. 30 Tablet 0    carvediloL (COREG) 25 mg tablet Take 1 Tablet by mouth two (2) times daily (with meals). 60 Tablet 0    famotidine (Pepcid) 20 mg tablet Take 1 Tablet by mouth daily. 30 Tablet 0    polyethylene glycol (MIRALAX) 17 gram packet Take 1 Packet by mouth daily as needed for Constipation. 15 Packet 0    atorvastatin (Lipitor) 10 mg tablet Take 10 mg by mouth daily. Indications: high amount of triglyceride in the blood      b complex-vitamin c-folic acid 0.8 mg (NEPHRO-DORIAN) 0.8 mg tab tablet Take 1 Tab by mouth daily.  levETIRAcetam (Keppra) 500 mg tablet Take 500 mg by mouth two (2) times a day.  sevelamer carbonate (RENVELA) 800 mg tab tab Take 800 mg by mouth three (3) times daily.  levETIRAcetam (KEPPRA) 250 mg tablet Take 1 Tab by mouth every Monday, Wednesday, Friday. 30 Tab 1    insulin lispro (HUMALOG) 100 unit/mL injection INITIATE INSULIN CORRECTIVE PROTOCOL: Normal Insulin Sensitivity   For Blood Sugar (mg/dL) of:     Less than 150 =   0 units           150 -199 =   2 units  200 -249 =   4 units  250 -299 =   6 units  300 -349 =   8 units  350 and above = 10 units and Call Physician  If 2 glucose readings are above 1 Vial 0    aspirin 81 mg tablet Take 81 mg by mouth daily. Physical Exam:     Physical Exam:   General:  Alert, cooperative, no distress, appears stated age. Mouth/Throat: Lips, mucosa, and tongue normal. Teeth and gums normal.   Neck: Supple, symmetrical, trachea midline, no adenopathy, thyroid: no enlargement/tenderness/nodules, no carotid bruit and no JVD. Lungs:   Clear to auscultation bilaterally. Heart:  Regular rate and rhythm, S1, S2 normal, no murmur,    Abdomen:   Soft, non-tender. Bowel sounds normal. No masses,  No organomegaly.    Extremities: Extremities normal, atraumatic, no cyanosis or edema,HD cath in right IJ   Skin: Skin color, texture, turgor normal. No rashes or lesions         Data Review:    CBC w/Diff    Recent Labs     10/21/21  0413 10/20/21  1420 10/20/21  1420   WBC 6.4  --  7.5   RBC 2.76*  --  3.20*   HGB 8.0*  --  9.5*   HCT 25.1*  --  29.0*   MCV 90.9   < > 90.6   MCH 29.0   < > 29.7   MCHC 31.9   < > 32.8   RDW 16.1*   < > 16.1*    < > = values in this interval not displayed. Recent Labs     10/21/21  0413 10/20/21  1420 10/20/21  1420   MONOS 14*  --  8   EOS 3  --  3   BASOS 0   < > 0   RDW 16.1*   < > 16.1*    < > = values in this interval not displayed. Comprehensive Metabolic Profile    Recent Labs     10/21/21  0413 10/20/21  1420    140   K 4.0 3.4*    101   CO2 28 32   BUN 32* 30*   CREA 9.08* 7.72*    Recent Labs     10/21/21  0413 10/20/21  1420   CA 9.0 9.7   PHOS 4.0  --         All CT scans at this facility are performed using dose optimization technique as  appropriate to a performed exam, to include automated exposure control,  adjustment of the mA and/or kV according to patient size (including appropriate  matching first site-specific examinations), or use of iterative reconstruction  technique.     COMPARISON: None.     FINDINGS:     No filling defects are appreciated within the main, left, right, lobar or  visualized segmental pulmonary arteries to suggest embolism.      Thyroid: Unremarkable in its visualized aspects. Pericardium/ Heart: There is a moderate pericardial effusion. Aorta/ Vessels: No aneurysm or dissection. Lymph Nodes: Unremarkable. .     Lungs: There is septal thickening. There is bibasilar atelectasis. There is no  dense consolidation. Small bilateral pleural effusions, right greater than left.     Upper Abdomen: Atrophic kidneys.  Multiple left renal cysts.     Bones/soft tissues: No acute finding     IMPRESSION  No evidence for pulmonary emboli.      Mild interstitial edema.     Small bilateral pleural effusions, right greater than left.     Moderate pericardial effusion. Pericardial Effusion   Vitals    Weight Height BSA (calculated - sq m) BP Pulse (Heart Rate)   59.4 kg (131 lb) 5' 3\" (1.6 m) 1.63 sq meters 197/80    Interpretation Summary    Result status: Final result   · LV: Estimated LVEF is 60 - 65%. Visually measured ejection fraction. Normal cavity size and systolic function (ejection fraction normal). Severe concentric hypertrophy. · Pericardium: Small-to-moderate circumferential pericardial effusion measuring 20 mm. Impression:       Active Hospital Problems    Diagnosis Date Noted    Acute pericardial effusion 10/21/2021    Cholecystitis 10/20/2021    Anemia 03/19/2021    ESRD on hemodialysis ([facility] 08/08/2018    Secondary hyperparathyroidism of renal origin ([facility] 08/08/2018            Plan:     Dialyzed today & taken off 2 kg, not on heparin  Hold Minoxidil.  continue other bp  Medicine, add other BP medicine to control BP    Gwen Kenny MD

## 2021-10-21 NOTE — PROGRESS NOTES
Admitted with Hypertensive Urgency, chest pain, new moderate Pericardial effusion by CTA,,has ESRD. BP needs to be controlled, will DC Minoxidil given her Pericardial effusion. Will schedule 2-D echo for better Delineation of Pericardial effusion, will do daily dialysis without heparin given moderate Pericardial effusion. Added Hydralazine in addition to Losaatan, Amlodipine & Coreg. Also will give her low dose of Haldol.

## 2021-10-21 NOTE — DIALYSIS
ACUTE HEMODIALYSIS FLOW SHEET    HEMODIALYSIS ORDERS: Physician: Dr. Jackie Ortiz: Mildred   Duration: 3 hr  BFR: 400   DFR: 800   Dialysate:  Temp 36   K+   2    Ca+  2.5   Na 138   Bicarb 35   Wt Readings from Last 1 Encounters:   10/21/21 59.4 kg (131 lb)    Patient Chart [x]   Unable to Obtain []  Dry weight/UF Goal: 2000 ml   Heparin []  Bolus    Units    [] Hourly    Units    [x]None       Pre BP:   201/96    Pulse:  89   Respirations: 19    Temperature:  98.3  [x] Oral [] Axillary  Tx: NSS   ml/Bolus   [x] N/A   Labs: []  Pre  []  Post:   [x] N/A   Additional Orders(medications, blood products, hypotension management): [x] Yes   [] No     [x]  [facility] Consent Verified     CATHETER ACCESS:  []N/A   [x]Right   []Left   []IJ     []Fem   [x]Chest wall   [] First use X-ray verified     [x]Tunnel    [] Non Tunneled   [x]No S/S infection  []Redness  []Drainage []Cultured []Swelling []Pain   [x]Medical Aseptic Prep Utilized   [x]Dressing Changed  [x] Biopatch  Date: 10/21/21   []Clotted   [x]Patent   Flows: [x]Good  []Poor  []Reversed   If access problem,  notified: []Yes    [x]N/A        GENERAL ASSESSMENT:    LUNGS:   SaO2%  100    [x] Clear  [] Coarse  [] Crackles  [] Wheezing                                                [] Diminished     Location : []RLL   []LLL    []RUL  []STEPHY   Cough: []Productive  []Dry  [x]N/A   Respirations:  [x]Easy  []Labored   Therapy:  []RA  []NC 2l/min    Mask: []NRB  [] Venti    O2%                  []Ventilator  []Intubated  [] Amish Rangel  [] BiPaP   CARDIAC: [x]Regular      [] Irregular   [] Pericardial Rub  [] JVD          []  Monitored  [] Bedside  [] Remotely monitored     EDEMA: [x] None  []Generalized  [] Pitting [] 1    [] 2    [] 3    [] 4                 [] Facial  [] Pedal  []  UE  [] LE   SKIN:   [x] Warm  [] Hot     [] Cold   [x] Dry     [] Pale   [] Diaphoretic                  [] Flushed  [] Jaundiced  [] Cyanotic  [] Rash  [] Weeping   LOC:    [x] Alert      [x]Oriented:    [x] Person     [] Place  []Time               [x] Confused  [] Lethargic  [] Medicated  [] Non-responsive  [] Non-Verbal   GI / ABDOMEN:                     [] Flat    [] Distended    [x] Soft    [] Firm   []  Obese                   [] Diarrhea  [x] Bowel Sounds  [] Nausea  [] Vomiting     / URINE ASSESSMENT:                   [] Voiding   [x] Oliguria  [] Anuria   []  Khan                  [] Incontinent  []  Incontinent Brief []  Fecal Management System     PAIN:  [x] 0 []1  []2   []3   []4   []5   []6   []7   []8   []9   []10            Scale 0-10  Action/Follow Up:    MOBILITY:  [x] Bed    [] Stretcher      All Vitals and Treatment Details on Attached [address]: [facility]          Room # PB91/58   [] 1st Time Acute      [] Stat       [x] Routine      [] Urgent     [x] Acute Room  []  Bedside  [] ICU/CCU  [] ER   Isolation Precautions:  [x] Dialysis    There are currently no Active Isolations       ALLERGIES:     Allergies   Allergen Reactions    Amoxicillin Unknown (comments)    Cleocin [Clindamycin Hcl] Unknown (comments)    Codeine Unknown (comments)    Lorcet 10/650 [Hydrocodone-Acetaminophen] Unknown (comments)    Penicillin G Benzathine Unknown (comments)    Shellfish Derived Unknown (comments)      Code Status:  Full Code     Hepatitis Status     Lab Results   Component Value Date/Time    Hepatitis B surface Ag <0.10 09/25/2020 05:31 PM    Hepatitis B surface Ab 63.01 09/25/2020 05:31 PM    Hepatitis B core, IgM NEGATIVE  08/11/2018 03:29 AM    Hep B Core Ab, IgM NEGATIVE  08/13/2018 04:34 AM    Hep B Core Ab, total NEGATIVE  08/13/2018 04:34 AM        Current Labs:      Lab Results   Component Value Date/Time    WBC 6.4 10/21/2021 04:13 AM    Hemoglobin, POC 13.3 11/08/2019 07:26 AM    HGB 8.0 (L) 10/21/2021 04:13 AM    Hematocrit, POC 39 11/08/2019 07:26 AM    HCT 25.1 (L) 10/21/2021 04:13 AM    PLATELET 625 12/92/4913 04:13 AM    MCV 90.9 10/21/2021 04:13 AM Lab Results   Component Value Date/Time    Sodium 138 10/21/2021 04:13 AM    Potassium 4.0 10/21/2021 04:13 AM    Chloride 101 10/21/2021 04:13 AM    CO2 28 10/21/2021 04:13 AM    Anion gap 9 10/21/2021 04:13 AM    Glucose 137 (H) 10/21/2021 04:13 AM    BUN 32 (H) 10/21/2021 04:13 AM    Creatinine 9.08 (H) 10/21/2021 04:13 AM    BUN/Creatinine ratio 4 (L) 10/21/2021 04:13 AM    GFR est AA 5 (L) 10/21/2021 04:13 AM    GFR est non-AA 4 (L) 10/21/2021 04:13 AM    Calcium 9.0 10/21/2021 04:13 AM          DIET:  DIET ADULT      PRIMARY NURSE REPORT:   Pre Dialysis: RADHA Hines RN     Time: 0912        EDUCATION:    [x] Patient [] Other           Knowledge Basis: []None [x]Minimal [] Substantial []Not able to assess at this time  Barriers to learning  [x]N/A  []Intubated/Ventilated  []Sedated   [] Access Care     [] S&S of infection  [] Fluid Management  [] K+   [x] Procedural    []Albumin   [] Medications   [] Tx Options   [] Transplant   [] Diet   [] Other   Teaching Tools:  [x] Explain  [] Demo  [] Handouts [] Video  Patient response: [x] Verbalized understanding  [] Teach back  [] Return demonstration   [x] Requires follow up      [x]Time Out/Safety Check  [x] Extracorporeal Circuit Tested for integrity       RO/HEMODIALYSIS MACHINE SAFETY CHECKS  Before each treatment:     Machine Number:                   [facility]                                    [x] Unit Machine # 7 with centralized RO                                                                            Alarm Test:  Pass time 0848            [x] RO/Machine Log Complete    Machine Temp    36.0             Dialysate: pH  7.4    Conductivity: Meter 14.1     HD Machine  13.9      TCD: 13.8  Dialyzer Lot # [id number]     Blood Tubing Lot # [id number]   Saline Lot # [id number]     CHLORINE TESTING-Before each treatment and every 4 hours    Total Chlorine: [x] less than 0.1 ppm  Initial Time Check: 0900       4 Hr/2nd Check Time:    (if greater than 0.1 ppm from Primary then every 30 minutes from Secondary)     TREATMENT INITIATION  with Dialysis Precautions:   [x] All Connections Secured              [x] Saline Line Double Clamped   [x] Venous Parameters Set               [x] Arterial Parameters Set    [x] Prime Given 250ml NSS              [x]Air Foam Detector Engaged      Treatment Initiation Note:  2817; Pt arrived to HD without any complaints. Pt A &O X 2, Has episodes of confusion on and off follows commands, no distress noted on oxygen 2L via NC, SATs 100%,  Rt chest wall CVC assessed no abnormalities noted, line patent with good flow. CVC accessed without any difficulty. 0945;  Time out performed per policy, HD commenced, pt tolerated well. During Treatment Notes:  1000;HD in good progress; /103. Vascular access visible with arterial and venous line connections intact. 1030;HD in good progress, BP remains high(206/96),. Vascular access visible with arterial and venous line connections intact. Pt nauseous, Dr Joleen Marcos updated, IV Zofran 4mg given stat as per DR Florence's orders, advised to give clonidine 0.1mg intra dialysis if BP remains too high. 1100;HD in good progress,VSS. Vascular access visible with arterial and venous line connections intact. 1130;HD in good progress,VSS. Vascular access visible with arterial and venous line connections intact. 1200;HD in good progress,VSS. Vascular access visible with arterial and venous line connections intact. 1230;HD in good progress, VSS. Vascular access visible with arterial and venous line connections intact. 1300; Dialysis treatment completed. /84       Medication Dose Volume Route Time [facility] Nurse, Title   zofran 4 mg 2ml HD 1030 THERESA Orozco RN     Post Assessment  Dialyzer Cleared:[] Good [x] Fair  [] Poor  Blood processed:  68.1 L  UF Removed:  2000 Ml  Post /96  Pulse  94 Resp  19   Temp 98  [] Oral [] Axillary      CVC Catheter: [] N/A  Locking solution: Heparin 1:1000 U  Arterial port 1.6 ml   Venous port 1.6ml         Skin:[x] Warm  [x] Dry [] Diaphoretic               [] Flushed  [] Pale [] Cyanotic   Pain:  [x]0  []1 []2  []3 []4  []5  []6 []7 []8  []9  []10       Post Treatment Note:   1300;VSS. Dialysis catheter intact, patent and heplocked accordingly, Dressing clean, dry and intact.        POST TREATMENT PRIMARY NURSE HANDOFF REPORT:   Post Dialysis: RAYO Abreu                Time:  0       Abbreviations: AVG-arterial venous graft, AVF-arterial venous fistula, IJ-Internal Jugular, Subcl-Subclavian, Fem-Femoral, Tx-treatment, AP/HR-apical heart rate, DFR-dialysate flow rate, BFR-blood flow rate, AP-arterial pressure, -venous pressure, UF-ultrafiltrate, TMP-transmembrane pressure, Ray-Venous, Art-Arterial, RO-Reverse Osmosis

## 2021-10-21 NOTE — PROGRESS NOTES
completed the initial Spiritual Assessment of the patient in bed 13 of the emergency room, and offered Pastoral Care support to this elderly woman who appeared agitated and confused at times. , There is no advance directive at present for this patient. . Patient does not have any Hindu/cultural needs that will affect patients preferences in health care. Chaplains will continue to follow and will provide pastoral care on an as needed/requested basis.     [facility] Care Department  [phone]

## 2021-10-21 NOTE — PROGRESS NOTES
Bedside and Verbal shift change report given to [address] [place] [address] (oncoming nurse) by Elisa Ta RN (offgoing nurse). Report included the following information SBAR, Kardex, Intake/Output, MAR and Recent Results.

## 2021-10-22 LAB
ANION GAP SERPL CALC-SCNC: 8 MMOL/L (ref 3–18)
APPEARANCE UR: CLEAR
ATRIAL RATE: 88 BPM
BACTERIA URNS QL MICRO: ABNORMAL /HPF
BASOPHILS # BLD: 0 K/UL (ref 0–0.1)
BASOPHILS NFR BLD: 0 % (ref 0–2)
BILIRUB UR QL: NEGATIVE
BUN SERPL-MCNC: 21 MG/DL (ref 7–18)
BUN/CREAT SERPL: 3 (ref 12–20)
CALCIUM SERPL-MCNC: 9.4 MG/DL (ref 8.5–10.1)
CALCULATED P AXIS, ECG09: 74 DEGREES
CALCULATED R AXIS, ECG10: 171 DEGREES
CALCULATED T AXIS, ECG11: 116 DEGREES
CHLORIDE SERPL-SCNC: 98 MMOL/L (ref 100–111)
CO2 SERPL-SCNC: 28 MMOL/L (ref 21–32)
COLOR UR: YELLOW
CREAT SERPL-MCNC: 6.39 MG/DL (ref 0.6–1.3)
DIAGNOSIS, 93000: NORMAL
DIFFERENTIAL METHOD BLD: ABNORMAL
EOSINOPHIL # BLD: 0.3 K/UL (ref 0–0.4)
EOSINOPHIL NFR BLD: 4 % (ref 0–5)
EPITH CASTS URNS QL MICRO: ABNORMAL /LPF (ref 0–5)
ERYTHROCYTE [DISTWIDTH] IN BLOOD BY AUTOMATED COUNT: 15.7 % (ref 11.6–14.5)
GLUCOSE BLD STRIP.AUTO-MCNC: 106 MG/DL (ref 70–110)
GLUCOSE BLD STRIP.AUTO-MCNC: 113 MG/DL (ref 70–110)
GLUCOSE BLD STRIP.AUTO-MCNC: 137 MG/DL (ref 70–110)
GLUCOSE BLD STRIP.AUTO-MCNC: 96 MG/DL (ref 70–110)
GLUCOSE SERPL-MCNC: 105 MG/DL (ref 74–99)
GLUCOSE UR STRIP.AUTO-MCNC: 100 MG/DL
HCT VFR BLD AUTO: 27.2 % (ref 35–45)
HGB BLD-MCNC: 8.9 G/DL (ref 12–16)
HGB UR QL STRIP: ABNORMAL
KETONES UR QL STRIP.AUTO: NEGATIVE MG/DL
LACTATE SERPL-SCNC: 1.6 MMOL/L (ref 0.4–2)
LEUKOCYTE ESTERASE UR QL STRIP.AUTO: NEGATIVE
LYMPHOCYTES # BLD: 0.7 K/UL (ref 0.9–3.6)
LYMPHOCYTES NFR BLD: 9 % (ref 21–52)
MCH RBC QN AUTO: 29.8 PG (ref 24–34)
MCHC RBC AUTO-ENTMCNC: 32.7 G/DL (ref 31–37)
MCV RBC AUTO: 91 FL (ref 78–100)
MONOCYTES # BLD: 1.1 K/UL (ref 0.05–1.2)
MONOCYTES NFR BLD: 14 % (ref 3–10)
NEUTS SEG # BLD: 5.6 K/UL (ref 1.8–8)
NEUTS SEG NFR BLD: 73 % (ref 40–73)
NITRITE UR QL STRIP.AUTO: NEGATIVE
P-R INTERVAL, ECG05: 130 MS
PH UR STRIP: 8 [PH] (ref 5–8)
PHOSPHATE SERPL-MCNC: 3.6 MG/DL (ref 2.5–4.9)
PLATELET # BLD AUTO: 226 K/UL (ref 135–420)
PMV BLD AUTO: 12 FL (ref 9.2–11.8)
POTASSIUM SERPL-SCNC: 3.7 MMOL/L (ref 3.5–5.5)
PROCALCITONIN SERPL-MCNC: 0.58 NG/ML
PROT UR STRIP-MCNC: >1000 MG/DL
Q-T INTERVAL, ECG07: 360 MS
QRS DURATION, ECG06: 78 MS
QTC CALCULATION (BEZET), ECG08: 435 MS
RBC # BLD AUTO: 2.99 M/UL (ref 4.2–5.3)
RBC #/AREA URNS HPF: ABNORMAL /HPF (ref 0–5)
SODIUM SERPL-SCNC: 134 MMOL/L (ref 136–145)
SP GR UR REFRACTOMETRY: 1.02 (ref 1–1.03)
UROBILINOGEN UR QL STRIP.AUTO: 0.2 EU/DL (ref 0.2–1)
VENTRICULAR RATE, ECG03: 88 BPM
WBC # BLD AUTO: 7.7 K/UL (ref 4.6–13.2)
WBC URNS QL MICRO: ABNORMAL /HPF (ref 0–5)

## 2021-10-22 PROCEDURE — 90935 HEMODIALYSIS ONE EVALUATION: CPT

## 2021-10-22 PROCEDURE — 2709999900 HC NON-CHARGEABLE SUPPLY

## 2021-10-22 PROCEDURE — 99232 SBSQ HOSP IP/OBS MODERATE 35: CPT | Performed by: INTERNAL MEDICINE

## 2021-10-22 PROCEDURE — 65270000029 HC RM PRIVATE

## 2021-10-22 PROCEDURE — 82962 GLUCOSE BLOOD TEST: CPT

## 2021-10-22 PROCEDURE — 74011250637 HC RX REV CODE- 250/637: Performed by: INTERNAL MEDICINE

## 2021-10-22 PROCEDURE — 87086 URINE CULTURE/COLONY COUNT: CPT

## 2021-10-22 PROCEDURE — 74011250637 HC RX REV CODE- 250/637: Performed by: STUDENT IN AN ORGANIZED HEALTH CARE EDUCATION/TRAINING PROGRAM

## 2021-10-22 PROCEDURE — 84145 PROCALCITONIN (PCT): CPT

## 2021-10-22 PROCEDURE — 81001 URINALYSIS AUTO W/SCOPE: CPT

## 2021-10-22 PROCEDURE — 36415 COLL VENOUS BLD VENIPUNCTURE: CPT

## 2021-10-22 PROCEDURE — 85025 COMPLETE CBC W/AUTO DIFF WBC: CPT

## 2021-10-22 PROCEDURE — 83605 ASSAY OF LACTIC ACID: CPT

## 2021-10-22 PROCEDURE — 74011250636 HC RX REV CODE- 250/636

## 2021-10-22 PROCEDURE — 84100 ASSAY OF PHOSPHORUS: CPT

## 2021-10-22 PROCEDURE — 80048 BASIC METABOLIC PNL TOTAL CA: CPT

## 2021-10-22 PROCEDURE — 99233 SBSQ HOSP IP/OBS HIGH 50: CPT | Performed by: INTERNAL MEDICINE

## 2021-10-22 PROCEDURE — 93005 ELECTROCARDIOGRAM TRACING: CPT

## 2021-10-22 RX ORDER — ONDANSETRON 2 MG/ML
4 INJECTION INTRAMUSCULAR; INTRAVENOUS
Status: COMPLETED | OUTPATIENT
Start: 2021-10-22 | End: 2021-10-22

## 2021-10-22 RX ADMIN — ATORVASTATIN CALCIUM 10 MG: 10 TABLET, FILM COATED ORAL at 08:20

## 2021-10-22 RX ADMIN — Medication 10 ML: at 06:41

## 2021-10-22 RX ADMIN — ONDANSETRON 4 MG: 2 INJECTION INTRAMUSCULAR; INTRAVENOUS at 16:43

## 2021-10-22 RX ADMIN — HYDRALAZINE HYDROCHLORIDE 50 MG: 50 TABLET, FILM COATED ORAL at 21:45

## 2021-10-22 RX ADMIN — ISOSORBIDE MONONITRATE 30 MG: 60 TABLET, EXTENDED RELEASE ORAL at 08:21

## 2021-10-22 RX ADMIN — HALOPERIDOL 1 MG: 2 TABLET ORAL at 21:45

## 2021-10-22 RX ADMIN — ASPIRIN 81 MG CHEWABLE TABLET 81 MG: 81 TABLET CHEWABLE at 08:20

## 2021-10-22 RX ADMIN — Medication 10 ML: at 06:04

## 2021-10-22 RX ADMIN — LEVETIRACETAM 500 MG: 500 TABLET, FILM COATED ORAL at 08:22

## 2021-10-22 RX ADMIN — CARVEDILOL 25 MG: 25 TABLET, FILM COATED ORAL at 20:18

## 2021-10-22 RX ADMIN — HYDRALAZINE HYDROCHLORIDE 50 MG: 50 TABLET, FILM COATED ORAL at 08:21

## 2021-10-22 RX ADMIN — LEVETIRACETAM 500 MG: 500 TABLET, FILM COATED ORAL at 20:17

## 2021-10-22 RX ADMIN — NEPHROCAP 1 CAPSULE: 1 CAP ORAL at 08:22

## 2021-10-22 RX ADMIN — SEVELAMER CARBONATE 800 MG: 800 TABLET, FILM COATED ORAL at 08:21

## 2021-10-22 RX ADMIN — LEVETIRACETAM 250 MG: 250 TABLET ORAL at 20:17

## 2021-10-22 RX ADMIN — LOSARTAN POTASSIUM 100 MG: 50 TABLET, FILM COATED ORAL at 08:22

## 2021-10-22 RX ADMIN — SEVELAMER CARBONATE 800 MG: 800 TABLET, FILM COATED ORAL at 21:45

## 2021-10-22 RX ADMIN — FAMOTIDINE 20 MG: 20 TABLET ORAL at 08:21

## 2021-10-22 RX ADMIN — Medication 10 ML: at 21:50

## 2021-10-22 RX ADMIN — CARVEDILOL 25 MG: 25 TABLET, FILM COATED ORAL at 08:22

## 2021-10-22 RX ADMIN — AMLODIPINE BESYLATE 10 MG: 10 TABLET ORAL at 21:45

## 2021-10-22 NOTE — DIALYSIS
ACUTE HEMODIALYSIS FLOW SHEET    HEMODIALYSIS ORDERS: Physician: Dr. Brady Deter: Revaclear   Duration: 3 hr   BFR: 400   DFR: 800   Dialysate:  Temp 36-37*C   K+  2    Ca+ 2.5   Na 138   Bicarb 35   Wt Readings from Last 1 Encounters:   10/22/21 57.3 kg (126 lb 6.4 oz)    Patient Chart [x]   Unable to Obtain []  Dry weight/UF Goal: 2000 ml    Heparin []  Bolus    Units    [] Hourly    Units    [x]None       Pre BP: 177/93  Pulse: 82  Respirations: 20 Temp: 97.4  [x] Oral  [] Ax  [] Esoph   Labs: []  Pre  []  Post:   [x] N/A   Additional Orders (medications, blood products, hypotension management): [x] Yes   [] No     [x]  [facility] Consent Verified     CATHETER ACCESS:  []N/A   [x]Right   []Left   []IJ   []Fem  [x]Chest wall  []TransHepatic   [] First use X-ray verified     [x]Tunnel    [] Non Tunneled   [x]No S/S infection  []Redness  []Drainage []Cultured []Swelling []Pain   [x]Medical Aseptic Prep Utilized   []Dressing Changed  [] Biopatch  Date: 10/21/21   []Clotted   [x]Patent   Flows: [x]Good  []Poor  []Reversed   If access problem,  notified: []Yes    [x]N/A        GRAFT/FISTULA ACCESS:   [x]N/A     []Right     []Left     []UE     []LE                   GENERAL ASSESSMENT:    LUNGS:  Resp Rate 20   [] Clear  [] Coarse  [] Crackles  [] Wheezing  [x] Diminished                                                           [x] RLL   [x] LLL  [x] RUL   [x] STEPHY            Respirations:  [x]Easy  []Labored  []N/A  Cough:  []Productive  []Dry  []N/A               Therapy:  []RA   [] Ventilated   [] Intubated   [] Trach            O2 Device:  [x] NC   [] NRB  [] Trach Mask  [] BiPaP  Flow: 6  l/min                                                    CARDIAC: [] Regular      [] Irregular   [] Rhythm:          [] Monitored   [] Bedside   [x] Remotely monitored       EDEMA: [x] None   []Generalized  [] Pitting [] 1+   [] 2 +   [] 3+    [] 4+        SKIN:   [] Hot     [] Cold    [x] Warm   [x] Dry    [] Diaphoretic                 [] Flushed  [] Jaundiced  [] Cyanotic  [] Pale      LOC:    [x] Alert      []Oriented:    [] Person     [] Place   []Time               [x] Confused  [] Lethargic  [] Medicated  [] Non-responsive  [] Non-Verbal     GI / ABDOMEN:                     [] Flat    [] Distended    [x] Soft    [] Firm   []  Obese                   [] Diarrhea   [] FMS [x] Bowel Sounds  [] Nausea  [] Vomiting                   [] NGT  [] OGT  [] PEG  [] Tube Feedings @     mL/hr     / URINE ASSESSMENT:                   [] Voiding    [x] Oliguria  [] Anuria                     []  Khan   [] Incontinent  []  Incontinent Brief   []  PureWick     PAIN:  [x] 0 []1  []2   []3   []4   []5   []6   []7   []8   []9   []10                MOBILITY:  [x] Bed    [] Stretcher      All Vitals and Treatment Details on Attached [address]: [facility]          Room # 648/17    [x] Routine         [] 1st Time Acute/Chronic   [] Urgent      [] Stat            [x] Acute Room   []  Bedside    [] ICU/CCU     [] ER     Isolation Precautions:  [x] Dialysis    There are currently no Active Isolations     ALLERGIES:     Allergies   Allergen Reactions    Amoxicillin Unknown (comments)    Cleocin [Clindamycin Hcl] Unknown (comments)    Codeine Unknown (comments)    Lorcet 10/650 [Hydrocodone-Acetaminophen] Unknown (comments)    Penicillin G Benzathine Unknown (comments)    Shellfish Derived Unknown (comments)        Code Status:  Full Code     Hepatitis Status      Lab Results   Component Value Date/Time    Hepatitis B surface Ag <0.10 09/25/2020 05:31 PM    Hepatitis B surface Ab 63.01 09/25/2020 05:31 PM    Hepatitis B core, IgM NEGATIVE  08/11/2018 03:29 AM    Hep B Core Ab, IgM NEGATIVE  08/13/2018 04:34 AM    Hep B Core Ab, total NEGATIVE  08/13/2018 04:34 AM        Current Labs:      Lab Results   Component Value Date/Time    WBC 7.7 10/22/2021 02:13 AM    Hemoglobin, POC 13.3 11/08/2019 07:26 AM    HGB 8.9 (L) 10/22/2021 02:13 AM    Hematocrit, POC 39 11/08/2019 07:26 AM    HCT 27.2 (L) 10/22/2021 02:13 AM    PLATELET 055 10/21/3246 02:13 AM    MCV 91.0 10/22/2021 02:13 AM     Lab Results   Component Value Date/Time    Sodium 134 (L) 10/22/2021 02:13 AM    Potassium 3.7 10/22/2021 02:13 AM    Chloride 98 (L) 10/22/2021 02:13 AM    CO2 28 10/22/2021 02:13 AM    Anion gap 8 10/22/2021 02:13 AM    Glucose 105 (H) 10/22/2021 02:13 AM    BUN 21 (H) 10/22/2021 02:13 AM    Creatinine 6.39 (H) 10/22/2021 02:13 AM    BUN/Creatinine ratio 3 (L) 10/22/2021 02:13 AM    GFR est AA 8 (L) 10/22/2021 02:13 AM    GFR est non-AA 6 (L) 10/22/2021 02:13 AM    Calcium 9.4 10/22/2021 02:13 AM          DIET:  DIET ADULT  DIET ADULT ORAL NUTRITION SUPPLEMENT     PRIMARY NURSE REPORT:   Pre Dialysis: Josiah Pascal RN    Time: 1      EDUCATION:    [x] Patient           Knowledge Basis: []None [x]Minimal [] Substantial [] Unknown  Barriers to learning  [x]None  [] Intubated/Trached/Ventilated  [] Sedated/Paralyzed   [] Access Care     [] S&S of infection  [] Fluid Management  [] K+   [x] Procedural    [] Medications   [] Tx Options   [] Transplant   [] Diet      Teaching Tools:  [x] Explain  [] Demo  [] Handouts [] Video  Patient response: [] Verbalized understanding   [x] Requires follow up        [x] Time Out/Safety Check    [x] Extracorporeal Circuit Tested for integrity       RO/HEMODIALYSIS MACHINE SAFETY CHECKS  Before each treatment:        [facility]                                    [x] Unit Machine # 3 with centralized RO                                                                                                                       Alarm Test:  Pass time 1435            [x] RO/Machine Log Complete    Machine Temp    36-37*C             Dialysate: pH  7.4    Conductivity: Meter 14.0    HD Machine  13.8     TCD: 13.7  Dialyzer Lot # [id number]     Blood Tubing Lot # [id number]     Saline Lot # [id number]     CHLORINE TESTING-Before each treatment and every 4 hours    Total Chlorine: [x] less than 0.1 ppm  Initial Time Check: 1300       4 Hr/2nd Check Time: 1700   (if greater than 0.1 ppm from Primary then every 30 minutes from Secondary)     TREATMENT INITIATION  with Dialysis Precautions:   [x] All Connections Secured              [x] Saline Line Double Clamped   [x] Venous Parameters Set               [x] Arterial Parameters Set    [x] Prime Given 250ml NSS              [x]Air Foam Detector Engaged        Treatment Initiation Note:  1520--Pt arrived in dialysis unit. Pt is confused and alert to just self. Pt denies any complaints. Pt is on 6L O2 via nasal canula. Pt R. cvc is patent, dressings are dry and intact and no S/S of infection noted. 1530--HD initiated without difficulty. During Treatment Notes:  7623  Face & Vascular access visible with art and elba line connections intact. Pt tolerating dialysis. 1600  Face & Vascular access visible with art and elba line connections intact. Pt tolerating dialysis. 1615  Face & Vascular access visible with art and elba line connections intact. Pt tolerating dialysis. 1630  Face & Vascular access visible with art and elba line connections intact. Pt tolerating dialysis. 1645  Face & Vascular access visible with art and elba line connections intact. Pt tolerating dialysis. Zofran given per order for neusea. 1700  Face & Vascular access visible with art and elba line connections intact. Pt tolerating dialysis. 1730  Face & Vascular access visible with art and elba line connections intact. Pt tolerating dialysis. 1745  Face & Vascular access visible with art and elba line connections intact. Pt tolerating dialysis. 1800  Face & Vascular access visible with art and elba line connections intact. Pt tolerating dialysis. 1815  Face & Vascular access visible with art and elba line connections intact. Pt tolerating dialysis. 1830  Dialysis treatment complete.        Medication    Dose    Volume Route Time       [facility] Nurse   zofran 4mg 2ml HD 1645 Boo Tran RN     Post Assessment  Dialyzer Cleared:   [] Good  [x] Fair  [] Poor  Blood processed:  67.3 L  UF Removed:  2000 Ml    Post BP: 190/90  Pulse: 90  Respirations: 18   Temp: 97.4  [x] Oral  [] Ax  [] Esophageal   Lungs: [] Clear                [x] No change from initial assessment    Cardiac:  [] Regular   [] Irregular   Rhythm:  [] Monitored   [] Not Monitored    CVC Catheter: [] N/A  Locking solution: Heparin 1:1000 U  Arterial port 1.6 ml   Venous port 1.6 ml   Edema:  [] None  [] Generalized                     Skin:[x] Warm  [x] Dry [] Diaphoretic               [] Flushed  [] Pale [] Cyanotic Pain:  [x]0  []1-2  []3-4  []5-6   []7-8  []9-10         Post Treatment Note:   Pt tolerated dialysis well. Dialysis catheter intact, patent and heplocked as noted above.      POST TREATMENT PRIMARY NURSE HANDOFF REPORT:   Post Dialysis: Courtney Lugo RN        Time:  5698       Abbreviations: AVG-arterial venous graft, AVF-arterial venous fistula, IJ-Internal Jugular, Subcl-Subclavian, Fem-Femoral, Tx-treatment, AP/HR-apical heart rate, VSS- Vital Signs Stable, CVC- Central Venous Catheter, DFR-dialysate flow rate, BFR-blood flow rate, AP-arterial pressure, -venous pressure, UF-ultrafiltrate, TMP-transmembrane pressure, Ray-Venous, Art-Arterial, RO-Reverse Osmosis

## 2021-10-22 NOTE — PROGRESS NOTES
Reason for Admission:   Cholecystitis [K81.9]               RUR Score:     23%             Resources/supports as identified by patient/family:       Top Challenges facing patient (as identified by patient/family and CM): Finances/Medication cost?     [employer] system or lack thereof? Family, LTC staff  Living arrangements? LTC at [address] [place] [address]   Self-care/ADLs/Cognition? dependent        Current Advanced Directive/Advance Care Plan:   no    Healthcare Decision Maker:   Primary Decision Maker: Flora Ortiz -  - [phone]    Click here to complete [address] including selection of the Healthcare Decision Maker Relationship (ie \"Primary\")                          Plan for utilizing home health:    no                      Likelihood of readmission:   HIGH    Transition of Care Plan:                    [facility] status of [facility]: not assessed. Face sheet information confirmed:  yes. The patient designates sisterShiva to participate in her discharge plan and to receive any needed information. This patient lives in [address] [place] [address] at [address] [place] [address]. Patient is not able to navigate steps as needed. Prior to hospitalization, patient was considered to be independent with ADLs/IADLS : no . If not independent,  patient needs assist with : dressing, bathing, food preparation, cooking and toileting    Patient has a current ACP document on file: no  The patient will need transport home upon discharge. The patient has all needed DME available in the home. Patient is not currently active with home health. Patient has stayed in a skilled nursing facility or rehab. Was  stay within last 60 days : yes.       This patient is on dialysis :yes    If yes, hemodialysis patient and receives treatment on Monday/Wednesday/Friday at [facility] time is 6 am. Pt is transported to/from dialysis by Medicaid transport. Currently, the discharge plan is LTC. The patient states that she can obtain her medications from the pharmacy, and take her medications as directed. Patient's current insurance is Medicare and [employer] Medicaid. Care Management Interventions  PCP Verified by CM:  Yes  Mode of Transport at Discharge: BLS  Transition of Care Consult (CM Consult): [address]: [place], Other Family Member(s)  Confirm Follow Up Transport: Other (see comment) (medicaid transport)  Discharge Location  Discharge Placement: [facility] RN - Outcomes Manager  [phone]

## 2021-10-22 NOTE — PROGRESS NOTES
Hospitalist Progress Note    Patient: Blanca Mills Age: 74 y.o. : 1955 MR#: [id number] SSN: [id number]  Date/Time: 10/22/2021 12:42 PM    DOA: 10/20/2021  PCP: Alexandra Aguilar MD    Subjective:     She expressed she felt better today. No chest pain. She can breath better. She did not remember what happened yesterday. No fever since last recorded in ER yesterday evening   Still with cough. She is not confuse today as yesterday per nursing staffs      Interval Hospital Course:  74 y.o female with HTN, HFpEF, ESRD on HD, seizure disorder, bipolar disorder, normocytic anemia, presented from dialysis with chest pain in her mid sternal area. According to ER and admission note, she had non radiating chest pain. Initial workup showed SBP >200, she had indeterminate troponin level, CTA chest was negative for PE, no infectious process. She was managed for hypertensive emergency. Nephrology consulted for dialysis. She was noted to have moderate sized pericardial effusion on CT, hence cardiology was consulted. Echo check with mild-moderate pericardial effusion. Cardiology considered this as renal related and recommended aggressive dialysis for further treatment. No further workup from cardiology. ROS: No current fever/chills, no headache, no dizziness, no facial pain, +cough,   No swallowing pain, No chest pain, no palpitation, + shortness of breath, no abd pain,  No diarrhea, no urinary complaint, no leg pain or swelling      Assessment/Plan:     1. Hypertensive emergency   2. Chest pain, possible angina   3. Indeterminated troponin level,   4. Moderate pericardial effusion, previous Echo mention small   5. Acute on Chronic HFpEF, elevated proBNP  6.   Seizure disorder   7. Bipolar disorder   8. ESRD on HD   9. Hypertensive encephalopathy, improved to baseline. 10.  Low grade fever, no clear source of infection. No leukocytosis, low proCalc  11.    Normocytic anemia with CKD     Spoke with cardiology, her pericardial effusion is likely related to her ESRD and volume status. She does not have evidence of uremia at this time. Will control her SBP <140, cont Amlodipine, Hydralazine, Imdur, Losartan, Carvedilol, clonidine patch. Echo reviewed. Spoke with Nephrology for Dialysis today, tomorrow, and Monday with discharge to her facility    Spoke with nursing to her UA, blood culture ordered from last night. HOLD off antibx. Seizure meds continue. Seizure precaution   She is on Haldol, tolerated well.  Continue for now   ppi   Advance diet    Full code   Disposition: Monday after her dialysis     Additional Notes:    Time spent >35 minutes    Case discussed with:  [x]Patient  []Family  [x]Nursing  [x]Case Management  DVT Prophylaxis:  []Lovenox  [x]Hep SQ  []SCDs  []Coumadin   []On Heparin gtt    Signed By: Tamir White MD     2021 12:42 PM              Objective:   VS:   Visit Vitals  BP (!) 176/85   Pulse 90   Temp 98.8 °F (37.1 °C)   Resp 16   Ht 5' 3\" (1.6 m)   Wt 57.3 kg (126 lb 6.4 oz)   LMP  (LMP Unknown)   SpO2 100%   BMI 22.39 kg/m²      Tmax/24hrs: Temp (24hrs), Av.1 °F (37.3 °C), Min:98 °F (36.7 °C), Max:100.8 °F (38.2 °C)      Intake/Output Summary (Last 24 hours) at 10/22/2021 1242  Last data filed at 10/22/2021 6306  Gross per 24 hour   Intake 120 ml   Output 2000 ml   Net -1880 ml       Tele:   General:  Cooperative, Not in acute distress, speaks in short sentence while in bed  HEENT: PERRL, EOMI, supple neck, no JVD, dry oral mucosa  Cardiovascular: S1S2 regular, no rub/gallop   Pulmonary:  air entry bilaterally, no wheezing, + crackle  GI:  Soft, non tender, non distended, +bs, no guarding   Extremities:  No pedal edema, +distal pulses appreciated   Neuro: AOx3,  moving all extremities    Additional:       Current Facility-Administered Medications   Medication Dose Route Frequency    heparin (porcine) 1,000 unit/mL injection 5,000 Units  5,000 Units IntraCATHeter DIALYSIS PRN    amLODIPine (NORVASC) tablet 10 mg  10 mg Oral QHS    hydrALAZINE (APRESOLINE) tablet 50 mg  50 mg Oral TID    cloNIDine (CATAPRES) 0.3 mg/24 hr patch 1 Patch  1 Patch TransDERmal Q7D    sodium chloride (NS) flush 5-40 mL  5-40 mL IntraVENous Q8H    sodium chloride (NS) flush 5-40 mL  5-40 mL IntraVENous PRN    [Held by provider] heparin (porcine) injection 5,000 Units  5,000 Units SubCUTAneous Q8H    aspirin chewable tablet 81 mg  81 mg Oral DAILY    atorvastatin (LIPITOR) tablet 10 mg  10 mg Oral DAILY    carvediloL (COREG) tablet 25 mg  25 mg Oral BID WITH MEALS    diphenhydrAMINE (BENADRYL) capsule 25 mg  25 mg Oral Q8H PRN    famotidine (PEPCID) tablet 20 mg  20 mg Oral DAILY    isosorbide mononitrate ER (IMDUR) tablet 30 mg  30 mg Oral DAILY    levETIRAcetam (KEPPRA) tablet 250 mg  250 mg Oral Q MON, WED & FRI    levETIRAcetam (KEPPRA) tablet 500 mg  500 mg Oral BID    losartan (COZAAR) tablet 100 mg  100 mg Oral DAILY    sevelamer carbonate (RENVELA) tab 800 mg  800 mg Oral TID    insulin lispro (HUMALOG) injection   SubCUTAneous AC&HS    glucose chewable tablet 16 g  4 Tablet Oral PRN    glucagon (GLUCAGEN) injection 1 mg  1 mg IntraMUSCular PRN    dextrose (D50W) injection syrg 12.5-25 g  25-50 mL IntraVENous PRN    B complex-vitaminC-folic acid (NEPHROCAP) cap  1 Capsule Oral DAILY    haloperidoL (HALDOL) tablet 1 mg  1 mg Oral QHS            Lab/Data Review:  Labs: Results:       Chemistry Recent Labs     10/22/21  0213 10/21/21  0413 10/20/21  1420   * 137* 174*   * 138 140   K 3.7 4.0 3.4*   CL 98* 101 101   CO2 28 28 32   BUN 21* 32* 30*   CREA 6.39* 9.08* 7.72*   BUCR 3* 4* 4*   AGAP 8 9 7   CA 9.4 9.0 9.7   PHOS 3.6 4.0  --      No results for input(s): TBIL, ALT, ALKP, TP, ALB, GLOB, AGRAT in the last 72 hours.     No lab exists for component: SGOT   CBC w/Diff Recent Labs     10/22/21  0213 10/21/21  0413 10/21/21  0413 10/20/21  1420 10/20/21  1420   WBC 7.7  --  6.4  --  7.5   RBC 2.99*  --  2.76*  --  3.20*   HGB 8.9*  --  8.0*  --  9.5*   HCT 27.2*  --  25.1*  --  29.0*   MCV 91.0   < > 90.9   < > 90.6   MCH 29.8   < > 29.0   < > 29.7   MCHC 32.7   < > 31.9   < > 32.8   RDW 15.7*   < > 16.1*   < > 16.1*     --  188  --  223   GRANS 73  --  73  --  82*   LYMPH 9*  --  10*  --  7*   EOS 4  --  3  --  3    < > = values in this interval not displayed. Coagulation Recent Labs     10/20/21  1420   PTP 13.3   INR 1.0       Iron/Ferritin Lab Results   Component Value Date/Time    Iron 46 (L) 03/20/2021 02:02 PM    TIBC 173 (L) 03/20/2021 02:02 PM    Iron % saturation 27 03/20/2021 02:02 PM    Ferritin 1,396 (H) 03/20/2021 02:02 PM       BNP    Cardiac Enzymes Lab Results   Component Value Date/Time     (H) 10/21/2021 04:13 AM    CK - MB 1.4 10/21/2021 04:13 AM    CK-MB Index 0.4 10/21/2021 04:13 AM    Troponin-I, QT 0.04 10/21/2021 04:13 AM        Lactic Acid    Thyroid Studies          All Micro Results     Procedure Component Value Units Date/Time    CULTURE, URINE [[id number]]     Order Status: Sent Specimen: Cath Urine     CULTURE, BLOOD [[id number]]     Order Status: Sent Specimen: Blood     CULTURE, BLOOD [[id number]]     Order Status: Sent Specimen: Blood             Images:    CT (Most Recent). CT Results (most recent):  Results from Hospital Encounter encounter on 10/20/21    CTA CHEST W OR W WO CONT    Narrative  CTA CHEST PULMONARY EMBOLISM PROTOCOL      INDICATION: Chest pain with positive d-dimer. Question pulmonary embolism.     TECHNIQUE: Thin collimation axial images obtained through the level of the  pulmonary arteries with additional imaging through the chest following the  uneventful administration of nonionic intravenous contrast.  Images  reconstructed into three dimensional coronal and sagittal projections for  complete evaluation of the tortuous and overlapping pulmonary vascular  structures and to reduce patient radiation dose. All CT scans at this facility are performed using dose optimization technique as  appropriate to a performed exam, to include automated exposure control,  adjustment of the mA and/or kV according to patient size (including appropriate  matching first site-specific examinations), or use of iterative reconstruction  technique. COMPARISON: None. FINDINGS:    No filling defects are appreciated within the main, left, right, lobar or  visualized segmental pulmonary arteries to suggest embolism. Thyroid: Unremarkable in its visualized aspects. Pericardium/ Heart: There is a moderate pericardial effusion. Aorta/ Vessels: No aneurysm or dissection. Lymph Nodes: Unremarkable. .    Lungs: There is septal thickening. There is bibasilar atelectasis. There is no  dense consolidation. Small bilateral pleural effusions, right greater than left. Upper Abdomen: Atrophic kidneys. Multiple left renal cysts. Bones/soft tissues: No acute finding    Impression  No evidence for pulmonary emboli. Mild interstitial edema. Small bilateral pleural effusions, right greater than left. Moderate pericardial effusion. XRAY (Most Recent)      EKG No results found for this or any previous visit. 2D ECHO 07/19/21    ECHO ADULT FOLLOW-UP OR LIMITED 07/20/2021 7/20/2021    Interpretation Summary  · LV: Estimated LVEF is 65 - 70%. Visually measured ejection fraction. Normal systolic function (ejection fraction normal). Small left ventricle. Severe concentric hypertrophy. Wall motion: normal. Global longitudinal strain is -11.2%. · Pericardium: Small circumferential pericardial effusion. Signed by: Telly Matamoros MD on 7/20/2021 12:01 PM         10/20/21    ECHO ADULT FOLLOW-UP OR LIMITED 10/21/2021 10/21/2021    Interpretation Summary  · LV: Estimated LVEF is 60 - 65%. Visually measured ejection fraction. Normal cavity size and systolic function (ejection fraction normal).  Severe concentric hypertrophy. · Pericardium: Small-to-moderate circumferential pericardial effusion measuring 20 mm.     Signed by: Maren Patton MD on 10/21/2021  2:04 PM

## 2021-10-22 NOTE — ED NOTES
TRANSFER - OUT REPORT:    Verbal report given to RAYO Hernandez on Kvng Munguia  being transferred to [facility], Room 78 [phone] for routine progression of care       Report consisted of patients Situation, Background, Assessment and   Recommendations(SBAR). Information from the following report(s) SBAR was reviewed with the receiving nurse. Lines:   Peripheral IV 10/20/21 Right Antecubital (Active)   Site Assessment Clean, dry, & intact 10/20/21 0963        Opportunity for questions and clarification was provided.       Patient transported with:  Hospital Transport  O2 via nasal cannula at 4 L/min

## 2021-10-22 NOTE — PROGRESS NOTES
Problem: Diabetes Self-Management  Goal: *Disease process and treatment process  Description: Define diabetes and identify own type of diabetes; list 3 options for treating diabetes. Outcome: Progressing Towards Goal  Goal: *Incorporating nutritional management into lifestyle  Description: Describe effect of type, amount and timing of food on blood glucose; list 3 methods for planning meals. Outcome: Progressing Towards Goal  Goal: *Incorporating physical activity into lifestyle  Description: State effect of exercise on blood glucose levels. Outcome: Progressing Towards Goal  Goal: *Developing strategies to promote health/change behavior  Description: Define the ABC's of diabetes; identify appropriate screenings, schedule and personal plan for screenings. Outcome: Progressing Towards Goal  Goal: *Using medications safely  Description: State effect of diabetes medications on diabetes; name diabetes medication taking, action and side effects. Outcome: Progressing Towards Goal  Goal: *Monitoring blood glucose, interpreting and using results  Description: Identify recommended blood glucose targets  and personal targets. Outcome: Progressing Towards Goal  Goal: *Prevention, detection, treatment of acute complications  Description: List symptoms of hyper- and hypoglycemia; describe how to treat low blood sugar and actions for lowering  high blood glucose level. Outcome: Progressing Towards Goal  Goal: *Prevention, detection and treatment of chronic complications  Description: Define the natural course of diabetes and describe the relationship of blood glucose levels to long term complications of diabetes.   Outcome: Progressing Towards Goal  Goal: *Developing strategies to address psychosocial issues  Description: Describe feelings about living with diabetes; identify support needed and support network  Outcome: Progressing Towards Goal  Goal: *Insulin pump training  Outcome: Progressing Towards Goal  Goal: *Sick day guidelines  Outcome: Progressing Towards Goal  Goal: *Patient Specific Goal (EDIT GOAL, INSERT TEXT)  Outcome: Progressing Towards Goal     Problem: Falls - Risk of  Goal: *Absence of Falls  Description: Document Oscar Fall Risk and appropriate interventions in the flowsheet. Outcome: Progressing Towards Goal  Note: Fall Risk Interventions:  Mobility Interventions: Mechanical lift, Utilize gait belt for transfers/ambulation    Mentation Interventions: Adequate sleep, hydration, pain control, Bed/chair exit alarm    Medication Interventions: Utilize gait belt for transfers/ambulation    Elimination Interventions:  Toileting schedule/hourly rounds, Patient to call for help with toileting needs              Problem: Patient Education: Go to Patient Education Activity  Goal: Patient/Family Education  Outcome: Progressing Towards Goal

## 2021-10-22 NOTE — ROUTINE PROCESS
Received Pt to bed 521 at this time. Pt is alert and oriented to self. Skin assessment completed with Hodan Sorto RN. Foam boarder placed on sacrum for pressure sore prevention. No further concerns at this time.

## 2021-10-22 NOTE — PROGRESS NOTES
Comprehensive Nutrition Assessment    Type and Reason for Visit: Initial, Positive nutrition screen    Nutrition Recommendations/Plan:  - Continue current diet and add Nepro Shake, once daily.  - Monitor and encourage po intake as tolerated. Nutrition Assessment:  Hypertensive emergency and moderate pericardial effusion. Reporting good appetite and po intake. Agreeable to supplements. Malnutrition Assessment:  Malnutrition Status:  No malnutrition      Nutrition History and Allergies: PMHx- ESRD on HD, bipolar affective disorder, DM, HLD, seizures & HTN. Presented c/o chest pain, lives in a nursing home. Wt fluctuations noted per chart, reporting UBW of 130#. Good appetite PTA. Shellfish allergy. Estimated Daily Nutrient Needs:  Energy (kcal): 9875-2152; Weight Used for Energy Requirements: Current (57 kg)  Protein (g): 57-68; Weight Used for Protein Requirements: Current (1-1.2)  Fluid (ml/day): 750-1500; Method Used for Fluid Requirements: Standard renal    Nutrition Related Findings:  BM 10/21. +Edema. S/p HD yesterday, 2L UF removed. Edentulous but denies difficulty with regular diet.  Meds: Nephrocap, SSI & renvela      Wounds:    None       Current Nutrition Therapies:  ADULT DIET Regular; 4 carb choices (60 gm/meal)    Anthropometric Measures:  · Height:  5' 3\" (160 cm)  · Current Body Wt:  57.3 kg (126 lb 5.2 oz)   · Admission Body Wt:  131 lb    · Usual Body Wt:  59 kg (130 lb)     · Ideal Body Wt:  115 lbs:  109.8 %   · BMI Category:  Normal weight (BMI 22.0-24.9) age over 72       Nutrition Diagnosis:   · Increased nutrient needs related to renal dysfunction as evidenced by dialysis    Nutrition Interventions:   Food and/or Nutrient Delivery: Continue current diet, Start oral nutrition supplement, Vitamin supplement  Nutrition Education and Counseling: No recommendations at this time, Education not indicated  Coordination of Nutrition Care: Continue to monitor while inpatient    Goals:  PO nutrition intake will meet >75% of patient's estimated nutrition needs within the next 7 days       Nutrition Monitoring and Evaluation:   Behavioral-Environmental Outcomes: None identified  Food/Nutrient Intake Outcomes: Food and nutrient intake, Supplement intake, Vitamin/mineral intake  Physical Signs/Symptoms Outcomes: Biochemical data, Chewing or swallowing, Meal time behavior, Nutrition focused physical findings, Fluid status or edema    Discharge Planning:    Continue oral nutrition supplement, Continue current diet     Electronically signed by Dagoberto Dee RD on 10/22/2021 at 12:42 PM    Contact: [phone]

## 2021-10-22 NOTE — CONSULTS
[address] [facility]    Name:  Bird Angel  MR#:   [id number]  :  1955  ACCOUNT #:  [de-identified]  DATE OF SERVICE:  10/20/2021    RENAL CONSULTATION    Consult was requested by the emergency room physician    REASON FOR CONSULTATION:  Dialysis patient with severe chest pain and now with a moderate pericardial effusion by CTA. HISTORY OF PRESENT ILLNESS:  This is a 67-year-old -American female, a nursing home resident, who usually comes for dialysis every Monday, Wednesday, and Friday under my care, came for usual dialysis, but after 2-/2 hours of dialysis, suddenly she started complaining of severe chest pain, mainly in the precordial area without any radiation and was quite restless as usual.  During that time, her heart rate was within normal limits this time, but blood pressure was extremely high like the way she does. Most of the time, her blood pressure is extremely high and requires clonidine to bring the blood pressure down. Here it is not clear whether she gets the regular blood pressure medicine regularly at the nursing home or even if the nurse gives her, whether she takes it or not. She has history of hypertension, type 2 diabetes mellitus, old stroke, and also schizophrenia. She was evaluated further and CTA with a PE protocol was done and that did not show any pulmonary embolism, but did show moderate pericardial effusion. For this reason, I was notified, and advised them given that new moderate pericardial effusion, it is worth to keep the patient in the hospital and get evaluated and may need aggressive dialysis to take off this pericardial fluid and also advised them to stop the minoxidil, which sometimes may cause pericardial effusion. She is a poor historian. Most of the time, she is restless and just smiles. She possibly still makes some urine.   Her AV fistula graft attempted, not successful, so she is a dialysis patient with a catheter-dependent. PAST MEDICAL HISTORY:  Asthma, possible bipolar affective disorder, schizophrenia, cataract, ESRD, hyperlipidemia, hypertension, paralytic strabismus, and psychiatric disorder. No definitive history of seizure, though the mention of seizure is there. PAST SURGICAL HISTORY:  Include tunneled dialysis catheter, unsuccessful attempt of AV fistula, AV graft. FAMILY HISTORY:  Not clear. SOCIAL HISTORY:  Single. Lives in a nursing home. No drugs. No alcohol. No smoking history. ALLERGIES:  SHE IS ALLERGIC TO AMOXYCILLIN, CLINDAMYCIN, CODEINE, LORCET, SHELLFISH ALSO. REVIEW OF SYSTEMS:  CONSTITUTIONAL:  No fever, no chills, no headache. RESPIRATORY:  No shortness of breath. No calf muscle tenderness. No cough. No hemoptysis. CARDIOVASCULAR:  Positive for chest pain without radiation. No palpitations. GASTROINTESTINAL:  No hematemesis, no melena, no nausea, no vomiting. GENITOURINARY:  No hematuria, no dysuria. SKIN:  No rashes. NEUROLOGICAL:  No headaches. No seizures. LIST OF MEDICATIONS BEFORE ADMISSION:  Amlodipine 10 mg daily, aspirin 81 mg p.o. daily, Lipitor 10 mg p.o. daily, Nephrocaps once a day, Coreg 25 mg twice daily, clonidine patch 0.3 mg every week, Benadryl 25 mg every 6 hours p.r.n. for itching, Pepcid 20 mg p.o. daily, insulin Humalog on sliding scale; blood sugar less than 150 - 0 unit; 150 to199 - 2 units; 200 to 249 - 4 units; 250 to 299 - 6 units; 300 to 349 - 8 units; above 350 and above - 10 units and call physician. If glucose readings are above that level, call the attending physician. Isosorbide mononitrate 30 mg p.o. daily, losartan 100 mg p.o. daily, Keppra 500 mg one tablet two times daily, in addition, 250 mg supplemental on each dialysis. Losartan 100 mg daily, minoxidil 2.5 mg 1 tablet twice daily, MiraLax 17 g p.o. daily p.r.n. for constipation, and Renvela 800 mg tablet 1 tablet three times daily.     PHYSICAL EXAMINATION:  VITAL SIGNS:  On the day of admission, temperature 96.1, heart rate 86 per minute, blood pressure 197/80; 202/81. Mean arterial pressure 119-121. Oxygen saturation from 98% to 99%. Weight is 59.4 Kg. HEENT:  Oral mucosa is moist.  Pupils are reacting to light. NECK:  Jugular venous pressure not distended. Carotids, no audible bruit. Supple. LUNGS:  Clear to auscultation. HEART:  S1 and S2 without any gallop or murmur. ABDOMEN:  Soft. No palpable mass. EXTREMITIES:  Has right IJ tunneled dialysis catheter. No drainage from the catheter exit site. Ankle, negative edema. No calf muscle tenderness. LABORATORY DATA:  Relevant labs on the day of admission:  WBC 7.5 with a hemoglobin of 9.5, hematocrit 29.0, platelets 847,706. Neutrophil 82%, lymphocytes 7%. Chemistry:  Sodium 140, potassium 3.4, chloride 101, CO2 of 32, glucose of 174, blood urea nitrogen of 30, creatinine of 7.72, calcium of 9.7, magnesium 2.5. Troponin 0.03, 0.04. BNP of 11,342. Estimated average glucose is 140. CTA of the chest with and without contrast that was done on the day of admission. Findings:  No filling defects are appreciated within the main left and right lobe. All visualized segmental pulmonary arteries to suggest pulmonary embolism. Thyroid:  Unremarkable in the visual aspects. Pericardium:  Heart: There is moderate pericardial effusion. Aorta:  Blood vessels, no aneurysm or dissection. Lymph Nodes:  Unremarkable. Lungs: There is a septal thickening. There is bibasilar atelectasis. There is no dense consolidation, a small bilateral pleural effusion, right greater than left. Upper Abdomen:  Atrophic kidneys. Multiple left renal cysts. Bones and soft tissue:  No acute findings. Impression:  No evidence for any pulmonary embolism, mild interstitial edema, small bilateral pleural effusion, right greater than left, moderate pericardial effusion. Echocardiogram was done today.   Ejection fraction was 62-65%, visually measured ejection fraction, normal cavity size, severe concentric hypertrophy. There is a small-to-moderate circumferential pericardial effusion measuring 20 mm. IMPRESSION:  1. Mild-to-moderate circumferential pericardial effusion without acute pericarditis. 2.  Hypertensive urgency. 3.  End-stage renal disease. 4.  Atypical chest pain. Does not have any evidence of any coronary artery disease. Cannot rule out angina. 5.  Bipolar disorder. 6.  Type 2 diabetes mellitus. 7.  Anemia. PLAN:  The patient is admitted. Need to control the blood pressure with the medication that she is on except that Minoxidil will be discontinued given the diagnosis of Pericardial effusion. We will add hydralazine to the other blood pressure medicine. Aggressive dialysis without any Heparin to take some extra fluid off, at least 2-to 3-1/2 kilos. Monitor phosphorus and other electrolytes. Once the blood pressure is controlled, we may consider giving Retacrit. Fall precautions should be implemented. She may need some antipsychotic medications also. Further recommendations will be given based on the hospital course.         MD RENETTA Pinedo/[email or web address]  D:  10/21/2021 19:04  T:  10/22/2021 0:24  JOB #:  [id number]  CC:

## 2021-10-22 NOTE — PROGRESS NOTES
Problem: Diabetes Self-Management  Goal: *Disease process and treatment process  Description: Define diabetes and identify own type of diabetes; list 3 options for treating diabetes. Outcome: Progressing Towards Goal  Goal: *Incorporating nutritional management into lifestyle  Description: Describe effect of type, amount and timing of food on blood glucose; list 3 methods for planning meals. Outcome: Progressing Towards Goal  Goal: *Incorporating physical activity into lifestyle  Description: State effect of exercise on blood glucose levels. Outcome: Progressing Towards Goal  Goal: *Developing strategies to promote health/change behavior  Description: Define the ABC's of diabetes; identify appropriate screenings, schedule and personal plan for screenings. Outcome: Progressing Towards Goal  Goal: *Using medications safely  Description: State effect of diabetes medications on diabetes; name diabetes medication taking, action and side effects. Outcome: Progressing Towards Goal  Goal: *Monitoring blood glucose, interpreting and using results  Description: Identify recommended blood glucose targets  and personal targets. Outcome: Progressing Towards Goal  Goal: *Prevention, detection, treatment of acute complications  Description: List symptoms of hyper- and hypoglycemia; describe how to treat low blood sugar and actions for lowering  high blood glucose level. Outcome: Progressing Towards Goal  Goal: *Prevention, detection and treatment of chronic complications  Description: Define the natural course of diabetes and describe the relationship of blood glucose levels to long term complications of diabetes.   Outcome: Progressing Towards Goal  Goal: *Developing strategies to address psychosocial issues  Description: Describe feelings about living with diabetes; identify support needed and support network  Outcome: Progressing Towards Goal  Goal: *Insulin pump training  Outcome: Progressing Towards Goal  Goal: *Sick day guidelines  Outcome: Progressing Towards Goal  Goal: *Patient Specific Goal (EDIT GOAL, INSERT TEXT)  Outcome: Progressing Towards Goal     Problem: Patient Education: Go to Patient Education Activity  Goal: Patient/Family Education  Outcome: Progressing Towards Goal     Problem: Falls - Risk of  Goal: *Absence of Falls  Description: Document Varsha Led Fall Risk and appropriate interventions in the flowsheet. Outcome: Progressing Towards Goal  Note: Fall Risk Interventions:  Mobility Interventions: Mechanical lift, Utilize gait belt for transfers/ambulation    Mentation Interventions: Adequate sleep, hydration, pain control, Bed/chair exit alarm    Medication Interventions: Utilize gait belt for transfers/ambulation    Elimination Interventions: Toileting schedule/hourly rounds, Patient to call for help with toileting needs              Problem: Patient Education: Go to Patient Education Activity  Goal: Patient/Family Education  Outcome: Progressing Towards Goal     Problem: Pressure Injury - Risk of  Goal: *Prevention of pressure injury  Description: Document Ab Scale and appropriate interventions in the flowsheet. Outcome: Progressing Towards Goal  Note: Pressure Injury Interventions:  Sensory Interventions: Keep linens dry and wrinkle-free, Minimize linen layers, Monitor skin under medical devices    Moisture Interventions: Absorbent underpads, Limit adult briefs    Activity Interventions: PT/OT evaluation    Mobility Interventions: PT/OT evaluation, Turn and reposition approx.  every two hours(pillow and wedges)    Nutrition Interventions: Discuss nutritional consult with provider                     Problem: Patient Education: Go to Patient Education Activity  Goal: Patient/Family Education  Outcome: Progressing Towards Goal

## 2021-10-22 NOTE — PROGRESS NOTES
Cardiology Progress Note    Admit Date: 10/20/2021  Attending Cardiologist: Dr. Shavonne Mclaughlin:     -Chest pain. Atypical likely related to hypertensive crisis and vascular congestion, but also with known pericardial effusion as noted below. Presented from HD with CP and hypertensive crisis, HD session ended early. Troponin unremarkable. ECG with chronic nonspecific ECG abnormalitites. Multiple previous similar admissions. Conservative cardiac management has historically been recommended for her atypical chest pain. -H/o moderate loculated pericardial effusion by echo 6/2021. Echo 7/2021 with small improved circumferential pericardial effusion. Echo this admission with small to moderate circumferential pericardial effusion measuring 20 mm.  -Hypertensive crisis with SBP >200. Skipped morning antihypertensives yesterday. -ESRD on HD MWF. HD session ended early prior to admission due to CP and hypertensive crisis.  -Chronic HFpEF. EF 65-70% 7/2021 with severe concentric hypertrophy.  -Diabetes mellitus. Hgb A1c 6.5.  -Dyslipidemia. On statin. -H/o paroxysmal SVT. -Chronic anemia.  -Seizure disorder.  -Bipolar disorder. -H/o CVA. -Essentially wheelchair/bed bound, nursing home patient, patient is again concerned that she does not get all of the medications as prescribed at nursing home.     No primary cardiologist. Consult this admission by Dr. Jey Torres. Previous cardiology evaluations have been inpatient.     Plan:       I saw, evaluated, interviewed and examined the patient personally. I agree with the findings and plan of care as documented below with PA-C note  Denies any chest pain concerning for angina or heart failure. Volume management per renal team  Echo with small to medium pericardial effusion on echo  Likely in the setting of chronic kidney disease and uremia  Consider slightly more aggressive dialysis to help with pericardial effusion.   Discussed with renal team  Hemodynamically stable  No further cardiac work-up is planned at this time. Please call with question    Heath Peters MD       Echo this admission with small to moderate circumferential pericardial effusion measuring 20 mm. There is no evidence of hemodynamic compromise. Would continue volume management with HD. Would continue BP management. No plans of further cardiac work up or intervention at this time. Would continue conservative cardiac management in setting of multiple comorbidities. Subjective:     Patient feeling much better. She denies recurrent CP.     Objective:      Patient Vitals for the past 8 hrs:   Temp Pulse Resp BP SpO2   10/22/21 0844 98.8 °F (37.1 °C) 90 16 (!) 176/85 100 %   10/22/21 0522 99.5 °F (37.5 °C) 92 18 (!) 182/70 100 %         Patient Vitals for the past 96 hrs:   Weight   10/22/21 0522 126 lb 6.4 oz (57.3 kg)   10/21/21 1333 131 lb (59.4 kg)   10/21/21 1017 131 lb (59.4 kg)   10/20/21 1339 131 lb (59.4 kg)       TELE: Not on monitor               Current Facility-Administered Medications   Medication Dose Route Frequency Last Admin    heparin (porcine) 1,000 unit/mL injection 5,000 Units  5,000 Units IntraCATHeter DIALYSIS PRN      amLODIPine (NORVASC) tablet 10 mg  10 mg Oral QHS 10 mg at 10/21/21 1819    hydrALAZINE (APRESOLINE) tablet 50 mg  50 mg Oral TID 50 mg at 10/22/21 4348    cloNIDine (CATAPRES) 0.3 mg/24 hr patch 1 Patch  1 Patch TransDERmal Q7D 1 Patch at 10/21/21 1840    sodium chloride (NS) flush 5-40 mL  5-40 mL IntraVENous Q8H 10 mL at 10/22/21 0641    sodium chloride (NS) flush 5-40 mL  5-40 mL IntraVENous PRN      [Held by provider] heparin (porcine) injection 5,000 Units  5,000 Units SubCUTAneous Q8H      aspirin chewable tablet 81 mg  81 mg Oral DAILY 81 mg at 10/22/21 0820    atorvastatin (LIPITOR) tablet 10 mg  10 mg Oral DAILY 10 mg at 10/22/21 0820    carvediloL (COREG) tablet 25 mg  25 mg Oral BID WITH MEALS 25 mg at 10/22/21 2884    diphenhydrAMINE (BENADRYL) capsule 25 mg  25 mg Oral Q8H PRN      famotidine (PEPCID) tablet 20 mg  20 mg Oral DAILY 20 mg at 10/22/21 5802    isosorbide mononitrate ER (IMDUR) tablet 30 mg  30 mg Oral DAILY 30 mg at 10/22/21 6301    levETIRAcetam (KEPPRA) tablet 250 mg  250 mg Oral Q MON, WED & FRI      levETIRAcetam (KEPPRA) tablet 500 mg  500 mg Oral  mg at 10/22/21 9364    losartan (COZAAR) tablet 100 mg  100 mg Oral DAILY 100 mg at 10/22/21 9184    sevelamer carbonate (RENVELA) tab 800 mg  800 mg Oral  mg at 10/22/21 6900    insulin lispro (HUMALOG) injection   SubCUTAneous AC&HS 2 Units at 10/21/21 2255    glucose chewable tablet 16 g  4 Tablet Oral PRN      glucagon (GLUCAGEN) injection 1 mg  1 mg IntraMUSCular PRN      dextrose (D50W) injection syrg 12.5-25 g  25-50 mL IntraVENous PRN      B complex-vitaminC-folic acid (NEPHROCAP) cap  1 Capsule Oral DAILY 1 Capsule at 10/22/21 5968    haloperidoL (HALDOL) tablet 1 mg  1 mg Oral QHS 1 mg at 10/21/21 2321         Intake/Output Summary (Last 24 hours) at 10/22/2021 1005  Last data filed at 10/21/2021 1245  Gross per 24 hour   Intake    Output 2000 ml   Net -2000 ml       Physical Exam:  General:  alert, cooperative, no distress, appears stated age  Neck:  no JVD  Lungs:  clear to auscultation bilaterally  Heart:  regular rate and rhythm  Abdomen:  abdomen is soft without significant tenderness, masses, organomegaly or guarding  Extremities:  extremities normal, atraumatic, no cyanosis or edema    Visit Vitals  BP (!) 176/85   Pulse 90   Temp 98.8 °F (37.1 °C)   Resp 16   Ht 5' 3\" (1.6 m)   Wt 126 lb 6.4 oz (57.3 kg)   SpO2 100%   BMI 22.39 kg/m²       Data Review:     Labs: Results:       Chemistry Recent Labs     10/22/21  0213 10/21/21  0413 10/20/21  1420   * 137* 174*   * 138 140   K 3.7 4.0 3.4*   CL 98* 101 101   CO2 28 28 32   BUN 21* 32* 30*   CREA 6.39* 9.08* 7.72*   CA 9.4 9.0 9.7   MG  --   --  2.5   PHOS 3.6 4.0  --    AGAP 8 9 7   BUCR 3* 4* 4*      CBC w/Diff Recent Labs     10/22/21  0213 10/21/21  0413 10/20/21  1420   WBC 7.7 6.4 7.5   RBC 2.99* 2.76* 3.20*   HGB 8.9* 8.0* 9.5*   HCT 27.2* 25.1* 29.0*    188 223   GRANS 73 73 82*   LYMPH 9* 10* 7*   EOS 4 3 3      Cardiac Enzymes No results found for: CPK, CK, CKMMB, CKMB, RCK3, CKMBT, CKNDX, CKND1, SONG, TROPT, TROIQ, KARINA, TROPT, TNIPOC, BNP, BNPP   Coagulation Recent Labs     10/20/21  1420   PTP 13.3   INR 1.0       Lipid Panel No results found for: CHOL, CHOLPOCT, CHOLX, CHLST, CHOLV, 029233, HDL, HDLP, LDL, LDLC, DLDLP, 328016, VLDLC, VLDL, TGLX, TRIGL, TRIGP, TGLPOCT, CHHD, CHHDX   BNP No results found for: BNP, BNPP, XBNPT   Liver Enzymes No results for input(s): TP, ALB, TBIL, AP in the last 72 hours.     No lab exists for component: SGOT, GPT, DBIL   Thyroid Studies Lab Results   Component Value Date/Time    TSH 2.59 06/12/2021 09:15 AM          Signed By: SUSI Montgomery     October 22, 2021

## 2021-10-23 LAB
ANION GAP SERPL CALC-SCNC: 9 MMOL/L (ref 3–18)
BACTERIA SPEC CULT: NORMAL
BASOPHILS # BLD: 0 K/UL (ref 0–0.1)
BASOPHILS NFR BLD: 0 % (ref 0–2)
BUN SERPL-MCNC: 15 MG/DL (ref 7–18)
BUN/CREAT SERPL: 3 (ref 12–20)
CALCIUM SERPL-MCNC: 9.2 MG/DL (ref 8.5–10.1)
CHLORIDE SERPL-SCNC: 99 MMOL/L (ref 100–111)
CO2 SERPL-SCNC: 28 MMOL/L (ref 21–32)
CREAT SERPL-MCNC: 4.56 MG/DL (ref 0.6–1.3)
DIFFERENTIAL METHOD BLD: ABNORMAL
EOSINOPHIL # BLD: 0.3 K/UL (ref 0–0.4)
EOSINOPHIL NFR BLD: 5 % (ref 0–5)
ERYTHROCYTE [DISTWIDTH] IN BLOOD BY AUTOMATED COUNT: 15.3 % (ref 11.6–14.5)
GLUCOSE BLD STRIP.AUTO-MCNC: 161 MG/DL (ref 70–110)
GLUCOSE BLD STRIP.AUTO-MCNC: 180 MG/DL (ref 70–110)
GLUCOSE BLD STRIP.AUTO-MCNC: 82 MG/DL (ref 70–110)
GLUCOSE BLD STRIP.AUTO-MCNC: 98 MG/DL (ref 70–110)
GLUCOSE SERPL-MCNC: 88 MG/DL (ref 74–99)
HCT VFR BLD AUTO: 25.9 % (ref 35–45)
HGB BLD-MCNC: 8.5 G/DL (ref 12–16)
LYMPHOCYTES # BLD: 0.6 K/UL (ref 0.9–3.6)
LYMPHOCYTES NFR BLD: 8 % (ref 21–52)
MCH RBC QN AUTO: 29.4 PG (ref 24–34)
MCHC RBC AUTO-ENTMCNC: 32.8 G/DL (ref 31–37)
MCV RBC AUTO: 89.6 FL (ref 78–100)
MONOCYTES # BLD: 0.8 K/UL (ref 0.05–1.2)
MONOCYTES NFR BLD: 12 % (ref 3–10)
NEUTS SEG # BLD: 5.1 K/UL (ref 1.8–8)
NEUTS SEG NFR BLD: 75 % (ref 40–73)
PHOSPHATE SERPL-MCNC: 3.3 MG/DL (ref 2.5–4.9)
PLATELET # BLD AUTO: 210 K/UL (ref 135–420)
PMV BLD AUTO: 10.4 FL (ref 9.2–11.8)
POTASSIUM SERPL-SCNC: 3.4 MMOL/L (ref 3.5–5.5)
RBC # BLD AUTO: 2.89 M/UL (ref 4.2–5.3)
SERVICE CMNT-IMP: NORMAL
SODIUM SERPL-SCNC: 136 MMOL/L (ref 136–145)
WBC # BLD AUTO: 6.9 K/UL (ref 4.6–13.2)

## 2021-10-23 PROCEDURE — 2709999900 HC NON-CHARGEABLE SUPPLY

## 2021-10-23 PROCEDURE — 74011250637 HC RX REV CODE- 250/637: Performed by: INTERNAL MEDICINE

## 2021-10-23 PROCEDURE — 65270000029 HC RM PRIVATE

## 2021-10-23 PROCEDURE — 36415 COLL VENOUS BLD VENIPUNCTURE: CPT

## 2021-10-23 PROCEDURE — 99232 SBSQ HOSP IP/OBS MODERATE 35: CPT | Performed by: INTERNAL MEDICINE

## 2021-10-23 PROCEDURE — 80048 BASIC METABOLIC PNL TOTAL CA: CPT

## 2021-10-23 PROCEDURE — 84100 ASSAY OF PHOSPHORUS: CPT

## 2021-10-23 PROCEDURE — 74011636637 HC RX REV CODE- 636/637: Performed by: STUDENT IN AN ORGANIZED HEALTH CARE EDUCATION/TRAINING PROGRAM

## 2021-10-23 PROCEDURE — 90935 HEMODIALYSIS ONE EVALUATION: CPT

## 2021-10-23 PROCEDURE — 74011250637 HC RX REV CODE- 250/637: Performed by: STUDENT IN AN ORGANIZED HEALTH CARE EDUCATION/TRAINING PROGRAM

## 2021-10-23 PROCEDURE — 85025 COMPLETE CBC W/AUTO DIFF WBC: CPT

## 2021-10-23 PROCEDURE — 82962 GLUCOSE BLOOD TEST: CPT

## 2021-10-23 RX ORDER — HALOPERIDOL 2 MG/1
1 TABLET ORAL 2 TIMES DAILY
Status: DISCONTINUED | OUTPATIENT
Start: 2021-10-23 | End: 2021-10-25 | Stop reason: HOSPADM

## 2021-10-23 RX ORDER — POTASSIUM CHLORIDE 20 MEQ/1
20 TABLET, EXTENDED RELEASE ORAL
Status: COMPLETED | OUTPATIENT
Start: 2021-10-23 | End: 2021-10-23

## 2021-10-23 RX ADMIN — Medication 10 ML: at 06:19

## 2021-10-23 RX ADMIN — HYDRALAZINE HYDROCHLORIDE 50 MG: 50 TABLET, FILM COATED ORAL at 21:25

## 2021-10-23 RX ADMIN — LEVETIRACETAM 500 MG: 500 TABLET, FILM COATED ORAL at 17:58

## 2021-10-23 RX ADMIN — ISOSORBIDE MONONITRATE 30 MG: 60 TABLET, EXTENDED RELEASE ORAL at 08:23

## 2021-10-23 RX ADMIN — FAMOTIDINE 20 MG: 20 TABLET ORAL at 08:22

## 2021-10-23 RX ADMIN — SEVELAMER CARBONATE 800 MG: 800 TABLET, FILM COATED ORAL at 21:25

## 2021-10-23 RX ADMIN — DIPHENHYDRAMINE HYDROCHLORIDE 25 MG: 25 CAPSULE ORAL at 21:25

## 2021-10-23 RX ADMIN — POTASSIUM CHLORIDE 20 MEQ: 1500 TABLET, EXTENDED RELEASE ORAL at 14:21

## 2021-10-23 RX ADMIN — Medication 10 ML: at 14:22

## 2021-10-23 RX ADMIN — AMLODIPINE BESYLATE 10 MG: 10 TABLET ORAL at 21:25

## 2021-10-23 RX ADMIN — NEPHROCAP 1 CAPSULE: 1 CAP ORAL at 08:22

## 2021-10-23 RX ADMIN — Medication 10 ML: at 21:29

## 2021-10-23 RX ADMIN — HALOPERIDOL 1 MG: 2 TABLET ORAL at 17:58

## 2021-10-23 RX ADMIN — INSULIN LISPRO 2 UNITS: 100 INJECTION, SOLUTION INTRAVENOUS; SUBCUTANEOUS at 17:56

## 2021-10-23 RX ADMIN — DIPHENHYDRAMINE HYDROCHLORIDE 25 MG: 25 CAPSULE ORAL at 00:34

## 2021-10-23 RX ADMIN — SEVELAMER CARBONATE 800 MG: 800 TABLET, FILM COATED ORAL at 08:22

## 2021-10-23 RX ADMIN — DIPHENHYDRAMINE HYDROCHLORIDE 25 MG: 25 CAPSULE ORAL at 08:23

## 2021-10-23 RX ADMIN — SEVELAMER CARBONATE 800 MG: 800 TABLET, FILM COATED ORAL at 17:57

## 2021-10-23 RX ADMIN — CARVEDILOL 25 MG: 25 TABLET, FILM COATED ORAL at 17:58

## 2021-10-23 RX ADMIN — HYDRALAZINE HYDROCHLORIDE 50 MG: 50 TABLET, FILM COATED ORAL at 08:22

## 2021-10-23 RX ADMIN — ATORVASTATIN CALCIUM 10 MG: 10 TABLET, FILM COATED ORAL at 08:23

## 2021-10-23 RX ADMIN — ASPIRIN 81 MG CHEWABLE TABLET 81 MG: 81 TABLET CHEWABLE at 08:22

## 2021-10-23 RX ADMIN — CARVEDILOL 25 MG: 25 TABLET, FILM COATED ORAL at 08:22

## 2021-10-23 RX ADMIN — INSULIN LISPRO 2 UNITS: 100 INJECTION, SOLUTION INTRAVENOUS; SUBCUTANEOUS at 21:25

## 2021-10-23 RX ADMIN — LEVETIRACETAM 500 MG: 500 TABLET, FILM COATED ORAL at 08:23

## 2021-10-23 RX ADMIN — LOSARTAN POTASSIUM 100 MG: 50 TABLET, FILM COATED ORAL at 08:22

## 2021-10-23 RX ADMIN — HYDRALAZINE HYDROCHLORIDE 50 MG: 50 TABLET, FILM COATED ORAL at 17:57

## 2021-10-23 NOTE — ROUTINE PROCESS
End of Shift Note     Bedside and verbal shift change report given to RAYO Dailey (On coming nurse) by Daniel Arevalo RN (Off going nurse).   Report included the following information:      --Procedure Summary     --MAR,     --Recent Results     --Med Rec Status    SBAR Recommendations: Complete care    Issues for Provider to address placement          Activity This Shift     [x] Bed Rest Order   [] Refused   [] Dangled    [] TDWB         Ambulating:     [] Bathroom     [] BSC     [] Room/Hallway      Up in Chair for meals    []Yes [] No   Voiding       [] Yes  [x] No  Khan          [] Yes  [] No  Incontinent [] Yes  [] No    DUE TO VOID POUR        [] Yes [] No  Purewick    [] Yes [] No  New Onset [] Yes [] No Straight Cath   []Yes  [] No  Condom Cath  [] Yes [] No  MD Called      [] Yes  [] No   Blood Sugars Managed [x]Yes [] No    Bowels Moved [] Yes [x] No    Incontinent     [] Yes [] No Passed Gas []Yes [x] No    New Onset  []Yes [] No        MD Called []Yes  [] No     CHG Bath Done     Before Surgery     After Surgery      [] Yes  [x] No  [] Yes  [x] No       Drain Removed [] Yes  [] No [x] N/A    Dressing Changed [] Yes   [] No [x] N/A      Nausea/Vomiting [] Yes   [x] No     Ice Packs Changed [] Yes   [] No  [x] N/A    Incentive Spirometer  [] Yes  [x] No      SCD Pumps On     Ankle Pumping  [] Yes   [x] No      [] Yes   [x] No        Telemetry Monitoring [] Yes   [x] No   Rhythm

## 2021-10-23 NOTE — PROGRESS NOTES
Problem: Diabetes Self-Management  Goal: *Disease process and treatment process  Description: Define diabetes and identify own type of diabetes; list 3 options for treating diabetes. Outcome: Progressing Towards Goal  Goal: *Incorporating nutritional management into lifestyle  Description: Describe effect of type, amount and timing of food on blood glucose; list 3 methods for planning meals. Outcome: Progressing Towards Goal  Goal: *Incorporating physical activity into lifestyle  Description: State effect of exercise on blood glucose levels. Outcome: Progressing Towards Goal  Goal: *Developing strategies to promote health/change behavior  Description: Define the ABC's of diabetes; identify appropriate screenings, schedule and personal plan for screenings. Outcome: Progressing Towards Goal  Goal: *Using medications safely  Description: State effect of diabetes medications on diabetes; name diabetes medication taking, action and side effects. Outcome: Progressing Towards Goal  Goal: *Monitoring blood glucose, interpreting and using results  Description: Identify recommended blood glucose targets  and personal targets. Outcome: Progressing Towards Goal  Goal: *Prevention, detection, treatment of acute complications  Description: List symptoms of hyper- and hypoglycemia; describe how to treat low blood sugar and actions for lowering  high blood glucose level. Outcome: Progressing Towards Goal  Goal: *Prevention, detection and treatment of chronic complications  Description: Define the natural course of diabetes and describe the relationship of blood glucose levels to long term complications of diabetes.   Outcome: Progressing Towards Goal  Goal: *Developing strategies to address psychosocial issues  Description: Describe feelings about living with diabetes; identify support needed and support network  Outcome: Progressing Towards Goal  Goal: *Insulin pump training  Outcome: Progressing Towards Goal  Goal: *Sick day guidelines  Outcome: Progressing Towards Goal  Goal: *Patient Specific Goal (EDIT GOAL, INSERT TEXT)  Outcome: Progressing Towards Goal     Problem: Falls - Risk of  Goal: *Absence of Falls  Description: Document Oscar Fall Risk and appropriate interventions in the flowsheet.   Outcome: Progressing Towards Goal  Note: Fall Risk Interventions:  Mobility Interventions: Communicate number of staff needed for ambulation/transfer, OT consult for ADLs, Patient to call before getting OOB, PT Consult for mobility concerns, PT Consult for assist device competence    Mentation Interventions: Door open when patient unattended, Eyeglasses and hearing aids    Medication Interventions: Patient to call before getting OOB, Teach patient to arise slowly    Elimination Interventions: Call light in reach, Bed/chair exit alarm, Patient to call for help with toileting needs

## 2021-10-23 NOTE — ROUTINE PROCESS
Bedside shift change report given to Renetta East (oncoming nurse) by Pritesh Lomeli (offgoing nurse). Report included the following information SBAR, Kardex, Intake/Output, MAR and Recent Results.

## 2021-10-23 NOTE — PROGRESS NOTES
Progress Note    Debby Ziegler  74 y.o. Admit Date: 10/20/2021  Active Problems:    ESRD on hemodialysis ([facility] (8/8/2018) POA: Unknown      Secondary hyperparathyroidism of renal origin ([facility] (8/8/2018) POA: Yes      Anemia (3/19/2021) POA: Yes      Acute pericardial effusion (10/21/2021) POA: Unknown            Subjective:     Patient remained in her base line status ,says feels much better than yesterday, no Chest pain, no SOB, had 1 episode of vomiting during dialysis, Removed 2 liters By UF       A comprehensive review of systems was negative except for that written in the History of Present Illness.     Objective:     Visit Vitals  BP (!) 190/90   Pulse 97   Temp 97.4 °F (36.3 °C) (Oral)   Resp 18   Ht 5' 3\" (1.6 m)   Wt 57.3 kg (126 lb 6.4 oz)   LMP  (LMP Unknown)   SpO2 100%   BMI 22.39 kg/m²         Intake/Output Summary (Last 24 hours) at 10/22/2021 2038  Last data filed at 10/22/2021 1830  Gross per 24 hour   Intake 120 ml   Output 2000 ml   Net -1880 ml       Current Facility-Administered Medications   Medication Dose Route Frequency Provider Last Rate Last Admin    heparin (porcine) 1,000 unit/mL injection 5,000 Units  5,000 Units IntraCATHeter DIALYSIS PRN Madisyn Cooley MD        amLODIPine (NORVASC) tablet 10 mg  10 mg Oral QHS Lea Mccarty MD   10 mg at 10/21/21 1819    hydrALAZINE (APRESOLINE) tablet 50 mg  50 mg Oral TID Lea Mccarty MD   50 mg at 10/22/21 0093    cloNIDine (CATAPRES) 0.3 mg/24 hr patch 1 Patch  1 Patch TransDERmal Q7D Lea Mccarty MD   1 Patch at 10/21/21 1840    sodium chloride (NS) flush 5-40 mL  5-40 mL IntraVENous Q8H Tom Melgoza MD   10 mL at 10/22/21 8499    sodium chloride (NS) flush 5-40 mL  5-40 mL IntraVENous PRN Miky Melgoza MD       [facility] by provider] heparin (porcine) injection 5,000 Units  5,000 Units SubCUTAneous Q8H Tom Melgoza MD        aspirin chewable tablet 81 mg  81 mg Oral DAILY Tom Melgoza MD   81 mg at 10/22/21 0820    atorvastatin (LIPITOR) tablet 10 mg  10 mg Oral DAILY Tom Melgoza MD   10 mg at 10/22/21 0820    carvediloL (COREG) tablet 25 mg  25 mg Oral BID WITH MEALS Tom Melgoza MD   25 mg at 10/22/21 2018    diphenhydrAMINE (BENADRYL) capsule 25 mg  25 mg Oral Q8H PRN Tom Melgoza MD        famotidine (PEPCID) tablet 20 mg  20 mg Oral DAILY Tom Melgoza MD   20 mg at 10/22/21 6204    isosorbide mononitrate ER (IMDUR) tablet 30 mg  30 mg Oral DAILY Tmo Melgoza MD   30 mg at 10/22/21 4807    levETIRAcetam (KEPPRA) tablet 250 mg  250 mg Oral Q MON, WED & FRI Tom Melgoza MD   250 mg at 10/22/21 2017    levETIRAcetam (KEPPRA) tablet 500 mg  500 mg Oral BID Tom Melgoza MD   500 mg at 10/22/21 2017    losartan (COZAAR) tablet 100 mg  100 mg Oral DAILY Tom Melgoza MD   100 mg at 10/22/21 8993    sevelamer carbonate (RENVELA) tab 800 mg  800 mg Oral TID Tom Melgoza MD   800 mg at 10/22/21 5716    insulin lispro (HUMALOG) injection   SubCUTAneous AC&HS Tom Melgoza MD   2 Units at 10/21/21 2255    glucose chewable tablet 16 g  4 Tablet Oral PRN Tom Melgoza MD        glucagon [facility]) injection 1 mg  1 mg IntraMUSCular PRN Tom Melgoza MD        dextrose (D50W) injection syrg 12.5-25 g  25-50 mL IntraVENous PRN Tom Melgoza MD  B complex-vitaminC-folic acid (NEPHROCAP) cap  1 Capsule Oral DAILY Tom Melgoza MD   1 Capsule at 10/22/21 9015    haloperidoL (HALDOL) tablet 1 mg  1 mg Oral QHS Bisi Duran MD   1 mg at 10/21/21 2321        Physical Exam:     Physical Exam:   General:  Alert, cooperative, no distress, appears stated age. Neck: Supple, symmetrical, trachea midline, no adenopathy, thyroid: no enlargement/tenderness/nodules, no carotid bruit and no JVD. Lungs:   Clear to auscultation bilaterally. Heart:  Regular rate and rhythm, S1, S2 normal, no murmur, click, rub or gallop. Abdomen:   Soft, non-tender. Bowel sounds normal. No masses,  No organomegaly.    Extremities: Extremities normal, atraumatic, no cyanosis or edema, HD  Catheter is well dressed   Skin: Skin color, texture, turgor normal. No rashes or lesions         Data Review:    CBC w/Diff    Recent Labs     10/22/21  0213 10/21/21  0413 10/21/21  0413 10/20/21  1420 10/20/21  1420   WBC 7.7  --  6.4  --  7.5   RBC 2.99*  --  2.76*  --  3.20*   HGB 8.9*  --  8.0*  --  9.5*   HCT 27.2*  --  25.1*  --  29.0*   MCV 91.0   < > 90.9   < > 90.6   MCH 29.8   < > 29.0   < > 29.7   MCHC 32.7   < > 31.9   < > 32.8   RDW 15.7*   < > 16.1*   < > 16.1*    < > = values in this interval not displayed. Recent Labs     10/22/21  0213 10/21/21  0413 10/21/21  0413 10/20/21  1420 10/20/21  1420   MONOS 14*  --  14*  --  8   EOS 4  --  3  --  3   BASOS 0   < > 0   < > 0   RDW 15.7*   < > 16.1*   < > 16.1*    < > = values in this interval not displayed. Comprehensive Metabolic Profile    Recent Labs     10/22/21  0213 10/21/21  0413 10/20/21  1420   * 138 140   K 3.7 4.0 3.4*   CL 98* 101 101   CO2 28 28 32   BUN 21* 32* 30*   CREA 6.39* 9.08* 7.72*    Recent Labs     10/22/21  0213 10/21/21  0413 10/20/21  1420   CA 9.4 9.0 9.7   PHOS 3.6 4.0  --                       Impression:       Active Hospital Problems    Diagnosis Date Noted    Acute pericardial effusion 10/21/2021    Anemia 03/19/2021    ESRD on hemodialysis ([facility] 08/08/2018    Secondary hyperparathyroidism of renal origin ([facility] 08/08/2018      BP not controlled,for some reason not on ARB,Beta blocker      Plan:     Continue current BP medicine, restart ARB,Carvedilol, Not to give MINOXIDIL. Dialysis again tomorrow.   Discussed with Dr. Josue Farnsworth MD

## 2021-10-23 NOTE — DIALYSIS
ACUTE HEMODIALYSIS TREATMENT    HEMODIALYSIS ORDERS: Physician: Dr. Linda Ji: aclear   Duration: 3 hr   BFR: 400   DFR: 800   Dialysate:  Temp 36-37*C   K+  3    Ca+ 2.5   Na 138   Bicarb 35   Wt Readings from Last 1 Encounters:   10/22/21 57.3 kg (126 lb 6.4 oz)    Patient Chart [x]   Unable to Obtain []  Dry weight/UF Goal: 2000 ml    Heparin []  Bolus    Units    [] Hourly    Units    [x]None       Pre BP:   135/69   Pulse:  85   Respirations: 16   Temp:  98  [] Oral [] Axillary [] Esophageal   Labs: []  Pre  []  Post:   [x] N/A   Additional Orders(medications, blood products, hypotension management): [] Yes   [] No     [x]  [facility] Consent Verified     CATHETER ACCESS:  []N/A   []Right   []Left   []IJ   []Fem  []Chest wall  []TransHepatic   [] First use X-ray verified     []Tunnel    [] Non Tunneled   [x]No S/S infection  []Redness  []Drainage []Cultured []Swelling []Pain   [x]Medical Aseptic Prep Utilized   []Dressing Changed  [x] Biopatch  Date:    []Clotted   [x]Patent   Flows: [x]Good  []Poor  []Reversed   If access problem,  notified: []Yes    [x]N/A        GRAFT/FISTULA ACCESS:   []N/A     []Right     []Left     []UE     []LE   []AVG   []AVF       [x]Medical Aseptic Prep Utilized   [x]No S/S infection  []Redness  []Drainage [] Cultured  [] Swelling  [] Pain  Bruit:   [x] Strong    [] Weak       Thrill :   [x] Strong    [] Weak     Needle Gauge: 15   Length: 1 inch   If access problem,  notified: []Yes     [x]N/A          GENERAL ASSESSMENT:    LUNGS:  Resp Rate 18   [] Clear  [x] Coarse  [] Crackles  [] Wheezing  [] Diminished         Respirations:  [x]Easy  []Labored  []N/A  Cough:  []Productive  []Dry  [x]N/A      Therapy:  [x]RA  O2 Type:  []NC  Mask: []  NRB    [] BiPaP  Flow:  l/min                   [] Ventilated  [] Intubated  [] Trach     CARDIAC: [x] Regular      [] Irregular   [] Rhythm:          [] Monitored   [] Bedside   [] Remotely monitored     EDEMA: [] None [x]Generalized  [] Pitting [] 1+   [] 2 +   [] 3+    [] 4+  [] Pedal    SKIN:   [x] Warm  [] Hot     [] Cold   [x] Dry     [] Pale   [] Diaphoretic                  [] Flushed  [] Jaundiced  [] Cyanotic     LOC:    [x] Alert      [x]Oriented:    [x] Person     [] Place  []Time               [x] Confused  [] Lethargic  [] Medicated  [] Non-responsive  [] Non Verbal   GI / ABDOMEN:                     [] Flat    [] Distended    [x] Soft    [] Firm   []  Obese                   [] Diarrhea   [] FMS [x] Bowel Sounds  [] Nausea  [] Vomiting                   [] NGT  [] OGT    [] PEG  [] Tube Feedings @     / URINE ASSESSMENT:                   [] Voiding  []  Khan  [x] Oliguria  [] Anuria                     [] Incontinent  []  Incontinent Brief   []  PureWick     PAIN:  [x] 0 []1  []2   []3   []4   []5   []6   []7   []8   []9   []10                MOBILITY:  [x] Bed    [] Stretcher      All Vitals and Treatment Details on Attached Induction [address]: [facility]          Room # 382/05    [x] Routine         [] 1st Time Acute/Chronic   [] Urgent      [] Stat            [] Acute Room   [x]  Bedside    [] ICU/CCU     [] ER   Isolation Precautions:  [x] Dialysis    There are currently no Active Isolations     ALLERGIES:     Allergies   Allergen Reactions    Amoxicillin Unknown (comments)    Cleocin [Clindamycin Hcl] Unknown (comments)    Codeine Unknown (comments)    Lorcet 10/650 [Hydrocodone-Acetaminophen] Unknown (comments)    Penicillin G Benzathine Unknown (comments)    Shellfish Derived Unknown (comments)      Code Status:  Full Code     Hepatitis Status      Lab Results   Component Value Date/Time    Hepatitis B surface Ag <0.10 09/25/2020 05:31 PM    Hepatitis B surface Ab 63.01 09/25/2020 05:31 PM    Hepatitis B core, IgM NEGATIVE  08/11/2018 03:29 AM    Hep B Core Ab, IgM NEGATIVE  08/13/2018 04:34 AM    Hep B Core Ab, total NEGATIVE  08/13/2018 04:34 AM        Current Labs:      Lab Results   Component Value Date/Time    WBC 6.9 10/23/2021 02:00 AM    Hemoglobin, POC 13.3 11/08/2019 07:26 AM    HGB 8.5 (L) 10/23/2021 02:00 AM    Hematocrit, POC 39 11/08/2019 07:26 AM    HCT 25.9 (L) 10/23/2021 02:00 AM    PLATELET 825 95/40/2478 02:00 AM    MCV 89.6 10/23/2021 02:00 AM     Lab Results   Component Value Date/Time    Sodium 136 10/23/2021 02:00 AM    Potassium 3.4 (L) 10/23/2021 02:00 AM    Chloride 99 (L) 10/23/2021 02:00 AM    CO2 28 10/23/2021 02:00 AM    Anion gap 9 10/23/2021 02:00 AM    Glucose 88 10/23/2021 02:00 AM    BUN 15 10/23/2021 02:00 AM    Creatinine 4.56 (H) 10/23/2021 02:00 AM    BUN/Creatinine ratio 3 (L) 10/23/2021 02:00 AM    GFR est AA 12 (L) 10/23/2021 02:00 AM    GFR est non-AA 10 (L) 10/23/2021 02:00 AM    Calcium 9.2 10/23/2021 02:00 AM          DIET:  DIET ADULT  DIET ADULT ORAL NUTRITION SUPPLEMENT     PRIMARY NURSE REPORT:   Pre Dialysis: Josue Garcia RN    Time: 1000      EDUCATION:    [x] Patient           Knowledge Basis: [x]None []Minimal [] Substantial [] Unknown  Barriers to learning  []N/A  [] Intubated/Trached/Ventilated  [] Sedated/Paralyzed   [] Access Care     [] S&S of infection  [] Fluid Management  [] K+   [x] Procedural    [] Medications   [] Tx Options   [] Transplant   [] Diet      Teaching Tools:  [x] Explain  [] Demo  [] Handouts [] Video  Patient response: [] Verbalized understanding   [x] Requires follow up        [x] Time Out/Safety Check    [x] Extracorporeal Circuit Tested for integrity       RO/HEMODIALYSIS MACHINE SAFETY CHECKS  Before each treatment:        [address] [place] [address]                                     [] Unit Machine #  with centralized RO                                    [] Portable Machine #1/RO serial # [id number]                                  [] Portable Machine #2/RO serial # [id number]                                  [] Portable Machine #4/RO serial # [id number]                                  [x] Portable Machine #10/RO serial #  [id number] Alarm Test:  Pass time 7299            [x] RO/Machine Log Complete    Machine Temp    36-37*C             Dialysate: pH 7.4    Conductivity: Meter 13.9   HD Machine  13.9     TCD: 13.9  Dialyzer Lot # [id number]     Blood Tubing Lot # [id number]     Saline Lot # [id number]     CHLORINE TESTING-Before each treatment and every 4 hours    Total Chlorine: [x] less than 0.1 ppm  Initial Time Check: 1020       4 Hr/2nd Check Time:    (if greater than 0.1 ppm from Primary then every 30 minutes from Secondary)     TREATMENT INITIATION  with Dialysis Precautions:   [x] All Connections Secured              [x] Saline Line Double Clamped   [x] Venous Parameters Set               [x] Arterial Parameters Set    [x] Prime Given 250ml NSS              [x]Air Foam Detector Engaged      Treatment Initiation Note:  Received Patient in her bed asleep easily arouse, assessed and stable for treatment. Right chest TDC  Accessed with no signs or symptoms of complication. Treatment initiated      During Treatment Notes:  1100  Vascular access visible with arterial and venous line connections intact. Pt resting comfortably. 1115  Vascular access visible with arterial and venous line connections intact. Pt resting comfortably. 1145  Vascular access visible with arterial and venous line connections intact. Pt resting comfortably. 1200  Vascular access visible with arterial and venous line connections intact. Pt resting comfortably. 1215  Vascular access visible with arterial and venous line connections intact. Pt resting comfortably. 1230  Vascular access visible with arterial and venous line connections intact. Pt resting comfortably. 1245  Vascular access visible with arterial and venous line connections intact. Pt resting comfortably. 1300  Vascular access visible with arterial and venous line connections intact.   Pt resting comfortably. 1315  Vascular access visible with arterial and venous line connections intact. Pt resting comfortably. 1330  Vascular access visible with arterial and venous line connections intact. Pt resting comfortably. 1345  Dialysis treatment complete. Medication Dose Volume Route Time [place] Nurse, Title      SHERIF Zavaleta, RN      SHERIF Zavaleta, RN      SHERIF Zavaleta, RN     Post Assessment  Dialyzer Cleared:   [] Good  [x] Fair  [] Poor  Blood processed:  57 L  UF Removed:  2000 Ml    Post /56   Pulse  88 Resp  16  Temp 98 Lungs: [] Clear [x] Course  [] Crackles                 []  Wheezing   [] Diminished   Post Tx Vascular Access: [x] N/A  AVF/AVG: Bleeding stopped with  Arterial Pressure for  min   Venous Pressure for  min      Cardiac :[x] Regular   [] Irregular   Rhythm:  [] Monitored   [] Not Monitored    CVC Catheter: [] N/A  Locking solution: Heparin 1:1000 U  Arterial port 1.6 ml   Venous port 1.6 ml   Edema:  [] None  [x] Generalized                     Skin:[x] Warm  [x] Dry [] Diaphoretic               [] Flushed  [] Pale [] Cyanotic Pain:  [x]0  []1 []2  []3 []4  []5  []6  []7 []8    []9  []10     Post Treatment Note:    Patient completed and  Tolerated 3 hrs of hemo dialysis. 2000 ml of fluid removed. Dialysis catheter intact, patent and heplocked as noted above.       POST TREATMENT PRIMARY NURSE HANDOFF REPORT:   Post Dialysis: Addison Fischer RN               Time:  1400       Abbreviations: AVG-arterial venous graft, AVF-arterial venous fistula, IJ-Internal Jugular, Subcl-Subclavian, Fem-Femoral, Tx-treatment, AP/HR-apical heart rate, VSS- Vital Signs Stable, CVC- Central Venous Catheter, DFR-dialysate flow rate, BFR-blood flow rate, AP-arterial pressure, -venous pressure, UF-ultrafiltrate, TMP-transmembrane pressure, Ray-Venous, Art-Arterial, RO-Reverse Osmosis

## 2021-10-23 NOTE — PROGRESS NOTES
Admit Date: 10/20/2021  Date of Service: 10/23/2021    Reason for follow-up:   74 y.o female with HTN, HFpEF, ESRD on HD, seizure disorder, bipolar disorder, normocytic anemia, presented from dialysis with chest pain in her mid sternal area. According to ER and admission note, she had non radiating chest pain. Initial workup showed SBP >200, she had indeterminate troponin level, CTA chest was negative for PE, no infectious process. She was managed for hypertensive emergency. Nephrology consulted for dialysis. She was noted to have moderate sized pericardial effusion on CT, hence cardiology was consulted. Echo check with mild-moderate pericardial effusion. Cardiology considered this as renal related and recommended aggressive dialysis for further treatment. No further workup from cardiology.         Assessment:         1. Hypertensive emergency   2. Chest pain, possible angina   3. Indeterminated troponin level,   4. Moderate pericardial effusion, previous Echo mention small   5. Acute on Chronic HFpEF, elevated proBNP  6.   Seizure disorder   7. Bipolar disorder   8. ESRD on HD   9. Hypertensive encephalopathy, improved to baseline. 10.  Low grade fever, no clear source of infection. No leukocytosis, low proCalc  11. Normocytic anemia with CKD     Plan:   Echo reviewed.    Discussed with Nephrology for Dialysis today, tomorrow and Monday with discharge to her facility  F/u blood cx, urine cx  Hold Abx for now  Continue Norvasc, ASA, Lipitor, Coreg, hydralazine, Imdur, Cozaar  Continue Haldol, PPI, Keppra, Renvela    Dispo: d/c Monday after HD    Current Antibtiocs:   none    Lines:   Peripheral    Case discussed with:  []Patient  []Family  []Nursing  []Case Management  DVT Prophylaxis:  []Lovenox  []Hep SQ  []SCDs  []Coumadin   []On Heparin gtt    I have independently examined the patient and reviewed all lab studies and imgaing as well as review of nursing notes and physican notes from the past 24 hours. Aspen Tinoco D.O. Pager [phone]      Allergies   Allergen Reactions    Amoxicillin Unknown (comments)    Cleocin [Clindamycin Hcl] Unknown (comments)    Codeine Unknown (comments)    Lorcet 10 [Hydrocodone-Acetaminophen] Unknown (comments)    Penicillin G Benzathine Unknown (comments)    Shellfish Derived Unknown (comments)           Subjective:      Pt seen and examined with HD team and nrusing in room. Nursing reports that patient lost IV access. Nursing reports that patient easily becomes agitated and has been shouting and screaming. At time of my visit, patient is calm, appropriate. No complaints      Objective:        Visit Vitals  BP (!) 113/51   Pulse 84   Temp 98.4 °F (36.9 °C)   Resp 16   Ht 5' 3\" (1.6 m)   Wt 57.3 kg (126 lb 6.4 oz)   LMP  (LMP Unknown)   SpO2 95%   BMI 22.39 kg/m²     Temp (24hrs), Av.5 °F (36.9 °C), Min:97.4 °F (36.3 °C), Max:99.9 °F (37.7 °C)        General:   awake alert and oriented, non-toxic   Skin:   seb derm on face and scalp; allopecia; no rashes or skin lesions noted on limited exam, dry and warm   HEENT:  No scleral icterus or pallor; oral mucosa moist, lips moist   Lymph Nodes:   not assessed today   Lungs:   non, labored; bilaterally clear to aspiration- no crackles wheezes rales or rhonchi   Heart:  RRR, s1 and s2; no murmurs rubs or gallops; no edema, + pedal pulses   Abdomen:  soft, non-distended, active bowel sounds, non-tender   Genitourinary:  deferred   Extremities:   average muscle tone; no contractures, no joint effusions   Neurologic:  No gross focal motor or sensory abnormalities; CN 2-12 intact; Follows commands. Psychiatric:   appropriate and interactive.        Labs: Results:   Chemistry Recent Labs     10/23/21  0200 10/22/21  0213 10/21/21  0413   GLU 88 105* 137*    134* 138   K 3.4* 3.7 4.0   CL 99* 98* 101   CO2 28 28 28   BUN 15 21* 32*   CREA 4.56* 6.39* 9.08*   CA 9.2 9.4 9.0   AGAP 9 8 9   BUCR 3* 3* 4* CBC w/Diff Recent Labs     10/23/21  0200 10/22/21  0213 10/21/21  0413   WBC 6.9 7.7 6.4   RBC 2.89* 2.99* 2.76*   HGB 8.5* 8.9* 8.0*   HCT 25.9* 27.2* 25.1*    226 188   GRANS 75* 73 73   LYMPH 8* 9* 10*   EOS 5 4 3        No results found for: SDES Lab Results   Component Value Date/Time    Culture result: NO GROWTH AFTER 17 HOURS 10/21/2021 01:06 PM    Culture result: NO GROWTH AFTER 17 HOURS 10/21/2021 12:58 PM    Culture result: NO GROWTH 6 DAYS 09/25/2020 12:05 PM    Culture result: NO GROWTH 6 DAYS 09/25/2020 11:56 AM    Culture result: NO GROWTH 6 DAYS 09/16/2020 06:00 PM        Results     Procedure Component Value Units Date/Time    CULTURE, URINE [[id number]] Collected: 10/22/21 1325    Order Status: Completed Specimen: Cath Urine Updated: 10/22/21 2349    CULTURE, URINE [[id number]] Collected: 10/22/21 1300    Order Status: Canceled Specimen: Cath Urine     CULTURE, BLOOD [[id number]] Collected: 10/21/21 1306    Order Status: Completed Specimen: Blood Updated: 10/23/21 0640     Special Requests: NO SPECIAL REQUESTS        Culture result: NO GROWTH AFTER 17 HOURS       CULTURE, BLOOD [[id number]] Collected: 10/21/21 1258    Order Status: Completed Specimen: Blood Updated: 10/23/21 0640     Special Requests: NO SPECIAL REQUESTS        Culture result: NO GROWTH AFTER 17 HOURS             Imaging:     CTA CHEST W OR W WO CONT    Result Date: 10/20/2021  No evidence for pulmonary emboli. Mild interstitial edema. Small bilateral pleural effusions, right greater than left. Moderate pericardial effusion. XR CHEST PORT    Result Date: 10/20/2021  Enlarged cardiac silhouette with vascular congestion and mild pulmonary edema. Probably small left pleural effusion.

## 2021-10-24 LAB
GLUCOSE BLD STRIP.AUTO-MCNC: 123 MG/DL (ref 70–110)
GLUCOSE BLD STRIP.AUTO-MCNC: 228 MG/DL (ref 70–110)
GLUCOSE BLD STRIP.AUTO-MCNC: 98 MG/DL (ref 70–110)
GLUCOSE BLD STRIP.AUTO-MCNC: 99 MG/DL (ref 70–110)

## 2021-10-24 PROCEDURE — 99232 SBSQ HOSP IP/OBS MODERATE 35: CPT | Performed by: INTERNAL MEDICINE

## 2021-10-24 PROCEDURE — 74011250637 HC RX REV CODE- 250/637: Performed by: INTERNAL MEDICINE

## 2021-10-24 PROCEDURE — 74011636637 HC RX REV CODE- 636/637: Performed by: STUDENT IN AN ORGANIZED HEALTH CARE EDUCATION/TRAINING PROGRAM

## 2021-10-24 PROCEDURE — 74011250637 HC RX REV CODE- 250/637: Performed by: STUDENT IN AN ORGANIZED HEALTH CARE EDUCATION/TRAINING PROGRAM

## 2021-10-24 PROCEDURE — 82962 GLUCOSE BLOOD TEST: CPT

## 2021-10-24 PROCEDURE — 65270000029 HC RM PRIVATE

## 2021-10-24 RX ORDER — HYDRALAZINE HYDROCHLORIDE 20 MG/ML
10 INJECTION INTRAMUSCULAR; INTRAVENOUS
Status: DISCONTINUED | OUTPATIENT
Start: 2021-10-24 | End: 2021-10-25 | Stop reason: HOSPADM

## 2021-10-24 RX ADMIN — CARVEDILOL 25 MG: 25 TABLET, FILM COATED ORAL at 10:15

## 2021-10-24 RX ADMIN — Medication 10 ML: at 21:16

## 2021-10-24 RX ADMIN — HYDRALAZINE HYDROCHLORIDE 50 MG: 50 TABLET, FILM COATED ORAL at 18:13

## 2021-10-24 RX ADMIN — AMLODIPINE BESYLATE 10 MG: 10 TABLET ORAL at 21:14

## 2021-10-24 RX ADMIN — LEVETIRACETAM 500 MG: 500 TABLET, FILM COATED ORAL at 18:13

## 2021-10-24 RX ADMIN — SEVELAMER CARBONATE 800 MG: 800 TABLET, FILM COATED ORAL at 18:13

## 2021-10-24 RX ADMIN — HALOPERIDOL 1 MG: 2 TABLET ORAL at 18:13

## 2021-10-24 RX ADMIN — HALOPERIDOL 1 MG: 2 TABLET ORAL at 10:15

## 2021-10-24 RX ADMIN — INSULIN LISPRO 4 UNITS: 100 INJECTION, SOLUTION INTRAVENOUS; SUBCUTANEOUS at 18:16

## 2021-10-24 RX ADMIN — ISOSORBIDE MONONITRATE 30 MG: 60 TABLET, EXTENDED RELEASE ORAL at 10:16

## 2021-10-24 RX ADMIN — Medication 10 ML: at 18:13

## 2021-10-24 RX ADMIN — HYDRALAZINE HYDROCHLORIDE 50 MG: 50 TABLET, FILM COATED ORAL at 10:15

## 2021-10-24 RX ADMIN — LEVETIRACETAM 500 MG: 500 TABLET, FILM COATED ORAL at 10:16

## 2021-10-24 RX ADMIN — Medication 10 ML: at 05:35

## 2021-10-24 RX ADMIN — CARVEDILOL 25 MG: 25 TABLET, FILM COATED ORAL at 18:13

## 2021-10-24 RX ADMIN — NEPHROCAP 1 CAPSULE: 1 CAP ORAL at 10:16

## 2021-10-24 RX ADMIN — SEVELAMER CARBONATE 800 MG: 800 TABLET, FILM COATED ORAL at 10:16

## 2021-10-24 RX ADMIN — SEVELAMER CARBONATE 800 MG: 800 TABLET, FILM COATED ORAL at 21:15

## 2021-10-24 RX ADMIN — HYDRALAZINE HYDROCHLORIDE 50 MG: 50 TABLET, FILM COATED ORAL at 21:16

## 2021-10-24 RX ADMIN — FAMOTIDINE 20 MG: 20 TABLET ORAL at 10:16

## 2021-10-24 RX ADMIN — ASPIRIN 81 MG CHEWABLE TABLET 81 MG: 81 TABLET CHEWABLE at 10:15

## 2021-10-24 RX ADMIN — ATORVASTATIN CALCIUM 10 MG: 10 TABLET, FILM COATED ORAL at 10:15

## 2021-10-24 RX ADMIN — LOSARTAN POTASSIUM 100 MG: 50 TABLET, FILM COATED ORAL at 10:15

## 2021-10-24 NOTE — ROUTINE PROCESS
Bedside shift change report given to Paresh Robertson (oncoming nurse) by Cayla Gerardo (offgoing nurse). Report included the following information SBAR, Kardex, Intake/Output, MAR and Recent Results.

## 2021-10-24 NOTE — PROGRESS NOTES
Admit Date: 10/20/2021  Date of Service: 10/24/2021    Reason for follow-up:   74 y.o female with HTN, HFpEF, ESRD on HD, seizure disorder, bipolar disorder, normocytic anemia, presented from dialysis with chest pain in her mid sternal area. According to ER and admission note, she had non radiating chest pain. Initial workup showed SBP >200, she had indeterminate troponin level, CTA chest was negative for PE, no infectious process. She was managed for hypertensive emergency. Nephrology consulted for dialysis. She was noted to have moderate sized pericardial effusion on CT, hence cardiology was consulted. Echo check with mild-moderate pericardial effusion. Cardiology considered this as renal related and recommended aggressive dialysis for further treatment. No further workup from cardiology.         Assessment:         1. Hypertensive emergency   2. Chest pain, possible angina   3. Indeterminated troponin level,   4. Moderate pericardial effusion, previous Echo mention small;    - 10/21 TTE  Estimated LVEF is 60 - 65%. Visually measured ejection fraction. Normal cavity size and systolic function (ejection fraction normal). Severe concentric hypertrophy. Pericardium: Small-to-moderate circumferential pericardial effusion measuring 20 mm  5. Acute on Chronic HFpEF, elevated proBNP; symptoms improving with HD  6. Seizure disorder   7. Bipolar disorder   8. ESRD on HD   9. Hypertensive encephalopathy, improved to baseline. 10.  Low grade fever, no clear source of infection. No leukocytosis, low proCalc  11.    Normocytic anemia with CKD     Plan:   Discussed with Nephrology for Dialysis on Monday with discharge to her facility following  F/u blood cx, urine cx- remain negative  Hold Abx for now  Continue Norvasc, ASA, Lipitor, Coreg, hydralazine, Imdur, Cozaar  Continue Haldol, PPI, Keppra, Renvela    Dispo: d/c Monday after HD    Current Antibtiocs:   none    Lines:   Peripheral    Case discussed with: [x]Patient  []Family  [x]Nursing  []Case Management  DVT Prophylaxis:  []Lovenox  []Hep SQ  []SCDs  []Coumadin   []On Heparin gtt    I have independently examined the patient and reviewed all lab studies and imgaing as well as review of nursing notes and physican notes from the past 24 hours. Duncan Villar D.O. Pager [phone]      Allergies   Allergen Reactions    Amoxicillin Unknown (comments)    Cleocin [Clindamycin Hcl] Unknown (comments)    Codeine Unknown (comments)    Lorcet 10/650 [Hydrocodone-Acetaminophen] Unknown (comments)    Penicillin G Benzathine Unknown (comments)    Shellfish Derived Unknown (comments)           Subjective:      Pt seen and examined. Doing okay. Hungry and ready for lunch. No pain. No fevers or chills. No headaches no vision changes. Nursing at bedside and relates that patient's blood pressure has been increasing over the last several hours. No other new issues for me to address today    Objective:        Visit Vitals  BP (!) 175/80   Pulse 82   Temp 97.3 °F (36.3 °C)   Resp 12   Ht 5' 3\" (1.6 m)   Wt 53.1 kg (117 lb)   LMP  (LMP Unknown)   SpO2 98%   BMI 20.73 kg/m²     Temp (24hrs), Av °F (37.2 °C), Min:97.3 °F (36.3 °C), Max:99.9 °F (37.7 °C)        General:   awake alert and oriented, non-toxic   Skin:   seb derm on face and scalp; allopecia; no rashes or skin lesions noted on limited exam, dry and warm   HEENT:  No scleral icterus or pallor; oral mucosa moist, lips moist   Lymph Nodes:   not assessed today   Lungs:   non, labored; bilaterally clear to aspiration- no crackles wheezes rales or rhonchi   Heart:  RRR, s1 and s2; no murmurs rubs or gallops; no edema, + pedal pulses   Abdomen:  soft, non-distended, active bowel sounds, non-tender   Genitourinary:  deferred   Extremities:   average muscle tone; no contractures, no joint effusions   Neurologic:  No gross focal motor or sensory abnormalities; CN 2-12 intact; Follows commands.     Psychiatric: appropriate and interactive. Labs: Results:   Chemistry Recent Labs     10/23/21  0200 10/22/21  0213   GLU 88 105*    134*   K 3.4* 3.7   CL 99* 98*   CO2 28 28   BUN 15 21*   CREA 4.56* 6.39*   CA 9.2 9.4   AGAP 9 8   BUCR 3* 3*      CBC w/Diff Recent Labs     10/23/21  0200 10/22/21  0213   WBC 6.9 7.7   RBC 2.89* 2.99*   HGB 8.5* 8.9*   HCT 25.9* 27.2*    226   GRANS 75* 73   LYMPH 8* 9*   EOS 5 4        No results found for: SDES Lab Results   Component Value Date/Time    Culture result: No growth (<1,000 CFU/ML) 10/22/2021 01:25 PM    Culture result: NO GROWTH 2 DAYS 10/21/2021 01:06 PM    Culture result: NO GROWTH 2 DAYS 10/21/2021 12:58 PM    Culture result: NO GROWTH 6 DAYS 09/25/2020 12:05 PM    Culture result: NO GROWTH 6 DAYS 09/25/2020 11:56 AM        Results     Procedure Component Value Units Date/Time    CULTURE, URINE [[id number]] Collected: 10/22/21 1325    Order Status: Completed Specimen: Cath Urine Updated: 10/23/21 2005     Special Requests: NO SPECIAL REQUESTS        Culture result: No growth (<1,000 CFU/ML)       CULTURE, URINE [[id number]] Collected: 10/22/21 1300    Order Status: Canceled Specimen: Cath Urine     CULTURE, BLOOD [[id number]] Collected: 10/21/21 1306    Order Status: Completed Specimen: Blood Updated: 10/24/21 0650     Special Requests: NO SPECIAL REQUESTS        Culture result: NO GROWTH 2 DAYS       CULTURE, BLOOD [[id number]] Collected: 10/21/21 1258    Order Status: Completed Specimen: Blood Updated: 10/24/21 0650     Special Requests: NO SPECIAL REQUESTS        Culture result: NO GROWTH 2 DAYS             Imaging:     CTA CHEST W OR W WO CONT    Result Date: 10/20/2021  No evidence for pulmonary emboli. Mild interstitial edema. Small bilateral pleural effusions, right greater than left. Moderate pericardial effusion. XR CHEST PORT    Result Date: 10/20/2021  Enlarged cardiac silhouette with vascular congestion and mild pulmonary edema.  Probably small left pleural effusion.

## 2021-10-24 NOTE — PROGRESS NOTES
Progress Note    Dilcia Solano  74 y.o. Admit Date: 10/20/2021  Active Problems:    Hypertension (8/7/2018) POA: Yes      ESRD on hemodialysis ([facility] (8/8/2018) POA: Unknown      Secondary hyperparathyroidism of renal origin ([facility] (8/8/2018) POA: Yes      CVA, old, cognitive deficits (8/8/2018) POA: Yes      Anemia (3/19/2021) POA: Yes      Acute pericardial effusion (10/21/2021) POA: Unknown            Subjective:     Patient feels good, resting comfortably. No Chest Pain, no SOB. A comprehensive review of systems was negative except for that written in the History of Present Illness.     Objective:     Visit Vitals  BP (!) 151/75   Pulse 82   Temp 97.3 °F (36.3 °C)   Resp 12   Ht 5' 3\" (1.6 m)   Wt 53.1 kg (117 lb)   LMP  (LMP Unknown)   SpO2 98%   BMI 20.73 kg/m²         Intake/Output Summary (Last 24 hours) at 10/24/2021 1258  Last data filed at 10/23/2021 2257  Gross per 24 hour   Intake 240 ml   Output 2000 ml   Net -1760 ml       Current Facility-Administered Medications   Medication Dose Route Frequency Provider Last Rate Last Admin    hydrALAZINE (APRESOLINE) 20 mg/mL injection 10 mg  10 mg IntraVENous Q6H PRN Joseph Rodriguez MD       [facility] Ulysses Pacer ON 10/25/2021] epoetin raphael-epbx (RETACRIT) injection 8,000 Units  8,000 Units SubCUTAneous Q MON, WED & [place], Elliott Lazaro MD        haloperidoL (HALDOL) tablet 1 mg  1 mg Oral BID Joseph Rodriguez MD   1 mg at 10/24/21 1015    heparin (porcine) 1,000 unit/mL injection 5,000 Units  5,000 Units IntraCATHeter DIALYSIS PRN Aj Lorenzo MD        amLODIPine (NORVASC) tablet 10 mg  10 mg Oral QHS Lubna Abdi MD   10 mg at 10/23/21 2125    hydrALAZINE (APRESOLINE) tablet 50 mg  50 mg Oral TID Lubna Abdi MD   50 mg at 10/24/21 1015    cloNIDine (CATAPRES) 0.3 mg/24 hr patch 1 Patch  1 Patch TransDERmal Q7D Lubna Abdi MD   1 Patch at 10/21/21 8790    sodium chloride (NS) flush 5-40 mL  5-40 mL IntraVENous Q8H Tom Melgoza MD   10 mL at 10/24/21 1900    sodium chloride (NS) flush 5-40 mL  5-40 mL IntraVENous PRN Day Melgoza MD       [facility] by provider] heparin (porcine) injection 5,000 Units  5,000 Units SubCUTAneous Q8H Tom Melgoza MD        aspirin chewable tablet 81 mg  81 mg Oral DAILY Tom Melgoza MD   81 mg at 10/24/21 1015    atorvastatin (LIPITOR) tablet 10 mg  10 mg Oral DAILY Tom Melgoza MD   10 mg at 10/24/21 1015    carvediloL (COREG) tablet 25 mg  25 mg Oral BID WITH MEALS Tom Melgoza MD   25 mg at 10/24/21 1015    diphenhydrAMINE (BENADRYL) capsule 25 mg  25 mg Oral Q8H PRN Tom Melgoza MD   25 mg at 10/23/21 2125    famotidine (PEPCID) tablet 20 mg  20 mg Oral DAILY Tom Melgoza MD   20 mg at 10/24/21 1016    isosorbide mononitrate ER (IMDUR) tablet 30 mg  30 mg Oral DAILY Tom Melgoza MD   30 mg at 10/24/21 1016    levETIRAcetam (KEPPRA) tablet 250 mg  250 mg Oral Q MON, WED & FRI Tom Melgoza MD   250 mg at 10/22/21 2017    levETIRAcetam (KEPPRA) tablet 500 mg  500 mg Oral BID Tom Melgoza MD   500 mg at 10/24/21 1016    losartan (COZAAR) tablet 100 mg  100 mg Oral DAILY Tom Melgoza MD   100 mg at 10/24/21 1015    sevelamer carbonate (RENVELA) tab 800 mg  800 mg Oral TID Tom Melgoza MD   800 mg at 10/24/21 1016    insulin lispro (HUMALOG) injection   SubCUTAneous AC&HS Tom Melgoza MD   2 Units at 10/23/21 2125    glucose chewable tablet 16 g  4 Tablet Oral PRN Tom Melgoza MD        glucagon [facility]) injection 1 mg  1 mg IntraMUSCular PRN Tom Melgoza MD        dextrose (D50W) injection syrg 12.5-25 g  25-50 mL IntraVENous PRN Tom Melgoza MD  B complex-vitaminC-folic acid (NEPHROCAP) cap  1 Capsule Oral DAILY Day Melgoza MD   1 Capsule at 10/24/21 1016        Physical Exam:     Physical Exam:   General:  Alert, cooperative, no distress, appears stated age.    Neck: Supple, symmetrical, trachea midline, no adenopathy, thyroid: no enlargement/tenderness/nodules, no carotid bruit and no JVD.   Lungs:   Clear to auscultation bilaterally. Heart:  Regular rate and rhythm, S1, S2 normal, no murmur, click, rub or gallop. Abdomen:   Soft, non-tender. Bowel sounds normal. No masses,  No organomegaly. Extremities: Extremities normal, atraumatic, no cyanosis or edema,HD catheter is well dresssed   Skin: Skin color, texture, turgor normal. No rashes or lesions         Data Review:    CBC w/Diff    Recent Labs     10/23/21  0200 10/22/21  0213 10/22/21  0213   WBC 6.9  --  7.7   RBC 2.89*  --  2.99*   HGB 8.5*  --  8.9*   HCT 25.9*  --  27.2*   MCV 89.6   < > 91.0   MCH 29.4   < > 29.8   MCHC 32.8   < > 32.7   RDW 15.3*   < > 15.7*    < > = values in this interval not displayed. Recent Labs     10/23/21  0200 10/22/21  0213 10/22/21  0213   MONOS 12*  --  14*   EOS 5  --  4   BASOS 0   < > 0   RDW 15.3*   < > 15.7*    < > = values in this interval not displayed. Comprehensive Metabolic Profile    Recent Labs     10/23/21  0200 10/22/21  0213    134*   K 3.4* 3.7   CL 99* 98*   CO2 28 28   BUN 15 21*   CREA 4.56* 6.39*    Recent Labs     10/23/21  0200 10/22/21  0213   CA 9.2 9.4   PHOS 3.3 3.6                        Impression:       Active Hospital Problems    Diagnosis Date Noted    Acute pericardial effusion 10/21/2021    Anemia 03/19/2021    ESRD on hemodialysis ([facility] 08/08/2018    Secondary hyperparathyroidism of renal origin ([facility] 08/08/2018    CVA, old, cognitive deficits 08/08/2018    Hypertension 08/07/2018      BP is ok for her. Plan:     No need for any Dialysis today, will Dialyze her tomorrow & if she remains Stable then DC to nursing home.       Shmuel Holbrook MD

## 2021-10-24 NOTE — ROUTINE PROCESS
End of Shift Note     Bedside and verbal shift change report given to RAYO Dailey (On coming nurse) by Ira Graves RN (Off going nurse).   Report included the following information:      --Procedure Summary     --MAR,     --Recent Results     --Med Rec Status    SBAR Recommendations: Complete care    Issues for Provider to address placement          Activity This Shift     [x] Bed Rest Order   [] Refused   [] Dangled    [] TDWB         Ambulating:     [] Bathroom     [] BSC     [] Room/Hallway      Up in Chair for meals    []Yes [] No   Voiding       [] Yes  [x] No  Khan          [] Yes  [] No  Incontinent [] Yes  [] No    DUE TO VOID POUR        [] Yes [] No  Purewick    [] Yes [] No  New Onset [] Yes [] No Straight Cath   []Yes  [] No  Condom Cath  [] Yes [] No  MD Called      [] Yes  [] No   Blood Sugars Managed [x]Yes [] No    Bowels Moved [] Yes [x] No    Incontinent     [] Yes [] No Passed Gas []Yes [x] No    New Onset  []Yes [] No        MD Called []Yes  [] No     CHG Bath Done     Before Surgery     After Surgery      [] Yes  [x] No  [] Yes  [x] No       Drain Removed [] Yes  [] No [x] N/A    Dressing Changed [] Yes   [] No [x] N/A      Nausea/Vomiting [] Yes   [x] No     Ice Packs Changed [] Yes   [] No  [x] N/A    Incentive Spirometer  [] Yes  [x] No      SCD Pumps On     Ankle Pumping  [] Yes   [x] No      [] Yes   [x] No        Telemetry Monitoring [] Yes   [x] No   Rhythm

## 2021-10-24 NOTE — PROGRESS NOTES
Problem: Falls - Risk of  Goal: *Absence of Falls  Description: Document Danne Lobe Fall Risk and appropriate interventions in the flowsheet. Outcome: Progressing Towards Goal  Note: Fall Risk Interventions:  Mobility Interventions: Bed/chair exit alarm    Mentation Interventions: Bed/chair exit alarm    Medication Interventions: Bed/chair exit alarm    Elimination Interventions: Call light in reach, Bed/chair exit alarm              Problem: Pressure Injury - Risk of  Goal: *Prevention of pressure injury  Description: Document Ab Scale and appropriate interventions in the flowsheet.   Outcome: Progressing Towards Goal  Note: Pressure Injury Interventions:  Sensory Interventions: Chair cushion, Float heels, Discuss PT/OT consult with provider    Moisture Interventions: Absorbent underpads    Activity Interventions: Pressure redistribution bed/mattress(bed type)    Mobility Interventions: Pressure redistribution bed/mattress (bed type)    Nutrition Interventions: Document food/fluid/supplement intake    Friction and Shear Interventions: Lift sheet

## 2021-10-25 VITALS
WEIGHT: 121.1 LBS | DIASTOLIC BLOOD PRESSURE: 74 MMHG | HEART RATE: 77 BPM | BODY MASS INDEX: 21.46 KG/M2 | OXYGEN SATURATION: 99 % | TEMPERATURE: 98.1 F | RESPIRATION RATE: 18 BRPM | HEIGHT: 63 IN | SYSTOLIC BLOOD PRESSURE: 158 MMHG

## 2021-10-25 LAB
ANION GAP SERPL CALC-SCNC: 10 MMOL/L (ref 3–18)
BASOPHILS # BLD: 0 K/UL (ref 0–0.1)
BASOPHILS NFR BLD: 0 % (ref 0–2)
BUN SERPL-MCNC: 34 MG/DL (ref 7–18)
BUN/CREAT SERPL: 5 (ref 12–20)
CALCIUM SERPL-MCNC: 9.7 MG/DL (ref 8.5–10.1)
CHLORIDE SERPL-SCNC: 97 MMOL/L (ref 100–111)
CO2 SERPL-SCNC: 26 MMOL/L (ref 21–32)
CREAT SERPL-MCNC: 7.55 MG/DL (ref 0.6–1.3)
DIFFERENTIAL METHOD BLD: ABNORMAL
EOSINOPHIL # BLD: 0.4 K/UL (ref 0–0.4)
EOSINOPHIL NFR BLD: 5 % (ref 0–5)
ERYTHROCYTE [DISTWIDTH] IN BLOOD BY AUTOMATED COUNT: 14.7 % (ref 11.6–14.5)
GLUCOSE BLD STRIP.AUTO-MCNC: 186 MG/DL (ref 70–110)
GLUCOSE BLD STRIP.AUTO-MCNC: 201 MG/DL (ref 70–110)
GLUCOSE SERPL-MCNC: 181 MG/DL (ref 74–99)
HCT VFR BLD AUTO: 26.9 % (ref 35–45)
HGB BLD-MCNC: 8.8 G/DL (ref 12–16)
LYMPHOCYTES # BLD: 0.7 K/UL (ref 0.9–3.6)
LYMPHOCYTES NFR BLD: 10 % (ref 21–52)
MCH RBC QN AUTO: 29.1 PG (ref 24–34)
MCHC RBC AUTO-ENTMCNC: 32.7 G/DL (ref 31–37)
MCV RBC AUTO: 89.1 FL (ref 78–100)
MONOCYTES # BLD: 0.9 K/UL (ref 0.05–1.2)
MONOCYTES NFR BLD: 14 % (ref 3–10)
NEUTS SEG # BLD: 4.9 K/UL (ref 1.8–8)
NEUTS SEG NFR BLD: 71 % (ref 40–73)
PHOSPHATE SERPL-MCNC: 3.5 MG/DL (ref 2.5–4.9)
PLATELET # BLD AUTO: 257 K/UL (ref 135–420)
PMV BLD AUTO: 11.4 FL (ref 9.2–11.8)
POTASSIUM SERPL-SCNC: 3.7 MMOL/L (ref 3.5–5.5)
RBC # BLD AUTO: 3.02 M/UL (ref 4.2–5.3)
SODIUM SERPL-SCNC: 133 MMOL/L (ref 136–145)
WBC # BLD AUTO: 6.9 K/UL (ref 4.6–13.2)

## 2021-10-25 PROCEDURE — 85025 COMPLETE CBC W/AUTO DIFF WBC: CPT

## 2021-10-25 PROCEDURE — 74011250637 HC RX REV CODE- 250/637: Performed by: INTERNAL MEDICINE

## 2021-10-25 PROCEDURE — 99239 HOSP IP/OBS DSCHRG MGMT >30: CPT | Performed by: INTERNAL MEDICINE

## 2021-10-25 PROCEDURE — 74011250637 HC RX REV CODE- 250/637: Performed by: STUDENT IN AN ORGANIZED HEALTH CARE EDUCATION/TRAINING PROGRAM

## 2021-10-25 PROCEDURE — 84100 ASSAY OF PHOSPHORUS: CPT

## 2021-10-25 PROCEDURE — 2709999900 HC NON-CHARGEABLE SUPPLY

## 2021-10-25 PROCEDURE — 82962 GLUCOSE BLOOD TEST: CPT

## 2021-10-25 PROCEDURE — 36415 COLL VENOUS BLD VENIPUNCTURE: CPT

## 2021-10-25 PROCEDURE — 74011636637 HC RX REV CODE- 636/637: Performed by: STUDENT IN AN ORGANIZED HEALTH CARE EDUCATION/TRAINING PROGRAM

## 2021-10-25 PROCEDURE — 90935 HEMODIALYSIS ONE EVALUATION: CPT

## 2021-10-25 PROCEDURE — 80048 BASIC METABOLIC PNL TOTAL CA: CPT

## 2021-10-25 RX ORDER — HALOPERIDOL 1 MG/1
1 TABLET ORAL 2 TIMES DAILY
Qty: 60 TABLET | Refills: 0 | Status: SHIPPED
Start: 2021-10-25 | End: 2021-11-03

## 2021-10-25 RX ORDER — NEPHROCAP 1 MG
1 CAP ORAL DAILY
Qty: 30 CAPSULE | Refills: 0 | Status: SHIPPED
Start: 2021-10-26 | End: 2021-11-10

## 2021-10-25 RX ORDER — ACETAMINOPHEN 325 MG/1
650 TABLET ORAL
Status: DISCONTINUED | OUTPATIENT
Start: 2021-10-25 | End: 2021-10-25 | Stop reason: HOSPADM

## 2021-10-25 RX ORDER — HYDRALAZINE HYDROCHLORIDE 50 MG/1
50 TABLET, FILM COATED ORAL 3 TIMES DAILY
Qty: 90 TABLET | Refills: 0 | Status: ON HOLD
Start: 2021-10-25 | End: 2021-11-10 | Stop reason: SDUPTHER

## 2021-10-25 RX ADMIN — LEVETIRACETAM 500 MG: 500 TABLET, FILM COATED ORAL at 18:09

## 2021-10-25 RX ADMIN — ATORVASTATIN CALCIUM 10 MG: 10 TABLET, FILM COATED ORAL at 08:57

## 2021-10-25 RX ADMIN — ISOSORBIDE MONONITRATE 30 MG: 60 TABLET, EXTENDED RELEASE ORAL at 08:56

## 2021-10-25 RX ADMIN — HALOPERIDOL 1 MG: 2 TABLET ORAL at 06:18

## 2021-10-25 RX ADMIN — ASPIRIN 81 MG CHEWABLE TABLET 81 MG: 81 TABLET CHEWABLE at 08:56

## 2021-10-25 RX ADMIN — HALOPERIDOL 1 MG: 2 TABLET ORAL at 18:09

## 2021-10-25 RX ADMIN — SEVELAMER CARBONATE 800 MG: 800 TABLET, FILM COATED ORAL at 08:56

## 2021-10-25 RX ADMIN — NEPHROCAP 1 CAPSULE: 1 CAP ORAL at 08:56

## 2021-10-25 RX ADMIN — Medication 10 ML: at 06:25

## 2021-10-25 RX ADMIN — HYDRALAZINE HYDROCHLORIDE 50 MG: 50 TABLET, FILM COATED ORAL at 08:56

## 2021-10-25 RX ADMIN — CARVEDILOL 25 MG: 25 TABLET, FILM COATED ORAL at 08:57

## 2021-10-25 RX ADMIN — LEVETIRACETAM 500 MG: 500 TABLET, FILM COATED ORAL at 08:56

## 2021-10-25 RX ADMIN — ACETAMINOPHEN 650 MG: 325 TABLET ORAL at 06:34

## 2021-10-25 RX ADMIN — LOSARTAN POTASSIUM 100 MG: 50 TABLET, FILM COATED ORAL at 08:56

## 2021-10-25 RX ADMIN — FAMOTIDINE 20 MG: 20 TABLET ORAL at 08:56

## 2021-10-25 RX ADMIN — SEVELAMER CARBONATE 800 MG: 800 TABLET, FILM COATED ORAL at 16:51

## 2021-10-25 RX ADMIN — CARVEDILOL 25 MG: 25 TABLET, FILM COATED ORAL at 16:51

## 2021-10-25 RX ADMIN — INSULIN LISPRO 4 UNITS: 100 INJECTION, SOLUTION INTRAVENOUS; SUBCUTANEOUS at 12:00

## 2021-10-25 RX ADMIN — INSULIN LISPRO 2 UNITS: 100 INJECTION, SOLUTION INTRAVENOUS; SUBCUTANEOUS at 06:43

## 2021-10-25 RX ADMIN — HYDRALAZINE HYDROCHLORIDE 50 MG: 50 TABLET, FILM COATED ORAL at 16:52

## 2021-10-25 NOTE — ROUTINE PROCESS
Bedside and Verbal shift change report given to [employer] (oncoming nurse) by Brittany Fish RN (offgoing nurse). Report included the following information SBAR, Kardex, Intake/Output, MAR and Recent Results.

## 2021-10-25 NOTE — PROGRESS NOTES
Transition of Care Plan to SNF/Rehab    SNF/Rehab Transition:  Patient has been accepted to [address] [place] [address] and meets criteria for admission. Patient will  be transported by [facility] stretcher transport and expected to leave at 3pm.    Communication to Patient/Family:  Met with patient and spoke with sister, Marito Traylor (identified care giver) and they are agreeable to the transition plan. Communication to SNF/Rehab:  Bedside RN, BUSHRA DRAPER, has been notified of the transition plan to the facility and to call report (phone number [phone]). Discharge information has been updated on the AVS. And communicated to facility via [facility], or CC link. SNF/Rehab Transition:    PCP/Specialist:     Nursing Please include all hard scripts for controlled substances, med rec and dc summary, and AVS in packet. Reviewed and confirmed with facility representative, Valencia Paredes  at [address] [place] [address] they can manage the patient care needs for the following:     Job Foots with (X) only those applicable:    COVID Status  Patient has had the Covid vaccine Yes. If yes, dates:   and . Patient is Covid negative  Pt initially tested Covid positive on: [place]  It has been  days since initial positive Covid test.      Medication:  [x]  Medications will be available at the facility  []  IV Antibiotics   []  Controlled Substance - hard copy to be sent with patient   []  Weekly Labs    Documents:  [] Hard RX  Number sent   [] MAR  [] Kardex  [] AVS  [] Wound care note  []Transfer Summary/Discharge Summary    Equipment:  []  CPAP/BiPAP  []  Wound Vacuum  []  Khan or Urinary Device  []  PICC/Central Line  []  Nebulizer  []  Ventilator    Treatment:  []Isolation (for MRSA, VRE, etc.)  []Surgical Drain Management  []Tracheostomy Care  []Dressing Changes  []Dialysis with transportation and chair time [address] [place] [address] [address] Mode of tranpsort Medicaid transport  []PEG Care  []Oxygen  []Daily Weights for Heart Failure    Dietary:  []Any diet limitations  []Tube Feedings   []Total Parenteral Management (TPN)    Eligible for Medicaid Long Term Services and Supports  Yes:  [x] Eligible for medical assistance or will become eligible within 180 days and LTSS completed. [] Provider/Patient and/or support system has requested screening.  [] LTSS copy provided to patient or responsible party, .  [] LTSS unavailable at discharge will send once processed to SNF provider.  [] LTSS  unavailable at discharged mailed to patient  [] LTSS performed by outside agency  on  with tracking number   No:   [] Private pay and is not financially eligible for Medicaid within the next 180 days. [] Reside out-of-state.   [] A residents of a state owned/operated facility that is licensed  by [address] [place] have it! [facility] or [facility]  [] Enrollment in [facility] hospice services  [] [address] [place] [address]  [] Patient /Family declines to have screening completed or provide financial information for screening          Financial Resources:  Medicaid    [] Initiated and application pending   [x] Full coverage      Advanced Care Plan:  []Surrogate Decision Maker of Care  []POA  [x]Communicated Code Status- FULL    Other  Fer Salas RN - Outcomes Manager  [phone]

## 2021-10-25 NOTE — PROGRESS NOTES
Nurse attempted to call to give report to [address]. Nurse started to call [phone] to [phone] with no success. Nurse called [phone].

## 2021-10-25 NOTE — PROGRESS NOTES
Problem: Diabetes Self-Management  Goal: *Disease process and treatment process  Description: Define diabetes and identify own type of diabetes; list 3 options for treating diabetes. 10/25/2021 1834 by Rojelio Ayers RN  Outcome: Resolved/Met  10/25/2021 1833 by Rojelio Ayers RN  Outcome: Progressing Towards Goal  10/25/2021 1158 by Rojelio Ayers RN  Outcome: Progressing Towards Goal  Goal: *Incorporating nutritional management into lifestyle  Description: Describe effect of type, amount and timing of food on blood glucose; list 3 methods for planning meals. 10/25/2021 1834 by Rojelio Ayers RN  Outcome: Resolved/Met  10/25/2021 1833 by Rojelio Ayers RN  Outcome: Progressing Towards Goal  10/25/2021 1158 by Rojelio Ayers RN  Outcome: Progressing Towards Goal  Goal: *Incorporating physical activity into lifestyle  Description: State effect of exercise on blood glucose levels. 10/25/2021 1834 by Rojelio Ayers RN  Outcome: Resolved/Met  10/25/2021 1833 by Rojelio Ayers RN  Outcome: Progressing Towards Goal  10/25/2021 1158 by Rojelio Ayers RN  Outcome: Progressing Towards Goal  Goal: *Developing strategies to promote health/change behavior  Description: Define the ABC's of diabetes; identify appropriate screenings, schedule and personal plan for screenings. 10/25/2021 1834 by Rojelio Ayers RN  Outcome: Resolved/Met  10/25/2021 1833 by Rojelio Ayers RN  Outcome: Progressing Towards Goal  10/25/2021 1158 by Rojelio Ayers RN  Outcome: Progressing Towards Goal  Goal: *Using medications safely  Description: State effect of diabetes medications on diabetes; name diabetes medication taking, action and side effects.   10/25/2021 1834 by Rojelio Ayers RN  Outcome: Resolved/Met  10/25/2021 1833 by Rojelio Ayers RN  Outcome: Progressing Towards Goal  10/25/2021 1158 by Rojelio Ayers RN  Outcome: Progressing Towards Goal  Goal: *Monitoring blood glucose, interpreting and using results  Description: Identify recommended blood glucose targets  and personal targets. 10/25/2021 1834 by Chrissy Cordova RN  Outcome: Resolved/Met  10/25/2021 1833 by Chrissy Cordova RN  Outcome: Progressing Towards Goal  10/25/2021 1158 by Chrissy Cordova RN  Outcome: Progressing Towards Goal  Goal: *Prevention, detection, treatment of acute complications  Description: List symptoms of hyper- and hypoglycemia; describe how to treat low blood sugar and actions for lowering  high blood glucose level. 10/25/2021 1834 by Chrissy Cordova RN  Outcome: Resolved/Met  10/25/2021 1833 by Chrissy Cordova RN  Outcome: Progressing Towards Goal  10/25/2021 1158 by Chrissy Cordova RN  Outcome: Progressing Towards Goal  Goal: *Prevention, detection and treatment of chronic complications  Description: Define the natural course of diabetes and describe the relationship of blood glucose levels to long term complications of diabetes.   10/25/2021 1834 by Chrissy Cordova RN  Outcome: Resolved/Met  10/25/2021 1833 by Chrissy Cordova RN  Outcome: Progressing Towards Goal  10/25/2021 1158 by Chrissy Cordova RN  Outcome: Progressing Towards Goal  Goal: *Developing strategies to address psychosocial issues  Description: Describe feelings about living with diabetes; identify support needed and support network  10/25/2021 1834 by Chrissy Cordova RN  Outcome: Resolved/Met  10/25/2021 1833 by Chrissy Cordova RN  Outcome: Progressing Towards Goal  10/25/2021 1158 by Chrissy Cordova RN  Outcome: Progressing Towards Goal  Goal: *Insulin pump training  10/25/2021 1834 by Chrissy Cordova RN  Outcome: Resolved/Met  10/25/2021 1833 by Chrissy Cordova RN  Outcome: Progressing Towards Goal  10/25/2021 1158 by Chrissy Cordova RN  Outcome: Progressing Towards Goal  Goal: *Sick day guidelines  10/25/2021 1834 by Chrissy Cordova RN  Outcome: Resolved/Met  10/25/2021 1833 by Fer Murrell Li CALIX RN  Outcome: Progressing Towards Goal  10/25/2021 1158 by Rodolfo Bhatia RN  Outcome: Progressing Towards Goal  Goal: *Patient Specific Goal (EDIT GOAL, INSERT TEXT)  10/25/2021 1834 by Rodolfo Bhatia RN  Outcome: Resolved/Met  10/25/2021 1833 by Rodolfo Bhatia RN  Outcome: Progressing Towards Goal  10/25/2021 1158 by Rodolfo Bhatia RN  Outcome: Progressing Towards Goal     Problem: Patient Education: Go to Patient Education Activity  Goal: Patient/Family Education  10/25/2021 1834 by Rodolfo Bhatia RN  Outcome: Resolved/Met  10/25/2021 1833 by Rodolfo Bhatia RN  Outcome: Progressing Towards Goal  10/25/2021 1158 by Rodolfo Bhatia RN  Outcome: Progressing Towards Goal     Problem: Falls - Risk of  Goal: *Absence of Falls  Description: Document Rubi Pablo Fall Risk and appropriate interventions in the flowsheet.   10/25/2021 1834 by Rodolfo Bhatia RN  Outcome: Resolved/Met  10/25/2021 1833 by Rodolfo Bhatia RN  Outcome: Progressing Towards Goal  Note: Fall Risk Interventions:  Mobility Interventions: Communicate number of staff needed for ambulation/transfer    Mentation Interventions: Bed/chair exit alarm    Medication Interventions: Bed/chair exit alarm    Elimination Interventions: Call light in reach           10/25/2021 1158 by Rodolfo Bhatia RN  Outcome: Progressing Towards Goal  Note: Fall Risk Interventions:  Mobility Interventions: Communicate number of staff needed for ambulation/transfer    Mentation Interventions: Bed/chair exit alarm    Medication Interventions: Bed/chair exit alarm    Elimination Interventions: Call light in reach              Problem: Patient Education: Go to Patient Education Activity  Goal: Patient/Family Education  10/25/2021 1834 by Rodolfo Bhatia RN  Outcome: Resolved/Met  10/25/2021 1833 by Rodolfo Bhatia RN  Outcome: Progressing Towards Goal  10/25/2021 1158 by Rodolfo Bhatia RN  Outcome: Progressing Towards Goal Problem: Pressure Injury - Risk of  Goal: *Prevention of pressure injury  Description: Document Ab Scale and appropriate interventions in the flowsheet.   10/25/2021 1834 by Marito Rodriguez RN  Outcome: Resolved/Met  10/25/2021 1833 by Marito Rodriguez RN  Outcome: Progressing Towards Goal  Note: Pressure Injury Interventions:  Sensory Interventions: Assess changes in LOC    Moisture Interventions: Absorbent underpads    Activity Interventions: Pressure redistribution bed/mattress(bed type)    Mobility Interventions: HOB 30 degrees or less    Nutrition Interventions: Document food/fluid/supplement intake    Friction and Shear Interventions: Foam dressings/transparent film/skin sealants             10/25/2021 1158 by Marito Rodriguez RN  Outcome: Progressing Towards Goal  Note: Pressure Injury Interventions:  Sensory Interventions: Assess changes in LOC    Moisture Interventions: Absorbent underpads    Activity Interventions: Pressure redistribution bed/mattress(bed type)    Mobility Interventions: HOB 30 degrees or less    Nutrition Interventions: Document food/fluid/supplement intake    Friction and Shear Interventions: Foam dressings/transparent film/skin sealants                Problem: Patient Education: Go to Patient Education Activity  Goal: Patient/Family Education  10/25/2021 1834 by Marito Rodriguez RN  Outcome: Resolved/Met  10/25/2021 1833 by Marito Rodriguez RN  Outcome: Progressing Towards Goal  10/25/2021 1158 by Marito Rodriguez RN  Outcome: Progressing Towards Goal     Problem: Nutrition Deficit  Goal: *Optimize nutritional status  10/25/2021 1834 by Marito Rodriguez RN  Outcome: Resolved/Met  10/25/2021 1833 by Marito Rodriguez RN  Outcome: Progressing Towards Goal  10/25/2021 1158 by Marito Rodriguez RN  Outcome: Progressing Towards Goal

## 2021-10-25 NOTE — DISCHARGE SUMMARY
Discharge Summary    Patient: Sumit Smith MRN: [id number]  CSN: [id number]    YOB: 1955  Age: 74 y.o.   Sex: female    DOA: 10/20/2021 LOS:  LOS: 5 days   Discharge Date:      Admission Diagnoses: Cholecystitis [K81.9]    Discharge Diagnoses:    Problem List as of 10/25/2021 Date Reviewed: 10/22/2021        Codes Class Noted - Resolved    Acute pericardial effusion ICD-10-CM: I30.9  ICD-9-CM: 420.90  10/21/2021 - Present        Schizophrenia ([facility] ICD-10-CM: F20.9  ICD-9-CM: 295.90  7/20/2021 - Present        Prolonged Q-T interval on ECG ICD-10-CM: R94.31  ICD-9-CM: 794.31  6/12/2021 - Present        Hypertensive urgency ICD-10-CM: I16.0  ICD-9-CM: 401.9  6/12/2021 - Present        Chest pain ICD-10-CM: R07.9  ICD-9-CM: 786.50  6/11/2021 - Present        Hyperkalemia ICD-10-CM: E87.5  ICD-9-CM: 276.7  6/2/2021 - Present        Elevated troponin level ICD-10-CM: R77.8  ICD-9-CM: 790.6  6/2/2021 - Present        Elevated brain natriuretic peptide (BNP) level ICD-10-CM: R79.89  ICD-9-CM: 790.99  6/2/2021 - Present        Anemia ICD-10-CM: D64.9  ICD-9-CM: 285.9  3/19/2021 - Present        Hypertensive emergency ICD-10-CM: I16.1  ICD-9-CM: 401.9  9/25/2020 - Present        Tachycardia ICD-10-CM: R00.0  ICD-9-CM: 785.0  9/25/2020 - Present        Bandemia ICD-10-CM: H74.516  ICD-9-CM: 288.66  8/27/2018 - Present        New onset seizure ([facility] ICD-10-CM: R56.9  ICD-9-CM: 780.39  8/27/2018 - Present        ESRD on hemodialysis ([facility] ICD-10-CM: N18.6, Z99.2  ICD-9-CM: 585.6, V45.11  8/8/2018 - Present        Secondary hyperparathyroidism of renal origin [facility]) ICD-10-CM: N25.81  ICD-9-CM: 588.81  8/8/2018 - Present        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 ([facility] ICD-10-CM: E11.22, I12.0, Z99.2, N18.6  ICD-9-CM: 250.40, 403.91, V45.11, 585.6  8/8/2018 - Present        CVA, old, cognitive deficits ICD-10-CM: I69.319  ICD-9-CM: 438.0  8/8/2018 - Present        Acute on chronic renal insufficiency ICD-10-CM: N28.9, N18.9  ICD-9-CM: 593.9, 585.9  8/7/2018 - Present        Hypertension ICD-10-CM: I10  ICD-9-CM: 401.9  8/7/2018 - Present        RESOLVED: Chest pain ICD-10-CM: R07.9  ICD-9-CM: 786.50  9/25/2020 - 9/27/2020              Discharge Condition: Stable    PHYSICAL EXAM  Visit Vitals  BP (!) 147/63   Pulse 79   Temp 98.6 °F (37 °C)   Resp 16   Ht 5' 3\" (1.6 m)   Wt 54.9 kg (121 lb 1.6 oz)   SpO2 98%   BMI 21.45 kg/m²     General:   awake alert and oriented, non-toxic   Skin:   seb derm on face and scalp; allopecia; no rashes or skin lesions noted on limited exam, dry and warm   HEENT:  No scleral icterus or pallor; oral mucosa moist, lips moist   Lymph Nodes:   not assessed today   Lungs:   non, labored; bilaterally clear to aspiration- no crackles wheezes rales or rhonchi   Heart:  RRR, s1 and s2; no murmurs rubs or gallops; no edema, + pedal pulses   Abdomen:  soft, non-distended, active bowel sounds, non-tender   Genitourinary:  deferred   Extremities:   average muscle tone; no contractures, no joint effusions   Neurologic:  No gross focal motor or sensory abnormalities; CN 2-12 intact; Follows commands. Psychiatric:   appropriate and interactive.          Hospital Course: As per HPI:   74 y.o female with HTN, HFpEF, ESRD on HD, seizure disorder, bipolar disorder, normocytic anemia, presented from dialysis with chest pain in her mid sternal area.  According to ER and admission note, she had non radiating chest pain. Initial workup showed SBP >200, she had indeterminate troponin level, CTA chest was negative for PE, no infectious process. She was managed for hypertensive emergency. Nephrology consulted for dialysis. She was noted to have moderate sized pericardial effusion on CT, hence cardiology was consulted. Echo check with mild-moderate pericardial effusion. Cardiology considered this as renal related and recommended aggressive dialysis for further treatment.  No further workup from cardiology. Hospital Problems:   1.  Hypertensive emergency- resolved. BP currently controled   2.  Chest pain, possible angina : resolved. No acute cardiac process identified  3.  Indeterminated troponin level,   4.  Moderate pericardial effusion, previous Echo mention small;               - 10/21 TTE  Estimated LVEF is 60 - 65%. Visually measured ejection fraction. Normal cavity size and systolic function (ejection fraction normal). Severe concentric hypertrophy. Pericardium: Small-to-moderate circumferential pericardial effusion measuring 20 mm  5.   Acute on Chronic HFpEF, elevated proBNP; symptoms improving with HD  6.   Seizure disorder   7.   Bipolar disorder   8.   ESRD on HD   9.   Hypertensive encephalopathy, improved to baseline. 10.  Low grade fever, no clear source of infection. No leukocytosis, low proCalc  11.   Normocytic anemia with CKD     Consults:   Nephrology:  Dr. Tomas Booker    Significant Diagnostic Studies:   10/21 Echocardiogram:   · LV: Estimated LVEF is 60 - 65%. Visually measured ejection fraction. Normal cavity size and systolic function (ejection fraction normal). Severe concentric hypertrophy. · Pericardium: Small-to-moderate circumferential pericardial effusion measuring 20 mm. CTA CHEST W OR W WO CONT    Result Date: 10/20/2021  No evidence for pulmonary emboli. Mild interstitial edema. Small bilateral pleural effusions, right greater than left. Moderate pericardial effusion. XR CHEST PORT    Result Date: 10/20/2021  Enlarged cardiac silhouette with vascular congestion and mild pulmonary edema. Probably small left pleural effusion.          CBC WITH AUTOMATED DIFF    Collection Time: 10/25/21  4:46 AM   Result Value Ref Range    WBC 6.9 4.6 - 13.2 K/uL    RBC 3.02 (L) 4.20 - 5.30 M/uL    HGB 8.8 (L) 12.0 - 16.0 g/dL    HCT 26.9 (L) 35.0 - 45.0 %    MCV 89.1 78.0 - 100.0 FL    MCH 29.1 24.0 - 34.0 PG    MCHC 32.7 31.0 - 37.0 g/dL    RDW 14.7 (H) 11.6 - 14.5 %    PLATELET 257 135 - 420 K/uL    MPV 11.4 9.2 - 11.8 FL    NEUTROPHILS 71 40 - 73 %    LYMPHOCYTES 10 (L) 21 - 52 %    MONOCYTES 14 (H) 3 - 10 %    EOSINOPHILS 5 0 - 5 %    BASOPHILS 0 0 - 2 %    ABS. NEUTROPHILS 4.9 1.8 - 8.0 K/UL    ABS. LYMPHOCYTES 0.7 (L) 0.9 - 3.6 K/UL    ABS. MONOCYTES 0.9 0.05 - 1.2 K/UL    ABS. EOSINOPHILS 0.4 0.0 - 0.4 K/UL    ABS. BASOPHILS 0.0 0.0 - 0.1 K/UL    DF AUTOMATED       BMP:   Lab Results   Component Value Date/Time     (L) 10/25/2021 04:46 AM    K 3.7 10/25/2021 04:46 AM    CL 97 (L) 10/25/2021 04:46 AM    CO2 26 10/25/2021 04:46 AM    AGAP 10 10/25/2021 04:46 AM     (H) 10/25/2021 04:46 AM    BUN 34 (H) 10/25/2021 04:46 AM    CREA 7.55 (H) 10/25/2021 04:46 AM    GFRAA 6 (L) 10/25/2021 04:46 AM    GFRNA 5 (L) 10/25/2021 04:46 AM          Results     Procedure Component Value Units Date/Time    CULTURE, URINE [[id number]] Collected: 10/22/21 1325    Order Status: Completed Specimen: Cath Urine Updated: 10/23/21 2005     Special Requests: NO SPECIAL REQUESTS        Culture result: No growth (<1,000 CFU/ML)       CULTURE, URINE [[id number]] Collected: 10/22/21 1300    Order Status: Canceled Specimen: Cath Urine     CULTURE, BLOOD [[id number]] Collected: 10/21/21 1306    Order Status: Completed Specimen: Blood Updated: 10/25/21 0650     Special Requests: NO SPECIAL REQUESTS        Culture result: NO GROWTH 3 DAYS       CULTURE, BLOOD [[id number]] Collected: 10/21/21 1258    Order Status: Completed Specimen: Blood Updated: 10/25/21 0650     Special Requests: NO SPECIAL REQUESTS        Culture result: NO GROWTH 3 DAYS               Discharge Medications:     Current Discharge Medication List      START taking these medications    Details   b complex-vitamin c-folic acid (Virt-Caps) 1 mg capsule Take 1 Capsule by mouth daily. Qty: 30 Capsule, Refills: 0      haloperidoL (HALDOL) 1 mg tablet Take 1 Tablet by mouth two (2) times a day.   Qty: 60 Tablet, Refills: 0      hydrALAZINE (APRESOLINE) 50 mg tablet Take 1 Tablet by mouth three (3) times daily. Qty: 90 Tablet, Refills: 0         CONTINUE these medications which have NOT CHANGED    Details   cloNIDine (CATAPRES) 0.3 mg/24 hr 1 Patch by TransDERmal route every seven (7) days. Qty: 4 Patch, Refills: 0      isosorbide mononitrate ER (IMDUR) 30 mg tablet Take 1 Tablet by mouth daily. Qty: 30 Tablet, Refills: 0      amLODIPine (NORVASC) 10 mg tablet Take 1 Tablet by mouth daily. Qty: 30 Tablet, Refills: 0      diphenhydrAMINE (BenadryL) 25 mg capsule Take 25 mg by mouth every eight (8) hours as needed for Allergies. losartan (COZAAR) 100 mg tablet Take 1 Tablet by mouth daily. Qty: 30 Tablet, Refills: 0      carvediloL (COREG) 25 mg tablet Take 1 Tablet by mouth two (2) times daily (with meals). Qty: 60 Tablet, Refills: 0      famotidine (Pepcid) 20 mg tablet Take 1 Tablet by mouth daily. Qty: 30 Tablet, Refills: 0      polyethylene glycol (MIRALAX) 17 gram packet Take 1 Packet by mouth daily as needed for Constipation. Qty: 15 Packet, Refills: 0      atorvastatin (Lipitor) 10 mg tablet Take 10 mg by mouth daily. Indications: high amount of triglyceride in the blood      !! levETIRAcetam (Keppra) 500 mg tablet Take 500 mg by mouth two (2) times a day. sevelamer carbonate (RENVELA) 800 mg tab tab Take 800 mg by mouth three (3) times daily. !! levETIRAcetam (KEPPRA) 250 mg tablet Take 1 Tab by mouth every Monday, Wednesday, Friday. Qty: 30 Tab, Refills: 1      insulin lispro (HUMALOG) 100 unit/mL injection INITIATE INSULIN CORRECTIVE PROTOCOL: Normal Insulin Sensitivity   For Blood Sugar (mg/dL) of:     Less than 150 =   0 units           150 -199 =   2 units  200 -249 =   4 units  250 -299 =   6 units  300 -349 =   8 units  350 and above = 10 units and Call Physician  If 2 glucose readings are above  Qty: 1 Vial, Refills: 0      aspirin 81 mg tablet Take 81 mg by mouth daily.          !! - Potential duplicate medications found. Please discuss with provider.       STOP taking these medications       minoxidiL (LONITEN) 2.5 mg tablet Comments:   Reason for Stopping:         b complex-vitamin c-folic acid 0.8 mg (NEPHRO-DORIAN) 0.8 mg tab tablet Comments:   Reason for Stopping:               Activity:  Fall risk, aspiration precautions, as directed by PT/OT    Diet: Renal Diet    Wound Care: None needed    Follow-up: with PCP, Dot Scheuermann, MD in 7-10days     Dispo:  Return ton LTC after HD    Minutes spent on discharge: >30 minutes spent coordinating this discharge (review instructions/follow-up, prescriptions, preparing report for sign off)

## 2021-10-25 NOTE — PROGRESS NOTES
Progress Note    Casey Ser  74 y.o. Admit Date: 10/20/2021  Active Problems:    Hypertension (8/7/2018) POA: Yes      ESRD on hemodialysis ([facility] (8/8/2018) POA: Unknown      Secondary hyperparathyroidism of renal origin ([facility] (8/8/2018) POA: Yes      CVA, old, cognitive deficits (8/8/2018) POA: Yes      Anemia (3/19/2021) POA: Yes      Acute pericardial effusion (10/21/2021) POA: Unknown            Subjective:     Patient feels good, no Chest pain, no SOB, waiting to be Dialyzed. A comprehensive review of systems was negative except for that written in the History of Present Illness.     Objective:     Visit Vitals  BP (!) 135/94   Pulse 77   Temp 98.3 °F (36.8 °C) (Oral)   Resp 16   Ht 5' 3\" (1.6 m)   Wt 54.9 kg (121 lb 1.6 oz)   LMP  (LMP Unknown)   SpO2 99%   BMI 21.45 kg/m²         Intake/Output Summary (Last 24 hours) at 10/25/2021 1331  Last data filed at 10/25/2021 0436  Gross per 24 hour   Intake 240 ml   Output 0 ml   Net 240 ml       Current Facility-Administered Medications   Medication Dose Route Frequency Provider Last Rate Last Admin    acetaminophen (TYLENOL) tablet 650 mg  650 mg Oral Q4H PRN Deisy Cloud, DO   650 mg at 10/25/21 7683    hydrALAZINE (APRESOLINE) 20 mg/mL injection 10 mg  10 mg IntraVENous Q6H PRN Chan Graves MD        epoetin raphael-epbx (RETACRIT) injection 8,000 Units  8,000 Units SubCUTAneous Q MON, WED & Roxy Fischer MD        haloperidoL (HALDOL) tablet 1 mg  1 mg Oral BID Chan Graves MD   1 mg at 10/25/21 0618    heparin (porcine) 1,000 unit/mL injection 5,000 Units  5,000 Units IntraCATHeter DIALYSIS PRN Anatoly Islas MD        amLODIPine (NORVASC) tablet 10 mg  10 mg Oral QHS Dempsey Boeck, MD   10 mg at 10/24/21 2114    hydrALAZINE (APRESOLINE) tablet 50 mg  50 mg Oral TID Dempsey Boeck, MD   50 mg at 10/25/21 0856    cloNIDine (CATAPRES) 0.3 mg/24 hr patch 1 Patch  1 Patch TransDERmal Q7D Dempsey Boeck, MD   1 Patch at 10/21/21 1840    sodium chloride (NS) flush 5-40 mL  5-40 mL IntraVENous Q8H Tom Melgoza MD   10 mL at 10/25/21 0886    sodium chloride (NS) flush 5-40 mL  5-40 mL IntraVENous PRN Kait Melgoza MD       [facility] by provider] heparin (porcine) injection 5,000 Units  5,000 Units SubCUTAneous Q8H Tom Melgoza MD        aspirin chewable tablet 81 mg  81 mg Oral DAILY Tom Melgoza MD   81 mg at 10/25/21 9705    atorvastatin (LIPITOR) tablet 10 mg  10 mg Oral DAILY Tom Melgoza MD   10 mg at 10/25/21 6694    carvediloL (COREG) tablet 25 mg  25 mg Oral BID WITH MEALS Tom Melgoza MD   25 mg at 10/25/21 0857    diphenhydrAMINE (BENADRYL) capsule 25 mg  25 mg Oral Q8H PRN Tom Melgoza MD   25 mg at 10/23/21 2125    famotidine (PEPCID) tablet 20 mg  20 mg Oral DAILY Tom Melgoza MD   20 mg at 10/25/21 2344    isosorbide mononitrate ER (IMDUR) tablet 30 mg  30 mg Oral DAILY Tom Melgoza MD   30 mg at 10/25/21 9261    levETIRAcetam (KEPPRA) tablet 250 mg  250 mg Oral Q MON, WED & FRI Tom Melgoza MD   250 mg at 10/22/21 2017    levETIRAcetam (KEPPRA) tablet 500 mg  500 mg Oral BID Tom Melgoza MD   500 mg at 10/25/21 3282    losartan (COZAAR) tablet 100 mg  100 mg Oral DAILY Tom Melgoza MD   100 mg at 10/25/21 9123    sevelamer carbonate (RENVELA) tab 800 mg  800 mg Oral TID Tom Melgoza MD   800 mg at 10/25/21 9244    insulin lispro (HUMALOG) injection   SubCUTAneous AC&HS Tom Melgoza MD   4 Units at 10/25/21 1200    glucose chewable tablet 16 g  4 Tablet Oral PRN Tom Melgoza MD        glucagon [facility]) injection 1 mg  1 mg IntraMUSCular PRN Tom Melgoza MD        dextrose (D50W) injection syrg 12.5-25 g  25-50 mL IntraVENous PRN Tom Melgoza MD        B complex-vitaminC-folic acid (NEPHROCAP) cap  1 Capsule Oral DAILY Kait Melgoza MD   1 Capsule at 10/25/21 9521        Physical Exam:     Physical Exam:   General:  Alert, cooperative, no distress, appears stated age.    Neck: Supple, symmetrical, trachea midline, no adenopathy, thyroid: no enlargement/tenderness/nodules, no carotid bruit and no JVD. Lungs:   Clear to auscultation bilaterally. Heart:  Regular rate and rhythm, S1, S2 normal, no murmur, click, rub or gallop. Abdomen:   Soft, non-tender. Bowel sounds normal. No masses,  No organomegaly. Extremities: Extremities normal, atraumatic, no cyanosis or edema, HD catheter is well dressed. Skin: Skin color, texture, turgor normal. No rashes or lesions         Data Review:    CBC w/Diff    Recent Labs     10/25/21  0446 10/23/21  0200 10/23/21  0200   WBC 6.9  --  6.9   RBC 3.02*  --  2.89*   HGB 8.8*  --  8.5*   HCT 26.9*  --  25.9*   MCV 89.1   < > 89.6   MCH 29.1   < > 29.4   MCHC 32.7   < > 32.8   RDW 14.7*   < > 15.3*    < > = values in this interval not displayed. Recent Labs     10/25/21  0446 10/23/21  0200 10/23/21  0200   MONOS 14*  --  12*   EOS 5  --  5   BASOS 0   < > 0   RDW 14.7*   < > 15.3*    < > = values in this interval not displayed. Comprehensive Metabolic Profile    Recent Labs     10/25/21  0446 10/23/21  0200   * 136   K 3.7 3.4*   CL 97* 99*   CO2 26 28   BUN 34* 15   CREA 7.55* 4.56*    Recent Labs     10/25/21  0446 10/23/21  0200   CA 9.7 9.2   PHOS 3.5 3.3                        Impression:       Active Hospital Problems    Diagnosis Date Noted    Acute pericardial effusion 10/21/2021    Anemia 03/19/2021    ESRD on hemodialysis ([facility] 08/08/2018    Secondary hyperparathyroidism of renal origin ([facility] 08/08/2018    CVA, old, cognitive deficits 08/08/2018    Hypertension 08/07/2018        BP is much better    Plan:     Dialysis today, if remain stable post dialysis may transfer to nursing home.       Marcelina Sehth MD

## 2021-10-25 NOTE — ROUTINE PROCESS
TRANSFER - OUT REPORT:    Verbal report given to Nikolas Bridges (name) on Ale Price  being transferred to [address] [place] [address] (unit) for routine progression of care       Report consisted of patients Situation, Background, Assessment and   Recommendations(SBAR). Information from the following report(s) SBAR, Kardex, OR Summary, Intake/Output and MAR was reviewed with the receiving nurse. Lines:   Peripheral IV 10/23/21 Anterior;Proximal;Right Forearm (Active)   Site Assessment Clean, dry, & intact 10/25/21 0800   Phlebitis Assessment 0 10/25/21 0800   Infiltration Assessment 0 10/25/21 0800   Dressing Status Clean, dry, & intact 10/25/21 0800   Dressing Type Tape;Transparent 10/25/21 0800   Hub Color/Line Status Yellow 10/25/21 0800   Action Taken Open ports on tubing capped 10/25/21 0800   Alcohol Cap Used Yes 10/25/21 0800        Opportunity for questions and clarification was provided.       Patient transported with:  [facility]

## 2021-10-25 NOTE — PROGRESS NOTES
Problem: Diabetes Self-Management  Goal: *Disease process and treatment process  Description: Define diabetes and identify own type of diabetes; list 3 options for treating diabetes. Outcome: Progressing Towards Goal  Goal: *Incorporating nutritional management into lifestyle  Description: Describe effect of type, amount and timing of food on blood glucose; list 3 methods for planning meals. Outcome: Progressing Towards Goal  Goal: *Incorporating physical activity into lifestyle  Description: State effect of exercise on blood glucose levels. Outcome: Progressing Towards Goal  Goal: *Developing strategies to promote health/change behavior  Description: Define the ABC's of diabetes; identify appropriate screenings, schedule and personal plan for screenings. Outcome: Progressing Towards Goal  Goal: *Using medications safely  Description: State effect of diabetes medications on diabetes; name diabetes medication taking, action and side effects. Outcome: Progressing Towards Goal  Goal: *Monitoring blood glucose, interpreting and using results  Description: Identify recommended blood glucose targets  and personal targets. Outcome: Progressing Towards Goal  Goal: *Prevention, detection, treatment of acute complications  Description: List symptoms of hyper- and hypoglycemia; describe how to treat low blood sugar and actions for lowering  high blood glucose level. Outcome: Progressing Towards Goal  Goal: *Prevention, detection and treatment of chronic complications  Description: Define the natural course of diabetes and describe the relationship of blood glucose levels to long term complications of diabetes.   Outcome: Progressing Towards Goal  Goal: *Developing strategies to address psychosocial issues  Description: Describe feelings about living with diabetes; identify support needed and support network  Outcome: Progressing Towards Goal  Goal: *Insulin pump training  Outcome: Progressing Towards Goal  Goal: *Sick day guidelines  Outcome: Progressing Towards Goal  Goal: *Patient Specific Goal (EDIT GOAL, INSERT TEXT)  Outcome: Progressing Towards Goal     Problem: Patient Education: Go to Patient Education Activity  Goal: Patient/Family Education  Outcome: Progressing Towards Goal     Problem: Falls - Risk of  Goal: *Absence of Falls  Description: Document Ignacio Suazo Fall Risk and appropriate interventions in the flowsheet. Outcome: Progressing Towards Goal  Note: Fall Risk Interventions:  Mobility Interventions: Communicate number of staff needed for ambulation/transfer    Mentation Interventions: Bed/chair exit alarm    Medication Interventions: Bed/chair exit alarm    Elimination Interventions: Call light in reach              Problem: Patient Education: Go to Patient Education Activity  Goal: Patient/Family Education  Outcome: Progressing Towards Goal     Problem: Pressure Injury - Risk of  Goal: *Prevention of pressure injury  Description: Document Ab Scale and appropriate interventions in the flowsheet.   Outcome: Progressing Towards Goal  Note: Pressure Injury Interventions:  Sensory Interventions: Assess changes in LOC    Moisture Interventions: Absorbent underpads    Activity Interventions: Pressure redistribution bed/mattress(bed type)    Mobility Interventions: HOB 30 degrees or less    Nutrition Interventions: Document food/fluid/supplement intake    Friction and Shear Interventions: Foam dressings/transparent film/skin sealants                Problem: Patient Education: Go to Patient Education Activity  Goal: Patient/Family Education  Outcome: Progressing Towards Goal     Problem: Nutrition Deficit  Goal: *Optimize nutritional status  Outcome: Progressing Towards Goal

## 2021-10-26 NOTE — DIALYSIS
ACUTE HEMODIALYSIS FLOW SHEET    HEMODIALYSIS ORDERS: Physician: Dr. Mcknight [place]: Mildred   Duration: 3 hr   BFR: 400   DFR: 800   Dialysate:  Temp 36-37*C   K+  3    Ca+ 2.5   Na 138   Bicarb 35   Wt Readings from Last 1 Encounters:   10/25/21 54.9 kg (121 lb 1.6 oz)    Patient Chart [x]   Unable to Obtain []  Dry weight/UF Goal: 2000 ml    Heparin []  Bolus    Units    [] Hourly    Units    [x]None       Pre BP: 121/71  Pulse: 76  Respirations: 16 Temp: 98.  [x] Oral  [] Ax  [] Esoph   Labs: []  Pre  []  Post:   [x] N/A   Additional Orders (medications, blood products, hypotension management): [] Yes   [x] No     [x]  [facility] Consent Verified     CATHETER ACCESS:  []N/A   [x]Right   []Left   []IJ   []Fem  [x]Chest wall  []TransHepatic   [] First use X-ray verified     [x]Tunnel    [] Non Tunneled   [x]No S/S infection  []Redness  []Drainage []Cultured []Swelling []Pain   [x]Medical Aseptic Prep Utilized   []Dressing Changed  [] Biopatch  Date: 10/21/21   []Clotted   [x]Patent   Flows: [x]Good  []Poor  []Reversed   If access problem,  notified: []Yes    [x]N/A        GRAFT/FISTULA ACCESS:   [x]N/A     []Right     []Left     []UE     []LE                   GENERAL ASSESSMENT:    LUNGS:  Resp Rate 16   [] Clear  [] Coarse  [] Crackles  [] Wheezing  [x] Diminished                                                           [x] RLL   [x] LLL  [x] RUL   [x] TSEPHY            Respirations:  [x]Easy  []Labored  []N/A  Cough:  []Productive  []Dry  []N/A               Therapy:  [x]RA   [] Ventilated   [] Intubated   [] Trach            O2 Device:  [] NC   [] NRB  [] Trach Mask  [] BiPaP  Flow:   l/min                                                    CARDIAC: [] Regular      [] Irregular   [] Rhythm: not monitored          [] Monitored   [] Bedside   [] Remotely monitored       EDEMA: [x] None   []Generalized  [] Pitting [] 1+   [] 2 +   [] 3+    [] 4+        SKIN:   [] Hot     [] Cold    [x] Warm   [x] Dry    [] Diaphoretic                 [] Flushed  [] Jaundiced  [] Cyanotic  [] Pale      LOC:    [x] Alert      [x]Oriented:    [x] Person     [] Place   []Time               [x] Confused  [] Lethargic  [] Medicated  [] Non-responsive  [] Non-Verbal     GI / ABDOMEN:                     [] Flat    [] Distended    [x] Soft    [] Firm   []  Obese                   [] Diarrhea   [] FMS [x] Bowel Sounds  [] Nausea  [] Vomiting                   [] NGT  [] OGT  [] PEG  [] Tube Feedings @     mL/hr     / URINE ASSESSMENT:                   [] Voiding    [x] Oliguria  [] Anuria                     []  Khan   [] Incontinent  []  Incontinent Brief   []  PureWick     PAIN:  [x] 0 []1  []2   []3   []4   []5   []6   []7   []8   []9   []10                MOBILITY:  [x] Bed    [] Stretcher      All Vitals and Treatment Details on [address]: [facility]          Room # 574/07    [x] Routine         [] 1st Time Acute/Chronic   [] Urgent      [] Stat            [x] Acute Room   []  Bedside    [] ICU/CCU     [] ER     Isolation Precautions:  [x] Dialysis    There are currently no Active Isolations     ALLERGIES:     Allergies   Allergen Reactions    Amoxicillin Unknown (comments)    Cleocin [Clindamycin Hcl] Unknown (comments)    Codeine Unknown (comments)    Lorcet 10/650 [Hydrocodone-Acetaminophen] Unknown (comments)    Penicillin G Benzathine Unknown (comments)    Shellfish Derived Unknown (comments)        Code Status:  Full Code     Hepatitis Status      Lab Results   Component Value Date/Time    Hepatitis B surface Ag <0.10 09/25/2020 05:31 PM    Hepatitis B surface Ab 63.01 09/25/2020 05:31 PM    Hepatitis B core, IgM NEGATIVE  08/11/2018 03:29 AM    Hep B Core Ab, IgM NEGATIVE  08/13/2018 04:34 AM    Hep B Core Ab, total NEGATIVE  08/13/2018 04:34 AM        Current Labs:      Lab Results   Component Value Date/Time    WBC 6.9 10/25/2021 04:46 AM    Hemoglobin, POC 13.3 11/08/2019 07:26 AM    HGB 8.8 (L) 10/25/2021 04:46 AM    Hematocrit, POC 39 11/08/2019 07:26 AM    HCT 26.9 (L) 10/25/2021 04:46 AM    PLATELET 085 79/38/1548 04:46 AM    MCV 89.1 10/25/2021 04:46 AM     Lab Results   Component Value Date/Time    Sodium 133 (L) 10/25/2021 04:46 AM    Potassium 3.7 10/25/2021 04:46 AM    Chloride 97 (L) 10/25/2021 04:46 AM    CO2 26 10/25/2021 04:46 AM    Anion gap 10 10/25/2021 04:46 AM    Glucose 181 (H) 10/25/2021 04:46 AM    BUN 34 (H) 10/25/2021 04:46 AM    Creatinine 7.55 (H) 10/25/2021 04:46 AM    BUN/Creatinine ratio 5 (L) 10/25/2021 04:46 AM    GFR est AA 6 (L) 10/25/2021 04:46 AM    GFR est non-AA 5 (L) 10/25/2021 04:46 AM    Calcium 9.7 10/25/2021 04:46 AM          DIET:  DIET ADULT ORAL NUTRITION SUPPLEMENT  DIET ADULT     PRIMARY NURSE REPORT:   Pre Dialysis: Letty Nguyen RN    Time: 200      EDUCATION:    [x] Patient           Knowledge Basis: []None [x]Minimal [] Substantial [] Unknown  Barriers to learning  [x]None  [] Intubated/Trached/Ventilated  [] Sedated/Paralyzed   [] Access Care     [] S&S of infection  [] Fluid Management  [] K+   [x] Procedural    [] Medications   [] Tx Options   [] Transplant   [] Diet      Teaching Tools:  [x] Explain  [] Demo  [] Handouts [] Video  Patient response: [] Verbalized understanding   [x] Requires follow up        [x] Time Out/Safety Check    [x] Extracorporeal Circuit Tested for integrity       RO/HEMODIALYSIS MACHINE SAFETY CHECKS  Before each treatment:        [address] [place] [address]                                     [x] Unit Machine # 8 with centralized RO                                                                                                                               Alarm Test:  Pass time 1974            [x] RO/Machine Log Complete    Machine Temp    36-37*C             Dialysate: pH  7.4    Conductivity: Meter 14.0    HD Machine  14.0     TCD: 13.9  Dialyzer Lot # [id number]     Blood Tubing Lot # [id number]     Saline Lot # [id number] CHLORINE TESTING-Before each treatment and every 4 hours    Total Chlorine: [x] less than 0.1 ppm  Initial Time Check: 1300       4 Hr/2nd Check Time: 1700   (if greater than 0.1 ppm from Primary then every 30 minutes from Secondary)     TREATMENT INITIATION  with Dialysis Precautions:   [x] All Connections Secured              [x] Saline Line Double Clamped   [x] Venous Parameters Set               [x] Arterial Parameters Set    [x] Prime Given 250ml NSS              [x]Air Foam Detector Engaged        Treatment Initiation Note:      During Treatment Notes:  3234  Face & Vascular access visible with art and elba line connections intact. Pt tolerating dialysis. 1330  Face & Vascular access visible with art and elba line connections intact. Pt tolerating dialysis. 1345  Face & Vascular access visible with art and elba line connections intact. Pt tolerating dialysis. 1400  Face & Vascular access visible with art and elba line connections intact. Pt tolerating dialysis. 1415  Face & Vascular access visible with art and elba line connections intact. Pt tolerating dialysis. 1430  Face & Vascular access visible with art and elba line connections intact. Pt tolerating dialysis. 1445  Face & Vascular access visible with art and elba line connections intact. Pt tolerating dialysis. 1500  Face & Vascular access visible with art and elba line connections intact. Pt tolerating dialysis. 1515  Face & Vascular access visible with art and elba line connections intact. Pt tolerating dialysis. 1530  Face & Vascular access visible with art and elba line connections intact. Pt tolerating dialysis. 1545  Face & Vascular access visible with art and elba line connections intact. Pt tolerating dialysis. 1600  Dialysis treatment complete.        Medication    Dose    Volume Route      Time       [facility] Nurse     Post Assessment  Dialyzer Cleared:   [] Good  [] Fair  [] Poor  Blood processed:  60.0 L  UF Removed:  2000 Ml    Post BP: 156/88  Pulse: 84  Respirations: 18   Temp: 98.4  [x] Oral  [] Ax  [] Esophageal   Lungs: [] Clear                [x] No change from initial assessment   Post Tx Vascular Access: [x] N/A   Cardiac:  [] Regular   [] Irregular   Rhythm:  [] Monitored   [] Not Monitored    CVC Catheter: [] N/A  Locking solution: Heparin 1:1000 U  Arterial port 1.6 ml   Venous port 1.6 ml   Edema:  [x] None  [] Generalized                     Skin:[x] Warm  [x] Dry [] Diaphoretic               [] Flushed  [] Pale [] Cyanotic Pain:  [x]0  []1-2  []3-4  []5-6   []7-8  []9-10         Post Treatment Note:   Pt tolerated dialysis well. Dialysis catheter intact, patent and heplocked as noted above. POST TREATMENT PRIMARY NURSE HANDOFF REPORT:   Post Dialysis: MYRNA Gooden RN        Time:  0078       Abbreviations: AVG-arterial venous graft, AVF-arterial venous fistula, IJ-Internal Jugular, Subcl-Subclavian, Fem-Femoral, Tx-treatment, AP/HR-apical heart rate, VSS- Vital Signs Stable, CVC- Central Venous Catheter, DFR-dialysate flow rate, BFR-blood flow rate, AP-arterial pressure, -venous pressure, UF-ultrafiltrate, TMP-transmembrane pressure, Ray-Venous, Art-Arterial, RO-Reverse Osmosis

## 2021-10-27 ENCOUNTER — APPOINTMENT (OUTPATIENT)
Dept: GENERAL RADIOLOGY | Age: 66
End: 2021-10-27
Attending: PHYSICIAN ASSISTANT
Payer: MEDICARE

## 2021-10-27 ENCOUNTER — HOSPITAL ENCOUNTER (EMERGENCY)
Age: 66
Discharge: HOME | End: 2021-10-27
Attending: STUDENT IN AN ORGANIZED HEALTH CARE EDUCATION/TRAINING PROGRAM
Payer: MEDICARE

## 2021-10-27 VITALS
SYSTOLIC BLOOD PRESSURE: 210 MMHG | WEIGHT: 121 LBS | HEIGHT: 63 IN | TEMPERATURE: 98.7 F | DIASTOLIC BLOOD PRESSURE: 98 MMHG | BODY MASS INDEX: 21.44 KG/M2 | HEART RATE: 94 BPM | OXYGEN SATURATION: 99 % | RESPIRATION RATE: 18 BRPM

## 2021-10-27 DIAGNOSIS — R07.9 CHEST PAIN, UNSPECIFIED TYPE: Primary | ICD-10-CM

## 2021-10-27 LAB
ALBUMIN SERPL-MCNC: 3.8 G/DL (ref 3.4–5)
ALBUMIN/GLOB SERPL: 1 {RATIO} (ref 0.8–1.7)
ALP SERPL-CCNC: 76 U/L (ref 45–117)
ALT SERPL-CCNC: 21 U/L (ref 13–56)
ANION GAP SERPL CALC-SCNC: 7 MMOL/L (ref 3–18)
AST SERPL-CCNC: 25 U/L (ref 10–38)
ATRIAL RATE: 92 BPM
BASOPHILS # BLD: 0 K/UL (ref 0–0.1)
BASOPHILS NFR BLD: 1 % (ref 0–2)
BILIRUB SERPL-MCNC: 0.2 MG/DL (ref 0.2–1)
BUN SERPL-MCNC: 14 MG/DL (ref 7–18)
BUN/CREAT SERPL: 3 (ref 12–20)
CALCIUM SERPL-MCNC: 9.6 MG/DL (ref 8.5–10.1)
CALCULATED P AXIS, ECG09: 56 DEGREES
CALCULATED R AXIS, ECG10: -19 DEGREES
CALCULATED T AXIS, ECG11: 116 DEGREES
CHLORIDE SERPL-SCNC: 101 MMOL/L (ref 100–111)
CK MB CFR SERPL CALC: NORMAL % (ref 0–4)
CK MB SERPL-MCNC: <1 NG/ML (ref 5–25)
CK SERPL-CCNC: 157 U/L (ref 26–192)
CO2 SERPL-SCNC: 30 MMOL/L (ref 21–32)
CREAT SERPL-MCNC: 4.69 MG/DL (ref 0.6–1.3)
DIAGNOSIS, 93000: NORMAL
DIFFERENTIAL METHOD BLD: ABNORMAL
EOSINOPHIL # BLD: 0.3 K/UL (ref 0–0.4)
EOSINOPHIL NFR BLD: 5 % (ref 0–5)
ERYTHROCYTE [DISTWIDTH] IN BLOOD BY AUTOMATED COUNT: 14.6 % (ref 11.6–14.5)
GLOBULIN SER CALC-MCNC: 4 G/DL (ref 2–4)
GLUCOSE SERPL-MCNC: 118 MG/DL (ref 74–99)
HCT VFR BLD AUTO: 32.6 % (ref 35–45)
HGB BLD-MCNC: 10.6 G/DL (ref 12–16)
LYMPHOCYTES # BLD: 0.7 K/UL (ref 0.9–3.6)
LYMPHOCYTES NFR BLD: 11 % (ref 21–52)
MAGNESIUM SERPL-MCNC: 2.2 MG/DL (ref 1.6–2.6)
MCH RBC QN AUTO: 28.7 PG (ref 24–34)
MCHC RBC AUTO-ENTMCNC: 32.5 G/DL (ref 31–37)
MCV RBC AUTO: 88.3 FL (ref 78–100)
MONOCYTES # BLD: 0.7 K/UL (ref 0.05–1.2)
MONOCYTES NFR BLD: 11 % (ref 3–10)
NEUTS SEG # BLD: 4.7 K/UL (ref 1.8–8)
NEUTS SEG NFR BLD: 73 % (ref 40–73)
P-R INTERVAL, ECG05: 122 MS
PLATELET # BLD AUTO: 348 K/UL (ref 135–420)
PMV BLD AUTO: 10.6 FL (ref 9.2–11.8)
POTASSIUM SERPL-SCNC: 3.7 MMOL/L (ref 3.5–5.5)
PROT SERPL-MCNC: 7.8 G/DL (ref 6.4–8.2)
Q-T INTERVAL, ECG07: 356 MS
QRS DURATION, ECG06: 92 MS
QTC CALCULATION (BEZET), ECG08: 440 MS
RBC # BLD AUTO: 3.69 M/UL (ref 4.2–5.3)
SODIUM SERPL-SCNC: 138 MMOL/L (ref 136–145)
TROPONIN I SERPL-MCNC: <0.02 NG/ML (ref 0–0.04)
TROPONIN I SERPL-MCNC: <0.02 NG/ML (ref 0–0.04)
VENTRICULAR RATE, ECG03: 92 BPM
WBC # BLD AUTO: 6.5 K/UL (ref 4.6–13.2)

## 2021-10-27 PROCEDURE — 83735 ASSAY OF MAGNESIUM: CPT

## 2021-10-27 PROCEDURE — 82553 CREATINE MB FRACTION: CPT

## 2021-10-27 PROCEDURE — 71045 X-RAY EXAM CHEST 1 VIEW: CPT

## 2021-10-27 PROCEDURE — 99283 EMERGENCY DEPT VISIT LOW MDM: CPT

## 2021-10-27 PROCEDURE — 85025 COMPLETE CBC W/AUTO DIFF WBC: CPT

## 2021-10-27 PROCEDURE — 80053 COMPREHEN METABOLIC PANEL: CPT

## 2021-10-27 PROCEDURE — 93005 ELECTROCARDIOGRAM TRACING: CPT

## 2021-10-27 NOTE — ED NOTES
Pt in room yelling. RN moved the patient by the nurses station and provided emotional support. Pt states she lives at home with her daughter.

## 2021-10-27 NOTE — ED PROVIDER NOTES
EMERGENCY DEPARTMENT HISTORY AND PHYSICAL EXAM    I have evaluated the patient at 2:11 PM      Date: 10/27/2021  Patient Name: Papito Cough    History of Presenting Illness     Chief Complaint   Patient presents with    Chest Pain         History Provided By: Patient  Location/Duration/Severity/Modifying factors   78-year-old female with history of ESRD on hemodialysis, dementia, bipolar disorder, hypertension, hyperlipidemia presenting to the emergency department for evaluation of chest pain. Patient was attending her dialysis session and finished half of her session before she started experiencing substernal chest pain. Dialysis was aborted and she was brought here for evaluation. States that pain is still ongoing at this time. She has experienced this pain in the past with her dialysis sessions and has had emergency room visits. She reports that the pain is no worse than what she normally experiences during episodes. She reports feeling fatigued today. Denies having headache, fevers or chills, cough, shortness of breath, abdominal pain, NVD          PCP: Elina Melendez MD    Current Outpatient Medications   Medication Sig Dispense Refill    b complex-vitamin c-folic acid (Virt-Caps) 1 mg capsule Take 1 Capsule by mouth daily. 30 Capsule 0    haloperidoL (HALDOL) 1 mg tablet Take 1 Tablet by mouth two (2) times a day. 60 Tablet 0    hydrALAZINE (APRESOLINE) 50 mg tablet Take 1 Tablet by mouth three (3) times daily. 90 Tablet 0    cloNIDine (CATAPRES) 0.3 mg/24 hr 1 Patch by TransDERmal route every seven (7) days. 4 Patch 0    isosorbide mononitrate ER (IMDUR) 30 mg tablet Take 1 Tablet by mouth daily. 30 Tablet 0    amLODIPine (NORVASC) 10 mg tablet Take 1 Tablet by mouth daily. 30 Tablet 0    diphenhydrAMINE (BenadryL) 25 mg capsule Take 25 mg by mouth every eight (8) hours as needed for Allergies.  losartan (COZAAR) 100 mg tablet Take 1 Tablet by mouth daily.  30 Tablet 0    carvediloL (COREG) 25 mg tablet Take 1 Tablet by mouth two (2) times daily (with meals). 60 Tablet 0    famotidine (Pepcid) 20 mg tablet Take 1 Tablet by mouth daily. 30 Tablet 0    polyethylene glycol (MIRALAX) 17 gram packet Take 1 Packet by mouth daily as needed for Constipation. 15 Packet 0    atorvastatin (Lipitor) 10 mg tablet Take 10 mg by mouth daily. Indications: high amount of triglyceride in the blood      levETIRAcetam (Keppra) 500 mg tablet Take 500 mg by mouth two (2) times a day.  sevelamer carbonate (RENVELA) 800 mg tab tab Take 800 mg by mouth three (3) times daily.  levETIRAcetam (KEPPRA) 250 mg tablet Take 1 Tab by mouth every Monday, Wednesday, Friday. 30 Tab 1    insulin lispro (HUMALOG) 100 unit/mL injection INITIATE INSULIN CORRECTIVE PROTOCOL: Normal Insulin Sensitivity   For Blood Sugar (mg/dL) of:     Less than 150 =   0 units           150 -199 =   2 units  200 -249 =   4 units  250 -299 =   6 units  300 -349 =   8 units  350 and above = 10 units and Call Physician  If 2 glucose readings are above 1 Vial 0    aspirin 81 mg tablet Take 81 mg by mouth daily.            Past History     Past Medical History:  Past Medical History:   Diagnosis Date    Asthma     [facility]     Brain condition     Cataract     Chronic kidney disease     hemodialysis M-W-F    Diabetes ([facility]     Hyperlipidemia     Hypertension     Paralytic strabismus, unspecified     Psychiatric disorder     Seizures ([facility] [address] 08/27/2018       Past Surgical History:  Past Surgical History:   Procedure Laterality Date    ND INSJ TUNNELED CVC W/O SUBQ PORT/ AGE 5 YR/> N/A 8/9/2018     in-pt 474A, Insertion Tunneled Central Venous Catheter performed by Dayna Saini MD at [address] [place] [address]    ND INSJ TUNNELED CVC W/O SUBQ PORT/ AGE 5 YR/> N/A 11/8/2019    INSERTION TUNNELED CENTRAL VENOUS CATHETER performed by Priscilla Wray MD at [facility]       Family History:  No family history on file. Social History:  Social History     Tobacco Use    Smoking status: Never Smoker    Smokeless tobacco: Never Used   Substance Use Topics    Alcohol use: No    Drug use: No       Allergies: Allergies   Allergen Reactions    Amoxicillin Unknown (comments)    Cleocin [Clindamycin Hcl] Unknown (comments)    Codeine Unknown (comments)    Lorcet 10/650 [Hydrocodone-Acetaminophen] Unknown (comments)    Penicillin G Benzathine Unknown (comments)    Shellfish Derived Unknown (comments)         Review of Systems       Review of Systems   Constitutional: Positive for fatigue. Negative for activity change, chills, diaphoresis and fever. Respiratory: Negative for cough, chest tightness, shortness of breath, wheezing and stridor. Cardiovascular: Positive for chest pain. Negative for palpitations. Gastrointestinal: Negative for abdominal distention, abdominal pain, constipation, diarrhea, nausea and vomiting. Genitourinary: Negative for difficulty urinating, dysuria and hematuria. Musculoskeletal: Negative for back pain, joint swelling and myalgias. Skin: Negative for rash and wound. Neurological: Negative for dizziness, weakness and headaches. Psychiatric/Behavioral: Negative for agitation. The patient is not nervous/anxious. Physical Exam     Visit Vitals  BP (!) 210/98   Pulse 94   Temp 98.7 °F (37.1 °C)   Resp 18   Ht 5' 3\" (1.6 m)   Wt 54.9 kg (121 lb)   LMP  (LMP Unknown)   SpO2 99%   BMI 21.43 kg/m²         Physical Exam  Constitutional:       General: She is not in acute distress. Appearance: She is not toxic-appearing. HENT:      Head: Normocephalic and atraumatic. Mouth/Throat:      Mouth: Mucous membranes are moist.   Eyes:      Extraocular Movements: Extraocular movements intact. Pupils: Pupils are equal, round, and reactive to light. Cardiovascular:      Rate and Rhythm: Normal rate and regular rhythm.       Pulses:           Radial pulses are 2+ on the right side and 2+ on the left side. Heart sounds: Normal heart sounds. No murmur heard. No friction rub. No gallop. Pulmonary:      Effort: Pulmonary effort is normal.      Breath sounds: Normal breath sounds. Abdominal:      General: There is no distension. Palpations: Abdomen is soft. There is no mass. Tenderness: There is no abdominal tenderness. There is no guarding. Hernia: No hernia is present. Musculoskeletal:         General: No swelling, tenderness or deformity. Cervical back: Normal range of motion and neck supple. Skin:     General: Skin is warm and dry. Capillary Refill: Capillary refill takes less than 2 seconds. Findings: No rash. Neurological:      General: No focal deficit present. Mental Status: She is alert and oriented to person, place, and time.    Psychiatric:         Mood and Affect: Mood normal.           Diagnostic Study Results     Labs -  Recent Results (from the past 12 hour(s))   EKG, 12 LEAD, INITIAL    Collection Time: 10/27/21 12:49 PM   Result Value Ref Range    Ventricular Rate 92 BPM    Atrial Rate 92 BPM    P-R Interval 122 ms    QRS Duration 92 ms    Q-T Interval 356 ms    QTC Calculation (Bezet) 440 ms    Calculated P Axis 56 degrees    Calculated R Axis -19 degrees    Calculated T Axis 116 degrees    Diagnosis       Normal sinus rhythm  Minimal voltage criteria for LVH, may be normal variant ( Stevie product )  T wave abnormality, consider lateral ischemia  Abnormal ECG  When compared with ECG of 22-OCT-2021 07:56,  Questionable change in QRS axis  Confirmed by Cristel Giron MD, Patti Setting ([id number]) on 10/27/2021 1:05:40 PM     CBC WITH AUTOMATED DIFF    Collection Time: 10/27/21  1:00 PM   Result Value Ref Range    WBC 6.5 4.6 - 13.2 K/uL    RBC 3.69 (L) 4.20 - 5.30 M/uL    HGB 10.6 (L) 12.0 - 16.0 g/dL    HCT 32.6 (L) 35.0 - 45.0 %    MCV 88.3 78.0 - 100.0 FL    MCH 28.7 24.0 - 34.0 PG    MCHC 32.5 31.0 - 37.0 g/dL    RDW 14.6 (H) 11.6 - 14.5 %    PLATELET 389 582 - 728 K/uL    MPV 10.6 9.2 - 11.8 FL    NEUTROPHILS 73 40 - 73 %    LYMPHOCYTES 11 (L) 21 - 52 %    MONOCYTES 11 (H) 3 - 10 %    EOSINOPHILS 5 0 - 5 %    BASOPHILS 1 0 - 2 %    ABS. NEUTROPHILS 4.7 1.8 - 8.0 K/UL    ABS. LYMPHOCYTES 0.7 (L) 0.9 - 3.6 K/UL    ABS. MONOCYTES 0.7 0.05 - 1.2 K/UL    ABS. EOSINOPHILS 0.3 0.0 - 0.4 K/UL    ABS. BASOPHILS 0.0 0.0 - 0.1 K/UL    DF AUTOMATED     CARDIAC PANEL,(CK, CKMB & TROPONIN)    Collection Time: 10/27/21  1:00 PM   Result Value Ref Range    CK - MB <1.0 <3.6 ng/ml    CK-MB Index  0.0 - 4.0 %     CALCULATION NOT PERFORMED WHEN RESULT IS BELOW LINEAR LIMIT     26 - 192 U/L    Troponin-I, QT <0.02 0.0 - 0.045 NG/ML   MAGNESIUM    Collection Time: 10/27/21  1:00 PM   Result Value Ref Range    Magnesium 2.2 1.6 - 2.6 mg/dL   METABOLIC PANEL, COMPREHENSIVE    Collection Time: 10/27/21  1:00 PM   Result Value Ref Range    Sodium 138 136 - 145 mmol/L    Potassium 3.7 3.5 - 5.5 mmol/L    Chloride 101 100 - 111 mmol/L    CO2 30 21 - 32 mmol/L    Anion gap 7 3.0 - 18 mmol/L    Glucose 118 (H) 74 - 99 mg/dL    BUN 14 7.0 - 18 MG/DL    Creatinine 4.69 (H) 0.6 - 1.3 MG/DL    BUN/Creatinine ratio 3 (L) 12 - 20      GFR est AA 11 (L) >60 ml/min/1.73m2    GFR est non-AA 9 (L) >60 ml/min/1.73m2    Calcium 9.6 8.5 - 10.1 MG/DL    Bilirubin, total 0.2 0.2 - 1.0 MG/DL    ALT (SGPT) 21 13 - 56 U/L    AST (SGOT) 25 10 - 38 U/L    Alk. phosphatase 76 45 - 117 U/L    Protein, total 7.8 6.4 - 8.2 g/dL    Albumin 3.8 3.4 - 5.0 g/dL    Globulin 4.0 2.0 - 4.0 g/dL    A-G Ratio 1.0 0.8 - 1.7     TROPONIN I    Collection Time: 10/27/21  3:25 PM   Result Value Ref Range    Troponin-I, QT <0.02 0.0 - 0.045 NG/ML       Radiologic Studies -   XR CHEST PORT   Final Result      Grossly stable enlarged cardiac silhouette and mild vascular congestion.    Improved aeration since prior exam.            Medical Decision Making   I am the first provider for this patient. I reviewed the vital signs, available nursing notes, past medical history, past surgical history, family history and social history. Vital Signs-Reviewed the patient's vital signs. EKG: Normal sinus rhythm without ST elevation or depression. Records Reviewed: Nursing Notes, Old Medical Records, Previous electrocardiograms, Previous Radiology Studies and Previous Laboratory Studies (Time of Review: 2:11 PM)    ED Course: Progress Notes, Reevaluation, and Consults:    ED Course as of Oct 27 1627   Wed Oct 27, 2021   1615 Potassium: 3.7 [AP]      ED Course User Index  [AP] Morgan Reyes MD       Provider Notes (Medical Decision Making):   MDM  Number of Diagnoses or Management Options  Chest pain, unspecified type  Diagnosis management comments: 58-year-old female presenting to this emergency department for evaluation of chest pain. She is well-known to this emergency department with this complaint. She appears in no acute distress. Vital signs stable. Blood pressure is elevated 210/98. EKG obtained shows normal sinus rhythm without evidence of ST elevation or depression. Normal intervals. No evidence of abnormal morphology or peak T waves. Screening lab work performed has been generally unremarkable. Initial troponin is negative. Plan on obtaining delta troponin. 1626:  Repeat troponin is equally negative. Patient's chest pain is now resolved. She is at her mental baseline. She will be discharged at this time. She has been reassured and instructed to follow-up with her primary care doctor and maintain her dialysis schedule. She has been given ED return precautions. She verbalizes good understanding agreement discharge plan. ED Course as of Oct 27 1627   Wed Oct 27, 2021   1615 Potassium: 3.7 [AP]      ED Course User Index  [AP] Morgan Reyes MD               Diagnosis     Clinical Impression:   1.  Chest pain, unspecified type        Disposition: home    Follow-up Information     Follow up With Specialties Details Why Contact Info    [facility] EMERGENCY DEPT Emergency Medicine  As needed, If symptoms worsen [address] [facility]    Nico Cardona MD Geriatric Medicine Call   [address] [facility]  [phone]             Patient's Medications   Start Taking    No medications on file   Continue Taking    AMLODIPINE (NORVASC) 10 MG TABLET    Take 1 Tablet by mouth daily. ASPIRIN 81 MG TABLET    Take 81 mg by mouth daily. ATORVASTATIN (LIPITOR) 10 MG TABLET    Take 10 mg by mouth daily. Indications: high amount of triglyceride in the blood    B COMPLEX-VITAMIN C-FOLIC ACID (VIRT-CAPS) 1 MG CAPSULE    Take 1 Capsule by mouth daily. CARVEDILOL (COREG) 25 MG TABLET    Take 1 Tablet by mouth two (2) times daily (with meals). CLONIDINE (CATAPRES) 0.3 MG/24 HR    1 Patch by TransDERmal route every seven (7) days. DIPHENHYDRAMINE (BENADRYL) 25 MG CAPSULE    Take 25 mg by mouth every eight (8) hours as needed for Allergies. FAMOTIDINE (PEPCID) 20 MG TABLET    Take 1 Tablet by mouth daily. HALOPERIDOL (HALDOL) 1 MG TABLET    Take 1 Tablet by mouth two (2) times a day. HYDRALAZINE (APRESOLINE) 50 MG TABLET    Take 1 Tablet by mouth three (3) times daily. INSULIN LISPRO (HUMALOG) 100 UNIT/ML INJECTION    INITIATE INSULIN CORRECTIVE PROTOCOL: Normal Insulin Sensitivity   For Blood Sugar (mg/dL) of:     Less than 150 =   0 units           150 -199 =   2 units  200 -249 =   4 units  250 -299 =   6 units  300 -349 =   8 units  350 and above = 10 units and Call Physician  If 2 glucose readings are above    ISOSORBIDE MONONITRATE ER (IMDUR) 30 MG TABLET    Take 1 Tablet by mouth daily. LEVETIRACETAM (KEPPRA) 250 MG TABLET    Take 1 Tab by mouth every Monday, Wednesday, Friday. LEVETIRACETAM (KEPPRA) 500 MG TABLET    Take 500 mg by mouth two (2) times a day.     LOSARTAN (COZAAR) 100 MG TABLET    Take 1 Tablet by mouth daily. POLYETHYLENE GLYCOL (MIRALAX) 17 GRAM PACKET    Take 1 Packet by mouth daily as needed for Constipation. SEVELAMER CARBONATE (RENVELA) 800 MG TAB TAB    Take 800 mg by mouth three (3) times daily. These Medications have changed    No medications on file   Stop Taking    No medications on file     Disclaimer: Sections of this note are dictated using utilizing voice recognition software. Minor typographical errors may be present. If questions arise, please do not hesitate to contact me or call our department.

## 2021-10-27 NOTE — ED NOTES
I performed a brief evaluation, including history and physical, of the patient here in triage and I have determined that pt will need further treatment and evaluation from the main side ER physician. I have placed initial orders to help in expediting patients care.      October 27, 2021 at 12:46 PM - SUSI Betancourt

## 2021-10-27 NOTE — DISCHARGE INSTRUCTIONS
Please follow up with your primary care doctor. Maintain your dialysis schedule. Return to the ED for new or worsening symptoms.

## 2021-11-03 ENCOUNTER — HOSPITAL ENCOUNTER (INPATIENT)
Age: 66
LOS: 5 days | Discharge: HOSPICE | DRG: 314 | End: 2021-11-10
Attending: EMERGENCY MEDICINE | Admitting: FAMILY MEDICINE
Payer: MEDICARE

## 2021-11-03 ENCOUNTER — APPOINTMENT (OUTPATIENT)
Dept: GENERAL RADIOLOGY | Age: 66
DRG: 314 | End: 2021-11-03
Attending: EMERGENCY MEDICINE
Payer: MEDICARE

## 2021-11-03 DIAGNOSIS — R07.89 OTHER CHEST PAIN: ICD-10-CM

## 2021-11-03 DIAGNOSIS — R07.9 CHEST PAIN, UNSPECIFIED TYPE: Primary | ICD-10-CM

## 2021-11-03 DIAGNOSIS — Z51.5 HOSPICE CARE: ICD-10-CM

## 2021-11-03 PROBLEM — R77.8 ELEVATED TROPONIN: Status: ACTIVE | Noted: 2021-11-03

## 2021-11-03 LAB
ANION GAP SERPL CALC-SCNC: 8 MMOL/L (ref 3–18)
ATRIAL RATE: 78 BPM
BASOPHILS # BLD: 0 K/UL (ref 0–0.1)
BASOPHILS NFR BLD: 0 % (ref 0–2)
BUN SERPL-MCNC: 46 MG/DL (ref 7–18)
BUN/CREAT SERPL: 5 (ref 12–20)
CALCIUM SERPL-MCNC: 9.6 MG/DL (ref 8.5–10.1)
CALCULATED P AXIS, ECG09: 74 DEGREES
CALCULATED R AXIS, ECG10: -24 DEGREES
CALCULATED T AXIS, ECG11: 45 DEGREES
CHLORIDE SERPL-SCNC: 106 MMOL/L (ref 100–111)
CK MB CFR SERPL CALC: 1.2 % (ref 0–4)
CK MB SERPL-MCNC: 1.3 NG/ML (ref 5–25)
CK SERPL-CCNC: 113 U/L (ref 26–192)
CO2 SERPL-SCNC: 27 MMOL/L (ref 21–32)
COVID-19 RAPID TEST, COVR: NOT DETECTED
CREAT SERPL-MCNC: 9.39 MG/DL (ref 0.6–1.3)
DIAGNOSIS, 93000: NORMAL
DIFFERENTIAL METHOD BLD: ABNORMAL
EOSINOPHIL # BLD: 0.1 K/UL (ref 0–0.4)
EOSINOPHIL NFR BLD: 1 % (ref 0–5)
ERYTHROCYTE [DISTWIDTH] IN BLOOD BY AUTOMATED COUNT: 16 % (ref 11.6–14.5)
GLUCOSE BLD STRIP.AUTO-MCNC: 172 MG/DL (ref 70–110)
GLUCOSE SERPL-MCNC: 164 MG/DL (ref 74–99)
HCT VFR BLD AUTO: 28.7 % (ref 35–45)
HGB BLD-MCNC: 8.9 G/DL (ref 12–16)
LYMPHOCYTES # BLD: 0.8 K/UL (ref 0.9–3.6)
LYMPHOCYTES NFR BLD: 9 % (ref 21–52)
MCH RBC QN AUTO: 28.3 PG (ref 24–34)
MCHC RBC AUTO-ENTMCNC: 31 G/DL (ref 31–37)
MCV RBC AUTO: 91.1 FL (ref 78–100)
MONOCYTES # BLD: 0.9 K/UL (ref 0.05–1.2)
MONOCYTES NFR BLD: 10 % (ref 3–10)
NEUTS SEG # BLD: 6.9 K/UL (ref 1.8–8)
NEUTS SEG NFR BLD: 79 % (ref 40–73)
P-R INTERVAL, ECG05: 126 MS
PLATELET # BLD AUTO: 285 K/UL (ref 135–420)
PMV BLD AUTO: 10.6 FL (ref 9.2–11.8)
POTASSIUM SERPL-SCNC: 4 MMOL/L (ref 3.5–5.5)
Q-T INTERVAL, ECG07: 378 MS
QRS DURATION, ECG06: 82 MS
QTC CALCULATION (BEZET), ECG08: 430 MS
RBC # BLD AUTO: 3.15 M/UL (ref 4.2–5.3)
SODIUM SERPL-SCNC: 141 MMOL/L (ref 136–145)
SOURCE, COVRS: NORMAL
TROPONIN I SERPL-MCNC: 0.06 NG/ML (ref 0–0.04)
TROPONIN I SERPL-MCNC: 0.07 NG/ML (ref 0–0.04)
VENTRICULAR RATE, ECG03: 78 BPM
WBC # BLD AUTO: 8.7 K/UL (ref 4.6–13.2)

## 2021-11-03 PROCEDURE — 87635 SARS-COV-2 COVID-19 AMP PRB: CPT

## 2021-11-03 PROCEDURE — 82962 GLUCOSE BLOOD TEST: CPT

## 2021-11-03 PROCEDURE — 96374 THER/PROPH/DIAG INJ IV PUSH: CPT

## 2021-11-03 PROCEDURE — 85025 COMPLETE CBC W/AUTO DIFF WBC: CPT

## 2021-11-03 PROCEDURE — 93005 ELECTROCARDIOGRAM TRACING: CPT

## 2021-11-03 PROCEDURE — 74011636637 HC RX REV CODE- 636/637: Performed by: NURSE PRACTITIONER

## 2021-11-03 PROCEDURE — APPSS60 APP SPLIT SHARED TIME 46-60 MINUTES: Performed by: NURSE PRACTITIONER

## 2021-11-03 PROCEDURE — 74011250637 HC RX REV CODE- 250/637: Performed by: STUDENT IN AN ORGANIZED HEALTH CARE EDUCATION/TRAINING PROGRAM

## 2021-11-03 PROCEDURE — 82553 CREATINE MB FRACTION: CPT

## 2021-11-03 PROCEDURE — 74011250637 HC RX REV CODE- 250/637: Performed by: NURSE PRACTITIONER

## 2021-11-03 PROCEDURE — 99285 EMERGENCY DEPT VISIT HI MDM: CPT

## 2021-11-03 PROCEDURE — 74011250637 HC RX REV CODE- 250/637: Performed by: EMERGENCY MEDICINE

## 2021-11-03 PROCEDURE — 71045 X-RAY EXAM CHEST 1 VIEW: CPT

## 2021-11-03 PROCEDURE — G0378 HOSPITAL OBSERVATION PER HR: HCPCS

## 2021-11-03 PROCEDURE — 96372 THER/PROPH/DIAG INJ SC/IM: CPT

## 2021-11-03 PROCEDURE — 80048 BASIC METABOLIC PNL TOTAL CA: CPT

## 2021-11-03 PROCEDURE — 99223 1ST HOSP IP/OBS HIGH 75: CPT | Performed by: FAMILY MEDICINE

## 2021-11-03 PROCEDURE — 74011250636 HC RX REV CODE- 250/636: Performed by: NURSE PRACTITIONER

## 2021-11-03 RX ORDER — HALOPERIDOL 1 MG/1
1 TABLET ORAL 2 TIMES DAILY
Status: DISCONTINUED | OUTPATIENT
Start: 2021-11-03 | End: 2021-11-03

## 2021-11-03 RX ORDER — LOSARTAN POTASSIUM 50 MG/1
100 TABLET ORAL DAILY
Status: DISCONTINUED | OUTPATIENT
Start: 2021-11-04 | End: 2021-11-06

## 2021-11-03 RX ORDER — SODIUM CHLORIDE 0.9 % (FLUSH) 0.9 %
5-40 SYRINGE (ML) INJECTION EVERY 8 HOURS
Status: DISCONTINUED | OUTPATIENT
Start: 2021-11-03 | End: 2021-11-09

## 2021-11-03 RX ORDER — SEVELAMER CARBONATE 800 MG/1
800 TABLET, FILM COATED ORAL 3 TIMES DAILY
Status: DISCONTINUED | OUTPATIENT
Start: 2021-11-03 | End: 2021-11-10 | Stop reason: HOSPADM

## 2021-11-03 RX ORDER — LORAZEPAM 0.5 MG/1
0.5 TABLET ORAL
COMMUNITY
End: 2021-11-10

## 2021-11-03 RX ORDER — MAGNESIUM SULFATE 100 %
4 CRYSTALS MISCELLANEOUS AS NEEDED
Status: DISCONTINUED | OUTPATIENT
Start: 2021-11-03 | End: 2021-11-09

## 2021-11-03 RX ORDER — HEPARIN SODIUM 5000 [USP'U]/ML
5000 INJECTION, SOLUTION INTRAVENOUS; SUBCUTANEOUS EVERY 8 HOURS
Status: DISCONTINUED | OUTPATIENT
Start: 2021-11-03 | End: 2021-11-09

## 2021-11-03 RX ORDER — QUETIAPINE FUMARATE 25 MG/1
50 TABLET, FILM COATED ORAL
Status: DISCONTINUED | OUTPATIENT
Start: 2021-11-03 | End: 2021-11-07

## 2021-11-03 RX ORDER — AMLODIPINE BESYLATE 10 MG/1
10 TABLET ORAL DAILY
Status: DISCONTINUED | OUTPATIENT
Start: 2021-11-04 | End: 2021-11-06

## 2021-11-03 RX ORDER — LEVETIRACETAM 250 MG/1
250 TABLET ORAL
Status: DISCONTINUED | OUTPATIENT
Start: 2021-11-03 | End: 2021-11-04

## 2021-11-03 RX ORDER — POLYETHYLENE GLYCOL 3350 17 G/17G
17 POWDER, FOR SOLUTION ORAL DAILY PRN
Status: DISCONTINUED | OUTPATIENT
Start: 2021-11-03 | End: 2021-11-09

## 2021-11-03 RX ORDER — DEXTROSE 50 % IN WATER (D50W) INTRAVENOUS SYRINGE
25-50 AS NEEDED
Status: DISCONTINUED | OUTPATIENT
Start: 2021-11-03 | End: 2021-11-09

## 2021-11-03 RX ORDER — HALOPERIDOL 1 MG/1
1 TABLET ORAL
Status: COMPLETED | OUTPATIENT
Start: 2021-11-03 | End: 2021-11-03

## 2021-11-03 RX ORDER — HYDRALAZINE HYDROCHLORIDE 20 MG/ML
10 INJECTION INTRAMUSCULAR; INTRAVENOUS
Status: DISCONTINUED | OUTPATIENT
Start: 2021-11-03 | End: 2021-11-09

## 2021-11-03 RX ORDER — INSULIN LISPRO 100 [IU]/ML
INJECTION, SOLUTION INTRAVENOUS; SUBCUTANEOUS
Status: DISCONTINUED | OUTPATIENT
Start: 2021-11-03 | End: 2021-11-10 | Stop reason: HOSPADM

## 2021-11-03 RX ORDER — GUAIFENESIN 100 MG/5ML
324 LIQUID (ML) ORAL
Status: COMPLETED | OUTPATIENT
Start: 2021-11-03 | End: 2021-11-03

## 2021-11-03 RX ORDER — LEVETIRACETAM 500 MG/1
500 TABLET ORAL 2 TIMES DAILY
Status: DISCONTINUED | OUTPATIENT
Start: 2021-11-03 | End: 2021-11-04

## 2021-11-03 RX ORDER — ONDANSETRON 4 MG/1
4 TABLET, ORALLY DISINTEGRATING ORAL
Status: DISCONTINUED | OUTPATIENT
Start: 2021-11-03 | End: 2021-11-07

## 2021-11-03 RX ORDER — CARVEDILOL 25 MG/1
25 TABLET ORAL 2 TIMES DAILY WITH MEALS
Status: DISCONTINUED | OUTPATIENT
Start: 2021-11-03 | End: 2021-11-06

## 2021-11-03 RX ORDER — ATORVASTATIN CALCIUM 10 MG/1
10 TABLET, FILM COATED ORAL DAILY
Status: DISCONTINUED | OUTPATIENT
Start: 2021-11-04 | End: 2021-11-08

## 2021-11-03 RX ORDER — ACETAMINOPHEN 325 MG/1
650 TABLET ORAL
Status: DISCONTINUED | OUTPATIENT
Start: 2021-11-03 | End: 2021-11-10 | Stop reason: HOSPADM

## 2021-11-03 RX ORDER — ONDANSETRON 2 MG/ML
4 INJECTION INTRAMUSCULAR; INTRAVENOUS
Status: DISCONTINUED | OUTPATIENT
Start: 2021-11-03 | End: 2021-11-07

## 2021-11-03 RX ORDER — ISOSORBIDE MONONITRATE 60 MG/1
30 TABLET, EXTENDED RELEASE ORAL DAILY
Status: DISCONTINUED | OUTPATIENT
Start: 2021-11-04 | End: 2021-11-07

## 2021-11-03 RX ORDER — SODIUM CHLORIDE 0.9 % (FLUSH) 0.9 %
5-40 SYRINGE (ML) INJECTION AS NEEDED
Status: DISCONTINUED | OUTPATIENT
Start: 2021-11-03 | End: 2021-11-10 | Stop reason: HOSPADM

## 2021-11-03 RX ORDER — FAMOTIDINE 20 MG/1
20 TABLET, FILM COATED ORAL DAILY
Status: DISCONTINUED | OUTPATIENT
Start: 2021-11-04 | End: 2021-11-07

## 2021-11-03 RX ORDER — CLONIDINE 0.3 MG/24H
1 PATCH, EXTENDED RELEASE TRANSDERMAL
Status: DISCONTINUED | OUTPATIENT
Start: 2021-11-05 | End: 2021-11-10 | Stop reason: HOSPADM

## 2021-11-03 RX ORDER — ACETAMINOPHEN 650 MG/1
650 SUPPOSITORY RECTAL
Status: DISCONTINUED | OUTPATIENT
Start: 2021-11-03 | End: 2021-11-10 | Stop reason: HOSPADM

## 2021-11-03 RX ORDER — GUAIFENESIN 100 MG/5ML
81 LIQUID (ML) ORAL DAILY
Status: DISCONTINUED | OUTPATIENT
Start: 2021-11-04 | End: 2021-11-10 | Stop reason: HOSPADM

## 2021-11-03 RX ADMIN — HALOPERIDOL 1 MG: 1 TABLET ORAL at 17:04

## 2021-11-03 RX ADMIN — HALOPERIDOL 1 MG: 1 TABLET ORAL at 14:14

## 2021-11-03 RX ADMIN — ASPIRIN 81 MG CHEWABLE TABLET 324 MG: 81 TABLET CHEWABLE at 17:09

## 2021-11-03 RX ADMIN — HYDRALAZINE HYDROCHLORIDE 75 MG: 50 TABLET, FILM COATED ORAL at 21:51

## 2021-11-03 RX ADMIN — HYDRALAZINE HYDROCHLORIDE 10 MG: 20 INJECTION INTRAMUSCULAR; INTRAVENOUS at 18:11

## 2021-11-03 RX ADMIN — LEVETIRACETAM 500 MG: 500 TABLET, FILM COATED ORAL at 19:37

## 2021-11-03 RX ADMIN — HEPARIN SODIUM 5000 UNITS: 5000 INJECTION INTRAVENOUS; SUBCUTANEOUS at 21:52

## 2021-11-03 RX ADMIN — QUETIAPINE FUMARATE 50 MG: 25 TABLET ORAL at 23:41

## 2021-11-03 RX ADMIN — CARVEDILOL 25 MG: 25 TABLET, FILM COATED ORAL at 19:37

## 2021-11-03 RX ADMIN — LEVETIRACETAM 250 MG: 250 TABLET ORAL at 21:50

## 2021-11-03 RX ADMIN — ACETAMINOPHEN 650 MG: 325 TABLET ORAL at 19:37

## 2021-11-03 RX ADMIN — SEVELAMER CARBONATE 800 MG: 800 TABLET, FILM COATED ORAL at 21:51

## 2021-11-03 RX ADMIN — INSULIN LISPRO 2 UNITS: 100 INJECTION, SOLUTION INTRAVENOUS; SUBCUTANEOUS at 21:51

## 2021-11-03 NOTE — ED PROVIDER NOTES
EMERGENCY DEPARTMENT HISTORY AND PHYSICAL EXAM    12:01 PM patient seen at this time in room 10      Date: 11/3/2021  Patient Name: Papito Khan    History of Presenting Illness     No chief complaint on file. History Provided By: patient    Additional History (Context): Papito Khan is a 74 y.o. female presents with history of schizophrenia resident of a nursing home mostly bedridden, frequent visits for chest pain, comes in complaining of chest pain unable to express when it started exacerbating or alleviating factors but is worse with palpation of the chest.  She had admissions and significant work-up in July. Notable for four visits for the same in the last 3 weeks without any admission. Echo has been nonactionable. History review of systems limited due to effects of her schizophrenia. Ramiro Blackwood PCP: Elina Melendez MD    Chief Complaint:   Duration:    Timing:    Location:   Quality:   Severity:   Modifying Factors:   Associated Symptoms:       Current Outpatient Medications   Medication Sig Dispense Refill    b complex-vitamin c-folic acid (Virt-Caps) 1 mg capsule Take 1 Capsule by mouth daily. 30 Capsule 0    haloperidoL (HALDOL) 1 mg tablet Take 1 Tablet by mouth two (2) times a day. 60 Tablet 0    hydrALAZINE (APRESOLINE) 50 mg tablet Take 1 Tablet by mouth three (3) times daily. 90 Tablet 0    cloNIDine (CATAPRES) 0.3 mg/24 hr 1 Patch by TransDERmal route every seven (7) days. 4 Patch 0    isosorbide mononitrate ER (IMDUR) 30 mg tablet Take 1 Tablet by mouth daily. 30 Tablet 0    amLODIPine (NORVASC) 10 mg tablet Take 1 Tablet by mouth daily. 30 Tablet 0    diphenhydrAMINE (BenadryL) 25 mg capsule Take 25 mg by mouth every eight (8) hours as needed for Allergies.  losartan (COZAAR) 100 mg tablet Take 1 Tablet by mouth daily. 30 Tablet 0    carvediloL (COREG) 25 mg tablet Take 1 Tablet by mouth two (2) times daily (with meals).  60 Tablet 0    famotidine (Pepcid) 20 mg tablet Take 1 Tablet by mouth daily. 30 Tablet 0    polyethylene glycol (MIRALAX) 17 gram packet Take 1 Packet by mouth daily as needed for Constipation. 15 Packet 0    atorvastatin (Lipitor) 10 mg tablet Take 10 mg by mouth daily. Indications: high amount of triglyceride in the blood      levETIRAcetam (Keppra) 500 mg tablet Take 500 mg by mouth two (2) times a day.  sevelamer carbonate (RENVELA) 800 mg tab tab Take 800 mg by mouth three (3) times daily.  levETIRAcetam (KEPPRA) 250 mg tablet Take 1 Tab by mouth every Monday, Wednesday, Friday. 30 Tab 1    insulin lispro (HUMALOG) 100 unit/mL injection INITIATE INSULIN CORRECTIVE PROTOCOL: Normal Insulin Sensitivity   For Blood Sugar (mg/dL) of:     Less than 150 =   0 units           150 -199 =   2 units  200 -249 =   4 units  250 -299 =   6 units  300 -349 =   8 units  350 and above = 10 units and Call Physician  If 2 glucose readings are above 1 Vial 0    aspirin 81 mg tablet Take 81 mg by mouth daily. Past History     Past Medical History:  Past Medical History:   Diagnosis Date    Asthma     [facility] [address]     Brain condition     Cataract     Chronic kidney disease     hemodialysis M-W-F    Diabetes ([facility] [address]     Hyperlipidemia     Hypertension     Paralytic strabismus, unspecified     Psychiatric disorder     Seizures ([facility] [address] 08/27/2018       Past Surgical History:  Past Surgical History:   Procedure Laterality Date    DE INSJ TUNNELED CVC W/O SUBQ PORT/ AGE 3 YR/> N/A 8/9/2018     in-pt 474A, Insertion Tunneled Central Venous Catheter performed by Aliya Huerta MD at [address] [place] [address]    DE INSJ TUNNELED CVC W/O SUBQ PORT/ AGE 3 YR/> N/A 11/8/2019    INSERTION TUNNELED CENTRAL VENOUS CATHETER performed by Vincent Stevenson MD at [address] [place] [address]       Family History:  No family history on file.     Social History:  Social History     Tobacco Use    Smoking status: Never Smoker    Smokeless tobacco: Never Used Substance Use Topics    Alcohol use: No    Drug use: No       Allergies: Allergies   Allergen Reactions    Amoxicillin Unknown (comments)    Cleocin [Clindamycin Hcl] Unknown (comments)    Codeine Unknown (comments)    Lorcet 10/650 [Hydrocodone-Acetaminophen] Unknown (comments)    Penicillin G Benzathine Unknown (comments)    Shellfish Derived Unknown (comments)         Review of Systems     Review of Systems   Cardiovascular: Positive for chest pain. Psychiatric/Behavioral: Positive for agitation (Mild). Physical Exam       No data found. IPVITALS  Patient Vitals for the past 24 hrs:   Pulse Resp SpO2   11/03/21 1212 82 19 100 %       Physical Exam  Vitals and nursing note reviewed. Constitutional:       Appearance: She is well-developed. Comments: She answers questions with one or two words this is her baseline. Unable to give long descriptions   HENT:      Head: Normocephalic and atraumatic. Eyes:      General: No scleral icterus. Conjunctiva/sclera: Conjunctivae normal.   Neck:      Vascular: No JVD. Cardiovascular:      Rate and Rhythm: Normal rate and regular rhythm. Heart sounds: Normal heart sounds. Comments: 4 intact extremity pulses  Pulmonary:      Effort: Pulmonary effort is normal.      Breath sounds: Normal breath sounds. Abdominal:      Palpations: Abdomen is soft. There is no mass. Tenderness: There is no abdominal tenderness. Musculoskeletal:         General: Normal range of motion. Cervical back: Normal range of motion and neck supple. Tenderness (General tenderness of the sternum) present. Lymphadenopathy:      Cervical: No cervical adenopathy. Skin:     General: Skin is warm and dry. Neurological:      Mental Status: She is alert. Psychiatric:      Comments: Very occasionally yells out.   Fairly common for her typical presentation           Diagnostic Study Results   Labs -  No results found for this or any previous visit (from the past 12 hour(s)). Radiologic Studies -   No orders to display     No results found. Medications ordered:   Medications - No data to display      Medical Decision Making   Initial Medical Decision Making and DDx:  Unlikely ACS, do not suspect PE pneumonia aortic disease. Prior work-ups no acute interventions required normal echo. Well-documented history of episodes of SVT. ED Course: Progress Notes, Reevaluation, and Consults:  ED Course as of 11/03/21 1713   Wed Nov 03, 2021   1228 Twelve-lead EKG sinus rhythm at 76 no acute process [CB]   1 Was discussed with cardiology team, they concur the patient should be admitted for observation and trending of troponins, they also recommend aspirin. Have paged out hospitalist team to discuss case and consider admission. [JK]   1713 Case discussed with hospitalist team, Dr. Heydi Beltran, will be excepting patient for observation admission. [JK]      ED Course User Index  [CB] Maddison Luke MD  [JK] Lexi Alfaro MD     2:49 PM uneventful initial screening labs will get second troponin, I do not think there is an acute cardiac process going on today. Signed out to Dr. Dorothey Ganser if second troponin is negative patient will be discharged. I am the first provider for this patient. I reviewed the vital signs, available nursing notes, past medical history, past surgical history, family history and social history. No data found. Vital Signs-Reviewed the patient's vital signs. Pulse Oximetry Analysis, Cardiac Monitor, 12 lead ekg:      Interpreted by the EP. Records Reviewed: Nursing notes reviewed (Time of Review: 12:01 PM)    Procedures:   Critical Care Time:   Aspirin: (was aspirin given for stroke?)    Diagnosis     Clinical Impression: No diagnosis found.     Disposition:       Follow-up Information    None          Patient's Medications   Start Taking    No medications on file   Continue Taking    AMLODIPINE (NORVASC) 10 MG TABLET Take 1 Tablet by mouth daily. ASPIRIN 81 MG TABLET    Take 81 mg by mouth daily. ATORVASTATIN (LIPITOR) 10 MG TABLET    Take 10 mg by mouth daily. Indications: high amount of triglyceride in the blood    B COMPLEX-VITAMIN C-FOLIC ACID (VIRT-CAPS) 1 MG CAPSULE    Take 1 Capsule by mouth daily. CARVEDILOL (COREG) 25 MG TABLET    Take 1 Tablet by mouth two (2) times daily (with meals). CLONIDINE (CATAPRES) 0.3 MG/24 HR    1 Patch by TransDERmal route every seven (7) days. DIPHENHYDRAMINE (BENADRYL) 25 MG CAPSULE    Take 25 mg by mouth every eight (8) hours as needed for Allergies. FAMOTIDINE (PEPCID) 20 MG TABLET    Take 1 Tablet by mouth daily. HALOPERIDOL (HALDOL) 1 MG TABLET    Take 1 Tablet by mouth two (2) times a day. HYDRALAZINE (APRESOLINE) 50 MG TABLET    Take 1 Tablet by mouth three (3) times daily. INSULIN LISPRO (HUMALOG) 100 UNIT/ML INJECTION    INITIATE INSULIN CORRECTIVE PROTOCOL: Normal Insulin Sensitivity   For Blood Sugar (mg/dL) of:     Less than 150 =   0 units           150 -199 =   2 units  200 -249 =   4 units  250 -299 =   6 units  300 -349 =   8 units  350 and above = 10 units and Call Physician  If 2 glucose readings are above    ISOSORBIDE MONONITRATE ER (IMDUR) 30 MG TABLET    Take 1 Tablet by mouth daily. LEVETIRACETAM (KEPPRA) 250 MG TABLET    Take 1 Tab by mouth every Monday, Wednesday, Friday. LEVETIRACETAM (KEPPRA) 500 MG TABLET    Take 500 mg by mouth two (2) times a day. LOSARTAN (COZAAR) 100 MG TABLET    Take 1 Tablet by mouth daily. POLYETHYLENE GLYCOL (MIRALAX) 17 GRAM PACKET    Take 1 Packet by mouth daily as needed for Constipation. SEVELAMER CARBONATE (RENVELA) 800 MG TAB TAB    Take 800 mg by mouth three (3) times daily.    These Medications have changed    No medications on file   Stop Taking    No medications on file     _______________________________    Notes:    Keyur Davenport MD using Dragon dictation      _______________________________

## 2021-11-03 NOTE — H&P
Hospitalist Admission History and Physical    NAME:  Sofie Rush   :   1955   MRN:   [id number]     PCP:  Maddison Ha MD  Date/Time:  11/3/2021 6:00 PM    Subjective:   CHIEF COMPLAINT:  Chest pain     HISTORY OF PRESENT ILLNESS:     Tony Weaver is a 74 y.o.  female with PMH of seizures, ESRD, bipolar, hypertension, diabetes, asthma who presents with chest pain. Patient is somewhat vague in her complaints but states that her pain is in the central part of her chest without radiation to jaw or arm. Patient states that pain is similar to how she has had in the past.  She also states she just \"does not feel right\" but she is quite vague on this. Patient denies other pain complaints, denies shortness of breath or nausea. Past Medical History:   Diagnosis Date    Asthma     [facility] affective disorder ([facility] [address]     Brain condition     Cataract     Chronic kidney disease     hemodialysis M-W-F    Diabetes ([facility] [address]     Hyperlipidemia     Hypertension     Paralytic strabismus, unspecified     Psychiatric disorder     Seizures ([facility] [address] 2018     Past Surgical History:   Procedure Laterality Date    AR INSJ TUNNELED CVC W/O SUBQ PORT/ AGE 3 YR/> N/A 2018     in-pt 474A, Insertion Tunneled Central Venous Catheter performed by Jericho Burrows MD at [facility]    AR INSJ TUNNELED CVC W/O SUBQ PORT/ AGE 5 YR/> N/A 2019    INSERTION TUNNELED CENTRAL VENOUS CATHETER performed by Caty Banda MD at [facility] CATH LAB       Social History     Tobacco Use    Smoking status: Never Smoker    Smokeless tobacco: Never Used   Substance Use Topics    Alcohol use: No        History reviewed. No pertinent family history.      Allergies   Allergen Reactions    Amoxicillin Unknown (comments)    Cleocin [Clindamycin Hcl] Unknown (comments)    Codeine Unknown (comments)    Lorcet 10650 [Hydrocodone-Acetaminophen] Unknown (comments)    Penicillin G Benzathine Unknown (comments)    Shellfish Derived Unknown (comments)        Prior to Admission Medications   Prescriptions Last Dose Informant Patient Reported? Taking? amLODIPine (NORVASC) 10 mg tablet   No No   Sig: Take 1 Tablet by mouth daily. aspirin 81 mg tablet   Yes No   Sig: Take 81 mg by mouth daily. atorvastatin (Lipitor) 10 mg tablet   Yes No   Sig: Take 10 mg by mouth daily. Indications: high amount of triglyceride in the blood   b complex-vitamin c-folic acid (Virt-Caps) 1 mg capsule   No No   Sig: Take 1 Capsule by mouth daily. carvediloL (COREG) 25 mg tablet   No No   Sig: Take 1 Tablet by mouth two (2) times daily (with meals). cloNIDine (CATAPRES) 0.3 mg/24 hr   No No   Si Patch by TransDERmal route every seven (7) days. diphenhydrAMINE (BenadryL) 25 mg capsule   Yes No   Sig: Take 25 mg by mouth every eight (8) hours as needed for Allergies. famotidine (Pepcid) 20 mg tablet   No No   Sig: Take 1 Tablet by mouth daily. haloperidoL (HALDOL) 1 mg tablet   No No   Sig: Take 1 Tablet by mouth two (2) times a day. hydrALAZINE (APRESOLINE) 50 mg tablet   No No   Sig: Take 1 Tablet by mouth three (3) times daily. insulin lispro (HUMALOG) 100 unit/mL injection   No No   Sig: INITIATE INSULIN CORRECTIVE PROTOCOL: Normal Insulin Sensitivity   For Blood Sugar (mg/dL) of:     Less than 150 =   0 units           150 -199 =   2 units  200 -249 =   4 units  250 -299 =   6 units  300 -349 =   8 units  350 and above = 10 units and Call Physician  If 2 glucose readings are above   isosorbide mononitrate ER (IMDUR) 30 mg tablet   No No   Sig: Take 1 Tablet by mouth daily. levETIRAcetam (KEPPRA) 250 mg tablet   No No   Sig: Take 1 Tab by mouth every Monday, Wednesday, Friday. levETIRAcetam (Keppra) 500 mg tablet   Yes No   Sig: Take 500 mg by mouth two (2) times a day. losartan (COZAAR) 100 mg tablet   No No   Sig: Take 1 Tablet by mouth daily.    polyethylene glycol (MIRALAX) 17 gram packet   No No Sig: Take 1 Packet by mouth daily as needed for Constipation. sevelamer carbonate (RENVELA) 800 mg tab tab   Yes No   Sig: Take 800 mg by mouth three (3) times daily. Facility-Administered Medications: None       REVIEW OF SYSTEMS:     [x] Unable to obtain  ROS due to  [x]mental status change / poor mental status at baseline[]sedated   []intubated   []Total of 12 systems reviewed as follows:    Patient with vague complaints reports she states she has some pain to center of her chest, denies radiation to neck or arm  Denies shortness of breath, nausea, vaguely reports not feeling well    Objective:   VITALS:    Visit Vitals  BP (!) 216/104   Pulse 89   Resp 18   LMP  (LMP Unknown)   SpO2 99%     No data recorded. PHYSICAL EXAM:   General:          Alert, in NAD  HEENT:           Sclera anicteric. Conjunctiva pink. Mucous membranes                          Moist, no ear or nasal discharge  Neck:               Supple. Trachea midline. No accessory muscle use. No jugular venous distention, no carotid bruit  CV:                  Regular rate and rhythm. S1S2+  Lungs:             Clear to auscultation bilaterally. No Wheezing or Rhonchi. No rales. Abdomen:        Soft, non-tender. Not distended. Bowel sounds normal.   Extremities:     No cyanosis. No edema. Pulses 2+ b/l; dialysis catheter in place to right chest wall  Neurologic:      Alert and oriented X 2 -aware of person and place, not to time or situation. Follows commands, responds appropriately. No focal neurological deficit was noted  Skin:                Warm and dry. No rashes.      LAB DATA REVIEWED:    No components found for: [place]  Recent Labs     11/03/21  1215      K 4.0      CO2 27   BUN 46*   CREA 9.39*   *   CA 9.6   WBC 8.7   HGB 8.9*   HCT 28.7*        IMAGING RESULTS:     [x]  I have personally reviewed the actual   [x]CXR  []CT scan    Assessment/Plan:      Active Problems: Elevated troponin (11/3/2021)     ___________________________________________________  PLAN:    1. Chest pain with mildly elevated troponins - cardiology consulted by ED, trend troponins, recheck echo  2. End-stage renal disease on hemodialysis -dialysis per Dr. Rachel Morrison, normal dialysis days M-W-F  3. Small to moderate pericardial effusion - recheck Echo, noted small size July 2020, small to moderate approx 2 weeks ago  4. Bipolar d/o -no current meds per med rec review from nursing facility, has not started oral Ativan prior to dialysis. 5. HTN - cont home regimen with hold parameters   6. Type 2 diabetes with hyperglycemia -recent A1c 6.5 on 10/20/2021, SSI only  7. Seizure disorder - continue home Keppra dose   8. ? Unspecified dementia - noted on facility documentation    Discussed with nursing facility staff at [facility] at phone #[phone]    Discussed plan of care with sister Ms. Edyta Gabriel at phone [phone] called / follow up - cardiology, nephrology     Prophylaxis:  []Lovenox  []Coumadin  [x]Hep SQ  []SCDs  []H2B/PPI    Discussed Code Status:    [x]Full Code      []DNR     ___________________________________________________  Admitting Provider: Sulema Tavarez NP

## 2021-11-03 NOTE — Clinical Note
Patient Class[de-identified] OBSERVATION [336] Type of Bed: Telemetry [19] Cardiac Monitoring Required?: Yes Reason for Observation: high risk CP Admitting Diagnosis: Elevated troponin [[id number]] Admitting Physician: Madie Habermann [[id number]] Attending Physician: Madie Habermann [[id number]]

## 2021-11-03 NOTE — ED TRIAGE NOTES
Pt brought in per stretcher via [facility] with initial complains of Chest pain with altered mental status as per the EMS while she is having her dialysis. Pt also is having shortness of breath with clear lung sounds and 20 respiratory rate. Pt is also having elevated blood pressure. EMS has given aspirin and 1 nitrile. Pt's mental status is normal upon arriving at the E.R, oriented to self, and person but disoriented to day, time and situation.

## 2021-11-04 ENCOUNTER — APPOINTMENT (OUTPATIENT)
Dept: NON INVASIVE DIAGNOSTICS | Age: 66
DRG: 314 | End: 2021-11-04
Attending: NURSE PRACTITIONER
Payer: MEDICARE

## 2021-11-04 ENCOUNTER — APPOINTMENT (OUTPATIENT)
Dept: CT IMAGING | Age: 66
DRG: 314 | End: 2021-11-04
Attending: INTERNAL MEDICINE
Payer: MEDICARE

## 2021-11-04 LAB
ANION GAP SERPL CALC-SCNC: 15 MMOL/L (ref 3–18)
BASOPHILS # BLD: 0 K/UL (ref 0–0.1)
BASOPHILS NFR BLD: 0 % (ref 0–2)
BUN SERPL-MCNC: 58 MG/DL (ref 7–18)
BUN/CREAT SERPL: 5 (ref 12–20)
CALCIUM SERPL-MCNC: 9.7 MG/DL (ref 8.5–10.1)
CHLORIDE SERPL-SCNC: 104 MMOL/L (ref 100–111)
CK MB CFR SERPL CALC: 0.9 % (ref 0–4)
CK MB CFR SERPL CALC: 1.2 % (ref 0–4)
CK MB SERPL-MCNC: 2.9 NG/ML (ref 5–25)
CK MB SERPL-MCNC: 3.8 NG/ML (ref 5–25)
CK SERPL-CCNC: 235 U/L (ref 26–192)
CK SERPL-CCNC: 412 U/L (ref 26–192)
CO2 SERPL-SCNC: 20 MMOL/L (ref 21–32)
CREAT SERPL-MCNC: 11.2 MG/DL (ref 0.6–1.3)
DIFFERENTIAL METHOD BLD: ABNORMAL
ECHO TV REGURGITANT MAX VELOCITY: 331.04 CM/S
ECHO TV REGURGITANT PEAK GRADIENT: 43.84 MMHG
EOSINOPHIL # BLD: 0.2 K/UL (ref 0–0.4)
EOSINOPHIL NFR BLD: 2 % (ref 0–5)
ERYTHROCYTE [DISTWIDTH] IN BLOOD BY AUTOMATED COUNT: 16.4 % (ref 11.6–14.5)
GLUCOSE BLD STRIP.AUTO-MCNC: 111 MG/DL (ref 70–110)
GLUCOSE BLD STRIP.AUTO-MCNC: 197 MG/DL (ref 70–110)
GLUCOSE SERPL-MCNC: 187 MG/DL (ref 74–99)
HCT VFR BLD AUTO: 32.2 % (ref 35–45)
HGB BLD-MCNC: 10.1 G/DL (ref 12–16)
LYMPHOCYTES # BLD: 0.7 K/UL (ref 0.9–3.6)
LYMPHOCYTES NFR BLD: 6 % (ref 21–52)
MCH RBC QN AUTO: 29.1 PG (ref 24–34)
MCHC RBC AUTO-ENTMCNC: 31.4 G/DL (ref 31–37)
MCV RBC AUTO: 92.8 FL (ref 78–100)
MONOCYTES # BLD: 0.6 K/UL (ref 0.05–1.2)
MONOCYTES NFR BLD: 6 % (ref 3–10)
NEUTS SEG # BLD: 9.3 K/UL (ref 1.8–8)
NEUTS SEG NFR BLD: 85 % (ref 40–73)
PLATELET # BLD AUTO: 312 K/UL (ref 135–420)
PMV BLD AUTO: 10 FL (ref 9.2–11.8)
POTASSIUM SERPL-SCNC: 4.6 MMOL/L (ref 3.5–5.5)
RBC # BLD AUTO: 3.47 M/UL (ref 4.2–5.3)
SODIUM SERPL-SCNC: 139 MMOL/L (ref 136–145)
TROPONIN I SERPL-MCNC: 0.06 NG/ML (ref 0–0.04)
TROPONIN I SERPL-MCNC: 0.07 NG/ML (ref 0–0.04)
WBC # BLD AUTO: 11 K/UL (ref 4.6–13.2)

## 2021-11-04 PROCEDURE — 85025 COMPLETE CBC W/AUTO DIFF WBC: CPT

## 2021-11-04 PROCEDURE — 74011000258 HC RX REV CODE- 258: Performed by: INTERNAL MEDICINE

## 2021-11-04 PROCEDURE — 82962 GLUCOSE BLOOD TEST: CPT

## 2021-11-04 PROCEDURE — 93321 DOPPLER ECHO F-UP/LMTD STD: CPT | Performed by: INTERNAL MEDICINE

## 2021-11-04 PROCEDURE — 93308 TTE F-UP OR LMTD: CPT | Performed by: INTERNAL MEDICINE

## 2021-11-04 PROCEDURE — 74011000250 HC RX REV CODE- 250: Performed by: INTERNAL MEDICINE

## 2021-11-04 PROCEDURE — 96375 TX/PRO/DX INJ NEW DRUG ADDON: CPT

## 2021-11-04 PROCEDURE — 74011250636 HC RX REV CODE- 250/636: Performed by: INTERNAL MEDICINE

## 2021-11-04 PROCEDURE — 90935 HEMODIALYSIS ONE EVALUATION: CPT

## 2021-11-04 PROCEDURE — 99232 SBSQ HOSP IP/OBS MODERATE 35: CPT | Performed by: INTERNAL MEDICINE

## 2021-11-04 PROCEDURE — 74011250637 HC RX REV CODE- 250/637: Performed by: NURSE PRACTITIONER

## 2021-11-04 PROCEDURE — 96372 THER/PROPH/DIAG INJ SC/IM: CPT

## 2021-11-04 PROCEDURE — 96376 TX/PRO/DX INJ SAME DRUG ADON: CPT

## 2021-11-04 PROCEDURE — G0378 HOSPITAL OBSERVATION PER HR: HCPCS

## 2021-11-04 PROCEDURE — 82553 CREATINE MB FRACTION: CPT

## 2021-11-04 PROCEDURE — 70450 CT HEAD/BRAIN W/O DYE: CPT

## 2021-11-04 PROCEDURE — 74011250636 HC RX REV CODE- 250/636: Performed by: STUDENT IN AN ORGANIZED HEALTH CARE EDUCATION/TRAINING PROGRAM

## 2021-11-04 PROCEDURE — 93321 DOPPLER ECHO F-UP/LMTD STD: CPT

## 2021-11-04 PROCEDURE — 99233 SBSQ HOSP IP/OBS HIGH 50: CPT | Performed by: INTERNAL MEDICINE

## 2021-11-04 PROCEDURE — 74011250636 HC RX REV CODE- 250/636: Performed by: NURSE PRACTITIONER

## 2021-11-04 PROCEDURE — 80048 BASIC METABOLIC PNL TOTAL CA: CPT

## 2021-11-04 RX ORDER — ENALAPRILAT 1.25 MG/ML
0.62 INJECTION INTRAVENOUS
Status: COMPLETED | OUTPATIENT
Start: 2021-11-04 | End: 2021-11-04

## 2021-11-04 RX ORDER — LORAZEPAM 2 MG/ML
1 INJECTION INTRAMUSCULAR ONCE
Status: COMPLETED | OUTPATIENT
Start: 2021-11-04 | End: 2021-11-04

## 2021-11-04 RX ORDER — HEPARIN SODIUM 1000 [USP'U]/ML
5000 INJECTION, SOLUTION INTRAVENOUS; SUBCUTANEOUS
Status: DISCONTINUED | OUTPATIENT
Start: 2021-11-04 | End: 2021-11-10 | Stop reason: HOSPADM

## 2021-11-04 RX ADMIN — Medication 10 ML: at 00:52

## 2021-11-04 RX ADMIN — HYDRALAZINE HYDROCHLORIDE 10 MG: 20 INJECTION INTRAMUSCULAR; INTRAVENOUS at 04:04

## 2021-11-04 RX ADMIN — SEVELAMER CARBONATE 800 MG: 800 TABLET, FILM COATED ORAL at 16:00

## 2021-11-04 RX ADMIN — Medication 10 ML: at 21:41

## 2021-11-04 RX ADMIN — CARVEDILOL 25 MG: 25 TABLET, FILM COATED ORAL at 18:30

## 2021-11-04 RX ADMIN — QUETIAPINE FUMARATE 50 MG: 25 TABLET ORAL at 21:46

## 2021-11-04 RX ADMIN — LEVETIRACETAM 500 MG: 100 INJECTION, SOLUTION INTRAVENOUS at 18:06

## 2021-11-04 RX ADMIN — LORAZEPAM 1 MG: 2 INJECTION INTRAMUSCULAR; INTRAVENOUS at 02:21

## 2021-11-04 RX ADMIN — Medication 10 ML: at 21:34

## 2021-11-04 RX ADMIN — LORAZEPAM 1 MG: 2 INJECTION INTRAMUSCULAR; INTRAVENOUS at 03:56

## 2021-11-04 RX ADMIN — Medication 10 ML: at 21:40

## 2021-11-04 RX ADMIN — HEPARIN SODIUM 5000 UNITS: 5000 INJECTION INTRAVENOUS; SUBCUTANEOUS at 23:23

## 2021-11-04 RX ADMIN — Medication 10 ML: at 06:10

## 2021-11-04 RX ADMIN — HYDRALAZINE HYDROCHLORIDE 75 MG: 50 TABLET, FILM COATED ORAL at 18:26

## 2021-11-04 RX ADMIN — HEPARIN SODIUM 5000 UNITS: 1000 INJECTION, SOLUTION INTRAVENOUS; SUBCUTANEOUS at 12:15

## 2021-11-04 RX ADMIN — HYDRALAZINE HYDROCHLORIDE 75 MG: 50 TABLET, FILM COATED ORAL at 21:39

## 2021-11-04 RX ADMIN — ENALAPRILAT 0.62 MG: 1.25 INJECTION INTRAVENOUS at 17:52

## 2021-11-04 RX ADMIN — HEPARIN SODIUM 5000 UNITS: 5000 INJECTION INTRAVENOUS; SUBCUTANEOUS at 07:02

## 2021-11-04 RX ADMIN — EPOETIN ALFA-EPBX 8000 UNITS: 4000 INJECTION, SOLUTION INTRAVENOUS; SUBCUTANEOUS at 00:53

## 2021-11-04 NOTE — ED NOTES
Suctioned blood clot out of patient's mouth. Paged admitting doctor. Waiting for callback.  ED provider made aware and is at bedside assessing patient

## 2021-11-04 NOTE — CONSULTS
Cardiology Initial Patient Referral Note    Cardiology referral request from Dr. Ilir Smith for evaluation and management/treatment of CP and pericardial effusion. Date of  Admission: 11/3/2021 11:49 AM   Primary Care Physician:  Jesus Tubbs MD    Attending Cardiologist: Dr. Georgina Collado    Patient seen and examined independently. She was examined in the [facility] ED. Echo done earlier today demonstrating an EF of 60 to 65%. There is severe concentric LVH. Patient has a pericardial effusion which is smaller than the previous echo of 10/21/2021. Serum troponins are not consistent with an ACS. Recommend no further cardiac evaluation. Agree with assessment and plan as noted below. Carlton Dove MD   Assessment:     Hospital Problems  Date Reviewed: 10/22/2021          Codes Class Noted POA    Elevated troponin ICD-10-CM: R77.8  ICD-9-CM: 790.6  11/3/2021 Unknown              -Atypical chest pain. Present from HD with CP. Multiple previous ER visits for similar presentation in setting of hypertensive crisis, ESRD with volume overload, with known hemodynamically insignificant pericardial effusion. Troponin not consistent with ACS. ECG with chronic nonspecific ST wave abnormalities. -Hypertensive crisis. -ESRD on HD MWF. -Chronic HFpEF. EF 60-65% on echo 10/21/21 with severe concentric LVH. -H/o moderate loculated pericardial effusion by echo 6/2021. Echo 7/2021 with small improved circumferential pericardial effusion. Echo last admission 10/2021 with small to moderate circumferential pericardial effusion measuring 20 mm without hemodynamic compromise. -Diabetes mellitus. HgbA1c 6.5  -Dyslipidemia. On statin. -H/o CVA. -H/o pSVT. -H/o asthma.  -Chronic anemia.  -Seizure disorder.  -Bipolar/schizophrenia/anxiety, dementia.  -Wheelchair/bed bound, [facility] home patient. Patient repeatedly concerned that she does not get all of the medications as prescribed at nursing home.     No primary cardiologist. Previous cardiology evaluations have been inpatient. Plan: Will review follow up limited echocardiogram.  Will review follow up ECG. Troponin remains indeterminate and not consistent with ACS. Would continue aggressive volume management via HD per nephrology. Would continue aggressive BP control, would recommend reaching out to NH to make sure patient is getting antihypertensives as prescribed. Recommend ASA, statin, BB. Can consider nuclear stress test for completeness but historically conservative management has been recommended in setting of atypical CP in this wheelchair/bedbound patient with dementia and psychiatric history. History of Present Illness: This is a 74 y.o. female admitted for Elevated troponin [R77.8]. Patient complains of: CP. Patient is a 74year old female with history of HTN, DM, dyslipidemia, pericardial effusion, ESRD on HD who presents with recurrent atypical CP during HD. Patient has had multiple previous admissions for similar presentation. Patient develops CP during HD. Substernal. Associated SOB. Appears patient was treated with ativan and haldol and ASA. Patient is noted to have elevated BP. Troponin not consistent with ACS. ECG without acute changes. Plan for HD today.        Cardiac risk factors: dyslipidemia, diabetes mellitus, hypertension      Review of Symptoms:  Except as stated above include:  Constitutional:  negative  Respiratory:  negative  Cardiovascular:  negative  Gastrointestinal: negative  Genitourinary:  negative  Musculoskeletal:  Negative  Neurological:  Negative  Dermatological:  Negative  Endocrinological: Negative  Psychological:  Negative       Past Medical History:     Past Medical History:   Diagnosis Date    Asthma     Bipolar affective disorder ([facility] [address]     Brain condition     Cataract     Chronic kidney disease     hemodialysis M-W-F    Diabetes ([facility] [address]     Hyperlipidemia     Hypertension     Paralytic strabismus, unspecified  Psychiatric disorder     Seizures ([facility] [address] 08/27/2018         Social History:     Social History     Socioeconomic History    Marital status: SINGLE   Tobacco Use    Smoking status: Never Smoker    Smokeless tobacco: Never Used   Substance and Sexual Activity    Alcohol use: No    Drug use: No    Sexual activity: Never        Family History:   History reviewed. No pertinent family history. Medications:      Allergies   Allergen Reactions    Amoxicillin Unknown (comments)    Cleocin [Clindamycin Hcl] Unknown (comments)    Codeine Unknown (comments)    Lorcet 10/650 [Hydrocodone-Acetaminophen] Unknown (comments)    Penicillin G Benzathine Unknown (comments)    Shellfish Derived Unknown (comments)        Current Facility-Administered Medications   Medication Dose Route Frequency    hydrALAZINE (APRESOLINE) 20 mg/mL injection 10 mg  10 mg IntraVENous Q6H PRN    insulin lispro (HUMALOG) injection   SubCUTAneous AC&HS    glucose chewable tablet 16 g  4 Tablet Oral PRN    glucagon (GLUCAGEN) injection 1 mg  1 mg IntraMUSCular PRN    dextrose (D50W) injection syrg 12.5-25 g  25-50 mL IntraVENous PRN    sodium chloride (NS) flush 5-40 mL  5-40 mL IntraVENous Q8H    sodium chloride (NS) flush 5-40 mL  5-40 mL IntraVENous PRN    acetaminophen (TYLENOL) tablet 650 mg  650 mg Oral Q6H PRN    Or    acetaminophen (TYLENOL) suppository 650 mg  650 mg Rectal Q6H PRN    polyethylene glycol (MIRALAX) packet 17 g  17 g Oral DAILY PRN    ondansetron (ZOFRAN ODT) tablet 4 mg  4 mg Oral Q8H PRN    Or    ondansetron (ZOFRAN) injection 4 mg  4 mg IntraVENous Q6H PRN    heparin (porcine) injection 5,000 Units  5,000 Units SubCUTAneous Q8H    aspirin chewable tablet 81 mg  81 mg Oral DAILY    levETIRAcetam (KEPPRA) tablet 250 mg  250 mg Oral Q MON, WED & FRI    sevelamer carbonate (RENVELA) tab 800 mg  800 mg Oral TID    atorvastatin (LIPITOR) tablet 10 mg  10 mg Oral DAILY    levETIRAcetam (KEPPRA) tablet 500 mg  500 mg Oral BID    famotidine (PEPCID) tablet 20 mg  20 mg Oral DAILY    losartan (COZAAR) tablet 100 mg  100 mg Oral DAILY    carvediloL (COREG) tablet 25 mg  25 mg Oral BID WITH MEALS    [START ON 11/5/2021] cloNIDine (CATAPRES) 0.3 mg/24 hr patch 1 Patch  1 Patch TransDERmal Q7D    isosorbide mononitrate ER (IMDUR) tablet 30 mg  30 mg Oral DAILY    amLODIPine (NORVASC) tablet 10 mg  10 mg Oral DAILY    B complex-vitaminC-folic acid (NEPHROCAP) cap  1 Capsule Oral DAILY    hydrALAZINE (APRESOLINE) tablet 75 mg  75 mg Oral TID    QUEtiapine (SEROquel) tablet 50 mg  50 mg Oral QHS     Current Outpatient Medications   Medication Sig    LORazepam (Ativan) 0.5 mg tablet Take 0.5 mg by mouth every Monday, Wednesday, Friday. Three times daily    b complex-vitamin c-folic acid (Virt-Caps) 1 mg capsule Take 1 Capsule by mouth daily.  hydrALAZINE (APRESOLINE) 50 mg tablet Take 1 Tablet by mouth three (3) times daily.  cloNIDine (CATAPRES) 0.3 mg/24 hr 1 Patch by TransDERmal route every seven (7) days.  isosorbide mononitrate ER (IMDUR) 30 mg tablet Take 1 Tablet by mouth daily.  amLODIPine (NORVASC) 10 mg tablet Take 1 Tablet by mouth daily.  diphenhydrAMINE (BenadryL) 25 mg capsule Take 25 mg by mouth every eight (8) hours as needed for Allergies.  losartan (COZAAR) 100 mg tablet Take 1 Tablet by mouth daily.  carvediloL (COREG) 25 mg tablet Take 1 Tablet by mouth two (2) times daily (with meals).  famotidine (Pepcid) 20 mg tablet Take 1 Tablet by mouth daily.  polyethylene glycol (MIRALAX) 17 gram packet Take 1 Packet by mouth daily as needed for Constipation.  atorvastatin (Lipitor) 10 mg tablet Take 10 mg by mouth daily. Indications: high amount of triglyceride in the blood    levETIRAcetam (Keppra) 500 mg tablet Take 500 mg by mouth two (2) times a day.  sevelamer carbonate (RENVELA) 800 mg tab tab Take 800 mg by mouth three (3) times daily.     levETIRAcetam (KEPPRA) 250 mg tablet Take 1 Tab by mouth every Monday, Wednesday, Friday.  insulin lispro (HUMALOG) 100 unit/mL injection INITIATE INSULIN CORRECTIVE PROTOCOL: Normal Insulin Sensitivity   For Blood Sugar (mg/dL) of:     Less than 150 =   0 units           150 -199 =   2 units  200 -249 =   4 units  250 -299 =   6 units  300 -349 =   8 units  350 and above = 10 units and Call Physician  If 2 glucose readings are above    aspirin 81 mg tablet Take 81 mg by mouth daily. Physical Exam:     Visit Vitals  BP (!) 206/84   Pulse 77   Temp 98.3 °F (36.8 °C)   Resp 14   SpO2 99%       TELE: normal sinus rhythm    BP Readings from Last 3 Encounters:   11/04/21 (!) 206/84   10/27/21 (!) 210/98   10/25/21 (!) 158/74     Pulse Readings from Last 3 Encounters:   11/04/21 77   10/27/21 94   10/25/21 77     Wt Readings from Last 3 Encounters:   10/27/21 121 lb (54.9 kg)   10/25/21 121 lb 1.6 oz (54.9 kg)   07/20/21 131 lb (59.4 kg)       General:  alert, cooperative, no distress, appears stated age  Neck:  no JVD  Lungs:  clear to auscultation bilaterally  Heart:  regular rate and rhythm  Abdomen:  abdomen is soft without significant tenderness, masses, organomegaly or guarding  Extremities:  extremities normal, atraumatic, no cyanosis or edema  Skin: Warm and dry.  no hyperpigmentation, vitiligo, or suspicious lesions  Neuro: alert, oriented x3, affect appropriate  Psych: non focal     Data Review:     Recent Labs     11/03/21  1215   WBC 8.7   HGB 8.9*   HCT 28.7*        Recent Labs     11/03/21  1215      K 4.0      CO2 27   *   BUN 46*   CREA 9.39*   CA 9.6       Results for orders placed or performed during the hospital encounter of 11/03/21   EKG, 12 LEAD, INITIAL   Result Value Ref Range    Ventricular Rate 78 BPM    Atrial Rate 78 BPM    P-R Interval 126 ms    QRS Duration 82 ms    Q-T Interval 378 ms    QTC Calculation (Bezet) 430 ms    Calculated P Axis 74 degrees    Calculated R Axis -24 degrees    Calculated T Axis 45 degrees    Diagnosis       Normal sinus rhythm  Nonspecific T wave abnormality  Abnormal ECG  When compared with ECG of 27-OCT-2021 12:49,  No significant change was found  Confirmed by Rudolph Prater MD, Jesús Clark ([id number]) on 11/3/2021 12:31:11 PM         All Cardiac Markers in the last 24 hours:    Lab Results   Component Value Date/Time     (H) 11/03/2021 11:50 PM     11/03/2021 12:15 PM    CKMB 2.9 11/03/2021 11:50 PM    CKMB 1.3 11/03/2021 12:15 PM    CKND1 1.2 11/03/2021 11:50 PM    CKND1 1.2 11/03/2021 12:15 PM    TROIQ 0.07 (H) 11/03/2021 11:50 PM    TROIQ 0.07 (H) 11/03/2021 03:27 PM    TROIQ 0.06 (H) 11/03/2021 12:15 PM       Last Lipid:  No results found for: CHOL, CHOLX, CHLST, CHOLV, HDL, HDLP, LDL, LDLC, DLDLP, TGLX, TRIGL, TRIGP, CHHD, CHHDX    Cardiographics:     EKG Results     Procedure 720 Value Units Date/Time    EKG, 12 LEAD, INITIAL [[id number]] Collected: 11/03/21 1204    Order Status: Completed Updated: 11/03/21 1237     Ventricular Rate 78 BPM      Atrial Rate 78 BPM      P-R Interval 126 ms      QRS Duration 82 ms      Q-T Interval 378 ms      QTC Calculation (Bezet) 430 ms      Calculated P Axis 74 degrees      Calculated R Axis -24 degrees      Calculated T Axis 45 degrees      Diagnosis --     Normal sinus rhythm  Nonspecific T wave abnormality  Abnormal ECG  When compared with ECG of 27-OCT-2021 12:49,  No significant change was found  Confirmed by Rudolph Prater MD, Jesús Clark ([id number]) on 11/3/2021 12:31:11 PM          10/20/21    ECHO ADULT FOLLOW-UP OR LIMITED 10/21/2021 10/21/2021    Interpretation Summary  · LV: Estimated LVEF is 60 - 65%. Visually measured ejection fraction. Normal cavity size and systolic function (ejection fraction normal). Severe concentric hypertrophy. · Pericardium: Small-to-moderate circumferential pericardial effusion measuring 20 mm.     Signed by: Nilda Flowers MD on 10/21/2021  2:04 PM        11/08/19    INVASIVE VASCULAR PROCEDURE 11/08/2019 11/8/2019    Narrative  Preoperative diagnosis: End-stage renal disease with poorly working dialysis catheter in need of new one    Postoperative diagnosis: End-stage renal disease with poorly working dialysis catheter in need of new one    Procedures performed:  #1  Dialysis catheter exchange over wire using 19 cm palindrome catheter through a right internal jugular vein approach. #2  Moderate conscious sedation of 13 minutes    Cultures: None    Specimens: None    Drains: None    Estimated blood loss: Less than 50 mL    Assistants: None    Implants: Please see above    Complications: None    Anesthesia: Moderate conscious sedation of 13 minutes was performed by an independent provider giving the medications per my discretion and monitoring of vital signs throughout the case. Indications for the procedure: Celine Montanez is a 75 y.o. female with end-stage renal disease and poorly working dialysis catheter in need a new one. Patient was given the appropriate risk and benefits of the procedure including but not limited to bleeding, infection, damage to adjacent structures, MI, stroke, death, loss of lower extremity, need for further surgery. Patient was understanding of all the risks and underwent a procedure. Operative findings:  #1  Appropriately placed right IJ permanent dialysis catheter    Procedure:  Patient was correctly identified in the precath area and taken to the Cath Lab in stable condition. Patient had pre-incision timeout prior to any incision. Patient was prepped and draped in the normal sterile fashion according to CDC guidelines aseptic technique. We were able to numb the patient up appropriately from the catheter insertion site all the way to the venous insertion site with 1% lidocaine. We then were able to place an Amplatz superstiff wire down the previous port into the vena cava.   A combination of blunt and sharp dissection was used in order to remove the previous catheter and remove the cuff. Once the catheter was removed over wire new palindrome dialysis catheter was placed. X-ray was taken showing the tip of the catheter within the sinoatrial junction with good curvature of the catheter without any kinking. There is no evidence of pneumothorax and the catheter is good for use. We secured the catheter using 2-0 Prolene suture at both butterfly holes and catheter insertion site with Biopatch and Tegaderm being placed. Each port was easily flushed and aspirated and inserted with 1.6 mL of hot heparin. We then were able to cap and wrap the ports with 4 x 4 and tape. Patient tolerated procedure well without any issues and was taken to the recovery area. Signed by: Delvin Bansal MD on 11/8/2019  8:15 AM      XR Results (most recent):  Results from Hospital Encounter encounter on 11/03/21    XR CHEST PORT    Narrative  EXAM:  XR CHEST PORT    INDICATION:   Chest pain    COMPARISON: 10/27/2021. FINDINGS:  Enlarged cardiac silhouette and stable right jugular catheter. Pulmonary  vasculature within normal limits. No pneumothorax, or pleural effusion. Mild  streaky retrocardiac opacities. Otherwise clear lungs. Intact osseous  structures. Impression  Stable cardiomegaly. Retrocardiac atelectasis versus infiltrate.         Signed By: Catalina Lundborg, PA     November 4, 2021

## 2021-11-04 NOTE — ED NOTES
Found pt with dried blood around her mouth, paged hospitalist Dr. Syeda Barney. Dr. Syeda Barney was made aware of this early this morning/ last night. Informed I will continue to clean mouth and trace origin. Also informed her that I will attempt straight stick in order to acquire morning labs.

## 2021-11-04 NOTE — DIALYSIS
[facility]        ACUTE HEMODIALYSIS FLOW SHEET      HEMODIALYSIS ORDERS: Physician: Dionna Harris     Dialyzer: revaclear   Duration: 3 hr  BFR: 400   DFR: 600   Dialysate:  Temp 36-37*C  K+   2    Ca+  2 Na 138 Bicarb 35   Weight:   kg    Patient Chart []     Unable to Obtain [x]   Dry weight/UF Goal: 1500   Access: right chest TDC    Heparin []  Bolus      Units    [] Hourly       Units    [x]None      Catheter locking solution: heparin    Pre BP:   224/122    Pulse:     76       Respirations: 18  Temperature:   97.7 ax   Labs: Pre        Post:         [x] N/A   Additional Orders(medications, blood products, hypotension management):       [x] N/A     [x] [place] Consent Verified     CATHETER ACCESS: []N/A   [x]Right chest  []Left   []IJ     []Fem   []transhepatic   [] First use X-ray verified     [x]Permanent                [] Temporary   [x]No S/S infection  []Redness  []Drainage []Cultured []Swelling []Pain   [x]Medical Aseptic Prep Utilized   [x]Dressing Changed  [x] Biopatch  Date: 11/4/21       []Clotted   [x]Patent   Flows: [x]Good  []Poor  []Reversed   If access problem,  notified: []Yes    [x]N/A       GRAFT/FISTULA ACCESS:  [x]N/A                             GENERAL ASSESSMENT:      LUNGS:  Rate 18 SaO2%        [] N/A    [x] Clear  [] Coarse  [] Crackles  [] Wheezing        [] Diminished     Location : []RLL   []LLL    []RUL  []STEPHY     Cough: []Productive  []Dry  [x]N/A   Respirations:  [x]Easy  []Labored     Therapy:   [x]RA  []NC  l/min    Mask: []NRB []Venti       O2%                  []Ventilator  []Intubated  [] Trach  [] BiPaP     CARDIAC: [x]Regular      [] Irregular   [] Pericardial Rub  [] JVD        []  Monitored  [] Bedside  [] Remotely monitored [x] N/A       EDEMA: [] None   [x]Generalized  [] Pitting [] 1    [] 2    [] 3    [] 4                 [] Facial  [] Pedal  []  UE  [] LE     SKIN:   [x] Warm   [] Hot     [] Cold   [x] Dry     [] Pale   [] Diaphoretic                  [] Flushed  [] Jaundiced [] Cyanotic  [] Rash  [] Weeping     LOC:    [x] Alert      [x]Oriented:    [x] Person     [] Place  []Time               [] Confused  [x] Lethargic  [] Medicated  [] Non-responsive     GI / ABDOMEN:  [] Flat    [] Distended    [x] Soft    [] Firm   []  Obese                             [] Diarrhea  [x] Bowel Sounds  [] Nausea  [] Vomiting       / URINE ASSESSMENT:[] Voiding   [x] Oliguria  [] Anuria   []  Khan     [] Incontinent    []  Incontinent Brief      []  Bathroom Privileges       PAIN: [x] 0 []1  []2   []3   []4   []5   []6   []7   []8   []9   []10              Scale 0-10  Action/Follow Up:      MOBILITY:  [] Amb    [] Amb/Assist    [] Bed    [] Wheelchair  [x] Stretcher      All Vitals and Treatment Details on Attached [facility] [place]          Room # ER09/09      [] 1st Time Acute  [] Stat  [x] Routine  [] Urgent     [x] Acute Room  []  Bedside  [] ICU/CCU  [] ER   Isolation Precautions:   There are currently no Active Isolations      Special Considerations:         [] Blood Consent Verified [x]N/A      ALLERGIES:   Allergies   Allergen Reactions    Amoxicillin Unknown (comments)    Cleocin [Clindamycin Hcl] Unknown (comments)    Codeine Unknown (comments)    Lorcet 10/650 [Hydrocodone-Acetaminophen] Unknown (comments)    Penicillin G Benzathine Unknown (comments)    Shellfish Derived Unknown (comments)               Code Status:Full Code        Hepatitis Status:                        Lab Results   Component Value Date/Time    Hepatitis B surface Ag <0.10 09/25/2020 05:31 PM    Hepatitis B surface Ab 63.01 09/25/2020 05:31 PM    Hepatitis B core, IgM NEGATIVE  08/11/2018 03:29 AM    Hep B Core Ab, IgM NEGATIVE  08/13/2018 04:34 AM    Hep B Core Ab, total NEGATIVE  08/13/2018 04:34 AM                     Current Labs:   Lab Results   Component Value Date/Time    Sodium 141 11/03/2021 12:15 PM    Potassium 4.0 11/03/2021 12:15 PM    Chloride 106 11/03/2021 12:15 PM    CO2 27 11/03/2021 12:15 PM    Anion gap 8 11/03/2021 12:15 PM    Glucose 164 (H) 11/03/2021 12:15 PM    BUN 46 (H) 11/03/2021 12:15 PM    Creatinine 9.39 (H) 11/03/2021 12:15 PM    BUN/Creatinine ratio 5 (L) 11/03/2021 12:15 PM    GFR est AA 5 (L) 11/03/2021 12:15 PM    GFR est non-AA 4 (L) 11/03/2021 12:15 PM    Calcium 9.6 11/03/2021 12:15 PM      Lab Results   Component Value Date/Time    WBC 8.7 11/03/2021 12:15 PM    Hemoglobin, POC 13.3 11/08/2019 07:26 AM    HGB 8.9 (L) 11/03/2021 12:15 PM    Hematocrit, POC 39 11/08/2019 07:26 AM    HCT 28.7 (L) 11/03/2021 12:15 PM    PLATELET 832 99/50/8160 12:15 PM    MCV 91.1 11/03/2021 12:15 PM                                                                                     DIET:   DIET NPO       PRIMARY NURSE REPORT: First initial/Last name/Title      Pre Dialysis: Mio Coker RN      Time: 9218      EDUCATION:    [x] Patient [] Other         Knowledge Basis: []None [x]Minimal [] Substantial   Barriers to learning: pt is very lethargic   [] Access Care     [] S&S of infection     [] Fluid Management     []K+     [x]Procedural    []Albumin     [] Medications     [] Tx Options     [] Transplant     [] Diet     [] Other   Teaching Tools:  [x] Explain  [x] Demo  [] Handouts [] Video  Patient response:  [x] Verbalized understanding  [] Teach back  [] Return demonstration [] Requires follow up   Inappropriate due to:            [x]Time Out/Safety Check  [x]Extracorporeal Circuit Tested for integrity       RO/HEMODIALYSIS MACHINE SAFETY CHECKS - Before each treatment:     Machine Number:                   [facility]                                   [x] Unit Machine # 5 with centralized RO                                       Alarm Test:  Pass time 3811               [x] RO/Machine Log Complete      Temp   36             Dialysate: pH  7.4 Conductivity: Meter   13.8     HD Machine   13.7                  TCD: 13.8  Dialyzer Lot # [id number]            Blood Tubing Lot # [id number] Saline Lot #  [id number]     CHLORINE TESTING-Before each treatment and every 4 hours    Total Chlorine: [x] less than 0.1 ppm  Time: 0900    (if greater than 0.1 ppm from Primary then every 30 minutes from Secondary)     TREATMENT INITIATION - with Dialysis Precautions:   [x] All Connections Secured                 [x] Saline Line Double Clamped   [x] Venous Parameters Set                  [x] Arterial Parameters Set    [x] Prime Given 250ml                          [x]Air Foam Detector Engaged      Treatment Initiation Note:   Pt arrived to HD unit from ED via stretcher. Pt is very lethargic; alert to self only. Verbal consent obtained for dialysis treatment. Resp even/unlabored on RA. Right chest TDC assessed with no s/s complications. HD initiated without difficulty. During Treatment Notes:  2449 Pt resting with eyes closed. Face and access in view with connections secure. 0930 Pt resting with eyes closed. Face and access in view with connections secure. 0945 Pt resting with eyes closed. Face and access in view with connections secure. 1000 Pt resting with eyes closed. Face and access in view with connections secure. 1015 Pt resting with eyes closed. Face and access in view with connections secure. 1030 Pt resting with eyes closed. Face and access in view with connections secure. 1045 Pt resting with eyes closed. Face and access in view with connections secure. 1100 Pt resting with eyes closed. Face and access in view with connections secure. 1115 Pt resting with eyes closed. Face and access in view with connections secure. 1130 Pt resting with eyes closed. Face and access in view with connections secure. 1145 Pt resting with eyes closed. Face and access in view with connections secure. 1200 Pt resting with eyes closed. Face and access in view with connections secure. 1215 Pt resting with eyes closed. Face and access in view with connections secure.           Medication Dose Volume Route Time [facility] name Title   none     JOCELYN Herring RN                   Post Assessment:   Dialyzer Cleared: [] Good [x] Fair  [] Poor  Blood processed:  72.9 L  UF Removed  2000 mL  POst BP:   223/112       Pulse: 85        Respirations: 18  Temperature: 97.7 Lungs:     [x] Clear      [] Course         [] Crackles    [] Wheezing         [] Diminished   Post Tx Vascular Access:      N/A Cardiac:   [x] Regular   [] Irregular   [] Monitor  [x] N/A       Catheter:   Locking solution: Heparin 1:1000   Art. 1.6  Ray. 1.6      Skin:   Pain:   [x] Warm  [x] Dry [] Diaphoretic    [] Flushed    [] Pale [] Cyanotic [x]0  []1  []2   []3  []4   []5   []6   []7   []8   []9   []10     Post Treatment Note:  Pt tolerated treatment well. Net 1.5 L UF removed.      POST TREATMENT PRIMARY NURSE HANDOFF REPORT:     First initial/Last name/Title         Post Dialysis: Johnathan Blackwood RN     Time:  56     Abbreviations: AVG-arterial venous graft, AVF-arterial venous fistula, IJ-Internal Jugular, Subcl-Subclavian, Fem-Femoral, Tx-treatment, AP/HR-apical heart rate, DFR-dialysate flow rate, BFR-blood flow rate, AP-arterial pressure, -venous pressure, UF-ultrafiltrate, TMP-transmembrane pressure, Ray-Venous, Art-Arterial, RO-Reverse Osmosis

## 2021-11-04 NOTE — ED NOTES
Charted by Yousif Yates RN (offgoing nurse). Report included the following information SBAR, Kardex, MAR and Recent Results. SITUATION:    Code Status: Full Code   Reason for Admission: Elevated troponin [R77.8]    [facility] day: 0   Problem List:       Hospital Problems  Date Reviewed: 10/22/2021          Codes Class Noted POA    Elevated troponin ICD-10-CM: R77.8  ICD-9-CM: 790.6  11/3/2021 Unknown              BACKGROUND:    Past Medical History:   Past Medical History:   Diagnosis Date    Asthma     Bipolar affective disorder ([facility]     Brain condition     Cataract     Chronic kidney disease     hemodialysis M-W-F    Diabetes ([facility]     Hyperlipidemia     Hypertension     Paralytic strabismus, unspecified     Psychiatric disorder     [facility]         Patient taking anticoagulants yes     ASSESSMENT:    Changes in Assessment Throughout Shift: no     Patient has Central Line: no Reasons if yes: n/a   Patient has Khan Cath: no Reasons if yes: n/a      Last Vitals:     Vitals:    11/04/21 1732 11/04/21 1756 11/04/21 1758 11/04/21 1800   BP:  (!) 215/82 (!) 236/95 (!) 226/81   Pulse:  81 86 90   Resp:  14 17 19   Temp:       TempSrc:       SpO2: 98% 98% 97% 99%   Weight:       Height:            IV and DRAINS (will only show if present)   Peripheral IV 11/04/21 Right Hand-Site Assessment: Clean, dry, & intact     WOUND (if present)   Wound Type:  none   Dressing present     Wound Concerns/Notes:  none     PAIN    Pain Assessment                      o Interventions for Pain:  PRN pain/ psych administered   o Intervention effective: yes  o Time of last intervention: 0400   o Reassessment Completed: yes      Last 3 Weights:  Last 3 Recorded Weights in this Encounter    11/04/21 1249   Weight: 54.9 kg (121 lb)     Weight change:      INTAKE/OUPUT    Current Shift: 11/04 0701 - 11/04 1900  In: -   Out: 1500     Last three shifts: No intake/output data recorded.      LAB RESULTS     Recent Labs     11/04/21  0840 11/03/21  1215   WBC 11.0 8.7   HGB 10.1* 8.9*   HCT 32.2* 28.7*    285        Recent Labs     11/04/21  0840 11/03/21  1215    141   K 4.6 4.0   * 164*   BUN 58* 46*   CREA 11.20* 9.39*   CA 9.7 9.6       RECOMMENDATIONS AND DISCHARGE PLANNING     1. Pending tests/procedures/ Plan of Care or Other Needs: daily labs      2. Discharge plan for patient and Needs/Barriers: 2-3 for eval/ monitor    3. Estimated Discharge Date: 2-3 days Posted on Whiteboard in Patients Room: no      4. The patient's care plan was reviewed with the oncoming nurse. \"HEALS\" SAFETY CHECK      Fall Risk         Safety Measures:      A safety check occurred in the patient's room between off going nurse and oncoming nurse listed above. The safety check included the below items  Area Items   H  High Alert Medications - Verify all high alert medication drips (heparin, PCA, etc.)   E  Equipment - Suction is set up for ALL patients (with genesis)  - Red plugs utilized for all equipment (IV pumps, etc.)  - WOWs wiped down at end of shift.  - Room stocked with oxygen, suction, and other unit-specific supplies   A  Alarms - Bed alarm is set for fall risk patients  - Ensure chair alarm is in place and activated if patient is up in a chair   L  Lines - Check IV for any infiltration  - Khan bag is empty if patient has a Khan   - Tubing and IV bags are labeled   S  Safety   - Room is clean, patient is clean, and equipment is clean. - Hallways are clear from equipment besides carts. - Fall bracelet on for fall risk patients  - Ensure room is clear and free of clutter  - Suction is set up for ALL patients (with genesis)  - Hallways are clear from equipment besides carts.    - Isolation precautions followed, supplies available outside room, sign posted     Sary Mireles RN

## 2021-11-04 NOTE — PROGRESS NOTES
Seen her in ER, still remain sleepy, was sleeping on dialysis most of the time. Wakes up after calling her several times, says doing ok & falls sleep. This is quite unusual for her. BP still is quite high. Not sure any impending stroke or not. Will send for non contrast CT of Head. Plan for Dialysis tomorrow. Do not feel safe to send back her to Nursing home until status improves.

## 2021-11-04 NOTE — ED NOTES
Patient yelling continuously, sitter with her, medication adm and tolerated well, slight coughing noted during med adm, med crushed and administered with apple sauce, tolerated well.  Will continue to monitor

## 2021-11-04 NOTE — PROGRESS NOTES
Completed dialysis. Earlier was sleepy. Thana Penta was the last part of dialysis. BP throughout dialysis is high. Subsequently went for echocardiogram.  Echo report reviewed has diastolic dysfunction. Still persistent pericardial effusion but much lower than the previous pericardial effusion. Cardiologist note reviewed. No plan for any dialysis today but if she still in the hospital will dialyze tomorrow again. Recommend before sending her to nursing home need to control the blood pressure which is still quite high.

## 2021-11-04 NOTE — ED NOTES
Report received from Mary Landry; pt fidgeting around bed and calling Bernard nurse! \" Informed that nursing supervisor aware that pt should have a sitter at the bedside, as many have found this helpful to keep pt calm. Director Caridad Ku present during this conversation.

## 2021-11-04 NOTE — PROGRESS NOTES
[facility] Hospitalist Group  Progress Note    Patient: Jose Mckinley Age: 74 y.o. : 1955 MR#: [id number] SSN: [id number]  Date/Time: 2021    Subjective:     Patient mumbled, difficult to understand what she was saying. Seen during hemodialysis. Assessment/Plan:   74year old female with past medical seizures, bipoar order, ESRD among many other medical conditions who was admitted after presenting with chief complaint of chest pain:    -Chest pain somewhat atypical  -Lethargy, confusion state, reports of patient biting t her buccal mucosa and finding of blood clot in mouth by nursing. Concern for seizure episode. CK is in the 400 range last checked. At this time patient does not appear to be an active seizure episode as per sister she is able to wake up and tell people that she is okay before drifting off to sleep. So her presentation at this point is more consistent with postictal state rather than status  -Hypertensive emergency in this patient with possible seizure and very elevated blood pressures discussed with nursing, her last blood pressure is 190s over 80s  -Elevated troponin: Status post cardiology evaluation with no further work-up or risk stratification recommended given the atypical nature, comorbidities and current functional state    HISTORY OF:  -ESRD on hemodialysis  -Type 2 diabetes mellitus  -Seizure disorder  -Bipolar disorder versus schizophrenia both listed in the electronic chart    PLAN:  -Clonidine patch, IV enalapril, given bradycardia reluctant to use beta-blocker, low threshold to start nitro drip and transfer to ICU should these measures do not control her blood pressures.  -Change seizure medications to IV form as her p.o. intake is unreliable at this time.   -Neurology consult  -Stepdown admission, low threshold to transfer to the ICU    Updated her sister Ms Gill Orellana over the phone.  Spoke to sister about CODE STATUS, and sister says she does not know and that Suzanne Huang is tired\" and that she does not want her to suffer. She also states that Ms Sissy Levin is capable of making her own decisions. The sister knows that Ms Sissy Levin is at this point unable to make the decision given her ams change.  Plan for now is FULL CODE but will readdress this with the assistance of palliative care team.  additional Notes:      Case discussed with:  [x]Patient  [x]Family  [x]Nursing  []Case Management  DVT Prophylaxis:  []Lovenox  [x]Hep SQ  []SCDs  []Coumadin   []On Heparin gtt    Objective:   VS:   Visit Vitals  BP (!) 223/112   Pulse 85   Temp 97.7 °F (36.5 °C) (Oral)   Resp 18   Ht 5' 3\" (1.6 m)   Wt 54.9 kg (121 lb)   LMP  (LMP Unknown)   SpO2 99%   BMI 21.43 kg/m²      Tmax/24hrs: Temp (24hrs), Av.9 °F (36.6 °C), Min:97.7 °F (36.5 °C), Max:98.3 °F (36.8 °C)    Input/Output:     Intake/Output Summary (Last 24 hours) at 2021 1720  Last data filed at 2021 1227  Gross per 24 hour   Intake --   Output 1500 ml   Net -1500 ml       General:  Somnolent, in nad, grunts answers, difficult to understand  Cardiovascular:  Rrr, no murmurs  Pulmonary:  ctab  GI:  Soft nt nd  Extremities: No edema   Additional:      Labs:    Recent Results (from the past 24 hour(s))   GLUCOSE, POC    Collection Time: 21  9:21 PM   Result Value Ref Range    Glucose (POC) 172 (H) 70 - 110 mg/dL   COVID-19 RAPID TEST    Collection Time: 21  9:32 PM   Result Value Ref Range    Specimen source Nasopharyngeal      COVID-19 rapid test Not detected NOTD     CARDIAC PANEL,(CK, CKMB & TROPONIN)    Collection Time: 21 11:50 PM   Result Value Ref Range    CK - MB 2.9 <3.6 ng/ml    CK-MB Index 1.2 0.0 - 4.0 %     (H) 26 - 192 U/L    Troponin-I, QT 0.07 (H) 0.0 - 0.045 NG/ML   GLUCOSE, POC    Collection Time: 21  6:53 AM   Result Value Ref Range    Glucose (POC) 197 (H) 70 - 110 mg/dL   CARDIAC PANEL,(CK, CKMB & TROPONIN)    Collection Time: 21  8:40 AM   Result Value Ref Range    CK - MB 3.8 (H) <3.6 ng/ml    CK-MB Index 0.9 0.0 - 4.0 %     (H) 26 - 192 U/L    Troponin-I, QT 0.06 (H) 0.0 - 7.321 NG/ML   METABOLIC PANEL, BASIC    Collection Time: 11/04/21  8:40 AM   Result Value Ref Range    Sodium 139 136 - 145 mmol/L    Potassium 4.6 3.5 - 5.5 mmol/L    Chloride 104 100 - 111 mmol/L    CO2 20 (L) 21 - 32 mmol/L    Anion gap 15 3.0 - 18 mmol/L    Glucose 187 (H) 74 - 99 mg/dL    BUN 58 (H) 7.0 - 18 MG/DL    Creatinine 11.20 (H) 0.6 - 1.3 MG/DL    BUN/Creatinine ratio 5 (L) 12 - 20      GFR est AA 4 (L) >60 ml/min/1.73m2    GFR est non-AA 3 (L) >60 ml/min/1.73m2    Calcium 9.7 8.5 - 10.1 MG/DL   CBC WITH AUTOMATED DIFF    Collection Time: 11/04/21  8:40 AM   Result Value Ref Range    WBC 11.0 4.6 - 13.2 K/uL    RBC 3.47 (L) 4.20 - 5.30 M/uL    HGB 10.1 (L) 12.0 - 16.0 g/dL    HCT 32.2 (L) 35.0 - 45.0 %    MCV 92.8 78.0 - 100.0 FL    MCH 29.1 24.0 - 34.0 PG    MCHC 31.4 31.0 - 37.0 g/dL    RDW 16.4 (H) 11.6 - 14.5 %    PLATELET 045 649 - 926 K/uL    MPV 10.0 9.2 - 11.8 FL    NEUTROPHILS 85 (H) 40 - 73 %    LYMPHOCYTES 6 (L) 21 - 52 %    MONOCYTES 6 3 - 10 %    EOSINOPHILS 2 0 - 5 %    BASOPHILS 0 0 - 2 %    ABS. NEUTROPHILS 9.3 (H) 1.8 - 8.0 K/UL    ABS. LYMPHOCYTES 0.7 (L) 0.9 - 3.6 K/UL    ABS. MONOCYTES 0.6 0.05 - 1.2 K/UL    ABS. EOSINOPHILS 0.2 0.0 - 0.4 K/UL    ABS.  BASOPHILS 0.0 0.0 - 0.1 K/UL    DF AUTOMATED     ECHO ADULT FOLLOW-UP OR LIMITED    Collection Time: 11/04/21  1:28 PM   Result Value Ref Range    Triscuspid Valve Regurgitation Peak Gradient 43.84 mmHg    TR Max Velocity 331.04 cm/s     Additional Data Reviewed:      Signed By: Padma Corea MD     November 4, 2021 5:20 PM

## 2021-11-04 NOTE — ED NOTES
Spoke with dr. Erlinda Burciaga wanted to know if pt was still bleeding from the mouth. It was evident from blood tinged saliva around pt pillow that she may have; but her mouth was free and clear from any new signs of a bleed.  BP/ seizure control meds administered as ordered (allowed enlapril to drip slowly with 50ml saline bag) by MD.

## 2021-11-04 NOTE — DIABETES MGMT
Diabetes/ Glycemic Control Plan of Care    11/04: Patient from ED to dialysis unit this morning. Recommendations: Correctional lispro insulin as ordered. Assessment:   DX:   1. Chest pain, unspecified type        Fasting/ Morning blood glucose:   Lab Results   Component Value Date/Time    Glucose 187 (H) 11/04/2021 08:40 AM    Glucose (POC) 197 (H) 11/04/2021 06:53 AM    Glucose,  (H) 11/08/2019 07:26 AM     IV Fluids containing dextrose: None. Steroids:   None. Blood glucose values: Within target range (70-180mg/dL):  No.    Current insulin orders: Total Daily Dose previous 24 hours = Lispro 2 units    Current A1c:   Lab Results   Component Value Date/Time    Hemoglobin A1c 6.5 (H) 10/20/2021 02:20 PM      equivalent  to ave Blood Glucose of 140mg/dl for 2-3 months prior to admission    Adequate glycemic control PTA: Yes. Nutrition/Diet:   Active Orders   Diet    DIET NPO      Meal Intake:  No data found. Supplement Intake:  No data found.      Home diabetes medications:   Key Antihyperglycemic Medications             insulin lispro (HUMALOG) 100 unit/mL injection (Taking) INITIATE INSULIN CORRECTIVE PROTOCOL: Normal Insulin Sensitivity   For Blood Sugar (mg/dL) of:     Less than 150 =   0 units           150 -199 =   2 units  200 -249 =   4 units  250 -299 =   6 units  300 -349 =   8 units  350 and above = 10 units and Call Physician  If 2 glucose readings are above          Plan/Goals:   Blood glucose will be within target of 70 - 180 mg/dl within 72 hours    Education:  [] Refer to Diabetes Education Record                       [x] Education not indicated at this time     Liv Bernard RN  Pager: [phone]

## 2021-11-04 NOTE — PROGRESS NOTES
Again came to ER with chest pain, sent from Dialysis unit after 1 hour of Dialysis, as usual BP is high, nursing home resident, troponin slightly elevated, chest x-ray no sign of Volume overload,mildly enlarged globular heart recently was agresively dialyzed for Pericardial effusion. Will observe with serial cardiac enzymes, follow cardiology's recommendations. Arranged Dialysis tomorrow morning, if Cardiology clears she can be discharged tomorrow after dialysis ,Start all BP medicine, wuyolie give  1 dose of Retacrit.

## 2021-11-05 LAB
ANION GAP SERPL CALC-SCNC: 11 MMOL/L (ref 3–18)
ATRIAL RATE: 80 BPM
BASOPHILS # BLD: 0 K/UL (ref 0–0.1)
BASOPHILS NFR BLD: 0 % (ref 0–2)
BUN SERPL-MCNC: 26 MG/DL (ref 7–18)
BUN/CREAT SERPL: 4 (ref 12–20)
CALCIUM SERPL-MCNC: 9.5 MG/DL (ref 8.5–10.1)
CALCULATED P AXIS, ECG09: 89 DEGREES
CALCULATED R AXIS, ECG10: -132 DEGREES
CALCULATED T AXIS, ECG11: 90 DEGREES
CHLORIDE SERPL-SCNC: 101 MMOL/L (ref 100–111)
CO2 SERPL-SCNC: 26 MMOL/L (ref 21–32)
CREAT SERPL-MCNC: 6.75 MG/DL (ref 0.6–1.3)
DIAGNOSIS, 93000: NORMAL
DIFFERENTIAL METHOD BLD: ABNORMAL
EOSINOPHIL # BLD: 0.3 K/UL (ref 0–0.4)
EOSINOPHIL NFR BLD: 4 % (ref 0–5)
ERYTHROCYTE [DISTWIDTH] IN BLOOD BY AUTOMATED COUNT: 16.4 % (ref 11.6–14.5)
GLUCOSE BLD STRIP.AUTO-MCNC: 125 MG/DL (ref 70–110)
GLUCOSE BLD STRIP.AUTO-MCNC: 125 MG/DL (ref 70–110)
GLUCOSE BLD STRIP.AUTO-MCNC: 99 MG/DL (ref 70–110)
GLUCOSE SERPL-MCNC: 93 MG/DL (ref 74–99)
HCT VFR BLD AUTO: 33 % (ref 35–45)
HGB BLD-MCNC: 10.3 G/DL (ref 12–16)
LYMPHOCYTES # BLD: 0.8 K/UL (ref 0.9–3.6)
LYMPHOCYTES NFR BLD: 9 % (ref 21–52)
MCH RBC QN AUTO: 28.2 PG (ref 24–34)
MCHC RBC AUTO-ENTMCNC: 31.2 G/DL (ref 31–37)
MCV RBC AUTO: 90.4 FL (ref 78–100)
MONOCYTES # BLD: 0.6 K/UL (ref 0.05–1.2)
MONOCYTES NFR BLD: 7 % (ref 3–10)
NEUTS SEG # BLD: 6.8 K/UL (ref 1.8–8)
NEUTS SEG NFR BLD: 79 % (ref 40–73)
P-R INTERVAL, ECG05: 118 MS
PHOSPHATE SERPL-MCNC: 4.1 MG/DL (ref 2.5–4.9)
PLATELET # BLD AUTO: 287 K/UL (ref 135–420)
PMV BLD AUTO: 10.5 FL (ref 9.2–11.8)
POTASSIUM SERPL-SCNC: 4.2 MMOL/L (ref 3.5–5.5)
Q-T INTERVAL, ECG07: 392 MS
QRS DURATION, ECG06: 84 MS
QTC CALCULATION (BEZET), ECG08: 452 MS
RBC # BLD AUTO: 3.65 M/UL (ref 4.2–5.3)
SODIUM SERPL-SCNC: 138 MMOL/L (ref 136–145)
VENTRICULAR RATE, ECG03: 80 BPM
WBC # BLD AUTO: 8.7 K/UL (ref 4.6–13.2)

## 2021-11-05 PROCEDURE — 82962 GLUCOSE BLOOD TEST: CPT

## 2021-11-05 PROCEDURE — 2709999900 HC NON-CHARGEABLE SUPPLY

## 2021-11-05 PROCEDURE — 74011250636 HC RX REV CODE- 250/636: Performed by: INTERNAL MEDICINE

## 2021-11-05 PROCEDURE — 99232 SBSQ HOSP IP/OBS MODERATE 35: CPT | Performed by: INTERNAL MEDICINE

## 2021-11-05 PROCEDURE — 74011000258 HC RX REV CODE- 258: Performed by: INTERNAL MEDICINE

## 2021-11-05 PROCEDURE — 84100 ASSAY OF PHOSPHORUS: CPT

## 2021-11-05 PROCEDURE — G0378 HOSPITAL OBSERVATION PER HR: HCPCS

## 2021-11-05 PROCEDURE — 36415 COLL VENOUS BLD VENIPUNCTURE: CPT

## 2021-11-05 PROCEDURE — 74011250637 HC RX REV CODE- 250/637: Performed by: NURSE PRACTITIONER

## 2021-11-05 PROCEDURE — 90935 HEMODIALYSIS ONE EVALUATION: CPT

## 2021-11-05 PROCEDURE — 74011250636 HC RX REV CODE- 250/636: Performed by: NURSE PRACTITIONER

## 2021-11-05 PROCEDURE — 92610 EVALUATE SWALLOWING FUNCTION: CPT

## 2021-11-05 PROCEDURE — 65660000000 HC RM CCU STEPDOWN

## 2021-11-05 PROCEDURE — 74011250637 HC RX REV CODE- 250/637: Performed by: STUDENT IN AN ORGANIZED HEALTH CARE EDUCATION/TRAINING PROGRAM

## 2021-11-05 PROCEDURE — 96372 THER/PROPH/DIAG INJ SC/IM: CPT

## 2021-11-05 PROCEDURE — 97166 OT EVAL MOD COMPLEX 45 MIN: CPT

## 2021-11-05 PROCEDURE — 96376 TX/PRO/DX INJ SAME DRUG ADON: CPT

## 2021-11-05 PROCEDURE — 80048 BASIC METABOLIC PNL TOTAL CA: CPT

## 2021-11-05 PROCEDURE — 85025 COMPLETE CBC W/AUTO DIFF WBC: CPT

## 2021-11-05 PROCEDURE — 93005 ELECTROCARDIOGRAM TRACING: CPT

## 2021-11-05 PROCEDURE — 76450000000

## 2021-11-05 PROCEDURE — 99222 1ST HOSP IP/OBS MODERATE 55: CPT | Performed by: NURSE PRACTITIONER

## 2021-11-05 PROCEDURE — 99222 1ST HOSP IP/OBS MODERATE 55: CPT | Performed by: PSYCHIATRY & NEUROLOGY

## 2021-11-05 PROCEDURE — 77030038269 HC DRN EXT URIN PURWCK BARD -A

## 2021-11-05 RX ORDER — LORAZEPAM 2 MG/ML
0.5 INJECTION INTRAMUSCULAR ONCE
Status: COMPLETED | OUTPATIENT
Start: 2021-11-05 | End: 2021-11-05

## 2021-11-05 RX ORDER — LEVETIRACETAM 250 MG/1
250 TABLET ORAL
Status: DISCONTINUED | OUTPATIENT
Start: 2021-11-05 | End: 2021-11-10 | Stop reason: HOSPADM

## 2021-11-05 RX ORDER — HEPARIN SODIUM 5000 [USP'U]/ML
5000 INJECTION, SOLUTION INTRAVENOUS; SUBCUTANEOUS EVERY 8 HOURS
Status: DISCONTINUED | OUTPATIENT
Start: 2021-11-05 | End: 2021-11-05 | Stop reason: SDUPTHER

## 2021-11-05 RX ORDER — LEVETIRACETAM 500 MG/1
500 TABLET ORAL EVERY 24 HOURS
Status: DISCONTINUED | OUTPATIENT
Start: 2021-11-05 | End: 2021-11-10 | Stop reason: HOSPADM

## 2021-11-05 RX ADMIN — LOSARTAN POTASSIUM 100 MG: 50 TABLET, FILM COATED ORAL at 08:43

## 2021-11-05 RX ADMIN — HEPARIN SODIUM 3200 UNITS: 1000 INJECTION, SOLUTION INTRAVENOUS; SUBCUTANEOUS at 18:48

## 2021-11-05 RX ADMIN — FAMOTIDINE 20 MG: 20 TABLET, FILM COATED ORAL at 08:41

## 2021-11-05 RX ADMIN — Medication 10 ML: at 05:49

## 2021-11-05 RX ADMIN — QUETIAPINE FUMARATE 50 MG: 25 TABLET ORAL at 21:24

## 2021-11-05 RX ADMIN — CARVEDILOL 25 MG: 25 TABLET, FILM COATED ORAL at 08:40

## 2021-11-05 RX ADMIN — ONDANSETRON 4 MG: 2 INJECTION INTRAMUSCULAR; INTRAVENOUS at 15:55

## 2021-11-05 RX ADMIN — ASPIRIN 81 MG CHEWABLE TABLET 81 MG: 81 TABLET CHEWABLE at 08:41

## 2021-11-05 RX ADMIN — LORAZEPAM 0.5 MG: 2 INJECTION INTRAMUSCULAR; INTRAVENOUS at 16:53

## 2021-11-05 RX ADMIN — LEVETIRACETAM 250 MG: 250 TABLET ORAL at 21:24

## 2021-11-05 RX ADMIN — HYDRALAZINE HYDROCHLORIDE 10 MG: 20 INJECTION INTRAMUSCULAR; INTRAVENOUS at 04:12

## 2021-11-05 RX ADMIN — SEVELAMER CARBONATE 800 MG: 800 TABLET, FILM COATED ORAL at 21:25

## 2021-11-05 RX ADMIN — HYDRALAZINE HYDROCHLORIDE 75 MG: 50 TABLET, FILM COATED ORAL at 08:40

## 2021-11-05 RX ADMIN — HEPARIN SODIUM 5000 UNITS: 5000 INJECTION INTRAVENOUS; SUBCUTANEOUS at 05:46

## 2021-11-05 RX ADMIN — ISOSORBIDE MONONITRATE 30 MG: 60 TABLET, EXTENDED RELEASE ORAL at 08:40

## 2021-11-05 RX ADMIN — NEPHROCAP 1 CAPSULE: 1 CAP ORAL at 08:41

## 2021-11-05 RX ADMIN — SEVELAMER CARBONATE 800 MG: 800 TABLET, FILM COATED ORAL at 08:41

## 2021-11-05 RX ADMIN — LEVETIRACETAM 500 MG: 500 TABLET, FILM COATED ORAL at 08:41

## 2021-11-05 RX ADMIN — Medication 10 ML: at 21:25

## 2021-11-05 RX ADMIN — AMLODIPINE BESYLATE 10 MG: 10 TABLET ORAL at 08:41

## 2021-11-05 RX ADMIN — HYDRALAZINE HYDROCHLORIDE 75 MG: 50 TABLET, FILM COATED ORAL at 21:24

## 2021-11-05 RX ADMIN — HEPARIN SODIUM 5000 UNITS: 5000 INJECTION INTRAVENOUS; SUBCUTANEOUS at 13:47

## 2021-11-05 RX ADMIN — HEPARIN SODIUM 5000 UNITS: 5000 INJECTION INTRAVENOUS; SUBCUTANEOUS at 21:25

## 2021-11-05 RX ADMIN — ATORVASTATIN CALCIUM 10 MG: 10 TABLET, FILM COATED ORAL at 08:41

## 2021-11-05 RX ADMIN — Medication 10 ML: at 13:47

## 2021-11-05 NOTE — REHAB NOTE
[facility] REFERRAL CONTACT NOTE  [id number] [place] [address] for Physical Rehabilitation       Thank you for the opportunity to review this patient's case for admission to [address] [facility]. Based on our pre-admission screening:     Janet ] Our Team/Medical Director is following this case. Comments: Awaiting PT/OT evaluations    Again, Thank you for this referral. Should you have any questions please do not hesitate to call.      Sincerely,  Trino Donato, [address] [place] [address] Inpatient Rehab Manager  [phone]

## 2021-11-05 NOTE — DIALYSIS
ACUTE HEMODIALYSIS FLOW SHEET    HEMODIALYSIS ORDERS: Physician: Dr. Karol Christensenbing: Revaclear   Duration:  3.5 hr  BFR: 400   DFR: 600   Dialysate:  Temp 36   K+   2    Ca+  2.0   Na 138   Bicarb 35   Wt Readings from Last 1 Encounters:   11/04/21 54.9 kg (121 lb)   [x] Patient Chart     [] Unable to Obtain     Dry weight/UF Goal: 2000 ml               Access:  Right IJ tunneled CVC     Heparin []  Bolus    Units    [] Hourly    Units    [x] None      Catheter locking solution:  1:1000U Heparin   Pre BP:  184/95    Pulse:  77   Respirations: 16    Temperature:  98.3 oral    Tx: NSS   ml/Bolus   [x] N/A   Labs: []  Pre  []  Post   [x] N/A   Additional Orders(medications, blood products, hypotension management): [x] Yes   [] No     [x]  Romyita Consent Verified     CATHETER ACCESS:  []N/A   [x]Right   []Left   [x]IJ     []Fem   []Chest wall   [] First use X-ray verified     [x]Tunneled    [] Non Tunneled   [x]No S/S infection  []Redness  []Drainage []Cultured []Swelling []Pain   [x]Medical Aseptic Prep Utilized   []Dressing Changed  [x] Biopatch  Date: 11/4/2021   []Clotted   [x]Patent   Flows: [x]Good  []Poor  []Reversed   If access problem,  notified: []Yes    [x]N/A          GENERAL ASSESSMENT:    LOC:    [] Alert   [x]Oriented:  [x] Person  [] Place  []Time              [x] Confused  [] Non-Verbal [] Lethargic  [] Obtunded                [] Sedated   [] Agitated  [] Restless    []  Non-responsive        CARDIAC: [x]Regular      [] Irregular   [] Pericardial Rub  [] JVD          []  Monitored  [] Bedside  [] Remotely monitored       LUNGS:   SaO2  %   [x] Clear  [] Coarse  [] Crackles  [] Wheezing                                        [x] Diminished     Location : []RLL   []LLL    [x]RUL  [x]STEPHY   Cough: []Productive  []Dry  [x]N/A   Respirations:  [x]Easy  []Labored   Therapy:  [x]RA  []NC L/min    Mask: []NRB  [] Venti    O2%                  []Ventilator  []Intubated  [] Trach  [] BiPaP GI / ABDOMEN:                     [] Flat    [] Distended    [x] Soft    [] Firm   []  Obese                   [] Diarrhea  [x] Bowel Sounds  [] Nausea  [] Vomiting                   [] Fecal Management System  [] Incontinent of stool      / URINE ASSESSMENT:                   [] Voiding   [] Oliguria  [x] Anuria   []  Khan                  [] Incontinent of urine     SKIN:   [x] Warm  [] Hot  [] Cold   [] Cool   [x] Dry    [] Pale   [] Diaphoretic                  [] Flushed  [] Jaundiced  [] Cyanotic  [] Rash  [] Weeping     EDEMA: [] None  [x]Generalized  [] Pitting [x] 1    [] 2    [] 3    [] 4                 [] Facial  [] Pedal  []  UE  [] LE     MOBILITY:  [x] Bed    [] Stretcher     PAIN:  [x] 0 []1  []2   []3   []4   []5   []6   []7   []8   []9   []10            Scale 0-10  Action/Follow Up:        All Vitals and Treatment Details on Attached [address]: [facility]          Room # 362/01   [] 1st Time Acute      [] Stat       [x] Routine      [] Urgent     [x] Acute Room  []  Bedside  [] ICU/CCU  [] ER   Isolation Precautions:  [x] Dialysis    There are currently no Active Isolations       ALLERGIES:     Allergies   Allergen Reactions    Amoxicillin Unknown (comments)    Cleocin [Clindamycin Hcl] Unknown (comments)    Codeine Unknown (comments)    Lorcet 10/650 [Hydrocodone-Acetaminophen] Unknown (comments)    Penicillin G Benzathine Unknown (comments)    Shellfish Derived Unknown (comments)      Code Status:  Full Code     Hepatitis Status     Lab Results   Component Value Date/Time    Hepatitis B surface Ag <0.10 09/25/2020 05:31 PM    Hepatitis B surface Ab 63.01 09/25/2020 05:31 PM    Hepatitis B core, IgM NEGATIVE  08/11/2018 03:29 AM    Hep B Core Ab, IgM NEGATIVE  08/13/2018 04:34 AM    Hep B Core Ab, total NEGATIVE  08/13/2018 04:34 AM        Current Labs:      Lab Results   Component Value Date/Time    WBC 8.7 11/05/2021 05:28 AM    Hemoglobin, POC 13.3 11/08/2019 07:26 AM    HGB 10.3 (L) 11/05/2021 05:28 AM    Hematocrit, POC 39 11/08/2019 07:26 AM    HCT 33.0 (L) 11/05/2021 05:28 AM    PLATELET 741 96/09/6833 05:28 AM    MCV 90.4 11/05/2021 05:28 AM     Lab Results   Component Value Date/Time    Sodium 138 11/05/2021 05:28 AM    Potassium 4.2 11/05/2021 05:28 AM    Chloride 101 11/05/2021 05:28 AM    CO2 26 11/05/2021 05:28 AM    Anion gap 11 11/05/2021 05:28 AM    Glucose 93 11/05/2021 05:28 AM    BUN 26 (H) 11/05/2021 05:28 AM    Creatinine 6.75 (H) 11/05/2021 05:28 AM    BUN/Creatinine ratio 4 (L) 11/05/2021 05:28 AM    GFR est AA 7 (L) 11/05/2021 05:28 AM    GFR est non-AA 6 (L) 11/05/2021 05:28 AM    Calcium 9.5 11/05/2021 05:28 AM          DIET:  DIET ADULT      PRIMARY NURSE REPORT:   Pre Dialysis:  Olvera Zaarly     Time: 14:35      EDUCATION:    [x] Patient [] Other           Knowledge Basis: []None [x]Minimal [] Substantial [] Unable to assess at this time.    Barriers to learning  [x]N/A   [] Access Care     [] S&S of infection  [] Fluid Management  [] K+   [x] Procedural    []Albumin   [] Medications   [] Tx Options   [] Transplant   [] Diet   [] Other   Teaching Tools:  [x] Explain  [] Demo  [] Handouts [] Video  Patient response: [x] Verbalized understanding  [] Teach back  [] Return demonstration   [] Requires follow up      [x]Time Out/Safety Check  [x] Extracorporeal Circuit Tested for integrity       [address] - Before each treatment:     Machine Number:                   [facility]                                     [x] Unit Machine # 8 with centralized RO                                                                                                           Alarm Test:  Pass time 14:51            [x] RO/Machine Log Complete    Machine Temp    36.0             Dialysate: pH  7.4    Conductivity: Meter 13.6     HD Machine  13.9      TCD: 13.7  Dialyzer Lot # [id number]     Blood Tubing Lot # [id number]    Saline Lot # [id number] CHLORINE TESTING-Before each treatment and every 4 hours    Total Chlorine: [x] less than 0.1 ppm  Initial Time Check: 13:00       4 Hr/2nd Check Time: 17:00   (if greater than 0.1 ppm from Primary then every 30 minutes from Secondary)     TREATMENT INITIATION - with Dialysis Precautions:   [x] All Connections Secured              [x] Saline Line Double Clamped   [x] Venous Parameters Set               [x] Arterial Parameters Set    [x] Prime Given 250ml NSS              [x]Air Foam Detector Engaged      Treatment Initiation Note:  15:08  Pt arrived to HD, pt c/o not feeling good. Pt confused, oriented to self, follows commands, no distress noted, time out done with pt. Called Dr. Merrill Bailey to clarify HD orders, orders received to run DFR at 600. During Treatment Notes:  15:15  Right IJ tunneled CVC assessed no abnormalities noted, line patent with good flow. CVC accessed without any difficulty, pt tolerated well. Vascular access visible with arterial and venous line connections intact. 15:18  Pt very thirsty informed to slow down in drinking because she can only drink 1 cup of water per hour d/t pt being on HD.    15:30  Pt keeps asking what day it is. Vascular access visible with arterial and venous line connections intact. 15:45  Pt constantly c/o not feeling good. Vascular access visible with arterial and venous line connections intact. 15:55  Pt c/o nausea after drinking 1 cup of water since pt arrived to HD, admin Zofran 4mg IVP via HD, and informed pt she will not be getting anymore water. 16:00  Vascular access visible with arterial and venous line connections intact. 16:06  Called Dr. Merrill Bailey to informed MD of pt yelling out, \"I don't feel too good,\" and crying. Orders received to give pt Ativan 0.5mg IVP. 16:15  Pt continues to c/o not feeling well, yelling Prasanna nurse! I don't feel so good! \"  Waiting for pharmacy to bring Ativan.   Vascular access visible with arterial and venous line connections intact. 16:25  Pt stated she was going \"night night,\" and fell asleep. 16:30  Vascular access visible with arterial and venous line connections intact. 16:36  Pharmacy dropped off Ativan, pt currently sleeping, will hold Ativan for now. 16:45  Vascular access visible with arterial and venous line connections intact. 16:53  Pt woke up howling, c/o not feeling good, administered Ativan 0.5mg IVP via HD. 17:00  Vascular access visible with arterial and venous line connections intact. 17:15  Pt keeps saying, \"Night night. \"  Vascular access visible with arterial and venous line connections intact. 17:30  Pt awake, stating she's going to sleep now, \"night night. \"  Vascular access visible with arterial and venous line connections intact. 17:45  Vascular access visible with arterial and venous line connections intact. 18:00  Pt awake, stating \"Leonardo go to night night. \"  Vascular access visible with arterial and venous line connections intact. 18:15  Vascular access visible with arterial and venous line connections intact. 18:30  Pt awake continuously stating \"night night. \"  Vascular access visible with arterial and venous line connections intact. Medication Dose Volume Route Time [place] Nurse, Title   Zofran 4mg 2ml IVP HD  15:55 Oracio Mccann RN    Ativan  0.5mg 0.25ml IVP HD 16:53 Oracio Mccann RN     Post Assessment  Dialyzer Cleared:[] Good [x] Fair  [] Poor  Blood processed:  78.7 L  Net UF Removed:  2000 Ml  Post /76  Pulse  80 Resp  20   Temp 98.5    Post Tx Vascular Access:   CVC Catheter:   Locking solution: Heparin 1:1000U  Arterial port 1.6 ml   Venous port 1.6 ml         Skin:[x] Warm  [x] Dry [] Diaphoretic             []Cool     [] Flushed  [] Pale            [] Cyanotic   Pain:  [x]0  []1 []2  []3 []4  []5  []6 []7 []8  []9  []10     Post Treatment Note:   18:55  HD completed at this time, pt tolerated well. Dressing clean, dry and intact.      POST TREATMENT PRIMARY NURSE HANDOFF REPORT:   Post Dialysis:  A. Rubbie Klinefelter                Time:  19:00  Pt transferred to [facility] room 407.        Abbreviations: AVG-arterial venous graft, AVF-arterial venous fistula, IJ-Internal Jugular, Subcl-Subclavian, Fem-Femoral, Tx-treatment, AP/HR-apical heart rate, DFR-dialysate flow rate, BFR-blood flow rate, AP-arterial pressure, -venous pressure, UF-ultrafiltrate, TMP-transmembrane pressure, Ray-Venous, Art-Arterial, RO-Reverse Osmosis

## 2021-11-05 NOTE — PROGRESS NOTES
[address] [employer] [phone]  [phone]      Palliative Care Support: This writer, along with Parisa Santiago NP, with the Palliative Care team; met with patient to offer support and to also discuss goals of care. Patient was in bed and relaxing. Patient is alert and oriented. Patient is a long term resident at [address] [place] [address]. Patient reported that she is here, at [facility], due to high blood pressure readings. Patient was very pleasant and in good spirits. When asked about her wishes, in regards to goals of care; patient stated that she would want to be resuscitated. Patient was educated on the risks and burdens of CPR and that intubation would be a part of that process. Patient then stated that she did not know. Patient was encouraged to have further dialogue with her sister Annmarie Barnhart, #[phone]) to discuss the risks and burdens of CPR and to make the best decision for her, regarding CPR vs DNR. At this time, patient will remain a FULL CODE WITH FULL INTERVENTIONS. Recommendations: The Palliative Care team will continue to offer support to patient at this time. Mitra Jeffries., [facility] [place]all[place]   #[phone]

## 2021-11-05 NOTE — ROUTINE PROCESS
5717 - Patient arrived on the floor. 6500 - Dr. Angy Burch will place order for oral Keppra and medication for elevated blood pressure. Several attempts were made to replace the patients peripheral IV.    7504 - Dr. Angy Burch gave verbal order for patient to receive an ultra sound guided IV.    3633 - Patient complained of chest discomfort, pt was accessed, vitals and EKG were performed. Patient was talking and laughing during the EKG and stated she felt better before nurse left the room. Gave bedside report to [employer], using SBAR, MAR, and Kardex.

## 2021-11-05 NOTE — ROUTINE PROCESS
TRANSFER - IN REPORT:    Verbal report received from Robert Olson Rn(name) on Javi Le  being received from [address] [place] [address](unit) for routine progression of care      Report consisted of patients Situation, Background, Assessment and   Recommendations(SBAR). Information from the following report(s) SBAR, Kardex and Recent Results was reviewed with the receiving nurse. Opportunity for questions and clarification was provided. Assessment completed upon patients arrival to unit and care assumed.  Pt is currently in dialysis

## 2021-11-05 NOTE — PROGRESS NOTES
Progress Note    Laquita Esparza  74 y.o. 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 Communicating in the way she does. Says that she is not feeling great as usal   complaints of chest pain. . CT of the head did show a possible new infarct. Also there was questionable seizure in the emergency room with tongue bite. A comprehensive review of systems was negative except for that written in the History of Present Illness.     Objective:     Visit Vitals  BP (!) 176/87 (BP 1 Location: Left upper arm, BP Patient Position: At rest)   Pulse 78   Temp 98.6 °F (37 °C)   Resp 18   Ht 5' 3\" (1.6 m)   Wt 54.9 kg (121 lb)   LMP  (LMP Unknown)   SpO2 96%   BMI 21.43 kg/m²       No intake or output data in the 24 hours ending 11/05/21 1330    Current Facility-Administered Medications   Medication Dose Route Frequency Provider Last Rate Last Admin    levETIRAcetam (KEPPRA) tablet 500 mg  500 mg Oral Q24H Deisy Cloud DO   500 mg at 11/05/21 0841    levETIRAcetam (KEPPRA) tablet 250 mg  250 mg Oral Q MON, WED & FRI Deisy Cloud DO        heparin (porcine) 1,000 unit/mL injection 5,000 Units  5,000 Units IntraCATHeter DIALYSIS PRN Clemente Augustin MD   5,000 Units at 11/04/21 1215    [Held by provider] levETIRAcetam (KEPPRA) 500 mg in 0.9% sodium chloride (MBP/ADV) 100 mL MBP  500 mg IntraVENous Q12H Sharon García MD   Stopped at 11/05/21 0545    [Held by provider] levETIRAcetam (KEPPRA) 250 mg in 0.9% sodium chloride 100 mL IVPB  250 mg IntraVENous Q MON, WED & Blossom Bernal MD        hydrALAZINE (APRESOLINE) 20 mg/mL injection 10 mg  10 mg IntraVENous Q6H PRN Benji Block B, NP   10 mg at 11/05/21 0412    insulin lispro (HUMALOG) injection   SubCUTAneous AC&HS Madeline Judith, NP   2 Units at 11/03/21 2151    glucose chewable tablet 16 g  4 Tablet Oral PRN Madeline Judith, NP        glucagon (GLUCAGEN) injection 1 mg  1 mg IntraMUSCular PRN Madeline Judith, NP        dextrose (D50W) injection syrg 12.5-25 g  25-50 mL IntraVENous PRN Madeline Judith, NP        sodium chloride (NS) flush 5-40 mL  5-40 mL IntraVENous Q8H Benji Block B, NP   10 mL at 11/05/21 0549    sodium chloride (NS) flush 5-40 mL  5-40 mL IntraVENous PRN Benji Block B, NP   10 mL at 11/04/21 2140    acetaminophen (TYLENOL) tablet 650 mg  650 mg Oral Q6H PRN Benji Block B, NP   650 mg at 11/03/21 1937    Or    acetaminophen (TYLENOL) suppository 650 mg  650 mg Rectal Q6H PRN Madeline Judith, NP        polyethylene glycol (MIRALAX) packet 17 g  17 g Oral DAILY PRN Madeline Judith, NP        ondansetron (ZOFRAN ODT) tablet 4 mg  4 mg Oral Q8H PRN Benji Block B, NP        Or    ondansetron (ZOFRAN) injection 4 mg  4 mg IntraVENous Q6H PRN Benji Block B, NP        heparin (porcine) injection 5,000 Units  5,000 Units SubCUTAneous Q8H Benji Block B, NP   5,000 Units at 11/05/21 0546    aspirin chewable tablet 81 mg  81 mg Oral DAILY Benji Block B, NP   81 mg at 11/05/21 0841    sevelamer carbonate (RENVELA) tab 800 mg  800 mg Oral TID Benji Block B, NP   800 mg at 11/05/21 0841    atorvastatin (LIPITOR) tablet 10 mg  10 mg Oral DAILY Benji Block B, NP   10 mg at 11/05/21 0841    famotidine (PEPCID) tablet 20 mg  20 mg Oral DAILY Benji Block B, NP   20 mg at 11/05/21 0841    losartan (COZAAR) tablet 100 mg  100 mg Oral DAILY Benji REINA NP   100 mg at 11/05/21 0843    carvediloL (COREG) tablet 25 mg  25 mg Oral BID WITH MEALS Rajendra REINA, NP   25 mg at 11/05/21 0840    cloNIDine (CATAPRES) 0.3 mg/24 hr patch 1 Patch  1 Patch TransDERmal Q7D Rajendra REINA, NP   1 Patch at 11/05/21 1253    isosorbide mononitrate ER (IMDUR) tablet 30 mg  30 mg Oral DAILY Rajendra REINA, NP   30 mg at 11/05/21 0840    amLODIPine (NORVASC) tablet 10 mg  10 mg Oral DAILY Rajendra REINA, NP   10 mg at 11/05/21 0841    B complex-vitaminC-folic acid (NEPHROCAP) cap  1 Capsule Oral DAILY Rajendra REINA, NP   1 Capsule at 11/05/21 0841    hydrALAZINE (APRESOLINE) tablet 75 mg  75 mg Oral TID Rajendra REINA, NP   75 mg at 11/05/21 0840    QUEtiapine (SEROquel) tablet 50 mg  50 mg Oral QHS Rajendra REINA NP   50 mg at 11/04/21 1276        Physical Exam:     Physical Exam:   General:  Alert, cooperative, no distress, appears stated age. Neck: Supple, symmetrical, trachea midline, no adenopathy, thyroid: no enlargement/tenderness/nodules, no carotid bruit and no JVD. Lungs:   Clear to auscultation bilaterally. Heart:  Regular rate and rhythm, S1, S2 normal, no murmur, click, rub or gallop. Abdomen:   Soft, non-tender. Bowel sounds normal. No masses,  No organomegaly. Extremities: Extremities normal, atraumatic, no cyanosis or edema, HD catheter is well dressed   Skin: Skin color, texture, turgor normal. No rashes or lesions   Lymph nodes: Cervical, supraclavicular, and axillary nodes normal.         Data Review:    CBC w/Diff    Recent Labs     11/05/21  0528 11/04/21  0840 11/04/21  0840 11/03/21  1215 11/03/21  1215   WBC 8.7  --  11.0  --  8.7   RBC 3.65*  --  3.47*  --  3.15*   HGB 10.3*  --  10.1*  --  8.9*   HCT 33.0*  --  32.2*  --  28.7*   MCV 90.4   < > 92.8   < > 91.1   MCH 28.2   < > 29.1   < > 28.3   MCHC 31.2   < > 31.4   < > 31.0   RDW 16.4*   < > 16.4*   < > 16.0*    < > = values in this interval not displayed.     Recent Labs     11/05/21 0528 11/04/21 0840 11/04/21  0840 11/03/21  1215 11/03/21  1215   MONOS 7  --  6  --  10   EOS 4  --  2  --  1   BASOS 0   < > 0   < > 0   RDW 16.4*   < > 16.4*   < > 16.0*    < > = values in this interval not displayed. Comprehensive Metabolic Profile    Recent Labs     11/05/21 0528 11/04/21 0840 11/03/21  1215    139 141   K 4.2 4.6 4.0    104 106   CO2 26 20* 27   BUN 26* 58* 46*   CREA 6.75* 11.20* 9.39*    Recent Labs     11/05/21 0528 11/04/21 0840 11/03/21  1215   CA 9.5 9.7 9.6   PHOS 4.1  --   --                         Impression:       Active Hospital Problems    Diagnosis Date Noted    Elevated troponin 11/03/2021    Hypertensive urgency 06/12/2021    [facility] 08/27/2018    CVA, old, cognitive deficits 08/08/2018    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 ([facility] 08/08/2018    Secondary hyperparathyroidism of renal origin ([facility] 08/08/2018    ESRD (end stage renal disease) on dialysis ([facility] 08/08/2018            Plan:     Continue to monitor in a telemetry bed. Watch for any seizure. Neuro needs to see. Will dialyze her today without any heparin. She may need MRI of the brain to see whether she is a new stroke or not.       Evan Jones MD

## 2021-11-05 NOTE — CONSULTS
NEUROLOGY CONSULT NOTE    Patient ID:  Shaw Cassidy  [id number]  17 y.o.  1955    Date of Consultation:  November 5, 2021    Referring Physician: Dr Ina Mahajan    Reason for Consultation:  Possible seizures        Subjective:       History of Present Illness: This is a 75 y/o AAF with a history of ESRD on hemodialysis as well as HTN and a reported seizure disorder in additional to schizophrenia. She was admitted with a primary complaint of chest pain and has had persistent severe HTN as well. There is concern she may have had a seizure due to blood being found in her mouth. She is on Keppra 500mg BID with an additional 250mg on dialysis days.  She cannot give me any additional history but does have some awareness of why she is in the hospital.       Patient Active Problem List    Diagnosis Date Noted    Elevated troponin 11/03/2021    Acute pericardial effusion 10/21/2021    Schizophrenia ([facility] [address] 07/20/2021    Prolonged Q-T interval on ECG 06/12/2021    Hypertensive urgency 06/12/2021    Chest pain 06/11/2021    Hyperkalemia 06/02/2021    Elevated troponin level 06/02/2021    Elevated brain natriuretic peptide (BNP) level 06/02/2021    Anemia 03/19/2021    Hypertensive emergency 09/25/2020    Tachycardia 09/25/2020    Bandemia 08/27/2018    New onset seizure ([address] 08/27/2018    ESRD on hemodialysis ([address] 08/08/2018    Secondary hyperparathyroidism of renal origin ([address] 08/08/2018    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 ([address] 08/08/2018    CVA, old, cognitive deficits 08/08/2018    Acute on chronic renal insufficiency 08/07/2018    Hypertension 08/07/2018     Past Medical History:   Diagnosis Date    Asthma     Bipolar affective disorder ([address]     Brain condition     Cataract     Chronic kidney disease     hemodialysis M-W-F    Diabetes ([address]     Hyperlipidemia     Hypertension     Paralytic strabismus, unspecified     Psychiatric disorder     Seizures ([address] 08/27/2018 Past Surgical History:   Procedure Laterality Date    [place] INSJ TUNNELED CVC W/O SUBQ PORT/ AGE 5 YR/> N/A 8/9/2018     in-pt 474A, Insertion Tunneled Central Venous Catheter performed by John Westbrook MD at [address] [place] [address]    [place] INSJ TUNNELED CVC W/O SUBQ PORT/ AGE 5 YR/> N/A 11/8/2019    INSERTION TUNNELED CENTRAL VENOUS CATHETER performed by Eusebio Barajas MD at [facility] CATH LAB      Prior to Admission medications    Medication Sig Start Date End Date Taking? Authorizing Provider   LORazepam (Ativan) 0.5 mg tablet Take 0.5 mg by mouth every Monday, Wednesday, Friday. Three times daily   Yes Provider, Historical   b complex-vitamin c-folic acid (Virt-Caps) 1 mg capsule Take 1 Capsule by mouth daily. 10/26/21  Yes Leander Selby MD   hydrALAZINE (APRESOLINE) 50 mg tablet Take 1 Tablet by mouth three (3) times daily. 10/25/21  Yes Leander Selby MD   cloNIDine (CATAPRES) 0.3 mg/24 hr 1 Patch by TransDERmal route every seven (7) days. 7/27/21  Yes Leander Selby MD   isosorbide mononitrate ER (IMDUR) 30 mg tablet Take 1 Tablet by mouth daily. 7/23/21  Yes Leander Selby MD   amLODIPine (NORVASC) 10 mg tablet Take 1 Tablet by mouth daily. 7/23/21  Yes Leander Selby MD   diphenhydrAMINE (BenadryL) 25 mg capsule Take 25 mg by mouth every eight (8) hours as needed for Allergies. Yes Provider, Historical   losartan (COZAAR) 100 mg tablet Take 1 Tablet by mouth daily. 6/15/21  Yes Brandy Wiley MD   carvediloL (COREG) 25 mg tablet Take 1 Tablet by mouth two (2) times daily (with meals). 6/14/21  Yes Brandy Wiley MD   famotidine (Pepcid) 20 mg tablet Take 1 Tablet by mouth daily. 6/3/21  Yes Martina Rose MD   polyethylene glycol (MIRALAX) 17 gram packet Take 1 Packet by mouth daily as needed for Constipation. 6/3/21  Yes Martina Rose MD   atorvastatin (Lipitor) 10 mg tablet Take 10 mg by mouth daily.  Indications: high amount of triglyceride in the blood   Yes Provider, Historical levETIRAcetam (Keppra) 500 mg tablet Take 500 mg by mouth two (2) times a day. Yes Provider, Historical   sevelamer carbonate (RENVELA) 800 mg tab tab Take 800 mg by mouth three (3) times daily. Yes Provider, Historical   levETIRAcetam (KEPPRA) 250 mg tablet Take 1 Tab by mouth every Monday, Wednesday, Friday. 8/31/18  Yes Kala Rosas MD   insulin lispro (HUMALOG) 100 unit/mL injection INITIATE INSULIN CORRECTIVE PROTOCOL: Normal Insulin Sensitivity   For Blood Sugar (mg/dL) of:     Less than 150 =   0 units           150 -199 =   2 units  200 -249 =   4 units  250 -299 =   6 units  300 -349 =   8 units  350 and above = 10 units and Call Physician  If 2 glucose readings are above 8/14/18  Yes Luda Kirk MD   aspirin 81 mg tablet Take 81 mg by mouth daily. Yes Other, MD Elina     Allergies   Allergen Reactions    Amoxicillin Unknown (comments)    Cleocin [Clindamycin Hcl] Unknown (comments)    Codeine Unknown (comments)    Lorcet 10/650 [Hydrocodone-Acetaminophen] Unknown (comments)    Penicillin G Benzathine Unknown (comments)    Shellfish Derived Unknown (comments)      Social History     Tobacco Use    Smoking status: Never Smoker    Smokeless tobacco: Never Used   Substance Use Topics    Alcohol use: No      History reviewed. No pertinent family history. Review of Systems    Review of systems not obtained due to patient factors.     Objective:     Patient Vitals for the past 8 hrs:   BP Temp Pulse Resp SpO2   11/05/21 0818 (!) 219/111 98.7 °F (37.1 °C) 80 18 97 %   11/05/21 0646 (!) 218/103 98.4 °F (36.9 °C) 80 18 98 %   11/05/21 0559 (!) 207/62 -- -- -- --   11/05/21 0406 (!) 200/93 98.8 °F (37.1 °C) 79 18 97 %       General Exam  No acute distress, normal body habitus    HEENT: Normocephalic, atraumatic, Sclera anicteric, normal conjunctiva  Mucous membranes: normal color and hydration, perhaps one small lesion on left side of tongue but no overt abrasion Lungs: clear anteriorly. Skin: no lesions no rashes, normal color  CV: Heart: hyperdynamic but regular to rate and rhythm. No murmurs with occasional PVCs    Neurologic Exam:    Mental status:  Alert, oriented to person, place but not time  No visual spatial neglect or overt apraxia    Language: normal fluency and comprehension to simple commands    Cranial nerves: PERRL, OD is externally deviated, face symmetric to movement, Tongue midline with normal strength, palat symmetric    Motor: strength >4/5 throughout  No abnormal movements    Coordination: Normal finger-nose-finger, Normal rapid alternating movements    DTRs (R/L)  Biceps: (2/2)  Brachorad (3/3)  Triceps: (2/2)   Patellar (0/0)  Ankles (0/0)      Sensation: Intact and symmetric to light touch,     Data Review:    Recent Results (from the past 24 hour(s))   ECHO ADULT FOLLOW-UP OR LIMITED    Collection Time: 11/04/21  1:28 PM   Result Value Ref Range    Triscuspid Valve Regurgitation Peak Gradient 43.84 mmHg    TR Max Velocity 331.04 cm/s   GLUCOSE, POC    Collection Time: 11/04/21 11:17 PM   Result Value Ref Range    Glucose (POC) 111 (H) 70 - 110 mg/dL   CBC WITH AUTOMATED DIFF    Collection Time: 11/05/21  5:28 AM   Result Value Ref Range    WBC 8.7 4.6 - 13.2 K/uL    RBC 3.65 (L) 4.20 - 5.30 M/uL    HGB 10.3 (L) 12.0 - 16.0 g/dL    HCT 33.0 (L) 35.0 - 45.0 %    MCV 90.4 78.0 - 100.0 FL    MCH 28.2 24.0 - 34.0 PG    MCHC 31.2 31.0 - 37.0 g/dL    RDW 16.4 (H) 11.6 - 14.5 %    PLATELET 992 960 - 762 K/uL    MPV 10.5 9.2 - 11.8 FL    NEUTROPHILS 79 (H) 40 - 73 %    LYMPHOCYTES 9 (L) 21 - 52 %    MONOCYTES 7 3 - 10 %    EOSINOPHILS 4 0 - 5 %    BASOPHILS 0 0 - 2 %    ABS. NEUTROPHILS 6.8 1.8 - 8.0 K/UL    ABS. LYMPHOCYTES 0.8 (L) 0.9 - 3.6 K/UL    ABS. MONOCYTES 0.6 0.05 - 1.2 K/UL    ABS. EOSINOPHILS 0.3 0.0 - 0.4 K/UL    ABS.  BASOPHILS 0.0 0.0 - 0.1 K/UL    DF AUTOMATED     METABOLIC PANEL, BASIC    Collection Time: 11/05/21  5:28 AM   Result Value Ref Range    Sodium 138 136 - 145 mmol/L    Potassium 4.2 3.5 - 5.5 mmol/L    Chloride 101 100 - 111 mmol/L    CO2 26 21 - 32 mmol/L    Anion gap 11 3.0 - 18 mmol/L    Glucose 93 74 - 99 mg/dL    BUN 26 (H) 7.0 - 18 MG/DL    Creatinine 6.75 (H) 0.6 - 1.3 MG/DL    BUN/Creatinine ratio 4 (L) 12 - 20      GFR est AA 7 (L) >60 ml/min/1.73m2    GFR est non-AA 6 (L) >60 ml/min/1.73m2    Calcium 9.5 8.5 - 10.1 MG/DL   PHOSPHORUS    Collection Time: 11/05/21  5:28 AM   Result Value Ref Range    Phosphorus 4.1 2.5 - 4.9 MG/DL   EKG, 12 LEAD, INITIAL    Collection Time: 11/05/21  6:54 AM   Result Value Ref Range    Ventricular Rate 80 BPM    Atrial Rate 80 BPM    P-R Interval 118 ms    QRS Duration 84 ms    Q-T Interval 392 ms    QTC Calculation (Bezet) 452 ms    Calculated P Axis 89 degrees    Calculated R Axis -132 degrees    Calculated T Axis 90 degrees    Diagnosis       Normal sinus rhythm  Right superior axis deviation  Nonspecific T wave abnormality  Abnormal ECG  When compared with ECG of 03-NOV-2021 12:04,  QRS axis shifted left     GLUCOSE, POC    Collection Time: 11/05/21  8:21 AM   Result Value Ref Range    Glucose (POC) 125 (H) 70 - 110 mg/dL         Radiology studies: head CT reviewed, no changes      Assessment: This is a 75 y/o AAF with a history of severe HTN, reported seizure disorder, ESRD and schizophrenia who was admitted for chest pain and who had a possible seizure. She is awake and cooperative presently and I suspect is cognitively at baseline. Presently she is severely hypertensive with a current BP of 219/111. Her current dosing schedule of Keppra seems reasonable for someone on dialysis although if she has another seizure after dialysis then would increase her dialysis day dose to 500mg from 250mg, Her HTN is also likely a significant provoking factor. Active Problems:    Elevated troponin (11/3/2021)        Plan:     1. Aggressive HTN management  2.  Continue Keppra as dosed but if she has another seizure in close proximity to dialysis would increase her added dose on dialysis to 500mg from 250mg  3. There is no indication for an EEG, this will not  and I have cancelled this. 4. Will sign off. Johanny Ta. Samara George M.D.   Clinical Neurophysiology  Neuromuscular specialist

## 2021-11-05 NOTE — CONSULTS
Palliative Medicine Consult    Patient Name: Krishan Garza  YOB: 1955    Date of Initial Consult: 11/5/2021  Reason for Consult: Goals of care discussions  Requesting Provider: Dr. De Dios Officer  Primary Care Physician: Johnny Ferrari MD      SUMMARY:   Krishan Garza is a 74 y.o. with a past history of seizures, ESRD, bipolar, hypertension, diabetes, asthma, who was admitted on 11/3/2021 from [address] [place] [address] with a diagnosis of chest pain and high blood pressor, with possible seizure. Patient had a period of lethargy and delirium, and is now reportedly at baseline. Current medical issues leading to Palliative Medicine involvement include: support and goals of care discussions. PALLIATIVE DIAGNOSES:   1. Goals of care discussions  2. Altered mental status  3. Severe hypertension  4. Seizure disorder  5. End-stage renal disease  6. Debility       PLAN:   1. Goals of care discussions: Palliative medicine team including JOLYNN Bal and I met with patient at patient's bedside. Patient is awake, alert, and oriented x4. Patient states she is a little frustrated with her high blood pressure, and wants her blood pressure to be normalized. Patient aware that she might have had a seizure and a period of delirium/post ictal state but she is now oriented x 4. Patient is a long term resident of [address] [place] [address],[address]. When discussing goals of care, patient initially stated she would want attempts at CPR but once she understood the potential burdens, she is unsure if she would want resuscitation efforts. Patient would like some time to talk to her sister. She is aware that in the absence of firm decisions, she will remain a full code with full interventions. Will follow up next week if patient remains hospitalized to further discuss once she has time to think about it and talk to her sister. If patient is discharged over the weekend, encouraged her to continue discussions with her sister and PCP. 2. Altered mental status: Followed by neurology, possible seizure. Patient is on Keppra, hypertension management per primary team. Patient is currently alert and oriented x4.  3. Severe hypertension: Systolic pressures above 758, treatment per primary team  4. Seizure disorder: History of, with possible seizure upon admission. She is on Keppra. Neurology has evaluated. 5. End-stage renal disease: Nephrology following, on hemodialysis  6. Debility: Long-term care resident at [facility], needs assist with all ADLs. 7. Initial consult note routed to primary continuity provider  8.  Communicated plan of care with: Palliative IDT       GOALS OF CARE / TREATMENT PREFERENCES:   [====Goals of Care====]  GOALS OF CARE: Full code with full interventions  Patient/Health Care Proxy Stated Goals: Prolong life      TREATMENT PREFERENCES:   Code Status: Full Code    Advance Care Planning:  Advance Care Planning 11/5/2021   Patient's Healthcare Decision Maker is: -   Primary Decision Maker Name -   Confirm Advance Directive None   Patient Would Like to Complete Advance Directive No       Medical Interventions: Full interventions           The palliative care team has discussed with patient / health care proxy about goals of care / treatment preferences for patient.  [====Goals of Care====]         HISTORY:     History obtained from: patient, chart    CHIEF COMPLAINT: generalized weakness, high blood pressure    HPI/SUBJECTIVE:    The patient is:   [x] Verbal and participatory  [] Non-participatory due to:   oriented x 4, engaged in goals of care discussions    Clinical Pain Assessment (nonverbal scale for severity on nonverbal patients):   Clinical Pain Assessment  Severity: 0            FUNCTIONAL ASSESSMENT:     Palliative Performance Scale (PPS):  PPS: 40       PSYCHOSOCIAL/SPIRITUAL SCREENING:     Advance Care Planning:  Advance Care Planning 11/5/2021   Patient's Healthcare Decision Maker is: -   Primary Decision Maker Name -   Confirm Advance Directive None   Patient Would Like to Complete Advance Directive No        Any spiritual / Anglican concerns:  [] Yes /  [x] No    Caregiver Burnout:  [] Yes /  [] No /  [x] No Caregiver Present      Anticipatory grief assessment:   [x] Normal  / [] Maladaptive          REVIEW OF SYSTEMS:     Positive and pertinent negative findings in ROS are noted above in HPI. The following systems were [x] reviewed / [] unable to be reviewed as noted in HPI  Other findings are noted below. Systems: constitutional, ears/nose/mouth/throat, respiratory, gastrointestinal, genitourinary, musculoskeletal, integumentary, neurologic, psychiatric, endocrine. Positive findings noted below. Modified ESAS Completed by: provider   Fatigue: 5       Pain: 0   Anxiety: 0 Nausea: 0     Dyspnea: 0     Constipation: No              PHYSICAL EXAM:     From RN flowsheet:  Wt Readings from Last 3 Encounters:   11/04/21 54.9 kg (121 lb)   10/27/21 54.9 kg (121 lb)   10/25/21 54.9 kg (121 lb 1.6 oz)     Blood pressure (!) 176/87, pulse 78, temperature 98.6 °F (37 °C), resp. rate 18, height 5' 3\" (1.6 m), weight 54.9 kg (121 lb), SpO2 96 %.     Pain Scale 1: Numeric (0 - 10)  Pain Intensity 1: 0                     Constitutional: Awake, alert, NAD, lying in bed, appears chronically ill  Eyes: pupils equal, anicteric  ENMT: no nasal discharge, moist mucous membranes  Cardiovascular: regular rhythm, distal pulses intact  Respiratory: breathing not labored, symmetric  Gastrointestinal: soft non-tender, +bowel sounds  Musculoskeletal: no deformity, no tenderness to palpation  Skin: warm, dry  Neurologic: following commands, moving all extremities, oriented x 4  Psychiatric: full affect, no hallucinations         HISTORY:     Active Problems:    Elevated troponin (11/3/2021)      Past Medical History:   Diagnosis Date    Asthma     Bipolar affective disorder ([facility] [address]     Brain condition     Cataract     Chronic kidney disease     hemodialysis M-W-F    Diabetes [facility]     Hyperlipidemia     Hypertension     Paralytic strabismus, unspecified     Psychiatric disorder     Seizures ([facility] [address] 08/27/2018      Past Surgical History:   Procedure Laterality Date    [place] INSJ TUNNELED CVC W/O SUBQ PORT/ AGE 5 YR/> N/A 8/9/2018     in-pt 474A, Insertion Tunneled Central Venous Catheter performed by Chauncey Snow MD at [address] [place] [address] LAB    MT INSJ TUNNELED CVC W/O SUBQ PORT/ AGE 5 YR/> N/A 11/8/2019    INSERTION TUNNELED CENTRAL VENOUS CATHETER performed by Keyon Haider MD at [facility] CATH LAB      History reviewed. No pertinent family history. History reviewed, no pertinent family history.   Social History     Tobacco Use    Smoking status: Never Smoker    Smokeless tobacco: Never Used   Substance Use Topics    Alcohol use: No     Allergies   Allergen Reactions    Amoxicillin Unknown (comments)    Cleocin [Clindamycin Hcl] Unknown (comments)    Codeine Unknown (comments)    Lorcet 10/650 [Hydrocodone-Acetaminophen] Unknown (comments)    Penicillin G Benzathine Unknown (comments)    Shellfish Derived Unknown (comments)      Current Facility-Administered Medications   Medication Dose Route Frequency    levETIRAcetam (KEPPRA) tablet 500 mg  500 mg Oral Q24H    levETIRAcetam (KEPPRA) tablet 250 mg  250 mg Oral Q MON, WED & FRI    heparin (porcine) 1,000 unit/mL injection 5,000 Units  5,000 Units IntraCATHeter DIALYSIS PRN    [Held by provider] levETIRAcetam (KEPPRA) 500 mg in 0.9% sodium chloride (MBP/ADV) 100 mL MBP  500 mg IntraVENous Q12H    [Held by provider] levETIRAcetam (KEPPRA) 250 mg in 0.9% sodium chloride 100 mL IVPB  250 mg IntraVENous Q MON, WED & FRI    hydrALAZINE (APRESOLINE) 20 mg/mL injection 10 mg  10 mg IntraVENous Q6H PRN    insulin lispro (HUMALOG) injection   SubCUTAneous AC&HS    glucose chewable tablet 16 g  4 Tablet Oral PRN    glucagon (GLUCAGEN) injection 1 mg  1 mg IntraMUSCular PRN    dextrose (D50W) injection syrg 12.5-25 g  25-50 mL IntraVENous PRN    sodium chloride (NS) flush 5-40 mL  5-40 mL IntraVENous Q8H    sodium chloride (NS) flush 5-40 mL  5-40 mL IntraVENous PRN    acetaminophen (TYLENOL) tablet 650 mg  650 mg Oral Q6H PRN    Or    acetaminophen (TYLENOL) suppository 650 mg  650 mg Rectal Q6H PRN    polyethylene glycol (MIRALAX) packet 17 g  17 g Oral DAILY PRN    ondansetron (ZOFRAN ODT) tablet 4 mg  4 mg Oral Q8H PRN    Or    ondansetron (ZOFRAN) injection 4 mg  4 mg IntraVENous Q6H PRN    heparin (porcine) injection 5,000 Units  5,000 Units SubCUTAneous Q8H    aspirin chewable tablet 81 mg  81 mg Oral DAILY    sevelamer carbonate (RENVELA) tab 800 mg  800 mg Oral TID    atorvastatin (LIPITOR) tablet 10 mg  10 mg Oral DAILY    famotidine (PEPCID) tablet 20 mg  20 mg Oral DAILY    losartan (COZAAR) tablet 100 mg  100 mg Oral DAILY    carvediloL (COREG) tablet 25 mg  25 mg Oral BID WITH MEALS    cloNIDine (CATAPRES) 0.3 mg/24 hr patch 1 Patch  1 Patch TransDERmal Q7D    isosorbide mononitrate ER (IMDUR) tablet 30 mg  30 mg Oral DAILY    amLODIPine (NORVASC) tablet 10 mg  10 mg Oral DAILY    B complex-vitaminC-folic acid (NEPHROCAP) cap  1 Capsule Oral DAILY    hydrALAZINE (APRESOLINE) tablet 75 mg  75 mg Oral TID    QUEtiapine (SEROquel) tablet 50 mg  50 mg Oral QHS          LAB AND IMAGING FINDINGS:     Lab Results   Component Value Date/Time    WBC 8.7 11/05/2021 05:28 AM    HGB 10.3 (L) 11/05/2021 05:28 AM    PLATELET 563 54/04/3461 05:28 AM     Lab Results   Component Value Date/Time    Sodium 138 11/05/2021 05:28 AM    Potassium 4.2 11/05/2021 05:28 AM    Chloride 101 11/05/2021 05:28 AM    CO2 26 11/05/2021 05:28 AM    BUN 26 (H) 11/05/2021 05:28 AM    Creatinine 6.75 (H) 11/05/2021 05:28 AM    Calcium 9.5 11/05/2021 05:28 AM    Magnesium 2.2 10/27/2021 01:00 PM    Phosphorus 4.1 11/05/2021 05:28 AM      Lab Results   Component Value Date/Time    Alk. phosphatase 76 10/27/2021 01:00 PM    Protein, total 7.8 10/27/2021 01:00 PM    Albumin 3.8 10/27/2021 01:00 PM    Globulin 4.0 10/27/2021 01:00 PM     Lab Results   Component Value Date/Time    INR 1.0 10/20/2021 02:20 PM    Prothrombin time 13.3 10/20/2021 02:20 PM    aPTT 22.6 (L) 07/20/2021 01:15 PM      Lab Results   Component Value Date/Time    Iron 46 (L) 03/20/2021 02:02 PM    TIBC 173 (L) 03/20/2021 02:02 PM    Iron % saturation 27 03/20/2021 02:02 PM    Ferritin 1,396 (H) 03/20/2021 02:02 PM      No results found for: PH, PCO2, PO2  No components found for: [place]   Lab Results   Component Value Date/Time     (H) 11/04/2021 08:40 AM    CK - MB 3.8 (H) 11/04/2021 08:40 AM                Total time: 50 minutes  Counseling / coordination time, spent as noted above:   > 50% counseling / coordination?: yes, patient    Prolonged service was provided for  []30 min   []75 min in face to face time in the presence of the patient, spent as noted above. Time Start:   Time End:   Note: this can only be billed with 28290 (initial) or 57335 (follow up). If multiple start / stop times, list each separately.

## 2021-11-05 NOTE — PROGRESS NOTES
Discharge planning    Spoke with Cipriano Jordan, with [facility], concerning accepting patient today. Per Cipriano Jordan, will accept patient back in the morning. Notified Dr. Brian Gale via [employer]. Transportation to [address] [place] [address]  Address is [address] [address] and phone number is [phone]  Patient will require BLS transport. Pt requires Stretcher If stretcher, reason: non-ambulatory, ESRD, DMII, Seizure disorder, elevated troponin  Patient is currently requiring oxygen No   Height:5'3\"   Weight: 121 lb  Pt is on isolation: No    Is the pt ready now? Other (tomorrow)  Requested time: 10:30 am  PCS Faxed: yes  Insurance verified on face sheet: yes  Auth needed for transport: yes  CM completed PCS/ Envelope and placed on chart. Spoke with Cinthya with [employer] at [phone] with confirmation # [id number]    MERARY SextonN, RN  Pager # [phone]  Care Manager

## 2021-11-05 NOTE — PROGRESS NOTES
Reason for Readmission:    Elevated troponin [R77.8]         RUR Score and Risk Level:     n/a     Level of Readmission:    1   (1 - First Readmission, 2- Second Readmission within 30 days or multiple admissions over previous 90 days, 3- Greater than two readmissions within 30 days or multiple admissions over previous 90 days)      Care Conference scheduled:  no (consider for all patient's with readmission level of 2, should definitely be scheduled for all patients with readmission level of 3)       Resources/supports as identified by patient/family:  Nursing home staff and family are support system. Top Challenges facing patient (as identified by patient/family and CM): Finances/Medication cost?    Medicare & medicaid  Transportation    Medicaid stretcher  Support system or lack thereof? Good support  Living arrangements? LTC at [address] [place] [address]  Self-care/ADLs/Cognition? dependent       Current Advanced Directive/Advance Care Plan:  no      Healthcare Decision Maker:   Primary Decision Maker: Flora Ortiz - [phone]               Plan for utilizing home health:   no.             Likelihood of additional readmission:   moderate           Transition of Care Plan:    Based on readmission, the patient's previous Plan of Care   has been evaluated and/or modified. The current Transition of Care Plan is:    LTC       Initial assessment completed with patient. Cognitive status of patient: oriented to time, place, person and situation. Face sheet information confirmed:  yes. The patient designates sister Vegas ([phone]) to participate in her discharge plan and to receive any needed information. This patient is a long term care resident of [address] [place] [address]. Patient is not able to navigate steps as needed. Prior to hospitalization, patient was considered to be independent with ADLs/IADLS : no .  If not independent,  patient needs assist with : dressing, bathing, food preparation, cooking, toileting and grooming    Patient has a current ACP document on file: no       The patient will need medicaid stretcher transport  upon      This patient is on dialysis :yes    If yes, hemodialysis patient and receives treatment on Monday/Wednesday/Friday at [address] time is 11am. Pt is transported to/from dialysis by [facility] stretcher     Currently, the discharge plan is LTC. CM will continue to assist with transition of care needs. Patient's current insurance is Medicare Part A & B and [employer] [facility] medicaid    Care Management Interventions  PCP Verified by CM: Yes  Mode of Transport at Discharge: BLS  Transition of Care Consult (CM Consult): Discharge Planning  [facility]: Other (see comment) (SNF at [facility])  Discharge [facility]  [facility]      Readmission Assessment  Number of days since last admission?: 8-30 days  Previous disposition: [facility] is being interviewed?: Patient  What was the patient's/caregiver's perception as to why they think they needed to return back to the hospital?: Other (Comment) (Patient stated \" I don't know what happened. \")  Who advised the patient to return to the hospital?: Other (Comment) (Dialysis unit staff)  Does the patient report anything that got in the way of taking their medications?:  (n/a)  In our efforts to provide the best possible care to you and others like you, can you think of anything that we could have done to help you after you left the hospital the first time, so that you might not have needed to return so soon?: Other (Comment) (unknown)      MERARY KellerN, RN  Pager # [phone]  Care Manager

## 2021-11-05 NOTE — PROGRESS NOTES
OCCUPATIONAL THERAPY EVALUATION/DISCHARGE    Patient: Man Stevenson (89 y.o. female)  Date: 11/5/2021  Primary Diagnosis: Elevated troponin [R77.8]        Precautions: aspiration, fall     PLOF: Pt is poor historian. Per chart review pt resides in LTC facility, dependent for ADLs and functional mobility. Pt reports she feeds herself \"sometimes\". ASSESSMENT AND RECOMMENDATIONS:  Based on the objective data described below, the patient presents with participation in ADLs and functional mobility at what appears to be pt's functional baseline. Pt reports she doesn't get out of bed, chart review also confirms that pt is bed bound. Pt requires Max A for bed mobility. Pt requires Mod- Max A for simple grooming and dressing task mostly due to difficulty sequencing and maintaining attention on task. Pt follows commands intermittently and reports no pain at the time of evaluation. Skilled occupational therapy is not indicated at this time. Discharge Recommendations: Return to LTC facility  Further Equipment Recommendations for Discharge: N/A      SUBJECTIVE:   Patient stated I want to go home.     OBJECTIVE DATA SUMMARY:     Past Medical History:   Diagnosis Date    Asthma     Bipolar affective disorder ([facility] [address]     Brain condition     Cataract     Chronic kidney disease     hemodialysis M-W-F    Diabetes ([facility] [address]     Hyperlipidemia     Hypertension     Paralytic strabismus, unspecified     Psychiatric disorder     Seizures ([facility] [address] 08/27/2018     Past Surgical History:   Procedure Laterality Date    MA INSJ TUNNELED CVC W/O SUBQ PORT/ AGE 3 YR/> N/A 8/9/2018     in-pt 474A, Insertion Tunneled Central Venous Catheter performed by Jamel Shearer MD at [address] [place] [address]    MA INSJ TUNNELED CVC W/O SUBQ PORT/ AGE 3 YR/> N/A 11/8/2019    INSERTION TUNNELED CENTRAL VENOUS CATHETER performed by Karol Mcguire MD at [facility] CATH LAB     Barriers to Learning/Limitations: yes;  cognitive  Compensate with: visual, verbal, tactile, kinesthetic cues/model    Home Situation:   Home Situation  Support Systems: [place]  []     Right hand dominant   []     Left hand dominant    Cognitive/Behavioral Status:  Neurologic State: Alert  Orientation Level: Oriented to person  Cognition: Follows commands; Decreased command following; Decreased attention/concentration  Safety/Judgement: Decreased awareness of environment    Skin: visible skin intact  Edema: none noted    Vision/Perceptual:       Coordination: BUE  Coordination: Grossly decreased, non-functional  Fine Motor Skills-Upper: Left Impaired; Right Impaired    Gross Motor Skills-Upper: Left Impaired; Right Impaired  Strength: BUE  Strength: Generally decreased, functional   Tone & Sensation: BUE  Tone: Normal   Range of Motion: BUE  AROM: Generally decreased, functional   Functional Mobility and Transfers for ADLs:  Bed Mobility:  Rolling: Maximum assistance   Scooting: Maximum assistance  ADL Assessment:  Feeding:  (pt is NPO)  Oral Facial Hygiene/Grooming: Maximum assistance/Mod A  Bathing: Maximum assistance  Upper Body Dressing: Maximum assistance  Lower Body Dressing: Maximum assistance  Toileting: Total assistance   ADL Intervention:   Cognitive Retraining  Safety/Judgement: Decreased awareness of environment  Pain:  Pain level pre-treatment: not rated  Pain level post-treatment: not rated     Activity Tolerance:   Fair  Please refer to the flowsheet for vital signs taken during this treatment. After treatment:   []  Patient left in no apparent distress sitting up in chair  [x]  Patient left in no apparent distress in bed  [x]  Call bell left within reach  [x]  Nursing notified  []  Caregiver present  [x]  Bed alarm activated    COMMUNICATION/EDUCATION:   [x]      Role of Occupational Therapy in the acute care setting  [x]      Home safety education was provided and the patient/caregiver indicated understanding.   []      Patient/family have participated as able and agree with findings and recommendations. []      Patient is unable to participate in plan of care at this time. Thank you for this referral.  Riley Alvarado OTR/L  Time Calculation: 9 mins      Eval Complexity: History: MEDIUM Complexity : Expanded review of history including physical, cognitive and psychosocial  history ; Examination: MEDIUM Complexity : 3-5 performance deficits relating to physical, cognitive , or psychosocial skils that result in activity limitations and / or participation restrictions; Decision Making:MEDIUM Complexity : Patient may present with comorbidities that affect occupational performnce.  Miniml to moderate modification of tasks or assistance (eg, physical or verbal ) with assesment(s) is necessary to enable patient to complete evaluation

## 2021-11-05 NOTE — PROGRESS NOTES
TRANSFER - IN REPORT:    Verbal report received from Elvis Pandey RN(name) on Dutch Maldonado  being received from Dialysis(unit) for routine progression of care      Report consisted of patients Situation, Background, Assessment and   Recommendations(SBAR). Information from the following report(s) SBAR, Kardex and Recent Results was reviewed with the receiving nurse. Opportunity for questions and clarification was provided. Assessment completed upon patients arrival to unit and care assumed.

## 2021-11-05 NOTE — PROGRESS NOTES
3588: Bedside shift change report given to Bill Scott RN (oncoming nurse) by Landen Ortiz RN (offgoing nurse). Report included the following information SBAR, Kardex, Intake/Output, MAR and Cardiac Rhythm NSR. TRANSFER - OUT REPORT:    Verbal report given to [address] [place] [address] RN(name) on Clover Howell  being transferred to (unit) for routine progression of care       Report consisted of patients Situation, Background, Assessment and   Recommendations(SBAR). Information from the following report(s) SBAR, Kardex, Procedure Summary, Intake/Output, MAR, Recent Results and Cardiac Rhythm NSR was reviewed with the receiving nurse. Lines:   Peripheral IV 11/04/21 Right Hand (Active)   Site Assessment Clean, dry, & intact 11/04/21 2217   Phlebitis Assessment 0 11/04/21 2217   Infiltration Assessment 0 11/04/21 2217   Dressing Status Clean, dry, & intact; New 11/04/21 2217   Dressing Type Transparent 11/04/21 2217   Hub Color/Line Status Blue 11/04/21 2217        Opportunity for questions and clarification was provided.       Patient transported with:   [employer]

## 2021-11-06 LAB
CHOLEST SERPL-MCNC: 205 MG/DL
EST. AVERAGE GLUCOSE BLD GHB EST-MCNC: 126 MG/DL
GLUCOSE BLD STRIP.AUTO-MCNC: 132 MG/DL (ref 70–110)
GLUCOSE BLD STRIP.AUTO-MCNC: 167 MG/DL (ref 70–110)
GLUCOSE BLD STRIP.AUTO-MCNC: 197 MG/DL (ref 70–110)
GLUCOSE BLD STRIP.AUTO-MCNC: 257 MG/DL (ref 70–110)
HBA1C MFR BLD: 6 % (ref 4.2–5.6)
HDLC SERPL-MCNC: 71 MG/DL (ref 40–60)
HDLC SERPL: 2.9 {RATIO} (ref 0–5)
LDLC SERPL CALC-MCNC: 80.6 MG/DL (ref 0–100)
LIPID PROFILE,FLP: ABNORMAL
TRIGL SERPL-MCNC: 267 MG/DL (ref ?–150)
VLDLC SERPL CALC-MCNC: 53.4 MG/DL

## 2021-11-06 PROCEDURE — 80061 LIPID PANEL: CPT

## 2021-11-06 PROCEDURE — 74011250636 HC RX REV CODE- 250/636: Performed by: INTERNAL MEDICINE

## 2021-11-06 PROCEDURE — 74011250637 HC RX REV CODE- 250/637: Performed by: NURSE PRACTITIONER

## 2021-11-06 PROCEDURE — 74011636637 HC RX REV CODE- 636/637: Performed by: NURSE PRACTITIONER

## 2021-11-06 PROCEDURE — 36415 COLL VENOUS BLD VENIPUNCTURE: CPT

## 2021-11-06 PROCEDURE — 99233 SBSQ HOSP IP/OBS HIGH 50: CPT | Performed by: INTERNAL MEDICINE

## 2021-11-06 PROCEDURE — 82962 GLUCOSE BLOOD TEST: CPT

## 2021-11-06 PROCEDURE — 74011250637 HC RX REV CODE- 250/637: Performed by: INTERNAL MEDICINE

## 2021-11-06 PROCEDURE — 83036 HEMOGLOBIN GLYCOSYLATED A1C: CPT

## 2021-11-06 PROCEDURE — 2709999900 HC NON-CHARGEABLE SUPPLY

## 2021-11-06 PROCEDURE — 65660000000 HC RM CCU STEPDOWN

## 2021-11-06 PROCEDURE — 74011250637 HC RX REV CODE- 250/637: Performed by: STUDENT IN AN ORGANIZED HEALTH CARE EDUCATION/TRAINING PROGRAM

## 2021-11-06 PROCEDURE — 74011250636 HC RX REV CODE- 250/636: Performed by: NURSE PRACTITIONER

## 2021-11-06 RX ORDER — COLCHICINE 0.6 MG/1
0.6 CAPSULE ORAL DAILY
Status: DISCONTINUED | OUTPATIENT
Start: 2021-11-07 | End: 2021-11-07

## 2021-11-06 RX ORDER — AMLODIPINE BESYLATE 10 MG/1
10 TABLET ORAL DAILY
Status: DISCONTINUED | OUTPATIENT
Start: 2021-11-06 | End: 2021-11-10 | Stop reason: HOSPADM

## 2021-11-06 RX ORDER — LOSARTAN POTASSIUM 50 MG/1
100 TABLET ORAL DAILY
Status: DISCONTINUED | OUTPATIENT
Start: 2021-11-06 | End: 2021-11-10 | Stop reason: HOSPADM

## 2021-11-06 RX ORDER — HALOPERIDOL 5 MG/ML
5 INJECTION INTRAMUSCULAR ONCE
Status: COMPLETED | OUTPATIENT
Start: 2021-11-06 | End: 2021-11-06

## 2021-11-06 RX ORDER — HYDRALAZINE HYDROCHLORIDE 50 MG/1
100 TABLET, FILM COATED ORAL 3 TIMES DAILY
Status: DISCONTINUED | OUTPATIENT
Start: 2021-11-06 | End: 2021-11-10 | Stop reason: HOSPADM

## 2021-11-06 RX ORDER — HALOPERIDOL 5 MG/ML
4 INJECTION INTRAMUSCULAR ONCE
Status: COMPLETED | OUTPATIENT
Start: 2021-11-07 | End: 2021-11-07

## 2021-11-06 RX ORDER — CARVEDILOL 25 MG/1
25 TABLET ORAL 2 TIMES DAILY WITH MEALS
Status: DISCONTINUED | OUTPATIENT
Start: 2021-11-06 | End: 2021-11-10 | Stop reason: HOSPADM

## 2021-11-06 RX ADMIN — INSULIN LISPRO 6 UNITS: 100 INJECTION, SOLUTION INTRAVENOUS; SUBCUTANEOUS at 11:47

## 2021-11-06 RX ADMIN — Medication 10 ML: at 16:40

## 2021-11-06 RX ADMIN — AMLODIPINE BESYLATE 10 MG: 10 TABLET ORAL at 06:40

## 2021-11-06 RX ADMIN — SEVELAMER CARBONATE 800 MG: 800 TABLET, FILM COATED ORAL at 21:49

## 2021-11-06 RX ADMIN — SEVELAMER CARBONATE 800 MG: 800 TABLET, FILM COATED ORAL at 16:39

## 2021-11-06 RX ADMIN — SEVELAMER CARBONATE 800 MG: 800 TABLET, FILM COATED ORAL at 09:18

## 2021-11-06 RX ADMIN — INSULIN LISPRO 2 UNITS: 100 INJECTION, SOLUTION INTRAVENOUS; SUBCUTANEOUS at 16:33

## 2021-11-06 RX ADMIN — ONDANSETRON 4 MG: 2 INJECTION INTRAMUSCULAR; INTRAVENOUS at 21:59

## 2021-11-06 RX ADMIN — Medication 10 ML: at 23:45

## 2021-11-06 RX ADMIN — FAMOTIDINE 20 MG: 20 TABLET, FILM COATED ORAL at 09:18

## 2021-11-06 RX ADMIN — HYDRALAZINE HYDROCHLORIDE 100 MG: 50 TABLET, FILM COATED ORAL at 16:39

## 2021-11-06 RX ADMIN — HALOPERIDOL LACTATE 5 MG: 5 INJECTION, SOLUTION INTRAMUSCULAR at 11:43

## 2021-11-06 RX ADMIN — CARVEDILOL 25 MG: 25 TABLET, FILM COATED ORAL at 18:14

## 2021-11-06 RX ADMIN — QUETIAPINE FUMARATE 50 MG: 25 TABLET ORAL at 21:49

## 2021-11-06 RX ADMIN — ISOSORBIDE MONONITRATE 30 MG: 60 TABLET, EXTENDED RELEASE ORAL at 09:25

## 2021-11-06 RX ADMIN — HEPARIN SODIUM 5000 UNITS: 5000 INJECTION INTRAVENOUS; SUBCUTANEOUS at 05:43

## 2021-11-06 RX ADMIN — CARVEDILOL 25 MG: 25 TABLET, FILM COATED ORAL at 06:40

## 2021-11-06 RX ADMIN — ASPIRIN 81 MG CHEWABLE TABLET 81 MG: 81 TABLET CHEWABLE at 09:18

## 2021-11-06 RX ADMIN — LEVETIRACETAM 500 MG: 500 TABLET, FILM COATED ORAL at 06:23

## 2021-11-06 RX ADMIN — HYDRALAZINE HYDROCHLORIDE 10 MG: 20 INJECTION INTRAMUSCULAR; INTRAVENOUS at 03:39

## 2021-11-06 RX ADMIN — HEPARIN SODIUM 5000 UNITS: 5000 INJECTION INTRAVENOUS; SUBCUTANEOUS at 16:35

## 2021-11-06 RX ADMIN — ATORVASTATIN CALCIUM 10 MG: 10 TABLET, FILM COATED ORAL at 09:18

## 2021-11-06 RX ADMIN — NEPHROCAP 1 CAPSULE: 1 CAP ORAL at 09:18

## 2021-11-06 RX ADMIN — Medication 10 ML: at 05:44

## 2021-11-06 RX ADMIN — ACETAMINOPHEN 650 MG: 325 TABLET ORAL at 00:23

## 2021-11-06 RX ADMIN — HYDRALAZINE HYDROCHLORIDE 100 MG: 50 TABLET, FILM COATED ORAL at 09:17

## 2021-11-06 RX ADMIN — ONDANSETRON 4 MG: 4 TABLET, ORALLY DISINTEGRATING ORAL at 00:23

## 2021-11-06 RX ADMIN — LOSARTAN POTASSIUM 100 MG: 50 TABLET, FILM COATED ORAL at 06:40

## 2021-11-06 RX ADMIN — HYDRALAZINE HYDROCHLORIDE 100 MG: 50 TABLET, FILM COATED ORAL at 21:49

## 2021-11-06 RX ADMIN — INSULIN LISPRO 2 UNITS: 100 INJECTION, SOLUTION INTRAVENOUS; SUBCUTANEOUS at 23:43

## 2021-11-06 RX ADMIN — HEPARIN SODIUM 5000 UNITS: 5000 INJECTION INTRAVENOUS; SUBCUTANEOUS at 23:43

## 2021-11-06 NOTE — ROUTINE PROCESS
Bedside and Verbal shift change report given to Rodriguez Morrow (oncoming nurse) by Pineda Bee (offgoing nurse). Report included the following information SBAR and Kardex.

## 2021-11-06 NOTE — PROGRESS NOTES
Problem: Dysphagia (Adult)  Goal: *Acute Goals and Plan of Care (Insert Text)  Description:     Patient will:  1. Tolerate PO trials with 0 s/s overt distress in 4/5 trials - met    Rec:     Soft/bite sized with thin liquids  Aspiration precautions  HOB >45 during po intake, remain >30 for 30-45 minutes after po   Small bites/sips; alternate liquid/solid with slow feeding rate   Oral care TID  Meds per pt preference    Outcome: Resolved/Met    SPEECH LANGUAGE PATHOLOGY BEDSIDE SWALLOW EVALUATION/DISCHARGE    Patient: Joann Griffith (58 y.o. female)  Date: 11/5/2021  Primary Diagnosis: Elevated troponin [R77.8]        Precautions: aspiration      PLOF: As per H&P    ASSESSMENT :  Based on the objective data described below, the patient presents with mild oral dysphagia. Pt alert and reported that she consumes softer foods at baseline. She tolerated reg solid, puree, and thin liquids without any overt s/sx of aspiration. Mastication was labored with positive oral clearance. Pt tolerated serial swallows of ~8 oz thin liquids via straw without any difficulty. Laryngeal elevation was functional/timely to palpation. Recommend soft/bite sized diet with thin liquids, aspiration precautions, oral care TID, and meds as tolerated. No further skilled SLP services are indicated at this time as pt appears to be tolerating LRD and is at baseline. Please re-consult if needed. PLAN :  Recommendations and Planned Interventions: See above  Frequency/Duration: x eval only  Discharge Recommendations: [place] and To Be Determined     SUBJECTIVE:   Patient stated I eat softer stuff usually.     OBJECTIVE:     Past Medical History:   Diagnosis Date    Asthma     Bipolar affective disorder ([facility] [address]     Brain condition     Cataract     Chronic kidney disease     hemodialysis M-W-F    Diabetes ([facility] [address]     Hyperlipidemia     Hypertension     Paralytic strabismus, unspecified     Psychiatric disorder     Seizures ([facility] [address] 08/27/2018     Past Surgical History:   Procedure Laterality Date    NM INSJ TUNNELED CVC W/O SUBQ PORT/ AGE 3 YR/> N/A 8/9/2018     in-pt 474A, Insertion Tunneled Central Venous Catheter performed by Bola Lewis MD at [address] [place] [address]    NM INSJ TUNNELED CVC W/O SUBQ PORT/ AGE 3 YR/> N/A 11/8/2019    INSERTION TUNNELED CENTRAL VENOUS CATHETER performed by Leopold Mori, MD at [facility] LAB     Prior Level of Function/Home Situation: see below  Home Situation  Support Systems: [address] prior to admission: soft/bite sized with thin  Current Diet:  soft/bite sized with thin    Cognitive and Communication Status:  Neurologic State: Alert  Orientation Level: Oriented to person  Cognition: Follows commands  Perseveration: Perseverates during conversation  Safety/Judgement: Decreased awareness of environment  Oral Assessment:  Oral Assessment  Labial: No impairment  Dentition: Natural; Limited  Oral Hygiene: adequate  Lingual: Decreased strength  Velum: No impairment  Mandible: No impairment  P.O. Trials:  Patient Position: [facility]  Vocal quality prior to P.O.: No impairment  Consistency Presented: Thin liquid;  Solid; Puree  How Presented: SLP-fed/presented; Self-fed/presented; Cup/sip; Spoon; Straw; Successive swallows  Bolus Acceptance: No impairment  Bolus Formation/Control: Impaired  Type of Impairment: Mastication; Delayed  Propulsion: Delayed (# of seconds)  Oral Residue: None  Initiation of Swallow: No impairment  Laryngeal Elevation: Functional  Aspiration Signs/Symptoms: None  Pharyngeal Phase Characteristics: No impairment, issues, or problems   Effective Modifications: None  Cues for Modifications: None     Oral Phase Severity: Mild  Pharyngeal Phase Severity : No impairment    PAIN:  Start of Eval: 0  End of Eval: 0    After treatment:   []            Patient left in no apparent distress sitting up in chair  []            Patient left in no apparent distress in bed  [x]            Call bell left within reach  [x]            Nursing notified  [x]            Family present  []            Caregiver present  []            Bed alarm activated    COMMUNICATION/EDUCATION:   [x]            Aspiration precautions; swallow safety; compensatory techniques. []            Patient/family have participated as able in goal setting and plan of care. []            Patient/family agree to work toward stated goals and plan of care. [x]            Patient understands intent and goals of therapy; neutral about participation. []            Patient unable to participate in goal setting/plan of care; educ ongoing with interdisciplinary staff  [x]         Posted safety precautions in patient's room.     Thank you for this referral.    Leeann Beasley M.S. CCC-SLP/L  Speech-Language Pathologist

## 2021-11-06 NOTE — PROGRESS NOTES
Pt not seen for skilled PT due to:    []  Nausea/vomiting  []  Eating  []  Pain  []  Pt lethargic   []  Off Unit ([phone])   Other: Pt sleeping soundly in bed, does not arouse to participate, per nurse, received recent dose of Haldol. Will follow-up. Will f/u later as schedule allows. Thank you.   Ketan Kaur, PT, DPT

## 2021-11-06 NOTE — PROGRESS NOTES
Patient found on ground. Placed back in bed. No signs of injury noted. Put back in bed, MD notified.

## 2021-11-06 NOTE — PROGRESS NOTES
Discharge planning    Per Dr. Isidro Cooper, patient is not stable for discharge due to elevated BP. Transportation cancelled with [facility] and spoke with [place]. Notified Jose Newman with [facility] concerning patient not returning today to [address].     Elina Malave, BSN, RN  Pager # [phone]  Care Manager

## 2021-11-06 NOTE — PROGRESS NOTES
Hospitalist Progress Note    Patient: Laquita Esparza Age: 74 y.o. : 1955 MR#: [id number] SSN: [id number]  Date/Time: 2021 10:45 AM    DOA: 11/3/2021  PCP: Clayton Nickerson MD    Subjective:     She sustained a fall, unwitnessed she does not have pain in her arms or legs or hip   She expressed pain in her chest which was the reason for her initial admission  Blood pressure remain elevated, systolic BP greater than 201  Nursing report the patient screamed no night, continued to be agitated  No further report of bleeding from her mouth  Neurology evaluate her yesterday and suspect her underlying pathology was associated with her uncontrolled hypertension. She tolerate hemodialysis yesterday. Interval Hospital Course:        ROS: Limited but able to answer no pain in her head, no pain in her neck, no pain in her stomach, +pain in the left chest reproducible, no shortness of breath, no hip pain, no leg pain      Assessment/Plan:   1. Acute confusion, agitation associated with hypertensive emergency       Hypertensive encephalopathy  2. Chest pain, recurrent, indeterminant troponin level elevation  3. Persistent moderate pericardial effusion previously on echocardiogram associated with volume overload in the setting of end-stage renal disease  4. Acute on chronic HFpEF   5. End-stage renal disease on dialysis  6. Bipolar disorder  7. Seizure disorder stable on Keppra doses  8. Normocytic anemia with CKD  9.   Fall, mechanical, without  Injury    Spoke with nursing for fall prevention and fall precaution  We will continue with aggressive BP control  Apparently she did not have her clonidine patch on  If she continued to have persistent chest pain, will consider colchicine follow-up pericardial effusion plus chest pain pathology  Give her Haldol as needed, Seroquel can be continued at bedtime  Appreciate cardiology consult  Appreciate nephrology consult  We will have her with PT OT if tolerated  Seizure precaution  Nutritional support    Full code.   Palliative care consult on Monday for goals of care with family  Disposition: Pending clinical status    Additional Notes:  Time spent> 35 minutes    Case discussed with:  [x]Patient  []Family  []Nursing  []Case Management  DVT Prophylaxis:  []Lovenox  [x]Hep SQ  []SCDs  []Coumadin   []On Heparin gtt    Signed By: Vernell Wade MD     2021 10:45 AM              Objective:   VS:   Visit Vitals  /65   Pulse 84   Temp 98.4 °F (36.9 °C)   Resp 18   Ht 5' 3\" (1.6 m)   Wt 55 kg (121 lb 4.1 oz)   LMP  (LMP Unknown)   SpO2 97%   BMI 21.48 kg/m²      Tmax/24hrs: Temp (24hrs), Av.4 °F (36.9 °C), Min:98 °F (36.7 °C), Max:98.6 °F (37 °C)      Intake/Output Summary (Last 24 hours) at 2021 1045  Last data filed at 2021 1848  Gross per 24 hour   Intake --   Output 2000 ml   Net -2000 ml       Tele:   General:  agitated, In acute distress,  HEENT: PERRL, EOMI, supple neck, no JVD, dry oral mucosa  Cardiovascular: S1S2 regular, no rub/gallop   Pulmonary: air entry bilaterally, no wheezing, + crackle  GI:  Soft, non tender, non distended, +bs, no guarding   Extremities:  No pedal edema, +distal pulses appreciated   Neuro: AOx2, moving all extremities    Additional:       Current Facility-Administered Medications   Medication Dose Route Frequency    amLODIPine (NORVASC) tablet 10 mg  10 mg Oral DAILY    carvediloL (COREG) tablet 25 mg  25 mg Oral BID WITH MEALS    losartan (COZAAR) tablet 100 mg  100 mg Oral DAILY    hydrALAZINE (APRESOLINE) tablet 100 mg  100 mg Oral TID    levETIRAcetam (KEPPRA) tablet 500 mg  500 mg Oral Q24H    levETIRAcetam (KEPPRA) tablet 250 mg  250 mg Oral Q MON, WED & FRI    heparin (porcine) 1,000 unit/mL injection 5,000 Units  5,000 Units IntraCATHeter DIALYSIS PRN    hydrALAZINE (APRESOLINE) 20 mg/mL injection 10 mg  10 mg IntraVENous Q6H PRN    insulin lispro (HUMALOG) injection   SubCUTAneous AC&HS  glucose chewable tablet 16 g  4 Tablet Oral PRN    glucagon (GLUCAGEN) injection 1 mg  1 mg IntraMUSCular PRN    dextrose (D50W) injection syrg 12.5-25 g  25-50 mL IntraVENous PRN    sodium chloride (NS) flush 5-40 mL  5-40 mL IntraVENous Q8H    sodium chloride (NS) flush 5-40 mL  5-40 mL IntraVENous PRN    acetaminophen (TYLENOL) tablet 650 mg  650 mg Oral Q6H PRN    Or    acetaminophen (TYLENOL) suppository 650 mg  650 mg Rectal Q6H PRN    polyethylene glycol (MIRALAX) packet 17 g  17 g Oral DAILY PRN    ondansetron (ZOFRAN ODT) tablet 4 mg  4 mg Oral Q8H PRN    Or    ondansetron (ZOFRAN) injection 4 mg  4 mg IntraVENous Q6H PRN    heparin (porcine) injection 5,000 Units  5,000 Units SubCUTAneous Q8H    aspirin chewable tablet 81 mg  81 mg Oral DAILY    sevelamer carbonate (RENVELA) tab 800 mg  800 mg Oral TID    atorvastatin (LIPITOR) tablet 10 mg  10 mg Oral DAILY    famotidine (PEPCID) tablet 20 mg  20 mg Oral DAILY    cloNIDine (CATAPRES) 0.3 mg/24 hr patch 1 Patch  1 Patch TransDERmal Q7D    isosorbide mononitrate ER (IMDUR) tablet 30 mg  30 mg Oral DAILY    B complex-vitaminC-folic acid (NEPHROCAP) cap  1 Capsule Oral DAILY    QUEtiapine (SEROquel) tablet 50 mg  50 mg Oral QHS            Lab/Data Review:  Labs: Results:       Chemistry Recent Labs     11/05/21 0528 11/04/21 0840 11/03/21  1215   GLU 93 187* 164*    139 141   K 4.2 4.6 4.0    104 106   CO2 26 20* 27   BUN 26* 58* 46*   CREA 6.75* 11.20* 9.39*   BUCR 4* 5* 5*   AGAP 11 15 8   CA 9.5 9.7 9.6   PHOS 4.1  --   --      No results for input(s): TBIL, ALT, ALKP, TP, ALB, GLOB, AGRAT in the last 72 hours.     No lab exists for component: SGOT   CBC w/Diff Recent Labs     11/05/21 0528 11/04/21 0840 11/04/21 0840 11/03/21  1215 11/03/21  1215   WBC 8.7  --  11.0  --  8.7   RBC 3.65*  --  3.47*  --  3.15*   HGB 10.3*  --  10.1*  --  8.9*   HCT 33.0*  --  32.2*  --  28.7*   MCV 90.4   < > 92.8   < > 91.1   MCH 28.2 < > 29.1   < > 28.3   MCHC 31.2   < > 31.4   < > 31.0   RDW 16.4*   < > 16.4*   < > 16.0*     --  312  --  285   GRANS 79*  --  85*  --  79*   LYMPH 9*  --  6*  --  9*   EOS 4  --  2  --  1    < > = values in this interval not displayed. Coagulation No results for input(s): PTP, INR, APTT, INREXT in the last 72 hours. Iron/Ferritin Lab Results   Component Value Date/Time    Iron 46 (L) 03/20/2021 02:02 PM    TIBC 173 (L) 03/20/2021 02:02 PM    Iron % saturation 27 03/20/2021 02:02 PM    Ferritin 1,396 (H) 03/20/2021 02:02 PM       BNP    Cardiac Enzymes Lab Results   Component Value Date/Time     (H) 11/04/2021 08:40 AM    CK - MB 3.8 (H) 11/04/2021 08:40 AM    CK-MB Index 0.9 11/04/2021 08:40 AM    Troponin-I, QT 0.06 (H) 11/04/2021 08:40 AM        Lactic Acid    Thyroid Studies          All Micro Results     Procedure Component Value Units Date/Time    COVID-19 RAPID TEST [[id number]] Collected: 11/03/21 2132    Order Status: Completed Specimen: Nasopharyngeal Updated: 11/03/21 2205     Specimen source Nasopharyngeal        COVID-19 rapid test Not detected        Comment: Rapid Abbott ID Now       Rapid NAAT:  The specimen is NEGATIVE for SARS-CoV-2, the novel coronavirus associated with COVID-19. Negative results should be treated as presumptive and, if inconsistent with clinical signs and symptoms or necessary for patient management, should be tested with an alternative molecular assay. Negative results do not preclude SARS-CoV-2 infection and should not be used as the sole basis for patient management decisions. This test has been authorized by the FDA under an Emergency Use Authorization (EUA) for use by authorized laboratories. Fact sheet for Healthcare Providers: ConventionUpdate.co.nz  Fact sheet for Patients: ConventionUpdate.co.nz       Methodology: Isothermal Nucleic Acid Amplification                 Images:    CT (Most Recent). CT Results (most recent):  Results from Hospital Encounter encounter on 11/03/21    CT HEAD WO CONT    Narrative  CT HEAD WO CONT    History: Seizure, altered, fatigue. Comparison: 3/17/2021    TECHNIQUE: 5 mm helical scan obtained of the head were obtained from the skull  vertex through the base of the skull without intravenous contrast.    All CT scans at this facility are performed using dose optimization technique as  appropriate to a performed exam, to include automated exposure control,  adjustment of the mA and/or kV according to patient size (including appropriate  matching first site-specific examinations), or use of iterative reconstruction  technique. BRAIN RESULT:    Similar mild generalized volume loss. Similar left frontal encephalomalacia. There is a right basal ganglia small amount which appears new since 3/17/2021  but ages otherwise indeterminate. No midline shift. No acute intracranial  hemorrhage. Basilar cisterns are patent. Orbits, soft tissues are grossly normal. Hyperostosis. Limited visualized  paranasal sinuses are patent. Large left and trace right mastoid effusions. Impression  New since 3/17/2021 subcentimeter right basal ganglia infarct but age is  otherwise indeterminate. If clarification is desired, may obtain MR. New large left and trace right mastoid effusions. Otherwise no significant interval change. XRAY (Most Recent)      EKG No results found for this or any previous visit. 2D ECHO 11/03/21    ECHO ADULT FOLLOW-UP OR LIMITED 11/04/2021 11/4/2021    Interpretation Summary  · LV: Estimated LVEF is 60 - 65%. Normal cavity size. Severe concentric hypertrophy. Hyperdynamic systolic function. · Pericardium: Moderate circumferential pericardial effusion measuring 1.3 mm. · No indications of tamponade present. Respiratory variation of mitral valve inflow is < 30%.     Signed by: Prosper Matt MD on 11/4/2021  2:14 PM

## 2021-11-06 NOTE — PROGRESS NOTES
conducted an initial consultation and Spiritual Assessment for Nav Garcia, who is a 74 y.o.,female. Patients Primary Language is: [place]. According to the patients EMR Christianity Affiliation is: Restorationism.     The reason the Patient came to the hospital is:   Patient Active Problem List    Diagnosis Date Noted    Elevated troponin 11/03/2021    Acute pericardial effusion 10/21/2021    [facility] 07/20/2021    Prolonged Q-T interval on ECG 06/12/2021    Hypertensive urgency 06/12/2021    Chest pain 06/11/2021    Hyperkalemia 06/02/2021    Elevated troponin level 06/02/2021    Elevated brain natriuretic peptide (BNP) level 06/02/2021    Anemia 03/19/2021    Hypertensive emergency 09/25/2020    Tachycardia 09/25/2020    Bandemia 08/27/2018    New onset seizure ([facility] 08/27/2018    ESRD (end stage renal disease) on dialysis ([facility] 08/08/2018    Secondary hyperparathyroidism of renal origin ([facility] 08/08/2018    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 ([facility] 08/08/2018    CVA, old, cognitive deficits 08/08/2018    Acute on chronic renal insufficiency 08/07/2018    Hypertension 08/07/2018        The  provided the following Interventions:  Initiated a relationship of care and support. Explored issues of iveth, belief, spirituality and Taoist/ritual needs while hospitalized. Listened empathically. Provided chaplaincy education. Provided information about Spiritual Care Services. Offered prayer and assurance of continued prayers on patient's behalf. Chart reviewed. The following outcomes where achieved:  Patient shared limited information about both her medical narrative and spiritual journey/beliefs.  confirmed Patient's Christianity Affiliation. Patient processed feeling about current hospitalization. Patient expressed gratitude for 's visit.     Assessment:  Patient asked to be prayed over and seemed to be calm while curled up under the sheets. Patient does not have any Taoism/cultural needs that will affect patients preferences in health care. There are no spiritual or Taoism issues which require intervention at this time. Plan:  Chaplains will continue to follow and will provide pastoral care on an as needed/requested basis.  recommends bedside caregivers page  on duty if patient shows signs of acute spiritual or emotional distress.     Victor Hugo Cornelius 5   [phone]

## 2021-11-06 NOTE — PROGRESS NOTES
Progress Note    Dilcia Solano  74 y.o. Admit Date: 11/3/2021  Active Problems:    ESRD (end stage renal disease) on dialysis ([facility] (8/8/2018) POA: Unknown      Secondary [facility]par[facility] (8/8/2018) POA: Yes      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 ([facility] (8/8/2018) POA: Yes      CVA, old, cognitive deficits (8/8/2018) POA: Yes      New onset seizure ([facility] (8/27/2018) POA: Yes      Hypertensive urgency (6/12/2021) POA: Yes      Elevated troponin (11/3/2021) POA: Unknown            Subjective:     Patient feels good and comfortable today. Sleepy easily arousable says she is doing fine today. Dialyzed yesterday remove 2 L of fluid. She was really feisty with the dialysis nurse      A comprehensive review of systems was negative except for that written in the History of Present Illness.     Objective:     Visit Vitals  BP (!) 146/69 (BP 1 Location: Right upper arm, BP Patient Position: At rest)   Pulse 84   Temp 97.8 °F (36.6 °C)   Resp 16   Ht 5' 3\" (1.6 m)   Wt 55 kg (121 lb 4.1 oz)   LMP  (LMP Unknown)   SpO2 99%   BMI 21.48 kg/m²         Intake/Output Summary (Last 24 hours) at 11/6/2021 1412  Last data filed at 11/5/2021 1848  Gross per 24 hour   Intake --   Output 2000 ml   Net -2000 ml       Current Facility-Administered Medications   Medication Dose Route Frequency Provider Last Rate Last Admin    amLODIPine (NORVASC) tablet 10 mg  10 mg Oral DAILY Deisy Cloud DO   10 mg at 11/06/21 0640    carvediloL (COREG) tablet 25 mg  25 mg Oral BID WITH MEALS Deisy Cloud DO   25 mg at 11/06/21 0640    losartan (COZAAR) tablet 100 mg  100 mg Oral DAILY Deisy Cloud DO   100 mg at 11/06/21 0640    hydrALAZINE (APRESOLINE) tablet 100 mg  100 mg Oral TID Lubna Abdi MD   100 mg at 11/06/21 0917    levETIRAcetam (KEPPRA) tablet 500 mg  500 mg Oral Q24H Deisy Cloud DO   500 mg at 11/06/21 2971    levETIRAcetam (KEPPRA) tablet 250 mg  250 mg Oral Q MON, WED & Deisy Tsang, DO   250 mg at 11/05/21 2124    heparin (porcine) 1,000 unit/mL injection 5,000 Units  5,000 Units IntraCATHeter DIALYSIS PRN Aaliyah Morales MD   3,200 Units at 11/05/21 1848    hydrALAZINE (APRESOLINE) 20 mg/mL injection 10 mg  10 mg IntraVENous Q6H PRN Abel Moses B, NP   10 mg at 11/06/21 6087    insulin lispro (HUMALOG) injection   SubCUTAneous AC&HS Radhaon Servando, NP   6 Units at 11/06/21 1147    glucose chewable tablet 16 g  4 Tablet Oral PRN Tess Al, NP        glucagon (GLUCAGEN) injection 1 mg  1 mg IntraMUSCular PRN Radhaon Servando, NP        dextrose (D50W) injection syrg 12.5-25 g  25-50 mL IntraVENous PRN Suzon Servando, NP        sodium chloride (NS) flush 5-40 mL  5-40 mL IntraVENous Q8H Abel Moses B, NP   10 mL at 11/06/21 0544    sodium chloride (NS) flush 5-40 mL  5-40 mL IntraVENous PRN Abel Moses B, NP   10 mL at 11/04/21 2140    acetaminophen (TYLENOL) tablet 650 mg  650 mg Oral Q6H PRN Abel Moses B, NP   650 mg at 11/06/21 0023    Or    acetaminophen (TYLENOL) suppository 650 mg  650 mg Rectal Q6H PRN Suzon Servando, NP        polyethylene glycol (MIRALAX) packet 17 g  17 g Oral DAILY PRN Suzon Servando, NP        ondansetron (ZOFRAN ODT) tablet 4 mg  4 mg Oral Q8H PRN Abel Moses B, NP   4 mg at 11/06/21 0023    Or    ondansetron (ZOFRAN) injection 4 mg  4 mg IntraVENous Q6H PRN Abel Moses B, NP   4 mg at 11/05/21 1555    heparin (porcine) injection 5,000 Units  5,000 Units SubCUTAneous Q8H Abel Moses B, NP   5,000 Units at 11/06/21 0543    aspirin chewable tablet 81 mg  81 mg Oral DAILY Abel Moses B, NP   81 mg at 11/06/21 0918    sevelamer carbonate (RENVELA) tab 800 mg  800 mg Oral TID Abel Moses B, NP   800 mg at 11/06/21 9837    atorvastatin (LIPITOR) tablet 10 mg  10 mg Oral DAILY Abel Moses B, NP   10 mg at 11/06/21 0918    famotidine (PEPCID) tablet 20 mg  20 mg Oral DAILY Cresenciano Clause B, NP   20 mg at 11/06/21 0796    cloNIDine (CATAPRES) 0.3 mg/24 hr patch 1 Patch  1 Patch TransDERmal Q7D Melany Hendrickson, NP   1 Patch at 11/06/21 1217    isosorbide mononitrate ER (IMDUR) tablet 30 mg  30 mg Oral DAILY Cresenciano Clause B, NP   30 mg at 11/06/21 8956    B complex-vitaminC-folic acid (NEPHROCAP) cap  1 Capsule Oral DAILY Cresenciano Clause B, NP   1 Capsule at 11/06/21 6942    QUEtiapine (SEROquel) tablet 50 mg  50 mg Oral QHS Cresenciano Clause B, NP   50 mg at 11/05/21 2124        Physical Exam:     Physical Exam:   General:  Alert, cooperative, no distress, appears stated age. Lungs:   Clear to auscultation bilaterally. Heart:  Regular rate and rhythm, S1, S2 normal, no murmur, click, rub or gallop. Abdomen:   Soft, non-tender. Bowel sounds normal. No masses,  No organomegaly. Extremities: Extremities normal, atraumatic, no cyanosis or edema,HD catheter is well dressed   Skin: Skin color, texture, turgor normal. No rashes or lesions         Data Review:    CBC w/Diff    Recent Labs     11/05/21 0528 11/04/21 0840 11/04/21  0840   WBC 8.7  --  11.0   RBC 3.65*  --  3.47*   HGB 10.3*  --  10.1*   HCT 33.0*  --  32.2*   MCV 90.4   < > 92.8   MCH 28.2   < > 29.1   MCHC 31.2   < > 31.4   RDW 16.4*   < > 16.4*    < > = values in this interval not displayed. Recent Labs     11/05/21 0528 11/04/21  0840 11/04/21  0840   MONOS 7  --  6   EOS 4  --  2   BASOS 0   < > 0   RDW 16.4*   < > 16.4*    < > = values in this interval not displayed.         Comprehensive Metabolic Profile    Recent Labs     11/05/21 0528 11/04/21  0840    139   K 4.2 4.6    104   CO2 26 20*   BUN 26* 58*   CREA 6.75* 11.20*    Recent Labs     11/05/21  0528 11/04/21  0840   CA 9.5 9.7   PHOS 4.1  --                         Impression:       Active Hospital Problems    Diagnosis Date Noted    Elevated troponin 11/03/2021    Hypertensive urgency 06/12/2021    [facility]27/2018    CVA, old, cognitive deficits 08/08/2018    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 ([facility] 08/08/2018    Secondary hyperparathyroidism of renal origin ([facility] 08/08/2018    ESRD (end stage renal disease) on dialysis ([facility] 08/08/2018      Clinically he has improved significantly. Back to her baseline mental status. Her blood pressure is fluctuating last blood pressure is quite good. Plan:     If the blood pressure remains reasonably controlled patient can be transferred back to her nursing home. If she is not transferred to nursing home in the weekend then we have to dialyze her on Monday morning as her scheduled dialysis treatment on Monday Wednesday Friday.       Aye Suazo MD

## 2021-11-06 NOTE — ROUTINE PROCESS
Pt has continued to scream all night just like in dialysis. Garlon Hawking constantly, screams \"girlfriend\" and \"nurse\" constantly. BP remains elevated, in the systolic 757F. Medicated with Hydralazine.   Dr Orestes Mccallmu notified, medications given po as ordered

## 2021-11-07 ENCOUNTER — APPOINTMENT (OUTPATIENT)
Dept: GENERAL RADIOLOGY | Age: 66
DRG: 314 | End: 2021-11-07
Attending: INTERNAL MEDICINE
Payer: MEDICARE

## 2021-11-07 LAB
ANION GAP SERPL CALC-SCNC: 8 MMOL/L (ref 3–18)
ATRIAL RATE: 84 BPM
BUN SERPL-MCNC: 35 MG/DL (ref 7–18)
BUN/CREAT SERPL: 4 (ref 12–20)
CALCIUM SERPL-MCNC: 9.7 MG/DL (ref 8.5–10.1)
CALCULATED P AXIS, ECG09: 63 DEGREES
CALCULATED R AXIS, ECG10: -51 DEGREES
CALCULATED T AXIS, ECG11: 75 DEGREES
CHLORIDE SERPL-SCNC: 97 MMOL/L (ref 100–111)
CO2 SERPL-SCNC: 27 MMOL/L (ref 21–32)
CREAT SERPL-MCNC: 8.24 MG/DL (ref 0.6–1.3)
CRP SERPL-MCNC: 7.3 MG/DL (ref 0–0.3)
DIAGNOSIS, 93000: NORMAL
ERYTHROCYTE [DISTWIDTH] IN BLOOD BY AUTOMATED COUNT: 16.6 % (ref 11.6–14.5)
ERYTHROCYTE [SEDIMENTATION RATE] IN BLOOD: 63 MM/HR (ref 0–30)
GLUCOSE BLD STRIP.AUTO-MCNC: 174 MG/DL (ref 70–110)
GLUCOSE BLD STRIP.AUTO-MCNC: 216 MG/DL (ref 70–110)
GLUCOSE BLD STRIP.AUTO-MCNC: 230 MG/DL (ref 70–110)
GLUCOSE BLD STRIP.AUTO-MCNC: 289 MG/DL (ref 70–110)
GLUCOSE SERPL-MCNC: 189 MG/DL (ref 74–99)
HCT VFR BLD AUTO: 28 % (ref 35–45)
HGB BLD-MCNC: 9 G/DL (ref 12–16)
MCH RBC QN AUTO: 28.5 PG (ref 24–34)
MCHC RBC AUTO-ENTMCNC: 32.1 G/DL (ref 31–37)
MCV RBC AUTO: 88.6 FL (ref 78–100)
P-R INTERVAL, ECG05: 120 MS
PLATELET # BLD AUTO: 264 K/UL (ref 135–420)
PMV BLD AUTO: 10.2 FL (ref 9.2–11.8)
POTASSIUM SERPL-SCNC: 4 MMOL/L (ref 3.5–5.5)
Q-T INTERVAL, ECG07: 394 MS
QRS DURATION, ECG06: 78 MS
QTC CALCULATION (BEZET), ECG08: 465 MS
RBC # BLD AUTO: 3.16 M/UL (ref 4.2–5.3)
SODIUM SERPL-SCNC: 132 MMOL/L (ref 136–145)
VENTRICULAR RATE, ECG03: 84 BPM
WBC # BLD AUTO: 11.5 K/UL (ref 4.6–13.2)

## 2021-11-07 PROCEDURE — 74011636637 HC RX REV CODE- 636/637: Performed by: NURSE PRACTITIONER

## 2021-11-07 PROCEDURE — 93005 ELECTROCARDIOGRAM TRACING: CPT

## 2021-11-07 PROCEDURE — 36415 COLL VENOUS BLD VENIPUNCTURE: CPT

## 2021-11-07 PROCEDURE — 82962 GLUCOSE BLOOD TEST: CPT

## 2021-11-07 PROCEDURE — 74011250637 HC RX REV CODE- 250/637: Performed by: STUDENT IN AN ORGANIZED HEALTH CARE EDUCATION/TRAINING PROGRAM

## 2021-11-07 PROCEDURE — 2709999900 HC NON-CHARGEABLE SUPPLY

## 2021-11-07 PROCEDURE — 74011250636 HC RX REV CODE- 250/636: Performed by: STUDENT IN AN ORGANIZED HEALTH CARE EDUCATION/TRAINING PROGRAM

## 2021-11-07 PROCEDURE — 74011250637 HC RX REV CODE- 250/637: Performed by: INTERNAL MEDICINE

## 2021-11-07 PROCEDURE — 74011250636 HC RX REV CODE- 250/636: Performed by: NURSE PRACTITIONER

## 2021-11-07 PROCEDURE — 85652 RBC SED RATE AUTOMATED: CPT

## 2021-11-07 PROCEDURE — 74011250637 HC RX REV CODE- 250/637: Performed by: NURSE PRACTITIONER

## 2021-11-07 PROCEDURE — 85027 COMPLETE CBC AUTOMATED: CPT

## 2021-11-07 PROCEDURE — 65660000000 HC RM CCU STEPDOWN

## 2021-11-07 PROCEDURE — 86140 C-REACTIVE PROTEIN: CPT

## 2021-11-07 PROCEDURE — 97161 PT EVAL LOW COMPLEX 20 MIN: CPT

## 2021-11-07 PROCEDURE — 73560 X-RAY EXAM OF KNEE 1 OR 2: CPT

## 2021-11-07 PROCEDURE — 72170 X-RAY EXAM OF PELVIS: CPT

## 2021-11-07 PROCEDURE — 80048 BASIC METABOLIC PNL TOTAL CA: CPT

## 2021-11-07 PROCEDURE — 99232 SBSQ HOSP IP/OBS MODERATE 35: CPT | Performed by: INTERNAL MEDICINE

## 2021-11-07 PROCEDURE — 74011636637 HC RX REV CODE- 636/637: Performed by: INTERNAL MEDICINE

## 2021-11-07 RX ORDER — DIPHENHYDRAMINE HCL 25 MG
25 CAPSULE ORAL
Status: COMPLETED | OUTPATIENT
Start: 2021-11-07 | End: 2021-11-07

## 2021-11-07 RX ORDER — COLCHICINE 0.6 MG/1
0.6 CAPSULE ORAL
Status: DISCONTINUED | OUTPATIENT
Start: 2021-11-08 | End: 2021-11-10 | Stop reason: HOSPADM

## 2021-11-07 RX ORDER — ISOSORBIDE MONONITRATE 60 MG/1
60 TABLET, EXTENDED RELEASE ORAL DAILY
Status: DISCONTINUED | OUTPATIENT
Start: 2021-11-08 | End: 2021-11-10 | Stop reason: HOSPADM

## 2021-11-07 RX ORDER — ZIPRASIDONE HYDROCHLORIDE 20 MG/1
20 CAPSULE ORAL 2 TIMES DAILY WITH MEALS
Status: DISCONTINUED | OUTPATIENT
Start: 2021-11-07 | End: 2021-11-10 | Stop reason: HOSPADM

## 2021-11-07 RX ORDER — ISOSORBIDE MONONITRATE 60 MG/1
30 TABLET, EXTENDED RELEASE ORAL ONCE
Status: COMPLETED | OUTPATIENT
Start: 2021-11-07 | End: 2021-11-07

## 2021-11-07 RX ORDER — FAMOTIDINE 20 MG/1
20 TABLET, FILM COATED ORAL DAILY
Status: DISCONTINUED | OUTPATIENT
Start: 2021-11-07 | End: 2021-11-10 | Stop reason: HOSPADM

## 2021-11-07 RX ADMIN — LEVETIRACETAM 500 MG: 500 TABLET, FILM COATED ORAL at 07:51

## 2021-11-07 RX ADMIN — CARVEDILOL 25 MG: 25 TABLET, FILM COATED ORAL at 16:37

## 2021-11-07 RX ADMIN — INSULIN LISPRO 4 UNITS: 100 INJECTION, SOLUTION INTRAVENOUS; SUBCUTANEOUS at 16:31

## 2021-11-07 RX ADMIN — FAMOTIDINE 20 MG: 20 TABLET, FILM COATED ORAL at 08:35

## 2021-11-07 RX ADMIN — HEPARIN SODIUM 5000 UNITS: 5000 INJECTION INTRAVENOUS; SUBCUTANEOUS at 14:34

## 2021-11-07 RX ADMIN — HEPARIN SODIUM 5000 UNITS: 5000 INJECTION INTRAVENOUS; SUBCUTANEOUS at 22:41

## 2021-11-07 RX ADMIN — ZIPRASIDONE HYDROCHLORIDE 20 MG: 20 CAPSULE ORAL at 16:37

## 2021-11-07 RX ADMIN — ASPIRIN 81 MG CHEWABLE TABLET 81 MG: 81 TABLET CHEWABLE at 08:34

## 2021-11-07 RX ADMIN — SEVELAMER CARBONATE 800 MG: 800 TABLET, FILM COATED ORAL at 08:34

## 2021-11-07 RX ADMIN — LOSARTAN POTASSIUM 100 MG: 50 TABLET, FILM COATED ORAL at 08:31

## 2021-11-07 RX ADMIN — SEVELAMER CARBONATE 800 MG: 800 TABLET, FILM COATED ORAL at 22:40

## 2021-11-07 RX ADMIN — HYDRALAZINE HYDROCHLORIDE 100 MG: 50 TABLET, FILM COATED ORAL at 16:27

## 2021-11-07 RX ADMIN — HEPARIN SODIUM 5000 UNITS: 5000 INJECTION INTRAVENOUS; SUBCUTANEOUS at 06:12

## 2021-11-07 RX ADMIN — ISOSORBIDE MONONITRATE 30 MG: 60 TABLET, EXTENDED RELEASE ORAL at 08:31

## 2021-11-07 RX ADMIN — SEVELAMER CARBONATE 800 MG: 800 TABLET, FILM COATED ORAL at 16:32

## 2021-11-07 RX ADMIN — HYDRALAZINE HYDROCHLORIDE 10 MG: 20 INJECTION INTRAMUSCULAR; INTRAVENOUS at 03:09

## 2021-11-07 RX ADMIN — NEPHROCAP 1 CAPSULE: 1 CAP ORAL at 08:35

## 2021-11-07 RX ADMIN — INSULIN LISPRO 2 UNITS: 100 INJECTION, SOLUTION INTRAVENOUS; SUBCUTANEOUS at 14:29

## 2021-11-07 RX ADMIN — Medication 10 ML: at 22:41

## 2021-11-07 RX ADMIN — ATORVASTATIN CALCIUM 10 MG: 10 TABLET, FILM COATED ORAL at 08:35

## 2021-11-07 RX ADMIN — CARVEDILOL 25 MG: 25 TABLET, FILM COATED ORAL at 07:52

## 2021-11-07 RX ADMIN — COLCHICINE 0.6 MG: 0.6 CAPSULE ORAL at 08:34

## 2021-11-07 RX ADMIN — AMLODIPINE BESYLATE 10 MG: 10 TABLET ORAL at 08:30

## 2021-11-07 RX ADMIN — HYDRALAZINE HYDROCHLORIDE 100 MG: 50 TABLET, FILM COATED ORAL at 22:40

## 2021-11-07 RX ADMIN — INSULIN LISPRO 9 UNITS: 100 INJECTION, SOLUTION INTRAVENOUS; SUBCUTANEOUS at 22:41

## 2021-11-07 RX ADMIN — ISOSORBIDE MONONITRATE 30 MG: 60 TABLET, EXTENDED RELEASE ORAL at 14:26

## 2021-11-07 RX ADMIN — ZIPRASIDONE HYDROCHLORIDE 20 MG: 20 CAPSULE ORAL at 10:44

## 2021-11-07 RX ADMIN — INSULIN LISPRO 4 UNITS: 100 INJECTION, SOLUTION INTRAVENOUS; SUBCUTANEOUS at 07:47

## 2021-11-07 RX ADMIN — DIPHENHYDRAMINE HYDROCHLORIDE 25 MG: 25 CAPSULE ORAL at 02:53

## 2021-11-07 RX ADMIN — HALOPERIDOL LACTATE 4 MG: 5 INJECTION, SOLUTION INTRAMUSCULAR at 00:04

## 2021-11-07 RX ADMIN — HYDRALAZINE HYDROCHLORIDE 100 MG: 50 TABLET, FILM COATED ORAL at 08:30

## 2021-11-07 RX ADMIN — HYDRALAZINE HYDROCHLORIDE 10 MG: 20 INJECTION INTRAMUSCULAR; INTRAVENOUS at 10:44

## 2021-11-07 NOTE — PROGRESS NOTES
Hospitalist Progress Note    Patient: Catherine Donis Age: 74 y.o. : 1955 MR#: [id number] SSN: [id number]  Date/Time: 2021 10:07 AM    DOA: 11/3/2021  PCP: Kayleen Dixon MD    Subjective:     She remains pleasant without agitation this afternoon. Able to tolerate feed by nursing. SBP still elevated. She continues to have left sided chest pain complaint   Tolerate colchicine today     She has pain in he knee where she fell yesterday   No further report of bleeding from her mouth  Interval Hospital Course:        ROS: Limited but able to answer no pain in her head, no pain in her neck, no pain in her stomach, +pain in the left chest reproducible, no shortness of breath, no hip pain, + leg pain    Assessment/Plan:     1. Acute confusion, agitation associated with hypertensive emergency       Hypertensive encephalopathy  2. Chest pain, recurrent, indeterminant troponin level elevation  3. Persistent moderate pericardial effusion previously on echocardiogram associated with volume overload in the setting of end-stage renal disease  4. Acute on chronic HFpEF   5. End-stage renal disease on dialysis  6. Bipolar disorder  7. Seizure disorder stable on Keppra doses  8. Normocytic anemia with CKD  9. Fall, mechanical, without  Injury    Xray of knees and hips follow up. Increase her imdur per nephrology. Cont HD as scheduled   continue with aggressive BP control  Spoke with nursing for fall prevention and fall precaution  continue colchicine follow-up pericardial effusion plus chest pain pathology  Give her Haldol as needed, stop Seroquel, start Geodon BID  Appreciate cardiology consult  Appreciate nephrology consult  We will have her with PT OT if tolerated  Seizure precaution  Nutritional support    Full code.   Palliative care consult on Monday for goals of care with family  Disposition: Pending clinical status    Additional Notes:  Time spent> 35 minutes    Case discussed with:  [x]Patient []Family  []Nursing  []Case Management  DVT Prophylaxis:  []Lovenox  [x]Hep SQ  []SCDs  []Coumadin   []On Heparin gtt    Signed By: Katiana Stovall MD     2021 10:07 AM              Objective:   VS:   Visit Vitals  BP (!) 185/78   Pulse 87   Temp 99.3 °F (37.4 °C)   Resp 16   Ht 5' 3\" (1.6 m)   Wt 50.8 kg (111 lb 14.4 oz)   LMP  (LMP Unknown)   SpO2 97%   BMI 19.82 kg/m²      Tmax/24hrs: Temp (24hrs), Av.1 °F (36.7 °C), Min:97.6 °F (36.4 °C), Max:99.3 °F (37.4 °C)    No intake or output data in the 24 hours ending 21 1007    Tele:   General:  agitated, In acute distress,  HEENT: PERRL, EOMI, supple neck, no JVD, dry oral mucosa  Cardiovascular: S1S2 regular, no rub/gallop   Pulmonary: air entry bilaterally, no wheezing, + crackle  GI:  Soft, non tender, non distended, +bs, no guarding   Extremities:  No pedal edema, +distal pulses appreciated   Neuro: AOx2, moving all extremities    Additional:       Current Facility-Administered Medications   Medication Dose Route Frequency    ziprasidone (GEODON) capsule 20 mg  20 mg Oral BID WITH MEALS    famotidine (PEPCID) tablet 20 mg  20 mg Oral BID    amLODIPine (NORVASC) tablet 10 mg  10 mg Oral DAILY    carvediloL (COREG) tablet 25 mg  25 mg Oral BID WITH MEALS    losartan (COZAAR) tablet 100 mg  100 mg Oral DAILY    hydrALAZINE (APRESOLINE) tablet 100 mg  100 mg Oral TID    colchicine (MITIGARE) capsule 0.6 mg  0.6 mg Oral DAILY    levETIRAcetam (KEPPRA) tablet 500 mg  500 mg Oral Q24H    levETIRAcetam (KEPPRA) tablet 250 mg  250 mg Oral Q MON, WED & FRI    heparin (porcine) 1,000 unit/mL injection 5,000 Units  5,000 Units IntraCATHeter DIALYSIS PRN    hydrALAZINE (APRESOLINE) 20 mg/mL injection 10 mg  10 mg IntraVENous Q6H PRN    insulin lispro (HUMALOG) injection   SubCUTAneous AC&HS    glucose chewable tablet 16 g  4 Tablet Oral PRN    glucagon (GLUCAGEN) injection 1 mg  1 mg IntraMUSCular PRN    dextrose (D50W) injection syrg 12.5-25 g 25-50 mL IntraVENous PRN    sodium chloride (NS) flush 5-40 mL  5-40 mL IntraVENous Q8H    sodium chloride (NS) flush 5-40 mL  5-40 mL IntraVENous PRN    acetaminophen (TYLENOL) tablet 650 mg  650 mg Oral Q6H PRN    Or    acetaminophen (TYLENOL) suppository 650 mg  650 mg Rectal Q6H PRN    polyethylene glycol (MIRALAX) packet 17 g  17 g Oral DAILY PRN    heparin (porcine) injection 5,000 Units  5,000 Units SubCUTAneous Q8H    aspirin chewable tablet 81 mg  81 mg Oral DAILY    sevelamer carbonate (RENVELA) tab 800 mg  800 mg Oral TID    atorvastatin (LIPITOR) tablet 10 mg  10 mg Oral DAILY    cloNIDine (CATAPRES) 0.3 mg/24 hr patch 1 Patch  1 Patch TransDERmal Q7D    isosorbide mononitrate ER (IMDUR) tablet 30 mg  30 mg Oral DAILY    B complex-vitaminC-folic acid (NEPHROCAP) cap  1 Capsule Oral DAILY            Lab/Data Review:  Labs: Results:       Chemistry Recent Labs     11/05/21  0528   GLU 93      K 4.2      CO2 26   BUN 26*   CREA 6.75*   BUCR 4*   AGAP 11   CA 9.5   PHOS 4.1     No results for input(s): TBIL, ALT, ALKP, TP, ALB, GLOB, AGRAT in the last 72 hours. No lab exists for component: SGOT   CBC w/Diff Recent Labs     11/05/21  0528   WBC 8.7   RBC 3.65*   HGB 10.3*   HCT 33.0*   MCV 90.4   MCH 28.2   MCHC 31.2   RDW 16.4*      GRANS 79*   LYMPH 9*   EOS 4      Coagulation No results for input(s): PTP, INR, APTT, INREXT, INREXT in the last 72 hours.     Iron/Ferritin Lab Results   Component Value Date/Time    Iron 46 (L) 03/20/2021 02:02 PM    TIBC 173 (L) 03/20/2021 02:02 PM    Iron % saturation 27 03/20/2021 02:02 PM    Ferritin 1,396 (H) 03/20/2021 02:02 PM       BNP    Cardiac Enzymes Lab Results   Component Value Date/Time     (H) 11/04/2021 08:40 AM    CK - MB 3.8 (H) 11/04/2021 08:40 AM    CK-MB Index 0.9 11/04/2021 08:40 AM    Troponin-I, QT 0.06 (H) 11/04/2021 08:40 AM        Lactic Acid    Thyroid Studies          All Micro Results     Procedure Component Value Units Date/Time    COVID-19 RAPID TEST [[id number]] Collected: 11/03/21 2132    Order Status: Completed Specimen: Nasopharyngeal Updated: 11/03/21 2205     Specimen source Nasopharyngeal        COVID-19 rapid test Not detected        Comment: Rapid Abbott ID Now       Rapid NAAT:  The specimen is NEGATIVE for SARS-CoV-2, the novel coronavirus associated with COVID-19. Negative results should be treated as presumptive and, if inconsistent with clinical signs and symptoms or necessary for patient management, should be tested with an alternative molecular assay. Negative results do not preclude SARS-CoV-2 infection and should not be used as the sole basis for patient management decisions. This test has been authorized by the FDA under an Emergency Use Authorization (EUA) for use by authorized laboratories. Fact sheet for Healthcare Providers: ConventionUpdate.co.nz  Fact sheet for Patients: ConventionUpdate.co.nz       Methodology: Isothermal Nucleic Acid Amplification                 Images:    CT (Most Recent). CT Results (most recent):  Results from Hospital Encounter encounter on 11/03/21    CT HEAD WO CONT    Narrative  CT HEAD WO CONT    History: Seizure, altered, fatigue. Comparison: 3/17/2021    TECHNIQUE: 5 mm helical scan obtained of the head were obtained from the skull  vertex through the base of the skull without intravenous contrast.    All CT scans at this facility are performed using dose optimization technique as  appropriate to a performed exam, to include automated exposure control,  adjustment of the mA and/or kV according to patient size (including appropriate  matching first site-specific examinations), or use of iterative reconstruction  technique. BRAIN RESULT:    Similar mild generalized volume loss. Similar left frontal encephalomalacia.   There is a right basal ganglia small amount which appears new since 3/17/2021  but ages otherwise indeterminate. No midline shift. No acute intracranial  hemorrhage. Basilar cisterns are patent. Orbits, soft tissues are grossly normal. Hyperostosis. Limited visualized  paranasal sinuses are patent. Large left and trace right mastoid effusions. Impression  New since 3/17/2021 subcentimeter right basal ganglia infarct but age is  otherwise indeterminate. If clarification is desired, may obtain MR. New large left and trace right mastoid effusions. Otherwise no significant interval change. XRAY (Most Recent)      EKG No results found for this or any previous visit. 2D ECHO 11/03/21    ECHO ADULT FOLLOW-UP OR LIMITED 11/04/2021 11/4/2021    Interpretation Summary  · LV: Estimated LVEF is 60 - 65%. Normal cavity size. Severe concentric hypertrophy. Hyperdynamic systolic function. · Pericardium: Moderate circumferential pericardial effusion measuring 1.3 mm. · No indications of tamponade present. Respiratory variation of mitral valve inflow is < 30%.     Signed by: Rachele Buchanan MD on 11/4/2021  2:14 PM

## 2021-11-07 NOTE — PROGRESS NOTES
Hydralazine administered. Will reassess.        11/07/21 0307   Vital Signs   Pulse (Heart Rate) 85   BP (!) 187/78   MAP (Calculated) 114

## 2021-11-07 NOTE — PROGRESS NOTES
Progress Note    Vishnu Baeza  74 y.o. 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 BP is again high       A comprehensive review of systems was negative except for that written in the History of Present Illness. On Geodon.     Objective:     Visit Vitals  BP (!) 182/80 (BP 1 Location: Right upper arm)   Pulse 82   Temp 98.4 °F (36.9 °C)   Resp 16   Ht 5' 3\" (1.6 m)   Wt 50.8 kg (111 lb 14.4 oz)   LMP  (LMP Unknown)   SpO2 95%   BMI 19.82 kg/m²       No intake or output data in the 24 hours ending 11/07/21 1314    Current Facility-Administered Medications   Medication Dose Route Frequency Provider Last Rate Last Admin    ziprasidone (GEODON) capsule 20 mg  20 mg Oral BID WITH MEALS Obed Espinal MD   20 mg at 11/07/21 1044    famotidine (PEPCID) tablet 20 mg  20 mg Oral DAILY Mansi Turk MD        [START ON 11/8/2021] colchicine (MITIGARE) capsule 0.6 mg  0.6 mg Oral Q MON, WED & Antonio Ortiz MD        [START ON 11/8/2021] isosorbide mononitrate ER (IMDUR) tablet 60 mg  60 mg Oral DAILY Geovanna Thapa MD        amLODIPine (NORVASC) tablet 10 mg  10 mg Oral DAILY Deisy Cloud,    10 mg at 11/07/21 0830    carvediloL (COREG) tablet 25 mg  25 mg Oral BID WITH MEALS Deisy Cloud DO   25 mg at 11/07/21 0752    losartan (COZAAR) tablet 100 mg  100 mg Oral DAILY Deisy Cloud DO   100 mg at 11/07/21 0831    hydrALAZINE (APRESOLINE) tablet 100 mg 100 mg Oral TID Guadalupe Ríos MD   100 mg at 11/07/21 0830    levETIRAcetam (KEPPRA) tablet 500 mg  500 mg Oral Q24H Deisy Cloud, DO   500 mg at 11/07/21 0751    levETIRAcetam (KEPPRA) tablet 250 mg  250 mg Oral Q MON, WED & FRI Deisy Cloud N, DO   250 mg at 11/05/21 2124    heparin (porcine) 1,000 unit/mL injection 5,000 Units  5,000 Units IntraCATHeter DIALYSIS PRN Amauri Yi MD   3,200 Units at 11/05/21 1848    hydrALAZINE (APRESOLINE) 20 mg/mL injection 10 mg  10 mg IntraVENous Q6H PRN Chittenden Crumbly B, NP   10 mg at 11/07/21 1044    insulin lispro (HUMALOG) injection   SubCUTAneous AC&HS Chitt[place] Crumbly B, NP   4 Units at 11/07/21 0747    glucose chewable tablet 16 g  4 Tablet Oral PRN Ora Salaam, NP        glucagon (GLUCAGEN) injection 1 mg  1 mg IntraMUSCular PRN Ora Salaam, NP        dextrose (D50W) injection syrg 12.5-25 g  25-50 mL IntraVENous PRN Ora Salaam, NP        sodium chloride (NS) flush 5-40 mL  5-40 mL IntraVENous Q8H Chittenden Crumbly B, NP   10 mL at 11/06/21 2345    sodium chloride (NS) flush 5-40 mL  5-40 mL IntraVENous PRN Chittenden Crumbly B, NP   10 mL at 11/04/21 2140    acetaminophen (TYLENOL) tablet 650 mg  650 mg Oral Q6H PRN Chitt[place] Crumbly B, NP   650 mg at 11/06/21 0023    Or    acetaminophen (TYLENOL) suppository 650 mg  650 mg Rectal Q6H PRN Ora Salaam, NP        polyethylene glycol (MIRALAX) packet 17 g  17 g Oral DAILY PRN Ora Salaam, NP        heparin (porcine) injection 5,000 Units  5,000 Units SubCUTAneous Q8H Parrish Crumbly B, NP   5,000 Units at 11/07/21 1946    aspirin chewable tablet 81 mg  81 mg Oral DAILY Chittenden Crumbly B, NP   81 mg at 11/07/21 0834    sevelamer carbonate (RENVELA) tab 800 mg  800 mg Oral TID Parrish Agarwal B, NP   800 mg at 11/07/21 0834    atorvastatin (LIPITOR) tablet 10 mg  10 mg Oral DAILY Parrish Agarwal B, NP   10 mg at 11/07/21 0835    cloNIDine (CATAPRES) 0.3 mg/24 hr patch 1 Patch  1 Patch TransDERmal Q7D Tess Al, NP   1 Patch at 11/06/21 1217    B complex-vitaminC-folic acid (NEPHROCAP) cap  1 Capsule Oral DAILY Tess Al, NP   1 Capsule at 11/07/21 9629        Physical Exam:     Physical Exam:   General:  Alert, cooperative, no distress, appears stated age. Neck: Supple, symmetrical, trachea midline, no adenopathy, thyroid: no enlargement/tenderness/nodules, no carotid bruit and no JVD. Lungs:   Clear to auscultation bilaterally. Heart:  Regular rate and rhythm, S1, S2 normal, no murmur, click, rub or gallop. Abdomen:   Soft, non-tender. Bowel sounds normal. No masses,  No organomegaly. Extremities: Extremities normal, atraumatic, no cyanosis or edema, HD catheter is well dressed   Skin: Skin color, texture, turgor normal. No rashes or lesions         Data Review:    CBC w/Diff    Recent Labs     11/07/21 1141 11/05/21 0528 11/05/21 0528   WBC 11.5  --  8.7   RBC 3.16*  --  3.65*   HGB 9.0*  --  10.3*   HCT 28.0*  --  33.0*   MCV 88.6   < > 90.4   MCH 28.5   < > 28.2   MCHC 32.1   < > 31.2   RDW 16.6*   < > 16.4*    < > = values in this interval not displayed. Recent Labs     11/07/21 1141 11/05/21 0528 11/05/21 0528   MONOS  --   --  7   EOS  --   --  4   BASOS  --   --  0   RDW 16.6*   < > 16.4*    < > = values in this interval not displayed.         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       Lyndsey Greene MD

## 2021-11-07 NOTE — PROGRESS NOTES
0300-pt complaining of itching. Benadryl listed on pt's PTA medication list noted.  notified. Orders received for benadryl 25mg po(x1).

## 2021-11-07 NOTE — PROGRESS NOTES
Problem: Mobility Impaired (Adult and Pediatric)  Goal: *Acute Goals and Plan of Care (Insert Text)  Outcome: Resolved/Met   PHYSICAL THERAPY EVALUATION AND DISCHARGE    Patient: Krishan Garza (78 y.o. female)  Date: 11/7/2021  Primary Diagnosis: Elevated troponin [R77.8]        Precautions:  Fall, Aspiration    PLOF: Pt from LTC, reporting she does not get out of bed needs assist for bed mobility. ASSESSMENT :  Based on the objective data described below, the patient presents with baseline functional mobility. Pt with hip and knee flexion with flexion contractures. Pt needing totalA for rolling and scooting towards HOB. Pt with reports of pain through LEs with passive range of motion. Pt with preference for L sidelying. Pt left with all needs met and call bell in reach. Pt with baseline mobility. Patient does not require further skilled intervention at this level of care. PLAN :  Recommendations and Planned Interventions:   No formal PT needs identified at this time. Discharge Recommendations: Return to LTC   Further Equipment Recommendations for Discharge: hospital bed, lift, wheelchair     SUBJECTIVE:   Patient stated I don't leave the bed.     OBJECTIVE DATA SUMMARY:     Past Medical History:   Diagnosis Date    Asthma     Bipolar affective disorder ([facility]     Brain condition     Cataract     Chronic kidney disease     hemodialysis M-W-F    Diabetes ([facility]     Hyperlipidemia     Hypertension     Paralytic strabismus, unspecified     Psychiatric disorder     Seizures ([facility] 08/27/2018     Past Surgical History:   Procedure Laterality Date    [facility] INSJ TUNNELED CVC W/O SUBQ PORT/ AGE 3 YR/> N/A 8/9/2018     in-pt 474A, Insertion Tunneled Central Venous Catheter performed by Richelle Trejo MD at [address] [place] [address]    VA INSJ TUNNELED CVC W/O SUBQ PORT/ AGE 5 YR/> N/A 11/8/2019    INSERTION TUNNELED CENTRAL VENOUS CATHETER performed by Debbie Bryan MD at [facility]     Barriers to Learning/Limitations: None  Compensate with: N/A  Home Situation:   Home Situation  Home Environment: Long term care  Support Systems: Skilled Nursing Facility  Critical Behavior:  Neurologic State: Alert  Orientation Level: Oriented to person  Cognition: Follows commands  Safety/Judgement: Fall prevention  Psychosocial  Patient Behaviors: Calm  Strength:    Strength: Grossly decreased, non-functional    Tone & Sensation:   Tone: Abnormal    Sensation: Intact    Range Of Motion:  AROM: Grossly decreased, non-functional    Posture:  Posture (WDL):  (forward head, hip and knee flexion )      Functional Mobility:  Bed Mobility:  Rolling: Total assistance    Scooting: Total assistance    Pain:  Pain level pre-treatment: 0/10   Pain level post-treatment: 0/10      Activity Tolerance:   Fair tolerance, baseline     Please refer to the flowsheet for vital signs taken during this treatment. After treatment:   []         Patient left in no apparent distress sitting up in chair  [x]         Patient left in no apparent distress in bed  [x]         Call bell left within reach  [x]         Nursing notified  []         Caregiver present  [x]         Bed alarm activated  []         SCDs applied    COMMUNICATION/EDUCATION:   [x]         Role of Physical Therapy in the acute care setting. [x]         Fall prevention education was provided and the patient/caregiver indicated understanding. []         Patient/family have participated as able in goal setting and plan of care. []         Patient/family agree to work toward stated goals and plan of care. []         Patient understands intent and goals of therapy, but is neutral about his/her participation. []         Patient is unable to participate in goal setting/plan of care: ongoing with therapy staff.  []         Other:     Thank you for this referral.  Danial Boucher, PT   Time Calculation: 8 mins      Eval Complexity: History: MEDIUM  Complexity : 1-2 comorbidities / personal factors will impact the outcome/ POC Exam:LOW Complexity : 1-2 Standardized tests and measures addressing body structure, function, activity limitation and / or participation in recreation  Presentation: LOW Complexity : Stable, uncomplicated  Clinical Decision Making:Low Complexity low  Overall Complexity:LOW

## 2021-11-07 NOTE — PROGRESS NOTES
Bedside shift change report given to Katherine James (oncoming nurse) by Mario Bui (offgoing nurse). Report included the following information SBAR, MAR and Alarm Parameters sinus rhythm.

## 2021-11-08 ENCOUNTER — APPOINTMENT (OUTPATIENT)
Dept: GENERAL RADIOLOGY | Age: 66
DRG: 314 | End: 2021-11-08
Attending: INTERNAL MEDICINE
Payer: MEDICARE

## 2021-11-08 LAB
ANION GAP SERPL CALC-SCNC: 9 MMOL/L (ref 3–18)
BASOPHILS # BLD: 0 K/UL (ref 0–0.1)
BASOPHILS NFR BLD: 0 % (ref 0–2)
BUN SERPL-MCNC: 43 MG/DL (ref 7–18)
BUN/CREAT SERPL: 4 (ref 12–20)
CALCIUM SERPL-MCNC: 9.5 MG/DL (ref 8.5–10.1)
CHLORIDE SERPL-SCNC: 96 MMOL/L (ref 100–111)
CO2 SERPL-SCNC: 26 MMOL/L (ref 21–32)
CREAT SERPL-MCNC: 10.1 MG/DL (ref 0.6–1.3)
DIFFERENTIAL METHOD BLD: ABNORMAL
EOSINOPHIL # BLD: 0.4 K/UL (ref 0–0.4)
EOSINOPHIL NFR BLD: 4 % (ref 0–5)
ERYTHROCYTE [DISTWIDTH] IN BLOOD BY AUTOMATED COUNT: 16.8 % (ref 11.6–14.5)
GLUCOSE BLD STRIP.AUTO-MCNC: 134 MG/DL (ref 70–110)
GLUCOSE BLD STRIP.AUTO-MCNC: 196 MG/DL (ref 70–110)
GLUCOSE BLD STRIP.AUTO-MCNC: 198 MG/DL (ref 70–110)
GLUCOSE BLD STRIP.AUTO-MCNC: 233 MG/DL (ref 70–110)
GLUCOSE SERPL-MCNC: 198 MG/DL (ref 74–99)
HCT VFR BLD AUTO: 25.5 % (ref 35–45)
HGB BLD-MCNC: 8.4 G/DL (ref 12–16)
LYMPHOCYTES # BLD: 0.8 K/UL (ref 0.9–3.6)
LYMPHOCYTES NFR BLD: 7 % (ref 21–52)
MCH RBC QN AUTO: 28.9 PG (ref 24–34)
MCHC RBC AUTO-ENTMCNC: 32.9 G/DL (ref 31–37)
MCV RBC AUTO: 87.6 FL (ref 78–100)
MONOCYTES # BLD: 1 K/UL (ref 0.05–1.2)
MONOCYTES NFR BLD: 9 % (ref 3–10)
NEUTS SEG # BLD: 9.1 K/UL (ref 1.8–8)
NEUTS SEG NFR BLD: 79 % (ref 40–73)
PHOSPHATE SERPL-MCNC: 3.9 MG/DL (ref 2.5–4.9)
PLATELET # BLD AUTO: 311 K/UL (ref 135–420)
PMV BLD AUTO: 11.2 FL (ref 9.2–11.8)
POTASSIUM SERPL-SCNC: 3.8 MMOL/L (ref 3.5–5.5)
RBC # BLD AUTO: 2.91 M/UL (ref 4.2–5.3)
SODIUM SERPL-SCNC: 131 MMOL/L (ref 136–145)
URATE SERPL-MCNC: 4.5 MG/DL (ref 2.6–7.2)
WBC # BLD AUTO: 11.5 K/UL (ref 4.6–13.2)

## 2021-11-08 PROCEDURE — 99232 SBSQ HOSP IP/OBS MODERATE 35: CPT | Performed by: INTERNAL MEDICINE

## 2021-11-08 PROCEDURE — 74230 X-RAY XM SWLNG FUNCJ C+: CPT

## 2021-11-08 PROCEDURE — 90935 HEMODIALYSIS ONE EVALUATION: CPT

## 2021-11-08 PROCEDURE — 74011250637 HC RX REV CODE- 250/637: Performed by: INTERNAL MEDICINE

## 2021-11-08 PROCEDURE — 74011250636 HC RX REV CODE- 250/636: Performed by: NURSE PRACTITIONER

## 2021-11-08 PROCEDURE — 84100 ASSAY OF PHOSPHORUS: CPT

## 2021-11-08 PROCEDURE — 36415 COLL VENOUS BLD VENIPUNCTURE: CPT

## 2021-11-08 PROCEDURE — 74011250637 HC RX REV CODE- 250/637: Performed by: STUDENT IN AN ORGANIZED HEALTH CARE EDUCATION/TRAINING PROGRAM

## 2021-11-08 PROCEDURE — 84550 ASSAY OF BLOOD/URIC ACID: CPT

## 2021-11-08 PROCEDURE — 74011000250 HC RX REV CODE- 250: Performed by: INTERNAL MEDICINE

## 2021-11-08 PROCEDURE — 85025 COMPLETE CBC W/AUTO DIFF WBC: CPT

## 2021-11-08 PROCEDURE — 92526 ORAL FUNCTION THERAPY: CPT

## 2021-11-08 PROCEDURE — 82962 GLUCOSE BLOOD TEST: CPT

## 2021-11-08 PROCEDURE — 92611 MOTION FLUOROSCOPY/SWALLOW: CPT

## 2021-11-08 PROCEDURE — 74011636637 HC RX REV CODE- 636/637: Performed by: INTERNAL MEDICINE

## 2021-11-08 PROCEDURE — 65660000000 HC RM CCU STEPDOWN

## 2021-11-08 PROCEDURE — 2709999900 HC NON-CHARGEABLE SUPPLY

## 2021-11-08 PROCEDURE — 74011250637 HC RX REV CODE- 250/637: Performed by: NURSE PRACTITIONER

## 2021-11-08 PROCEDURE — 80048 BASIC METABOLIC PNL TOTAL CA: CPT

## 2021-11-08 RX ORDER — ATORVASTATIN CALCIUM 20 MG/1
20 TABLET, FILM COATED ORAL DAILY
Status: DISCONTINUED | OUTPATIENT
Start: 2021-11-09 | End: 2021-11-10 | Stop reason: HOSPADM

## 2021-11-08 RX ORDER — INSULIN GLARGINE 100 [IU]/ML
4 INJECTION, SOLUTION SUBCUTANEOUS DAILY
Status: DISCONTINUED | OUTPATIENT
Start: 2021-11-08 | End: 2021-11-09

## 2021-11-08 RX ADMIN — SEVELAMER CARBONATE 800 MG: 800 TABLET, FILM COATED ORAL at 17:59

## 2021-11-08 RX ADMIN — Medication 5 ML: at 14:00

## 2021-11-08 RX ADMIN — SEVELAMER CARBONATE 800 MG: 800 TABLET, FILM COATED ORAL at 21:34

## 2021-11-08 RX ADMIN — ACETAMINOPHEN 650 MG: 325 TABLET ORAL at 21:31

## 2021-11-08 RX ADMIN — ATORVASTATIN CALCIUM 10 MG: 10 TABLET, FILM COATED ORAL at 08:04

## 2021-11-08 RX ADMIN — BARIUM SULFATE 15 ML: 400 SUSPENSION ORAL at 13:12

## 2021-11-08 RX ADMIN — Medication 10 ML: at 06:59

## 2021-11-08 RX ADMIN — CARVEDILOL 25 MG: 25 TABLET, FILM COATED ORAL at 16:51

## 2021-11-08 RX ADMIN — INSULIN LISPRO 3 UNITS: 100 INJECTION, SOLUTION INTRAVENOUS; SUBCUTANEOUS at 07:52

## 2021-11-08 RX ADMIN — ASPIRIN 81 MG CHEWABLE TABLET 81 MG: 81 TABLET CHEWABLE at 08:04

## 2021-11-08 RX ADMIN — HYDRALAZINE HYDROCHLORIDE 100 MG: 50 TABLET, FILM COATED ORAL at 16:51

## 2021-11-08 RX ADMIN — Medication 10 ML: at 21:35

## 2021-11-08 RX ADMIN — FAMOTIDINE 20 MG: 20 TABLET ORAL at 08:04

## 2021-11-08 RX ADMIN — INSULIN GLARGINE 4 UNITS: 100 INJECTION, SOLUTION SUBCUTANEOUS at 12:00

## 2021-11-08 RX ADMIN — BARIUM SULFATE 15 ML: 400 SUSPENSION ORAL at 13:13

## 2021-11-08 RX ADMIN — ZIPRASIDONE HYDROCHLORIDE 20 MG: 20 CAPSULE ORAL at 17:58

## 2021-11-08 RX ADMIN — COLCHICINE 0.6 MG: 0.6 CAPSULE ORAL at 21:33

## 2021-11-08 RX ADMIN — HYDRALAZINE HYDROCHLORIDE 100 MG: 50 TABLET, FILM COATED ORAL at 21:32

## 2021-11-08 RX ADMIN — ZIPRASIDONE HYDROCHLORIDE 20 MG: 20 CAPSULE ORAL at 07:58

## 2021-11-08 RX ADMIN — HEPARIN SODIUM 5000 UNITS: 5000 INJECTION INTRAVENOUS; SUBCUTANEOUS at 06:58

## 2021-11-08 RX ADMIN — BARIUM SULFATE 30 G: 960 POWDER, FOR SUSPENSION ORAL at 13:12

## 2021-11-08 RX ADMIN — HEPARIN SODIUM 5000 UNITS: 5000 INJECTION INTRAVENOUS; SUBCUTANEOUS at 14:32

## 2021-11-08 RX ADMIN — SEVELAMER CARBONATE 800 MG: 800 TABLET, FILM COATED ORAL at 08:04

## 2021-11-08 RX ADMIN — HEPARIN SODIUM 5000 UNITS: 5000 INJECTION INTRAVENOUS; SUBCUTANEOUS at 21:36

## 2021-11-08 RX ADMIN — NEPHROCAP 1 CAPSULE: 1 CAP ORAL at 08:04

## 2021-11-08 RX ADMIN — LEVETIRACETAM 250 MG: 250 TABLET ORAL at 21:33

## 2021-11-08 RX ADMIN — LEVETIRACETAM 500 MG: 500 TABLET, FILM COATED ORAL at 07:57

## 2021-11-08 RX ADMIN — INSULIN LISPRO 6 UNITS: 100 INJECTION, SOLUTION INTRAVENOUS; SUBCUTANEOUS at 22:22

## 2021-11-08 RX ADMIN — BARIUM SULFATE 15 ML: 400 PASTE ORAL at 13:12

## 2021-11-08 NOTE — PROGRESS NOTES
Chart reviewed. Patient is a long term resident of [facility] and is able to return there once discharged. ARU has signed off for patient.      Lexi Lees RN

## 2021-11-08 NOTE — PROGRESS NOTES
Progress Note    Javi Le  74 y.o. 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 Currently the patient is comfortable and is sleepy and easily arousable. Dialysis nurses reports she was as usual feisty with them and it was very difficult to handle her. Patient denies chest pain any shortness of breath. Tolerating blood flow of 400 through the tunneled dialysis catheter in the right IJ. A comprehensive review of systems was negative except for that written in the History of Present Illness.     Objective:     Visit Vitals  BP (!) 159/72   Pulse 91   Temp 98.8 °F (37.1 °C) (Oral)   Resp 18   Ht 5' 3\" (1.6 m)   Wt 50.8 kg (111 lb 14.4 oz)   LMP  (LMP Unknown)   SpO2 97%   BMI 19.82 kg/m²         Intake/Output Summary (Last 24 hours) at 11/8/2021 1159  Last data filed at 11/8/2021 0809  Gross per 24 hour   Intake 120 ml   Output --   Net 120 ml       Current Facility-Administered Medications   Medication Dose Route Frequency Provider Last Rate Last Admin    insulin glargine (LANTUS) injection 4 Units  4 Units SubCUTAneous DAILY Mable Farah MD        [START ON 11/9/2021] atorvastatin (LIPITOR) tablet 20 mg  20 mg Oral DAILY Phoebe Salmeron MD        ziprasidone (GEODON) capsule 20 mg  20 mg Oral BID WITH MEALS Mable Farah MD   20 mg at 11/08/21 0758    famotidine (PEPCID) tablet 20 mg  20 mg Oral DAILY Mable Farah MD   20 mg at 11/08/21 0804    colchicine (MITIGARE) capsule 0.6 mg  0.6 mg Oral Q MON, WED & Syeda Montalvo MD  isosorbide mononitrate ER (IMDUR) tablet 60 mg  60 mg Oral DAILY Mary Harkins MD        amLODIPine (NORVASC) tablet 10 mg  10 mg Oral DAILY Deisy Cloud, DO   10 mg at 11/07/21 0830    carvediloL (COREG) tablet 25 mg  25 mg Oral BID WITH MEALS Deisy Cloud, DO   25 mg at 11/07/21 1637    losartan (COZAAR) tablet 100 mg  100 mg Oral DAILY Deisy Cloud, DO   100 mg at 11/07/21 0831    hydrALAZINE (APRESOLINE) tablet 100 mg  100 mg Oral TID Guadalupe Ríos MD   100 mg at 11/07/21 2240    levETIRAcetam (KEPPRA) tablet 500 mg  500 mg Oral Q24H Deisy Cloud, DO   500 mg at 11/08/21 0757    levETIRAcetam (KEPPRA) tablet 250 mg  250 mg Oral Q MON, WED & FRI Deisy Cloud, DO   250 mg at 11/05/21 2124    heparin (porcine) 1,000 unit/mL injection 5,000 Units  5,000 Units IntraCATHeter DIALYSIS PRN Amauri Yi MD   3,200 Units at 11/05/21 1848    hydrALAZINE (APRESOLINE) 20 mg/mL injection 10 mg  10 mg IntraVENous Q6H PRN Winchester Crumbly B, NP   10 mg at 11/07/21 1044    insulin lispro (HUMALOG) injection   SubCUTAneous AC&HS Guadalupe Ríos MD   3 Units at 11/08/21 0752    glucose chewable tablet 16 g  4 Tablet Oral PRN Ora Salaam, NP        glucagon (GLUCAGEN) injection 1 mg  1 mg IntraMUSCular PRN Ora Salaam, NP        dextrose (D50W) injection syrg 12.5-25 g  25-50 mL IntraVENous PRN Ora Salaam, NP        sodium chloride (NS) flush 5-40 mL  5-40 mL IntraVENous Q8H Winchester Crumbly B, NP   10 mL at 11/08/21 0659    sodium chloride (NS) flush 5-40 mL  5-40 mL IntraVENous PRN Parrish Crumbly B, NP   10 mL at 11/04/21 2140    acetaminophen (TYLENOL) tablet 650 mg  650 mg Oral Q6H PRN Winchester Crumbly B, NP   650 mg at 11/06/21 0023    Or    acetaminophen (TYLENOL) suppository 650 mg  650 mg Rectal Q6H PRN Ora Salaam, NP        polyethylene glycol (MIRALAX) packet 17 g  17 g Oral DAILY PRN Ora Salaam, NP        heparin (porcine) injection 5,000 Units  5,000 Units SubCUTAneous Q8H Alhaji Garcia B, NP   5,000 Units at 11/08/21 4134    aspirin chewable tablet 81 mg  81 mg Oral DAILY Alhaji Palaciosy B, NP   81 mg at 11/08/21 0804    sevelamer carbonate (RENVELA) tab 800 mg  800 mg Oral TID Alhaji Palaciosy B, NP   800 mg at 11/08/21 0804    cloNIDine (CATAPRES) 0.3 mg/24 hr patch 1 Patch  1 Patch TransDERmal Q7D Edwina Holley, NP   1 Patch at 11/06/21 1217    B complex-vitaminC-folic acid (NEPHROCAP) cap  1 Capsule Oral DAILY Edwina Holley, NP   1 Capsule at 11/08/21 3119        Physical Exam:     Physical Exam:   General:  Alert, cooperative, no distress, appears stated age. Neck: Supple, symmetrical, trachea midline, no adenopathy, thyroid: no enlargement/tenderness/nodules, no carotid bruit and no JVD. Lungs:   Clear to auscultation bilaterally. Heart:  Regular rate and rhythm, S1, S2 normal, no murmur, click, rub or gallop. Abdomen:   Soft, non-tender. Bowel sounds normal. No masses,  No organomegaly. Extremities: Extremities normal, atraumatic, no cyanosis or edema, TDC is functioning well. Skin: Skin color, texture, turgor normal. No rashes or lesions         Data Review:    CBC w/Diff    Recent Labs     11/08/21 0422 11/07/21  1141 11/07/21  1141   WBC 11.5  --  11.5   RBC 2.91*  --  3.16*   HGB 8.4*  --  9.0*   HCT 25.5*  --  28.0*   MCV 87.6   < > 88.6   MCH 28.9   < > 28.5   MCHC 32.9   < > 32.1   RDW 16.8*   < > 16.6*    < > = values in this interval not displayed.     Recent Labs     11/08/21 0422   MONOS 9   EOS 4   BASOS 0   RDW 16.8*        Comprehensive Metabolic Profile    Recent Labs     11/08/21 0422 11/07/21  1141   * 132*   K 3.8 4.0   CL 96* 97*   CO2 26 27   BUN 43* 35*   CREA 10.10* 8.24*    Recent Labs     11/08/21  0422 11/07/21  1141   CA 9.5 9.7   PHOS 3.9  --                       Impression:       Active Hospital Problems    Diagnosis Date Noted    Elevated troponin 11/03/2021    Hypertensive urgency 06/12/2021    New onset seizure ([facility] 08/27/2018    CVA, old, cognitive deficits 08/08/2018    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 ([facility] 08/08/2018    Secondary hyperparathyroidism of renal origin ([facility] 08/08/2018    ESRD (end stage renal disease) on dialysis ([facility] 08/08/2018      On 3K and 2.5 calcium bath. Ultrafiltration goal of 2.5 kg including Rinse back of 500 cc. Pressure is relatively better today compared to last several days. Plan:     Plan is to continue to dialyze as scheduled today after dialysis if she remains stable and the blood pressure remains in good then she could be transferred back to nursing home with the current medicines. She is anemic she will need dose of Retacrit before discharge.       Anthony Galvin MD

## 2021-11-08 NOTE — PROGRESS NOTES
Problem: Dysphagia (Adult)  Goal: *Acute Goals and Plan of Care (Insert Text)  Description: Patient will:  1. Tolerate PO trials with 0 s/s overt distress in 4/5 trials  2. Utilize compensatory swallow strategies/maneuvers (decrease bite/sip, size/rate, alt. liq/sol) with min cues in 4/5 trials  3. Perform oral-motor/laryngeal exercises to increase oropharyngeal swallow function with min cues  4. Complete an objective swallow study (i.e., MBSS) to assess swallow integrity, r/o aspiration, and determine of safest LRD, min A as indicated/ordered by MD     Recommend:   NPO   Strict aspiration precautions (HOB >30 degrees at all times, Oral care TID)  May benefit from palliative care consult to address ? alternative means of nutrition or comfort feeds     Outcome: Progressing Towards Goal    SPEECH PATHOLOGY MODIFIED BARIUM SWALLOW STUDY & TREATMENT    Patient: Vishnu Baeza (72 y.o. female)  Date: 11/8/2021  Primary Diagnosis: Elevated troponin [R77.8]  Precautions:  Fall, Aspiration    ASSESSMENT :  Based on the objective data described below, the patient presents with moderate oral and suspected moderately severe pharyngeal dysphagia characterized by silent aspiration with thin liquids and silent aspiration to the cords with NTL, HTL and pudding presentations. Deficits include delayed a-p transit, decreased base of tongue strength, decreased laryngeal elevation/adduction and slowed epiglottic inversion. Pt with decreased pharyngeal strength/motitilty resulting in moderate pharyngeal residuals that were eventually penetrated. Comp strategies ineffective at improving airway protection. Recommend NPO; consider alternative nutrition/hydration source vs comfort feeds. TREATMENT :  Treatment provided post diagnostic testing including oropharyngeal anatomy/physiology, MBS results, diet recommendations and compensatory strategies/positioning.   Attempted ice chips following oral care; demo immediate wet/weak cough post trials. Will follow. Patient will benefit from skilled intervention to address the above impairments. Patient's rehabilitation potential is considered to be Guarded  Factors which may influence rehabilitation potential include:   []              None noted  [x]              Mental ability/status  [x]              Medical condition  []              Home/family situation and support systems  [x]              Safety awareness  []              Pain tolerance/management  []              Other:      PLAN :  Recommendations and Planned Interventions:  As above   Frequency/Duration: Patient will be followed by speech-language pathology 1-2 times per day/3-7 days per week to address goals. Discharge Recommendations: To Be Determined     SUBJECTIVE:   Patient stated What's that?     OBJECTIVE:     Past Medical History:   Diagnosis Date    Asthma     B[facility] disorder [facility]     Brain condition     Cataract     Chronic kidney disease     hemodialysis M-W-F    Diabetes ([facility]     Hyperlipidemia     Hypertension     Paralytic strabismus, unspecified     Psychiatric disorder     Seizures ([facility] 08/27/2018     Past Surgical History:   Procedure Laterality Date    MA INSJ TUNNELED CVC W/O SUBQ PORT/ AGE 3 YR/> N/A 8/9/2018     in-pt 474A, Insertion Tunneled Central Venous Catheter performed by Tu High MD at [address] [place] [address]    MA INSJ TUNNELED CVC W/O SUBQ PORT/ AGE 5 YR/> N/A 11/8/2019    INSERTION TUNNELED CENTRAL VENOUS CATHETER performed by Estephanie James MD at [facility] CATH LAB     Prior Level of Function/Home Situation:   Home Situation  Home Environment: Long term care  Support Systems: [facility]  Diet prior to admission: unknown   Current Diet:  NPO   Radiologist:    Film Views: Fluoro; Lateral  Patient Position: 90 in chair    Trial 1: Trial 2:   Consistency Presented:  Thin liquid Consistency Presented: Nectar thick liquid; Honey thick liquid; Pudding   How Presented: Self-fed/presented; Cup/sip; Spoon How Presented: SLP-fed/presented; Cup/sip; Spoon; Straw; Successive swallows         Bolus Acceptance: No impairment Bolus Acceptance: No impairment   Bolus Formation/Control: Impaired: Premature spillage; Delayed; Poor; Posterior Bolus Formation/Control: Impaired: Delayed; Poor; Posterior; Mastication; Premature spillage   Propulsion: Discoordination; Delayed (# of seconds) Propulsion: Delayed (# of seconds); Discoordination   Oral Residue: Lingual Oral Residue: Lingual   Initiation of Swallow: No impairment Initiation of Swallow: Triggered at valleculae   Timing: No impairment       Penetration: To cords; To laryngeal vestibule; From initial swallow; From residual; During swallow; After swallow   Aspiration/Timing: Silent ; During; After; From initial swallow; From residual Aspiration/Timing: No evidence of aspiration   Pharyngeal Clearance: Vallecular residue; Pyriform residue ; 10-50% Pharyngeal Clearance: Vallecular residue; Pyriform residue ; 10-50%   Attempted Modifications: Double swallow; Spoon; Cup/sip Attempted Modifications: Small sips and bites; Spoon; Cup/sip   Effective Modifications: None Effective Modifications: None   Cues for Modifications: Moderate Cues for Modifications: Moderate     Decreased Tongue Base Retraction?: Yes  Laryngeal Elevation: Inadequate epiglottic inversion; Incomplete laryngeal closure; Minimal movement of larynx/epiglottis  Aspiration/Penetration Score: 8 (Aspiration-Contrast passes cords/glottis with no effort to eject, ie/silent aspiration)  Pharyngeal Symmetry: Not assessed  Pharyngeal-Esophageal Segment: Decreased relaxation of upper esophageal segment; No relaxation of upper esophageal segment;  Suspected esophageal dysphagia  Pharyngeal Dysfunction: Crico-pharyngeal dysfunction; Decreased tongue base retraction; Decreased strength; Decreased elevation/closure; Decreased pharyngeal wall constriction  Oral Phase Severity: Moderate  Pharyngeal Phase Severity: Moderately severe  8-point Penetration-Aspiration Scale: Score 8    PAIN:  Pt reports 0/10 pain or discomfort prior to MBS. Pt reports 0/10 pain or discomfort post MBS. COMMUNICATION/EDUCATION:   [x]  Patient educated regarding MBS results and diet recommendations. []  Patient/family have participated as able in goal setting and plan of care. []  Patient/family agree to work toward stated goals and plan of care. []  Patient understands intent and goals of therapy, but is neutral about his/her participation. [x]  Patient is unable to participate in goal setting and plan of care.     Thank you for this referral,   Landy Ramires M.S., [address] [facility]  Speech-Language Pathologist

## 2021-11-08 NOTE — PROGRESS NOTES
ARU/IPR REFERRAL CONTACT NOTE  [id number] [place] [address] for Physical Rehabilitation    RE: Darya Heredia     Thank you for the opportunity to review this patient's case for admission to [id number] [place] [address] for Physical Rehabilitation. Based on our pre-admission screening:     [x ] Our Team/Medical Director is following this case. Comments: The plan is for the patient to return to LTC. Both therapies have discharged the patient. Will sign off at this time. Again, Thank you for this referral. Should you have any questions please do not hesitate to call. Sincerely,  Norma Sood. Didi Robertson, [address]   Didi Robertson, RAYO  Admissions [facility] for [facility]  [phone]

## 2021-11-08 NOTE — PROGRESS NOTES
SLP Note:    MBS completed; full report to follow. Recommend NPO; consider alternative nutrition/hydration source vs comfort feeds.      Osmar Reyna M.S., [address] [facility]  Speech-Language Pathologist

## 2021-11-08 NOTE — DIALYSIS
ACUTE HEMODIALYSIS FLOW SHEET    HEMODIALYSIS ORDERS: Physician: Dr. Conard Oppenheim: [facility]   Duration: 3.5 hr   BFR: 400   DFR: 600   Dialysate:  Temp 36-37*C   K+  3    Ca+ 2.0   Na 138   Bicarb 35   Wt Readings from Last 1 Encounters:   11/07/21 50.8 kg (111 lb 14.4 oz)    Patient Chart [x]   Unable to Obtain []  Dry weight/UF Goal: 2000 ml    Heparin []  Bolus    Units    [] Hourly    Units    [x]None       Pre BP: 179/80  Pulse: 95  Respirations: 18 Temp: 98.8  [x] Oral  [] Ax  [] Esoph   Labs: []  Pre  []  Post:   [x] N/A   Additional Orders (medications, blood products, hypotension management): [] Yes   [x] No     [x]  [facility] Consent Verified     CATHETER ACCESS:  []N/A   [x]Right   []Left   []IJ   []Fem  [x]Chest wall  []TransHepatic   [] First use X-ray verified     [x]Tunnel    [] Non Tunneled   [x]No S/S infection  []Redness  []Drainage []Cultured []Swelling []Pain   [x]Medical Aseptic Prep Utilized   []Dressing Changed  [] Biopatch  Date: 11/02/21   []Clotted   [x]Patent   Flows: [x]Good  []Poor  []Reversed   If access problem,  notified: []Yes    [x]N/A        GRAFT/FISTULA ACCESS:   [x]N/A     []Right     []Left     []UE     []LE                   GENERAL ASSESSMENT:    LUNGS:  Resp Rate 18   [x] Clear  [] Coarse  [] Crackles  [] Wheezing  [] Diminished                                                           [] RLL   [] LLL  [] RUL   [] STEPHY            Respirations:  []Easy  []Labored  []N/A  Cough:  []Productive  []Dry  []N/A               Therapy:  [x]RA   [] Ventilated   [] Intubated   [] Trach            O2 Device:  [] NC   [] NRB  [] Trach Mask  [] BiPaP  Flow:   l/min                                                    CARDIAC: [] Regular      [] Irregular   [] Rhythm: not monitored          [] Monitored   [] Bedside   [] Remotely monitored       EDEMA: [x] None   []Generalized  [] Pitting [] 1+   [] 2 +   [] 3+    [] 4+        SKIN:   [] Hot     [] Cold    [x] Warm   [x] Dry    [] Diaphoretic                 [] Flushed  [] Jaundiced  [] Cyanotic  [] Pale      LOC:    [x] Alert      [x]Oriented:    [x] Person     [] Place   []Time               [] Confused  [] Lethargic  [] Medicated  [] Non-responsive  [] Non-Verbal     GI / ABDOMEN:                     [] Flat    [] Distended    [x] Soft    [] Firm   []  Obese                   [] Diarrhea   [] FMS [x] Bowel Sounds  [] Nausea  [] Vomiting                   [] NGT  [] OGT  [] PEG  [] Tube Feedings @     mL/hr     / URINE ASSESSMENT:                   [] Voiding    [] Oliguria  [x] Anuria                     []  Khan   [] Incontinent  []  Incontinent Brief   []  PureWick     PAIN:  [x] 0 []1  []2   []3   []4   []5   []6   []7   []8   []9   []10                MOBILITY:  [x] Bed    [] Stretcher      All Vitals and Treatment Details on [address]: [facility]          Room # 039/79    [x] Routine         [] 1st Time Acute/Chronic   [] Urgent      [] Stat            [x] Acute Room   []  Bedside    [] ICU/CCU     [] ER     Isolation Precautions:  [x] Dialysis    There are currently no Active Isolations     ALLERGIES:     Allergies   Allergen Reactions    Amoxicillin Unknown (comments)    Cleocin [Clindamycin Hcl] Unknown (comments)    Codeine Unknown (comments)    Lorcet 10/650 [Hydrocodone-Acetaminophen] Unknown (comments)    Penicillin G Benzathine Unknown (comments)    Shellfish Derived Unknown (comments)        Code Status:  Full Code     Hepatitis Status      Lab Results   Component Value Date/Time    Hepatitis B surface Ag <0.10 09/25/2020 05:31 PM    Hepatitis B surface Ab 63.01 09/25/2020 05:31 PM    Hepatitis B core, IgM NEGATIVE  08/11/2018 03:29 AM    Hep B Core Ab, IgM NEGATIVE  08/13/2018 04:34 AM    Hep B Core Ab, total NEGATIVE  08/13/2018 04:34 AM        Current Labs:      Lab Results   Component Value Date/Time    WBC 11.5 11/08/2021 04:22 AM    Hemoglobin, POC 13.3 11/08/2019 07:26 AM    HGB 8.4 (L) 11/08/2021 04:22 AM    Hematocrit, POC 39 11/08/2019 07:26 AM    HCT 25.5 (L) 11/08/2021 04:22 AM    PLATELET 506 41/59/6749 04:22 AM    MCV 87.6 11/08/2021 04:22 AM     Lab Results   Component Value Date/Time    Sodium 131 (L) 11/08/2021 04:22 AM    Potassium 3.8 11/08/2021 04:22 AM    Chloride 96 (L) 11/08/2021 04:22 AM    CO2 26 11/08/2021 04:22 AM    Anion gap 9 11/08/2021 04:22 AM    Glucose 198 (H) 11/08/2021 04:22 AM    BUN 43 (H) 11/08/2021 04:22 AM    Creatinine 10.10 (H) 11/08/2021 04:22 AM    BUN/Creatinine ratio 4 (L) 11/08/2021 04:22 AM    GFR est AA 5 (L) 11/08/2021 04:22 AM    GFR est non-AA 4 (L) 11/08/2021 04:22 AM    Calcium 9.5 11/08/2021 04:22 AM          DIET:  DIET ADULT     PRIMARY NURSE REPORT:   Pre Dialysis: Lex Johnson RN    Time: 1260      EDUCATION:    [x] Patient           Knowledge Basis: []None [x]Minimal [] Substantial [] Unknown  Barriers to learning  [x]None  [] Intubated/Trached/Ventilated  [] Sedated/Paralyzed   [] Access Care     [] S&S of infection  [] Fluid Management  [] K+   [x] Procedural    [] Medications   [] Tx Options   [] Transplant   [] Diet      Teaching Tools:  [x] Explain  [] Demo  [] Handouts [] Video  Patient response: [] Verbalized understanding   [x] Requires follow up        [x] Time Out/Safety Check    [x] Extracorporeal Circuit Tested for integrity       RO/HEMODIALYSIS MACHINE SAFETY CHECKS - Before each treatment:        [facility]                                    [x] Unit Machine # 7 with centralized RO                                                                                                                                     Alarm Test:  Pass time 0913            [x] RO/Machine Log Complete    Machine Temp    36-37*C             Dialysate: pH  7.4    Conductivity: Meter 14.0    HD Machine  14.2     TCD: 14.1  Dialyzer Lot # [id number]     Blood Tubing Lot # [id number]     Saline Lot # [id number]     CHLORINE TESTING-Before each treatment and every 4 hours    Total Chlorine: [x] less than 0.1 ppm  Initial Time Check: 0900       4 Hr/2nd Check Time: N/A   (if greater than 0.1 ppm from Primary then every 30 minutes from Secondary)     TREATMENT INITIATION - with Dialysis Precautions:   [x] All Connections Secured              [x] Saline Line Double Clamped   [x] Venous Parameters Set               [x] Arterial Parameters Set    [x] Prime Given 250ml NSS              [x]Air Foam Detector Engaged        Treatment Initiation Note:  0925--Pt arrived to dialysis unit. Pt is alert to self. Pt denies any complaints. On room air. Pt Left lower arm AVF thrill is strong, bruit present and no S/S of infection. 0928--HD initiated without difficulty. During Treatment Notes:  0930  Face & Vascular access visible with art and elba line connections intact. Pt tolerating dialysis. 0945  Face & Vascular access visible with art and elba line connections intact. Pt tolerating dialysis. 1000  Face & Vascular access visible with art and elba line connections intact. Pt tolerating dialysis. 1015  Face & Vascular access visible with art and elba line connections intact. Pt tolerating dialysis. 1030  Face & Vascular access visible with art and elba line connections intact. Pt tolerating dialysis. 1045  Face & Vascular access visible with art and elba line connections intact. Pt tolerating dialysis. 1100  Face & Vascular access visible with art and elba line connections intact. Pt tolerating dialysis. 1115  Face & Vascular access visible with art and elba line connections intact. Pt tolerating dialysis. 1130  Face & Vascular access visible with art and elba line connections intact. Pt tolerating dialysis. 1145  Face & Vascular access visible with art and elba line connections intact. Pt tolerating dialysis. 1200  Face & Vascular access visible with art and elba line connections intact. Pt tolerating dialysis. 1200  Dialysis treatment complete.        Medication Dose    Volume Route      Time       [facility] Nurse   none   HD  Claude Varma RN   Post Assessment  Dialyzer Cleared:   [] Good  [x] Fair  [] Poor  Blood processed:  80.0 L  UF Removed:  2000 Ml    Post BP: 153/72  Pulse: 87  Respirations: 18   Temp: 97.6  [x] Oral  [] Ax  [] Esophageal   Lungs: [] Clear                [x] No change from initial assessment   Post Tx Vascular Access: [x] N/A     Cardiac:  [] Regular   [] Irregular   Rhythm:  [] Monitored   [x] Not Monitored    CVC Catheter: [] N/A  Locking solution: Heparin 1:1000 U  Arterial port 1.6 ml   Venous port 1.6 ml   Edema:  [x] None  [] Generalized                     Skin:[x] Warm  [x] Dry [] Diaphoretic               [] Flushed  [] Pale [] Cyanotic Pain:  [x]0  []1-2  []3-4  []5-6   []7-8  []9-10         Post Treatment Note:   Pt tolerated dialysis well. Dialysis catheter intact, patent and heplocked as noted above. POST TREATMENT PRIMARY NURSE HANDOFF REPORT:   Post Dialysis: SARTHAK Rapp RN        Time:  18       Abbreviations: AVG-arterial venous graft, AVF-arterial venous fistula, IJ-Internal Jugular, Subcl-Subclavian, Fem-Femoral, Tx-treatment, AP/HR-apical heart rate, VSS- Vital Signs Stable, CVC- Central Venous Catheter, DFR-dialysate flow rate, BFR-blood flow rate, AP-arterial pressure, -venous pressure, UF-ultrafiltrate, TMP-transmembrane pressure, Ray-Venous, Art-Arterial, RO-Reverse Osmosis

## 2021-11-08 NOTE — PROGRESS NOTES
Bedside shift change report given to Ina Barlow W.RN (oncoming nurse) by Mona Hopkins (offgoing nurse). Report included the following information SBAR, Intake/Output, MAR, Recent Results and Cardiac Rhythm sinus rhythm. Coughing noted after attempting to drink regular thin liquids and thickened liquids. Pt was able to tolerate pudding without any difficulty. Medications were crushed and mixed with pudding. Strict aspiration precautions maintained with suction equipment at bedside. Speech consult ordered per protocol to re-evaluate. Information passed on to oncoming nurse. Pt rested well throughout the night. Blood pressure well maintained on current scheduled medications.

## 2021-11-08 NOTE — ROUTINE PROCESS
TRANSFER - IN REPORT:    Verbal report received on Evaristo Hopkins being received from DIALYSIS(unit) for routine progression of care      Report consisted of patients Situation, Background, Assessment and   Recommendations(SBAR). Information from the following report(s) SBAR and Intake/Output was reviewed with the sending nurse. Opportunity for questions and clarification was provided.       Removed 2L, last vitals 153/72, HR 87, T 97.6

## 2021-11-08 NOTE — PROGRESS NOTES
[facility]  [facility]    Patient: Evaristo Hopkins Age: 74 y.o. : 1955 MR#: [id number] SSN: [id number]  Date/Time: 2021    Subjective:     Pt alert and awake, seen with nursing assistant in room. She did not know the year and did not know where she was. Assessment/Plan:   74year old female with past medical seizures, bipoar order, ESRD among many other medical conditions who was admitted after presenting with chief complaint of chest pain:    -Chest pain somewhat atypical, elevated troponin: Status post cardiology evaluation with no further work-up or risk stratification recommended given the atypical nature, comorbidities and current functional state  -Acute metabolic encephalopathy ?due to post ictal state as per nursing was found to have blood clot in her mouth, also with very elevated bp's, also with new finding on brain CT? Stroke? discussed with neurology, overall improved today awake and alert. , no further work up recommended by neurology. On AED'. -Hypertensive emergency in this patient with possible seizure and very elevated blood pressures discussed with nursing, her last blood pressure today 120'R systolic. HISTORY OF:  -ESRD on hemodialysis  -Type 2 diabetes mellitus  -Seizure disorder  -Bipolar disorder versus schizophrenia both listed in the electronic chart    PLAN:  -Cont current bp regimen as her bp seems to have improved. \  -Cont AED's  -Change seizure medications to IV form as her p.o. intake is unreliable at this time.       Dispo: pt medically cleared for d/c, per CM tomorrow is the earliest she can be transferred back to NH.  additional Notes:      Case discussed with:  [x]Patient  []Family  [x]Nursing  []Case Management  DVT Prophylaxis:  []Lovenox  [x]Hep SQ  []SCDs  []Coumadin   []On Heparin gtt    Objective:   VS:   Visit Vitals  /80 (BP 1 Location: Left upper arm, BP Patient Position: At rest)   Pulse 82   Temp 99.4 °F (37.4 °C)   Resp 18   Ht 5' 3\" (1.6 m)   Wt 50.8 kg (111 lb 14.4 oz)   LMP  (LMP Unknown)   SpO2 95%   BMI 19.82 kg/m²      Tmax/24hrs: Temp (24hrs), Av.5 °F (36.9 °C), Min:97.7 °F (36.5 °C), Max:99.4 °F (37.4 °C)    Input/Output:   No intake or output data in the 24 hours ending 21 2335    General:  Alert, awake, in NAD, oriented to self only  Cardiovascular:  Rrr, no murmurs  Pulmonary:  ctab  GI:  Soft nt nd  Extremities: No edema   Additional:      Labs:    Recent Results (from the past 24 hour(s))   GLUCOSE, POC    Collection Time: 21  6:38 AM   Result Value Ref Range    Glucose (POC) 216 (H) 70 - 110 mg/dL   GLUCOSE, POC    Collection Time: 21 11:07 AM   Result Value Ref Range    Glucose (POC) 174 (H) 70 - 110 mg/dL   EKG, 12 LEAD, INITIAL    Collection Time: 21 11:20 AM   Result Value Ref Range    Ventricular Rate 84 BPM    Atrial Rate 84 BPM    P-R Interval 120 ms    QRS Duration 78 ms    Q-T Interval 394 ms    QTC Calculation (Bezet) 465 ms    Calculated P Axis 63 degrees    Calculated R Axis -51 degrees    Calculated T Axis 75 degrees    Diagnosis       Normal sinus rhythm  Left anterior fascicular block  Nonspecific ST and T wave abnormality  Abnormal ECG  When compared with ECG of 2021 06:54,  Questionable change in QRS axis  Confirmed by Nadia Santos ([id number]) on 2021 12:05:20 PM     CBC W/O DIFF    Collection Time: 21 11:41 AM   Result Value Ref Range    WBC 11.5 4.6 - 13.2 K/uL    RBC 3.16 (L) 4.20 - 5.30 M/uL    HGB 9.0 (L) 12.0 - 16.0 g/dL    HCT 28.0 (L) 35.0 - 45.0 %    MCV 88.6 78.0 - 100.0 FL    MCH 28.5 24.0 - 34.0 PG    MCHC 32.1 31.0 - 37.0 g/dL    RDW 16.6 (H) 11.6 - 14.5 %    PLATELET 697 869 - 338 K/uL    MPV 10.2 9.2 - 90.8 FL   METABOLIC PANEL, BASIC    Collection Time: 21 11:41 AM   Result Value Ref Range    Sodium 132 (L) 136 - 145 mmol/L    Potassium 4.0 3.5 - 5.5 mmol/L    Chloride 97 (L) 100 - 111 mmol/L    CO2 27 21 - 32 mmol/L Anion gap 8 3.0 - 18 mmol/L    Glucose 189 (H) 74 - 99 mg/dL    BUN 35 (H) 7.0 - 18 MG/DL    Creatinine 8.24 (H) 0.6 - 1.3 MG/DL    BUN/Creatinine ratio 4 (L) 12 - 20      GFR est AA 6 (L) >60 ml/min/1.73m2    GFR est non-AA 5 (L) >60 ml/min/1.73m2    Calcium 9.7 8.5 - 10.1 MG/DL   C REACTIVE PROTEIN, QT    Collection Time: 11/07/21 11:41 AM   Result Value Ref Range    C-Reactive protein 7.3 (H) 0 - 0.3 mg/dL   SED RATE (ESR)    Collection Time: 11/07/21 11:41 AM   Result Value Ref Range    Sed rate, automated 63 (H) 0 - 30 mm/hr   GLUCOSE, POC    Collection Time: 11/07/21  3:28 PM   Result Value Ref Range    Glucose (POC) 230 (H) 70 - 110 mg/dL   GLUCOSE, POC    Collection Time: 11/07/21  9:04 PM   Result Value Ref Range    Glucose (POC) 289 (H) 70 - 110 mg/dL     Additional Data Reviewed:      Signed By: Marisol Mckeon MD     November 7, 2021 5:20 PM

## 2021-11-08 NOTE — PROGRESS NOTES
Speech Pathology:    Unable to complete MBS as pt is currently in dialysis. Will complete next business date or as medical condition permits.      Thank you for this referral.    Lillian Martinez M.S. CCC-SLP/L  Speech-Language Pathologist

## 2021-11-08 NOTE — DIABETES MGMT
Diabetes/ Glycemic Control Plan of Care  Recommendations:   · Suggest starting conservative dose of basal insulin starting with 4 units Lantus/day (for blood glucose readings for last 5 out of 6 blood glucose readings 198 mg/dL and > and use of 19 units corrective lispro 11/7/21). 1013  Addendum:  Order obtained and entered for 4 units Lantus/day. Assessment:   Pt with past medical history including T2DM (A1C - 6.0%, PTA use of corrective lispro), ESRD- HD, CVA, seizure, HTN. Current blood glucose readings trending up. DX:   1. Chest pain, unspecified type     2. Other chest pain        Fasting/ Morning blood glucose:   Lab Results   Component Value Date/Time    Glucose 198 (H) 11/08/2021 04:22 AM    Glucose (POC) 198 (H) 11/08/2021 07:24 AM    Glucose,  (H) 11/08/2019 07:26 AM     IV Fluids containing dextrose: None    Steroids:   None    Blood glucose values:   11/8:  Lab - 198;  POC - 198, 198  11/7:  Lab - 189;  POC - 216, 174, 230, 289        Within target range ( mg/dL): No    Current insulin orders: corrective lispro, very insulin resistant dosing ACHS     Total Daily Dose previous 24 hours = 19 units (corrective lispro)    Current A1c:   Lab Results   Component Value Date/Time    Hemoglobin A1c 6.0 (H) 11/06/2021 03:47 AM      equivalent  to ave Blood Glucose of 140 mg/dl for 2-3 months prior to admission    Adequate glycemic control PTA: Yes. Nutrition/Diet:   Active Orders   Diet    ADULT DIET Dysphagia - Soft & Bite Sized      Meal Intake:  Patient Vitals for the past 168 hrs:   % Diet Eaten   11/08/21 0809 26 - 50%     Supplement Intake:  No data found.      Home diabetes medications:   Key Antihyperglycemic Medications             insulin lispro (HUMALOG) 100 unit/mL injection (Taking) INITIATE INSULIN CORRECTIVE PROTOCOL: Normal Insulin Sensitivity   For Blood Sugar (mg/dL) of:     Less than 150 =   0 units           150 -199 =   2 units  200 -249 =   4 units  250 -299 =   6 units  300 -349 =   8 units  350 and above = 10 units and Call Physician  If 2 glucose readings are above          Plan/Goals:   Blood glucose will be within target of 70 - 180 mg/dl within 72 hours    Education:  [] Refer to Diabetes Education Record                       [x] Education not indicated at this time     Emilee Libman,  MS, RD, CDCES  Diabetes and Glycemic Control   [employer]

## 2021-11-08 NOTE — PROGRESS NOTES
Hospitalist Progress Note    Patient: Vishnu Baeza Age: 74 y.o. : 1955 MR#: [id number] SSN: [id number]  Date/Time: 2021 3:23 PM    DOA: 11/3/2021  PCP: Kayy Del Valle MD    Subjective:     She was seen post Dialysis. She has been more calm since starting on Geodon  She expressed intermittent chest pain. Nursing noted that she cough with oral fluid intake. MBS demonstrated silent aspiration. NPO was order this afternoon. I spoke with her sister who expressed that her sister would not tolerate NG or PEG tube. She expressed that her sister enjoy feed yesterday and she wanted her sister to continue feed. Palliative care follow up for goal of care   Xray without fx    Interval Hospital Course:        ROS: Limited but able to answer no pain in her head, no pain in her neck, no pain in her stomach, +pain in the left chest reproducible, no shortness of breath, no hip pain, + leg pain    Assessment/Plan:     1. Acute confusion, agitation associated with hypertensive emergency       Hypertensive encephalopathy  2. Chest pain, recurrent, indeterminant troponin level elevation  3. Persistent moderate pericardial effusion previously on echocardiogram associated with volume overload in the setting of end-stage renal disease  4. Acute on chronic HFpEF   5. End-stage renal disease on dialysis  6. Bipolar disorder  7. Seizure disorder stable on Keppra doses  8. Normocytic anemia with CKD  9. Fall, mechanical, without  Injury  10. Dysphagia with aspiration risk     I spoke with her sister extensively on the risk of aspiration with her current feed. I recommended PEG but sister declined at this time. She agreed to resume comfort feed for her sister.  She is awaiting for palliative care discussion  SBP is better controlled   continue with aggressive BP control  HD continue per Nephrology   continue colchicine follow-up pericardial effusion plus chest pain pathology  Spoke with nursing for fall prevention and fall precaution  Continue Geodon BID.  stop Seroquel,   Appreciate cardiology consult  Appreciate nephrology consult  We will have her with PT OT if tolerated  Seizure precaution  Nutritional support    Full code.   Palliative care consult on Monday for goals of care with family  Disposition: Pending clinical status  Called to sister [phone]    Additional Notes:  Time spent> 35 minutes    Case discussed with:  [x]Patient  []Family  []Nursing  []Case Management  DVT Prophylaxis:  []Lovenox  [x]Hep SQ  []SCDs  []Coumadin   []On Heparin gtt    Signed By: Justyna Hunter MD     2021 3:23 PM              Objective:   VS:   Visit Vitals  BP (!) 153/72   Pulse 87   Temp 97.6 °F (36.4 °C) (Oral)   Resp 18   Ht 5' 3\" (1.6 m)   Wt 50.8 kg (111 lb 14.4 oz)   LMP  (LMP Unknown)   SpO2 97%   BMI 19.82 kg/m²      Tmax/24hrs: Temp (24hrs), Av.7 °F (37.1 °C), Min:97.6 °F (36.4 °C), Max:99.4 °F (37.4 °C)      Intake/Output Summary (Last 24 hours) at 2021 1523  Last data filed at 2021 1210  Gross per 24 hour   Intake 120 ml   Output 2000 ml   Net -1880 ml       Tele:   General:  agitated, In acute distress,  HEENT: PERRL, EOMI, supple neck, no JVD, dry oral mucosa  Cardiovascular: S1S2 regular, no rub/gallop   Pulmonary: air entry bilaterally, no wheezing, + crackle  GI:  Soft, non tender, non distended, +bs, no guarding   Extremities:  No pedal edema, +distal pulses appreciated   Neuro: AOx2, moving all extremities    Additional:       Current Facility-Administered Medications   Medication Dose Route Frequency    insulin glargine (LANTUS) injection 4 Units  4 Units SubCUTAneous DAILY    [START ON 2021] atorvastatin (LIPITOR) tablet 20 mg  20 mg Oral DAILY    ziprasidone (GEODON) capsule 20 mg  20 mg Oral BID WITH MEALS    famotidine (PEPCID) tablet 20 mg  20 mg Oral DAILY    colchicine (MITIGARE) capsule 0.6 mg  0.6 mg Oral Q MON, WED & FRI    isosorbide mononitrate ER (IMDUR) tablet 60 mg  60 mg Oral DAILY    amLODIPine (NORVASC) tablet 10 mg  10 mg Oral DAILY    carvediloL (COREG) tablet 25 mg  25 mg Oral BID WITH MEALS    losartan (COZAAR) tablet 100 mg  100 mg Oral DAILY    hydrALAZINE (APRESOLINE) tablet 100 mg  100 mg Oral TID    levETIRAcetam (KEPPRA) tablet 500 mg  500 mg Oral Q24H    levETIRAcetam (KEPPRA) tablet 250 mg  250 mg Oral Q MON, WED & FRI    heparin (porcine) 1,000 unit/mL injection 5,000 Units  5,000 Units IntraCATHeter DIALYSIS PRN    hydrALAZINE (APRESOLINE) 20 mg/mL injection 10 mg  10 mg IntraVENous Q6H PRN    insulin lispro (HUMALOG) injection   SubCUTAneous AC&HS    glucose chewable tablet 16 g  4 Tablet Oral PRN    glucagon (GLUCAGEN) injection 1 mg  1 mg IntraMUSCular PRN    dextrose (D50W) injection syrg 12.5-25 g  25-50 mL IntraVENous PRN    sodium chloride (NS) flush 5-40 mL  5-40 mL IntraVENous Q8H    sodium chloride (NS) flush 5-40 mL  5-40 mL IntraVENous PRN    acetaminophen (TYLENOL) tablet 650 mg  650 mg Oral Q6H PRN    Or    acetaminophen (TYLENOL) suppository 650 mg  650 mg Rectal Q6H PRN    polyethylene glycol (MIRALAX) packet 17 g  17 g Oral DAILY PRN    heparin (porcine) injection 5,000 Units  5,000 Units SubCUTAneous Q8H    aspirin chewable tablet 81 mg  81 mg Oral DAILY    sevelamer carbonate (RENVELA) tab 800 mg  800 mg Oral TID    cloNIDine (CATAPRES) 0.3 mg/24 hr patch 1 Patch  1 Patch TransDERmal Q7D    B complex-vitaminC-folic acid (NEPHROCAP) cap  1 Capsule Oral DAILY            Lab/Data Review:  Labs: Results:       Chemistry Recent Labs     11/08/21  0422 11/07/21  1141   * 189*   * 132*   K 3.8 4.0   CL 96* 97*   CO2 26 27   BUN 43* 35*   CREA 10.10* 8.24*   BUCR 4* 4*   AGAP 9 8   CA 9.5 9.7   PHOS 3.9  --      No results for input(s): TBIL, ALT, ALKP, TP, ALB, GLOB, AGRAT in the last 72 hours.     No lab exists for component: SGOT   CBC w/Diff Recent Labs     11/08/21  0422 11/07/21  1141 11/07/21  1141 WBC 11.5  --  11.5   RBC 2.91*  --  3.16*   HGB 8.4*  --  9.0*   HCT 25.5*  --  28.0*   MCV 87.6   < > 88.6   MCH 28.9   < > 28.5   MCHC 32.9   < > 32.1   RDW 16.8*   < > 16.6*     --  264   GRANS 79*  --   --    LYMPH 7*  --   --    EOS 4  --   --     < > = values in this interval not displayed. Coagulation No results for input(s): PTP, INR, APTT, INREXT, INREXT in the last 72 hours. Iron/Ferritin Lab Results   Component Value Date/Time    Iron 46 (L) 03/20/2021 02:02 PM    TIBC 173 (L) 03/20/2021 02:02 PM    Iron % saturation 27 03/20/2021 02:02 PM    Ferritin 1,396 (H) 03/20/2021 02:02 PM       BNP    Cardiac Enzymes Lab Results   Component Value Date/Time     (H) 11/04/2021 08:40 AM    CK - MB 3.8 (H) 11/04/2021 08:40 AM    CK-MB Index 0.9 11/04/2021 08:40 AM    Troponin-I, QT 0.06 (H) 11/04/2021 08:40 AM        Lactic Acid    Thyroid Studies          All Micro Results     Procedure Component Value Units Date/Time    COVID-19 RAPID TEST [[id number]] Collected: 11/03/21 2132    Order Status: Completed Specimen: Nasopharyngeal Updated: 11/03/21 2205     Specimen source Nasopharyngeal        COVID-19 rapid test Not detected        Comment: Rapid Abbott ID Now       Rapid NAAT:  The specimen is NEGATIVE for SARS-CoV-2, the novel coronavirus associated with COVID-19. Negative results should be treated as presumptive and, if inconsistent with clinical signs and symptoms or necessary for patient management, should be tested with an alternative molecular assay. Negative results do not preclude SARS-CoV-2 infection and should not be used as the sole basis for patient management decisions. This test has been authorized by the FDA under an Emergency Use Authorization (EUA) for use by authorized laboratories.    Fact sheet for Healthcare Providers: iTendency.uy  Fact sheet for Patients: iTendency.uy       Methodology: Isothermal Nucleic Acid Amplification                 Images:    CT (Most Recent). CT Results (most recent):  Results from Hospital Encounter encounter on 11/03/21    CT HEAD WO CONT    Narrative  CT HEAD WO CONT    History: Seizure, altered, fatigue. Comparison: 3/17/2021    TECHNIQUE: 5 mm helical scan obtained of the head were obtained from the skull  vertex through the base of the skull without intravenous contrast.    All CT scans at this facility are performed using dose optimization technique as  appropriate to a performed exam, to include automated exposure control,  adjustment of the mA and/or kV according to patient size (including appropriate  matching first site-specific examinations), or use of iterative reconstruction  technique. BRAIN RESULT:    Similar mild generalized volume loss. Similar left frontal encephalomalacia. There is a right basal ganglia small amount which appears new since 3/17/2021  but ages otherwise indeterminate. No midline shift. No acute intracranial  hemorrhage. Basilar cisterns are patent. Orbits, soft tissues are grossly normal. Hyperostosis. Limited visualized  paranasal sinuses are patent. Large left and trace right mastoid effusions. Impression  New since 3/17/2021 subcentimeter right basal ganglia infarct but age is  otherwise indeterminate. If clarification is desired, may obtain MR. New large left and trace right mastoid effusions. Otherwise no significant interval change. XRAY (Most Recent)      EKG No results found for this or any previous visit. 2D ECHO 11/03/21    ECHO ADULT FOLLOW-UP OR LIMITED 11/04/2021 11/4/2021    Interpretation Summary  · LV: Estimated LVEF is 60 - 65%. Normal cavity size. Severe concentric hypertrophy. Hyperdynamic systolic function. · Pericardium: Moderate circumferential pericardial effusion measuring 1.3 mm. · No indications of tamponade present. Respiratory variation of mitral valve inflow is < 30%.     Signed by: Lenita Phoenix, MD on 11/4/2021  2:14 PM

## 2021-11-09 ENCOUNTER — APPOINTMENT (OUTPATIENT)
Dept: CT IMAGING | Age: 66
DRG: 314 | End: 2021-11-09
Attending: INTERNAL MEDICINE
Payer: MEDICARE

## 2021-11-09 LAB
ALBUMIN SERPL-MCNC: 2.9 G/DL (ref 3.4–5)
ALBUMIN/GLOB SERPL: 0.7 {RATIO} (ref 0.8–1.7)
ALP SERPL-CCNC: 68 U/L (ref 45–117)
ALT SERPL-CCNC: 19 U/L (ref 13–56)
AMMONIA PLAS-SCNC: 22 UMOL/L (ref 11–32)
ANION GAP SERPL CALC-SCNC: 7 MMOL/L (ref 3–18)
AST SERPL-CCNC: 17 U/L (ref 10–38)
BILIRUB SERPL-MCNC: 0.3 MG/DL (ref 0.2–1)
BUN SERPL-MCNC: 28 MG/DL (ref 7–18)
BUN/CREAT SERPL: 4 (ref 12–20)
CALCIUM SERPL-MCNC: 9.6 MG/DL (ref 8.5–10.1)
CHLORIDE SERPL-SCNC: 99 MMOL/L (ref 100–111)
CO2 SERPL-SCNC: 29 MMOL/L (ref 21–32)
CREAT SERPL-MCNC: 7.71 MG/DL (ref 0.6–1.3)
ERYTHROCYTE [DISTWIDTH] IN BLOOD BY AUTOMATED COUNT: 17.1 % (ref 11.6–14.5)
GLOBULIN SER CALC-MCNC: 4 G/DL (ref 2–4)
GLUCOSE BLD STRIP.AUTO-MCNC: 165 MG/DL (ref 70–110)
GLUCOSE BLD STRIP.AUTO-MCNC: 304 MG/DL (ref 70–110)
GLUCOSE BLD STRIP.AUTO-MCNC: 71 MG/DL (ref 70–110)
GLUCOSE BLD STRIP.AUTO-MCNC: 74 MG/DL (ref 70–110)
GLUCOSE BLD STRIP.AUTO-MCNC: 76 MG/DL (ref 70–110)
GLUCOSE BLD STRIP.AUTO-MCNC: 92 MG/DL (ref 70–110)
GLUCOSE SERPL-MCNC: 257 MG/DL (ref 74–99)
HCT VFR BLD AUTO: 27.7 % (ref 35–45)
HGB BLD-MCNC: 8.8 G/DL (ref 12–16)
LIPASE SERPL-CCNC: 306 U/L (ref 73–393)
MCH RBC QN AUTO: 28.7 PG (ref 24–34)
MCHC RBC AUTO-ENTMCNC: 31.8 G/DL (ref 31–37)
MCV RBC AUTO: 90.2 FL (ref 78–100)
PLATELET # BLD AUTO: 305 K/UL (ref 135–420)
PMV BLD AUTO: 11.1 FL (ref 9.2–11.8)
POTASSIUM SERPL-SCNC: 4.1 MMOL/L (ref 3.5–5.5)
PROT SERPL-MCNC: 6.9 G/DL (ref 6.4–8.2)
RBC # BLD AUTO: 3.07 M/UL (ref 4.2–5.3)
SODIUM SERPL-SCNC: 135 MMOL/L (ref 136–145)
WBC # BLD AUTO: 10.4 K/UL (ref 4.6–13.2)

## 2021-11-09 PROCEDURE — 85027 COMPLETE CBC AUTOMATED: CPT

## 2021-11-09 PROCEDURE — 74011636637 HC RX REV CODE- 636/637: Performed by: INTERNAL MEDICINE

## 2021-11-09 PROCEDURE — 99232 SBSQ HOSP IP/OBS MODERATE 35: CPT | Performed by: INTERNAL MEDICINE

## 2021-11-09 PROCEDURE — 83690 ASSAY OF LIPASE: CPT

## 2021-11-09 PROCEDURE — 74011000258 HC RX REV CODE- 258

## 2021-11-09 PROCEDURE — 74177 CT ABD & PELVIS W/CONTRAST: CPT

## 2021-11-09 PROCEDURE — 74011250637 HC RX REV CODE- 250/637: Performed by: STUDENT IN AN ORGANIZED HEALTH CARE EDUCATION/TRAINING PROGRAM

## 2021-11-09 PROCEDURE — 74011000636 HC RX REV CODE- 636: Performed by: INTERNAL MEDICINE

## 2021-11-09 PROCEDURE — 82962 GLUCOSE BLOOD TEST: CPT

## 2021-11-09 PROCEDURE — 80053 COMPREHEN METABOLIC PANEL: CPT

## 2021-11-09 PROCEDURE — 74011250637 HC RX REV CODE- 250/637: Performed by: INTERNAL MEDICINE

## 2021-11-09 PROCEDURE — 92526 ORAL FUNCTION THERAPY: CPT

## 2021-11-09 PROCEDURE — 74011250637 HC RX REV CODE- 250/637: Performed by: NURSE PRACTITIONER

## 2021-11-09 PROCEDURE — 2709999900 HC NON-CHARGEABLE SUPPLY

## 2021-11-09 PROCEDURE — 82140 ASSAY OF AMMONIA: CPT

## 2021-11-09 PROCEDURE — 36415 COLL VENOUS BLD VENIPUNCTURE: CPT

## 2021-11-09 PROCEDURE — 74011000258 HC RX REV CODE- 258: Performed by: INTERNAL MEDICINE

## 2021-11-09 PROCEDURE — 99233 SBSQ HOSP IP/OBS HIGH 50: CPT | Performed by: NURSE PRACTITIONER

## 2021-11-09 PROCEDURE — 65660000000 HC RM CCU STEPDOWN

## 2021-11-09 PROCEDURE — 74011250636 HC RX REV CODE- 250/636: Performed by: INTERNAL MEDICINE

## 2021-11-09 PROCEDURE — 74011250636 HC RX REV CODE- 250/636: Performed by: NURSE PRACTITIONER

## 2021-11-09 RX ORDER — FACIAL-BODY WIPES
10 EACH TOPICAL DAILY PRN
Status: DISCONTINUED | OUTPATIENT
Start: 2021-11-09 | End: 2021-11-10 | Stop reason: HOSPADM

## 2021-11-09 RX ORDER — SODIUM CHLORIDE 900 MG/100ML
INJECTION INTRAVENOUS
Status: COMPLETED
Start: 2021-11-09 | End: 2021-11-09

## 2021-11-09 RX ORDER — HYOSCYAMINE SULFATE 0.12 MG/1
0.12 TABLET SUBLINGUAL
Status: DISCONTINUED | OUTPATIENT
Start: 2021-11-09 | End: 2021-11-10 | Stop reason: HOSPADM

## 2021-11-09 RX ORDER — INSULIN GLARGINE 100 [IU]/ML
3 INJECTION, SOLUTION SUBCUTANEOUS EVERY 12 HOURS
Status: DISCONTINUED | OUTPATIENT
Start: 2021-11-10 | End: 2021-11-10 | Stop reason: HOSPADM

## 2021-11-09 RX ORDER — MORPHINE SULFATE 20 MG/ML
2.5 SOLUTION ORAL
Status: DISCONTINUED | OUTPATIENT
Start: 2021-11-09 | End: 2021-11-10

## 2021-11-09 RX ORDER — INSULIN GLARGINE 100 [IU]/ML
2 INJECTION, SOLUTION SUBCUTANEOUS ONCE
Status: COMPLETED | OUTPATIENT
Start: 2021-11-09 | End: 2021-11-09

## 2021-11-09 RX ORDER — LORAZEPAM 2 MG/ML
1 CONCENTRATE ORAL
Status: DISCONTINUED | OUTPATIENT
Start: 2021-11-09 | End: 2021-11-10 | Stop reason: HOSPADM

## 2021-11-09 RX ORDER — ONDANSETRON 4 MG/1
4 TABLET, ORALLY DISINTEGRATING ORAL
Status: DISCONTINUED | OUTPATIENT
Start: 2021-11-09 | End: 2021-11-10 | Stop reason: HOSPADM

## 2021-11-09 RX ADMIN — ATORVASTATIN CALCIUM 20 MG: 20 TABLET, FILM COATED ORAL at 08:08

## 2021-11-09 RX ADMIN — LORAZEPAM 1 MG: 2 SOLUTION, CONCENTRATE ORAL at 19:13

## 2021-11-09 RX ADMIN — DIATRIZOATE MEGLUMINE AND DIATRIZOATE SODIUM 30 ML: 600; 100 SOLUTION ORAL; RECTAL at 13:08

## 2021-11-09 RX ADMIN — MORPHINE SULFATE 2.6 MG: 10 SOLUTION ORAL at 21:28

## 2021-11-09 RX ADMIN — AMLODIPINE BESYLATE 10 MG: 10 TABLET ORAL at 08:15

## 2021-11-09 RX ADMIN — CARVEDILOL 25 MG: 25 TABLET, FILM COATED ORAL at 08:08

## 2021-11-09 RX ADMIN — LOSARTAN POTASSIUM 100 MG: 50 TABLET, FILM COATED ORAL at 08:08

## 2021-11-09 RX ADMIN — ZIPRASIDONE HYDROCHLORIDE 20 MG: 20 CAPSULE ORAL at 18:31

## 2021-11-09 RX ADMIN — SEVELAMER CARBONATE 800 MG: 800 TABLET, FILM COATED ORAL at 18:31

## 2021-11-09 RX ADMIN — CARVEDILOL 25 MG: 25 TABLET, FILM COATED ORAL at 18:31

## 2021-11-09 RX ADMIN — ZIPRASIDONE HYDROCHLORIDE 20 MG: 20 CAPSULE ORAL at 08:08

## 2021-11-09 RX ADMIN — HEPARIN SODIUM 5000 UNITS: 5000 INJECTION INTRAVENOUS; SUBCUTANEOUS at 06:41

## 2021-11-09 RX ADMIN — NEPHROCAP 1 CAPSULE: 1 CAP ORAL at 08:09

## 2021-11-09 RX ADMIN — MORPHINE SULFATE 2.6 MG: 10 SOLUTION ORAL at 20:20

## 2021-11-09 RX ADMIN — SEVELAMER CARBONATE 800 MG: 800 TABLET, FILM COATED ORAL at 08:09

## 2021-11-09 RX ADMIN — ACETAMINOPHEN 650 MG: 325 TABLET ORAL at 10:23

## 2021-11-09 RX ADMIN — MORPHINE SULFATE 2.6 MG: 10 SOLUTION ORAL at 19:13

## 2021-11-09 RX ADMIN — PROMETHAZINE HYDROCHLORIDE 25 MG: 25 INJECTION INTRAMUSCULAR; INTRAVENOUS at 10:35

## 2021-11-09 RX ADMIN — LORAZEPAM 1 MG: 2 SOLUTION, CONCENTRATE ORAL at 21:28

## 2021-11-09 RX ADMIN — SODIUM CHLORIDE 200 ML: 900 INJECTION INTRAVENOUS at 10:35

## 2021-11-09 RX ADMIN — ISOSORBIDE MONONITRATE 60 MG: 60 TABLET, EXTENDED RELEASE ORAL at 08:08

## 2021-11-09 RX ADMIN — Medication 10 ML: at 15:03

## 2021-11-09 RX ADMIN — FAMOTIDINE 20 MG: 20 TABLET ORAL at 08:08

## 2021-11-09 RX ADMIN — SEVELAMER CARBONATE 800 MG: 800 TABLET, FILM COATED ORAL at 21:27

## 2021-11-09 RX ADMIN — ASPIRIN 81 MG CHEWABLE TABLET 81 MG: 81 TABLET CHEWABLE at 08:08

## 2021-11-09 RX ADMIN — Medication 10 ML: at 06:41

## 2021-11-09 RX ADMIN — HYDRALAZINE HYDROCHLORIDE 100 MG: 50 TABLET, FILM COATED ORAL at 08:09

## 2021-11-09 RX ADMIN — HEPARIN SODIUM 5000 UNITS: 5000 INJECTION INTRAVENOUS; SUBCUTANEOUS at 15:04

## 2021-11-09 RX ADMIN — LORAZEPAM 1 MG: 2 SOLUTION, CONCENTRATE ORAL at 20:20

## 2021-11-09 RX ADMIN — IOPAMIDOL 80 ML: 612 INJECTION, SOLUTION INTRAVENOUS at 17:50

## 2021-11-09 RX ADMIN — INSULIN GLARGINE 2 UNITS: 100 INJECTION, SOLUTION SUBCUTANEOUS at 21:29

## 2021-11-09 RX ADMIN — HYDRALAZINE HYDROCHLORIDE 100 MG: 50 TABLET, FILM COATED ORAL at 18:31

## 2021-11-09 RX ADMIN — HYDRALAZINE HYDROCHLORIDE 100 MG: 50 TABLET, FILM COATED ORAL at 21:28

## 2021-11-09 RX ADMIN — LEVETIRACETAM 500 MG: 500 TABLET, FILM COATED ORAL at 08:08

## 2021-11-09 RX ADMIN — INSULIN LISPRO 12 UNITS: 100 INJECTION, SOLUTION INTRAVENOUS; SUBCUTANEOUS at 12:55

## 2021-11-09 RX ADMIN — INSULIN GLARGINE 4 UNITS: 100 INJECTION, SOLUTION SUBCUTANEOUS at 10:22

## 2021-11-09 RX ADMIN — INSULIN LISPRO 3 UNITS: 100 INJECTION, SOLUTION INTRAVENOUS; SUBCUTANEOUS at 10:23

## 2021-11-09 NOTE — DIABETES MGMT
Diabetes/ Glycemic Control Plan of Care  Recommendations:   1. Suggest increasing Lantus conservatively to 6 units/day in split dose of 3 units q 12 hours (to minimize hypoglycemia). 2. Continue corrective lispro, very insulin resistant dosing ACHS    1526  Addendum:  Order obtained and entered for 3 units Lantus q 12 hours starting in the AM (pt already received 4 units Lantus this morning so will add 2 units Lantus  - one time order for tonight). Assessment:   Pt with past medical history including T2DM (A1C - 6.0%, PTA use of corrective lispro), ESRD- HD, CVA, seizure, HTN. Pt with history of hypoglycemia with Lantus at 10 units/day at prior admission. Low dose of basal insulin initiated yesterday; pt with fasting blood glucose in target range (165 mg/dL) however pre-lunch reading elevated (304 mg/dL). Pt has received 15 units corrective lispro thus far today. Po intake < 50% per I/O's thereby adding meal time lispro not an option. DX:   1. Chest pain, unspecified type     2. Other chest pain        Fasting/ Morning blood glucose:   Lab Results   Component Value Date/Time    Glucose 257 (H) 11/09/2021 12:05 PM    Glucose (POC) 304 (H) 11/09/2021 11:24 AM    Glucose,  (H) 11/08/2019 07:26 AM     IV Fluids containing dextrose: None    Steroids:   None    Blood glucose values:   11/9:  Lab - 257 (1205);  POC - 165, 304  11/8:  Lab - 198;  POC - 198, 198, 134, 233  11/7:  Lab - 189;  POC - 216, 174, 230, 289        Within target range ( mg/dL):   No    Current insulin orders:   4 units Lantus/day  corrective lispro, very insulin resistant dosing [facility]     Total Daily Dose previous 24 hours =   11/8:  13 units (4 Lantus, 9 corrective lispro)  11/7:  19 units (corrective lispro)    Current A1c:   Lab Results   Component Value Date/Time    Hemoglobin A1c 6.0 (H) 11/06/2021 03:47 AM      equivalent  to ave Blood Glucose of 140 mg/dl for 2-3 months prior to admission    Adequate glycemic control PTA: Yes. Nutrition/Diet:   Active Orders   Diet    DIET NPO      Meal Intake:  Patient Vitals for the past 168 hrs:   % Diet Eaten   11/09/21 0824 1 - 25%   11/08/21 0809 26 - 50%     Supplement Intake:  No data found.      Home diabetes medications:   Key Antihyperglycemic Medications             insulin lispro (HUMALOG) 100 unit/mL injection (Taking) INITIATE INSULIN CORRECTIVE PROTOCOL: Normal Insulin Sensitivity   For Blood Sugar (mg/dL) of:     Less than 150 =   0 units           150 -199 =   2 units  200 -249 =   4 units  250 -299 =   6 units  300 -349 =   8 units  350 and above = 10 units and Call Physician  If 2 glucose readings are above          Plan/Goals:   Blood glucose will be within target of 70 - 180 mg/dl within 72 hours    Education:  [] Refer to Diabetes Education Record                       [x] Education not indicated at this time     Luis Alfredo Murillo  MS, RD, [facility]  Diabetes and Glycemic Control   [employer]

## 2021-11-09 NOTE — PROGRESS NOTES
11/09/21 @1145 spoke with RAYO Hairston. Pt labs from yesterday are out of range. RN will not sure if patient is on dialysis.  RN will consult with MD and call back-SIMIN

## 2021-11-09 NOTE — PROGRESS NOTES
Hospitalist Progress Note    Patient: Evaristo Hopkins Age: 74 y.o. : 1955 MR#: [id number] SSN: [id number]  Date/Time: 2021 9:53 AM    DOA: 11/3/2021  PCP: Nico Cardona MD    Subjective:     Nursing reported nausea/vomiting today. Patient has right upper abd pain. She has low grade fever last night   Otherwise she is eating ok    Yesterday:  MBS demonstrated silent aspiration. I spoke with her sister who expressed that her sister would not tolerate NG or PEG tube. She expressed that her sister enjoy feed yesterday and she wanted her sister to continue feed. Palliative care follow up for goal of care     Interval Hospital Course:        ROS: Limited but able to answer no pain in her head, no pain in her neck, + pain in her stomach, no pain in the left chest reproducible, no shortness of breath, no hip pain, no leg pain    Assessment/Plan:     1. Acute confusion, agitation associated with hypertensive emergency       Hypertensive encephalopathy  2. Chest pain, recurrent, indeterminant troponin level elevation  3. Persistent moderate pericardial effusion previously on echocardiogram associated with volume overload in the setting of end-stage renal disease  4. Acute on chronic HFpEF   5. End-stage renal disease on dialysis  6. Bipolar disorder  7. Seizure disorder stable on Keppra doses  8. Normocytic anemia with CKD  9. Fall, mechanical, without  Injury  10. Dysphagia with aspiration risk   11. Right upper quadrant pain, +nausea/vomiting       Check CT abd/pelv. Check lipase, CMP, CBC  Anti-emetic given  Palliative care follows up for goal of care. Sister declined NG tube, declined PEG tube, patient voice that she does not want tube. SBP is better controlled   continue with aggressive BP control  HD continue per Nephrology   continue colchicine follow-up pericardial effusion plus chest pain pathology  Spoke with nursing for fall prevention and fall precaution  Continue Geodon BID. stop Seroquel,   Appreciate cardiology consult  Appreciate nephrology consult  We will have her with PT OT if tolerated  Seizure precaution  Nutritional support    Full code.   Palliative care consult on Monday for goals of care with family  Disposition: Pending clinical status  Will Call to sister [phone] for updates    Additional Notes:  Time spent> 30 minutes    Case discussed with:  [x]Patient  []Family  []Nursing  []Case Management  DVT Prophylaxis:  []Lovenox  [x]Hep SQ  []SCDs  []Coumadin   []On Heparin gtt  Signed By: Rupal Larson MD     2021 9:53 AM              Objective:   VS:   Visit Vitals  BP (!) 171/82 (BP 1 Location: Right upper arm)   Pulse 87   Temp 98 °F (36.7 °C)   Resp 24   Ht 5' 3\" (1.6 m)   Wt 50.8 kg (111 lb 14.4 oz)   LMP  (LMP Unknown)   SpO2 96%   BMI 19.82 kg/m²      Tmax/24hrs: Temp (24hrs), Av.8 °F (37.1 °C), Min:97.6 °F (36.4 °C), Max:100.9 °F (38.3 °C)      Intake/Output Summary (Last 24 hours) at 2021 0953  Last data filed at 2021 9861  Gross per 24 hour   Intake 240 ml   Output 2000 ml   Net -1760 ml       Tele:   General:  agitated, In acute distress,  HEENT: PERRL, EOMI, supple neck, no JVD, dry oral mucosa  Cardiovascular: S1S2 regular, no rub/gallop   Pulmonary: air entry bilaterally, no wheezing, + crackle  GI:  Soft, non tender, non distended, +bs, no guarding   Extremities:  No pedal edema, +distal pulses appreciated   Neuro: AOx2, moving all extremities    Additional:       Current Facility-Administered Medications   Medication Dose Route Frequency    promethazine (PHENERGAN) 25 mg in 0.9% sodium chloride 50 mL IVPB  25 mg IntraVENous Q8H PRN    insulin glargine (LANTUS) injection 4 Units  4 Units SubCUTAneous DAILY    atorvastatin (LIPITOR) tablet 20 mg  20 mg Oral DAILY    ziprasidone (GEODON) capsule 20 mg  20 mg Oral BID WITH MEALS    famotidine (PEPCID) tablet 20 mg  20 mg Oral DAILY    colchicine (MITIGARE) capsule 0.6 mg  0.6 mg Oral Q MON, WED & FRI    isosorbide mononitrate ER (IMDUR) tablet 60 mg  60 mg Oral DAILY    amLODIPine (NORVASC) tablet 10 mg  10 mg Oral DAILY    carvediloL (COREG) tablet 25 mg  25 mg Oral BID WITH MEALS    losartan (COZAAR) tablet 100 mg  100 mg Oral DAILY    hydrALAZINE (APRESOLINE) tablet 100 mg  100 mg Oral TID    levETIRAcetam (KEPPRA) tablet 500 mg  500 mg Oral Q24H    levETIRAcetam (KEPPRA) tablet 250 mg  250 mg Oral Q MON, WED & FRI    heparin (porcine) 1,000 unit/mL injection 5,000 Units  5,000 Units IntraCATHeter DIALYSIS PRN    hydrALAZINE (APRESOLINE) 20 mg/mL injection 10 mg  10 mg IntraVENous Q6H PRN    insulin lispro (HUMALOG) injection   SubCUTAneous AC&HS    glucose chewable tablet 16 g  4 Tablet Oral PRN    glucagon (GLUCAGEN) injection 1 mg  1 mg IntraMUSCular PRN    dextrose (D50W) injection syrg 12.5-25 g  25-50 mL IntraVENous PRN    sodium chloride (NS) flush 5-40 mL  5-40 mL IntraVENous Q8H    sodium chloride (NS) flush 5-40 mL  5-40 mL IntraVENous PRN    acetaminophen (TYLENOL) tablet 650 mg  650 mg Oral Q6H PRN    Or    acetaminophen (TYLENOL) suppository 650 mg  650 mg Rectal Q6H PRN    polyethylene glycol (MIRALAX) packet 17 g  17 g Oral DAILY PRN    heparin (porcine) injection 5,000 Units  5,000 Units SubCUTAneous Q8H    aspirin chewable tablet 81 mg  81 mg Oral DAILY    sevelamer carbonate (RENVELA) tab 800 mg  800 mg Oral TID    cloNIDine (CATAPRES) 0.3 mg/24 hr patch 1 Patch  1 Patch TransDERmal Q7D    B complex-vitaminC-folic acid (NEPHROCAP) cap  1 Capsule Oral DAILY            Lab/Data Review:  Labs: Results:       Chemistry Recent Labs     11/08/21  0422 11/07/21  1141   * 189*   * 132*   K 3.8 4.0   CL 96* 97*   CO2 26 27   BUN 43* 35*   CREA 10.10* 8.24*   BUCR 4* 4*   AGAP 9 8   CA 9.5 9.7   PHOS 3.9  --      No results for input(s): TBIL, ALT, ALKP, TP, ALB, GLOB, AGRAT in the last 72 hours.     No lab exists for component: SGOT   CBC w/Diff Recent Labs     11/08/21  0422 11/07/21  1141 11/07/21  1141   WBC 11.5  --  11.5   RBC 2.91*  --  3.16*   HGB 8.4*  --  9.0*   HCT 25.5*  --  28.0*   MCV 87.6   < > 88.6   MCH 28.9   < > 28.5   MCHC 32.9   < > 32.1   RDW 16.8*   < > 16.6*     --  264   GRANS 79*  --   --    LYMPH 7*  --   --    EOS 4  --   --     < > = values in this interval not displayed. Coagulation No results for input(s): PTP, INR, APTT, INREXT, INREXT in the last 72 hours. Iron/Ferritin Lab Results   Component Value Date/Time    Iron 46 (L) 03/20/2021 02:02 PM    TIBC 173 (L) 03/20/2021 02:02 PM    Iron % saturation 27 03/20/2021 02:02 PM    Ferritin 1,396 (H) 03/20/2021 02:02 PM       BNP    Cardiac Enzymes Lab Results   Component Value Date/Time     (H) 11/04/2021 08:40 AM    CK - MB 3.8 (H) 11/04/2021 08:40 AM    CK-MB Index 0.9 11/04/2021 08:40 AM    Troponin-I, QT 0.06 (H) 11/04/2021 08:40 AM        Lactic Acid    Thyroid Studies          All Micro Results     Procedure Component Value Units Date/Time    COVID-19 RAPID TEST [[id number]] Collected: 11/03/21 2132    Order Status: Completed Specimen: Nasopharyngeal Updated: 11/03/21 2205     Specimen source Nasopharyngeal        COVID-19 rapid test Not detected        Comment: Rapid Abbott ID Now       Rapid NAAT:  The specimen is NEGATIVE for SARS-CoV-2, the novel coronavirus associated with COVID-19. Negative results should be treated as presumptive and, if inconsistent with clinical signs and symptoms or necessary for patient management, should be tested with an alternative molecular assay. Negative results do not preclude SARS-CoV-2 infection and should not be used as the sole basis for patient management decisions. This test has been authorized by the FDA under an Emergency Use Authorization (EUA) for use by authorized laboratories.    Fact sheet for Healthcare Providers: ConventionUpdate.co.nz  Fact sheet for Patients: [facility]       Methodology: Isothermal Nucleic Acid Amplification                 Images:    CT (Most Recent). CT Results (most recent):  Results from Hospital Encounter encounter on 11/03/21    CT HEAD WO CONT    Narrative  CT HEAD WO CONT    History: Seizure, altered, fatigue. Comparison: 3/17/2021    TECHNIQUE: 5 mm helical scan obtained of the head were obtained from the skull  vertex through the base of the skull without intravenous contrast.    All CT scans at this facility are performed using dose optimization technique as  appropriate to a performed exam, to include automated exposure control,  adjustment of the mA and/or kV according to patient size (including appropriate  matching first site-specific examinations), or use of iterative reconstruction  technique. BRAIN RESULT:    Similar mild generalized volume loss. Similar left frontal encephalomalacia. There is a right basal ganglia small amount which appears new since 3/17/2021  but ages otherwise indeterminate. No midline shift. No acute intracranial  hemorrhage. Basilar cisterns are patent. Orbits, soft tissues are grossly normal. Hyperostosis. Limited visualized  paranasal sinuses are patent. Large left and trace right mastoid effusions. Impression  New since 3/17/2021 subcentimeter right basal ganglia infarct but age is  otherwise indeterminate. If clarification is desired, may obtain MR. New large left and trace right mastoid effusions. Otherwise no significant interval change. XRAY (Most Recent)      EKG No results found for this or any previous visit. 2D ECHO 11/03/21    ECHO ADULT FOLLOW-UP OR LIMITED 11/04/2021 11/4/2021    Interpretation Summary  · LV: Estimated LVEF is 60 - 65%. Normal cavity size. Severe concentric hypertrophy. Hyperdynamic systolic function. · Pericardium: Moderate circumferential pericardial effusion measuring 1.3 mm. · No indications of tamponade present. Respiratory variation of mitral valve inflow is < 30%.     Signed by: Lenita Phoenix, MD on 11/4/2021  2:14 PM

## 2021-11-09 NOTE — PROGRESS NOTES
Discharge/Transition Planning    CM notified by Palliative care patient transitioned to comfort care with Hospice. Can discharge back to nursing home tomorrow with hospice.  Notified [address] RN BSN  Outcomes Manager    Pager # [phone]

## 2021-11-09 NOTE — PROGRESS NOTES
Progress Note    Ki Bravo  74 y.o. 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  Having nausea. Received Phenergan. Crying. Calling her nurse. Discussed with her nurse saying that the patient wants to sit with her and hold her hand. Last night had one episode of low-grade fever. This morning no fever. Speech pathologist has seen and recommended PEG tube at high risk for aspiration recommended aspiration precautions also. Sister declined PEG . Once comfort feeding measures. Was dialyzed yesterday. tolerated well. .  On Geodon twice daily for her underlying psychiatric problem      A comprehensive review of systems was negative except for that written in the History of Present Illness.     Objective:     Visit Vitals  BP (!) 171/82 (BP 1 Location: Right upper arm)   Pulse 87   Temp 98 °F (36.7 °C)   Resp 24   Ht 5' 3\" (1.6 m)   Wt 50.8 kg (111 lb 14.4 oz)   LMP  (LMP Unknown)   SpO2 96%   BMI 19.82 kg/m²         Intake/Output Summary (Last 24 hours) at 11/9/2021 1111  Last data filed at 11/9/2021 0824  Gross per 24 hour   Intake 240 ml   Output 2000 ml   Net -1760 ml       Current Facility-Administered Medications   Medication Dose Route Frequency Provider Last Rate Last Admin    promethazine (PHENERGAN) 25 mg in 0.9% sodium chloride 50 mL IVPB  25 mg IntraVENous Q8H PRN Obed Taylor  mL/hr at 11/09/21 1035 25 mg at 11/09/21 1035    insulin glargine (LANTUS) injection 4 Units  4 Units SubCUTAneous DAILY Rachelle Awad MD   4 Units at 11/09/21 1022    atorvastatin (LIPITOR) tablet 20 mg  20 mg Oral DAILY Ansley Holland MD   20 mg at 11/09/21 8276    ziprasidone (GEODON) capsule 20 mg  20 mg Oral BID WITH MEALS Rachelle Awad MD   20 mg at 11/09/21 0808    famotidine (PEPCID) tablet 20 mg  20 mg Oral DAILY Rachelle Awad MD   20 mg at 11/09/21 0808    colchicine (MITIGARE) capsule 0.6 mg  0.6 mg Oral Q MON, WED & [place] Figures, Eva Blair MD   0.6 mg at 11/08/21 2133    isosorbide mononitrate ER (IMDUR) tablet 60 mg  60 mg Oral DAILY Ansley Holland MD   60 mg at 11/09/21 0808    amLODIPine (NORVASC) tablet 10 mg  10 mg Oral DAILY Deisy Cloud N, DO   10 mg at 11/09/21 0815    carvediloL (COREG) tablet 25 mg  25 mg Oral BID WITH MEALS Deisy Cloud, DO   25 mg at 11/09/21 2000    losartan (COZAAR) tablet 100 mg  100 mg Oral DAILY Deisy Cloud, DO   100 mg at 11/09/21 5564    hydrALAZINE (APRESOLINE) tablet 100 mg  100 mg Oral TID Rachelle Awad MD   100 mg at 11/09/21 0809    levETIRAcetam (KEPPRA) tablet 500 mg  500 mg Oral Q24H Deisy Cloud, DO   500 mg at 11/09/21 0266    levETIRAcetam (KEPPRA) tablet 250 mg  250 mg Oral Q MON, WED & FRI Deisy Cloud N, DO   250 mg at 11/08/21 2133    heparin (porcine) 1,000 unit/mL injection 5,000 Units  5,000 Units IntraCATHeter DIALYSIS PRN Luly Ring MD   3,200 Units at 11/05/21 1848    hydrALAZINE (APRESOLINE) 20 mg/mL injection 10 mg  10 mg IntraVENous Q6H PRN Devin REINA NP   10 mg at 11/07/21 1044    insulin lispro (HUMALOG) injection   SubCUTAneous AC&HS Rachelle Awad MD   3 Units at 11/09/21 1023    glucose chewable tablet 16 g  4 Tablet Oral PRN Araceli Lacey NP        glucagon (GLUCAGEN) injection 1 mg  1 mg IntraMUSCular PRN Araceli Lacey NP        dextrose (D50W) injection syrg 12.5-25 g  25-50 mL IntraVENous PRN Araceli Lacey NP        sodium chloride (NS) flush 5-40 mL  5-40 mL IntraVENous Q8H Araceli Lacey NP 10 mL at 11/09/21 0641    sodium chloride (NS) flush 5-40 mL  5-40 mL IntraVENous PRN [employer] B, NP   10 mL at 11/04/21 2140    acetaminophen (TYLENOL) tablet 650 mg  650 mg Oral Q6H PRN Betzy Divers B, NP   650 mg at 11/09/21 1023    Or    acetaminophen (TYLENOL) suppository 650 mg  650 mg Rectal Q6H PRN Socrates Rivas, NP        polyethylene glycol (MIRALAX) packet 17 g  17 g Oral DAILY PRN Socrates Rivas, NP        heparin (porcine) injection 5,000 Units  5,000 Units SubCUTAneous Q8H Betzyn Divers B, NP   5,000 Units at 11/09/21 0797    aspirin chewable tablet 81 mg  81 mg Oral DAILY Betzyn Divers B, NP   81 mg at 11/09/21 0808    sevelamer carbonate (RENVELA) tab 800 mg  800 mg Oral TID Betzy Divers B, NP   800 mg at 11/09/21 0809    cloNIDine (CATAPRES) 0.3 mg/24 hr patch 1 Patch  1 Patch TransDERmal Q7D Socrates Rivas, NP   1 Patch at 11/06/21 1217    B complex-vitaminC-folic acid (NEPHROCAP) cap  1 Capsule Oral DAILY Socrates Rivas, NP   1 Capsule at 11/09/21 0809        Physical Exam:     Physical Exam:   General:  Emotionally crying. , no distress, appears stated age. Neck: Supple, symmetrical, trachea midline, no adenopathy, thyroid: no enlargement/tenderness/nodules, no carotid bruit and no JVD. Lungs:   Clear to auscultation bilaterally. Heart:  Regular rate and rhythm, S1, S2 normal, no murmur, click, rub or gallop. Abdomen:   Soft, non-tender. Bowel sounds normal. No masses,  No organomegaly.    Extremities: Extremities normal, atraumatic, no cyanosis or edema,Dialysis catheter site is clean,well dressed   Skin: Skin color, texture, turgor normal. No rashes or lesions         Data Review:    CBC w/Diff    Recent Labs     11/08/21  0422 11/07/21  1141 11/07/21  1141   WBC 11.5  --  11.5   RBC 2.91*  --  3.16*   HGB 8.4*  --  9.0*   HCT 25.5*  --  28.0*   MCV 87.6   < > 88.6   MCH 28.9   < > 28.5   MCHC 32.9   < > 32.1   RDW 16.8*   < > 16.6*    < > = values in this interval not displayed. 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 Keep the head end of the bed 30 degrees elevated strict aspiration precautions. Drain for comfort feeding. Continue the blood pressure medicines and other medicines. No need for any dialysis today. If she is still in the hospital will dialyze her tomorrow. Discussed with the nurse to give her company as much as possible for supportive care.       Aye Suazo MD

## 2021-11-09 NOTE — ROUTINE PROCESS
Bedside and Verbal shift change report given to Verenice Hilario LPN (oncoming nurse) by Tex Posada RN (offgoing nurse). Report included the following information SBAR.

## 2021-11-09 NOTE — PROGRESS NOTES
Problem: Dysphagia (Adult)  Goal: *Acute Goals and Plan of Care (Insert Text)  Description: Patient will:  1. Tolerate PO trials with 0 s/s overt distress in 4/5 trials  2. Utilize compensatory swallow strategies/maneuvers (decrease bite/sip, size/rate, alt. liq/sol) with min cues in 4/5 trials  3. Perform oral-motor/laryngeal exercises to increase oropharyngeal swallow function with min cues  4. Complete an objective swallow study (i.e., MBSS) to assess swallow integrity, r/o aspiration, and determine of safest LRD, min A as indicated/ordered by MD     Recommend:   NPO   Strict aspiration precautions (HOB >30 degrees at all times, Oral care TID)  May benefit from palliative care consult to address ? alternative means of nutrition or comfort feeds     Outcome: Not Progressing Towards Goal    SPEECH LANGUAGE PATHOLOGY DYSPHAGIA TREATMENT    Patient: Bill Alicea (24 y.o. female)  Date: 11/9/2021  Diagnosis: Elevated troponin [R77.8]   Precautions: Fall, Aspiration  PLOF: As per H&P      ASSESSMENT:  Pt seen for follow up dysphagia tx, alert but confused with intermittent crying/yelling out during session. Pt s/p MBS previous date that showed silent airway compromise with all consistencies. Educated with regard to results of MBS, diet recs, aspiration risk/precautions, oral care and positioning. Pt unable to follow commands for completion of oropharyngeal exercise program despite max multi-modal cues. Will follow x1-2 visits as suspected pt reaching max level of function at this time. D/w RN. Progression toward goals:  []         Improving appropriately and progressing toward goals  []         Improving slowly and progressing toward goals  [x]         Not making progress toward goals and plan of care will be adjusted     PLAN:  Recommendations and Planned Interventions:  Patient continues to benefit from skilled intervention to address the above impairments.  Continue treatment per established plan of care.  Discharge Recommendations: To Be Determined     SUBJECTIVE:   Patient stated What?    OBJECTIVE:   Cognitive and Communication Status:  Neurologic State: Alert, Eyes open spontaneously  Orientation Level: Oriented to person  Cognition: Follows commands  Perception: Appears intact  Perseveration: No perseveration noted  Safety/Judgement: Fall prevention  Dysphagia Treatment:  Oral Assessment:  Oral Assessment  Labial: No impairment  Dentition: Natural, Limited  Oral Hygiene: adequate  Lingual: Decreased strength  Velum: No impairment  Mandible: No impairment  P.O.  Trials:   See above     PAIN:  Start of Tx: not reported   End of Tx: not reported      After treatment:   []              Patient left in no apparent distress sitting up in chair  [x]              Patient left in no apparent distress in bed  [x]              Call bell left within reach  [x]              Nursing notified  []              Family present  []              Caregiver present  []              Bed alarm activated      COMMUNICATION/EDUCATION:   [x] Aspiration precautions; swallow safety; compensatory techniques  []        Patient/family able to participate in training and education   [x]  Patient unable to participate in training and education, education ongoing with staff   [] Patient understands goals and intent of therapy; neutral about participation     Freddie Guevara M.S., [id number] [facility]  Speech-Language Pathologist

## 2021-11-09 NOTE — PROGRESS NOTES
Palliative Medicine Consult    Patient Name: Dutch Maldonado  YOB: 1955    Date of follow-up: 11/9/2021  Reason for Consult: Goals of care discussions  Requesting Provider: Dr. Jovana Reyes  Primary Care Physician: Nancy Hagan MD      SUMMARY:   Dutch Maldonado is a 74 y.o. with a past history of seizures, ESRD, bipolar, hypertension, diabetes, asthma, who was admitted on 11/3/2021 from [address] [place] [address] with a diagnosis of chest pain and high blood pressor, with possible seizure. Patient had a period of lethargy and delirium, and is now reportedly at baseline. Current medical issues leading to Palliative Medicine involvement include: support and goals of care discussions. 11/9/2021: Patient is quite anxious, yells nurse frequently when rounding on gill. She is unable to verbalize her specific needs. She is not able to answer orientation questions, and believe she could not grasp complex medical decision making at this point patient is now DNR/DNI, comfort measures only, no feeding tubes. Comfort feeds only. PALLIATIVE DIAGNOSES:   1. Goals of care discussions  2. Altered mental status  3. Severe hypertension  4. Seizure disorder  5. End-stage renal disease  6. Debility       PLAN:   11/9/2021: Palliative medicine team including Gadiel Stover RN and I met with patient at patient's bedside. Patient is quite anxious, yells \"nurse\" frequently when rounding in hallway throughout the day. She is unable to verbalize her specific needs. She is not able to answer orientation questions, and believe she could not grasp complex medical decision making at this point. Called patient's sister Carl Pugh who confirms she is legal next of kin. Reportedly patient has a daughter who has been estranged, unable to contact her. Marisolkim Serrano has a great understanding of current medical situation, patient is aspirating silently on all consistencies, MBS report personally reviewed.  Discussed the benefits and burdens of feeding tubes in the setting of dysphagia versus initiating comfort feeds. Carl Chao would like to move forward with comfort feeds only, and does not believe patient would want a feeding tube for any reason. Explained the risks involved with comfort feeds, including aspiration, pneumonias, respiratory failure, and death. Discussed the benefits and burdens of CPR in the event of cardiopulmonary arrest in the setting of silent aspiration and comfort feeds, along with advanced age and chronic comorbidities. Discussed the benefits and burdens of intubation in the event of respiratory decline pre-arrest. Discussed the benefits and burdens of continuing aggressive measures versus implementing comfort measures for support of hospice at discharge-including the cessation of IV medications, labs, tests, and stopping dialysis. Noted she had a hard time tolerating her last hemodialysis session as she was anxious, and it was difficult for the nurses to perform dialysis. Sister would like to focus on comfort only at this point, and agrees to DNR/DNI, and comfort measures only including comfort feeds and stopping dialysis, no further tests or lab draws. Carl Chao agrees to initiate these measures now, and she will come to the hospital tomorrow November 10, 2021 at 1 PM to sign a POST form. We will continue to follow for support and comfort while patient is hospitalized. Family wishes for patient to return to [facility] with the support of hospice. Discussed with case management. Orders for comfort and hospice placed. See previous discussions below:    11/5/2021: Goals of care discussions: Palliative medicine team including JOLYNN Azevedo and I met with patient at patient's bedside. Patient is awake, alert, and oriented x4. Patient states she is a little frustrated with her high blood pressure, and wants her blood pressure to be normalized.  Patient aware that she might have had a seizure and a period of delirium/post ictal state but she is now oriented x 4. Patient is a long term resident of [facility]. When discussing goals of care, patient initially stated she would want attempts at CPR but once she understood the potential burdens, she is unsure if she would want resuscitation efforts. Patient would like some time to talk to her sister. She is aware that in the absence of firm decisions, she will remain a full code with full interventions. Will follow up next week if patient remains hospitalized to further discuss once she has time to think about it and talk to her sister. If patient is discharged over the weekend, encouraged her to continue discussions with her sister and PCP. 1. Altered mental status: Followed by neurology, possible seizure. Patient is on Keppra, hypertension management per primary team. Patient is currently alert and oriented x4.  2. Severe hypertension: Systolic pressures above 890, treatment per primary team  3. Seizure disorder: History of, with possible seizure upon admission. She is on Keppra. Neurology has evaluated. 4. End-stage renal disease: Nephrology following, on hemodialysis  5. Debility: Long-term care resident at [facility], needs assist with all ADLs. 6. Initial consult note routed to primary continuity provider  7.  Communicated plan of care with: Palliative IDT       GOALS OF CARE / TREATMENT PREFERENCES:   [====Goals of Care====]  GOALS OF CARE: DNR/DNI, comfort measures, no feeding tubes  Patient/Health Care Proxy Stated Goals: Comfort      TREATMENT PREFERENCES:   Code Status: DNR    Advance Care Planning:  Advance Care Planning 11/5/2021   Patient's Healthcare Decision Maker is: -   Primary Decision Maker Name -   Confirm Advance Directive None   Patient Would Like to Complete Advance Directive No       Medical Interventions: Comfort measures    Artificially Administered Nutrition: No feeding tube      The palliative care team has discussed with patient / health care proxy about goals of care / treatment preferences for patient.  [====Goals of Care====]         HISTORY:     History obtained from: patient, chart, family    CHIEF COMPLAINT: N/A    HPI/SUBJECTIVE:    The patient is:   [] Verbal and participatory  [x] Non-participatory due to: Anxious, unable to answer orientation questions, Appears confused, not following complex medical discussions    Clinical Pain Assessment (nonverbal scale for severity on nonverbal patients):   Clinical Pain Assessment  Severity: 0    Adult Nonverbal Pain Scale  Face: Occasional grimace, tearing, frowning, wrinkled forehead  Activity (Movement): Seeking attention through movement or slow, cautious movement  Guarding: Lying quietly, no positioning of hands over areas of body  Physiology (Vital Signs): Stable vital signs  Respiratory: Baseline RR/SpO2 compliant with ventilator  Total Score: 2       FUNCTIONAL ASSESSMENT:     Palliative Performance Scale (PPS):  PPS: 30       PSYCHOSOCIAL/SPIRITUAL SCREENING:     Advance Care Planning:  Advance Care Planning 11/5/2021   Patient's Healthcare Decision Maker is: -   Primary Decision Maker Name -   Confirm Advance Directive None   Patient Would Like to Complete Advance Directive No        Any spiritual / Confucianist concerns:  [] Yes /  [x] No    Caregiver Burnout:  [] Yes /  [] No /  [x] No Caregiver Present      Anticipatory grief assessment: 11/9/2021, now unable to assess  [x] Normal  / [] Maladaptive          REVIEW OF SYSTEMS:     Positive and pertinent negative findings in ROS are noted above in HPI. The following systems were [] reviewed / [x] unable to be reviewed as noted in HPI  Other findings are noted below. Systems: constitutional, ears/nose/mouth/throat, respiratory, gastrointestinal, genitourinary, musculoskeletal, integumentary, neurologic, psychiatric, endocrine. Positive findings noted below.   Modified ESAS Completed by: provider   Fatigue: 5       Pain: 0 Anxiety: 0 Nausea: 0     Dyspnea: 0     Constipation: No              PHYSICAL EXAM:     From RN flowsheet:  Wt Readings from Last 3 Encounters:   11/07/21 50.8 kg (111 lb 14.4 oz)   10/27/21 54.9 kg (121 lb)   10/25/21 54.9 kg (121 lb 1.6 oz)     Blood pressure 129/70, pulse 84, temperature 98.3 °F (36.8 °C), resp. rate 16, height 5' 3\" (1.6 m), weight 50.8 kg (111 lb 14.4 oz), SpO2 95 %.     Pain Scale 1: Numeric (0 - 10)  Pain Intensity 1: 0     Pain Location 1: Generalized (all over)     Pain Description 1: Aching  Pain Intervention(s) 1: Medication (see MAR)      Constitutional: Awake, alert, anxious, confused,, lying in bed, appears chronically ill  Eyes: pupils equal, anicteric  ENMT: no nasal discharge, dry mucous membranes  Cardiovascular: regular rhythm, distal pulses intact  Respiratory: breathing not labored, symmetric  Gastrointestinal: soft non-tender, +bowel sounds  Musculoskeletal: no deformity, no tenderness to palpation  Skin: warm, dry  Neurologic: following commands, moving all extremities, anxious, unable to answer orientation questions, yelling \"nurse\" frequently but unable to verbalize needs  Psychiatric: anxious affect, no hallucinations         HISTORY:     Active Problems:    ESRD (end stage renal disease) on dialysis ([address] (8/8/2018)      Secondary hyperparathyroidism of renal origin ([address] (8/8/2018)      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 [facility]) (8/8/2018)      CVA, old, cognitive deficits (8/8/2018)      New onset seizure ([address] (8/27/2018)      Hypertensive urgency (6/12/2021)      Elevated troponin (11/3/2021)      Past Medical History:   Diagnosis Date    Asthma     Bipolar affective disorder ([address]     Brain condition     Cataract     Chronic kidney disease     hemodialysis M-W-F    Diabetes ([address]     Hyperlipidemia     Hypertension     Paralytic strabismus, unspecified     Psychiatric disorder     Seizures ([address] 08/27/2018      Past Surgical History: Procedure Laterality Date    NH INSJ TUNNELED CVC W/O SUBQ PORT/ AGE 3 YR/> N/A 8/9/2018     in-pt 474A, Insertion Tunneled Central Venous Catheter performed by Susie Meadows MD at [address] [place] [address] LAB    NH INSJ TUNNELED CVC W/O SUBQ PORT/ AGE 5 YR/> N/A 11/8/2019    INSERTION TUNNELED CENTRAL VENOUS CATHETER performed by Carissa Lopez MD at [facility] LAB      History reviewed. No pertinent family history. History reviewed, no pertinent family history.   Social History     Tobacco Use    Smoking status: Never Smoker    Smokeless tobacco: Never Used   Substance Use Topics    Alcohol use: No     Allergies   Allergen Reactions    Amoxicillin Unknown (comments)    Cleocin [Clindamycin Hcl] Unknown (comments)    Codeine Unknown (comments)    Lorcet 10/650 [Hydrocodone-Acetaminophen] Unknown (comments)    Penicillin G Benzathine Unknown (comments)    Shellfish Derived Unknown (comments)      Current Facility-Administered Medications   Medication Dose Route Frequency    [START ON 11/10/2021] insulin glargine (LANTUS) injection 3 Units  3 Units SubCUTAneous Q12H    insulin glargine (LANTUS) injection 2 Units  2 Units SubCUTAneous ONCE    ondansetron (ZOFRAN ODT) tablet 4 mg  4 mg SubLINGual Q6H PRN    LORazepam (INTENSOL) 2 mg/mL oral concentrate 1 mg  1 mg SubLINGual Q1H PRN    hyoscyamine SL (LEVSIN/SL) tablet 0.125 mg  0.125 mg SubLINGual Q4H PRN    bisacodyL (DULCOLAX) suppository 10 mg  10 mg Rectal DAILY PRN    morphine (ROXANOL) 100 mg/5 mL (20 mg/mL) concentrated solution 2.6 mg  2.6 mg SubLINGual Q1H PRN    atorvastatin (LIPITOR) tablet 20 mg  20 mg Oral DAILY    ziprasidone (GEODON) capsule 20 mg  20 mg Oral BID WITH MEALS    famotidine (PEPCID) tablet 20 mg  20 mg Oral DAILY    colchicine (MITIGARE) capsule 0.6 mg  0.6 mg Oral Q MON, WED & FRI    isosorbide mononitrate ER (IMDUR) tablet 60 mg  60 mg Oral DAILY    amLODIPine (NORVASC) tablet 10 mg  10 mg Oral DAILY    carvediloL (COREG) tablet 25 mg  25 mg Oral BID WITH MEALS    losartan (COZAAR) tablet 100 mg  100 mg Oral DAILY    hydrALAZINE (APRESOLINE) tablet 100 mg  100 mg Oral TID    levETIRAcetam (KEPPRA) tablet 500 mg  500 mg Oral Q24H    levETIRAcetam (KEPPRA) tablet 250 mg  250 mg Oral Q MON, WED & FRI    heparin (porcine) 1,000 unit/mL injection 5,000 Units  5,000 Units IntraCATHeter DIALYSIS PRN    insulin lispro (HUMALOG) injection   SubCUTAneous AC&HS    sodium chloride (NS) flush 5-40 mL  5-40 mL IntraVENous PRN    acetaminophen (TYLENOL) tablet 650 mg  650 mg Oral Q6H PRN    Or    acetaminophen (TYLENOL) suppository 650 mg  650 mg Rectal Q6H PRN    aspirin chewable tablet 81 mg  81 mg Oral DAILY    sevelamer carbonate (RENVELA) tab 800 mg  800 mg Oral TID    cloNIDine (CATAPRES) 0.3 mg/24 hr patch 1 Patch  1 Patch TransDERmal Q7D    B complex-vitaminC-folic acid (NEPHROCAP) cap  1 Capsule Oral DAILY          LAB AND IMAGING FINDINGS:     Lab Results   Component Value Date/Time    WBC 10.4 11/09/2021 12:05 PM    HGB 8.8 (L) 11/09/2021 12:05 PM    PLATELET 777 93/12/3216 12:05 PM     Lab Results   Component Value Date/Time    Sodium 135 (L) 11/09/2021 12:05 PM    Potassium 4.1 11/09/2021 12:05 PM    Chloride 99 (L) 11/09/2021 12:05 PM    CO2 29 11/09/2021 12:05 PM    BUN 28 (H) 11/09/2021 12:05 PM    Creatinine 7.71 (H) 11/09/2021 12:05 PM    Calcium 9.6 11/09/2021 12:05 PM    Magnesium 2.2 10/27/2021 01:00 PM    Phosphorus 3.9 11/08/2021 04:22 AM      Lab Results   Component Value Date/Time    Alk.  phosphatase 68 11/09/2021 12:05 PM    Protein, total 6.9 11/09/2021 12:05 PM    Albumin 2.9 (L) 11/09/2021 12:05 PM    Globulin 4.0 11/09/2021 12:05 PM     Lab Results   Component Value Date/Time    INR 1.0 10/20/2021 02:20 PM    Prothrombin time 13.3 10/20/2021 02:20 PM    aPTT 22.6 (L) 07/20/2021 01:15 PM      Lab Results   Component Value Date/Time    Iron 46 (L) 03/20/2021 02:02 PM    TIBC 173 (L) 03/20/2021 02:02 PM    Iron % saturation 27 03/20/2021 02:02 PM    Ferritin 1,396 (H) 03/20/2021 02:02 PM      No results found for: PH, PCO2, PO2  No components found for: [place]   Lab Results   Component Value Date/Time     (H) 11/04/2021 08:40 AM    CK - MB 3.8 (H) 11/04/2021 08:40 AM                Total time: 35 minutes  Counseling / coordination time, spent as noted above:   > 50% counseling / coordination?: yes, patient, family    Prolonged service was provided for  []30 min   []75 min in face to face time in the presence of the patient, spent as noted above. Time Start:   Time End:   Note: this can only be billed with 90791 (initial) or 85378 (follow up). If multiple start / stop times, list each separately.

## 2021-11-09 NOTE — PROGRESS NOTES
Palliative Medicine    Telephone call made to pt's sister/legal NONATO العراقي Has. Pt does have a daughter in [place], however per Sammie Ibrahim they have been estranged for many years. Brief medical update was provided to Sammie Ibrahim. Discussed that pt failed her MBS and is aspirating on all consistencies. Sammie Ibrahim stated that her sister would not be accepting of any type of feeding tube and that she would like to move forward with allowing her sister to comfort feed. Discussed that when we go this route that we do so with the support of hospice. We also discussed code status and that making the pt a DNR/DNI aligns with transitioning to comfort measures. We also discussed that once a patient enrolls with hospice services, dialysis is usually stopped. Pt's sister was in agreement to make the pt comfort measures only, allow to comfort feed, DNR/DNI status, NO feeding tubes, and ok to stop dialysis. Sammie Ibrahim understands that the pt's time with us may be short, days to weeks. BSRN and Attending updated on plan of care. Will place comfort orders and hospice consult. Pt is now DNR/DNI. Transition to comfort measures effective today. Stop dialysis and NO feeding tubes. Sister Malcom Canales will meet with our team tomorrow WED 11/10/21 at 1300 to sign a POST form. Potential dispo plan will be back to LTC facility with the support of hospice. Thank you for the Palliative Medicine consult and allowing us to participate in the care of Ms. Markus Casiano. Will continue to monitor and provide support.     Bertin Fitzpatrick RN, BSN  Palliative Medicine Inpatient RN  [facility]. [facility]  Palliative COPE Line: [phone] ([phone])

## 2021-11-09 NOTE — PROGRESS NOTES
James Salinas with pallative care contact writer to make aware of new orders. Patient is now comfort care/measures. DNR, DNI, no feeding tube, no dialysis, and is allowed to have comfort/pleasure meals. Family is to meet with pallative care at 1300H. Tele was removed at this time.

## 2021-11-09 NOTE — PROGRESS NOTES
Patient observed while feeding pocketing food and when giving fluids, difficulty swallowing observed and coughing, fluids running down/out patient mouth. Speech therapy was notified and aware. HOB elevated while feeding. No s/s of distress.  Respirations even/unlabored

## 2021-11-10 VITALS
BODY MASS INDEX: 19.83 KG/M2 | HEIGHT: 63 IN | DIASTOLIC BLOOD PRESSURE: 78 MMHG | HEART RATE: 90 BPM | TEMPERATURE: 100.4 F | SYSTOLIC BLOOD PRESSURE: 169 MMHG | OXYGEN SATURATION: 94 % | WEIGHT: 111.9 LBS | RESPIRATION RATE: 16 BRPM

## 2021-11-10 LAB
GLUCOSE BLD STRIP.AUTO-MCNC: 129 MG/DL (ref 70–110)
GLUCOSE BLD STRIP.AUTO-MCNC: 188 MG/DL (ref 70–110)

## 2021-11-10 PROCEDURE — 74011250637 HC RX REV CODE- 250/637: Performed by: NURSE PRACTITIONER

## 2021-11-10 PROCEDURE — 92610 EVALUATE SWALLOWING FUNCTION: CPT

## 2021-11-10 PROCEDURE — 74011250637 HC RX REV CODE- 250/637: Performed by: INTERNAL MEDICINE

## 2021-11-10 PROCEDURE — 99239 HOSP IP/OBS DSCHRG MGMT >30: CPT | Performed by: INTERNAL MEDICINE

## 2021-11-10 PROCEDURE — 82962 GLUCOSE BLOOD TEST: CPT

## 2021-11-10 PROCEDURE — 92526 ORAL FUNCTION THERAPY: CPT

## 2021-11-10 PROCEDURE — 74011636637 HC RX REV CODE- 636/637: Performed by: INTERNAL MEDICINE

## 2021-11-10 PROCEDURE — 99232 SBSQ HOSP IP/OBS MODERATE 35: CPT | Performed by: NURSE PRACTITIONER

## 2021-11-10 PROCEDURE — 74011250637 HC RX REV CODE- 250/637: Performed by: STUDENT IN AN ORGANIZED HEALTH CARE EDUCATION/TRAINING PROGRAM

## 2021-11-10 RX ORDER — COLCHICINE 0.6 MG/1
0.6 CAPSULE ORAL
Qty: 6 CAPSULE | Refills: 0 | Status: SHIPPED
Start: 2021-11-10 | End: 2021-11-24

## 2021-11-10 RX ORDER — MORPHINE SULFATE 20 MG/ML
5 SOLUTION ORAL
Status: DISCONTINUED | OUTPATIENT
Start: 2021-11-10 | End: 2021-11-10 | Stop reason: HOSPADM

## 2021-11-10 RX ORDER — ZIPRASIDONE HYDROCHLORIDE 20 MG/1
20 CAPSULE ORAL 2 TIMES DAILY WITH MEALS
Qty: 60 CAPSULE | Refills: 0 | Status: SHIPPED
Start: 2021-11-10

## 2021-11-10 RX ORDER — ISOSORBIDE MONONITRATE 60 MG/1
60 TABLET, EXTENDED RELEASE ORAL
Qty: 30 TABLET | Refills: 0 | Status: SHIPPED
Start: 2021-11-10

## 2021-11-10 RX ORDER — HYDRALAZINE HYDROCHLORIDE 100 MG/1
100 TABLET, FILM COATED ORAL 3 TIMES DAILY
Qty: 90 TABLET | Refills: 0 | Status: SHIPPED
Start: 2021-11-10

## 2021-11-10 RX ORDER — LORAZEPAM 2 MG/ML
1 CONCENTRATE ORAL
Qty: 30 ML | Refills: 0 | Status: SHIPPED | OUTPATIENT
Start: 2021-11-10

## 2021-11-10 RX ORDER — MORPHINE SULFATE 20 MG/ML
2.5 SOLUTION ORAL
Qty: 30 ML | Refills: 0 | Status: SHIPPED | OUTPATIENT
Start: 2021-11-10 | End: 2021-11-13

## 2021-11-10 RX ADMIN — INSULIN LISPRO 3 UNITS: 100 INJECTION, SOLUTION INTRAVENOUS; SUBCUTANEOUS at 12:44

## 2021-11-10 RX ADMIN — LOSARTAN POTASSIUM 100 MG: 50 TABLET, FILM COATED ORAL at 08:39

## 2021-11-10 RX ADMIN — ZIPRASIDONE HYDROCHLORIDE 20 MG: 20 CAPSULE ORAL at 08:40

## 2021-11-10 RX ADMIN — HYDRALAZINE HYDROCHLORIDE 100 MG: 50 TABLET, FILM COATED ORAL at 08:40

## 2021-11-10 RX ADMIN — LEVETIRACETAM 500 MG: 500 TABLET, FILM COATED ORAL at 08:46

## 2021-11-10 RX ADMIN — AMLODIPINE BESYLATE 10 MG: 10 TABLET ORAL at 08:40

## 2021-11-10 RX ADMIN — FAMOTIDINE 20 MG: 20 TABLET ORAL at 08:40

## 2021-11-10 RX ADMIN — MORPHINE SULFATE 2.6 MG: 10 SOLUTION ORAL at 12:32

## 2021-11-10 RX ADMIN — ASPIRIN 81 MG CHEWABLE TABLET 81 MG: 81 TABLET CHEWABLE at 08:39

## 2021-11-10 RX ADMIN — CARVEDILOL 25 MG: 25 TABLET, FILM COATED ORAL at 08:40

## 2021-11-10 RX ADMIN — LORAZEPAM 1 MG: 2 SOLUTION, CONCENTRATE ORAL at 12:32

## 2021-11-10 RX ADMIN — NEPHROCAP 1 CAPSULE: 1 CAP ORAL at 08:40

## 2021-11-10 RX ADMIN — SEVELAMER CARBONATE 800 MG: 800 TABLET, FILM COATED ORAL at 08:40

## 2021-11-10 RX ADMIN — INSULIN GLARGINE 3 UNITS: 100 INJECTION, SOLUTION SUBCUTANEOUS at 08:00

## 2021-11-10 RX ADMIN — ATORVASTATIN CALCIUM 20 MG: 20 TABLET, FILM COATED ORAL at 08:40

## 2021-11-10 RX ADMIN — ISOSORBIDE MONONITRATE 60 MG: 60 TABLET, EXTENDED RELEASE ORAL at 08:39

## 2021-11-10 NOTE — ROUTINE PROCESS
Bedside and Verbal shift change report given to Maine Kaur LPN (oncoming nurse) by Tammy Melton RN (offgoing nurse). Report included the following information SBAR.

## 2021-11-10 NOTE — CONSULTS
Palliative Medicine Consult    Patient Name: Zoie Bonilla  YOB: 1955    Date of follow-up: 11/10/2021  Reason for Consult: Goals of care discussions  Requesting Provider: Dr. Caro Norman  Primary Care Physician: Caesar Rivers MD      SUMMARY:   Zoie Bonilla is a 74 y.o. with a past history of seizures, ESRD, bipolar, hypertension, diabetes, asthma, who was admitted on 11/3/2021 from [address] [place] [address] with a diagnosis of chest pain and high blood pressor, with possible seizure. Patient had a period of lethargy and delirium, and is now reportedly at baseline. Current medical issues leading to Palliative Medicine involvement include: support and goals of care discussions. 11/9/2021: Patient is quite anxious, yells nurse frequently when rounding on gill. She is unable to verbalize her specific needs. She is not able to answer orientation questions, and believe she could not grasp complex medical decision making at this point patient is now DNR/DNI, comfort measures only, no feeding tubes. Comfort feeds only. 11/10/2021: Patient is awake, alert, anxious, not oriented. SLP to see so they can recommend the most tolerable diet for comfort feeds as she has very poor po intake and is pocketing food and coughing with intake, otherwise, continue comfort feeds. PALLIATIVE DIAGNOSES:   1. Goals of care discussions  2. Altered mental status  3. Severe hypertension  4. Seizure disorder  5. End-stage renal disease  6. Debility       PLAN:   11/10/2021: Palliative medicine team including Don Garrido RN and I met with patient at patient's bedside. Patient is awake, alert, anxious, not oriented. Sister Rigo Schreiber at bedside. Melissa Agosto confirms goals of care and signed a POST form today with the following measures: DNR/DNI, comfort measures, no feeding tubes.  SLP to see so they can recommend the most tolerable diet for comfort feeds as she has very poor po intake and is pocketing food and coughing with intake, otherwise, continue comfort feeds. Plan to d/c today to [facility] with the support of hospice. Lynn Hinds is waiting to hear from [facility]. Discussed with SALAZAR, RN, attending, and nephrology. See previous discussions below:     11/9/2021: Palliative medicine team including Angie Victor RN and I met with patient at patient's bedside. Patient is quite anxious, yells \"nurse\" frequently when rounding in hallway throughout the day. She is unable to verbalize her specific needs. She is not able to answer orientation questions, and believe she could not grasp complex medical decision making at this point. Called patient's sister Minnie Guerrero who confirms she is legal next of kin. Reportedly patient has a daughter who has been estranged, unable to contact her. Lynn Hinds has a great understanding of current medical situation, patient is aspirating silently on all consistencies, MBS report personally reviewed. Discussed the benefits and burdens of feeding tubes in the setting of dysphagia versus initiating comfort feeds. Lynn Hinds would like to move forward with comfort feeds only, and does not believe patient would want a feeding tube for any reason. Explained the risks involved with comfort feeds, including aspiration, pneumonias, respiratory failure, and death. Discussed the benefits and burdens of CPR in the event of cardiopulmonary arrest in the setting of silent aspiration and comfort feeds, along with advanced age and chronic comorbidities. Discussed the benefits and burdens of intubation in the event of respiratory decline pre-arrest. Discussed the benefits and burdens of continuing aggressive measures versus implementing comfort measures for support of hospice at discharge-including the cessation of IV medications, labs, tests, and stopping dialysis.   Noted she had a hard time tolerating her last hemodialysis session as she was anxious, and it was difficult for the nurses to perform dialysis. Sister would like to focus on comfort only at this point, and agrees to DNR/DNI, and comfort measures only including comfort feeds and stopping dialysis, no further tests or lab draws. Amie Clifford agrees to initiate these measures now, and she will come to the hospital tomorrow November 10, 2021 at 1 PM to sign a POST form. We will continue to follow for support and comfort while patient is hospitalized. Family wishes for patient to return to [facility] with the support of hospice. Discussed with case management. Orders for comfort and hospice placed. See previous discussions below:    11/5/2021: Goals of care discussions: Palliative medicine team including JOLYNN Hendrickson and I met with patient at patient's bedside. Patient is awake, alert, and oriented x4. Patient states she is a little frustrated with her high blood pressure, and wants her blood pressure to be normalized. Patient aware that she might have had a seizure and a period of delirium/post ictal state but she is now oriented x 4. Patient is a long term resident of [address] [place] [address],[address]. When discussing goals of care, patient initially stated she would want attempts at CPR but once she understood the potential burdens, she is unsure if she would want resuscitation efforts. Patient would like some time to talk to her sister. She is aware that in the absence of firm decisions, she will remain a full code with full interventions. Will follow up next week if patient remains hospitalized to further discuss once she has time to think about it and talk to her sister. If patient is discharged over the weekend, encouraged her to continue discussions with her sister and PCP. 1. Altered mental status: Followed by neurology, possible seizure.  Patient is on Keppra, hypertension management per primary team. Patient is currently alert and oriented x4.  2. Severe hypertension: Systolic pressures above 170, treatment per primary team  3. Seizure disorder: History of, with possible seizure upon admission. She is on Keppra. Neurology has evaluated. 4. End-stage renal disease: Nephrology following, on hemodialysis  5. Debility: Long-term care resident at [facility], needs assist with all ADLs. 6. Initial consult note routed to primary continuity provider  7. Communicated plan of care with: Palliative IDT       GOALS OF CARE / TREATMENT PREFERENCES:   [====Goals of Care====]  GOALS OF CARE: DNR/DNI, comfort measures, no feeding tubes  Patient/Health Care Proxy Stated Goals: Comfort      TREATMENT PREFERENCES:   Code Status: DNR    Advance Care Planning:  Advance Care Planning 11/5/2021   Patient's Healthcare Decision Maker is: -   Primary Decision Maker Name -   Confirm Advance Directive None   Patient Would Like to Complete Advance Directive No       Medical Interventions: Comfort measures    Artificially Administered Nutrition: No feeding tube      The palliative care team has discussed with patient / health care proxy about goals of care / treatment preferences for patient.  [====Goals of Care====]         HISTORY:     History obtained from: patient, chart, family    CHIEF COMPLAINT: N/A    HPI/SUBJECTIVE:    The patient is:   [] Verbal and participatory  [x] Non-participatory due to: Anxious, unable to answer orientation questions, Appears confused, not following complex medical discussions    Clinical Pain Assessment (nonverbal scale for severity on nonverbal patients):   Clinical Pain Assessment  Severity: 0    Adult Nonverbal Pain Scale  Face: No particular expression or smile  Activity (Movement): Lying quietly, normal position  Guarding: Lying quietly, no positioning of hands over areas of body  Physiology (Vital Signs):  Stable vital signs  Respiratory: Baseline RR/SpO2 compliant with ventilator  Total Score: 0       FUNCTIONAL ASSESSMENT:     Palliative Performance Scale (PPS):  PPS: 30       PSYCHOSOCIAL/SPIRITUAL SCREENING:     Advance Care Planning:  Advance Care Planning 11/5/2021   Patient's Healthcare Decision Maker is: -   Primary Decision Maker Name -   Confirm Advance Directive None   Patient Would Like to Complete Advance Directive No        Any spiritual / Congregation concerns:  [] Yes /  [x] No    Caregiver Burnout:  [] Yes /  [] No /  [x] No Caregiver Present      Anticipatory grief assessment: 11/10/2021, now unable to assess  [x] Normal  / [] Maladaptive          REVIEW OF SYSTEMS:     Positive and pertinent negative findings in ROS are noted above in HPI. The following systems were [] reviewed / [x] unable to be reviewed as noted in HPI  Other findings are noted below. Systems: constitutional, ears/nose/mouth/throat, respiratory, gastrointestinal, genitourinary, musculoskeletal, integumentary, neurologic, psychiatric, endocrine. Positive findings noted below. Modified ESAS Completed by: provider   Fatigue: 5       Pain: 0   Anxiety: 0 Nausea: 0     Dyspnea: 0     Constipation: No     Stool Occurrence(s): 0        PHYSICAL EXAM:     From RN flowsheet:  Wt Readings from Last 3 Encounters:   11/07/21 50.8 kg (111 lb 14.4 oz)   10/27/21 54.9 kg (121 lb)   10/25/21 54.9 kg (121 lb 1.6 oz)     Blood pressure (!) 169/78, pulse 90, temperature 100.4 °F (38 °C), resp. rate 16, height 5' 3\" (1.6 m), weight 50.8 kg (111 lb 14.4 oz), SpO2 94 %.     Pain Scale 1: Adult Nonverbal Pain Scale  Pain Intensity 1: 0     Pain Location 1: Generalized     Pain Description 1: Aching  Pain Intervention(s) 1: Medication (see MAR), Repositioned, Distraction, Therapeutic presence      Constitutional: Awake, alert, anxious, confused, lying in bed, appears chronically ill  Eyes: pupils equal, anicteric  ENMT: no nasal discharge, dry mucous membranes  Cardiovascular: regular rhythm, distal pulses intact  Respiratory: breathing not labored, symmetric  Gastrointestinal: soft non-tender, +bowel sounds  Musculoskeletal: no deformity, no tenderness to palpation  Skin: warm, dry  Neurologic: following commands, moving all extremities, anxious, unable to answer orientation questions, yelling \"nurse\" frequently but unable to verbalize needs  Psychiatric: anxious affect, no hallucinations         HISTORY:     Active Problems:    ESRD (end stage renal disease) on dialysis ([address] (8/8/2018)      Secondary hyperparathyroidism of renal origin ([address] (8/8/2018)      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 [facility]) (8/8/2018)      CVA, old, cognitive deficits (8/8/2018)      New onset seizure ([address] (8/27/2018)      Hypertensive urgency (6/12/2021)      Elevated troponin (11/3/2021)      Past Medical History:   Diagnosis Date    Asthma     Bipolar affective disorder ([address]     Brain condition     Cataract     Chronic kidney disease     hemodialysis M-W-F    Diabetes ([address]     Hyperlipidemia     Hypertension     Paralytic strabismus, unspecified     Psychiatric disorder     Seizures ([facility] 08/27/2018      Past Surgical History:   Procedure Laterality Date    [place] INSJ TUNNELED CVC W/O SUBQ PORT/ AGE 5 YR/> N/A 8/9/2018     in-pt 474A, Insertion Tunneled Central Venous Catheter performed by Sean Ortiz MD at [address] [place] [address] LAB    [place] INSJ TUNNELED CVC W/O SUBQ PORT/ AGE 5 YR/> N/A 11/8/2019    INSERTION TUNNELED CENTRAL VENOUS CATHETER performed by Aguila Brito MD at [facility] [address] LAB      History reviewed. No pertinent family history. History reviewed, no pertinent family history.   Social History     Tobacco Use    Smoking status: Never Smoker    Smokeless tobacco: Never Used   Substance Use Topics    Alcohol use: No     Allergies   Allergen Reactions    Amoxicillin Unknown (comments)    Cleocin [Clindamycin Hcl] Unknown (comments)    Codeine Unknown (comments)    Lorcet 10/650 [Hydrocodone-Acetaminophen] Unknown (comments)    Penicillin G Benzathine Unknown (comments)    Shellfish Derived Unknown (comments) Current Facility-Administered Medications   Medication Dose Route Frequency    morphine (ROXANOL) 100 mg/5 mL (20 mg/mL) concentrated solution 5 mg  5 mg SubLINGual Q1H PRN    insulin glargine (LANTUS) injection 3 Units  3 Units SubCUTAneous Q12H    ondansetron (ZOFRAN ODT) tablet 4 mg  4 mg SubLINGual Q6H PRN    LORazepam (INTENSOL) 2 mg/mL oral concentrate 1 mg  1 mg SubLINGual Q1H PRN    hyoscyamine SL (LEVSIN/SL) tablet 0.125 mg  0.125 mg SubLINGual Q4H PRN    bisacodyL (DULCOLAX) suppository 10 mg  10 mg Rectal DAILY PRN    atorvastatin (LIPITOR) tablet 20 mg  20 mg Oral DAILY    ziprasidone (GEODON) capsule 20 mg  20 mg Oral BID WITH MEALS    famotidine (PEPCID) tablet 20 mg  20 mg Oral DAILY    colchicine (MITIGARE) capsule 0.6 mg  0.6 mg Oral Q MON, WED & FRI    isosorbide mononitrate ER (IMDUR) tablet 60 mg  60 mg Oral DAILY    amLODIPine (NORVASC) tablet 10 mg  10 mg Oral DAILY    carvediloL (COREG) tablet 25 mg  25 mg Oral BID WITH MEALS    losartan (COZAAR) tablet 100 mg  100 mg Oral DAILY    hydrALAZINE (APRESOLINE) tablet 100 mg  100 mg Oral TID    levETIRAcetam (KEPPRA) tablet 500 mg  500 mg Oral Q24H    levETIRAcetam (KEPPRA) tablet 250 mg  250 mg Oral Q MON, WED & FRI    heparin (porcine) 1,000 unit/mL injection 5,000 Units  5,000 Units IntraCATHeter DIALYSIS PRN    insulin lispro (HUMALOG) injection   SubCUTAneous AC&HS    sodium chloride (NS) flush 5-40 mL  5-40 mL IntraVENous PRN    acetaminophen (TYLENOL) tablet 650 mg  650 mg Oral Q6H PRN    Or    acetaminophen (TYLENOL) suppository 650 mg  650 mg Rectal Q6H PRN    aspirin chewable tablet 81 mg  81 mg Oral DAILY    sevelamer carbonate (RENVELA) tab 800 mg  800 mg Oral TID    cloNIDine (CATAPRES) 0.3 mg/24 hr patch 1 Patch  1 Patch TransDERmal Q7D    B complex-vitaminC-folic acid (NEPHROCAP) cap  1 Capsule Oral DAILY          LAB AND IMAGING FINDINGS:     Lab Results   Component Value Date/Time    WBC 10.4 11/09/2021 12:05 PM    HGB 8.8 (L) 11/09/2021 12:05 PM    PLATELET 639 67/51/8155 12:05 PM     Lab Results   Component Value Date/Time    Sodium 135 (L) 11/09/2021 12:05 PM    Potassium 4.1 11/09/2021 12:05 PM    Chloride 99 (L) 11/09/2021 12:05 PM    CO2 29 11/09/2021 12:05 PM    BUN 28 (H) 11/09/2021 12:05 PM    Creatinine 7.71 (H) 11/09/2021 12:05 PM    Calcium 9.6 11/09/2021 12:05 PM    Magnesium 2.2 10/27/2021 01:00 PM    Phosphorus 3.9 11/08/2021 04:22 AM      Lab Results   Component Value Date/Time    Alk. phosphatase 68 11/09/2021 12:05 PM    Protein, total 6.9 11/09/2021 12:05 PM    Albumin 2.9 (L) 11/09/2021 12:05 PM    Globulin 4.0 11/09/2021 12:05 PM     Lab Results   Component Value Date/Time    INR 1.0 10/20/2021 02:20 PM    Prothrombin time 13.3 10/20/2021 02:20 PM    aPTT 22.6 (L) 07/20/2021 01:15 PM      Lab Results   Component Value Date/Time    Iron 46 (L) 03/20/2021 02:02 PM    TIBC 173 (L) 03/20/2021 02:02 PM    Iron % saturation 27 03/20/2021 02:02 PM    Ferritin 1,396 (H) 03/20/2021 02:02 PM      No results found for: PH, PCO2, PO2  No components found for: [place]   Lab Results   Component Value Date/Time     (H) 11/04/2021 08:40 AM    CK - MB 3.8 (H) 11/04/2021 08:40 AM                Total time: 25 minutes  Counseling / coordination time, spent as noted above:   > 50% counseling / coordination?: yes, patient, family    Prolonged service was provided for  []30 min   []75 min in face to face time in the presence of the patient, spent as noted above. Time Start:   Time End:   Note: this can only be billed with 04858 (initial) or 19047 (follow up). If multiple start / stop times, list each separately.

## 2021-11-10 NOTE — PROGRESS NOTES
Problem: Dysphagia (Adult)  Goal: *Acute Goals and Plan of Care (Insert Text)  Description: 1. Pt and family will participate in training and education with regard to comfort feeds, comp swallow strategies/positioning for improved comfort with intake in 4/5 trials-met     Recommend:   Comfort feeds of puree and HTL  Meds in puree  Feeding assist   HOB >45 degrees during all intake and for at least 30 min after intake  Small bites/sips, slow rate of intake   Oral care post meals via toothette     Outcome: Resolved/Met    SPEECH LANGUAGE PATHOLOGY BEDSIDE SWALLOW EVALUATION/TREATMENT & DISCHARGE    Patient: Ale Price (82 y.o. female)  Date: 11/10/2021  Primary Diagnosis: Elevated troponin [R77.8]  Precautions: Fall, Aspiration  PLOF: As per H&P    ASSESSMENT :  Based on the objective data described below, the patient presents with mod oral and moderately severe pharyngeal dysphagia. SLP ordered for determination of most appropriate comfort diet as pt completed MBS 11/8/21 that showed silent airway compromise across all consistencies presented. Pt seen today at bedside with sister present. Comfort diet initiated; however, pt unable to masticate/clear easy to chew items from tray. Pt demo delayed/discoordinated a-p transit of puree and HTL presentations. Demo delayed swallow initiation, weakened laryngeal elevation to palpation and wet/weak cough post intake. Recommend puree, HTL comfort diet with use of comp strategies for improved comfort/safety. TREATMENT :  Training and education completed with pt's sister following evaluation including diet recs, continued aspiration risk, positioning, comp strategies with sister able to verbalize understanding. Will sign off as goals have been met. D/w RN.      PLAN :  Recommendations and Planned Interventions:  As above   Frequency/Duration: Evaluation/tx only   Discharge Recommendations: Discharge with hospice care      SUBJECTIVE:   Patient stated Ok    OBJECTIVE:     Past Medical History:   Diagnosis Date    Asthma     Bipolar affective disorder ([facility]     Brain condition     Cataract     Chronic kidney disease     hemodialysis M-W-F    Diabetes ([facility]     Hyperlipidemia     Hypertension     Paralytic strabismus, unspecified     Psychiatric disorder     Seizures ([facility] 08/27/2018     Past Surgical History:   Procedure Laterality Date    [place] INSJ TUNNELED CVC W/O SUBQ PORT/ AGE 3 YR/> N/A 8/9/2018     in-pt 474A, Insertion Tunneled Central Venous Catheter performed by Te Hitchcock MD at [address] [place] [address]    [place] INSJ TUNNELED CVC W/O SUBQ PORT/ AGE 5 YR/> N/A 11/8/2019    INSERTION TUNNELED CENTRAL VENOUS CATHETER performed by Donte Ayala MD at [facility] LAB     Prior Level of Function/Home Situation:  Home Situation  Home Environment: Long term care  Support Systems: [employer]  Diet prior to admission: unknown   Current Diet:  easy to chew with thin liquids; recommend puree/HTL for comfort   Cognitive and Communication Status:  Neurologic State: Alert, Confused  Orientation Level: Oriented to person  Cognition: Follows commands, Memory loss, Poor safety awareness  Perception: Appears intact  Perseveration: No perseveration noted  Safety/Judgement: Fall prevention  Oral Assessment:  Oral Assessment  Labial: Impaired coordination  Oral Hygiene: Fair  Lingual: Incoordinated; Decreased strength  Velum: No impairment  Mandible: No impairment  P.O. Trials:  Patient Position: 45 at [facility]  Vocal quality prior to P.O.: Low volume  Consistency Presented: Honey thick liquid; Pudding  How Presented: SLP-fed/presented; Cup/sip; Spoon  Bolus Acceptance: No impairment  Bolus Formation/Control: Impaired  Type of Impairment: Delayed; Poor; Posterior; Mastication  Propulsion: Delayed (# of seconds); Discoordination  Oral Residue: Less than 10% of bolus;  Lingual  Initiation of Swallow: Delayed (# of seconds)  Laryngeal Elevation: Decreased; Weak  Aspiration Signs/Symptoms: Change vocal quality; Clear throat; Delayed cough/throat clear (Silent airway compromise on MBS completed 11/8/21)  Pharyngeal Phase Characteristics: Poor endurance; Easily fatigued ; Altered vocal quality; Suspected pharyngeal residue  Effective Modifications: Small sips and bites; Alternate liquids/solids  Cues for Modifications: Maximal  Oral Phase Severity: Moderate  Pharyngeal Phase Severity : Moderate-severe    PAIN:  Start of Eval: not reported   End of Eval: not reported      After treatment:   []            Patient left in no apparent distress sitting up in chair  [x]            Patient left in no apparent distress in bed  [x]            Call bell left within reach  [x]            Nursing notified  [x]            Family present  []            Caregiver present  []            Bed alarm activated    COMMUNICATION/EDUCATION:   [x]            Aspiration precautions; swallow safety; compensatory techniques. [x]            Patient/family have participated as able in goal setting and plan of care. []            Patient/family agree to work toward stated goals and plan of care. []            Patient understands intent and goals of therapy; neutral about participation. []            Patient unable to participate in goal setting/plan of care; educ ongoing with interdisciplinary staff  []         Posted safety precautions in patient's room.     Thank you for this referral,  Sumit Edwarsd M.S., [address] [facility]  Speech-Language Pathologist

## 2021-11-10 NOTE — DISCHARGE SUMMARY
Discharge Summary    Patient: Kvng Munguia               Sex: female          DOA: 11/3/2021         YOB: 1955      Age:  74 y.o.        LOS:  LOS: 5 days                Admit Date: 11/3/2021    Discharge Date: 11/10/2021    Primary care physician: Rae Riley MD    Discharge Diagnoses:    1. Hospice status with goal to transition to comfort measure only   2. Hypertensive encephalopathy      +Acute confusion, agitation associated with hypertensive emergency  3. Persistent moderate pericardial effusion previously on echocardiogram associated        +possible pericarditis caused recurrent chest pain, indeterminant troponin level elevation  4. Acute on chronic HFpEF   5. End-stage renal disease on dialysis  6. Bipolar disorder  7. Seizure disorder stable on Keppra doses  8. Normocytic anemia with CKD  9. Fall, mechanical, without Injury  10. Dysphagia with aspiration risk   11. Right upper quadrant pain, +nausea/vomiting, resolved. Negative CT abd/pelv      Discharge Condition: Good  Disposition: to facility with her hospice and transition to comfort care   Code Status:* DNR/DNI    Follow up for Primary Care Physician:*  1)  Please transition her off her medications as clinically indicated for her hospice care  2)  She can follow up with Nephrology if considering HD resumption   3)  She continue on comfort feed     Hospital Course:   74 y.o female with uncontrolled HTN, ESRD on HD, seizure disorder, bipolar disorder, type 2 DM, mild intermittent asthma, h/o recurrent chest pain, presented to ER on 11/03/21 with chest pain. She described vague left sided chest pain and mid sternal pain. In the ER, she was found indeterminate elevated troponin. Elevated BP. Cardiology consult and did not consider this as ACS hence no further workup recommended. She continued on aggressive BP control with her oral medications and HD.  She continue to yell and be agitated required sedative medications including haldol. Neurology evaluated her and deemed her agitation, encephalopathy was related to uncontrolled hypertension. No further seizure workup. She continued to have her oral antihypertensive medications adjusted and up trended. She has improvement with addition of Geodon BID. She was observed to have cough, nausea/vomiting with eating, MBS showed that she was having silent aspiration. NPO was placed but her sister requested to resume diet as patient does not want PEG Tube or feeding tube. She has RUQ pain and CT abd/pelv was also done to show normal finding. During her agitation episode, she sustained a fall on the floor. Xray of knees and hips were without fracture. Family has addressed her code status and goal of care with Palliative care team 21. Family has elevated hospice as she transitioned back to her nursing facility. Last 24 Hours: she continues to tolerates oral intake. She is able to take her oral medications. Speech recommended continue with Dysphagia diet with Pureed: Moderately Thicken (honey) fluid     Family at bedside agreed to her transition to hospice and comfort care outpatient.    Her dialysis has been stopped from Dr. Brian Mcgill     ROS:  No current fever/chills, no headache, no dizziness, no facial pain, +cough,   No swallowing pain, No chest pain, no palpitation, no shortness of breath, no abd pain,  No diarrhea, no urinary complaint, no leg pain or swelling    VS:   Visit Vitals  BP (!) 169/78 (BP 1 Location: Right upper arm, BP Patient Position: At rest)   Pulse 90   Temp 100.4 °F (38 °C)   Resp 16   Ht 5' 3\" (1.6 m)   Wt 50.8 kg (111 lb 14.4 oz)   LMP  (LMP Unknown)   SpO2 94%   BMI 19.82 kg/m²      Tmax/24hrs: Temp (24hrs), Av.9 °F (37.2 °C), Min:98.3 °F (36.8 °C), Max:100.4 °F (38 °C)      Intake/Output Summary (Last 24 hours) at 11/10/2021 0920  Last data filed at 2021 2141  Gross per 24 hour   Intake 240 ml   Output --   Net 240 ml       Tele: General:  Cooperative, Not in acute distress, speaks in short sentence while in bed  HEENT: PERRL, EOMI, supple neck, no JVD, dry oral mucosa  Cardiovascular: S1S2 regular, no rub/gallop   Pulmonary: air entry bilaterally, no wheezing, + crackle  GI:  Soft, non tender, non distended, +bs, no guarding   Extremities:  No pedal edema, +distal pulses appreciated   Neuro: AOx2, moving all extremities    Consults:   Dr. Skyla Joiner from nephrology    Significant Diagnostic Studies:   CT Results (most recent):  Results from [place] encounter on 11/03/21    CT ABD PELV W CONT    Narrative  EXAM: CT of the abdomen and pelvis    INDICATION: Right-sided abdominal pain, nausea vomiting    COMPARISON: CT abdomen pelvis 9/25/2020, 6/3/2020    TECHNIQUE: Axial CT imaging of the abdomen and pelvis was performed with 80 mL  of Isovue-300 intravenous contrast. Multiplanar reformats were generated. One or  more dose reduction techniques were used on this CT: automated exposure control,  adjustment of the mAs and/or kVp according to patient size, and iterative  reconstruction techniques. The specific techniques used on this CT exam have  been documented in the patient's electronic medical record. Digital Imaging and  Communications in Medicine (DICOM) format image data are available to  nonaffiliated external healthcare facilities or entities on a secured, media  free, reciprocally searchable basis with patient authorization for at least a  12-month period after this study. _______________    FINDINGS:    LOWER CHEST: Borderline cardiomegaly with small pericardial effusion    LIVER, BILIARY: Liver not enlarged. No definite mass. No biliary dilation. Gallbladder is unremarkable. PANCREAS: Motion degraded and poorly  from adjacent unopacified bowel. Possible 5 mm cystic lesion within the substance of the ventral body of the  pancreas image 25. Possible dilated side branch.     SPLEEN: Unremarkable    ADRENALS: Unremarkable    KIDNEYS/URETERS/BLADDER: Exophytic bilateral renal simple cyst. Kidneys are  similarly globally atrophic. No hydronephrosis or stones. LYMPH NODES: No appreciably enlarged lymph nodes    GASTROINTESTINAL TRACT: Stomach is nondistended. Oral contrast has reached the  level of the colon. No small bowel distention to suggest obstruction. Fecal  material mixed with dense oral contrast in the region of the cecum and ascending  colon which is mildly distended up to 5 cm. Rectum is gas distended,  nonspecific. Appendix is not definitively seen but no secondary signs of  inflammation. PELVIC ORGANS: Hysterectomy. VASCULATURE: Normal caliber aorta. Diffuse peripheral atherosclerotic  calcification    BONES: Degenerative change commensurate with age    OTHER: No ascites noted. .    _______________    Impression  1. Similar and symmetric renal atrophy. No hydronephrosis or stones. 2. No gastric or small bowel distention to suggest obstruction. Moderate colonic  fecal retention with some distention of the cecum and gaseous distention of the  rectum, nonspecific. 3. Borderline cardiomegaly with small amount of pericardial fluid partially  included at the lung base. 4. Possible small cystic lucency or perhaps dilated side branch focus in the  ventral substance of the pancreas, significantly motion degraded. Would consider  dedicated pancreatic CT or MRI for further characterization. Attending statement: I have personally reviewed both the study and this report  and concur with the above stated findings. CT (Most Recent).  CT Results (most recent):  Results from Hospital Encounter encounter on 11/03/21     CT HEAD WO CONT     Narrative  CT HEAD WO CONT     History: Seizure, altered, fatigue.     Comparison: 3/17/2021     TECHNIQUE: 5 mm helical scan obtained of the head were obtained from the skull  vertex through the base of the skull without intravenous contrast.     All CT scans at this facility are performed using dose optimization technique as  appropriate to a performed exam, to include automated exposure control,  adjustment of the mA and/or kV according to patient size (including appropriate  matching first site-specific examinations), or use of iterative reconstruction  technique.     BRAIN RESULT:     Similar mild generalized volume loss. Similar left frontal encephalomalacia. There is a right basal ganglia small amount which appears new since 3/17/2021  but ages otherwise indeterminate. No midline shift. No acute intracranial  hemorrhage. Basilar cisterns are patent.     Orbits, soft tissues are grossly normal. Hyperostosis. Limited visualized  paranasal sinuses are patent. Large left and trace right mastoid effusions.     Impression  New since 3/17/2021 subcentimeter right basal ganglia infarct but age is  otherwise indeterminate. If clarification is desired, may obtain MR. New large left and trace right mastoid effusions. Otherwise no significant interval change.             11/03/21    ECHO ADULT FOLLOW-UP OR LIMITED 11/04/2021 11/4/2021    Interpretation Summary  · LV: Estimated LVEF is 60 - 65%. Normal cavity size. Severe concentric hypertrophy. Hyperdynamic systolic function. · Pericardium: Moderate circumferential pericardial effusion measuring 1.3 mm. · No indications of tamponade present. Respiratory variation of mitral valve inflow is < 30%.     Signed by: Светлана Sanches MD on 11/4/2021  2:14 PM          Lab/Data Review:  Labs: Results:       Chemistry Recent Labs     11/09/21  1205 11/08/21 0422 11/07/21  1141   * 198* 189*   * 131* 132*   K 4.1 3.8 4.0   CL 99* 96* 97*   CO2 29 26 27   BUN 28* 43* 35*   CREA 7.71* 10.10* 8.24*   CA 9.6 9.5 9.7   AGAP 7 9 8   BUCR 4* 4* 4*   AP 68  --   --    TP 6.9  --   --    ALB 2.9*  --   --    GLOB 4.0  --   --    AGRAT 0.7*  --   --       CBC w/Diff Recent Labs     11/09/21  1205 11/08/21 0422 11/07/21  1141   WBC 10.4 11.5 11.5   RBC 3.07* 2.91* 3.16*   HGB 8.8* 8.4* 9.0*   HCT 27.7* 25.5* 28.0*    311 264   GRANS  --  79*  --    LYMPH  --  7*  --    EOS  --  4  --       Coagulation No results for input(s): PTP, INR, APTT, INREXT in the last 72 hours. Iron/Ferritin No results for input(s): IRON in the last 72 hours. No lab exists for component: TIBCCALC   BNP No results for input(s): BNPP in the last 72 hours. Cardiac Enzymes No results for input(s): CPK, CKND1, SONG in the last 72 hours. No lab exists for component: CKRMB, TROIP   Liver Enzymes Recent Labs     11/09/21  1205   TP 6.9   ALB 2.9*   AP 68      Thyroid Studies No results for input(s): T4, T3U, TSH, TSHEXT in the last 72 hours. No lab exists for component: T3RU       All Micro Results     Procedure Component Value Units Date/Time    COVID-19 RAPID TEST [[id number]] Collected: 11/03/21 2132    Order Status: Completed Specimen: Nasopharyngeal Updated: 11/03/21 2205     Specimen source Nasopharyngeal        COVID-19 rapid test Not detected        Comment: Rapid Abbott ID Now       Rapid NAAT:  The specimen is NEGATIVE for SARS-CoV-2, the novel coronavirus associated with COVID-19. Negative results should be treated as presumptive and, if inconsistent with clinical signs and symptoms or necessary for patient management, should be tested with an alternative molecular assay. Negative results do not preclude SARS-CoV-2 infection and should not be used as the sole basis for patient management decisions. This test has been authorized by the FDA under an Emergency Use Authorization (EUA) for use by authorized laboratories.    Fact sheet for Healthcare Providers: ConventionUpdate.co.nz  Fact sheet for Patients: ConventionUpdate.co.nz       Methodology: Isothermal Nucleic Acid Amplification                     Medications at discharge  including reasons for change and indications for new ones:   Current Discharge Medication List      START taking these medications    Details   colchicine (MITIGARE) 0.6 mg capsule Take 1 Capsule by mouth every Monday, Wednesday, Friday for 14 days. Indications: inflammation of the covering of the heart or pericardium  Qty: 6 Capsule, Refills: 0  Start date: 11/10/2021, End date: 11/24/2021      ziprasidone (GEODON) 20 mg capsule Take 1 Capsule by mouth two (2) times daily (with meals). Qty: 60 Capsule, Refills: 0  Start date: 11/10/2021      morphine (ROXANOL) 100 mg/5 mL (20 mg/mL) concentrated solution 0.13 mL by SubLINGual route every one (1) hour as needed for Pain or Shortness of Breath for up to 3 days. Max Daily Amount: 62.4 mg.  Qty: 30 mL, Refills: 0  Start date: 11/10/2021, End date: 11/13/2021    Associated Diagnoses: Hospice care      LORazepam (INTENSOL) 2 mg/mL concentrated solution 0.5 mL by SubLINGual route every one (1) hour as needed for Agitation, Anxiety or Restlessness. Max Daily Amount: 24 mg. Indications: anxious  Qty: 30 mL, Refills: 0  Start date: 11/10/2021    Associated Diagnoses: Hospice care         CONTINUE these medications which have CHANGED    Details   isosorbide mononitrate ER (IMDUR) 60 mg CR tablet Take 1 Tablet by mouth every morning. Qty: 30 Tablet, Refills: 0  Start date: 11/10/2021      hydrALAZINE (APRESOLINE) 100 mg tablet Take 1 Tablet by mouth three (3) times daily. Qty: 90 Tablet, Refills: 0  Start date: 11/10/2021         CONTINUE these medications which have NOT CHANGED    Details   cloNIDine (CATAPRES) 0.3 mg/24 hr 1 Patch by TransDERmal route every seven (7) days. Qty: 4 Patch, Refills: 0      amLODIPine (NORVASC) 10 mg tablet Take 1 Tablet by mouth daily. Qty: 30 Tablet, Refills: 0      losartan (COZAAR) 100 mg tablet Take 1 Tablet by mouth daily. Qty: 30 Tablet, Refills: 0      carvediloL (COREG) 25 mg tablet Take 1 Tablet by mouth two (2) times daily (with meals).   Qty: 60 Tablet, Refills: 0      atorvastatin (Lipitor) 10 mg tablet Take 10 mg by mouth daily. Indications: high amount of triglyceride in the blood      !! levETIRAcetam (Keppra) 500 mg tablet Take 500 mg by mouth two (2) times a day. !! levETIRAcetam (KEPPRA) 250 mg tablet Take 1 Tab by mouth every Monday, Wednesday, Friday. Qty: 30 Tab, Refills: 1      insulin lispro (HUMALOG) 100 unit/mL injection INITIATE INSULIN CORRECTIVE PROTOCOL: Normal Insulin Sensitivity   For Blood Sugar (mg/dL) of:     Less than 150 =   0 units           150 -199 =   2 units  200 -249 =   4 units  250 -299 =   6 units  300 -349 =   8 units  350 and above = 10 units and Call Physician  If 2 glucose readings are above  Qty: 1 Vial, Refills: 0       !! - Potential duplicate medications found. Please discuss with provider.       STOP taking these medications       LORazepam (Ativan) 0.5 mg tablet Comments:   Reason for Stopping:         b complex-vitamin c-folic acid (Virt-Caps) 1 mg capsule Comments:   Reason for Stopping:         diphenhydrAMINE (BenadryL) 25 mg capsule Comments:   Reason for Stopping:         famotidine (Pepcid) 20 mg tablet Comments:   Reason for Stopping:         polyethylene glycol (MIRALAX) 17 gram packet Comments:   Reason for Stopping:         sevelamer carbonate (RENVELA) 800 mg tab tab Comments:   Reason for Stopping:         aspirin 81 mg tablet Comments:   Reason for Stopping:                       Pending laboratory work and tests: none    Activity: Activity as tolerated, fall precaution     Diet: Dysphagia diet-pureed, moderately thicken (Honey) liquid    Wound Care: None needed      Time spent >30 minutes  French Fuller MD  11/10/2021  9:20 AM

## 2021-11-10 NOTE — PROGRESS NOTES
Report given to ELEAZAR Wade RN  at [facility]). Pick-up time scheduled at 1600H.

## 2021-11-10 NOTE — HOSPICE
Hospice referral received. Chart review in process. [facility].  If we can be of further assistance please contact [address] [place], [address] [place] [address]., [address] [facility], [address]  [phone]  Email: [email or web address]

## 2021-11-10 NOTE — ACP (ADVANCE CARE PLANNING)
Palliative Medicine    Advanced Steps Advance Care Planning Conversation  Kameron (Physician Orders for Scope of Treatment)       Date of conversation: 11/10/2021   Location: [facility]  Length (minutes): 40    Participants:     [x] Other Surrogate Decision Maker / Next of Kin    Name: Chelita Sweeney     Relationship to Patient: Sister     Phone Number: [phone]    Advanced Steps® ACP Facilitator: Carrie Aguilera RN      Conversation Topics   (If Patient does not have decision making capacity, Agent/Surrogate responds based on understanding of how patient would respond if capable)    Understanding of Medical Condition/s AND Potential Complications:    Healthcare Agent/Other Surrogate: Pt was transitioned to comfort measures yesterday. Mili Stanford was present today to sign a POST form. POST denotes DDNR status, comfort measures, stop dialysis and NO feeding tubes. Mili Lauraks is pt's legal NOK and signed the POST form.      Needs to discuss with spiritual/Rastafarian advisor: [] Yes  [x] No    Needs more information about illness and complications:  [x] Yes  [] No      Cardiopulmonary Resuscitation        Order Elected for CPR:  []  Attempt Resuscitation [x]  Do Not Attempt Resuscitation      When NOT in Cardiopulmonary Arrest, Order Elected:      [x] Comfort Measures- Stop dialysis   [] Limited Additional Interventions  [] Full Interventions    Artificially Administered Nutrition, Order Elected:    [x] No Feeding Tube   [] Feeding Tube for a defined trial period  [] Feeding Tube long-term if indicated    The following was provided (check all that apply):      Healthcare Decision Information Cards:   [] Help with Breathing Facts   [] Tube Feeding Facts   [] CPR Facts      [] Review of existing Advance Directive  [] Assistance with Completion of New Advance Directive   [x] Review of [place] POST Form       Meeting Outcomes:   [] ACP discussion completed   [x] [facility] form completed  [x] Kameron prepared for Provider review and signature   [x] Original placed on Chart, if in facility (form to be sent with patient at discharge)  [x] Copy given to healthcare agent    [] Copy scanned to electronic medical record     Follow-up plan:     [] Schedule follow-up conversation to continue planning   [x] Referred individual to Provider for additional questions/concerns   [x] Advised patient/agent/surrogate to review completed POST form and update if needed with changes in condition, patient preferences or care setting     [] This note routed to one or more involved healthcare providers    Pt remains DNR/DNI on comfort measures. POST completed and placed on the chart. Potential dispo plan is back to LTC facility with the support of [facility] today at 1600. Thank you for the Palliative Medicine consult and allowing us to participate in the care of Ms. Gustavo Aguilar. Will continue to monitor and provide support.     Charles Mcguire RN, BSN  Palliative Medicine Inpatient RN  [facility]  Palliative COPE Line: [phone] ([phone])

## 2021-11-10 NOTE — PROGRESS NOTES
Palliative care made her DNR/DNI, comfort Care/Hospice ,stopped dialysis after discussion with her Sister, her next of Kin.    Called Palliative care  & discussed with the concerned Palliative care & reminded her that before ordering stopping Dialysis palliative care must notify nephrologist.

## 2021-11-10 NOTE — PROGRESS NOTES
Transition of Care Plan to SNF/Rehab    SNF/Rehab Transition:  Patient has been accepted to [facility] and meets criteria for admission. Patient will  be transported by [facility] and expected to leave at 4pm.    Communication to Patient/Family:  Met with patient  (identified care giver) and they are agreeable to the transition plan. Communication to SNF/Rehab:  Bedside RN, Telly Wong, has been notified of the transition plan to the facility and to call report (phone number [phone]). Discharge information has been updated on the AVS. And communicated to facility via [facility], or CC link. SNF/Rehab Transition:    PCP/Specialist:     Nursing Please include all hard scripts for controlled substances, med rec and dc summary, and AVS in packet. Reviewed and confirmed with facility representative, Christy Oscar  at [address] they can manage the patient care needs for the following:     Dieter Roth with (X) only those applicable:    COVID Status  Patient is Covid Negative      Medication:  [x]  Medications will be available at the facility  []  IV Antibiotics   []  Controlled Substance - hard copy to be sent with patient   []  Weekly Labs    Documents:  [x] Hard RX  Number sent   [] MAR  [] Kardex  [] AVS  [] Wound care note  [x]Transfer Summary/Discharge Summary    Equipment:  []  CPAP/BiPAP  []  Wound Vacuum  []  Khan or Urinary Device  []  PICC/Central Line  []  Nebulizer  []  Ventilator    Treatment:  []Isolation (for MRSA, VRE, etc.)  []Surgical Drain Management  []Tracheostomy Care  []Dressing Changes  []Dialysis with transportation and chair time  Center Mode of tranpsort   []PEG Care  []Oxygen  []Daily Weights for Heart Failure    Dietary:  []Any diet limitations  []Tube Feedings   []Total Parenteral Management (TPN)    Eligible for Medicaid Long Term Services and Supports  Yes:  [] Eligible for medical assistance or will become eligible within 180 days and LTSS completed.    [] Provider/Patient and/or support system has requested screening. [x] LTSS copy provided to patient or responsible party, [] LTSS unavailable at discharge will send once processed to SNF provider.  [] LTSS  unavailable at discharged mailed to patient  [] LTSS performed by outside agency  on  with tracking number   No:   [] Private pay and is not financially eligible for Medicaid within the next 180 days. [] Reside out-of-state.   [] A residents of a state owned/operated facility that is licensed  by [address] or [facility]  [] Enrollment in [facility] hospice services  [] [address] [place] [address] [facility]  [] Patient /Family declines to have screening completed or provide financial information for screening          Financial Resources:  Medicaid    [] Initiated and application pending   [] Full coverage      Advanced Care Plan:  []Surrogate Decision Maker of Care  []POA  []Communicated Code Status/  POST    Other  Yasmin Stratton RN

## 2023-04-18 NOTE — ED TRIAGE NOTES
Pt arrived via EMS for reported chest pains. Per EMS pt received 2.5 hours of dialysis today when she started complaining of chest pain.
Yes

## 2024-02-27 NOTE — PROGRESS NOTES
Hospitalist Progress Note    Patient: Alexis Ferreira Age: 74 y.o. : 1955 MR#: [id number] SSN: [id number]  Date/Time: 10/21/2021 10:18 AM    DOA: 10/20/2021  PCP: Agus Andrews MD    Subjective:     She wants to eat. She does not have any more chest pain. She tolerated HD  SBP remains elevated since she missed her morning BP med. She has cough. CT without infectious process, there is concern for enlarging pericardial effusion  Echo pending       Interval Hospital Course:        ROS: No current fever/chills, no headache, no dizziness, no facial pain, +cough,   No swallowing pain, No chest pain, no palpitation, + shortness of breath, no abd pain,  No diarrhea, no urinary complaint, no leg pain or swelling      Assessment/Plan:   1. Hypertensive emergency   2. Chest pain, possible angina   3. Indeterminated troponin level,   4. Moderate pericardial effusion, previous Echo mention small   5. Acute on Chronic HFpEF, elevated proBNP  6.   Seizure disorder   7. Bipolar disorder   8. ESRD on HD     Will resume her cardiac medications to attempt at SBP goal <140. Spoke with ER nursing for further care  Echo follow up, no evidence of tamponade. Cardiology follows   Continue with HD for volume control and management   Seizure meds  Seizure precaution   ppi   Advance diet    Addendum; note fever. Check blood culture and urine culture.  Hold off antibiotics for now       Additional Notes:    Time spent >35 minutes    Case discussed with:  [x]Patient  []Family  [x]Nursing  [x]Case Management  DVT Prophylaxis:  []Lovenox  [x]Hep SQ  []SCDs  []Coumadin   []On Heparin gtt    Signed By: French Fuller MD     2021 10:18 AM              Objective:   VS:   Visit Vitals  BP (!) 160/82   Pulse 86   Temp 98.4 °F (36.9 °C) (Oral)   Resp 19   Ht 5' 3\" (1.6 m)   Wt 59.4 kg (131 lb)   LMP  (LMP Unknown)   SpO2 98%   BMI 23.21 kg/m²      Tmax/24hrs: Temp (24hrs), Av.6 °F (37 °C), Min:98.1 °F (36.7 °C), Noted   Max:99.3 °F (37.4 °C)  No intake or output data in the 24 hours ending 10/21/21 1018    Tele:   General:  Cooperative, Not in acute distress, speaks in short sentence while in bed  HEENT: PERRL, EOMI, supple neck, no JVD, dry oral mucosa  Cardiovascular: S1S2 regular, no rub/gallop   Pulmonary:  air entry bilaterally, no wheezing, + crackle  GI:  Soft, non tender, non distended, +bs, no guarding   Extremities:  No pedal edema, +distal pulses appreciated   Neuro: AOx2,  moving all extremities    Additional:       Current Facility-Administered Medications   Medication Dose Route Frequency    heparin (porcine) 1,000 unit/mL injection 5,000 Units  5,000 Units IntraCATHeter DIALYSIS PRN    sodium chloride (NS) flush 5-40 mL  5-40 mL IntraVENous Q8H    sodium chloride (NS) flush 5-40 mL  5-40 mL IntraVENous PRN    [Held by provider] heparin (porcine) injection 5,000 Units  5,000 Units SubCUTAneous Q8H    aspirin chewable tablet 81 mg  81 mg Oral DAILY    amLODIPine (NORVASC) tablet 10 mg  10 mg Oral DAILY    atorvastatin (LIPITOR) tablet 10 mg  10 mg Oral DAILY    carvediloL (COREG) tablet 25 mg  25 mg Oral BID WITH MEALS    [Held by provider] cloNIDine (CATAPRES) 0.3 mg/24 hr patch 1 Patch  1 Patch TransDERmal Q7D    diphenhydrAMINE (BENADRYL) capsule 25 mg  25 mg Oral Q8H PRN    famotidine (PEPCID) tablet 20 mg  20 mg Oral DAILY    isosorbide mononitrate ER (IMDUR) tablet 30 mg  30 mg Oral DAILY    [START ON 10/22/2021] levETIRAcetam (KEPPRA) tablet 250 mg  250 mg Oral Q MON, WED & FRI    levETIRAcetam (KEPPRA) tablet 500 mg  500 mg Oral BID    losartan (COZAAR) tablet 100 mg  100 mg Oral DAILY    sevelamer carbonate (RENVELA) tab 800 mg  800 mg Oral TID    insulin lispro (HUMALOG) injection   SubCUTAneous AC&HS    glucose chewable tablet 16 g  4 Tablet Oral PRN    glucagon (GLUCAGEN) injection 1 mg  1 mg IntraMUSCular PRN    dextrose (D50W) injection syrg 12.5-25 g  25-50 mL IntraVENous PRN    B complex-vitaminC-folic acid (NEPHROCAP) cap  1 Capsule Oral DAILY    hydrALAZINE (APRESOLINE) tablet 25 mg  25 mg Oral TID    haloperidoL (HALDOL) tablet 1 mg  1 mg Oral QHS     Current Outpatient Medications   Medication Sig    cloNIDine (CATAPRES) 0.3 mg/24 hr 1 Patch by TransDERmal route every seven (7) days.  minoxidiL (LONITEN) 2.5 mg tablet Take 2 Tablets by mouth two (2) times a day.  isosorbide mononitrate ER (IMDUR) 30 mg tablet Take 1 Tablet by mouth daily.  amLODIPine (NORVASC) 10 mg tablet Take 1 Tablet by mouth daily.  diphenhydrAMINE (BenadryL) 25 mg capsule Take 25 mg by mouth every eight (8) hours as needed for Allergies.  losartan (COZAAR) 100 mg tablet Take 1 Tablet by mouth daily.  carvediloL (COREG) 25 mg tablet Take 1 Tablet by mouth two (2) times daily (with meals).  famotidine (Pepcid) 20 mg tablet Take 1 Tablet by mouth daily.  polyethylene glycol (MIRALAX) 17 gram packet Take 1 Packet by mouth daily as needed for Constipation.  atorvastatin (Lipitor) 10 mg tablet Take 10 mg by mouth daily. Indications: high amount of triglyceride in the blood    b complex-vitamin c-folic acid 0.8 mg (NEPHRO-DORIAN) 0.8 mg tab tablet Take 1 Tab by mouth daily.  levETIRAcetam (Keppra) 500 mg tablet Take 500 mg by mouth two (2) times a day.  sevelamer carbonate (RENVELA) 800 mg tab tab Take 800 mg by mouth three (3) times daily.  levETIRAcetam (KEPPRA) 250 mg tablet Take 1 Tab by mouth every Monday, Wednesday, Friday.  insulin lispro (HUMALOG) 100 unit/mL injection INITIATE INSULIN CORRECTIVE PROTOCOL: Normal Insulin Sensitivity   For Blood Sugar (mg/dL) of:     Less than 150 =   0 units           150 -199 =   2 units  200 -249 =   4 units  250 -299 =   6 units  300 -349 =   8 units  350 and above = 10 units and Call Physician  If 2 glucose readings are above    aspirin 81 mg tablet Take 81 mg by mouth daily.               Lab/Data Review:  Labs: Results:       Chemistry Recent Labs     10/21/21  0413 10/20/21  1420   * 174*    140   K 4.0 3.4*    101   CO2 28 32   BUN 32* 30*   CREA 9.08* 7.72*   BUCR 4* 4*   AGAP 9 7   CA 9.0 9.7   PHOS 4.0  --      No results for input(s): TBIL, ALT, ALKP, TP, ALB, GLOB, AGRAT in the last 72 hours. No lab exists for component: SGOT   CBC w/Diff Recent Labs     10/21/21  0413 10/20/21  1420 10/20/21  1420   WBC 6.4  --  7.5   RBC 2.76*  --  3.20*   HGB 8.0*  --  9.5*   HCT 25.1*  --  29.0*   MCV 90.9   < > 90.6   MCH 29.0   < > 29.7   MCHC 31.9   < > 32.8   RDW 16.1*   < > 16.1*     --  223   GRANS 73  --  82*   LYMPH 10*  --  7*   EOS 3  --  3    < > = values in this interval not displayed. Coagulation Recent Labs     10/20/21  1420   PTP 13.3   INR 1.0       Iron/Ferritin Lab Results   Component Value Date/Time    Iron 46 (L) 03/20/2021 02:02 PM    TIBC 173 (L) 03/20/2021 02:02 PM    Iron % saturation 27 03/20/2021 02:02 PM    Ferritin 1,396 (H) 03/20/2021 02:02 PM       BNP    Cardiac Enzymes Lab Results   Component Value Date/Time     06/11/2021 01:35 PM    CK - MB 3.6 (H) 06/11/2021 01:35 PM    CK-MB Index 3.3 06/11/2021 01:35 PM    Troponin-I, QT 0.04 10/20/2021 05:44 PM        Lactic Acid    Thyroid Studies          All Micro Results     None            Images:    CT (Most Recent). CT Results (most recent):  Results from Hospital Encounter encounter on 10/20/21    CTA CHEST W OR W WO CONT    Narrative  CTA CHEST PULMONARY EMBOLISM PROTOCOL      INDICATION: Chest pain with positive d-dimer. Question pulmonary embolism.     TECHNIQUE: Thin collimation axial images obtained through the level of the  pulmonary arteries with additional imaging through the chest following the  uneventful administration of nonionic intravenous contrast.  Images  reconstructed into three dimensional coronal and sagittal projections for  complete evaluation of the tortuous and overlapping pulmonary vascular  structures and to reduce patient radiation dose. All CT scans at this facility are performed using dose optimization technique as  appropriate to a performed exam, to include automated exposure control,  adjustment of the mA and/or kV according to patient size (including appropriate  matching first site-specific examinations), or use of iterative reconstruction  technique. COMPARISON: None. FINDINGS:    No filling defects are appreciated within the main, left, right, lobar or  visualized segmental pulmonary arteries to suggest embolism. Thyroid: Unremarkable in its visualized aspects. Pericardium/ Heart: There is a moderate pericardial effusion. Aorta/ Vessels: No aneurysm or dissection. Lymph Nodes: Unremarkable. .    Lungs: There is septal thickening. There is bibasilar atelectasis. There is no  dense consolidation. Small bilateral pleural effusions, right greater than left. Upper Abdomen: Atrophic kidneys. Multiple left renal cysts. Bones/soft tissues: No acute finding    Impression  No evidence for pulmonary emboli. Mild interstitial edema. Small bilateral pleural effusions, right greater than left. Moderate pericardial effusion. XRAY (Most Recent)      EKG No results found for this or any previous visit. 2D ECHO 07/19/21    ECHO ADULT FOLLOW-UP OR LIMITED 07/20/2021 7/20/2021    Interpretation Summary  · LV: Estimated LVEF is 65 - 70%. Visually measured ejection fraction. Normal systolic function (ejection fraction normal). Small left ventricle. Severe concentric hypertrophy. Wall motion: normal. Global longitudinal strain is -11.2%. · Pericardium: Small circumferential pericardial effusion. Signed by: Karina Cho MD on 7/20/2021 12:01 PM         10/20/21    ECHO ADULT FOLLOW-UP OR LIMITED 10/21/2021 10/21/2021    Interpretation Summary  · LV: Estimated LVEF is 60 - 65%. Visually measured ejection fraction. Normal cavity size and systolic function (ejection fraction normal). Severe concentric hypertrophy. · Pericardium: Small-to-moderate circumferential pericardial effusion measuring 20 mm.     Signed by: Maren Patton MD on 10/21/2021  2:04 PM

## 2024-06-16 NOTE — ED NOTES
Problem: Pain  Goal: Verbalizes/displays adequate comfort level or baseline comfort level  Outcome: Progressing     Problem: Safety - Adult  Goal: Free from fall injury  Outcome: Progressing     Problem: Discharge Planning  Goal: Discharge to home or other facility with appropriate resources  Outcome: Progressing     Problem: ABCDS Injury Assessment  Goal: Absence of physical injury  Outcome: Progressing     Problem: Skin/Tissue Integrity  Goal: Absence of new skin breakdown  Description: 1.  Monitor for areas of redness and/or skin breakdown  2.  Assess vascular access sites hourly  3.  Every 4-6 hours minimum:  Change oxygen saturation probe site  4.  Every 4-6 hours:  If on nasal continuous positive airway pressure, respiratory therapy assess nares and determine need for appliance change or resting period.  Outcome: Progressing      Assumed care of pt.

## 2024-11-13 NOTE — H&P (VIEW-ONLY)
Surgery Consult      Patient: Sue Mullins MRN: [id number]  CSN: [id number]      YOB: 1955    Age: 70 y.o. Sex: female      DOA: 8/6/2018       HPI:     Sue Mullins is a 70 y.o. female who presents with CKD stage IV now in need of dialysis catheter access. No previous dialysis. She is right handed. No previous intravascular devices. No arm claudication or rest pain. Past Medical History:   Diagnosis Date    Asthma     Bipolar affective disorder ([facility]     Brain condition     Cataract     Chronic kidney disease     Diabetes ([facility]     Hyperlipidemia     Hypertension     Paralytic strabismus, unspecified     Psychiatric disorder        No past surgical history on file. No family history on file. Social History     Social History    Marital status:      Spouse name: N/A    Number of children: N/A    Years of education: N/A     Social History Main Topics    Smoking status: Never Smoker    Smokeless tobacco: Never Used    Alcohol use No    Drug use: No    Sexual activity: No     Other Topics Concern    Not on file     Social History Narrative    No narrative on file       Prior to Admission medications    Medication Sig Start Date End Date Taking? Authorizing Provider   aspirin 81 mg tablet Take 81 mg by mouth daily. Elina Light MD   captopril (CAPOTEN) 50 mg tablet Take 50 mg by mouth three (3) times daily. Elina Light MD   diltiazem CD (CARDIZEM CD) 240 mg ER capsule Take 240 mg by mouth daily. Elina Light MD   cloNIDine (CATAPRES) 0.1 mg tablet Take 0.1 mg by mouth two (2) times a day. Elina Light MD   furosemide (LASIX) 80 mg tablet Take 80 mg by mouth daily. Elina Light MD   hydrALAZINE (APRESOLINE) 25 mg tablet Take 25 mg by mouth three (3) times daily. Elina Light MD   lovastatin (MEVACOR) 40 mg tablet Take 40 mg by mouth nightly.       Elina Light MD   magnesium hydroxide ([employer] MILK OF MAGNESIA) 400 mg/5 mL suspension Take 30 mL The saphenous vein graft was selectively engaged, injected and visualized. by mouth daily as needed. Elina Light MD   insulin aspart (NOVOLOG) 100 unit/mL injection 12 Units by SubCUTAneous route. Elina Light MD   acetaminophen (TYLENOL) 325 mg tablet Take 325 mg by mouth as needed. Elnia Light MD   paricalcitol (ZEMPLAR) 1 mcg capsule Take 1 mcg by mouth every other day. Elina Light MD   potassium chloride SR (KLOR-CON 10) 10 mEq tablet Take 2 Tabs by mouth daily. 1/2/13   Debbie Fry NP       Allergies   Allergen Reactions    Amoxicillin Unknown (comments)    Cleocin [Clindamycin Hcl] Unknown (comments)    Codeine Unknown (comments)    Lorcet 10/650 [Hydrocodone-Acetaminophen] Unknown (comments)    Penicillin G Benzathine Unknown (comments)    Shellfish Derived Unknown (comments)       Physical Exam:      Visit Vitals    /86 (BP 1 Location: Left arm, BP Patient Position: At rest)    Pulse 75    Temp 97.5 °F (36.4 °C)    Resp 20    Ht 5' 3\" (1.6 m)    Wt 144 lb 13.5 oz (65.7 kg)    SpO2 98%    Breastfeeding No    BMI 25.66 kg/m2       GENERAL: alert, cooperative, no distress, appears stated age, THROAT & NECK: normal and no erythema or exudates noted. , LUNG: clear to auscultation bilaterally, HEART: regular rate and rhythm, S1, S2 normal, no murmur, click, rub or gallop, ABDOMEN: soft, non-tender. Bowel sounds normal. No masses,  no organomegaly, EXTREMITIES:  extremities normal, atraumatic, no cyanosis or edema, NEUROLOGIC: AOx3. Cranial nerves 3-12 and sensation grossly intact. ROS:  Constitutional: negative  Eyes: negative  Ears, nose, mouth, throat, and face: negative  Respiratory: negative  Cardiovascular: negative  Gastrointestinal: negative  Genitourinary:negative  Hematologic/lymphatic: negative  Musculoskeletal:negative  Neurological: negative  Behavioral/Psych: negative  Endocrine: negative  Allergic/Immunologic: negative  Unless otherwise mentioned in the HPI.     Data Review:    CBC:   Lab Results   Component Value Date/Time WBC 7.1 08/09/2018 02:42 AM    RBC 4.12 (L) 08/09/2018 02:42 AM    HGB 11.3 (L) 08/09/2018 02:42 AM    HCT 34.4 (L) 08/09/2018 02:42 AM    PLATELET 339 84/56/7777 02:42 AM      BMP:   Lab Results   Component Value Date/Time    Glucose 49 (LL) 08/09/2018 02:42 AM    Sodium 150 (H) 08/09/2018 02:42 AM    Potassium 3.3 (L) 08/09/2018 02:42 AM    Chloride 120 (H) 08/09/2018 02:42 AM    CO2 21 08/09/2018 02:42 AM    BUN 49 (H) 08/09/2018 02:42 AM    Creatinine 5.25 (H) 08/09/2018 02:42 AM    Calcium 8.2 (L) 08/09/2018 02:42 AM     Coagulation: No results found for: PTP, INR, APTT      Assessment/Plan     69 y/o female with acute on chronic renal failure now ESRD. --Will place permcath  --Will require outpatient work up for fistula. --Please call with any further questions or concerns.     Active Problems:    Acute on chronic renal insufficiency (8/7/2018)      Hypertension (8/7/2018)      ESRD (end stage renal disease) ([facility] (8/8/2018)      Secondary hyperparathyroidism of renal origin ([facility] (8/8/2018)      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) (8/8/2018)      CVA, old, cognitive deficits (8/8/2018)        Emelina Ortez MD  August 9, 2018

## 2025-02-09 NOTE — CONSULTS
Cardiology Initial Patient Referral Note    Cardiology referral request from Dr. Ludwig Fothergill for evaluation and management/treatment of chest pain, pericardial effusion. Date of  Admission: 10/20/2021  1:05 PM   Primary Care Physician:  Vesna Carrizales MD    Attending Cardiologist: Dr. Alcantara Plant:     Hospital Problems  Date Reviewed: 6/2/2021        Codes Class Noted POA    Cholecystitis ICD-10-CM: K81.9  ICD-9-CM: 575.10  10/20/2021 Unknown              -Chest pain. Atypical likely related to hypertensive crisis and vascular congestion, but also with known pericardial effusion as noted below. Presented from HD with CP and hypertensive crisis, HD session ended early. Initial troponin unremarkable. ECG with chronic nonspecific ECG abnormalitites. Multiple previous similar admissions. Conservative cardiac management has historically been recommended for her atypical chest pain. -H/o moderate loculated pericardial effusion adjacent to right ventricle and right atrium with no evidence of hemodynamic compromise by echo 6/2021. Echo 7/2021 with small improved circumferential pericardial effusion  -Hypertensive crisis with SBP >200. Skipped morning antihypertensives yesterday. -ESRD on HD MWF. HD session ended early yesterday due to CP and hypertensive crisis.  -Chronic HFpEF. EF 65-70% 7/2021 with severe concentric hypertrophy.  -Diabetes mellitus. Hgb A1c 6.5.  -Dyslipidemia. On statin. -H/o paroxysmal SVT. -Chronic anemia.  -Seizure disorder.  -Bipolar disorder. -H/o CVA. -Essentially wheelchair/bed bound, nursing home patient, patient is again concerned that she does not get all of the medications as prescribed at nursing home. No primary cardiologist. Consult this admission by Dr. Ludwig Fothergill. Previous cardiology evaluations have been inpatient. Plan:     Addendum: Independently seen and evaluated. Agree with below. Will review echocardiogram after hemodialysis.   There does not appear to be any hemodynamic compromise currently despite the fact that she is on dialysis. Will review echocardiogram when available. If there is no evidence of hemodynamic compromise, would continue conservative measures. Her episodes of chest pain are quite atypical for coronary ischemia. Continue volume management with HD. Will trend BP after HD session this AM.  Will review echocardiogram once completed after HD session. Will review follow up ECG and troponin. Will make further recommendations pending above testing results. History of Present Illness: This is a 74 y.o. female admitted for Cholecystitis [K81.9]. Patient complains of: CP. Patient is a wheelchair/bedbound patient with history of CVA, HTN, DM, dyslipidemia and small to moderate pericardial effusion in the past not requiring intervention. Patient has had multiple previous admissions for atypical CP in setting of volume overload and hypertensive crisis which improved with BP and volume management. Patient also with a known pericardial effusion which has been small to moderate in the past which seemed to improve with HD. Patient reports yesterday during HD she developed substernal chest pressure. Patients SBP was >200. HD session was cut short. Patient was transferred to ER. Patients BP was treated. Patient is now being dialyzed. Patients chest pain has resolved. Patient reports recent SOB and recent CHELI. Patient denies palp, syncope. Cardiac risk factors: dyslipidemia, diabetes mellitus, hypertension. Nonsmoker. Denies family history of cardiac disease.       Review of Symptoms:  Except as stated above include:  Constitutional:  negative  Respiratory:  negative  Cardiovascular:  negative  Gastrointestinal: negative  Genitourinary:  negative  Musculoskeletal:  Negative  Neurological:  Negative  Dermatological:  Negative  Endocrinological: Negative  Psychological:  Negative       Past Medical History:     Past Medical History:   Diagnosis Date  Asthma     Bipolar affective disorder ([facility]     Brain condition     Cataract     Chronic kidney disease     hemodialysis M-W-F    Diabetes ([facility]     Hyperlipidemia     Hypertension     Paralytic strabismus, unspecified     Psychiatric disorder     Seizures ([facility] 08/27/2018         Social History:     Social History     Socioeconomic History    Marital status: SINGLE     Spouse name: Not on file    Number of children: Not on file    Years of education: Not on file    Highest education level: Not on file   Tobacco Use    Smoking status: Never Smoker    Smokeless tobacco: Never Used   Substance and Sexual Activity    Alcohol use: No    Drug use: No    Sexual activity: Never     Social Determinants of Health     Financial Resource Strain:     Difficulty of Paying Living Expenses:    Food Insecurity:     Worried About Running Out of Food in the Last Year:     [address] in the Last Year:    Transportation Needs:     Lack of Transportation (Medical):  Lack of Transportation (Non-Medical):    Physical Activity:     Days of Exercise per Week:     Minutes of Exercise per Session:    Stress:     Feeling of Stress :    Social Connections:     Frequency of Communication with Friends and Family:     Frequency of Social Gatherings with Friends and Family:     Attends [employer] Services:     Active Member of Clubs or Organizations:     Attends Club or Organization Meetings:     Marital Status:         Family History:   History reviewed. No pertinent family history. Medications:      Allergies   Allergen Reactions    Amoxicillin Unknown (comments)    Cleocin [Clindamycin Hcl] Unknown (comments)    Codeine Unknown (comments)    Lorcet 10/650 [Hydrocodone-Acetaminophen] Unknown (comments)    Penicillin G Benzathine Unknown (comments)    Shellfish Derived Unknown (comments)        Current Facility-Administered Medications   Medication Dose Route Frequency    heparin (porcine) 1,000 unit/mL injection 5,000 Units  5,000 Units IntraCATHeter DIALYSIS PRN    sodium chloride (NS) flush 5-40 mL  5-40 mL IntraVENous Q8H    sodium chloride (NS) flush 5-40 mL  5-40 mL IntraVENous PRN    [Held by provider] heparin (porcine) injection 5,000 Units  5,000 Units SubCUTAneous Q8H    aspirin chewable tablet 81 mg  81 mg Oral DAILY    amLODIPine (NORVASC) tablet 10 mg  10 mg Oral DAILY    atorvastatin (LIPITOR) tablet 10 mg  10 mg Oral DAILY    carvediloL (COREG) tablet 25 mg  25 mg Oral BID WITH MEALS    [Held by provider] cloNIDine (CATAPRES) 0.3 mg/24 hr patch 1 Patch  1 Patch TransDERmal Q7D    diphenhydrAMINE (BENADRYL) capsule 25 mg  25 mg Oral Q8H PRN    famotidine (PEPCID) tablet 20 mg  20 mg Oral DAILY    isosorbide mononitrate ER (IMDUR) tablet 30 mg  30 mg Oral DAILY    [START ON 10/22/2021] levETIRAcetam (KEPPRA) tablet 250 mg  250 mg Oral Q MON, WED & FRI    levETIRAcetam (KEPPRA) tablet 500 mg  500 mg Oral BID    losartan (COZAAR) tablet 100 mg  100 mg Oral DAILY    sevelamer carbonate (RENVELA) tab 800 mg  800 mg Oral TID    insulin lispro (HUMALOG) injection   SubCUTAneous AC&HS    glucose chewable tablet 16 g  4 Tablet Oral PRN    glucagon (GLUCAGEN) injection 1 mg  1 mg IntraMUSCular PRN    dextrose (D50W) injection syrg 12.5-25 g  25-50 mL IntraVENous PRN    B complex-vitaminC-folic acid (NEPHROCAP) cap  1 Capsule Oral DAILY    hydrALAZINE (APRESOLINE) tablet 25 mg  25 mg Oral TID    haloperidoL (HALDOL) tablet 1 mg  1 mg Oral QHS     Current Outpatient Medications   Medication Sig    cloNIDine (CATAPRES) 0.3 mg/24 hr 1 Patch by TransDERmal route every seven (7) days.  minoxidiL (LONITEN) 2.5 mg tablet Take 2 Tablets by mouth two (2) times a day.  isosorbide mononitrate ER (IMDUR) 30 mg tablet Take 1 Tablet by mouth daily.  amLODIPine (NORVASC) 10 mg tablet Take 1 Tablet by mouth daily.     diphenhydrAMINE (BenadryL) 25 mg capsule Take 25 mg by mouth every eight (8) hours as needed for Allergies.  losartan (COZAAR) 100 mg tablet Take 1 Tablet by mouth daily.  carvediloL (COREG) 25 mg tablet Take 1 Tablet by mouth two (2) times daily (with meals).  famotidine (Pepcid) 20 mg tablet Take 1 Tablet by mouth daily.  polyethylene glycol (MIRALAX) 17 gram packet Take 1 Packet by mouth daily as needed for Constipation.  atorvastatin (Lipitor) 10 mg tablet Take 10 mg by mouth daily. Indications: high amount of triglyceride in the blood    b complex-vitamin c-folic acid 0.8 mg (NEPHRO-DORIAN) 0.8 mg tab tablet Take 1 Tab by mouth daily.  levETIRAcetam (Keppra) 500 mg tablet Take 500 mg by mouth two (2) times a day.  sevelamer carbonate (RENVELA) 800 mg tab tab Take 800 mg by mouth three (3) times daily.  levETIRAcetam (KEPPRA) 250 mg tablet Take 1 Tab by mouth every Monday, Wednesday, Friday.  insulin lispro (HUMALOG) 100 unit/mL injection INITIATE INSULIN CORRECTIVE PROTOCOL: Normal Insulin Sensitivity   For Blood Sugar (mg/dL) of:     Less than 150 =   0 units           150 -199 =   2 units  200 -249 =   4 units  250 -299 =   6 units  300 -349 =   8 units  350 and above = 10 units and Call Physician  If 2 glucose readings are above    aspirin 81 mg tablet Take 81 mg by mouth daily.            Physical Exam:     Visit Vitals  BP (!) 160/82   Pulse 86   Temp 98.4 °F (36.9 °C) (Oral)   Resp 19   Ht 5' 3\" (1.6 m)   Wt 131 lb (59.4 kg)   SpO2 98%   BMI 23.21 kg/m²       TELE: Not on monitor    BP Readings from Last 3 Encounters:   10/21/21 (!) 160/82   10/13/21 (!) 184/85   07/22/21 128/64     Pulse Readings from Last 3 Encounters:   10/21/21 86   10/13/21 97   07/22/21 83     Wt Readings from Last 3 Encounters:   10/21/21 131 lb (59.4 kg)   07/20/21 131 lb (59.4 kg)   06/14/21 131 lb 11.2 oz (59.7 kg)       General:  alert, cooperative, no distress, appears stated age  Neck:  no JVD  Lungs:  clear to auscultation bilaterally  Heart:  regular rate Strong peripheral pulses and rhythm  Abdomen:  abdomen is soft without significant tenderness, masses, organomegaly or guarding  Extremities:  extremities normal, atraumatic, no cyanosis or edema  Skin: Warm and dry.  no hyperpigmentation, vitiligo, or suspicious lesions  Neuro: alert, oriented x3, affect appropriate  Psych: non focal     Data Review:     Recent Labs     10/21/21  0413 10/20/21  1420   WBC 6.4 7.5   HGB 8.0* 9.5*   HCT 25.1* 29.0*    223     Recent Labs     10/21/21  0413 10/20/21  1420    140   K 4.0 3.4*    101   CO2 28 32   * 174*   BUN 32* 30*   CREA 9.08* 7.72*   CA 9.0 9.7   MG  --  2.5   PHOS 4.0  --    INR  --  1.0       Results for orders placed or performed during the hospital encounter of 10/20/21   EKG, 12 LEAD, INITIAL   Result Value Ref Range    Ventricular Rate 87 BPM    Atrial Rate 87 BPM    P-R Interval 126 ms    QRS Duration 82 ms    Q-T Interval 388 ms    QTC Calculation (Bezet) 466 ms    Calculated P Axis 86 degrees    Calculated R Axis 30 degrees    Calculated T Axis 94 degrees    Diagnosis       Normal sinus rhythm with sinus arrhythmia  Possible Left atrial enlargement  Indeterminate axis  Pulmonary disease pattern  Nonspecific T wave abnormality  Abnormal ECG  When compared with ECG of 13-OCT-2021 13:44,  No significant change was found  Confirmed by Ford Jalloh ([id number]) on 10/21/2021 9:49:21 AM         All Cardiac Markers in the last 24 hours:    Lab Results   Component Value Date/Time    TROIQ 0.04 10/20/2021 05:44 PM    TROIQ 0.03 10/20/2021 02:20 PM       Last Lipid:  No results found for: CHOL, CHOLX, CHLST, CHOLV, HDL, HDLP, LDL, LDLC, DLDLP, TGLX, TRIGL, TRIGP, CHHD, CHHDX    Cardiographics:     EKG Results     Procedure 720 Value Units Date/Time    EKG, 12 LEAD, INITIAL [[id number]] Collected: 10/20/21 1324    Order Status: Completed Updated: 10/21/21 0949     Ventricular Rate 87 BPM      Atrial Rate 87 BPM      P-R Interval 126 ms      QRS Duration 82 ms Q-T Interval 388 ms      QTC Calculation (Bezet) 466 ms      Calculated P Axis 86 degrees      Calculated R Axis 30 degrees      Calculated T Axis 94 degrees      Diagnosis --     Normal sinus rhythm with sinus arrhythmia  Possible Left atrial enlargement  Indeterminate axis  Pulmonary disease pattern  Nonspecific T wave abnormality  Abnormal ECG  When compared with ECG of 13-OCT-2021 13:44,  No significant change was found  Confirmed by Orlando Gutierrez ([id number]) on 10/21/2021 9:49:21 AM      EKG, 12 LEAD, INITIAL [[id number]]     Order Status: Sent         07/19/21    ECHO ADULT FOLLOW-UP OR LIMITED 07/20/2021 7/20/2021    Interpretation Summary  · LV: Estimated LVEF is 65 - 70%. Visually measured ejection fraction. Normal systolic function (ejection fraction normal). Small left ventricle. Severe concentric hypertrophy. Wall motion: normal. Global longitudinal strain is -11.2%. · Pericardium: Small circumferential pericardial effusion. Signed by: Nemesio Samaniego MD on 7/20/2021 12:01 PM        11/08/19    INVASIVE VASCULAR PROCEDURE 11/08/2019 11/8/2019    Narrative  Preoperative diagnosis: End-stage renal disease with poorly working dialysis catheter in need of new one    Postoperative diagnosis: End-stage renal disease with poorly working dialysis catheter in need of new one    Procedures performed:  #1  Dialysis catheter exchange over wire using 19 cm palindrome catheter through a right internal jugular vein approach. #2  Moderate conscious sedation of 13 minutes    Cultures: None    Specimens: None    Drains: None    Estimated blood loss: Less than 50 mL    Assistants: None    Implants: Please see above    Complications: None    Anesthesia: Moderate conscious sedation of 13 minutes was performed by an independent provider giving the medications per my discretion and monitoring of vital signs throughout the case. Indications for the procedure:   Celine Montanez is a 75 y.o. female with end-stage renal disease and poorly working dialysis catheter in need a new one. Patient was given the appropriate risk and benefits of the procedure including but not limited to bleeding, infection, damage to adjacent structures, MI, stroke, death, loss of lower extremity, need for further surgery. Patient was understanding of all the risks and underwent a procedure. Operative findings:  #1  Appropriately placed right IJ permanent dialysis catheter    Procedure:  Patient was correctly identified in the precath area and taken to the Cath Lab in stable condition. Patient had pre-incision timeout prior to any incision. Patient was prepped and draped in the normal sterile fashion according to CDC guidelines aseptic technique. We were able to numb the patient up appropriately from the catheter insertion site all the way to the venous insertion site with 1% lidocaine. We then were able to place an Amplatz superstiff wire down the previous port into the vena cava. A combination of blunt and sharp dissection was used in order to remove the previous catheter and remove the cuff. Once the catheter was removed over wire new palindrome dialysis catheter was placed. X-ray was taken showing the tip of the catheter within the sinoatrial junction with good curvature of the catheter without any kinking. There is no evidence of pneumothorax and the catheter is good for use. We secured the catheter using 2-0 Prolene suture at both butterfly holes and catheter insertion site with Biopatch and Tegaderm being placed. Each port was easily flushed and aspirated and inserted with 1.6 mL of hot heparin. We then were able to cap and wrap the ports with 4 x 4 and tape. Patient tolerated procedure well without any issues and was taken to the recovery area.     Signed by: Ananda Gaspar MD on 11/8/2019  8:15 AM      XR Results (most recent):  Results from Hospital Encounter encounter on 10/20/21    XR CHEST PORT    Narrative  EXAM:  XR CHEST PORT    INDICATION:   Chest pain    COMPARISON: 10/13/2021 and priors. FINDINGS:  Similar moderate enlargement of the cardiac silhouette. Pulmonary vascular  congestion. Diffuse interstitial opacities. No confluent consolidation. No  pneumothorax. Possibly small left pleural effusion. Intact osseous structures. Right jugular dialysis catheter. Impression  Enlarged cardiac silhouette with vascular congestion and mild pulmonary edema. Probably small left pleural effusion.         Signed By: SUSI Murillo     October 21, 2021

## (undated) DEVICE — SUTURE PERMA-HAND SZ 2-0 L24IN NONABSORBABLE BLK W/O NDL SA75H

## (undated) DEVICE — Device

## (undated) DEVICE — SUTURE ABSORBABLE BRAIDED 4-0 P-3 18 IN UD VICRYL J494G

## (undated) DEVICE — SUTURE PROL SZ 2-0 L36IN NONABSORBABLE BLU V-7 L26MM 1/2 8977H

## (undated) DEVICE — SUTURE GOR TX SZ 4-0 L30IN NONABSORBABLE L13MM TTC-13 3/8 5M02A

## (undated) DEVICE — SUTURE PROL SZ 5-0 L36IN NONABSORBABLE BLU L13MM C-1 3/8 8720H

## (undated) DEVICE — COVER US PRB W15XL120CM W/ GEL RUBBERBAND TAPE STRP FLD GEN

## (undated) DEVICE — GLOVE SURG BIOGEL 8.0 STRL -- SKINSENSE

## (undated) DEVICE — (D)PREP SKN CHLRAPRP APPL 26ML -- CONVERT TO ITEM 371833

## (undated) DEVICE — FLEX ADVANTAGE 1500CC: Brand: FLEX ADVANTAGE

## (undated) DEVICE — SUTURE COAT VCRL PC 5 PRECIS COSM CONVENTIONAL CUT PRIM 3 8 J823H

## (undated) DEVICE — SUT PROL 6-0 30IN C1 DA BLU --

## (undated) DEVICE — INTENDED FOR TISSUE SEPARATION, AND OTHER PROCEDURES THAT REQUIRE A SHARP SURGICAL BLADE TO PUNCTURE OR CUT.: Brand: BARD-PARKER SAFETY BLADES SIZE 11, STERILE

## (undated) DEVICE — GLOVE SURG SZ 7.5 L11.73IN FNGR THK9.8MIL STRW LTX POLYMER

## (undated) DEVICE — SET FLD ADMIN 3 W STPCOCK FIX FEM L BOR 1IN

## (undated) DEVICE — APPLICATOR BNDG 1MM ADH PREMIERPRO EXOFIN

## (undated) DEVICE — INTRODUCER SHTH 4FR CANN L11CM DIL TIP 25MM RED TUNGSTEN

## (undated) DEVICE — PACK PROCEDURE SURG VASC CATH 161 MMC LF

## (undated) DEVICE — INTENDED FOR TISSUE SEPARATION, AND OTHER PROCEDURES THAT REQUIRE A SHARP SURGICAL BLADE TO PUNCTURE OR CUT.: Brand: BARD-PARKER ® STAINLESS STEEL BLADES

## (undated) DEVICE — SUTURE VCRL SZ 3-0 L27IN ABSRB UD L26MM SH 1/2 CIR J416H

## (undated) DEVICE — TRAY CENTRAL LINE BIOPATCH --

## (undated) DEVICE — SUTURE PERMAHAND SZ 2-0 L30IN NONABSORBABLE BLK SILK W/O A305H

## (undated) DEVICE — SUTURE MCRYL SZ 4-0 L18IN ABSRB UD L19MM PS-2 3/8 CIR PRIM Y496G

## (undated) DEVICE — DRSG PATCH ANTIMIC 1INX7.0MM -- CONVERT TO ITEM 356054

## (undated) DEVICE — REM POLYHESIVE ADULT PATIENT RETURN ELECTRODE: Brand: VALLEYLAB

## (undated) DEVICE — DRAPE,ANGIO,BRACH,STERILE,38X44: Brand: MEDLINE

## (undated) DEVICE — DERMABOND SKIN ADH 0.7ML -- DERMABOND ADVANCED 12/BX

## (undated) DEVICE — KIT CLN UP BON SECOURS MARYV

## (undated) DEVICE — SOLUTION IV 1000ML 0.9% SOD CHL

## (undated) DEVICE — GUIDEWIRE VASC L180CM DIA0.035IN TIP L7CM PTFE S STL STR

## (undated) DEVICE — SUTURE PERMA-HAND SZ 3-0 L24IN NONABSORBABLE BLK W/O NDL SA74H

## (undated) DEVICE — GDWIRE ANGIO SUP STF 0.035IN --

## (undated) DEVICE — ANGIOGRAPHY KIT CUST VASC

## (undated) DEVICE — CATHETER DLYS PALINDROME

## (undated) DEVICE — PROCEDURE KIT FLUID MGMT 10 FR CUST MAINFOLD